# Patient Record
Sex: FEMALE | Race: BLACK OR AFRICAN AMERICAN | NOT HISPANIC OR LATINO | URBAN - METROPOLITAN AREA
[De-identification: names, ages, dates, MRNs, and addresses within clinical notes are randomized per-mention and may not be internally consistent; named-entity substitution may affect disease eponyms.]

---

## 2017-01-27 ENCOUNTER — EMERGENCY (EMERGENCY)
Facility: HOSPITAL | Age: 62
LOS: 1 days | Discharge: HOME | End: 2017-01-27
Attending: EMERGENCY MEDICINE | Admitting: EMERGENCY MEDICINE
Payer: COMMERCIAL

## 2017-01-27 VITALS
DIASTOLIC BLOOD PRESSURE: 106 MMHG | SYSTOLIC BLOOD PRESSURE: 152 MMHG | HEART RATE: 103 BPM | RESPIRATION RATE: 20 BRPM | TEMPERATURE: 99 F | OXYGEN SATURATION: 98 %

## 2017-01-27 DIAGNOSIS — S01.01XA LACERATION WITHOUT FOREIGN BODY OF SCALP, INITIAL ENCOUNTER: ICD-10-CM

## 2017-01-27 PROCEDURE — 70450 CT HEAD/BRAIN W/O DYE: CPT | Mod: 26

## 2017-01-27 PROCEDURE — 72125 CT NECK SPINE W/O DYE: CPT | Mod: 26

## 2017-01-27 PROCEDURE — 12001 RPR S/N/AX/GEN/TRNK 2.5CM/<: CPT

## 2017-01-27 PROCEDURE — 71010: CPT | Mod: 26

## 2017-01-27 PROCEDURE — 99285 EMERGENCY DEPT VISIT HI MDM: CPT | Mod: 25

## 2017-01-27 RX ORDER — ACETAMINOPHEN 500 MG
975 TABLET ORAL ONCE
Qty: 0 | Refills: 0 | Status: COMPLETED | OUTPATIENT
Start: 2017-01-27 | End: 2017-01-27

## 2017-01-27 RX ADMIN — Medication 975 MILLIGRAM(S): at 22:11

## 2017-01-27 NOTE — ED PROCEDURE NOTE - CPROC ED POST PROC CARE GUIDE1
Verbal/written post procedure instructions were given to patient/caregiver. Verbal/written post procedure instructions were given to patient/caregiver./Instructed patient/caregiver regarding signs and symptoms of infection./Instructed patient/caregiver to follow-up with primary care physician.

## 2017-01-27 NOTE — ED PROVIDER NOTE - PHYSICAL EXAMINATION
Gen: NAD  Eyes: PERRL, EOMI. sclerae white, no icterus  ENT: Moist mucous membranes. No exudates  Neck: supple, no LAD, mass or goiter, trachea midline  CV: RRR. Audible S1 and S2. No murmurs, rubs, gallops, S3, nor S4  Pulm: Clear to auscultation bilaterally. No wheezes, rales, or rhonchi  Abd: Obese, BS+, nondistended, No tenderness to palpation  Musculoskeletal:  No edema  Skin: 2cm lac on posterior scalp  Psych: mood good, affect full range and congruent with mood.  Neurologic: AAOx3, Cranial nerves grossly intact. 5/5 strength in all extrem

## 2017-01-27 NOTE — ED PROVIDER NOTE - OBJECTIVE STATEMENT
62 y/o F presents after fall. Walking up stairs with laundry basket w/ slippers, fell down 3 steps, hit head. Not sure if LOC. Sister was at home, ran immediately to see pt, states no LOC. C/O head soreness and soreness in mid back. Able to ambulate w/out difficulty.   PMHx: A-fib on Eliquis, HTN, DM, asthma  PSHx: None 62 y/o F presents after fall. Walking up stairs with laundry basket w/ slippers, fell down 3 steps, hit head. Not sure if LOC. Sister was at home, ran immediately to see pt, states no LOC. C/O head soreness and soreness in mid back. Able to ambulate w/out difficulty. Tetanus UTD  PMHx: A-fib on Eliquis, HTN, DM, asthma  PSHx: None

## 2017-01-27 NOTE — ED ADULT NURSE NOTE - OBJECTIVE STATEMENT
Pt presents to ED awake and alert, brought in by EMS s/p fall at home. Pt states she was walking up her stairs carrying laundry and lost her footing and fell backwards. Pt is unsure about LOC but did strike her head. Pt also reports mid back pain. Laceration to the occiput and bleeding is controlled. Pt denies neck pain but EMS did place a collar. Pt denies feeling dizzy, chest pain or SOB prior to falling. Pt takes Eloquis for Afib and has h/o HTN and DM. Pt was ambulatory at the scene after falling.

## 2017-01-27 NOTE — ED PROVIDER NOTE - CARE PLAN
Principal Discharge DX:	Fall  Secondary Diagnosis:	Laceration Principal Discharge DX:	Fall, initial encounter  Secondary Diagnosis:	Laceration

## 2017-01-27 NOTE — ED PROVIDER NOTE - ATTENDING CONTRIBUTION TO CARE
****ATTENDING**** 62yo f hx Afib on eliquis, HTN, DM presents s/p mechanical fall 2 steps onto her head. + trauma to the head, unknown loc, sister came immediately and patient was awake. Pt co headache at site of impact. no neck or back pain. no chest/abd, numbness, tingling or weakness. Denies any symptoms of prior illness or dizziness. On exam, Patient is awake, alert x 3. GCS15. eval laceration s/p ct scan and removal of collar. PERRL. C Collar in place, No Posterior midline cervical spine tenderness.  Chest is clear to auscultation. +S1S2. Abdomen is soft nondistended/nontender +BS. No rebound or guarding. Pelvis is stable. Full ROM B/L hips. Back non tender midline T/L spine.   Pt well appearing, neurologically intact. Check CT to ro injury. Tet utd. Pain control. Laceration repair.

## 2017-01-27 NOTE — ED PROVIDER NOTE - MEDICAL DECISION MAKING DETAILS
62 y/o F w/ A-fib on eliquis presents after fall, hit head, no LOC. Neuro exam WNL. Scalp lac. Plan: CT head and C-spine, lac repair.

## 2017-01-27 NOTE — ED PROVIDER NOTE - PROGRESS NOTE DETAILS
Patient noted to be tachycardic to 100, states she took her meds tonight and normally HR is in 90s. States she is nervous to be here. Also noted pulse ox to be 92 now. States she has history of atelectasis and states she is not able to take deep breaths with the collar. Will send labs and CT non con chest and continue to monitor. ROLY

## 2017-01-28 VITALS
OXYGEN SATURATION: 95 % | DIASTOLIC BLOOD PRESSURE: 78 MMHG | SYSTOLIC BLOOD PRESSURE: 124 MMHG | HEART RATE: 97 BPM | TEMPERATURE: 98 F | RESPIRATION RATE: 20 BRPM

## 2017-01-28 LAB
ALBUMIN SERPL ELPH-MCNC: 3.7 G/DL — SIGNIFICANT CHANGE UP (ref 3.3–5)
ALP SERPL-CCNC: 77 U/L — SIGNIFICANT CHANGE UP (ref 40–120)
ALT FLD-CCNC: 20 U/L RC — SIGNIFICANT CHANGE UP (ref 10–45)
ANION GAP SERPL CALC-SCNC: 11 MMOL/L — SIGNIFICANT CHANGE UP (ref 5–17)
ANISOCYTOSIS BLD QL: SLIGHT — SIGNIFICANT CHANGE UP
APTT BLD: 34.8 SEC — SIGNIFICANT CHANGE UP (ref 27.5–37.4)
AST SERPL-CCNC: 27 U/L — SIGNIFICANT CHANGE UP (ref 10–40)
BASE EXCESS BLDV CALC-SCNC: 1.6 MMOL/L — SIGNIFICANT CHANGE UP (ref -2–2)
BASO STIPL BLD QL SMEAR: PRESENT — SIGNIFICANT CHANGE UP
BILIRUB SERPL-MCNC: 0.8 MG/DL — SIGNIFICANT CHANGE UP (ref 0.2–1.2)
BUN SERPL-MCNC: 22 MG/DL — SIGNIFICANT CHANGE UP (ref 7–23)
BURR CELLS BLD QL SMEAR: PRESENT — SIGNIFICANT CHANGE UP
CA-I SERPL-SCNC: 1.25 MMOL/L — SIGNIFICANT CHANGE UP (ref 1.12–1.3)
CALCIUM SERPL-MCNC: 9.3 MG/DL — SIGNIFICANT CHANGE UP (ref 8.4–10.5)
CHLORIDE BLDV-SCNC: 108 MMOL/L — SIGNIFICANT CHANGE UP (ref 96–108)
CHLORIDE SERPL-SCNC: 106 MMOL/L — SIGNIFICANT CHANGE UP (ref 96–108)
CO2 BLDV-SCNC: 28 MMOL/L — SIGNIFICANT CHANGE UP (ref 22–30)
CO2 SERPL-SCNC: 27 MMOL/L — SIGNIFICANT CHANGE UP (ref 22–31)
CREAT SERPL-MCNC: 1.06 MG/DL — SIGNIFICANT CHANGE UP (ref 0.5–1.3)
DACRYOCYTES BLD QL SMEAR: SIGNIFICANT CHANGE UP
ELLIPTOCYTES BLD QL SMEAR: SLIGHT — SIGNIFICANT CHANGE UP
EOSINOPHIL NFR BLD AUTO: 1 % — SIGNIFICANT CHANGE UP (ref 0–6)
GAS PNL BLDV: 142 MMOL/L — SIGNIFICANT CHANGE UP (ref 136–145)
GAS PNL BLDV: SIGNIFICANT CHANGE UP
GAS PNL BLDV: SIGNIFICANT CHANGE UP
GLUCOSE BLDV-MCNC: 122 MG/DL — HIGH (ref 70–99)
GLUCOSE SERPL-MCNC: 125 MG/DL — HIGH (ref 70–99)
HCO3 BLDV-SCNC: 27 MMOL/L — SIGNIFICANT CHANGE UP (ref 21–29)
HCT VFR BLD CALC: 34.1 % — LOW (ref 34.5–45)
HCT VFR BLDA CALC: 34 % — LOW (ref 39–50)
HGB BLD CALC-MCNC: 10.9 G/DL — LOW (ref 11.5–15.5)
HGB BLD-MCNC: 11.1 G/DL — LOW (ref 11.5–15.5)
HYPOCHROMIA BLD QL: SIGNIFICANT CHANGE UP
INR BLD: 1.45 RATIO — HIGH (ref 0.88–1.16)
LACTATE BLDV-MCNC: 1.7 MMOL/L — SIGNIFICANT CHANGE UP (ref 0.7–2)
LYMPHOCYTES # BLD AUTO: 1.4 K/UL — SIGNIFICANT CHANGE UP (ref 1–3.3)
LYMPHOCYTES # BLD AUTO: 12 % — LOW (ref 13–44)
MCHC RBC-ENTMCNC: 21.6 PG — LOW (ref 27–34)
MCHC RBC-ENTMCNC: 32.5 GM/DL — SIGNIFICANT CHANGE UP (ref 32–36)
MCV RBC AUTO: 66.4 FL — LOW (ref 80–100)
MICROCYTES BLD QL: SIGNIFICANT CHANGE UP
MONOCYTES # BLD AUTO: 1.1 K/UL — HIGH (ref 0–0.9)
MONOCYTES NFR BLD AUTO: 10 % — SIGNIFICANT CHANGE UP (ref 2–14)
NEUTROPHILS NFR BLD AUTO: 74 % — SIGNIFICANT CHANGE UP (ref 43–77)
NEUTS BAND # BLD: 2 % — SIGNIFICANT CHANGE UP (ref 0–8)
NRBC # BLD: 1 /100 — HIGH (ref 0–0)
PCO2 BLDV: 46 MMHG — SIGNIFICANT CHANGE UP (ref 35–50)
PH BLDV: 7.38 — SIGNIFICANT CHANGE UP (ref 7.35–7.45)
PLAT MORPH BLD: NORMAL — SIGNIFICANT CHANGE UP
PLATELET # BLD AUTO: 212 K/UL — SIGNIFICANT CHANGE UP (ref 150–400)
PO2 BLDV: 39 MMHG — SIGNIFICANT CHANGE UP (ref 25–45)
POIKILOCYTOSIS BLD QL AUTO: SIGNIFICANT CHANGE UP
POLYCHROMASIA BLD QL SMEAR: SLIGHT — SIGNIFICANT CHANGE UP
POTASSIUM BLDV-SCNC: 4.1 MMOL/L — SIGNIFICANT CHANGE UP (ref 3.5–5)
POTASSIUM SERPL-MCNC: 4.3 MMOL/L — SIGNIFICANT CHANGE UP (ref 3.5–5.3)
POTASSIUM SERPL-SCNC: 4.3 MMOL/L — SIGNIFICANT CHANGE UP (ref 3.5–5.3)
PROT SERPL-MCNC: 6.7 G/DL — SIGNIFICANT CHANGE UP (ref 6–8.3)
PROTHROM AB SERPL-ACNC: 15.7 SEC — HIGH (ref 10–13.1)
RBC # BLD: 5.13 M/UL — SIGNIFICANT CHANGE UP (ref 3.8–5.2)
RBC # FLD: 17 % — HIGH (ref 10.3–14.5)
RBC BLD AUTO: ABNORMAL
SAO2 % BLDV: 65 % — LOW (ref 67–88)
SCHISTOCYTES BLD QL AUTO: SLIGHT — SIGNIFICANT CHANGE UP
SODIUM SERPL-SCNC: 144 MMOL/L — SIGNIFICANT CHANGE UP (ref 135–145)
TARGETS BLD QL SMEAR: SLIGHT — SIGNIFICANT CHANGE UP
VARIANT LYMPHS # BLD: 1 % — SIGNIFICANT CHANGE UP (ref 0–6)
WBC # BLD: 10.7 K/UL — HIGH (ref 3.8–10.5)
WBC # FLD AUTO: 10.7 K/UL — HIGH (ref 3.8–10.5)

## 2017-01-28 PROCEDURE — 72125 CT NECK SPINE W/O DYE: CPT

## 2017-01-28 PROCEDURE — 82330 ASSAY OF CALCIUM: CPT

## 2017-01-28 PROCEDURE — 71045 X-RAY EXAM CHEST 1 VIEW: CPT

## 2017-01-28 PROCEDURE — 71250 CT THORAX DX C-: CPT

## 2017-01-28 PROCEDURE — 12001 RPR S/N/AX/GEN/TRNK 2.5CM/<: CPT

## 2017-01-28 PROCEDURE — 71250 CT THORAX DX C-: CPT | Mod: 26

## 2017-01-28 PROCEDURE — 70450 CT HEAD/BRAIN W/O DYE: CPT

## 2017-01-28 PROCEDURE — 80053 COMPREHEN METABOLIC PANEL: CPT

## 2017-01-28 PROCEDURE — 84132 ASSAY OF SERUM POTASSIUM: CPT

## 2017-01-28 PROCEDURE — 85610 PROTHROMBIN TIME: CPT

## 2017-01-28 PROCEDURE — 82435 ASSAY OF BLOOD CHLORIDE: CPT

## 2017-01-28 PROCEDURE — 85014 HEMATOCRIT: CPT

## 2017-01-28 PROCEDURE — 82803 BLOOD GASES ANY COMBINATION: CPT

## 2017-01-28 PROCEDURE — 99284 EMERGENCY DEPT VISIT MOD MDM: CPT | Mod: 25

## 2017-01-28 PROCEDURE — 85730 THROMBOPLASTIN TIME PARTIAL: CPT

## 2017-01-28 PROCEDURE — 82947 ASSAY GLUCOSE BLOOD QUANT: CPT

## 2017-01-28 PROCEDURE — 84295 ASSAY OF SERUM SODIUM: CPT

## 2017-01-28 PROCEDURE — 83605 ASSAY OF LACTIC ACID: CPT

## 2017-01-28 PROCEDURE — 85027 COMPLETE CBC AUTOMATED: CPT

## 2017-01-28 RX ORDER — SODIUM CHLORIDE 9 MG/ML
500 INJECTION INTRAMUSCULAR; INTRAVENOUS; SUBCUTANEOUS ONCE
Qty: 0 | Refills: 0 | Status: COMPLETED | OUTPATIENT
Start: 2017-01-28 | End: 2017-01-28

## 2017-01-28 RX ADMIN — SODIUM CHLORIDE 500 MILLILITER(S): 9 INJECTION INTRAMUSCULAR; INTRAVENOUS; SUBCUTANEOUS at 00:37

## 2017-01-28 NOTE — ED ADULT NURSE REASSESSMENT NOTE - NS ED NURSE REASSESS COMMENT FT1
Pt O2 sat 92% on room air. Dr. Baron notified and went to bedside to see pt. Pt sat up and coughed and took deep breaths which raised O2 sat to 94% on room air. Pt placed on 2L nasal cannula as per MD and CT chest and labs ordered d/t persistent hypoxia on room air.

## 2017-01-28 NOTE — ED ADULT NURSE REASSESSMENT NOTE - NS ED NURSE REASSESS COMMENT FT1
Discussed Ct results and dispo with Dr. Jaime and Dr. Lira. Pt O2 sat is 95% on room air while she is taking deep breaths. Pt denies respiratory distress or SOB. Family states pt seems to be breathing as she normally does. Pt is ambulatory to and from the restroom. Pt has a scheduled appt with her pulmonologist in one week. Pt will return to either the ED or her PCP for staple removal. Pt urged to return to the ED for any concerns of worsening SOB or any respiratory distress. Dr. Lira and Dr. Jaime OK with pt being d/becca.

## 2017-02-04 ENCOUNTER — EMERGENCY (EMERGENCY)
Facility: HOSPITAL | Age: 62
LOS: 1 days | Discharge: HOME | End: 2017-02-04
Attending: EMERGENCY MEDICINE | Admitting: EMERGENCY MEDICINE
Payer: COMMERCIAL

## 2017-02-04 VITALS
SYSTOLIC BLOOD PRESSURE: 136 MMHG | DIASTOLIC BLOOD PRESSURE: 83 MMHG | TEMPERATURE: 99 F | OXYGEN SATURATION: 96 % | HEART RATE: 90 BPM | RESPIRATION RATE: 16 BRPM

## 2017-02-04 DIAGNOSIS — S01.01XD LACERATION WITHOUT FOREIGN BODY OF SCALP, SUBSEQUENT ENCOUNTER: ICD-10-CM

## 2017-02-04 DIAGNOSIS — I10 ESSENTIAL (PRIMARY) HYPERTENSION: ICD-10-CM

## 2017-02-04 DIAGNOSIS — E11.9 TYPE 2 DIABETES MELLITUS WITHOUT COMPLICATIONS: ICD-10-CM
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
## 2017-02-04 DIAGNOSIS — Z79.84 LONG TERM (CURRENT) USE OF ORAL HYPOGLYCEMIC DRUGS: ICD-10-CM

## 2017-02-04 PROCEDURE — G0463: CPT

## 2017-02-04 NOTE — ED PROVIDER NOTE - CARE PLAN
Principal Discharge DX:	Removal of staples  Instructions for follow-up, activity and diet:	1. Have your blood pressure check by your doctor next week  2. You may wash your hair  3. Return If you have any problems

## 2017-02-04 NOTE — ED PROVIDER NOTE - OBJECTIVE STATEMENT
Fall descending stairs 8d ago. No loc no chest pain shortness of breath, weakness,. Seen in ed had scalp lac stabled, Now pw req for remvoal of same.Has been well since except mild gen soreness. Private Physician HTN, Dm

## 2017-02-04 NOTE — ED ADULT NURSE NOTE - OBJECTIVE STATEMENT
Pt presents to the ER A+Ox3 for staple removal status post fall x8 days ago. Pt states was walking downstairs tripped and fell hit head; 4 staples were placed at that time. (-) redness, swelling, fever, chills, nausea, vomiting.

## 2017-02-04 NOTE — ED PROVIDER NOTE - PLAN OF CARE
1. Have your blood pressure check by your doctor next week  2. You may wash your hair  3. Return If you have any problems

## 2017-02-22 ENCOUNTER — APPOINTMENT (OUTPATIENT)
Dept: CARDIOLOGY | Facility: CLINIC | Age: 62
End: 2017-02-22

## 2017-02-22 VITALS
RESPIRATION RATE: 14 BRPM | DIASTOLIC BLOOD PRESSURE: 88 MMHG | BODY MASS INDEX: 42.17 KG/M2 | HEIGHT: 64 IN | WEIGHT: 247 LBS | OXYGEN SATURATION: 93 % | HEART RATE: 82 BPM | SYSTOLIC BLOOD PRESSURE: 145 MMHG

## 2017-03-24 ENCOUNTER — OUTPATIENT (OUTPATIENT)
Dept: OUTPATIENT SERVICES | Facility: HOSPITAL | Age: 62
LOS: 1 days | End: 2017-03-24

## 2017-03-24 ENCOUNTER — APPOINTMENT (OUTPATIENT)
Dept: CV DIAGNOSITCS | Facility: HOSPITAL | Age: 62
End: 2017-03-24

## 2017-03-24 DIAGNOSIS — I11.9 HYPERTENSIVE HEART DISEASE WITHOUT HEART FAILURE: ICD-10-CM

## 2017-03-24 DIAGNOSIS — R06.02 SHORTNESS OF BREATH: ICD-10-CM

## 2017-03-24 DIAGNOSIS — I10 ESSENTIAL (PRIMARY) HYPERTENSION: ICD-10-CM

## 2017-05-22 ENCOUNTER — APPOINTMENT (OUTPATIENT)
Dept: CARDIOLOGY | Facility: CLINIC | Age: 62
End: 2017-05-22

## 2017-05-22 ENCOUNTER — NON-APPOINTMENT (OUTPATIENT)
Age: 62
End: 2017-05-22

## 2017-05-22 VITALS — SYSTOLIC BLOOD PRESSURE: 134 MMHG | DIASTOLIC BLOOD PRESSURE: 87 MMHG

## 2017-05-22 VITALS — HEIGHT: 64 IN | WEIGHT: 254 LBS | HEART RATE: 82 BPM | BODY MASS INDEX: 43.36 KG/M2

## 2017-07-13 ENCOUNTER — APPOINTMENT (OUTPATIENT)
Dept: OPHTHALMOLOGY | Facility: CLINIC | Age: 62
End: 2017-07-13

## 2017-07-31 ENCOUNTER — APPOINTMENT (OUTPATIENT)
Dept: CARDIOLOGY | Facility: CLINIC | Age: 62
End: 2017-07-31
Payer: COMMERCIAL

## 2017-07-31 VITALS
OXYGEN SATURATION: 90 % | SYSTOLIC BLOOD PRESSURE: 126 MMHG | HEART RATE: 92 BPM | WEIGHT: 242 LBS | HEIGHT: 64 IN | DIASTOLIC BLOOD PRESSURE: 82 MMHG | BODY MASS INDEX: 41.32 KG/M2

## 2017-07-31 PROCEDURE — 93000 ELECTROCARDIOGRAM COMPLETE: CPT

## 2017-07-31 PROCEDURE — 99214 OFFICE O/P EST MOD 30 MIN: CPT

## 2017-08-01 ENCOUNTER — NON-APPOINTMENT (OUTPATIENT)
Age: 62
End: 2017-08-01

## 2017-10-30 ENCOUNTER — INPATIENT (INPATIENT)
Facility: HOSPITAL | Age: 62
LOS: 10 days | Discharge: HOME HEALTH | End: 2017-11-10
Attending: INTERNAL MEDICINE | Admitting: INTERNAL MEDICINE
Payer: COMMERCIAL

## 2017-10-30 VITALS
HEART RATE: 89 BPM | DIASTOLIC BLOOD PRESSURE: 94 MMHG | SYSTOLIC BLOOD PRESSURE: 122 MMHG | RESPIRATION RATE: 18 BRPM | OXYGEN SATURATION: 100 % | TEMPERATURE: 98 F

## 2017-10-30 DIAGNOSIS — E11.9 TYPE 2 DIABETES MELLITUS WITHOUT COMPLICATIONS: ICD-10-CM

## 2017-10-30 DIAGNOSIS — R06.00 DYSPNEA, UNSPECIFIED: ICD-10-CM

## 2017-10-30 DIAGNOSIS — I50.9 HEART FAILURE, UNSPECIFIED: ICD-10-CM

## 2017-10-30 DIAGNOSIS — Z29.9 ENCOUNTER FOR PROPHYLACTIC MEASURES, UNSPECIFIED: ICD-10-CM

## 2017-10-30 DIAGNOSIS — C91.10 CHRONIC LYMPHOCYTIC LEUKEMIA OF B-CELL TYPE NOT HAVING ACHIEVED REMISSION: ICD-10-CM

## 2017-10-30 DIAGNOSIS — I10 ESSENTIAL (PRIMARY) HYPERTENSION: ICD-10-CM

## 2017-10-30 DIAGNOSIS — I48.2 CHRONIC ATRIAL FIBRILLATION: ICD-10-CM

## 2017-10-30 DIAGNOSIS — J44.9 CHRONIC OBSTRUCTIVE PULMONARY DISEASE, UNSPECIFIED: ICD-10-CM

## 2017-10-30 LAB
ALBUMIN SERPL ELPH-MCNC: 3.4 G/DL — SIGNIFICANT CHANGE UP (ref 3.3–5)
ALP SERPL-CCNC: 72 U/L — SIGNIFICANT CHANGE UP (ref 40–120)
ALT FLD-CCNC: 20 U/L — SIGNIFICANT CHANGE UP (ref 4–33)
ANISOCYTOSIS BLD QL: SIGNIFICANT CHANGE UP
APPEARANCE UR: CLEAR — SIGNIFICANT CHANGE UP
AST SERPL-CCNC: 21 U/L — SIGNIFICANT CHANGE UP (ref 4–32)
BASE EXCESS BLDV CALC-SCNC: -1.9 MMOL/L — SIGNIFICANT CHANGE UP
BASOPHILS # BLD AUTO: 0.04 K/UL — SIGNIFICANT CHANGE UP (ref 0–0.2)
BASOPHILS NFR BLD AUTO: 0.6 % — SIGNIFICANT CHANGE UP (ref 0–2)
BASOPHILS NFR SPEC: 1.7 % — SIGNIFICANT CHANGE UP (ref 0–2)
BILIRUB SERPL-MCNC: 0.8 MG/DL — SIGNIFICANT CHANGE UP (ref 0.2–1.2)
BILIRUB UR-MCNC: NEGATIVE — SIGNIFICANT CHANGE UP
BLASTS # FLD: 0 % — SIGNIFICANT CHANGE UP (ref 0–0)
BLOOD GAS VENOUS - CREATININE: 1.68 MG/DL — HIGH (ref 0.5–1.3)
BLOOD UR QL VISUAL: NEGATIVE — SIGNIFICANT CHANGE UP
BUN SERPL-MCNC: 45 MG/DL — HIGH (ref 7–23)
CALCIUM SERPL-MCNC: 8.6 MG/DL — SIGNIFICANT CHANGE UP (ref 8.4–10.5)
CHLORIDE BLDV-SCNC: 110 MMOL/L — HIGH (ref 96–108)
CHLORIDE SERPL-SCNC: 107 MMOL/L — SIGNIFICANT CHANGE UP (ref 98–107)
CK MB BLD-MCNC: 2.75 NG/ML — SIGNIFICANT CHANGE UP (ref 1–4.7)
CK MB BLD-MCNC: SIGNIFICANT CHANGE UP (ref 0–2.5)
CK SERPL-CCNC: 50 U/L — SIGNIFICANT CHANGE UP (ref 25–170)
CO2 SERPL-SCNC: 23 MMOL/L — SIGNIFICANT CHANGE UP (ref 22–31)
COLOR SPEC: YELLOW — SIGNIFICANT CHANGE UP
CREAT SERPL-MCNC: 1.66 MG/DL — HIGH (ref 0.5–1.3)
DACRYOCYTES BLD QL SMEAR: SLIGHT — SIGNIFICANT CHANGE UP
ELLIPTOCYTES BLD QL SMEAR: SLIGHT — SIGNIFICANT CHANGE UP
EOSINOPHIL # BLD AUTO: 0.15 K/UL — SIGNIFICANT CHANGE UP (ref 0–0.5)
EOSINOPHIL NFR BLD AUTO: 2.1 % — SIGNIFICANT CHANGE UP (ref 0–6)
EOSINOPHIL NFR FLD: 1.7 % — SIGNIFICANT CHANGE UP (ref 0–6)
GAS PNL BLDV: 139 MMOL/L — SIGNIFICANT CHANGE UP (ref 136–146)
GIANT PLATELETS BLD QL SMEAR: PRESENT — SIGNIFICANT CHANGE UP
GLUCOSE BLDV-MCNC: 139 — HIGH (ref 70–99)
GLUCOSE SERPL-MCNC: 130 MG/DL — HIGH (ref 70–99)
GLUCOSE UR-MCNC: NEGATIVE — SIGNIFICANT CHANGE UP
HBA1C BLD-MCNC: 6.5 % — HIGH (ref 4–5.6)
HCO3 BLDV-SCNC: 20 MMOL/L — SIGNIFICANT CHANGE UP (ref 20–27)
HCT VFR BLD CALC: 38.7 % — SIGNIFICANT CHANGE UP (ref 34.5–45)
HCT VFR BLDV CALC: 36.2 % — SIGNIFICANT CHANGE UP (ref 34.5–45)
HGB BLD-MCNC: 11.3 G/DL — LOW (ref 11.5–15.5)
HGB BLDV-MCNC: 11.8 G/DL — SIGNIFICANT CHANGE UP (ref 11.5–15.5)
HYALINE CASTS # UR AUTO: SIGNIFICANT CHANGE UP (ref 0–?)
HYPOCHROMIA BLD QL: SIGNIFICANT CHANGE UP
IMM GRANULOCYTES # BLD AUTO: 0.03 # — SIGNIFICANT CHANGE UP
IMM GRANULOCYTES NFR BLD AUTO: 0.4 % — SIGNIFICANT CHANGE UP (ref 0–1.5)
KETONES UR-MCNC: NEGATIVE — SIGNIFICANT CHANGE UP
LACTATE BLDV-MCNC: 2.8 MMOL/L — HIGH (ref 0.5–2)
LEUKOCYTE ESTERASE UR-ACNC: NEGATIVE — SIGNIFICANT CHANGE UP
LYMPHOCYTES # BLD AUTO: 1.97 K/UL — SIGNIFICANT CHANGE UP (ref 1–3.3)
LYMPHOCYTES # BLD AUTO: 28 % — SIGNIFICANT CHANGE UP (ref 13–44)
LYMPHOCYTES NFR SPEC AUTO: 17.2 % — SIGNIFICANT CHANGE UP (ref 13–44)
MACROCYTES BLD QL: SLIGHT — SIGNIFICANT CHANGE UP
MAGNESIUM SERPL-MCNC: 2 MG/DL — SIGNIFICANT CHANGE UP (ref 1.6–2.6)
MCHC RBC-ENTMCNC: 20.3 PG — LOW (ref 27–34)
MCHC RBC-ENTMCNC: 29.2 % — LOW (ref 32–36)
MCV RBC AUTO: 69.5 FL — LOW (ref 80–100)
METAMYELOCYTES # FLD: 0 % — SIGNIFICANT CHANGE UP (ref 0–1)
MICROCYTES BLD QL: SIGNIFICANT CHANGE UP
MONOCYTES # BLD AUTO: 0.89 K/UL — SIGNIFICANT CHANGE UP (ref 0–0.9)
MONOCYTES NFR BLD AUTO: 12.7 % — SIGNIFICANT CHANGE UP (ref 2–14)
MONOCYTES NFR BLD: 6.9 % — SIGNIFICANT CHANGE UP (ref 2–9)
MUCOUS THREADS # UR AUTO: SIGNIFICANT CHANGE UP
MYELOCYTES NFR BLD: 0 % — SIGNIFICANT CHANGE UP (ref 0–0)
NEUTROPHIL AB SER-ACNC: 62.1 % — SIGNIFICANT CHANGE UP (ref 43–77)
NEUTROPHILS # BLD AUTO: 3.95 K/UL — SIGNIFICANT CHANGE UP (ref 1.8–7.4)
NEUTROPHILS NFR BLD AUTO: 56.2 % — SIGNIFICANT CHANGE UP (ref 43–77)
NEUTS BAND # BLD: 0 % — SIGNIFICANT CHANGE UP (ref 0–6)
NITRITE UR-MCNC: NEGATIVE — SIGNIFICANT CHANGE UP
NON-SQ EPI CELLS # UR AUTO: <1 — SIGNIFICANT CHANGE UP
NRBC # FLD: 0.22 — SIGNIFICANT CHANGE UP
NRBC FLD-RTO: 3.1 — SIGNIFICANT CHANGE UP
NT-PROBNP SERPL-SCNC: 6997 PG/ML — SIGNIFICANT CHANGE UP
OTHER - HEMATOLOGY %: 0 — SIGNIFICANT CHANGE UP
OVALOCYTES BLD QL SMEAR: SLIGHT — SIGNIFICANT CHANGE UP
PCO2 BLDV: 62 MMHG — HIGH (ref 41–51)
PH BLDV: 7.23 PH — LOW (ref 7.32–7.43)
PH UR: 6 — SIGNIFICANT CHANGE UP (ref 4.6–8)
PHOSPHATE SERPL-MCNC: 3.2 MG/DL — SIGNIFICANT CHANGE UP (ref 2.5–4.5)
PLATELET # BLD AUTO: 216 K/UL — SIGNIFICANT CHANGE UP (ref 150–400)
PLATELET COUNT - ESTIMATE: NORMAL — SIGNIFICANT CHANGE UP
PMV BLD: SIGNIFICANT CHANGE UP FL (ref 7–13)
PO2 BLDV: < 24 MMHG — LOW (ref 35–40)
POIKILOCYTOSIS BLD QL AUTO: SIGNIFICANT CHANGE UP
POLYCHROMASIA BLD QL SMEAR: SLIGHT — SIGNIFICANT CHANGE UP
POTASSIUM BLDV-SCNC: 4.3 MMOL/L — SIGNIFICANT CHANGE UP (ref 3.4–4.5)
POTASSIUM SERPL-MCNC: 4.5 MMOL/L — SIGNIFICANT CHANGE UP (ref 3.5–5.3)
POTASSIUM SERPL-SCNC: 4.5 MMOL/L — SIGNIFICANT CHANGE UP (ref 3.5–5.3)
PROMYELOCYTES # FLD: 0 % — SIGNIFICANT CHANGE UP (ref 0–0)
PROT SERPL-MCNC: 6.3 G/DL — SIGNIFICANT CHANGE UP (ref 6–8.3)
PROT UR-MCNC: 300 — HIGH
RBC # BLD: 5.57 M/UL — HIGH (ref 3.8–5.2)
RBC # FLD: 20.6 % — HIGH (ref 10.3–14.5)
RBC CASTS # UR COMP ASSIST: SIGNIFICANT CHANGE UP (ref 0–?)
REVIEW TO FOLLOW: YES — SIGNIFICANT CHANGE UP
SAO2 % BLDV: 23.1 % — LOW (ref 60–85)
SCHISTOCYTES BLD QL AUTO: SLIGHT — SIGNIFICANT CHANGE UP
SODIUM SERPL-SCNC: 142 MMOL/L — SIGNIFICANT CHANGE UP (ref 135–145)
SP GR SPEC: 1.02 — SIGNIFICANT CHANGE UP (ref 1–1.03)
SPHEROCYTES BLD QL SMEAR: SLIGHT — SIGNIFICANT CHANGE UP
SQUAMOUS # UR AUTO: SIGNIFICANT CHANGE UP
TARGETS BLD QL SMEAR: SIGNIFICANT CHANGE UP
TROPONIN T SERPL-MCNC: < 0.06 NG/ML — SIGNIFICANT CHANGE UP (ref 0–0.06)
TSH SERPL-MCNC: 6.54 UIU/ML — HIGH (ref 0.27–4.2)
UROBILINOGEN FLD QL: NORMAL E.U. — SIGNIFICANT CHANGE UP (ref 0.1–0.2)
VARIANT LYMPHS # BLD: 10.4 % — SIGNIFICANT CHANGE UP
WBC # BLD: 7.03 K/UL — SIGNIFICANT CHANGE UP (ref 3.8–10.5)
WBC # FLD AUTO: 7.03 K/UL — SIGNIFICANT CHANGE UP (ref 3.8–10.5)
WBC UR QL: SIGNIFICANT CHANGE UP (ref 0–?)

## 2017-10-30 PROCEDURE — 71010: CPT | Mod: 26

## 2017-10-30 RX ORDER — ALLOPURINOL 300 MG
100 TABLET ORAL
Qty: 0 | Refills: 0 | Status: DISCONTINUED | OUTPATIENT
Start: 2017-10-30 | End: 2017-11-10

## 2017-10-30 RX ORDER — INSULIN LISPRO 100/ML
VIAL (ML) SUBCUTANEOUS
Qty: 0 | Refills: 0 | Status: DISCONTINUED | OUTPATIENT
Start: 2017-10-30 | End: 2017-11-10

## 2017-10-30 RX ORDER — BUDESONIDE AND FORMOTEROL FUMARATE DIHYDRATE 160; 4.5 UG/1; UG/1
2 AEROSOL RESPIRATORY (INHALATION)
Qty: 0 | Refills: 0 | Status: DISCONTINUED | OUTPATIENT
Start: 2017-10-30 | End: 2017-11-05

## 2017-10-30 RX ORDER — DEXTROSE 50 % IN WATER 50 %
25 SYRINGE (ML) INTRAVENOUS ONCE
Qty: 0 | Refills: 0 | Status: DISCONTINUED | OUTPATIENT
Start: 2017-10-30 | End: 2017-11-10

## 2017-10-30 RX ORDER — FUROSEMIDE 40 MG
40 TABLET ORAL ONCE
Qty: 0 | Refills: 0 | Status: COMPLETED | OUTPATIENT
Start: 2017-10-30 | End: 2017-10-31

## 2017-10-30 RX ORDER — LISINOPRIL 2.5 MG/1
0 TABLET ORAL
Qty: 0 | Refills: 0 | COMMUNITY

## 2017-10-30 RX ORDER — AMLODIPINE BESYLATE 2.5 MG/1
0 TABLET ORAL
Qty: 0 | Refills: 0 | COMMUNITY

## 2017-10-30 RX ORDER — FUROSEMIDE 40 MG
0 TABLET ORAL
Qty: 0 | Refills: 0 | COMMUNITY

## 2017-10-30 RX ORDER — METOPROLOL TARTRATE 50 MG
0 TABLET ORAL
Qty: 0 | Refills: 0 | COMMUNITY

## 2017-10-30 RX ORDER — METOPROLOL TARTRATE 50 MG
12.5 TABLET ORAL
Qty: 0 | Refills: 0 | Status: DISCONTINUED | OUTPATIENT
Start: 2017-10-30 | End: 2017-10-31

## 2017-10-30 RX ORDER — FUROSEMIDE 40 MG
7.5 TABLET ORAL
Qty: 500 | Refills: 0 | Status: DISCONTINUED | OUTPATIENT
Start: 2017-10-30 | End: 2017-11-06

## 2017-10-30 RX ORDER — INFLUENZA VIRUS VACCINE 15; 15; 15; 15 UG/.5ML; UG/.5ML; UG/.5ML; UG/.5ML
0.5 SUSPENSION INTRAMUSCULAR ONCE
Qty: 0 | Refills: 0 | Status: DISCONTINUED | OUTPATIENT
Start: 2017-10-30 | End: 2017-11-10

## 2017-10-30 RX ORDER — SODIUM CHLORIDE 9 MG/ML
1000 INJECTION, SOLUTION INTRAVENOUS
Qty: 0 | Refills: 0 | Status: DISCONTINUED | OUTPATIENT
Start: 2017-10-30 | End: 2017-11-10

## 2017-10-30 RX ORDER — SODIUM CHLORIDE 9 MG/ML
500 INJECTION INTRAMUSCULAR; INTRAVENOUS; SUBCUTANEOUS ONCE
Qty: 0 | Refills: 0 | Status: COMPLETED | OUTPATIENT
Start: 2017-10-30 | End: 2017-10-30

## 2017-10-30 RX ORDER — GLUCAGON INJECTION, SOLUTION 0.5 MG/.1ML
1 INJECTION, SOLUTION SUBCUTANEOUS ONCE
Qty: 0 | Refills: 0 | Status: DISCONTINUED | OUTPATIENT
Start: 2017-10-30 | End: 2017-11-10

## 2017-10-30 RX ORDER — CHLORHEXIDINE GLUCONATE 213 G/1000ML
1 SOLUTION TOPICAL DAILY
Qty: 0 | Refills: 0 | Status: DISCONTINUED | OUTPATIENT
Start: 2017-10-30 | End: 2017-11-10

## 2017-10-30 RX ORDER — DEXTROSE 50 % IN WATER 50 %
1 SYRINGE (ML) INTRAVENOUS ONCE
Qty: 0 | Refills: 0 | Status: DISCONTINUED | OUTPATIENT
Start: 2017-10-30 | End: 2017-11-10

## 2017-10-30 RX ORDER — APIXABAN 2.5 MG/1
5 TABLET, FILM COATED ORAL EVERY 12 HOURS
Qty: 0 | Refills: 0 | Status: DISCONTINUED | OUTPATIENT
Start: 2017-10-30 | End: 2017-11-06

## 2017-10-30 RX ORDER — METFORMIN HYDROCHLORIDE 850 MG/1
0 TABLET ORAL
Qty: 0 | Refills: 0 | COMMUNITY

## 2017-10-30 RX ORDER — INSULIN LISPRO 100/ML
VIAL (ML) SUBCUTANEOUS AT BEDTIME
Qty: 0 | Refills: 0 | Status: DISCONTINUED | OUTPATIENT
Start: 2017-10-30 | End: 2017-11-10

## 2017-10-30 RX ORDER — DEXTROSE 50 % IN WATER 50 %
12.5 SYRINGE (ML) INTRAVENOUS ONCE
Qty: 0 | Refills: 0 | Status: DISCONTINUED | OUTPATIENT
Start: 2017-10-30 | End: 2017-11-10

## 2017-10-30 RX ADMIN — SODIUM CHLORIDE 500 MILLILITER(S): 9 INJECTION INTRAMUSCULAR; INTRAVENOUS; SUBCUTANEOUS at 19:55

## 2017-10-30 RX ADMIN — Medication 2.5 MG/HR: at 23:00

## 2017-10-30 RX ADMIN — Medication 2.5 MG/HR: at 23:03

## 2017-10-30 NOTE — H&P ADULT - PROBLEM SELECTOR PLAN 4
Has h/o diabetes and is on oral hypoglycemic agents  Check HbA1c  Hold oral hypoglycemic agents  Monitor blood sugars ac and hs  Lispro insulin sliding scale  Low carbohydrate diet  Diabetes education

## 2017-10-30 NOTE — H&P ADULT - FAMILY HISTORY
Aunt  Still living? Unknown  Family history of cancer, Age at diagnosis: Age Unknown     Mother  Still living? Unknown  Family history of type 2 diabetes mellitus, Age at diagnosis: Age Unknown  Family history of hypertension, Age at diagnosis: Age Unknown  Family history of hyperlipidemia, Age at diagnosis: Age Unknown     Sibling  Still living? Unknown  Family history of hypertension, Age at diagnosis: Age Unknown

## 2017-10-30 NOTE — ED PROVIDER NOTE - ATTENDING CONTRIBUTION TO CARE
Hasmukh: 63 yo female with h/o HTN an dafib c/o SOB x 4 days since she stopped her lasix and metoprolol because her BP has been low over the last several days. No recent illness or associated fevers. No associated chest pain. + b/l LE edema, no abdominal pain. Exam: chronically ill appearing, mild tachypnea with exertion, diffuse 4+ LE pitting edema, + abdominal edema, non tender, no guarding. + decreased breath sounds at b/l bases +irregular tachycardia Plan: cbc, cmp, cardiac enzymes, CTA chest, bipap reassess

## 2017-10-30 NOTE — PATIENT PROFILE ADULT. - PMH
Atrial fibrillation    Chronic diastolic congestive heart failure    CLL (chronic lymphocytic leukemia)    COPD (chronic obstructive pulmonary disease)    Diabetes    HTN (hypertension)

## 2017-10-30 NOTE — H&P ADULT - PROBLEM SELECTOR PLAN 2
Patient with Afib 110-110s  Has h/o Afib and is on Toprol  mg at home. Patient with low BP. Will initiate metoprolol 12.5 mg PO BID with hold parameters. Will up titrate as tolerated.   Continuous tele monitoring  Monitor lytes and replete as needed.

## 2017-10-30 NOTE — H&P ADULT - PROBLEM SELECTOR PLAN 5
Patient with h/o COPD, currently on home oxygen 2L  If SOB or wheezing, will start Duoneb prn  maintain goal SpO2 > 92%. C/W Advair discuss  O2 Supplement via nasal cannula. Wean BiPAP as tolerated  Continuous O2 sat monitoring

## 2017-10-30 NOTE — H&P ADULT - RS GEN PE MLT RESP DETAILS PC
clear to auscultation bilaterally/breath sounds equal/diminished breath sounds, R/airway patent/no chest wall tenderness/diminished breath sounds, L

## 2017-10-30 NOTE — ED ADULT NURSE NOTE - OBJECTIVE STATEMENT
Pt to spot # 18 alert and oriented with complaints of SOB, BLE edema, pt BIBA from PMD office.  pt c/o weakness, hasn't taken her lasix and toprol x 5 days due to BP being low in the 90's.  BLE noted, MD huffman at bedside, labs drawn and sent, vitals as noted, will monitor and follow up

## 2017-10-30 NOTE — ED PROVIDER NOTE - MEDICAL DECISION MAKING DETAILS
Suzan Cloud, DO: 62F with uncontrolled HTN here with low BP - consider cardiogenic vs. obstructive vs. neurogenic shock as hx inconsistent with hemorrhagic shock. Pt not endorsing infectious etiology & no clinical signs on exam - will check UA & CXR/CT chest. PE considered & will get CTA as pt has hx of COPD & likely baseline hypoxic but atypically hypotensive in setting of toprol & lasix held. Afib here bouncing between 100s - 150s, increased when stimulated but mostly in low 100s, unlikely 2/2 diastolic filling time - will rate control if continues to persist >130.

## 2017-10-30 NOTE — H&P ADULT - HISTORY OF PRESENT ILLNESS
62F with hx of Afib on Eliquis, DM on Metformin, uncontrolled HTN on Toprol COPD, lymphoma/CLL (1997), HFpEF LVEF 63%. BIBEMS from PMD for concern for fluid overload. Patient went to see PMD on Thursday for LE edema and was found to have BP 90/40. Antihypertensive medications held. Patient also held Lasix along with that. Patient had a fall at work (+ LOC) on Friday and was evaluated at [facility] with negative head CT. Patient was sent home the same day. Patient noted to have worsening LE edema and abdominal edema with worsening CHAVEZ.   Patient currently Afib 100-110s.   PE considered on the differentials but patient being therapeutically anticoagulated with eliquis and reports compliance with that. Patient’s creatinine 1.66. CTA held as per Dr. Devlin. Will treat CHF which is highly on the differentials and continue to monitor patient in CCU. Patient chronically SOB and is on 2L O2 at home. 62F with hx of Afib on Eliquis, DM on Metformin, uncontrolled HTN on Toprol COPD, 2L home O2, lymphoma/CLL (1997), HFpEF LVEF 63%. BIBEMS from PMD for concern for fluid overload. Patient went to see PMD on Thursday for LE edema and was found to have BP 90/40. Antihypertensive medications held. Patient also held Lasix along with that. Patient had a fall at work (+ LOC) on Friday and was evaluated at [facility] with negative head CT. Patient was sent home the same day. Patient noted to have worsening LE edema and abdominal edema with worsening CHAVEZ. Patient has chronic orthopnea, sleeps in chair most of the time. If she sleeps in bed, wakes up at night and sleeps in chair. + syncope on Friday. + weakness. Patient currently Afib 100-110s.   PE considered on the differentials but patient being therapeutically anticoagulated with Eliquis and reports compliance with that. Patient’s creatinine 1.66. CTA held as per Dr. Devlin. Will treat CHF which is highly on the differentials and continue to monitor patient in CCU. Patient chronically SOB and is on 2L O2 at home.   Denies fever, chills, chest pain, palpitation, nausea, vomiting, diarrhea, constipation, abdominal pain, bladder and bowel problems, sick contact, recent travel.

## 2017-10-30 NOTE — ED PROVIDER NOTE - NS ED ROS FT
Suzan Cloud, DO: ROS: denies HA, weakness, dizziness, diaphoresis, abdominal pain, diarrhea, joint pain, neuro deficits, dysuria/hematuria, rash, otherwise as HPI.

## 2017-10-30 NOTE — H&P ADULT - NSHPSOCIALHISTORY_GEN_ALL_CORE
SOCIAL HISTORY    Occupation: School nurse  Lives with: Mother and sister    SUBSTANCE USE  Tobacco Usage:  (x ) never smoked   ( ) former smoker  ( ) current smoker; Packs per day:   Alcohol Usage: (x ) none  ( ) occasional ( ) 2-3 times a week ( ) daily; Last drink:   Recreational drugs (x ) None

## 2017-10-30 NOTE — H&P ADULT - ASSESSMENT
62F with hx of Afib on Eliquis, DM on Metformin, uncontrolled HTN on Toprol COPD, on home O2 2L, lymphoma/CLL (1997), HFpEF LVEF 63%. BIBEMS from PMD for concern for fluid overload, found to be in CHF exacerbation in the setting of holding diuretics and afterload reducing agents. Admitted to CCU for monitoring.

## 2017-10-30 NOTE — ED PROVIDER NOTE - CARE PLAN
Principal Discharge DX:	Respiratory distress  Secondary Diagnosis:	Acute on chronic congestive heart failure, unspecified congestive heart failure type

## 2017-10-30 NOTE — ED PROVIDER NOTE - PROGRESS NOTE DETAILS
Hasmukh: discussed with radiollogy, Pt high risk for PE- relatively immobile with minimal abulation, SOB, LE edema, hypotensive and hypoxic, risk of PE outweighs risk of IV contrast. will get CTA. Hasmukh: pt clinically improved on bipap, decreased work of breathing, will continue to reassess Ramos: spoke with patient's interventional cardiologist, Dr. Devlin, who voiced question regarding CTA scan ordered given patient's creatinine. He acknowledges PE is on patient's differential for her SOB, tachycardia, hypotension, but felt her symptoms are due to CHF exacerbation. While the ED team agrees CHF is high on the differential, PE must be excluded given the patient's presentation and variation from her baseline presentation. Spoke with ED attending on [facility], Dr. Noe, who wants to proceed with CTA. Hasmukh: discussed with pt's nephrologist Dr Heart- given clinical picture he agrees with IV contrast to r/o PE despite creatinine elevation. he will see patient tomorrow. Hasmukh: pt seen and examined by CCU NP, pt to be admitted to CCU for further management. will not get CTA since pt has been taking her eliquis and cardiology thinks that her clinical presentation is similar to her previous CHF exacerbations and they will closely monitor in CCU.

## 2017-10-30 NOTE — H&P ADULT - PROBLEM SELECTOR PLAN 1
Patient p/w SOB, CHAVEZ, orthopnea, BLE edema and abdominal edema. Has h/o CHF and is on Lasix 40 mg PO BID at home. Doses held since Thursday along with other antihypertensives.   CXR clear  CT chest to r/p PE postponed 2/2 elevated creatinine and CHF exacerbation highly on differentials  Admit to CCU.   Continuous cardiac monitoring to r/o arrhythmias.   Continue Bipap, wean as tolerated  Check ABG,   Serum Pro-Brain Natriuretic Peptide: 6997  Diuresis: Lasix 40 mg IVP x 1 now  Start Lasix gtt @ 5 mg/hr. Increase as tolerated  Trend BMP 2/2 diuresis and replete as needed  Daily weight monitoring  Strict I/O  Low sodium DASH diet  Check ECHO to evaluate LV/RV function and valvular abnormalities  Hold ACE given elevated creatinine and low BP

## 2017-10-30 NOTE — ED ADULT TRIAGE NOTE - CHIEF COMPLAINT QUOTE
pt BIBA from PMD office.  pt c/o weakness, hasn't taken her lasix and toprol x 5 days due to BP being low in the 90's.  pt has BLE edema and SOB

## 2017-10-30 NOTE — ED PROVIDER NOTE - PHYSICAL EXAMINATION
Suzan Cloud, DO:   Gen: tachypneic on 2L home O2, NAD  Head: NCAT  HEENT: PERRL, MMM, normal conjunctiva, anicteric, neck supple  Lung: rales at b/l bases, tachypneic but speaking in full sentences  CV: irregular rhythm & tachycardic, no murmurs, rubs or gallops, +JVD   Abd: soft, ND with pitting edema, NT, no rebound or guarding, no CVAT  MSK: 4+ b/l pitting edema - weeping, no visible deformities  Neuro: No focal neurologic deficits. Moving all extremities equally.   Skin: Warm and dry, no evidence of rash  Psych: normal mood and affect

## 2017-10-30 NOTE — CONSULT NOTE ADULT - PROBLEM SELECTOR RECOMMENDATION 9
- with hypercarbia; responded well to Bipap  - likely 2/2 ADHF. would defer to CCU for recs   - CXR shows interstitial edema and cardiomegaly. Last EF 63%, diastolic dysfunction  - No indication for antibiotics at this time.   - Patient tachycardic on admission; although PE may certainly be possible, patient is not bedbound, no recent surgery was performed. Patient may certainly have a malignancy that could drive a PE. However, given her improvement with Bipap and clinical presentation consistent with ADHF, the latter is more likely. Agree with deferring CTA to r/o PE  - transfer to CCU for further management.

## 2017-10-30 NOTE — ED PROVIDER NOTE - OBJECTIVE STATEMENT
Suzan Cloud, DO: 62F with hx of Afib on Eliquis, DM on Metformin, uncontrolled HTN on Toprol BIBEMS from PMD for concern for fluid overload. Pt non-compliant with Lasix & Toprol x 5 days 2/2 low BP & syncopized ~1 week ago with eval at [facility] with negative head CT. No noted CT chest at that time. Pt with b/l LE edema & abdominal edema worsening since this summer. Echo 3/2017 with EF 67%. No fevers/chills, nausea/vomiting, cough, rhinorrhea, diarrhea, CP/SOB. +CHAVEZ.

## 2017-10-30 NOTE — CONSULT NOTE ADULT - SUBJECTIVE AND OBJECTIVE BOX
CHIEF COMPLAINT: worsening leg swelling; referred from PMD     HPI:  62F with hx of Afib on Eliquis, DM on Metformin, uncontrolled HTN on Toprol BIBEMS from PMD for concern for fluid overload. Patient reports that since this summer, she has been diagnosed with heart failure, and has had intermittent dyspnea on exertion and b/l lower extremity edema. She was started on lasix, metoprolol, lisinopril since. Pt reports being non-compliant with Lasix & Toprol x 5 days 2/ low BP & syncopized on Friday with eval at [facility] with negative head CT. No noted CT chest at that time. Patient went to see her PMD, Dr. Palafox on Thursday who noted progressive leg swelling and hypotension (BP 90/60 in clinic). Patient was told not to take her lasix until her BP is higher than 100 systolic. She reports not being on lasix since given hypotension. No fevers/chills, nausea/vomiting, cough, rhinorrhea, diarrhea, CP/SOB. +CHAVEZ. Of note, patient reports being told she has mild COPD and restrictive lung disease picked up on PFTs from 3 months ago. She is on home O2 at 2L. Pulmonologist: Dr. Francesco Francisco; PCP: Dr. Palafox     PAST MEDICAL & SURGICAL HISTORY:  CLL (chronic lymphocytic leukemia)  Chronic diastolic congestive heart failure  COPD (chronic obstructive pulmonary disease)  Diabetes  HTN (hypertension)  Atrial fibrillation  No significant past surgical history      FAMILY HISTORY:  Family history of hyperlipidemia (Mother)  Family history of hypertension (Mother, Sibling)  Family history of type 2 diabetes mellitus (Mother)  Family history of cancer (Aunt): Breast      SOCIAL HISTORY:  Smoking: denies  EtOH Use: denies   Marital Status:  Occupation: school nurse   Recent Travel:  Country of Birth:  Advance Directives:    Allergies    No Known Allergies    Intolerances        Home Medications:  Advair Diskus 100 mcg-50 mcg inhalation powder: 1 puff(s) inhaled 2 times a day (30 Oct 2017 21:38)  Aldactone 25 mg oral tablet: 1 tab(s) orally once a day (30 Oct 2017 21:38)  allopurinol 100 mg oral tablet: 1 tab(s) orally 3 times a day (30 Oct 2017 21:38)  Eliquis 5 mg oral tablet: 1 tab(s) orally 2 times a day (30 Oct 2017 21:38)  hydrALAZINE 100 mg oral tablet: 1 tab(s) orally 3 times a day (30 Oct 2017 21:38)  Lasix 40 mg oral tablet: 1 tab(s) orally 2 times a day (30 Oct 2017 21:38)  lisinopril 40 mg oral tablet: 1 tab(s) orally once a day (30 Oct 2017 21:38)  metFORMIN 500 mg oral tablet: 1 tab(s) orally 3 times a day (30 Oct 2017 21:38)  Metoprolol Succinate  mg oral tablet, extended release: 1 tab(s) orally once a day (30 Oct 2017 21:38)  Ventolin HFA 90 mcg/inh inhalation aerosol: 2 puff(s) inhaled 4 times a day, As Needed (30 Oct 2017 21:38)    REVIEW OF SYSTEMS:    CONSTITUTIONAL: No weakness, fevers or chills  EYES/ENT: No visual changes;  No vertigo or throat pain   NECK: No pain or stiffness  RESPIRATORY: No cough, wheezing, hemoptysis; DYSPNEA ON EXERTION  CARDIOVASCULAR: No chest pain or palpitations. PROGRESSIVE LEG EDEMA.   GASTROINTESTINAL: No abdominal or epigastric pain. No nausea, vomiting, or hematemesis; No diarrhea or constipation. No melena or hematochezia.  GENITOURINARY: No dysuria, frequency or hematuria  NEUROLOGICAL: No numbness or weakness  SKIN: No itching, burning, rashes, or lesions.   All other review of systems is negative unless indicated above.    OBJECTIVE:  ICU Vital Signs Last 24 Hrs  T(C): 37.6 (30 Oct 2017 17:49), Max: 37.6 (30 Oct 2017 17:49)  T(F): 99.6 (30 Oct 2017 17:49), Max: 99.6 (30 Oct 2017 17:49)  HR: 104 (30 Oct 2017 21:06) (89 - 113)  BP: 111/76 (30 Oct 2017 21:06) (101/57 - 122/94)  BP(mean): --  ABP: --  ABP(mean): --  RR: 25 (30 Oct 2017 21:06) (18 - 25)  SpO2: 99% (30 Oct 2017 21:06) (95% - 100%)        CAPILLARY BLOOD GLUCOSE      PHYSICAL EXAM:  GENERAL: NAD, well-developed, on Bipap  HEAD:  Atraumatic, Normocephalic  EYES: EOMI, PERRLA, conjunctiva and sclera clear  NECK: Supple, No JVD  CHEST/LUNG:  Decreased breath sounds; fine inspiratory crackles in RLL; No wheeze or ronchi  HEART: Irregular rate and rhythm; No murmurs, rubs, or gallops  ABDOMEN: Soft, Nontender, Nondistended; Bowel sounds present  EXTREMITIES:  2+ Peripheral Pulses, No clubbing, cyanosis, or edema  PSYCH: AO x 3   NEUROLOGY: non-focal  SKIN: No rashes or lesions    HOSPITAL MEDICATIONS:  MEDICATIONS  (STANDING):  allopurinol 100 milliGRAM(s) Oral three times a day after meals  apixaban 5 milliGRAM(s) Oral every 12 hours  buDESOnide  80 MICROgram(s)/formoterol 4.5 MICROgram(s) Inhaler 2 Puff(s) Inhalation two times a day  chlorhexidine 4% Liquid 1 Application(s) Topical daily  dextrose 5%. 1000 milliLiter(s) (50 mL/Hr) IV Continuous <Continuous>  dextrose 50% Injectable 12.5 Gram(s) IV Push once  dextrose 50% Injectable 25 Gram(s) IV Push once  dextrose 50% Injectable 25 Gram(s) IV Push once  furosemide Infusion 5 mG/Hr (2.5 mL/Hr) IV Continuous <Continuous>  insulin lispro (HumaLOG) corrective regimen sliding scale   SubCutaneous three times a day before meals  insulin lispro (HumaLOG) corrective regimen sliding scale   SubCutaneous at bedtime  metoprolol     tartrate 12.5 milliGRAM(s) Oral two times a day    MEDICATIONS  (PRN):  dextrose Gel 1 Dose(s) Oral once PRN Blood Glucose LESS THAN 70 milliGRAM(s)/deciliter  glucagon  Injectable 1 milliGRAM(s) IntraMuscular once PRN Glucose LESS THAN 70 milligrams/deciliter      LABS:                        11.3   7.03  )-----------( 216      ( 30 Oct 2017 16:00 )             38.7     10-30    142  |  107  |  45<H>  ----------------------------<  130<H>  4.5   |  23  |  1.66<H>    Ca    8.6      30 Oct 2017 16:00    TPro  6.3  /  Alb  3.4  /  TBili  0.8  /  DBili  x   /  AST  21  /  ALT  20  /  AlkPhos  72  10-30      Urinalysis Basic - ( 30 Oct 2017 17:45 )    Color: YELLOW / Appearance: CLEAR / S.021 / pH: 6.0  Gluc: NEGATIVE / Ketone: NEGATIVE  / Bili: NEGATIVE / Urobili: NORMAL E.U.   Blood: NEGATIVE / Protein: 300 / Nitrite: NEGATIVE   Leuk Esterase: NEGATIVE / RBC: 0-2 / WBC 2-5   Sq Epi: OCC / Non Sq Epi: x / Bacteria: x        Venous Blood Gas:  10-30 @ 16:00  7.23/62/< 24/20/23.1  VBG Lactate: 2.8      MICROBIOLOGY:     RADIOLOGY:  [ ] Reviewed and interpreted by me    EKG: TWI in V3-V4, II

## 2017-10-31 DIAGNOSIS — J45.909 UNSPECIFIED ASTHMA, UNCOMPLICATED: ICD-10-CM

## 2017-10-31 DIAGNOSIS — E66.01 MORBID (SEVERE) OBESITY DUE TO EXCESS CALORIES: ICD-10-CM

## 2017-10-31 DIAGNOSIS — I50.33 ACUTE ON CHRONIC DIASTOLIC (CONGESTIVE) HEART FAILURE: ICD-10-CM

## 2017-10-31 LAB
B PERT DNA SPEC QL NAA+PROBE: SIGNIFICANT CHANGE UP
BASE EXCESS BLDA CALC-SCNC: 1.5 MMOL/L — SIGNIFICANT CHANGE UP
BASOPHILS # BLD AUTO: 0.03 K/UL — SIGNIFICANT CHANGE UP (ref 0–0.2)
BASOPHILS NFR BLD AUTO: 0.6 % — SIGNIFICANT CHANGE UP (ref 0–2)
BUN SERPL-MCNC: 42 MG/DL — HIGH (ref 7–23)
BUN SERPL-MCNC: 44 MG/DL — HIGH (ref 7–23)
BUN SERPL-MCNC: 45 MG/DL — HIGH (ref 7–23)
C PNEUM DNA SPEC QL NAA+PROBE: NOT DETECTED — SIGNIFICANT CHANGE UP
CALCIUM SERPL-MCNC: 8.4 MG/DL — SIGNIFICANT CHANGE UP (ref 8.4–10.5)
CALCIUM SERPL-MCNC: 8.6 MG/DL — SIGNIFICANT CHANGE UP (ref 8.4–10.5)
CALCIUM SERPL-MCNC: 8.6 MG/DL — SIGNIFICANT CHANGE UP (ref 8.4–10.5)
CHLORIDE SERPL-SCNC: 106 MMOL/L — SIGNIFICANT CHANGE UP (ref 98–107)
CHLORIDE SERPL-SCNC: 107 MMOL/L — SIGNIFICANT CHANGE UP (ref 98–107)
CHLORIDE SERPL-SCNC: 108 MMOL/L — HIGH (ref 98–107)
CO2 SERPL-SCNC: 24 MMOL/L — SIGNIFICANT CHANGE UP (ref 22–31)
CO2 SERPL-SCNC: 27 MMOL/L — SIGNIFICANT CHANGE UP (ref 22–31)
CO2 SERPL-SCNC: 27 MMOL/L — SIGNIFICANT CHANGE UP (ref 22–31)
CREAT ?TM UR-MCNC: 21.98 MG/DL — SIGNIFICANT CHANGE UP
CREAT SERPL-MCNC: 1.38 MG/DL — HIGH (ref 0.5–1.3)
CREAT SERPL-MCNC: 1.46 MG/DL — HIGH (ref 0.5–1.3)
CREAT SERPL-MCNC: 1.48 MG/DL — HIGH (ref 0.5–1.3)
D DIMER BLD IA.RAPID-MCNC: 269 NG/ML — SIGNIFICANT CHANGE UP
EOSINOPHIL # BLD AUTO: 0.14 K/UL — SIGNIFICANT CHANGE UP (ref 0–0.5)
EOSINOPHIL NFR BLD AUTO: 2.7 % — SIGNIFICANT CHANGE UP (ref 0–6)
FLUAV H1 2009 PAND RNA SPEC QL NAA+PROBE: NOT DETECTED — SIGNIFICANT CHANGE UP
FLUAV H1 RNA SPEC QL NAA+PROBE: NOT DETECTED — SIGNIFICANT CHANGE UP
FLUAV H3 RNA SPEC QL NAA+PROBE: NOT DETECTED — SIGNIFICANT CHANGE UP
FLUAV SUBTYP SPEC NAA+PROBE: SIGNIFICANT CHANGE UP
FLUBV RNA SPEC QL NAA+PROBE: NOT DETECTED — SIGNIFICANT CHANGE UP
GLUCOSE BLDC GLUCOMTR-MCNC: 136 MG/DL — HIGH (ref 70–99)
GLUCOSE BLDC GLUCOMTR-MCNC: 149 MG/DL — HIGH (ref 70–99)
GLUCOSE BLDC GLUCOMTR-MCNC: 172 MG/DL — HIGH (ref 70–99)
GLUCOSE BLDC GLUCOMTR-MCNC: 99 MG/DL — SIGNIFICANT CHANGE UP (ref 70–99)
GLUCOSE SERPL-MCNC: 116 MG/DL — HIGH (ref 70–99)
GLUCOSE SERPL-MCNC: 151 MG/DL — HIGH (ref 70–99)
GLUCOSE SERPL-MCNC: 162 MG/DL — HIGH (ref 70–99)
HADV DNA SPEC QL NAA+PROBE: NOT DETECTED — SIGNIFICANT CHANGE UP
HCO3 BLDA-SCNC: 25 MMOL/L — SIGNIFICANT CHANGE UP (ref 22–26)
HCOV 229E RNA SPEC QL NAA+PROBE: NOT DETECTED — SIGNIFICANT CHANGE UP
HCOV HKU1 RNA SPEC QL NAA+PROBE: NOT DETECTED — SIGNIFICANT CHANGE UP
HCOV NL63 RNA SPEC QL NAA+PROBE: NOT DETECTED — SIGNIFICANT CHANGE UP
HCOV OC43 RNA SPEC QL NAA+PROBE: NOT DETECTED — SIGNIFICANT CHANGE UP
HCT VFR BLD CALC: 34.7 % — SIGNIFICANT CHANGE UP (ref 34.5–45)
HGB BLD-MCNC: 10.5 G/DL — LOW (ref 11.5–15.5)
HMPV RNA SPEC QL NAA+PROBE: NOT DETECTED — SIGNIFICANT CHANGE UP
HPIV1 RNA SPEC QL NAA+PROBE: NOT DETECTED — SIGNIFICANT CHANGE UP
HPIV2 RNA SPEC QL NAA+PROBE: NOT DETECTED — SIGNIFICANT CHANGE UP
HPIV3 RNA SPEC QL NAA+PROBE: NOT DETECTED — SIGNIFICANT CHANGE UP
HPIV4 RNA SPEC QL NAA+PROBE: NOT DETECTED — SIGNIFICANT CHANGE UP
IMM GRANULOCYTES # BLD AUTO: 0.01 # — SIGNIFICANT CHANGE UP
IMM GRANULOCYTES NFR BLD AUTO: 0.2 % — SIGNIFICANT CHANGE UP (ref 0–1.5)
INR BLD: 1.38 — HIGH (ref 0.88–1.17)
LYMPHOCYTES # BLD AUTO: 1.65 K/UL — SIGNIFICANT CHANGE UP (ref 1–3.3)
LYMPHOCYTES # BLD AUTO: 32 % — SIGNIFICANT CHANGE UP (ref 13–44)
M PNEUMO DNA SPEC QL NAA+PROBE: NOT DETECTED — SIGNIFICANT CHANGE UP
MAGNESIUM SERPL-MCNC: 1.8 MG/DL — SIGNIFICANT CHANGE UP (ref 1.6–2.6)
MAGNESIUM SERPL-MCNC: 1.8 MG/DL — SIGNIFICANT CHANGE UP (ref 1.6–2.6)
MANUAL SMEAR VERIFICATION: SIGNIFICANT CHANGE UP
MCHC RBC-ENTMCNC: 21 PG — LOW (ref 27–34)
MCHC RBC-ENTMCNC: 30.3 % — LOW (ref 32–36)
MCV RBC AUTO: 69.5 FL — LOW (ref 80–100)
MONOCYTES # BLD AUTO: 0.77 K/UL — SIGNIFICANT CHANGE UP (ref 0–0.9)
MONOCYTES NFR BLD AUTO: 15 % — HIGH (ref 2–14)
NEUTROPHILS # BLD AUTO: 2.55 K/UL — SIGNIFICANT CHANGE UP (ref 1.8–7.4)
NEUTROPHILS NFR BLD AUTO: 49.5 % — SIGNIFICANT CHANGE UP (ref 43–77)
NRBC # FLD: 0.12 — SIGNIFICANT CHANGE UP
NRBC FLD-RTO: 2.3 — SIGNIFICANT CHANGE UP
PCO2 BLDA: 50 MMHG — HIGH (ref 32–48)
PH BLDA: 7.35 PH — SIGNIFICANT CHANGE UP (ref 7.35–7.45)
PHOSPHATE SERPL-MCNC: 3.1 MG/DL — SIGNIFICANT CHANGE UP (ref 2.5–4.5)
PHOSPHATE SERPL-MCNC: 3.6 MG/DL — SIGNIFICANT CHANGE UP (ref 2.5–4.5)
PLATELET # BLD AUTO: 193 K/UL — SIGNIFICANT CHANGE UP (ref 150–400)
PMV BLD: SIGNIFICANT CHANGE UP FL (ref 7–13)
PO2 BLDA: 79 MMHG — LOW (ref 83–108)
POTASSIUM SERPL-MCNC: 4 MMOL/L — SIGNIFICANT CHANGE UP (ref 3.5–5.3)
POTASSIUM SERPL-MCNC: 4.3 MMOL/L — SIGNIFICANT CHANGE UP (ref 3.5–5.3)
POTASSIUM SERPL-MCNC: 4.4 MMOL/L — SIGNIFICANT CHANGE UP (ref 3.5–5.3)
POTASSIUM SERPL-SCNC: 4 MMOL/L — SIGNIFICANT CHANGE UP (ref 3.5–5.3)
POTASSIUM SERPL-SCNC: 4.3 MMOL/L — SIGNIFICANT CHANGE UP (ref 3.5–5.3)
POTASSIUM SERPL-SCNC: 4.4 MMOL/L — SIGNIFICANT CHANGE UP (ref 3.5–5.3)
PROT UR-MCNC: 12.2 MG/DL — SIGNIFICANT CHANGE UP
PROTHROM AB SERPL-ACNC: 15.6 SEC — HIGH (ref 9.8–13.1)
RBC # BLD: 4.99 M/UL — SIGNIFICANT CHANGE UP (ref 3.8–5.2)
RBC # FLD: 20 % — HIGH (ref 10.3–14.5)
RSV RNA SPEC QL NAA+PROBE: NOT DETECTED — SIGNIFICANT CHANGE UP
RV+EV RNA SPEC QL NAA+PROBE: POSITIVE — HIGH
SAO2 % BLDA: 95.5 % — SIGNIFICANT CHANGE UP (ref 95–99)
SODIUM SERPL-SCNC: 142 MMOL/L — SIGNIFICANT CHANGE UP (ref 135–145)
SODIUM SERPL-SCNC: 143 MMOL/L — SIGNIFICANT CHANGE UP (ref 135–145)
SODIUM SERPL-SCNC: 143 MMOL/L — SIGNIFICANT CHANGE UP (ref 135–145)
T4 FREE SERPL-MCNC: 1.16 NG/DL — SIGNIFICANT CHANGE UP (ref 0.9–1.8)
WBC # BLD: 5.15 K/UL — SIGNIFICANT CHANGE UP (ref 3.8–10.5)
WBC # FLD AUTO: 5.15 K/UL — SIGNIFICANT CHANGE UP (ref 3.8–10.5)

## 2017-10-31 PROCEDURE — 99223 1ST HOSP IP/OBS HIGH 75: CPT

## 2017-10-31 PROCEDURE — 93306 TTE W/DOPPLER COMPLETE: CPT | Mod: 26

## 2017-10-31 RX ORDER — METOPROLOL TARTRATE 50 MG
12.5 TABLET ORAL THREE TIMES A DAY
Qty: 0 | Refills: 0 | Status: DISCONTINUED | OUTPATIENT
Start: 2017-10-31 | End: 2017-11-01

## 2017-10-31 RX ORDER — MAGNESIUM SULFATE 500 MG/ML
2 VIAL (ML) INJECTION ONCE
Qty: 0 | Refills: 0 | Status: COMPLETED | OUTPATIENT
Start: 2017-10-31 | End: 2017-11-01

## 2017-10-31 RX ORDER — PANTOPRAZOLE SODIUM 20 MG/1
40 TABLET, DELAYED RELEASE ORAL
Qty: 0 | Refills: 0 | Status: DISCONTINUED | OUTPATIENT
Start: 2017-10-31 | End: 2017-11-10

## 2017-10-31 RX ORDER — MAGNESIUM SULFATE 500 MG/ML
2 VIAL (ML) INJECTION ONCE
Qty: 0 | Refills: 0 | Status: COMPLETED | OUTPATIENT
Start: 2017-10-31 | End: 2017-10-31

## 2017-10-31 RX ORDER — METOPROLOL TARTRATE 50 MG
12.5 TABLET ORAL THREE TIMES A DAY
Qty: 0 | Refills: 0 | Status: DISCONTINUED | OUTPATIENT
Start: 2017-10-31 | End: 2017-10-31

## 2017-10-31 RX ADMIN — Medication 100 MILLIGRAM(S): at 14:12

## 2017-10-31 RX ADMIN — Medication 100 MILLIGRAM(S): at 09:55

## 2017-10-31 RX ADMIN — CHLORHEXIDINE GLUCONATE 1 APPLICATION(S): 213 SOLUTION TOPICAL at 13:25

## 2017-10-31 RX ADMIN — Medication 12.5 MILLIGRAM(S): at 05:53

## 2017-10-31 RX ADMIN — APIXABAN 5 MILLIGRAM(S): 2.5 TABLET, FILM COATED ORAL at 05:53

## 2017-10-31 RX ADMIN — BUDESONIDE AND FORMOTEROL FUMARATE DIHYDRATE 2 PUFF(S): 160; 4.5 AEROSOL RESPIRATORY (INHALATION) at 22:52

## 2017-10-31 RX ADMIN — BUDESONIDE AND FORMOTEROL FUMARATE DIHYDRATE 2 PUFF(S): 160; 4.5 AEROSOL RESPIRATORY (INHALATION) at 09:30

## 2017-10-31 RX ADMIN — Medication 40 MILLIGRAM(S): at 01:02

## 2017-10-31 RX ADMIN — Medication 50 GRAM(S): at 09:55

## 2017-10-31 RX ADMIN — Medication 12.5 MILLIGRAM(S): at 16:22

## 2017-10-31 RX ADMIN — APIXABAN 5 MILLIGRAM(S): 2.5 TABLET, FILM COATED ORAL at 18:10

## 2017-10-31 RX ADMIN — Medication 100 MILLIGRAM(S): at 18:09

## 2017-10-31 RX ADMIN — Medication 12.5 MILLIGRAM(S): at 21:56

## 2017-10-31 NOTE — CHART NOTE - NSCHARTNOTEFT_GEN_A_CORE
62F with hx of Afib on Eliquis, DM on Metformin, uncontrolled HTN on Toprol COPD, on home O2 2L, lymphoma/CLL (1997), HFpEF LVEF 63%. BIBEMS from PMD for concern for fluid overload, found to be in CHF exacerbation in the setting of holding diuretics and afterload reducing agents. Pt was also noted to have a syncopal episode at work. This was corroborated by her PCP, Dr. Palafox. Admitted to CCU for monitoring.    #Syncope - unclear etiology at this point and will need to be worked up. On exam patient has notable JVD w/ hepatojugular reflux  - ? PE. Patient on Eliquis and with low pre-test probability. Will check D-dimer before imaging  - baseline COPD and reported to be feeling progressively more lethargic as an outpatient. Will get a baseline ABG. Continue with chronic O2 therapy and nocturnal bipap  - h/o afib - ? conversion pauses. Will continue monitoring on tele  - f/u echo w/ attention to right sided pressures    #HFpEF - currently volume overloaded and on lasix gtt w/ good urine output. He lungs are clear on chest x-ray and clinically on exam (except for some basilar crackles). Suspect there is right sided failure.  - c/w diuresis  - echo  - check BMP this afternoon to monitor lytes  - plan as outline in first problem 62F with hx of Afib on Eliquis, DM on Metformin, uncontrolled HTN on Toprol COPD, on home O2 2L, lymphoma/CLL (1997), HFpEF LVEF 63%. BIBEMS from PMD for concern for fluid overload, found to be in CHF exacerbation in the setting of holding diuretics and afterload reducing agents. Pt was also noted to have a syncopal episode at work. This was corroborated by her PCP, Dr. Palafox. Admitted to CCU for monitoring.    #Syncope - unclear etiology at this point and will need to be worked up. On exam patient has notable JVD w/ hepatojugular reflux  - ? PE. Patient on Eliquis and with low pre-test probability. Will check D-dimer before imaging  - baseline COPD and reported to be feeling progressively more lethargic as an outpatient. Will get a baseline ABG. Continue with chronic O2 therapy and nocturnal bipap  - h/o afib - ? conversion pauses. Will continue monitoring on tele  - f/u echo w/ attention to right sided pressures    #HFpEF - currently volume overloaded and on lasix gtt w/ good urine output. He lungs are clear on chest x-ray and clinically on exam (except for some basilar crackles). Suspect there is right sided failure. Pt with h/o of COPD w/ worsening dyspnea over the past months - ? pulm htn  - c/w diuresis  - echo  - check BMP this afternoon to monitor lytes  - plan as outline in first problem  - will consider getting a high resolution CT scan or CT-angio depending on preliminary findings    #Proteinuria - found on UA. Albumin levels acceptable. Unlikely to be the source of edema but will work up to quantify amount  - will check urine protein/cr ratio    #Elevated TSH - likely normal for her age or subclinical hypothyroidism  - will check FT4 62F with hx of Afib on Eliquis, DM on Metformin, uncontrolled HTN on Toprol COPD, on home O2 2L, lymphoma/CLL (1997), HFpEF LVEF 63%. BIBEMS from PMD for concern for fluid overload, found to be in CHF exacerbation in the setting of holding diuretics and afterload reducing agents. Pt was also noted to have a syncopal episode at work. This was corroborated by her PCP, Dr. Palafox. Admitted to CCU for monitoring.    #Syncope - unclear etiology at this point and will need to be worked up. On exam patient has notable JVD w/ hepatojugular reflux  - ? PE. Patient on Eliquis and with low pre-test probability. Will check D-dimer before imaging  - baseline COPD and reported to be feeling progressively more lethargic as an outpatient. Will get a baseline ABG. Continue with chronic O2 therapy and nocturnal bipap  - h/o afib - ? conversion pauses. Will continue monitoring on tele  - f/u echo w/ attention to right sided pressures    #HFpEF - currently volume overloaded and on lasix gtt w/ good urine output. He lungs are clear on chest x-ray and clinically on exam (except for some basilar crackles). Suspect there is right sided failure. Pt with h/o of COPD w/ worsening dyspnea over the past months - ? pulm htn  - c/w diuresis  - echo  - check BMP this afternoon to monitor lytes  - plan as outline in first problem  - will consider getting a high resolution CT scan or CT-angio depending on preliminary findings    #Proteinuria - found on UA. Albumin levels acceptable. Unlikely to be the source of edema but will work up to quantify amount  - will check urine protein/cr ratio    #Elevated TSH - likely normal for her age or subclinical hypothyroidism  - will check FT4    #+RVP - incidentally noted + Rhinovirus. Patient w/o overt URI symptoms. May be resolving infection   - will continue monitoring clinically 62F with hx of Afib on Eliquis, DM on Metformin, uncontrolled HTN on Toprol COPD, on home O2 2L, lymphoma/CLL (1997), HFpEF LVEF 63%. BIBEMS from PMD for concern for fluid overload, found to be in CHF exacerbation in the setting of holding diuretics and afterload reducing agents. Pt was also noted to have a syncopal episode at work. This was corroborated by her PCP, Dr. Palafox. Admitted to CCU for monitoring.    #Syncope - unclear etiology at this point and will need to be worked up. On exam patient has notable JVD w/ hepatojugular reflux  - ? PE. Patient on Eliquis and with low pre-test probability. Will check D-dimer before imaging  - baseline COPD and reported to be feeling progressively more lethargic as an outpatient. Will get a baseline ABG. Continue with chronic O2 therapy and nocturnal bipap  - h/o afib - ? conversion pauses. Will continue monitoring on tele  - f/u echo w/ attention to right sided pressures    #HFpEF - currently volume overloaded and on lasix gtt w/ good urine output. He lungs are clear on chest x-ray and clinically on exam (except for some basilar crackles). Suspect there is right sided failure. Pt with h/o of COPD w/ worsening dyspnea over the past months - ? pulm htn  - c/w diuresis  - echo  - check BMP this afternoon to monitor lytes  - plan as outline in first problem  - will consider getting a high resolution CT scan or CT-angio depending on preliminary findings  - will likely need right heart cath    #Proteinuria - found on UA. Albumin levels acceptable. Unlikely to be the source of edema but will work up to quantify amount  - will check urine protein/cr ratio    #Elevated TSH - likely normal for her age or subclinical hypothyroidism  - will check FT4    #+RVP - incidentally noted + Rhinovirus. Patient w/o overt URI symptoms. May be resolving infection   - will continue monitoring clinically 62F with hx of Afib on Eliquis, DM on Metformin, uncontrolled HTN on Toprol COPD, on home O2 2L, lymphoma/CLL (1997), HFpEF LVEF 63%. BIBEMS from PMD for concern for fluid overload, found to be in CHF exacerbation in the setting of holding diuretics and afterload reducing agents. Pt was also noted to have a syncopal episode at work. This was corroborated by her PCP, Dr. Palafox. Admitted to CCU for monitoring.    #Syncope - unclear etiology at this point and will need to be worked up. On exam patient has notable JVD w/ hepatojugular reflux  - ? PE. Patient on Eliquis and with low pre-test probability. Will check D-dimer before imaging  - baseline airway disease likely s/p radiation and chemo for CLL and reported to be feeling progressively more lethargic as an outpatient. Will get a baseline ABG. Continue with chronic O2 therapy and nocturnal bipap  - h/o afib - ? conversion pauses. Will continue monitoring on tele  - f/u echo     #HFpEF - currently volume overloaded and on lasix gtt w/ good urine output. He lungs are clear on chest x-ray and clinically on exam (except for some basilar crackles). Suspect there is right sided failure c/b poor medication adherence and optimization  - c/w diuresis  - echo  - check BMP this afternoon to monitor lytes  - plan as outline in first problem  - will consider getting a high resolution CT scan or CT-angio depending on preliminary findings  - will likely need right heart cath    #Proteinuria - found on UA. Albumin levels acceptable. Unlikely to be the source of edema but will work up to quantify amount  - will check urine protein/cr ratio    #Elevated TSH - likely normal for her age or subclinical hypothyroidism  - will check FT4    #+RVP - incidentally noted + Rhinovirus. Patient w/o overt URI symptoms. May be resolving infection   - will continue monitoring clinically 62F with hx of Afib on Eliquis, DM on Metformin, uncontrolled HTN on Toprol COPD, on home O2 2L, lymphoma/CLL (1997), HFpEF LVEF 63%. BIBEMS from PMD for concern for fluid overload, found to be in CHF exacerbation in the setting of holding diuretics and afterload reducing agents. Pt was also noted to have a syncopal episode at work. This was corroborated by her PCP, Dr. Palafox. Admitted to CCU for monitoring.    #Syncope - unclear etiology at this point and will need to be worked up. On exam patient has notable JVD w/ hepatojugular reflux  - ? PE. Patient on Eliquis and with low pre-test probability. Will check D-dimer before imaging  - baseline airway disease likely s/p radiation and chemo for CLL and reported to be feeling progressively more lethargic as an outpatient. Will get a baseline ABG. Continue with chronic O2 therapy and nocturnal bipap  - h/o afib - ? conversion pauses. Will continue monitoring on tele  - f/u echo     #HFpEF - currently volume overloaded and on lasix gtt w/ good urine output. He lungs are clear on chest x-ray and clinically on exam (except for some basilar crackles). Suspect there is right sided failure c/b poor medication adherence, optimization and likely pulmonary hypertension from airway disease  - c/w diuresis  - echo  - check BMP this afternoon to monitor lytes  - plan as outline in first problem  - will consider getting a high resolution CT scan or CT-angio depending on preliminary findings  - will likely need right heart cath    #Proteinuria - found on UA. Albumin levels acceptable. Unlikely to be the source of edema but will work up to quantify amount  - will check urine protein/cr ratio    #Elevated TSH - likely normal for her age or subclinical hypothyroidism  - will check FT4    #+RVP - incidentally noted + Rhinovirus. Patient w/o overt URI symptoms. May be resolving infection   - will continue monitoring clinically

## 2017-10-31 NOTE — PROGRESS NOTE ADULT - ASSESSMENT
61 yo F with PMH of Afib on Eliquis, DM on Metformin (HbA1c 6.5), uncontrolled HTN on Toprol, COPD, on home O2 2L, lymphoma/CLL (1997), HFpEF LVEF 63% was sent PMD office for concern for fluid overload, found to be in CHF exacerbation in the setting of holding diuretics and afterload reducing agents, now improving on diuretics.

## 2017-10-31 NOTE — CONSULT NOTE ADULT - ATTENDING COMMENTS
62F with hx of Afib on Eliquis, DM, uncontrolled HTN admitted for respiratory distress and worsening leg edema, likely 2/2 acute on chronic heart failure. Pt currently being admitted to CCU for treatment of acute decompensated heart failure.
63 y/o female w/ h/o Afib on Eliquis, DM II, HTN, ?COPD on home O2 2L, lymphoma/CLL s/p radiation to neck and chemo (1997), HFpEF LVEF 66% presented yesterday with SOB with decline over past several weeks-months with volume retention that was becoming resistant to home regimen of diuretics. She had an episode of possible syncope (? low BP) which she cannot recall. On admission, normotensive, volume overloaded, BNP elevated. CXR was read as clear (appears congested to me) and was started on lasix gtt with good urine output. She denies any recent travel/car rides and was takign her Eliquis and other meds fairly consistently. Reviewed her TTE today which shows significant RV dysfunction, mod-severe TR (new), RVSP of 60s (read as 80s but overread) with dilated IVC with severe diastolic dysfunction but normal EF which was changed from her prior TTE in 7/17. EKG afib w/o ST changes. On exam, significant volume overload with JVD to 15 cm, RV heave, irreg irreg rhythm, crackles at bases with wheezing, distended abdomen, 3+ pitting edema b/l, warm to palpation. Currently, acute on chronic diastolic HF with volume overload, class IIIb symptoms. While PE is a possibility, she is being treated as is and she is normotensive so would defer on CTA.   - continue lasix gtt at 2.5/hr; increase to 5 mg/hr if not net negative 1-2L  - lytes twice daily; replete K>4, Mg>2  - standing weights daily  - check LE dopplers bilaterally; D dimer elevated which is not unusual in this setting  - check TSH  - afib with RVR; continue metoprolol tartrate 12.5 mg q8h

## 2017-10-31 NOTE — CONSULT NOTE ADULT - SUBJECTIVE AND OBJECTIVE BOX
Date of Admission: 10/30/17    CHIEF COMPLAINT: SOB    HISTORY OF PRESENT ILLNESS:    63 y/o female w/ PMHX  of Afib on Eliquis, DM II,  HTN, COPD on home O2 2L, lymphoma/CLL (1997), HFpEF LVEF 63% comes in with complaints of worseing SOB and LE edema.     She was found to be in CHF exacerbation in the setting of holding diuretics and afterload reducing agents for hypotension.     Allergies    No Known Allergies    Intolerances    	    MEDICATIONS:  apixaban 5 milliGRAM(s) Oral every 12 hours  furosemide Infusion 5 mG/Hr IV Continuous <Continuous>  metoprolol     tartrate 12.5 milliGRAM(s) Oral two times a day    buDESOnide  80 MICROgram(s)/formoterol 4.5 MICROgram(s) Inhaler 2 Puff(s) Inhalation two times a day    allopurinol 100 milliGRAM(s) Oral three times a day after meals  dextrose 50% Injectable 12.5 Gram(s) IV Push once  dextrose 50% Injectable 25 Gram(s) IV Push once  dextrose 50% Injectable 25 Gram(s) IV Push once  dextrose Gel 1 Dose(s) Oral once PRN  glucagon  Injectable 1 milliGRAM(s) IntraMuscular once PRN  insulin lispro (HumaLOG) corrective regimen sliding scale   SubCutaneous three times a day before meals  insulin lispro (HumaLOG) corrective regimen sliding scale   SubCutaneous at bedtime    chlorhexidine 4% Liquid 1 Application(s) Topical daily  dextrose 5%. 1000 milliLiter(s) IV Continuous <Continuous>  influenza   Vaccine 0.5 milliLiter(s) IntraMuscular once      PAST MEDICAL & SURGICAL HISTORY:  CLL (chronic lymphocytic leukemia)  Chronic diastolic congestive heart failure  COPD (chronic obstructive pulmonary disease)  Diabetes  HTN (hypertension)  Atrial fibrillation  No significant past surgical history      FAMILY HISTORY:  Family history of hyperlipidemia (Mother)  Family history of hypertension (Mother, Sibling)  Family history of type 2 diabetes mellitus (Mother)  Family history of cancer (Aunt): Breast      SOCIAL HISTORY:          REVIEW OF SYSTEMS:  CONSTITUTIONAL: No fever, weight loss, or fatigue  EYES: No eye pain, visual disturbances, or discharge  ENMT:  No difficulty hearing, tinnitus, vertigo; No sinus or throat pain  NECK: No pain or stiffness  RESPIRATORY: No cough, wheezing, chills or hemoptysis; No Shortness of Breath  CARDIOVASCULAR: No chest pain, palpitations, passing out, dizziness, or leg swelling  GASTROINTESTINAL: No abdominal or epigastric pain. No nausea, vomiting, or hematemesis; No diarrhea or constipation. No melena or hematochezia.  GENITOURINARY: No dysuria, frequency, hematuria, or incontinence  NEUROLOGICAL: No headaches, memory loss, loss of strength, numbness, or tremors  SKIN: No itching, burning, rashes, or lesions   LYMPH Nodes: No enlarged glands  ENDOCRINE: No heat or cold intolerance; No hair loss  MUSCULOSKELETAL: No joint pain or swelling; No muscle, back, or extremity pain  PSYCHIATRIC: No depression, anxiety, mood swings, or difficulty sleeping  HEME/LYMPH: No easy bruising, or bleeding gums  ALLERY AND IMMUNOLOGIC: No hives or eczema	    [ ] All others negative	  [ ] Unable to obtain    PHYSICAL EXAM:  T(C): 36.7 (10-31-17 @ 07:37), Max: 37.6 (10-30-17 @ 17:49)  HR: 897 (10-31-17 @ 11:36) (88 - 897)  BP: 140/113 (10-31-17 @ 11:00) (88/68 - 140/113)  RR: 18 (10-31-17 @ 11:00) (18 - 27)  SpO2: 99% (10-31-17 @ 11:36) (83% - 100%)  Wt(kg): --  I&O's Summary    30 Oct 2017 07:01  -  31 Oct 2017 07:00  --------------------------------------------------------  IN: 20 mL / OUT: 2335 mL / NET: -2315 mL    31 Oct 2017 07:01  -  31 Oct 2017 11:39  --------------------------------------------------------  IN: 5 mL / OUT: 300 mL / NET: -295 mL        Appearance: Normal	  HEENT:   Normal oral mucosa, PERRL, EOMI	  Lymphatic: No lymphadenopathy  Cardiovascular: Normal S1 S2, No JVD, No murmurs, No edema  Respiratory: Lungs clear to auscultation	  Psychiatry: A & O x 3, Mood & affect appropriate  Gastrointestinal:  Soft, Non-tender, + BS	  Skin: No rashes, No ecchymoses, No cyanosis	  Neurologic: Non-focal  Extremities: Normal range of motion, No clubbing, cyanosis or edema  Vascular: Peripheral pulses palpable 2+ bilaterally        LABS:	 	    CBC Full  -  ( 31 Oct 2017 06:00 )  WBC Count : 5.15 K/uL  Hemoglobin : 10.5 g/dL  Hematocrit : 34.7 %  Platelet Count - Automated : 193 K/uL  Mean Cell Volume : 69.5 fL  Mean Cell Hemoglobin : 21.0 pg  Mean Cell Hemoglobin Concentration : 30.3 %  Auto Neutrophil # : 2.55 K/uL  Auto Lymphocyte # : 1.65 K/uL  Auto Monocyte # : 0.77 K/uL  Auto Eosinophil # : 0.14 K/uL  Auto Basophil # : 0.03 K/uL  Auto Neutrophil % : 49.5 %  Auto Lymphocyte % : 32.0 %  Auto Monocyte % : 15.0 %  Auto Eosinophil % : 2.7 %  Auto Basophil % : 0.6 %    10-31    142  |  107  |  44<H>  ----------------------------<  116<H>  4.3   |  24  |  1.48<H>  10-30    142  |  107  |  45<H>  ----------------------------<  130<H>  4.5   |  23  |  1.66<H>    Ca    8.6      31 Oct 2017 06:00  Ca    8.6      30 Oct 2017 16:00  Phos  3.6     10-31  Phos  3.2     10-30  Mg     1.8     10-31  Mg     2.0     10-30    TPro  6.3  /  Alb  3.4  /  TBili  0.8  /  DBili  x   /  AST  21  /  ALT  20  /  AlkPhos  72  10-30      proBNP: Serum Pro-Brain Natriuretic Peptide: 6997 pg/mL (10-30 @ 16:00)    Lipid Profile:   HgA1c: Hemoglobin A1C, Whole Blood: 6.5 % (10-30 @ 16:02)    TSH: Thyroid Stimulating Hormone, Serum: 6.54 uIU/mL (10-30 @ 16:00)        CARDIAC MARKERS:      CKMB: 2.75 ng/mL (10-30 @ 16:00)    CKMB Relative Index: Test not performed (10-30 @ 16:00)      < from: Transthoracic Echocardiogram (03.24.17 @ 10:58) >  DIMENSIONS:  Dimensions:     Normal Values:  LA:     4.1 cm    2.0 - 4.0 cm  Ao:     2.9 cm    2.0 - 3.8 cm  SEPTUM: 0.8 cm    0.6 - 1.2 cm  PWT:    0.8 cm    0.6 - 1.1 cm  LVIDd:  5.2 cm    3.0 - 5.6 cm  LVIDs:  3.3 cm    1.8 - 4.0 cm  Derived Variables:  LVMI:68 g/m2  RWT: 0.30  Fractional short: 37 %  Ejection Fraction (Teicholtz): 66 %  ------------------------------------------------------------------------  OBSERVATIONS:  Mitral Valve: Mitral annular calcification, otherwise  normal mitral valve. Mildmitral regurgitation.  Aortic Root: Normal aortic root.  Aortic Valve: Calcified trileaflet aortic valve with normal  opening.  Left Atrium: Mildly dilated left atrium.  LA volume index =  37 cc/m2.  Left Ventricle: Endocardium not well visualized; grossly  normal left ventricular systolic function. Normal left  ventricular internal dimensions and wall thicknesses.  Right Heart: Mild right atrial enlargement. Normal right  ventricular size and function. Normal tricuspid valve. Mild  tricuspid regurgitation. Normal pulmonic valve.  Mild  pulmonic regurgitation.  Pericardium/PleuraNormal pericardium with no pericardial  effusion.    < end of copied text >    < from: Xray Chest 1 View AP/PA (10.30.17 @ 17:34) >  Clear lungs. Cardiomegaly.      < end of copied text > Date of Admission: 10/30/17    CHIEF COMPLAINT: SOB    HISTORY OF PRESENT ILLNESS:    63 y/o female w/ PMHX  of Afib on Eliquis, DM II,  HTN, COPD on home O2 2L, lymphoma/CLL s/p radiation and chemo (1997), HFpEF LVEF 66% comes in with complaints of worsening SOB and LE edema. She states she was recently fell, and was found to be hypotensive and some of her HF medications had been held. She then took her self to Dr. Goldberg (PMD) for a follow up, and she was found to be in ADHF and was sent to the hospital.     Today she is feeling a little better, still has CHAVEZ w/ minimal activities, and a great amount of LE edema.     Allergies    No Known Allergies    Intolerances    	    MEDICATIONS:  apixaban 5 milliGRAM(s) Oral every 12 hours  furosemide Infusion 5 mG/Hr IV Continuous <Continuous>  metoprolol     tartrate 12.5 milliGRAM(s) Oral two times a day    buDESOnide  80 MICROgram(s)/formoterol 4.5 MICROgram(s) Inhaler 2 Puff(s) Inhalation two times a day    allopurinol 100 milliGRAM(s) Oral three times a day after meals  dextrose 50% Injectable 12.5 Gram(s) IV Push once  dextrose 50% Injectable 25 Gram(s) IV Push once  dextrose 50% Injectable 25 Gram(s) IV Push once  dextrose Gel 1 Dose(s) Oral once PRN  glucagon  Injectable 1 milliGRAM(s) IntraMuscular once PRN  insulin lispro (HumaLOG) corrective regimen sliding scale   SubCutaneous three times a day before meals  insulin lispro (HumaLOG) corrective regimen sliding scale   SubCutaneous at bedtime    chlorhexidine 4% Liquid 1 Application(s) Topical daily  dextrose 5%. 1000 milliLiter(s) IV Continuous <Continuous>  influenza   Vaccine 0.5 milliLiter(s) IntraMuscular once      PAST MEDICAL & SURGICAL HISTORY:  CLL (chronic lymphocytic leukemia)  Chronic diastolic congestive heart failure  COPD (chronic obstructive pulmonary disease)  Diabetes  HTN (hypertension)  Atrial fibrillation  No significant past surgical history      FAMILY HISTORY:  Family history of hyperlipidemia (Mother)  Family history of hypertension (Mother, Sibling)  Family history of type 2 diabetes mellitus (Mother)  Family history of cancer (Aunt): Breast      SOCIAL HISTORY:          REVIEW OF SYSTEMS:  CONSTITUTIONAL: No fever, weight loss, or fatigue  EYES: No eye pain, visual disturbances, or discharge  ENMT:  No difficulty hearing, tinnitus, vertigo; No sinus or throat pain  NECK: No pain or stiffness  RESPIRATORY: No cough, wheezing, chills or hemoptysis; No Shortness of Breath  CARDIOVASCULAR: No chest pain, palpitations, passing out, dizziness, or leg swelling  GASTROINTESTINAL: No abdominal or epigastric pain. No nausea, vomiting, or hematemesis; No diarrhea or constipation. No melena or hematochezia.  GENITOURINARY: No dysuria, frequency, hematuria, or incontinence  NEUROLOGICAL: No headaches, memory loss, loss of strength, numbness, or tremors  SKIN: No itching, burning, rashes, or lesions   LYMPH Nodes: No enlarged glands  ENDOCRINE: No heat or cold intolerance; No hair loss  MUSCULOSKELETAL: No joint pain or swelling; No muscle, back, or extremity pain  PSYCHIATRIC: No depression, anxiety, mood swings, or difficulty sleeping  HEME/LYMPH: No easy bruising, or bleeding gums  ALLERY AND IMMUNOLOGIC: No hives or eczema	    [ ] All others negative	  [ ] Unable to obtain    PHYSICAL EXAM:  T(C): 36.7 (10-31-17 @ 07:37), Max: 37.6 (10-30-17 @ 17:49)  HR: 897 (10-31-17 @ 11:36) (88 - 897)  BP: 140/113 (10-31-17 @ 11:00) (88/68 - 140/113)  RR: 18 (10-31-17 @ 11:00) (18 - 27)  SpO2: 99% (10-31-17 @ 11:36) (83% - 100%)  Wt(kg): --  I&O's Summary    30 Oct 2017 07:01  -  31 Oct 2017 07:00  --------------------------------------------------------  IN: 20 mL / OUT: 2335 mL / NET: -2315 mL    31 Oct 2017 07:01  -  31 Oct 2017 11:39  --------------------------------------------------------  IN: 5 mL / OUT: 300 mL / NET: -295 mL        Appearance: Normal	  HEENT:   Normal oral mucosa, PERRL, EOMI	  Lymphatic: No lymphadenopathy  Cardiovascular: Normal S1 S2, No JVD, No murmurs, No edema  Respiratory: Lungs clear to auscultation	  Psychiatry: A & O x 3, Mood & affect appropriate  Gastrointestinal:  Soft, Non-tender, + BS	  Skin: No rashes, No ecchymoses, No cyanosis	  Neurologic: Non-focal  Extremities: Normal range of motion, No clubbing, cyanosis or edema  Vascular: Peripheral pulses palpable 2+ bilaterally        LABS:	 	    CBC Full  -  ( 31 Oct 2017 06:00 )  WBC Count : 5.15 K/uL  Hemoglobin : 10.5 g/dL  Hematocrit : 34.7 %  Platelet Count - Automated : 193 K/uL  Mean Cell Volume : 69.5 fL  Mean Cell Hemoglobin : 21.0 pg  Mean Cell Hemoglobin Concentration : 30.3 %  Auto Neutrophil # : 2.55 K/uL  Auto Lymphocyte # : 1.65 K/uL  Auto Monocyte # : 0.77 K/uL  Auto Eosinophil # : 0.14 K/uL  Auto Basophil # : 0.03 K/uL  Auto Neutrophil % : 49.5 %  Auto Lymphocyte % : 32.0 %  Auto Monocyte % : 15.0 %  Auto Eosinophil % : 2.7 %  Auto Basophil % : 0.6 %    10-31    142  |  107  |  44<H>  ----------------------------<  116<H>  4.3   |  24  |  1.48<H>  10-30    142  |  107  |  45<H>  ----------------------------<  130<H>  4.5   |  23  |  1.66<H>    Ca    8.6      31 Oct 2017 06:00  Ca    8.6      30 Oct 2017 16:00  Phos  3.6     10-31  Phos  3.2     10-30  Mg     1.8     10-31  Mg     2.0     10-30    TPro  6.3  /  Alb  3.4  /  TBili  0.8  /  DBili  x   /  AST  21  /  ALT  20  /  AlkPhos  72  10-30      proBNP: Serum Pro-Brain Natriuretic Peptide: 6997 pg/mL (10-30 @ 16:00)    Lipid Profile:   HgA1c: Hemoglobin A1C, Whole Blood: 6.5 % (10-30 @ 16:02)    TSH: Thyroid Stimulating Hormone, Serum: 6.54 uIU/mL (10-30 @ 16:00)        CARDIAC MARKERS:      CKMB: 2.75 ng/mL (10-30 @ 16:00)    CKMB Relative Index: Test not performed (10-30 @ 16:00)      < from: Transthoracic Echocardiogram (03.24.17 @ 10:58) >  DIMENSIONS:  Dimensions:     Normal Values:  LA:     4.1 cm    2.0 - 4.0 cm  Ao:     2.9 cm    2.0 - 3.8 cm  SEPTUM: 0.8 cm    0.6 - 1.2 cm  PWT:    0.8 cm    0.6 - 1.1 cm  LVIDd:  5.2 cm    3.0 - 5.6 cm  LVIDs:  3.3 cm    1.8 - 4.0 cm  Derived Variables:  LVMI:68 g/m2  RWT: 0.30  Fractional short: 37 %  Ejection Fraction (Teicholtz): 66 %  ------------------------------------------------------------------------  OBSERVATIONS:  Mitral Valve: Mitral annular calcification, otherwise  normal mitral valve. Mildmitral regurgitation.  Aortic Root: Normal aortic root.  Aortic Valve: Calcified trileaflet aortic valve with normal  opening.  Left Atrium: Mildly dilated left atrium.  LA volume index =  37 cc/m2.  Left Ventricle: Endocardium not well visualized; grossly  normal left ventricular systolic function. Normal left  ventricular internal dimensions and wall thicknesses.  Right Heart: Mild right atrial enlargement. Normal right  ventricular size and function. Normal tricuspid valve. Mild  tricuspid regurgitation. Normal pulmonic valve.  Mild  pulmonic regurgitation.  Pericardium/PleuraNormal pericardium with no pericardial  effusion.    < end of copied text >    < from: Xray Chest 1 View AP/PA (10.30.17 @ 17:34) >  Clear lungs. Cardiomegaly.      < end of copied text > Date of Admission: 10/30/17    CHIEF COMPLAINT: SOB    HISTORY OF PRESENT ILLNESS:    61 y/o female w/ PMHX  of Afib on Eliquis, DM II,  HTN, COPD on home O2 2L, lymphoma/CLL s/p radiation and chemo (1997), HFpEF LVEF 66% comes in with complaints of worsening SOB and LE edema. She states she recently had a fall at work, hit her head and was sent to the ER where she was found to be hypotensive. Since then some of her HF medications had been held. CT head was unremarkable per pt. She then took her self to Dr. Goldberg (PMD) for a follow up yesterday, and she was found to be in ADHF and was sent to [facility] ED.     Today she is feeling a little better s/p IV diuresis, still has CHAVEZ w/ minimal activities, and a great amount of LE edema. Prior to coming to hospital pt was sleeping in chair. She states her fluctuating weight has been a problem since the spring.     She is a non-smoker, no ETOH or drug abuse.     Allergies    No Known Allergies      MEDICATIONS:  apixaban 5 milliGRAM(s) Oral every 12 hours  furosemide Infusion 5 mG/Hr IV Continuous <Continuous>  metoprolol     tartrate 12.5 milliGRAM(s) Oral two times a day  buDESOnide  80 MICROgram(s)/formoterol 4.5 MICROgram(s) Inhaler 2 Puff(s) Inhalation two times a day  allopurinol 100 milliGRAM(s) Oral three times a day after meals  dextrose 50% Injectable 12.5 Gram(s) IV Push once  dextrose 50% Injectable 25 Gram(s) IV Push once  dextrose 50% Injectable 25 Gram(s) IV Push once  dextrose Gel 1 Dose(s) Oral once PRN  glucagon  Injectable 1 milliGRAM(s) IntraMuscular once PRN  insulin lispro (HumaLOG) corrective regimen sliding scale   SubCutaneous three times a day before meals  insulin lispro (HumaLOG) corrective regimen sliding scale   SubCutaneous at bedtime    chlorhexidine 4% Liquid 1 Application(s) Topical daily  dextrose 5%. 1000 milliLiter(s) IV Continuous <Continuous>  influenza   Vaccine 0.5 milliLiter(s) IntraMuscular once      PAST MEDICAL & SURGICAL HISTORY:  CLL (chronic lymphocytic leukemia)  Chronic diastolic congestive heart failure  COPD (chronic obstructive pulmonary disease)  Diabetes  HTN (hypertension)  Atrial fibrillation  No significant past surgical history      FAMILY HISTORY:  Family history of hyperlipidemia (Mother)  Family history of hypertension (Mother, Sibling)  Family history of type 2 diabetes mellitus (Mother)  Family history of cancer (Aunt): Breast      SOCIAL HISTORY:    As in HPI    REVIEW OF SYSTEMS:  CONSTITUTIONAL: No fever, weight loss. Admits to weight gain and fatigue.   EYES: No eye pain, visual disturbances, or discharge  ENMT:  No difficulty hearing, tinnitus, vertigo; No sinus or throat pain  NECK: No pain or stiffness  RESPIRATORY: No cough, wheezing, chills or hemoptysis. Admits to SOB.   CARDIOVASCULAR: No chest pain, palpitations, passing out, dizziness. Has b/l LE edema.   GASTROINTESTINAL: No abdominal or epigastric pain. No nausea, vomiting, or hematemesis; No diarrhea or constipation. No melena or hematochezia.  GENITOURINARY: No dysuria, frequency, hematuria, or incontinence  NEUROLOGICAL: No headaches, memory loss, loss of strength, numbness, or tremors  SKIN: No itching, burning, rashes, or lesions   LYMPH Nodes: No enlarged glands  ENDOCRINE: No heat or cold intolerance; No hair loss  MUSCULOSKELETAL: No joint pain or swelling; No muscle, back, or extremity pain  PSYCHIATRIC: No depression, anxiety, mood swings, or difficulty sleeping  HEME/LYMPH: No easy bruising, or bleeding gums  ALLERY AND IMMUNOLOGIC: No hives or eczema	    [ ] All others negative	  [ ] Unable to obtain    PHYSICAL EXAM:  T(C): 36.7 (10-31-17 @ 07:37), Max: 37.6 (10-30-17 @ 17:49)  HR: 897 (10-31-17 @ 11:36) (88 - 897)  BP: 140/113 (10-31-17 @ 11:00) (88/68 - 140/113)  RR: 18 (10-31-17 @ 11:00) (18 - 27)  SpO2: 99% (10-31-17 @ 11:36) (83% - 100%)  Wt(kg): --  I&O's Summary    30 Oct 2017 07:01  -  31 Oct 2017 07:00  --------------------------------------------------------  IN: 20 mL / OUT: 2335 mL / NET: -2315 mL    31 Oct 2017 07:01  -  31 Oct 2017 11:39  --------------------------------------------------------  IN: 5 mL / OUT: 300 mL / NET: -295 mL      Appearance: Normal	  HEENT:   Normal oral mucosa, PERRL, EOMI	  Lymphatic: No lymphadenopathy  Cardiovascular: Normal S1 S2, moderatly elevated JVP, No murmurs, No edema  Respiratory: Lungs clear to auscultation	  Psychiatry: A & O x 3, Mood & affect appropriate  Gastrointestinal:  Soft, Non-tender, + BS	  Skin: No rashes, No ecchymoses, No cyanosis	  Neurologic: Non-focal  Extremities: Normal range of motion, No clubbing, cyanosis or edema  Vascular: Peripheral pulses palpable 2+ bilaterally        LABS:	 	    CBC Full  -  ( 31 Oct 2017 06:00 )  WBC Count : 5.15 K/uL  Hemoglobin : 10.5 g/dL  Hematocrit : 34.7 %  Platelet Count - Automated : 193 K/uL  Mean Cell Volume : 69.5 fL  Mean Cell Hemoglobin : 21.0 pg  Mean Cell Hemoglobin Concentration : 30.3 %  Auto Neutrophil # : 2.55 K/uL  Auto Lymphocyte # : 1.65 K/uL  Auto Monocyte # : 0.77 K/uL  Auto Eosinophil # : 0.14 K/uL  Auto Basophil # : 0.03 K/uL  Auto Neutrophil % : 49.5 %  Auto Lymphocyte % : 32.0 %  Auto Monocyte % : 15.0 %  Auto Eosinophil % : 2.7 %  Auto Basophil % : 0.6 %    10-31    142  |  107  |  44<H>  ----------------------------<  116<H>  4.3   |  24  |  1.48<H>  10-30    142  |  107  |  45<H>  ----------------------------<  130<H>  4.5   |  23  |  1.66<H>    Ca    8.6      31 Oct 2017 06:00  Ca    8.6      30 Oct 2017 16:00  Phos  3.6     10-31  Phos  3.2     10-30  Mg     1.8     10-31  Mg     2.0     10-30    TPro  6.3  /  Alb  3.4  /  TBili  0.8  /  DBili  x   /  AST  21  /  ALT  20  /  AlkPhos  72  10-30      proBNP: Serum Pro-Brain Natriuretic Peptide: 6997 pg/mL (10-30 @ 16:00)    Lipid Profile:   HgA1c: Hemoglobin A1C, Whole Blood: 6.5 % (10-30 @ 16:02)    TSH: Thyroid Stimulating Hormone, Serum: 6.54 uIU/mL (10-30 @ 16:00)        CARDIAC MARKERS:      CKMB: 2.75 ng/mL (10-30 @ 16:00)    CKMB Relative Index: Test not performed (10-30 @ 16:00)      < from: Transthoracic Echocardiogram (03.24.17 @ 10:58) >  DIMENSIONS:  Dimensions:     Normal Values:  LA:     4.1 cm    2.0 - 4.0 cm  Ao:     2.9 cm    2.0 - 3.8 cm  SEPTUM: 0.8 cm    0.6 - 1.2 cm  PWT:    0.8 cm    0.6 - 1.1 cm  LVIDd:  5.2 cm    3.0 - 5.6 cm  LVIDs:  3.3 cm    1.8 - 4.0 cm  Derived Variables:  LVMI:68 g/m2  RWT: 0.30  Fractional short: 37 %  Ejection Fraction (Teicholtz): 66 %  ------------------------------------------------------------------------  OBSERVATIONS:  Mitral Valve: Mitral annular calcification, otherwise  normal mitral valve. Mildmitral regurgitation.  Aortic Root: Normal aortic root.  Aortic Valve: Calcified trileaflet aortic valve with normal  opening.  Left Atrium: Mildly dilated left atrium.  LA volume index =  37 cc/m2.  Left Ventricle: Endocardium not well visualized; grossly  normal left ventricular systolic function. Normal left  ventricular internal dimensions and wall thicknesses.  Right Heart: Mild right atrial enlargement. Normal right  ventricular size and function. Normal tricuspid valve. Mild  tricuspid regurgitation. Normal pulmonic valve.  Mild  pulmonic regurgitation.  Pericardium/PleuraNormal pericardium with no pericardial  effusion.    < end of copied text >    < from: Xray Chest 1 View AP/PA (10.30.17 @ 17:34) >  Clear lungs. Cardiomegaly.      < end of copied text >

## 2017-10-31 NOTE — PROGRESS NOTE ADULT - PROBLEM SELECTOR PLAN 5
Patient with h/o COPD, currently on home oxygen 2L  - on 2L NC, maintain goal SpO2 > 92%.   - c/w Advair PRN   - ABG  - continuous O2 sat monitoring

## 2017-10-31 NOTE — DIETITIAN INITIAL EVALUATION ADULT. - DIET TYPE
regular/DASH/TLC (sodium and cholesterol restricted diet)/low sodium/consistent carbohydrate (evening snack)

## 2017-10-31 NOTE — PROGRESS NOTE ADULT - PROBLEM SELECTOR PLAN 2
Patient with Afib 90-110s  - c/w metoprolol 12.5 mg PO BID  - Continuous tele monitoring  - Monitor BMP and replete as needed.

## 2017-10-31 NOTE — CONSULT NOTE ADULT - SUBJECTIVE AND OBJECTIVE BOX
Patient is a 62y old  Female who presents with a chief complaint of LE edema and Abd edema, SOB (30 Oct 2017 22:46)      HPI:  62F with hx of Afib on Eliquis, DM on Metformin, uncontrolled HTN on Toprol COPD, 2L home O2, lymphoma/CLL (), HFpEF LVEF 63%. BIBEMS from PMD for concern for fluid overload. Patient went to see PMD on Thursday for LE edema and was found to have BP 90/40. Antihypertensive medications held. Patient also held Lasix along with that. Patient had a fall at work (+ LOC) on Friday and was evaluated at [facility] with negative head CT. Patient was sent home the same day. Patient noted to have worsening LE edema and abdominal edema with worsening CHAVEZ. Patient has chronic orthopnea, sleeps in chair most of the time. If she sleeps in bed, wakes up at night and sleeps in chair. + syncope on Friday. + weakness. Patient currently Afib 100-110s.   PE considered on the differentials but patient being therapeutically anticoagulated with Eliquis and reports compliance with that. Patient’s creatinine 1.66. CTA held as per Dr. Devlin. Will treat CHF which is highly on the differentials and continue to monitor patient in CCU. Patient chronically SOB and is on 2L O2 at home.   Denies fever, chills, chest pain, palpitation, nausea, vomiting, diarrhea, constipation, abdominal pain, bladder and bowel problems, sick contact, recent travel. (30 Oct 2017 21:20)  She has been diuresed aggressively as per CCU team and she feels a lot better.    PAST MEDICAL & SURGICAL HISTORY:  CLL (chronic lymphocytic leukemia)  Chronic diastolic congestive heart failure  COPD (chronic obstructive pulmonary disease)  Diabetes  HTN (hypertension)  Atrial fibrillation  No significant past surgical history      Review of Systems:   CONSTITUTIONAL: No fever, weight loss, or fatigue  EYES: No eye pain, visual disturbances, or discharge  ENMT:  No difficulty hearing, tinnitus, vertigo; No sinus or throat pain  NECK: No pain or stiffness  BREASTS: No pain, masses, or nipple discharge  RESPIRATORY: No cough, wheezing, chills or hemoptysis; SOB +  CARDIOVASCULAR: No chest pain, palpitations, dizziness, Leg swelling +++  GASTROINTESTINAL: No abdominal or epigastric pain. No nausea, vomiting, or hematemesis; No diarrhea or constipation. No melena or hematochezia. Abdomen distended  GENITOURINARY: No dysuria, frequency, hematuria, or incontinence  NEUROLOGICAL: No headaches, memory loss, loss of strength, numbness, or tremors  SKIN: No itching, burning, rashes, or lesions   LYMPH NODES: No enlarged glands  ENDOCRINE: No heat or cold intolerance; No hair loss  MUSCULOSKELETAL: No joint pain or swelling; No muscle, back, or extremity pain  PSYCHIATRIC: No depression, anxiety, mood swings, or difficulty sleeping  HEME/LYMPH: No easy bruising, or bleeding gums  ALLERY AND IMMUNOLOGIC: No hives or eczema    Allergies    No Known Allergies    Intolerances        Social History:     FAMILY HISTORY:  Family history of hyperlipidemia (Mother)  Family history of hypertension (Mother, Sibling)  Family history of type 2 diabetes mellitus (Mother)  Family history of cancer (Aunt): Breast      MEDICATIONS  (STANDING):  allopurinol 100 milliGRAM(s) Oral three times a day after meals  apixaban 5 milliGRAM(s) Oral every 12 hours  buDESOnide  80 MICROgram(s)/formoterol 4.5 MICROgram(s) Inhaler 2 Puff(s) Inhalation two times a day  chlorhexidine 4% Liquid 1 Application(s) Topical daily  dextrose 5%. 1000 milliLiter(s) (50 mL/Hr) IV Continuous <Continuous>  dextrose 50% Injectable 12.5 Gram(s) IV Push once  dextrose 50% Injectable 25 Gram(s) IV Push once  dextrose 50% Injectable 25 Gram(s) IV Push once  furosemide Infusion 5 mG/Hr (2.5 mL/Hr) IV Continuous <Continuous>  influenza   Vaccine 0.5 milliLiter(s) IntraMuscular once  insulin lispro (HumaLOG) corrective regimen sliding scale   SubCutaneous three times a day before meals  insulin lispro (HumaLOG) corrective regimen sliding scale   SubCutaneous at bedtime  metoprolol     tartrate 12.5 milliGRAM(s) Oral three times a day  pantoprazole    Tablet 40 milliGRAM(s) Oral before breakfast    MEDICATIONS  (PRN):  dextrose Gel 1 Dose(s) Oral once PRN Blood Glucose LESS THAN 70 milliGRAM(s)/deciliter  glucagon  Injectable 1 milliGRAM(s) IntraMuscular once PRN Glucose LESS THAN 70 milligrams/deciliter        CAPILLARY BLOOD GLUCOSE  149 (31 Oct 2017 13:15)  99 (31 Oct 2017 09:30)  133 (30 Oct 2017 23:00)      POCT Blood Glucose.: 136 mg/dL (31 Oct 2017 17:17)  POCT Blood Glucose.: 149 mg/dL (31 Oct 2017 13:15)  POCT Blood Glucose.: 99 mg/dL (31 Oct 2017 09:30)  POCT Blood Glucose.: 133 mg/dL (30 Oct 2017 23:15)    I&O's Summary    30 Oct 2017 07:01  -  31 Oct 2017 07:00  --------------------------------------------------------  IN: 20 mL / OUT: 2335 mL / NET: -2315 mL    31 Oct 2017 07:01  -  31 Oct 2017 17:28  --------------------------------------------------------  IN: 505 mL / OUT: 1400 mL / NET: -895 mL        PHYSICAL EXAM:  Vital Signs Last 24 Hrs  T(C): 36.8 (31 Oct 2017 16:05), Max: 37.6 (30 Oct 2017 17:49)  T(F): 98.3 (31 Oct 2017 16:05), Max: 99.6 (30 Oct 2017 17:49)  HR: 108 (31 Oct 2017 16:05) (88 - 897)  BP: 118/86 (31 Oct 2017 16:05) (88/68 - 140/113)  BP(mean): 91 (31 Oct 2017 16:05) (67 - 120)  RR: 28 (31 Oct 2017 16:05) (13 - 28)  SpO2: 97% (31 Oct 2017 16:05) (83% - 100%)    GENERAL: NAD, well-developed  HEAD:  Atraumatic, Normocephalic  EYES: EOMI, PERRLA, conjunctiva and sclera clear  NECK: Supple, JVD+  CHEST/LUNG: Poor AE bilaterally  HEART: Irregular rate and rhythm; No murmurs, rubs, or gallops  ABDOMEN: Soft, Nontender, distended Bowel sounds presentAbdominal skin with edema  EXTREMITIES:  2+ Peripheral Pulses, No clubbing, cyanosis, edema improved  PSYCH: AAOx3  NEUROLOGY: non-focal  SKIN: No rashes or lesions    LABS:                        10.5   5.15  )-----------( 193      ( 31 Oct 2017 06:00 )             34.7     10-31    143  |  106  |  42<H>  ----------------------------<  162<H>  4.0   |  27  |  1.38<H>    Ca    8.6      31 Oct 2017 13:00  Phos  3.6     10-31  Mg     1.8     10-31    TPro  6.3  /  Alb  3.4  /  TBili  0.8  /  DBili  x   /  AST  21  /  ALT  20  /  AlkPhos  72  10-30    PT/INR - ( 31 Oct 2017 06:00 )   PT: 15.6 SEC;   INR: 1.38            CARDIAC MARKERS ( 30 Oct 2017 16:00 )  x     / < 0.06 ng/mL / 50 u/L / 2.75 ng/mL / x          Urinalysis Basic - ( 30 Oct 2017 17:45 )    Color: YELLOW / Appearance: CLEAR / S.021 / pH: 6.0  Gluc: NEGATIVE / Ketone: NEGATIVE  / Bili: NEGATIVE / Urobili: NORMAL E.U.   Blood: NEGATIVE / Protein: 300 / Nitrite: NEGATIVE   Leuk Esterase: NEGATIVE / RBC: 0-2 / WBC 2-5   Sq Epi: OCC / Non Sq Epi: x / Bacteria: x        RADIOLOGY & ADDITIONAL TESTS:    Imaging Personally Reviewed:    Consultant(s) Notes Reviewed:      Care Discussed with Consultants/Other Providers:

## 2017-10-31 NOTE — CONSULT NOTE ADULT - PROBLEM SELECTOR RECOMMENDATION 9
She has a history of chronic diastolic HF. Her symptoms are for right sided HF. Will need echo  Continue diureses as per CCU team.

## 2017-10-31 NOTE — CHART NOTE - NSCHARTNOTEFT_GEN_A_CORE
Pt was seen and examined.  Briefly this is a female c hx of HTN DM afib pw SOB and found to have stigmata cw RV failure?  Subnephrotic range proteinuria  Hypotensive    Suggest  -awaiting echo--> pay attention to PASP and RV function  -Lasix gtt  -If CTA is clinically indicated (based on echo findings) then no renal objection to CTA  -Quantify proteinuria        Sayed Una  [facility]  [phone]

## 2017-10-31 NOTE — CONSULT NOTE ADULT - PROBLEM SELECTOR RECOMMENDATION 5
Once stabilized encourage weight reduction;  Would have dietician meet with patient.
No longer requiring BiPAP. May need a ABG to evaluate CO2 accumulation. Pulm is following

## 2017-10-31 NOTE — CONSULT NOTE ADULT - SUBJECTIVE AND OBJECTIVE BOX
PULM/MED CONSULT NOTE:	    The patient was admitted for dyspnea and increased fluid overload.   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  She was evaluated at [facility]; head Ct reported ly negative (Pt is on Eliquis)    HPI:  62F with hx of Afib on Eliquis, DM on Metformin, uncontrolled HTN on Toprol COPD, 2L home O2, lymphoma/CLL (), HFpEF LVEF 63%. BIBEMS from PMD for concern for fluid overload. Patient went to see PMD on 10/26 for LE edema and was found to have BP 90/40. Antihypertensive medications held. Patient also held Lasix along with that. Patient had a fall at work (+ LOC) on 10/27 and was evaluated at [facility] with negative head CT. Patient was sent home the same day. Patient noted to have worsening LE edema and abdominal edema with worsening CHAVEZ. Patient has chronic orthopnea, sleeps in chair most of the time. If she sleeps in bed, wakes up at night and sleeps in chair. + syncope on Friday. + weakness. Patient currently Afib 100-110s.     Patient chronically SOB and is on 2L O2 at home.     The patient has a hx of asthma with mosaic attnuation in chest CT 2017, along with pulmonary nodules.   No recent coughing or wheezing. Her breathing has improved since her treatment with Lasix and BIPAP.        MEDICATIONS  (STANDING):  allopurinol 100 milliGRAM(s) Oral three times a day after meals  apixaban 5 milliGRAM(s) Oral every 12 hours  buDESOnide  80 MICROgram(s)/formoterol 4.5 MICROgram(s) Inhaler 2 Puff(s) Inhalation two times a day  chlorhexidine 4% Liquid 1 Application(s) Topical daily  dextrose 5%. 1000 milliLiter(s) (50 mL/Hr) IV Continuous <Continuous>  dextrose 50% Injectable 12.5 Gram(s) IV Push once  dextrose 50% Injectable 25 Gram(s) IV Push once  dextrose 50% Injectable 25 Gram(s) IV Push once  furosemide Infusion 5 mG/Hr (2.5 mL/Hr) IV Continuous <Continuous>  influenza   Vaccine 0.5 milliLiter(s) IntraMuscular once  insulin lispro (HumaLOG) corrective regimen sliding scale   SubCutaneous three times a day before meals  insulin lispro (HumaLOG) corrective regimen sliding scale   SubCutaneous at bedtime  metoprolol     tartrate 12.5 milliGRAM(s) Oral three times a day  pantoprazole    Tablet 40 milliGRAM(s) Oral before breakfast    MEDICATIONS  (PRN):  dextrose Gel 1 Dose(s) Oral once PRN Blood Glucose LESS THAN 70 milliGRAM(s)/deciliter  glucagon  Injectable 1 milliGRAM(s) IntraMuscular once PRN Glucose LESS THAN 70 milligrams/deciliter          PAST MEDICAL & SURGICAL HISTORY:  CLL (chronic lymphocytic leukemia)  Chronic diastolic congestive heart failure  COPD (chronic obstructive pulmonary disease)  Diabetes  HTN (hypertension)  Atrial fibrillation  No significant past surgical history      FAMILY HISTORY:  Family history of hyperlipidemia (Mother)  Family history of hypertension (Mother, Sibling)  Family history of type 2 diabetes mellitus (Mother)  Family history of cancer (Aunt): Breast      PHYSICAL EXAM:  Vital Signs Last 24 Hrs  T(C): 36.8 (31 Oct 2017 16:05), Max: 37 (30 Oct 2017 22:15)  T(F): 98.3 (31 Oct 2017 16:05), Max: 98.6 (30 Oct 2017 22:15)  HR: 92 (31 Oct 2017 18:00) (88 - 897)  BP: 109/79 (31 Oct 2017 18:00) (88/68 - 140/113)  BP(mean): 86 (31 Oct 2017 18:00) (67 - 120)  RR: 16 (31 Oct 2017 18:00) (13 - 28)      SpO2: 96% (31 Oct 2017 18:00) (83% - 100%)  GENERAL: NAD,     HEAD:  Atraumatic, no evidence of bruising or lacerations ( on left from recent fall)    NECK: Supple,     CHEST/LUNG: Clear breath sounds bilaterally  HEART: S1S2 irreg irreg  ABDOMEN: Soft, Nontender, ; Bowel sounds present; some abdominal wall edema.   EXTREMITIES:  2 - 3+ edema      LABS:                        10.5   5.15  )-----------( 193      ( 31 Oct 2017 06:00 )             34.7     10-31    143  |  106  |  42<H>  ----------------------------<  162<H>  4.0   |  27  |  1.38<H>    Ca    8.6      31 Oct 2017 13:00  Phos  3.6     10-31  Mg     1.8     10-31    TPro  6.3  /  Alb  3.4  /  TBili  0.8  /  DBili  x   /  AST  21  /  ALT  20  /  AlkPhos  72  10-30    PT/INR - ( 31 Oct 2017 06:00 )   PT: 15.6 SEC;   INR: 1.38            Urinalysis Basic - ( 30 Oct 2017 17:45 )    Color: YELLOW / Appearance: CLEAR / S.021 / pH: 6.0  Gluc: NEGATIVE / Ketone: NEGATIVE  / Bili: NEGATIVE / Urobili: NORMAL E.U.   Blood: NEGATIVE / Protein: 300 / Nitrite: NEGATIVE   Leuk Esterase: NEGATIVE / RBC: 0-2 / WBC 2-5   Sq Epi: OCC / Non Sq Epi: x / Bacteria: x      ABG - ( 31 Oct 2017 13:00 )  pH: 7.35  /  pCO2: 50    /  pO2: 79    / HCO3: 25    / Base Excess: 1.5   /  SaO2: 95.5              CARDIAC MARKERS ( 30 Oct 2017 16:00 )  x     / < 0.06 ng/mL / 50 u/L / 2.75 ng/mL / x          D-Dimer Assay, Quantitative: 269 ng/mL (10-31-17 @ 13:00)    Serum Pro-Brain Natriuretic Peptide: 6997 pg/mL (10-30-17 @ 16:00)            Microbiology      RADIOLOGY & ADDITIONAL STUDIES:    Xray:   EXAM:  RAD CHEST AP PA 1 VIEW        PROCEDURE DATE:  Oct 30 2017         INTERPRETATION:  CLINICAL INFORMATION: Weakness    TECHNIQUE: AP view of the chest.    COMPARISON: Chest x-ray 2017. Chest CT 2017.    FINDINGS:     The lungs are clear.  The heart is enlarged despite projection.  There are degenerative changes of the thoracic spine.    IMPRESSION:   Clear lungs. Cardiomegaly.      AMY GAGE M.D., RADIOLOGY RESIDENT  This document has been electronically signed.  ACOSTA ZAMORA M.D.; ATTENDING RADIOLOGIST  This document has been electronically signed. Oct 31 2017  8:17AM          CTScan:     EXAM:  CT CHEST                            PROCEDURE DATE:  2017        INTERPRETATION:  CLINICAL INFORMATION: 61-year-old female status post   fall with chest pain and hypoxia.    COMPARISON: CT chest 6/10/2010    PROCEDURE:   CT of the Chest was performed without intravenous contrast.  Sagittal and coronal reformats were performed as well as 3D   Reconstructions.    FINDINGS:    LUNGS AND LARGE AIRWAYS: Patent central airways. Mosaic attenuation of   the lungs compatible with air trapping. 5 mm right upper lobe (image 286,   series 4) and 2 mm right lower lobe subpleural nodule (image 446, series   4), likely benign. 6 mm right lower lobe subpleural nodular opacity with   associated scarring (image 103, series 3). Mild bibasilar linear   atelectasis and/or scarring.    PLEURA: No pleural effusion or pneumothorax.    VESSELS: Calcific atherosclerosis. Normal caliber aorta. Dilatation of   the main pulmonary artery to a caliber of 3.5 cm which may be seen with   pulmonary arterialhypertension.    HEART: Cardiomegaly. No pericardial effusion. Mild coronary artery   calcifications.    MEDIASTINUM AND CLAUDIA: No lymphadenopathy.    CHEST WALL AND LOWER NECK: Within normal limits.    VISUALIZED UPPER ABDOMEN: Nonspecific mild nodular left adrenal gland   thickening.     BONES: Degenerative changes of the visualized spine.    IMPRESSION:     No evidence of acute injury in the chest.  Mosaic attenuation of the lung parenchyma consistent with air trapping.      BERNARDO SALCEDO, RADIOLOGY RESIDENT  This document has been electronically signed.  BENJIE TYSON M.D., ATTENDING RADIOLOGIST  This document has been electronically signed. 2017  2:05AM        Echo:    Assessment:    Plan:

## 2017-10-31 NOTE — CONSULT NOTE ADULT - PROBLEM SELECTOR PROBLEM 4
Chronic atrial fibrillation
Type 2 diabetes mellitus without complication, without long-term current use of insulin

## 2017-10-31 NOTE — DIETITIAN INITIAL EVALUATION ADULT. - PROBLEM SELECTOR PLAN 3
Patient with h/o hypertension and is on multiple antihypertensives. Currently with borderline to low BP  Initiate home medications as tolerated  DASH diet  Monitor blood pressure

## 2017-10-31 NOTE — CONSULT NOTE ADULT - PROBLEM SELECTOR PROBLEM 1
Respiratory distress
Acute on chronic heart failure, unspecified heart failure type
Respiratory distress
Acute on chronic diastolic heart failure

## 2017-10-31 NOTE — CONSULT NOTE ADULT - PROBLEM SELECTOR RECOMMENDATION 9
-Appears grossly hypervolemic. Elevated JVP and 3+ LE edema to knees.   -Agree w/ Lasix gtt 5 mg/hr. Diuresing well on current dose. Will re-evaluate tomorrow morning if she needs more.  - -Appears grossly hypervolemic. Elevated JVP and 3+ LE edema to knees.   -Agree w/ Lasix gtt 5 mg/hr. Diuresing well on current dose. Will re-evaluate tomorrow morning if she needs more.  -She has a.fib w/ RVR, but likely due to decompensated heart failure and should improve once fluid comes off.  -Continue lopressor as ordered.

## 2017-10-31 NOTE — PROGRESS NOTE ADULT - PROBLEM SELECTOR PLAN 1
sent from PMD due to fluid overload, pt stopped lasix and other HTN meds due to hypotension last week. CXR clear  - c/w lasix drip 5mg/hr  - c/w telemetry   - Daily weight monitoring  - Strict I/O  - ECHO to evaluate LV/RV function and valvular abnormalities  - nephrology ok with CTA if necessary

## 2017-10-31 NOTE — PROGRESS NOTE ADULT - SUBJECTIVE AND OBJECTIVE BOX
Patient is a 62y old  Female who presents with a chief complaint of LE edema and Abd edema, SOB (30 Oct 2017 22:46)      Overnight Events:    Vital Signs Last 24 Hrs  T(C): 36.7 (31 Oct 2017 07:37), Max: 37.6 (30 Oct 2017 17:49)  T(F): 98 (31 Oct 2017 07:37), Max: 99.6 (30 Oct 2017 17:49)  HR: 98 (31 Oct 2017 11:00) (88 - 113)  BP: 140/113 (31 Oct 2017 11:00) (88/68 - 140/113)  BP(mean): 120 (31 Oct 2017 11:00) (67 - 120)  RR: 18 (31 Oct 2017 11:00) (18 - 27)  SpO2: 83% (31 Oct 2017 11:00) (83% - 100%)    CAPILLARY BLOOD GLUCOSE  133 (30 Oct 2017 23:00)      POCT Blood Glucose.: 99 mg/dL (31 Oct 2017 09:30)  POCT Blood Glucose.: 133 mg/dL (30 Oct 2017 23:15)      I&O's Summary    30 Oct 2017 07:01  -  31 Oct 2017 07:00  --------------------------------------------------------  IN: 20 mL / OUT: 2335 mL / NET: -2315 mL    31 Oct 2017 07:01  -  31 Oct 2017 11:23  --------------------------------------------------------  IN: 5 mL / OUT: 300 mL / NET: -295 mL        Gen: NAD  HEENT: NCAT/ PERRLA  Cardio: S1 S2 WNL, No M/R/G, RRR  Resp: CTA B/L  Abd: soft, NT/ND BS+  Skin: no rashes, moist, no LE edema  Msk: normal ROM  Neuro: no focal deficits, CN grossly intact  Psych: AAO X3    MEDICATIONS  (STANDING):  allopurinol 100 milliGRAM(s) Oral three times a day after meals  apixaban 5 milliGRAM(s) Oral every 12 hours  buDESOnide  80 MICROgram(s)/formoterol 4.5 MICROgram(s) Inhaler 2 Puff(s) Inhalation two times a day  chlorhexidine 4% Liquid 1 Application(s) Topical daily  dextrose 5%. 1000 milliLiter(s) (50 mL/Hr) IV Continuous <Continuous>  dextrose 50% Injectable 12.5 Gram(s) IV Push once  dextrose 50% Injectable 25 Gram(s) IV Push once  dextrose 50% Injectable 25 Gram(s) IV Push once  furosemide Infusion 5 mG/Hr (2.5 mL/Hr) IV Continuous <Continuous>  influenza   Vaccine 0.5 milliLiter(s) IntraMuscular once  insulin lispro (HumaLOG) corrective regimen sliding scale   SubCutaneous three times a day before meals  insulin lispro (HumaLOG) corrective regimen sliding scale   SubCutaneous at bedtime  metoprolol     tartrate 12.5 milliGRAM(s) Oral two times a day    MEDICATIONS  (PRN):  dextrose Gel 1 Dose(s) Oral once PRN Blood Glucose LESS THAN 70 milliGRAM(s)/deciliter  glucagon  Injectable 1 milliGRAM(s) IntraMuscular once PRN Glucose LESS THAN 70 milligrams/deciliter      CBC Full  -  ( 31 Oct 2017 06:00 )  WBC Count : 5.15 K/uL  Hemoglobin : 10.5 g/dL  Hematocrit : 34.7 %  Platelet Count - Automated : 193 K/uL  Mean Cell Volume : 69.5 fL  Mean Cell Hemoglobin : 21.0 pg  Mean Cell Hemoglobin Concentration : 30.3 %  Auto Neutrophil # : 2.55 K/uL  Auto Lymphocyte # : 1.65 K/uL  Auto Monocyte # : 0.77 K/uL  Auto Eosinophil # : 0.14 K/uL  Auto Basophil # : 0.03 K/uL  Auto Neutrophil % : 49.5 %  Auto Lymphocyte % : 32.0 %  Auto Monocyte % : 15.0 %  Auto Eosinophil % : 2.7 %  Auto Basophil % : 0.6 %                            10.5   5.15  )-----------( 193      ( 31 Oct 2017 06:00 )             34.7       LIVER FUNCTIONS - ( 30 Oct 2017 16:00 )  Alb: 3.4 g/dL / Pro: 6.3 g/dL / ALK PHOS: 72 u/L / ALT: 20 u/L / AST: 21 u/L / GGT: x             PT/INR - ( 31 Oct 2017 06:00 )   PT: 15.6 SEC;   INR: 1.38              CARDIAC MARKERS ( 30 Oct 2017 16:00 )  x     / < 0.06 ng/mL / 50 u/L / 2.75 ng/mL / x                Urinalysis Basic - ( 30 Oct 2017 17:45 )    Color: YELLOW / Appearance: CLEAR / S.021 / pH: 6.0  Gluc: NEGATIVE / Ketone: NEGATIVE  / Bili: NEGATIVE / Urobili: NORMAL E.U.   Blood: NEGATIVE / Protein: 300 / Nitrite: NEGATIVE   Leuk Esterase: NEGATIVE / RBC: 0-2 / WBC 2-5   Sq Epi: OCC / Non Sq Epi: x / Bacteria: x        Imaging:    Assessment:    Plan: Patient is a 62y old  Female who presents with a chief complaint of LE edema and Abd edema, SOB (30 Oct 2017 22:46)      Overnight Events: Pt admitted overnight. AFib 100s-110s last night. This am, pt feels better. States that abd is less distended and softer and legs are less swollen. Denies fevers, chills, n/v, CP, SOB, abd pain, diarrhea.     Vital Signs Last 24 Hrs  T(C): 36.7 (31 Oct 2017 07:37), Max: 37.6 (30 Oct 2017 17:49)  T(F): 98 (31 Oct 2017 07:37), Max: 99.6 (30 Oct 2017 17:49)  HR: 98 (31 Oct 2017 11:00) (88 - 113)  BP: 140/113 (31 Oct 2017 11:00) (88/68 - 140/113)  BP(mean): 120 (31 Oct 2017 11:00) (67 - 120)  RR: 18 (31 Oct 2017 11:00) (18 - 27)  SpO2: 83% (31 Oct 2017 11:00) (83% - 100%)    CAPILLARY BLOOD GLUCOSE  133 (30 Oct 2017 23:00)      POCT Blood Glucose.: 99 mg/dL (31 Oct 2017 09:30)  POCT Blood Glucose.: 133 mg/dL (30 Oct 2017 23:15)      I&O's Summary    30 Oct 2017 07:  -  31 Oct 2017 07:00  --------------------------------------------------------  IN: 20 mL / OUT: 2335 mL / NET: -2315 mL    31 Oct 2017 07:01  -  31 Oct 2017 11:23  --------------------------------------------------------  IN: 5 mL / OUT: 300 mL / NET: -295 mL        Gen: NAD, obese woman lying comfortably in bed  HEENT: NCAT/ PERRL  Cardio: irregularly irregular, No M/R/G  Resp: bibasilar crackles  Abd: soft, NT, very distended, BS+  Skin: no rashes, moist  Extremities: 2+ radial pulses, no cyanosis, clubbing. 3+ edema b/l LE  Neuro: no focal deficits, CN grossly intact  Psych: AAO X3    MEDICATIONS  (STANDING):  allopurinol 100 milliGRAM(s) Oral three times a day after meals  apixaban 5 milliGRAM(s) Oral every 12 hours  buDESOnide  80 MICROgram(s)/formoterol 4.5 MICROgram(s) Inhaler 2 Puff(s) Inhalation two times a day  chlorhexidine 4% Liquid 1 Application(s) Topical daily  dextrose 5%. 1000 milliLiter(s) (50 mL/Hr) IV Continuous <Continuous>  dextrose 50% Injectable 12.5 Gram(s) IV Push once  dextrose 50% Injectable 25 Gram(s) IV Push once  dextrose 50% Injectable 25 Gram(s) IV Push once  furosemide Infusion 5 mG/Hr (2.5 mL/Hr) IV Continuous <Continuous>  influenza   Vaccine 0.5 milliLiter(s) IntraMuscular once  insulin lispro (HumaLOG) corrective regimen sliding scale   SubCutaneous three times a day before meals  insulin lispro (HumaLOG) corrective regimen sliding scale   SubCutaneous at bedtime  metoprolol     tartrate 12.5 milliGRAM(s) Oral two times a day    MEDICATIONS  (PRN):  dextrose Gel 1 Dose(s) Oral once PRN Blood Glucose LESS THAN 70 milliGRAM(s)/deciliter  glucagon  Injectable 1 milliGRAM(s) IntraMuscular once PRN Glucose LESS THAN 70 milligrams/deciliter      CBC Full  -  ( 31 Oct 2017 06:00 )  WBC Count : 5.15 K/uL  Hemoglobin : 10.5 g/dL  Hematocrit : 34.7 %  Platelet Count - Automated : 193 K/uL  Mean Cell Volume : 69.5 fL  Mean Cell Hemoglobin : 21.0 pg  Mean Cell Hemoglobin Concentration : 30.3 %  Auto Neutrophil # : 2.55 K/uL  Auto Lymphocyte # : 1.65 K/uL  Auto Monocyte # : 0.77 K/uL  Auto Eosinophil # : 0.14 K/uL  Auto Basophil # : 0.03 K/uL  Auto Neutrophil % : 49.5 %  Auto Lymphocyte % : 32.0 %  Auto Monocyte % : 15.0 %  Auto Eosinophil % : 2.7 %  Auto Basophil % : 0.6 %                            10.5   5.15  )-----------( 193      ( 31 Oct 2017 06:00 )             34.7       LIVER FUNCTIONS - ( 30 Oct 2017 16:00 )  Alb: 3.4 g/dL / Pro: 6.3 g/dL / ALK PHOS: 72 u/L / ALT: 20 u/L / AST: 21 u/L / GGT: x             PT/INR - ( 31 Oct 2017 06:00 )   PT: 15.6 SEC;   INR: 1.38              CARDIAC MARKERS ( 30 Oct 2017 16:00 )  x     / < 0.06 ng/mL / 50 u/L / 2.75 ng/mL / x                Urinalysis Basic - ( 30 Oct 2017 17:45 )    Color: YELLOW / Appearance: CLEAR / S.021 / pH: 6.0  Gluc: NEGATIVE / Ketone: NEGATIVE  / Bili: NEGATIVE / Urobili: NORMAL E.U.   Blood: NEGATIVE / Protein: 300 / Nitrite: NEGATIVE   Leuk Esterase: NEGATIVE / RBC: 0-2 / WBC 2-5   Sq Epi: OCC / Non Sq Epi: x / Bacteria: x        Imaging:    Assessment:    Plan: Patient is a 62y old  Female who presents with a chief complaint of LE edema and Abd edema, SOB (30 Oct 2017 22:46)      Overnight Events: Pt admitted overnight. AFib 100s-110s last night. This am, pt feels better. States that abd is less distended and softer and legs are less swollen. Denies fevers, chills, n/v, CP, SOB, abd pain, diarrhea.     Vital Signs Last 24 Hrs  T(C): 36.7 (31 Oct 2017 07:37), Max: 37.6 (30 Oct 2017 17:49)  T(F): 98 (31 Oct 2017 07:37), Max: 99.6 (30 Oct 2017 17:49)  HR: 98 (31 Oct 2017 11:00) (88 - 113)  BP: 140/113 (31 Oct 2017 11:00) (88/68 - 140/113)  BP(mean): 120 (31 Oct 2017 11:00) (67 - 120)  RR: 18 (31 Oct 2017 11:00) (18 - 27)  SpO2: 83% (31 Oct 2017 11:00) (83% - 100%)    CAPILLARY BLOOD GLUCOSE  133 (30 Oct 2017 23:00)      POCT Blood Glucose.: 99 mg/dL (31 Oct 2017 09:30)  POCT Blood Glucose.: 133 mg/dL (30 Oct 2017 23:15)      I&O's Summary    30 Oct 2017 07:  -  31 Oct 2017 07:00  --------------------------------------------------------  IN: 20 mL / OUT: 2335 mL / NET: -2315 mL    31 Oct 2017 07:  -  31 Oct 2017 11:23  --------------------------------------------------------  IN: 5 mL / OUT: 300 mL / NET: -295 mL        Gen: NAD, obese woman lying comfortably in bed  HEENT: NCAT/ PERRL  Neck: +JVD, +hepatojugular reflex  Cardio: irregularly irregular, No M/R/G  Resp: bibasilar crackles  Abd: soft, NT, very distended, BS+  Skin: no rashes, moist  Extremities: 2+ radial pulses, no cyanosis, clubbing. 3+ edema b/l LE  Neuro: no focal deficits, CN grossly intact  Psych: AAO X3    MEDICATIONS  (STANDING):  allopurinol 100 milliGRAM(s) Oral three times a day after meals  apixaban 5 milliGRAM(s) Oral every 12 hours  buDESOnide  80 MICROgram(s)/formoterol 4.5 MICROgram(s) Inhaler 2 Puff(s) Inhalation two times a day  chlorhexidine 4% Liquid 1 Application(s) Topical daily  dextrose 5%. 1000 milliLiter(s) (50 mL/Hr) IV Continuous <Continuous>  dextrose 50% Injectable 12.5 Gram(s) IV Push once  dextrose 50% Injectable 25 Gram(s) IV Push once  dextrose 50% Injectable 25 Gram(s) IV Push once  furosemide Infusion 5 mG/Hr (2.5 mL/Hr) IV Continuous <Continuous>  influenza   Vaccine 0.5 milliLiter(s) IntraMuscular once  insulin lispro (HumaLOG) corrective regimen sliding scale   SubCutaneous three times a day before meals  insulin lispro (HumaLOG) corrective regimen sliding scale   SubCutaneous at bedtime  metoprolol     tartrate 12.5 milliGRAM(s) Oral two times a day    MEDICATIONS  (PRN):  dextrose Gel 1 Dose(s) Oral once PRN Blood Glucose LESS THAN 70 milliGRAM(s)/deciliter  glucagon  Injectable 1 milliGRAM(s) IntraMuscular once PRN Glucose LESS THAN 70 milligrams/deciliter      CBC Full  -  ( 31 Oct 2017 06:00 )  WBC Count : 5.15 K/uL  Hemoglobin : 10.5 g/dL  Hematocrit : 34.7 %  Platelet Count - Automated : 193 K/uL  Mean Cell Volume : 69.5 fL  Mean Cell Hemoglobin : 21.0 pg  Mean Cell Hemoglobin Concentration : 30.3 %  Auto Neutrophil # : 2.55 K/uL  Auto Lymphocyte # : 1.65 K/uL  Auto Monocyte # : 0.77 K/uL  Auto Eosinophil # : 0.14 K/uL  Auto Basophil # : 0.03 K/uL  Auto Neutrophil % : 49.5 %  Auto Lymphocyte % : 32.0 %  Auto Monocyte % : 15.0 %  Auto Eosinophil % : 2.7 %  Auto Basophil % : 0.6 %                            10.5   5.15  )-----------( 193      ( 31 Oct 2017 06:00 )             34.7       LIVER FUNCTIONS - ( 30 Oct 2017 16:00 )  Alb: 3.4 g/dL / Pro: 6.3 g/dL / ALK PHOS: 72 u/L / ALT: 20 u/L / AST: 21 u/L / GGT: x             PT/INR - ( 31 Oct 2017 06:00 )   PT: 15.6 SEC;   INR: 1.38              CARDIAC MARKERS ( 30 Oct 2017 16:00 )  x     / < 0.06 ng/mL / 50 u/L / 2.75 ng/mL / x                Urinalysis Basic - ( 30 Oct 2017 17:45 )    Color: YELLOW / Appearance: CLEAR / S.021 / pH: 6.0  Gluc: NEGATIVE / Ketone: NEGATIVE  / Bili: NEGATIVE / Urobili: NORMAL E.U.   Blood: NEGATIVE / Protein: 300 / Nitrite: NEGATIVE   Leuk Esterase: NEGATIVE / RBC: 0-2 / WBC 2-5   Sq Epi: OCC / Non Sq Epi: x / Bacteria: x        Imaging:    Assessment:    Plan:

## 2017-10-31 NOTE — DIETITIAN INITIAL EVALUATION ADULT. - OTHER INFO
Nutrition consult received on 10/30/17 for BMI > 40 . Pt. alert, oriented , tolerating PO diet , reports following consistent carbohydrate, low salt diet , serum glucose under control, Hemoglobin A1C 6.2%, verbalized therapeutic diet well, reports her medical team and health insurance team follows up w/ her for keeping things under control.  Pt. states no issue w/ PO intake - no difficulty chewing , swallowing , no GI issues - diarrhea , constipation .

## 2017-10-31 NOTE — CONSULT NOTE ADULT - PROBLEM SELECTOR PROBLEM 2
Asthma, unspecified asthma severity, unspecified whether complicated, unspecified whether persistent
Chronic atrial fibrillation

## 2017-10-31 NOTE — CONSULT NOTE ADULT - ASSESSMENT
62F with hx of Afib on Eliquis, DM on Metformin, uncontrolled HTN on Toprol COPD, on home O2 2L, lymphoma/CLL (1997), HFpEF LVEF 63%. BIBEMS from PMD for concern for fluid overload, found to be in CHF exacerbation in the setting of holding diuretics and afterload reducing agents. Admitted to CCU for monitoring.
62F with hx of Afib on Eliquis, DM on Metformin, uncontrolled HTN on Toprol admitted for respiratory distress and worsening leg edema, likely 2/2 acute on chronic heart failure responded well to Bipap at the moment. Patient is being transferred to the CCU for further management of heart failure.
63 y/o female w/ PMHX  of Afib on Eliquis, DM II,  HTN, COPD on home O2 2L, lymphoma/CLL s/p radiation and chemo (1997), HFpEF LVEF 66% comes in with complaints of worsening SOB and LE edema. She states she recently had a fall at work, hit her head and was sent to the ER where she was found to be hypotensive. Since then some of her HF medications had been held. CT head was unremarkable per pt. She then took her self to Dr. Goldberg (PMD) for a follow up yesterday, and she was found to be in ADHF and was sent to [facility] ED.     Today she is feeling a little better s/p IV diuresis, still has CHAVEZ w/ minimal activities, and a great amount of LE edema. Prior to coming to hospital pt was sleeping in chair. She states her fluctuating weight has been a problem since the spring.     She is a non-smoker, no ETOH or drug abuse.     Appears hypervolemic.

## 2017-10-31 NOTE — PROGRESS NOTE ADULT - PROBLEM SELECTOR PLAN 4
on Metformin at home. HbA1c 6.5  - Monitor blood sugars ac and hs  - Humalog ISS. holding metformin in hospital  - CC/DASH diet

## 2017-11-01 DIAGNOSIS — I27.20 PULMONARY HYPERTENSION, UNSPECIFIED: ICD-10-CM

## 2017-11-01 LAB
ALBUMIN SERPL ELPH-MCNC: 3.3 G/DL — SIGNIFICANT CHANGE UP (ref 3.3–5)
ALP SERPL-CCNC: 69 U/L — SIGNIFICANT CHANGE UP (ref 40–120)
ALT FLD-CCNC: 18 U/L — SIGNIFICANT CHANGE UP (ref 4–33)
AST SERPL-CCNC: 21 U/L — SIGNIFICANT CHANGE UP (ref 4–32)
BACTERIA UR CULT: SIGNIFICANT CHANGE UP
BILIRUB SERPL-MCNC: 0.8 MG/DL — SIGNIFICANT CHANGE UP (ref 0.2–1.2)
BUN SERPL-MCNC: 37 MG/DL — HIGH (ref 7–23)
BUN SERPL-MCNC: 43 MG/DL — HIGH (ref 7–23)
CALCIUM SERPL-MCNC: 8.5 MG/DL — SIGNIFICANT CHANGE UP (ref 8.4–10.5)
CALCIUM SERPL-MCNC: 8.5 MG/DL — SIGNIFICANT CHANGE UP (ref 8.4–10.5)
CHLORIDE SERPL-SCNC: 105 MMOL/L — SIGNIFICANT CHANGE UP (ref 98–107)
CHLORIDE SERPL-SCNC: 106 MMOL/L — SIGNIFICANT CHANGE UP (ref 98–107)
CO2 SERPL-SCNC: 25 MMOL/L — SIGNIFICANT CHANGE UP (ref 22–31)
CO2 SERPL-SCNC: 27 MMOL/L — SIGNIFICANT CHANGE UP (ref 22–31)
CREAT SERPL-MCNC: 1.19 MG/DL — SIGNIFICANT CHANGE UP (ref 0.5–1.3)
CREAT SERPL-MCNC: 1.36 MG/DL — HIGH (ref 0.5–1.3)
GLUCOSE BLDC GLUCOMTR-MCNC: 104 MG/DL — HIGH (ref 70–99)
GLUCOSE BLDC GLUCOMTR-MCNC: 116 MG/DL — HIGH (ref 70–99)
GLUCOSE BLDC GLUCOMTR-MCNC: 132 MG/DL — HIGH (ref 70–99)
GLUCOSE BLDC GLUCOMTR-MCNC: 174 MG/DL — HIGH (ref 70–99)
GLUCOSE SERPL-MCNC: 114 MG/DL — HIGH (ref 70–99)
GLUCOSE SERPL-MCNC: 125 MG/DL — HIGH (ref 70–99)
HCT VFR BLD CALC: 37.1 % — SIGNIFICANT CHANGE UP (ref 34.5–45)
HGB BLD-MCNC: 11 G/DL — LOW (ref 11.5–15.5)
MAGNESIUM SERPL-MCNC: 1.9 MG/DL — SIGNIFICANT CHANGE UP (ref 1.6–2.6)
MAGNESIUM SERPL-MCNC: 2.1 MG/DL — SIGNIFICANT CHANGE UP (ref 1.6–2.6)
MANUAL SMEAR VERIFICATION: SIGNIFICANT CHANGE UP
MCHC RBC-ENTMCNC: 20.3 PG — LOW (ref 27–34)
MCHC RBC-ENTMCNC: 29.6 % — LOW (ref 32–36)
MCV RBC AUTO: 68.5 FL — LOW (ref 80–100)
NRBC # FLD: 0.06 — SIGNIFICANT CHANGE UP
NRBC FLD-RTO: 1 — SIGNIFICANT CHANGE UP
PHOSPHATE SERPL-MCNC: 3.2 MG/DL — SIGNIFICANT CHANGE UP (ref 2.5–4.5)
PHOSPHATE SERPL-MCNC: 3.8 MG/DL — SIGNIFICANT CHANGE UP (ref 2.5–4.5)
PLATELET # BLD AUTO: 189 K/UL — SIGNIFICANT CHANGE UP (ref 150–400)
PMV BLD: SIGNIFICANT CHANGE UP FL (ref 7–13)
POTASSIUM SERPL-MCNC: 4 MMOL/L — SIGNIFICANT CHANGE UP (ref 3.5–5.3)
POTASSIUM SERPL-MCNC: 4.1 MMOL/L — SIGNIFICANT CHANGE UP (ref 3.5–5.3)
POTASSIUM SERPL-SCNC: 4 MMOL/L — SIGNIFICANT CHANGE UP (ref 3.5–5.3)
POTASSIUM SERPL-SCNC: 4.1 MMOL/L — SIGNIFICANT CHANGE UP (ref 3.5–5.3)
PROT SERPL-MCNC: 6.3 G/DL — SIGNIFICANT CHANGE UP (ref 6–8.3)
RBC # BLD: 5.42 M/UL — HIGH (ref 3.8–5.2)
RBC # FLD: 20.1 % — HIGH (ref 10.3–14.5)
SODIUM SERPL-SCNC: 142 MMOL/L — SIGNIFICANT CHANGE UP (ref 135–145)
SODIUM SERPL-SCNC: 144 MMOL/L — SIGNIFICANT CHANGE UP (ref 135–145)
SPECIMEN SOURCE: SIGNIFICANT CHANGE UP
WBC # BLD: 5.82 K/UL — SIGNIFICANT CHANGE UP (ref 3.8–10.5)
WBC # FLD AUTO: 5.82 K/UL — SIGNIFICANT CHANGE UP (ref 3.8–10.5)

## 2017-11-01 PROCEDURE — 99233 SBSQ HOSP IP/OBS HIGH 50: CPT

## 2017-11-01 RX ORDER — METOPROLOL TARTRATE 50 MG
12.5 TABLET ORAL EVERY 8 HOURS
Qty: 0 | Refills: 0 | Status: DISCONTINUED | OUTPATIENT
Start: 2017-11-01 | End: 2017-11-02

## 2017-11-01 RX ADMIN — Medication 2.5 MG/HR: at 09:12

## 2017-11-01 RX ADMIN — APIXABAN 5 MILLIGRAM(S): 2.5 TABLET, FILM COATED ORAL at 17:27

## 2017-11-01 RX ADMIN — APIXABAN 5 MILLIGRAM(S): 2.5 TABLET, FILM COATED ORAL at 06:13

## 2017-11-01 RX ADMIN — Medication 100 MILLIGRAM(S): at 13:44

## 2017-11-01 RX ADMIN — Medication 100 MILLIGRAM(S): at 17:26

## 2017-11-01 RX ADMIN — Medication 12.5 MILLIGRAM(S): at 13:44

## 2017-11-01 RX ADMIN — PANTOPRAZOLE SODIUM 40 MILLIGRAM(S): 20 TABLET, DELAYED RELEASE ORAL at 06:13

## 2017-11-01 RX ADMIN — CHLORHEXIDINE GLUCONATE 1 APPLICATION(S): 213 SOLUTION TOPICAL at 13:47

## 2017-11-01 RX ADMIN — Medication 12.5 MILLIGRAM(S): at 22:09

## 2017-11-01 RX ADMIN — Medication 50 GRAM(S): at 00:13

## 2017-11-01 RX ADMIN — Medication 100 MILLIGRAM(S): at 13:46

## 2017-11-01 RX ADMIN — Medication 12.5 MILLIGRAM(S): at 06:12

## 2017-11-01 RX ADMIN — BUDESONIDE AND FORMOTEROL FUMARATE DIHYDRATE 2 PUFF(S): 160; 4.5 AEROSOL RESPIRATORY (INHALATION) at 09:13

## 2017-11-01 NOTE — PROGRESS NOTE ADULT - PROBLEM SELECTOR PLAN 5
on Metformin at home. HbA1c 6.5  - Monitor blood sugars ac and hs  - Humalog ISS. holding metformin  - CC/DASH diet Patient with h/o COPD, currently on home oxygen 2L  - on 2L NC, maintain goal SpO2 > 92%.   - c/w Advair PRN   - continuous O2 sat monitoring

## 2017-11-01 NOTE — PROGRESS NOTE ADULT - PROBLEM SELECTOR PLAN 1
-Diuresing well -2220 ml in 24 hours, but still grossly hypervolemic.   -Continue with Lasix gtt at 5 mg/hr.  -BUN/Cr continues to trend down, suggesting renal venous congestion as cause of OSMAR.   -Still tachycardic-continue Lopressor 12.5 mg po TID. Should improve as she becomes euvolemic.   -TTE shows severe pulm HTN, RV pressure overload and mod-severe TR, all likely from severe diastolic dysfunction.

## 2017-11-01 NOTE — PROGRESS NOTE ADULT - PROBLEM SELECTOR PLAN 2
sent from PMD due to fluid overload, pt stopped lasix and other HTN meds due to hypotension last week. CXR clear  - c/w lasix drip 5mg/hr  - c/w telemetry   - Daily weight monitoring  - Strict I/Os  - Echo showed severe pulmonary hypertension, EF 66%. Patient with Afib 90-110s  - c/w metoprolol 12.5 mg PO TID  - Continuous tele monitoring  - Monitor BMP and replete Mg to 2.0, K to 4.0 Patient with Afib 90-110s  - c/w metoprolol 12.5 mg PO TID  - Continuous tele monitoring  - Monitor BMP and replete Mg to 2.0, K to 4.0 (q12 labs while on gtt) Patient with Afib 90-110s  - c/w metoprolol 12.5 mg PO TID  - Continuous tele monitoring  - Monitor BMP Q12 and replete Mg to 2.0, K to 4.0 (q12 labs while on gtt)

## 2017-11-01 NOTE — PROGRESS NOTE ADULT - SUBJECTIVE AND OBJECTIVE BOX
Patient is a 62y old  Female who presents with a chief complaint of LE edema and Abd edema, SOB (30 Oct 2017 22:46)      SUBJECTIVE / OVERNIGHT EVENTS:  SOB ++  O >!  Anasarca persists  Review of Systems:   CONSTITUTIONAL: No fever, weight loss, or fatigue  EYES: No eye pain, visual disturbances, or discharge  ENMT:  No difficulty hearing, tinnitus, vertigo; No sinus or throat pain  NECK: No pain or stiffness  BREASTS: No pain, masses, or nipple discharge  RESPIRATORY: No cough, wheezing, chills or hemoptysis; SOB +  CARDIOVASCULAR: No chest pain, palpitations, dizziness, Leg swelling improving  GASTROINTESTINAL: No abdominal or epigastric pain. No nausea, vomiting, or hematemesis; No diarrhea or constipation. No melena or hematochezia.  GENITOURINARY: No dysuria, frequency, hematuria, or incontinence  NEUROLOGICAL: No headaches, memory loss, loss of strength, numbness, or tremors  SKIN: No itching, burning, rashes, or lesions   LYMPH NODES: No enlarged glands  ENDOCRINE: No heat or cold intolerance; No hair loss  MUSCULOSKELETAL: No joint pain or swelling; No muscle, back, or extremity pain  PSYCHIATRIC: No depression, anxiety, mood swings, or difficulty sleeping  HEME/LYMPH: No easy bruising, or bleeding gums  ALLERY AND IMMUNOLOGIC: No hives or eczema    MEDICATIONS  (STANDING):  allopurinol 100 milliGRAM(s) Oral three times a day after meals  apixaban 5 milliGRAM(s) Oral every 12 hours  buDESOnide  80 MICROgram(s)/formoterol 4.5 MICROgram(s) Inhaler 2 Puff(s) Inhalation two times a day  chlorhexidine 4% Liquid 1 Application(s) Topical daily  dextrose 5%. 1000 milliLiter(s) (50 mL/Hr) IV Continuous <Continuous>  dextrose 50% Injectable 12.5 Gram(s) IV Push once  dextrose 50% Injectable 25 Gram(s) IV Push once  dextrose 50% Injectable 25 Gram(s) IV Push once  furosemide Infusion 5 mG/Hr (2.5 mL/Hr) IV Continuous <Continuous>  influenza   Vaccine 0.5 milliLiter(s) IntraMuscular once  insulin lispro (HumaLOG) corrective regimen sliding scale   SubCutaneous three times a day before meals  insulin lispro (HumaLOG) corrective regimen sliding scale   SubCutaneous at bedtime  metoprolol     tartrate 12.5 milliGRAM(s) Oral every 8 hours  pantoprazole    Tablet 40 milliGRAM(s) Oral before breakfast    MEDICATIONS  (PRN):  dextrose Gel 1 Dose(s) Oral once PRN Blood Glucose LESS THAN 70 milliGRAM(s)/deciliter  glucagon  Injectable 1 milliGRAM(s) IntraMuscular once PRN Glucose LESS THAN 70 milligrams/deciliter      PHYSICAL EXAM:  Vital Signs Last 24 Hrs  T(C): 36.7 (01 Nov 2017 12:00), Max: 36.7 (31 Oct 2017 20:00)  T(F): 98.1 (01 Nov 2017 12:00), Max: 98.1 (01 Nov 2017 12:00)  HR: 90 (01 Nov 2017 18:00) (87 - 123)  BP: 105/89 (01 Nov 2017 18:00) (92/76 - 135/54)  BP(mean): 93 (01 Nov 2017 18:00) (71 - 99)  RR: 20 (01 Nov 2017 18:00) (18 - 36)  SpO2: 89% (01 Nov 2017 18:00) (80% - 100%)  I&O's Summary    31 Oct 2017 07:01  -  01 Nov 2017 07:00  --------------------------------------------------------  IN: 1030 mL / OUT: 3250 mL / NET: -2220 mL    01 Nov 2017 07:01  -  01 Nov 2017 18:27  --------------------------------------------------------  IN: 747.5 mL / OUT: 1475 mL / NET: -727.5 mL      GENERAL: NAD, well-developed  HEAD:  Atraumatic, Normocephalic  EYES: EOMI, PERRLA, conjunctiva and sclera clear  NECK: Supple, No JVD  CHEST/LUNG:Elie rales ++  HEART: IRRegular rate and rhythm; No murmurs, rubs, or gallops  ABDOMEN: Soft, Nontender, Skin edema ++, morbidly obese  EXTREMITIES:  2+ Peripheral Pulses, No clubbing, cyanosis,2 + Edema bilaterally  PSYCH: AAOx3  NEUROLOGY: non-focal  SKIN: No rashes or lesions    LABS:  CAPILLARY BLOOD GLUCOSE  176 (31 Oct 2017 21:52)      POCT Blood Glucose.: 116 mg/dL (01 Nov 2017 17:21)  POCT Blood Glucose.: 132 mg/dL (01 Nov 2017 12:45)  POCT Blood Glucose.: 104 mg/dL (01 Nov 2017 09:10)  POCT Blood Glucose.: 172 mg/dL (31 Oct 2017 21:52)                          11.0   5.82  )-----------( 189      ( 01 Nov 2017 05:30 )             37.1     11-01    144  |  106  |  43<H>  ----------------------------<  125<H>  4.0   |  25  |  1.36<H>    Ca    8.5      01 Nov 2017 05:30  Phos  3.8     11-01  Mg     2.1     11-01    TPro  6.3  /  Alb  3.3  /  TBili  0.8  /  DBili  x   /  AST  21  /  ALT  18  /  AlkPhos  69  11-01    PT/INR - ( 31 Oct 2017 06:00 )   PT: 15.6 SEC;   INR: 1.38                    RADIOLOGY & ADDITIONAL TESTS:    Imaging Personally Reviewed:    Consultant(s) Notes Reviewed:      Care Discussed with Consultants/Other Providers:

## 2017-11-01 NOTE — PROGRESS NOTE ADULT - PROBLEM SELECTOR PROBLEM 4
Hypertension, unspecified type Type 2 diabetes mellitus without complication, without long-term current use of insulin

## 2017-11-01 NOTE — PROGRESS NOTE ADULT - PROBLEM SELECTOR PROBLEM 2
Pulmonary hypertension Acute on chronic heart failure, unspecified heart failure type Chronic atrial fibrillation

## 2017-11-01 NOTE — PROGRESS NOTE ADULT - PROBLEM SELECTOR PROBLEM 5
Type 2 diabetes mellitus without complication, without long-term current use of insulin Chronic obstructive pulmonary disease, unspecified COPD type

## 2017-11-01 NOTE — PROGRESS NOTE ADULT - PROBLEM SELECTOR PLAN 3
Patient with Afib 90-110s  - c/w metoprolol 12.5 mg PO TID  - Continuous tele monitoring  - Monitor BMP and replete Mg to 2.0, K to 4.0 controlled. Lowest /60   - holding antihypertensives for low BP

## 2017-11-01 NOTE — PROGRESS NOTE ADULT - PROBLEM SELECTOR PLAN 4
controlled. Lowest /60   - holding antihypertensives for low BP on Metformin at home. HbA1c 6.5  - Monitor blood sugars ac and hs  - Humalog ISS. holding metformin  - CC/DASH diet  - nutrition consult on Metformin at home. HbA1c 6.5  - Monitor blood sugars ac and hs  - Humalog ISS. holding metformin  - CC/DASH diet per nutrition recs

## 2017-11-01 NOTE — PROGRESS NOTE ADULT - ASSESSMENT
63 yo F with PMH of Afib on Eliquis, DM on Metformin (HbA1c 6.5), uncontrolled HTN on Toprol, COPD, on home O2 2L, lymphoma/CLL (1997), HFpEF LVEF 63% was sent PMD office for concern for fluid overload, found to be in CHF exacerbation in the setting of holding diuretics and afterload reducing agents, now improving on diuretics.

## 2017-11-01 NOTE — PROGRESS NOTE ADULT - SUBJECTIVE AND OBJECTIVE BOX
NEPHROLOGY-[facility] [phone]        Patient seen and examined in bed.  Breathing improved        MEDICATIONS  (STANDING):  allopurinol 100 milliGRAM(s) Oral three times a day after meals  apixaban 5 milliGRAM(s) Oral every 12 hours  buDESOnide  80 MICROgram(s)/formoterol 4.5 MICROgram(s) Inhaler 2 Puff(s) Inhalation two times a day  chlorhexidine 4% Liquid 1 Application(s) Topical daily  dextrose 5%. 1000 milliLiter(s) (50 mL/Hr) IV Continuous <Continuous>  dextrose 50% Injectable 12.5 Gram(s) IV Push once  dextrose 50% Injectable 25 Gram(s) IV Push once  dextrose 50% Injectable 25 Gram(s) IV Push once  furosemide Infusion 5 mG/Hr (2.5 mL/Hr) IV Continuous <Continuous>  influenza   Vaccine 0.5 milliLiter(s) IntraMuscular once  insulin lispro (HumaLOG) corrective regimen sliding scale   SubCutaneous three times a day before meals  insulin lispro (HumaLOG) corrective regimen sliding scale   SubCutaneous at bedtime  metoprolol     tartrate 12.5 milliGRAM(s) Oral every 8 hours  pantoprazole    Tablet 40 milliGRAM(s) Oral before breakfast      VITAL:  T(C): , Max: 36.8 (10-31-17 @ 16:05)  T(F): , Max: 98.3 (10-31-17 @ 16:05)  HR: 94 (17 @ 12:00)  BP: 111/91 (17 @ 12:00)  BP(mean): 96 (17 @ 12:00)  RR: 21 (17 @ 12:00)  SpO2: 80% (17 @ 12:00)  Wt(kg): --    I and O's:    10-31 @ :  -   @ 07:00  --------------------------------------------------------  IN: 1030 mL / OUT: 3250 mL / NET: -2220 mL     @ 07:  -   @ 12:21  --------------------------------------------------------  IN: 367.5 mL / OUT: 725 mL / NET: -357.5 mL          PHYSICAL EXAM:    Constitutional: NAD  HEENT: PERRLA    Neck:  +  JVD  Respiratory: CTAB/L  Cardiovascular: S1 and S2  Gastrointestinal: BS+, soft, NT/ND ; ? fluid shift  Extremities: + 2  peripheral edema  Neurological: A/O x 3, no focal deficits  Psychiatric: Normal mood, normal affect  : +  Corey  Skin: No rashes  Access: Not applicable    LABS:                        11.0   5.82  )-----------( 189      ( 2017 05:30 )             37.1         144  |  106  |  43<H>  ----------------------------<  125<H>  4.0   |  25  |  1.36<H>    Ca    8.5      2017 05:30  Phos  3.8       Mg     2.1         TPro  6.3  /  Alb  3.3  /  TBili  0.8  /  DBili  x   /  AST  21  /  ALT  18  /  AlkPhos  69            Urine Studies:  Urinalysis Basic - ( 30 Oct 2017 17:45 )    Color: YELLOW / Appearance: CLEAR / S.021 / pH: 6.0  Gluc: NEGATIVE / Ketone: NEGATIVE  / Bili: NEGATIVE / Urobili: NORMAL E.U.   Blood: NEGATIVE / Protein: 300 / Nitrite: NEGATIVE   Leuk Esterase: NEGATIVE / RBC: 0-2 / WBC 2-5   Sq Epi: OCC / Non Sq Epi: x / Bacteria: x      Creatinine, Random Urine: 21.98 mg/dL (10-31 @ 15:59)  Protein, Random Urine: 12.2 mg/dL (10-31 @ 15:59)        RADIOLOGY & ADDITIONAL STUDIES:

## 2017-11-01 NOTE — PROGRESS NOTE ADULT - SUBJECTIVE AND OBJECTIVE BOX
Patient is a 62y old  Female who presents with a chief complaint of LE edema and Abd edema, SOB (30 Oct 2017 22:46)      SUBJECTIVE / OVERNIGHT EVENTS: Telemetry: Afib 90s-110s up to 13. Multiple runs of possible NSVT 3-12 beats in duration. 1.3s missed beat x1. Pt states that she feels fine. Appetite is improved. No difficulties with breathing. Abd feels softer per pt. Denies fever, chills, dizziness, n/v, CP, palpitations, SOB, abd pain, diarrhea, dysuria.     MEDICATIONS  (STANDING):  allopurinol 100 milliGRAM(s) Oral three times a day after meals  apixaban 5 milliGRAM(s) Oral every 12 hours  buDESOnide  80 MICROgram(s)/formoterol 4.5 MICROgram(s) Inhaler 2 Puff(s) Inhalation two times a day  chlorhexidine 4% Liquid 1 Application(s) Topical daily  dextrose 5%. 1000 milliLiter(s) (50 mL/Hr) IV Continuous <Continuous>  dextrose 50% Injectable 12.5 Gram(s) IV Push once  dextrose 50% Injectable 25 Gram(s) IV Push once  dextrose 50% Injectable 25 Gram(s) IV Push once  furosemide Infusion 5 mG/Hr (2.5 mL/Hr) IV Continuous <Continuous>  influenza   Vaccine 0.5 milliLiter(s) IntraMuscular once  insulin lispro (HumaLOG) corrective regimen sliding scale   SubCutaneous three times a day before meals  insulin lispro (HumaLOG) corrective regimen sliding scale   SubCutaneous at bedtime  metoprolol     tartrate 12.5 milliGRAM(s) Oral every 8 hours  pantoprazole    Tablet 40 milliGRAM(s) Oral before breakfast    MEDICATIONS  (PRN):  dextrose Gel 1 Dose(s) Oral once PRN Blood Glucose LESS THAN 70 milliGRAM(s)/deciliter  glucagon  Injectable 1 milliGRAM(s) IntraMuscular once PRN Glucose LESS THAN 70 milligrams/deciliter      Vital Signs Last 24 Hrs  T(C): 36.6 (2017 08:00), Max: 36.8 (31 Oct 2017 16:05)  T(F): 97.9 (2017 08:00), Max: 98.3 (31 Oct 2017 16:05)  HR: 92 (2017 08:00) (88 - 897)  BP: 112/91 (2017 08:00) (100/69 - 140/113)  BP(mean): 95 (2017 08:00) (70 - 120)  RR: 21 (2017 08:00) (13 - 28)  SpO2: 99% (2017 08:00) (83% - 100%)    CAPILLARY BLOOD GLUCOSE  176 (31 Oct 2017 21:52)  136 (31 Oct 2017 17:27)  149 (31 Oct 2017 13:15)  99 (31 Oct 2017 09:30)      POCT Blood Glucose.: 172 mg/dL (31 Oct 2017 21:52)  POCT Blood Glucose.: 136 mg/dL (31 Oct 2017 17:17)  POCT Blood Glucose.: 149 mg/dL (31 Oct 2017 13:15)  POCT Blood Glucose.: 99 mg/dL (31 Oct 2017 09:30)      I&O's Summary    31 Oct 2017 07:01  -  2017 07:00  --------------------------------------------------------  IN: 1027.5 mL / OUT: 3125 mL / NET: -2097.5 mL          PHYSICAL EXAM:  General: NAD, obese woman lying comfortably in bed  HEENT: NCAT/ PERRL  Neck: +JVD, +hepatojugular reflex  Cardio: irregularly irregular, No M/R/G  Resp: bibasilar crackles  Abd: soft, NT, very distended, decreased BS  Skin: no rashes, moist  Extremities: 1+ radial pulses, no cyanosis, clubbing. 3+ edema b/l LE  Neuro: no focal deficits, CN grossly intact  Psych: AAO X3      LABS:                        11.0   5.82  )-----------( 189      ( 2017 05:30 )             37.1     11    144  |  106  |  43<H>  ----------------------------<  125<H>  4.0   |  25  |  1.36<H>    Ca    8.5      2017 05:30  Phos  3.8       Mg     2.1         TPro  6.3  /  Alb  3.3  /  TBili  0.8  /  DBili  x   /  AST  21  /  ALT  18  /  AlkPhos  69      LIVER FUNCTIONS - ( 2017 05:30 )  Alb: 3.3 g/dL / Pro: 6.3 g/dL / ALK PHOS: 69 u/L / ALT: 18 u/L / AST: 21 u/L / GGT: x           PT/INR - ( 31 Oct 2017 06:00 )   PT: 15.6 SEC;   INR: 1.38            CARDIAC MARKERS ( 30 Oct 2017 16:00 )  x     / < 0.06 ng/mL / 50 u/L / 2.75 ng/mL / x          Urinalysis Basic - ( 30 Oct 2017 17:45 )    Color: YELLOW / Appearance: CLEAR / S.021 / pH: 6.0  Gluc: NEGATIVE / Ketone: NEGATIVE  / Bili: NEGATIVE / Urobili: NORMAL E.U.   Blood: NEGATIVE / Protein: 300 / Nitrite: NEGATIVE   Leuk Esterase: NEGATIVE / RBC: 0-2 / WBC 2-5   Sq Epi: OCC / Non Sq Epi: x / Bacteria: x      ABG - ( 31 Oct 2017 13:00 )  pH: 7.35  /  pCO2: 50    /  pO2: 79    / HCO3: 25    / Base Excess: 1.5   /  SaO2: 95.5                RADIOLOGY & ADDITIONAL TESTS:    Imaging Personally Reviewed: YES    Consultant(s) Notes Reviewed: YES    Care Discussed with Consultants/Other Providers: YES    Prasanna Garsia MD-PGY1 Department of Anesthesiology  Contact - [phone]/ Pager - [phone] Patient is a 62y old  Female who presents with a chief complaint of LE edema and Abd edema, SOB (30 Oct 2017 22:46)      SUBJECTIVE / OVERNIGHT EVENTS: Telemetry: Afib 90s-110s up to 137. Multiple runs of possible NSVT 3-12 beats in duration. 1.3s missed beat x1. Pt states that she feels fine. Appetite is improved. No difficulties with breathing or palpitations. Abd feels softer per pt. Denies fever, chills, dizziness, n/v, CP, palpitations, SOB, abd pain, diarrhea, dysuria.     MEDICATIONS  (STANDING):  allopurinol 100 milliGRAM(s) Oral three times a day after meals  apixaban 5 milliGRAM(s) Oral every 12 hours  buDESOnide  80 MICROgram(s)/formoterol 4.5 MICROgram(s) Inhaler 2 Puff(s) Inhalation two times a day  chlorhexidine 4% Liquid 1 Application(s) Topical daily  dextrose 5%. 1000 milliLiter(s) (50 mL/Hr) IV Continuous <Continuous>  dextrose 50% Injectable 12.5 Gram(s) IV Push once  dextrose 50% Injectable 25 Gram(s) IV Push once  dextrose 50% Injectable 25 Gram(s) IV Push once  furosemide Infusion 5 mG/Hr (2.5 mL/Hr) IV Continuous <Continuous>  influenza   Vaccine 0.5 milliLiter(s) IntraMuscular once  insulin lispro (HumaLOG) corrective regimen sliding scale   SubCutaneous three times a day before meals  insulin lispro (HumaLOG) corrective regimen sliding scale   SubCutaneous at bedtime  metoprolol     tartrate 12.5 milliGRAM(s) Oral every 8 hours  pantoprazole    Tablet 40 milliGRAM(s) Oral before breakfast    MEDICATIONS  (PRN):  dextrose Gel 1 Dose(s) Oral once PRN Blood Glucose LESS THAN 70 milliGRAM(s)/deciliter  glucagon  Injectable 1 milliGRAM(s) IntraMuscular once PRN Glucose LESS THAN 70 milligrams/deciliter      Vital Signs Last 24 Hrs  T(C): 36.6 (2017 08:00), Max: 36.8 (31 Oct 2017 16:05)  T(F): 97.9 (2017 08:00), Max: 98.3 (31 Oct 2017 16:05)  HR: 92 (2017 08:00) (88 - 897)  BP: 112/91 (2017 08:00) (100/69 - 140/113)  BP(mean): 95 (2017 08:00) (70 - 120)  RR: 21 (2017 08:00) (13 - 28)  SpO2: 99% (2017 08:00) (83% - 100%)    CAPILLARY BLOOD GLUCOSE  176 (31 Oct 2017 21:52)  136 (31 Oct 2017 17:27)  149 (31 Oct 2017 13:15)  99 (31 Oct 2017 09:30)      POCT Blood Glucose.: 172 mg/dL (31 Oct 2017 21:52)  POCT Blood Glucose.: 136 mg/dL (31 Oct 2017 17:17)  POCT Blood Glucose.: 149 mg/dL (31 Oct 2017 13:15)  POCT Blood Glucose.: 99 mg/dL (31 Oct 2017 09:30)      I&O's Summary    31 Oct 2017 07:01  -  2017 07:00  --------------------------------------------------------  IN: 1027.5 mL / OUT: 3125 mL / NET: -2097.5 mL          PHYSICAL EXAM:  General: NAD, obese woman lying comfortably in bed  HEENT: NCAT/ PERRL  Neck: +JVD, +hepatojugular reflex  Cardio: irregularly irregular, No M/R/G  Resp: bibasilar crackles  Abd: soft, NT, very distended, decreased BS  Skin: no rashes, moist  Extremities: 1+ radial pulses, no cyanosis, clubbing. 3+ edema b/l LE  Neuro: no focal deficits, CN grossly intact  Psych: AAO X3      LABS:                        11.0   5.82  )-----------( 189      ( 2017 05:30 )             37.1         144  |  106  |  43<H>  ----------------------------<  125<H>  4.0   |  25  |  1.36<H>    Ca    8.5      2017 05:30  Phos  3.8       Mg     2.1         TPro  6.3  /  Alb  3.3  /  TBili  0.8  /  DBili  x   /  AST  21  /  ALT  18  /  AlkPhos  69      LIVER FUNCTIONS - ( 2017 05:30 )  Alb: 3.3 g/dL / Pro: 6.3 g/dL / ALK PHOS: 69 u/L / ALT: 18 u/L / AST: 21 u/L / GGT: x           PT/INR - ( 31 Oct 2017 06:00 )   PT: 15.6 SEC;   INR: 1.38            CARDIAC MARKERS ( 30 Oct 2017 16:00 )  x     / < 0.06 ng/mL / 50 u/L / 2.75 ng/mL / x          Urinalysis Basic - ( 30 Oct 2017 17:45 )    Color: YELLOW / Appearance: CLEAR / S.021 / pH: 6.0  Gluc: NEGATIVE / Ketone: NEGATIVE  / Bili: NEGATIVE / Urobili: NORMAL E.U.   Blood: NEGATIVE / Protein: 300 / Nitrite: NEGATIVE   Leuk Esterase: NEGATIVE / RBC: 0-2 / WBC 2-5   Sq Epi: OCC / Non Sq Epi: x / Bacteria: x      ABG - ( 31 Oct 2017 13:00 )  pH: 7.35  /  pCO2: 50    /  pO2: 79    / HCO3: 25    / Base Excess: 1.5   /  SaO2: 95.5                RADIOLOGY & ADDITIONAL TESTS:    Imaging Personally Reviewed: YES    Consultant(s) Notes Reviewed: YES    Care Discussed with Consultants/Other Providers: YES    Prasanna Garsia MD-PGY1 Department of Anesthesiology  Contact - [phone]/ Pager - [phone] Patient is a 62y old  Female who presents with a chief complaint of LE edema and Abd edema, SOB (30 Oct 2017 22:46)      SUBJECTIVE / OVERNIGHT EVENTS: Telemetry: Afib 90s-110s up to 137. Multiple runs of possible NSVT 3-12 beats in duration. 1.3s missed beat x1. Pt states that she feels fine. Appetite is improved. No difficulties with breathing or palpitations. Abd feels softer per pt. Denies fever, chills, dizziness, n/v, CP, palpitations, SOB, abd pain, diarrhea, dysuria.     MEDICATIONS  (STANDING):  allopurinol 100 milliGRAM(s) Oral three times a day after meals  apixaban 5 milliGRAM(s) Oral every 12 hours  buDESOnide  80 MICROgram(s)/formoterol 4.5 MICROgram(s) Inhaler 2 Puff(s) Inhalation two times a day  chlorhexidine 4% Liquid 1 Application(s) Topical daily  dextrose 5%. 1000 milliLiter(s) (50 mL/Hr) IV Continuous <Continuous>  dextrose 50% Injectable 12.5 Gram(s) IV Push once  dextrose 50% Injectable 25 Gram(s) IV Push once  dextrose 50% Injectable 25 Gram(s) IV Push once  furosemide Infusion 5 mG/Hr (2.5 mL/Hr) IV Continuous <Continuous>  influenza   Vaccine 0.5 milliLiter(s) IntraMuscular once  insulin lispro (HumaLOG) corrective regimen sliding scale   SubCutaneous three times a day before meals  insulin lispro (HumaLOG) corrective regimen sliding scale   SubCutaneous at bedtime  metoprolol     tartrate 12.5 milliGRAM(s) Oral every 8 hours  pantoprazole    Tablet 40 milliGRAM(s) Oral before breakfast    MEDICATIONS  (PRN):  dextrose Gel 1 Dose(s) Oral once PRN Blood Glucose LESS THAN 70 milliGRAM(s)/deciliter  glucagon  Injectable 1 milliGRAM(s) IntraMuscular once PRN Glucose LESS THAN 70 milligrams/deciliter      Vital Signs Last 24 Hrs  T(C): 36.6 (2017 08:00), Max: 36.8 (31 Oct 2017 16:05)  T(F): 97.9 (2017 08:00), Max: 98.3 (31 Oct 2017 16:05)  HR: 92 (2017 08:00) (88 - 897)  BP: 112/91 (2017 08:00) (100/69 - 140/113)  BP(mean): 95 (2017 08:00) (70 - 120)  RR: 21 (2017 08:00) (13 - 28)  SpO2: 99% (2017 08:00) (83% - 100%)    CAPILLARY BLOOD GLUCOSE  176 (31 Oct 2017 21:52)  136 (31 Oct 2017 17:27)  149 (31 Oct 2017 13:15)  99 (31 Oct 2017 09:30)      POCT Blood Glucose.: 172 mg/dL (31 Oct 2017 21:52)  POCT Blood Glucose.: 136 mg/dL (31 Oct 2017 17:17)  POCT Blood Glucose.: 149 mg/dL (31 Oct 2017 13:15)  POCT Blood Glucose.: 99 mg/dL (31 Oct 2017 09:30)      I&O's Summary    31 Oct 2017 07:01  -  2017 07:00  --------------------------------------------------------  IN: 1027.5 mL / OUT: 3125 mL / NET: -2097.5 mL          PHYSICAL EXAM:  General: NAD, obese woman lying comfortably in bed  HEENT: NCAT/ PERRL  Neck: +JVD, +hepatojugular reflex  Cardio: irregularly irregular, No M/R/G  Resp: bibasilar crackles  Abd: soft, NT, very distended, decreased BS  Skin: no rashes, moist  Extremities: 1+ radial pulses, no cyanosis, clubbing. 3+ edema b/l LE  Neuro: no focal deficits, CN grossly intact  Psych: AAO X3      LABS:                        11.0   5.82  )-----------( 189      ( 2017 05:30 )             37.1         144  |  106  |  43<H>  ----------------------------<  125<H>  4.0   |  25  |  1.36<H>    Ca    8.5      2017 05:30  Phos  3.8       Mg     2.1         TPro  6.3  /  Alb  3.3  /  TBili  0.8  /  DBili  x   /  AST  21  /  ALT  18  /  AlkPhos  69      LIVER FUNCTIONS - ( 2017 05:30 )  Alb: 3.3 g/dL / Pro: 6.3 g/dL / ALK PHOS: 69 u/L / ALT: 18 u/L / AST: 21 u/L / GGT: x           PT/INR - ( 31 Oct 2017 06:00 )   PT: 15.6 SEC;   INR: 1.38            CARDIAC MARKERS ( 30 Oct 2017 16:00 )  x     / < 0.06 ng/mL / 50 u/L / 2.75 ng/mL / x          Urinalysis Basic - ( 30 Oct 2017 17:45 )    Color: YELLOW / Appearance: CLEAR / S.021 / pH: 6.0  Gluc: NEGATIVE / Ketone: NEGATIVE  / Bili: NEGATIVE / Urobili: NORMAL E.U.   Blood: NEGATIVE / Protein: 300 / Nitrite: NEGATIVE   Leuk Esterase: NEGATIVE / RBC: 0-2 / WBC 2-5   Sq Epi: OCC / Non Sq Epi: x / Bacteria: x      ABG - ( 31 Oct 2017 13:00 )  pH: 7.35  /  pCO2: 50    /  pO2: 79    / HCO3: 25    / Base Excess: 1.5   /  SaO2: 95.5                RADIOLOGY & ADDITIONAL TESTS:    Imaging Personally Reviewed: YES    Consultant(s) Notes Reviewed: YES    Care Discussed with Consultants/Other Providers: YES    Desmond Romeo MD  PGY-1 | Internal Medicine  [phone] / [phone]

## 2017-11-01 NOTE — PROGRESS NOTE ADULT - ATTENDING COMMENTS
63 y/o female w/ h/o Afib on Eliquis, DM II, HTN, ?COPD on home O2 2L, lymphoma/CLL s/p radiation to neck and chemo (1997), HFpEF LVEF 66% presented yesterday with SOB with decline over past several weeks-months with volume retention that was becoming resistant to home regimen of diuretics. She had an episode of possible syncope (? low BP) which she cannot recall. On admission, normotensive, volume overloaded, BNP elevated. CXR was read as clear (appears congested to me) and was started on lasix gtt with good urine output. She denies any recent travel/car rides and was takign her Eliquis and other meds fairly consistently. Reviewed her TTE which shows significant RV dysfunction, mod-severe TR (new), RVSP of 60s (read as 80s but overread) with dilated IVC with severe diastolic dysfunction but normal EF which was changed from her prior TTE in 7/17. EKG afib w/o ST changes. On exam, significant volume overload with JVD to 15 cm, RV heave, irreg irreg rhythm, crackles at bases with wheezing, distended abdomen, 3+ pitting edema b/l, warm to palpation. Currently, acute on chronic diastolic HF with volume overload, class IIIb symptoms. While PE is a possibility, she is being treated as is and she is normotensive so would defer on CTA.   - continue lasix gtt at 2.5/hr; increase to 5 mg/hr if not net negative 1-2L  - lytes twice daily; replete K>4, Mg>2  - standing weights daily  - check LE dopplers bilaterally; D dimer elevated which is not unusual in this setting  - check TSH  - afib with RVR; continue metoprolol tartrate 12.5 mg q8h

## 2017-11-01 NOTE — PROGRESS NOTE ADULT - SUBJECTIVE AND OBJECTIVE BOX
Patient seen and examined. She is feeling slighly better- LE edema and SOB improving. Diuresing well.   No CP, palpitations, dizziness. Brother by bedside.     Medications:  allopurinol 100 milliGRAM(s) Oral three times a day after meals  apixaban 5 milliGRAM(s) Oral every 12 hours  buDESOnide  80 MICROgram(s)/formoterol 4.5 MICROgram(s) Inhaler 2 Puff(s) Inhalation two times a day  chlorhexidine 4% Liquid 1 Application(s) Topical daily  dextrose 5%. 1000 milliLiter(s) IV Continuous <Continuous>  dextrose 50% Injectable 12.5 Gram(s) IV Push once  dextrose 50% Injectable 25 Gram(s) IV Push once  dextrose 50% Injectable 25 Gram(s) IV Push once  dextrose Gel 1 Dose(s) Oral once PRN  furosemide Infusion 5 mG/Hr IV Continuous <Continuous>  glucagon  Injectable 1 milliGRAM(s) IntraMuscular once PRN  influenza   Vaccine 0.5 milliLiter(s) IntraMuscular once  insulin lispro (HumaLOG) corrective regimen sliding scale   SubCutaneous three times a day before meals  insulin lispro (HumaLOG) corrective regimen sliding scale   SubCutaneous at bedtime  metoprolol     tartrate 12.5 milliGRAM(s) Oral every 8 hours  pantoprazole    Tablet 40 milliGRAM(s) Oral before breakfast      Vitals:    Vital Signs Last 24 Hrs  T(C): 36.6 (2017 08:00), Max: 36.8 (31 Oct 2017 16:05)  T(F): 97.9 (2017 08:00), Max: 98.3 (31 Oct 2017 16:05)  HR: 94 (2017 12:00) (87 - 123)  BP: 111/91 (2017 12:00) (98/79 - 135/54)  BP(mean): 96 (2017 12:00) (70 - 99)  RR: 21 (2017 12:00) (16 - 28)  SpO2: 80% (2017 12:00) (80% - 100%)    Daily     Daily Weight in k (2017 06:00)    I&O's Summary    31 Oct 2017 07:01  -  2017 07:00  --------------------------------------------------------  IN: 1030 mL / OUT: 3250 mL / NET: -2220 mL    2017 07:  -  2017 13:21  --------------------------------------------------------  IN: 367.5 mL / OUT: 725 mL / NET: -357.5 mL      I&O's Detail    31 Oct 2017 07:  -  2017 07:00  --------------------------------------------------------  IN:    furosemide Infusion: 60 mL    IV PiggyBack: 50 mL    Oral Fluid: 920 mL  Total IN: 1030 mL    OUT:    Indwelling Catheter - Urethral: 3250 mL  Total OUT: 3250 mL    Total NET: -2220 mL      2017 07:  -  2017 13:21  --------------------------------------------------------  IN:    furosemide Infusion: 7.5 mL    Oral Fluid: 360 mL  Total IN: 367.5 mL    OUT:    Indwelling Catheter - Urethral: 725 mL  Total OUT: 725 mL    Total NET: -357.5 mL          Physical Exam:     General: No distress. Comfortable.  HEENT: EOM intact.  Neck: Neck supple. JVP elevated to jawline.  No masses  Chest: diffuse crackles throughout   CV: Irregularly irregular. + RADHA, rub, or gallops. Radial pulses normal.  Abdomen: Soft, non-distended, non-tender  Skin: No rashes or skin breakdown  Neurology: Alert and oriented times three. Sensation intact  Psych: Affect normal    Labs:                        11.0   5.82  )-----------( 189      ( 2017 05:30 )             37.1     11-    144  |  106  |  43<H>  ----------------------------<  125<H>  4.0   |  25  |  1.36<H>    Ca    8.5      2017 05:30  Phos  3.8       Mg     2.1         TPro  6.3  /  Alb  3.3  /  TBili  0.8  /  DBili  x   /  AST  21  /  ALT  18  /  AlkPhos  69      PT/INR - ( 31 Oct 2017 06:00 )   PT: 15.6 SEC;   INR: 1.38            CARDIAC MARKERS ( 30 Oct 2017 16:00 )  x     / < 0.06 ng/mL / 50 u/L / 2.75 ng/mL / x          Serum Pro-Brain Natriuretic Peptide: 6997 pg/mL (10-30 @ 16:00) Patient seen and examined. She is feeling slighly better- LE edema and SOB improving. Diuresing well.   No CP, palpitations, dizziness. Brother by bedside.     Medications:  allopurinol 100 milliGRAM(s) Oral three times a day after meals  apixaban 5 milliGRAM(s) Oral every 12 hours  buDESOnide  80 MICROgram(s)/formoterol 4.5 MICROgram(s) Inhaler 2 Puff(s) Inhalation two times a day  chlorhexidine 4% Liquid 1 Application(s) Topical daily  dextrose 5%. 1000 milliLiter(s) IV Continuous <Continuous>  dextrose 50% Injectable 12.5 Gram(s) IV Push once  dextrose 50% Injectable 25 Gram(s) IV Push once  dextrose 50% Injectable 25 Gram(s) IV Push once  dextrose Gel 1 Dose(s) Oral once PRN  furosemide Infusion 5 mG/Hr IV Continuous <Continuous>  glucagon  Injectable 1 milliGRAM(s) IntraMuscular once PRN  influenza   Vaccine 0.5 milliLiter(s) IntraMuscular once  insulin lispro (HumaLOG) corrective regimen sliding scale   SubCutaneous three times a day before meals  insulin lispro (HumaLOG) corrective regimen sliding scale   SubCutaneous at bedtime  metoprolol     tartrate 12.5 milliGRAM(s) Oral every 8 hours  pantoprazole    Tablet 40 milliGRAM(s) Oral before breakfast      Vitals:    Vital Signs Last 24 Hrs  T(C): 36.6 (2017 08:00), Max: 36.8 (31 Oct 2017 16:05)  T(F): 97.9 (2017 08:00), Max: 98.3 (31 Oct 2017 16:05)  HR: 94 (2017 12:00) (87 - 123)  BP: 111/91 (2017 12:00) (98/79 - 135/54)  BP(mean): 96 (2017 12:00) (70 - 99)  RR: 21 (2017 12:00) (16 - 28)  SpO2: 80% (2017 12:00) (80% - 100%)    Daily     Daily Weight in k (2017 06:00)    I&O's Summary    31 Oct 2017 07:01  -  2017 07:00  --------------------------------------------------------  IN: 1030 mL / OUT: 3250 mL / NET: -2220 mL    2017 07:  -  2017 13:21  --------------------------------------------------------  IN: 367.5 mL / OUT: 725 mL / NET: -357.5 mL      I&O's Detail    31 Oct 2017 07:  -  2017 07:00  --------------------------------------------------------  IN:    furosemide Infusion: 60 mL    IV PiggyBack: 50 mL    Oral Fluid: 920 mL  Total IN: 1030 mL    OUT:    Indwelling Catheter - Urethral: 3250 mL  Total OUT: 3250 mL    Total NET: -2220 mL      2017 07:  -  2017 13:21  --------------------------------------------------------  IN:    furosemide Infusion: 7.5 mL    Oral Fluid: 360 mL  Total IN: 367.5 mL    OUT:    Indwelling Catheter - Urethral: 725 mL  Total OUT: 725 mL    Total NET: -357.5 mL          Physical Exam:     General: No distress. Comfortable.  HEENT: EOM intact.  Neck: Neck supple. JVP elevated to jawline.  No masses  Chest: diffuse crackles throughout   CV: Irregularly irregular. + RADHA, rub, or gallops. Radial pulses normal.  Abdomen: Soft, non-distended, non-tender  Skin: No rashes or skin breakdown  Neurology: Alert and oriented times three. Sensation intact  Psych: Affect normal    Labs:                        11.0   5.82  )-----------( 189      ( 2017 05:30 )             37.1     11-    144  |  106  |  43<H>  ----------------------------<  125<H>  4.0   |  25  |  1.36<H>    Ca    8.5      2017 05:30  Phos  3.8       Mg     2.1         TPro  6.3  /  Alb  3.3  /  TBili  0.8  /  DBili  x   /  AST  21  /  ALT  18  /  AlkPhos  69      PT/INR - ( 31 Oct 2017 06:00 )   PT: 15.6 SEC;   INR: 1.38            CARDIAC MARKERS ( 30 Oct 2017 16:00 )  x     / < 0.06 ng/mL / 50 u/L / 2.75 ng/mL / x          Serum Pro-Brain Natriuretic Peptide: 6997 pg/mL (10-30 @ 16:00)    < from: Transthoracic Echocardiogram (10.31.17 @ 12:58) >  Dimensions:     Normal Values:  LA:     4.2 cm    2.0 - 4.0 cm  Ao:     2.8 cm    2.0 - 3.8 cm  SEPTUM: 0.9 cm    0.6 - 1.2 cm  PWT:    1.0 cm    0.6 - 1.1 cm  LVIDd:  4.2 cm    3.0 - 5.6 cm  LVIDs:  2.7 cm    1.8 - 4.0 cm  Derived Variables:  LVMI: 58 g/m2  RWT: 0.47  Fractional short: 36 %  Ejection Fraction (Teicholtz): 66 %  ------------------------------------------------------------------------  OBSERVATIONS:  Mitral Valve: Mitral annular calcification, otherwise  normal mitral valve. Mild mitral regurgitation.  Aortic Root: Normal aortic root.  Aortic Valve: Normal trileaflet aortic valve.  Left Atrium: Normal left atrium.  Left Ventricle: Normal left ventricular systolic function.  No segmental wall motion abnormalities. Septal motion  consistent with right ventricular pressure and volume  overload. Increased relative wall thickness with normal  left ventricular mass index, consistent with concentric  left ventricular remodeling.  Right Heart: Severe right atrial enlargement. Right  ventricular enlargement with decreased right ventricular  systolic function. Normal tricuspid valve.  Moderate-severe  tricuspid regurgitation. Normal pulmonic valve.  Pericardium/PleuraNormal pericardium with no pericardial  effusion.  Hemodynamic: Estimated right ventricular systolic pressure  equals 80 mm Hg, assuming right atrial pressure equals 10  mm Hg, consistent with severe pulmonary hypertension.    < end of copied text >

## 2017-11-01 NOTE — PROGRESS NOTE ADULT - ASSESSMENT
A 62-year-old female with past medical history of chronic lymphocytic leukemia, atrial fibrillation, diabetes, and hypertension, who presents to the hospital for evaluation of shortness of breath and dyspnea on exertion.  The patient has jugular venous distention along with a positive hepatojugular reflux.  She does have evidence of volume overloaded state with flank edema and lower extremity edema.    Acute kidney injury, likely hemodynamic in nature.  New right ventricular dysfunction   Volume overloaded state    1.	Renal.  For now, hold off on renal sonogram.  Continue Lasix drips, out's greater than in's.  2.	Cardiovascular. Possible right heart cath c nitric oxide  3.	Pulmonary.  No renal objection to CTA.

## 2017-11-01 NOTE — PROGRESS NOTE ADULT - PROBLEM SELECTOR PLAN 1
sent from PMD due to fluid overload, pt stopped lasix and other HTN meds due to hypotension last week. CXR clear  - c/w lasix drip 5mg/hr  - c/w telemetry   - Daily weight monitoring  - Strict I/Os  - Echo showed severe pulmonary hypertension, EF 66%. TTE shows enlarged R atrium, RV, decreased RV systolic function. Likely severe pulmonary hypertension. Low likelihood for PE, negative D-dimer if age-adjusted.  - considering RHC  - f/u LE dopplers sent from PMD due to fluid overload, pt stopped lasix and other HTN meds due to hypotension last week. CXR clear. Low likelihood for PE, negative D-dimer if age-adjusted.  - c/w lasix drip 5mg/hr  - c/w telemetry   - Daily weight monitoring  - Strict I/Os  - Echo showed severe pulmonary hypertension, EF 66%. sent from PMD due to fluid overload, pt stopped lasix and other HTN meds due to hypotension last week. CXR clear. Low likelihood for PE, negative D-dimer if age-adjusted.  - c/w lasix drip 5mg/hr  - c/w telemetry   - Daily weight monitoring  - Strict I/Os  - Echo showed severe pulmonary hypertension, EF 66%.  - may need right heart cath eventually. For now will continue diuresing to dry weight and monitor sent from PMD due to fluid overload, pt stopped lasix and other HTN meds due to hypotension last week. CXR clear. Low likelihood for PE, negative D-dimer if age-adjusted.  - c/w lasix drip 5mg/hr  - c/w telemetry   - Daily weight monitoring  - Strict I/Os  - Echo showed severe pulmonary hypertension, EF 66%.  - may need right heart cath eventually. For now will continue diuresing to dry weight and monitor  - f/u heart failure recs

## 2017-11-01 NOTE — PROGRESS NOTE ADULT - PROBLEM SELECTOR PLAN 6
Patient with h/o COPD, currently on home oxygen 2L  - on 2L NC, maintain goal SpO2 > 92%.   - c/w Advair PRN   - continuous O2 sat monitoring On Eliquis for Afib

## 2017-11-01 NOTE — PROGRESS NOTE ADULT - PROBLEM SELECTOR PLAN 1
Echo indicates right sided HF, Acute cor pulmonale with TR.  On aggressive diuresis  Replete K [phone]

## 2017-11-01 NOTE — PROGRESS NOTE ADULT - ASSESSMENT
63 y/o female w/ PMHX  of Afib on Eliquis, DM II,  HTN, COPD on home O2 2L, lymphoma/CLL s/p radiation and chemo (1997), HFpEF LVEF 66% comes in with complaints of worsening SOB and LE edema. She states she recently had a fall at work, hit her head and was sent to the ER where she was found to be hypotensive. Since then some of her HF medications had been held. CT head was unremarkable per pt. She then took her self to Dr. Goldberg (PMD) for a follow up yesterday, and she was found to be in ADHF and was sent to [facility] ED.     Diuresing well, but still grossly hypervolemic.

## 2017-11-02 ENCOUNTER — TRANSCRIPTION ENCOUNTER (OUTPATIENT)
Age: 62
End: 2017-11-02

## 2017-11-02 DIAGNOSIS — I48.91 UNSPECIFIED ATRIAL FIBRILLATION: ICD-10-CM

## 2017-11-02 LAB
ALBUMIN SERPL ELPH-MCNC: 3.4 G/DL — SIGNIFICANT CHANGE UP (ref 3.3–5)
ALP SERPL-CCNC: 66 U/L — SIGNIFICANT CHANGE UP (ref 40–120)
ALT FLD-CCNC: 18 U/L — SIGNIFICANT CHANGE UP (ref 4–33)
AST SERPL-CCNC: 16 U/L — SIGNIFICANT CHANGE UP (ref 4–32)
BILIRUB SERPL-MCNC: 0.8 MG/DL — SIGNIFICANT CHANGE UP (ref 0.2–1.2)
BUN SERPL-MCNC: 32 MG/DL — HIGH (ref 7–23)
BUN SERPL-MCNC: 37 MG/DL — HIGH (ref 7–23)
CALCIUM SERPL-MCNC: 8.4 MG/DL — SIGNIFICANT CHANGE UP (ref 8.4–10.5)
CALCIUM SERPL-MCNC: 8.6 MG/DL — SIGNIFICANT CHANGE UP (ref 8.4–10.5)
CHLORIDE SERPL-SCNC: 104 MMOL/L — SIGNIFICANT CHANGE UP (ref 98–107)
CHLORIDE SERPL-SCNC: 107 MMOL/L — SIGNIFICANT CHANGE UP (ref 98–107)
CO2 SERPL-SCNC: 32 MMOL/L — HIGH (ref 22–31)
CO2 SERPL-SCNC: 32 MMOL/L — HIGH (ref 22–31)
CREAT SERPL-MCNC: 1.3 MG/DL — SIGNIFICANT CHANGE UP (ref 0.5–1.3)
CREAT SERPL-MCNC: 1.4 MG/DL — HIGH (ref 0.5–1.3)
GLUCOSE BLDC GLUCOMTR-MCNC: 148 MG/DL — HIGH (ref 70–99)
GLUCOSE BLDC GLUCOMTR-MCNC: 170 MG/DL — HIGH (ref 70–99)
GLUCOSE BLDC GLUCOMTR-MCNC: 206 MG/DL — HIGH (ref 70–99)
GLUCOSE BLDC GLUCOMTR-MCNC: 219 MG/DL — HIGH (ref 70–99)
GLUCOSE SERPL-MCNC: 129 MG/DL — HIGH (ref 70–99)
GLUCOSE SERPL-MCNC: 154 MG/DL — HIGH (ref 70–99)
HCT VFR BLD CALC: 35 % — SIGNIFICANT CHANGE UP (ref 34.5–45)
HGB BLD-MCNC: 10.4 G/DL — LOW (ref 11.5–15.5)
MAGNESIUM SERPL-MCNC: 1.7 MG/DL — SIGNIFICANT CHANGE UP (ref 1.6–2.6)
MAGNESIUM SERPL-MCNC: 1.9 MG/DL — SIGNIFICANT CHANGE UP (ref 1.6–2.6)
MCHC RBC-ENTMCNC: 20.6 PG — LOW (ref 27–34)
MCHC RBC-ENTMCNC: 29.7 % — LOW (ref 32–36)
MCV RBC AUTO: 69.2 FL — LOW (ref 80–100)
NRBC # FLD: 0.06 — SIGNIFICANT CHANGE UP
NRBC FLD-RTO: 1 — SIGNIFICANT CHANGE UP
PHOSPHATE SERPL-MCNC: 3.1 MG/DL — SIGNIFICANT CHANGE UP (ref 2.5–4.5)
PHOSPHATE SERPL-MCNC: 3.2 MG/DL — SIGNIFICANT CHANGE UP (ref 2.5–4.5)
PLATELET # BLD AUTO: 170 K/UL — SIGNIFICANT CHANGE UP (ref 150–400)
PMV BLD: SIGNIFICANT CHANGE UP FL (ref 7–13)
POTASSIUM SERPL-MCNC: 4 MMOL/L — SIGNIFICANT CHANGE UP (ref 3.5–5.3)
POTASSIUM SERPL-MCNC: 4.4 MMOL/L — SIGNIFICANT CHANGE UP (ref 3.5–5.3)
POTASSIUM SERPL-SCNC: 4 MMOL/L — SIGNIFICANT CHANGE UP (ref 3.5–5.3)
POTASSIUM SERPL-SCNC: 4.4 MMOL/L — SIGNIFICANT CHANGE UP (ref 3.5–5.3)
PROT SERPL-MCNC: 6.1 G/DL — SIGNIFICANT CHANGE UP (ref 6–8.3)
RBC # BLD: 5.06 M/UL — SIGNIFICANT CHANGE UP (ref 3.8–5.2)
RBC # FLD: 19.9 % — HIGH (ref 10.3–14.5)
SODIUM SERPL-SCNC: 144 MMOL/L — SIGNIFICANT CHANGE UP (ref 135–145)
SODIUM SERPL-SCNC: 146 MMOL/L — HIGH (ref 135–145)
WBC # BLD: 5.9 K/UL — SIGNIFICANT CHANGE UP (ref 3.8–10.5)
WBC # FLD AUTO: 5.9 K/UL — SIGNIFICANT CHANGE UP (ref 3.8–10.5)

## 2017-11-02 PROCEDURE — 99233 SBSQ HOSP IP/OBS HIGH 50: CPT

## 2017-11-02 RX ORDER — METOPROLOL TARTRATE 50 MG
25 TABLET ORAL
Qty: 0 | Refills: 0 | Status: DISCONTINUED | OUTPATIENT
Start: 2017-11-02 | End: 2017-11-03

## 2017-11-02 RX ORDER — MAGNESIUM SULFATE 500 MG/ML
2 VIAL (ML) INJECTION ONCE
Qty: 0 | Refills: 0 | Status: COMPLETED | OUTPATIENT
Start: 2017-11-02 | End: 2017-11-02

## 2017-11-02 RX ORDER — FUROSEMIDE 40 MG
40 TABLET ORAL ONCE
Qty: 0 | Refills: 0 | Status: COMPLETED | OUTPATIENT
Start: 2017-11-02 | End: 2017-11-02

## 2017-11-02 RX ORDER — METOPROLOL TARTRATE 50 MG
12.5 TABLET ORAL ONCE
Qty: 0 | Refills: 0 | Status: COMPLETED | OUTPATIENT
Start: 2017-11-02 | End: 2017-11-02

## 2017-11-02 RX ADMIN — Medication 1: at 12:27

## 2017-11-02 RX ADMIN — APIXABAN 5 MILLIGRAM(S): 2.5 TABLET, FILM COATED ORAL at 17:30

## 2017-11-02 RX ADMIN — PANTOPRAZOLE SODIUM 40 MILLIGRAM(S): 20 TABLET, DELAYED RELEASE ORAL at 06:01

## 2017-11-02 RX ADMIN — Medication 100 MILLIGRAM(S): at 08:58

## 2017-11-02 RX ADMIN — Medication 100 MILLIGRAM(S): at 15:08

## 2017-11-02 RX ADMIN — Medication 12.5 MILLIGRAM(S): at 21:51

## 2017-11-02 RX ADMIN — Medication 12.5 MILLIGRAM(S): at 06:01

## 2017-11-02 RX ADMIN — Medication 100 MILLIGRAM(S): at 17:30

## 2017-11-02 RX ADMIN — Medication 2.5 MG/HR: at 08:58

## 2017-11-02 RX ADMIN — Medication 12.5 MILLIGRAM(S): at 15:08

## 2017-11-02 RX ADMIN — BUDESONIDE AND FORMOTEROL FUMARATE DIHYDRATE 2 PUFF(S): 160; 4.5 AEROSOL RESPIRATORY (INHALATION) at 20:09

## 2017-11-02 RX ADMIN — Medication 3.75 MG/HR: at 22:31

## 2017-11-02 RX ADMIN — BUDESONIDE AND FORMOTEROL FUMARATE DIHYDRATE 2 PUFF(S): 160; 4.5 AEROSOL RESPIRATORY (INHALATION) at 08:00

## 2017-11-02 RX ADMIN — CHLORHEXIDINE GLUCONATE 1 APPLICATION(S): 213 SOLUTION TOPICAL at 12:27

## 2017-11-02 RX ADMIN — APIXABAN 5 MILLIGRAM(S): 2.5 TABLET, FILM COATED ORAL at 06:01

## 2017-11-02 RX ADMIN — Medication 50 GRAM(S): at 06:14

## 2017-11-02 RX ADMIN — Medication 12.5 MILLIGRAM(S): at 22:30

## 2017-11-02 RX ADMIN — Medication 40 MILLIGRAM(S): at 11:13

## 2017-11-02 NOTE — DISCHARGE NOTE ADULT - HOSPITAL COURSE
63 yo F with PMH of Afib on Eliquis, DM on Metformin, uncontrolled HTN on Toprol, COPD on 2L home O2, lymphoma/CLL (1997), HFpEF LVEF  63%. BIBEMS from PMD for concern for fluid overload due to diastolic HFpEF and was diuresed. Patient went to see PMD on Thursday for LE edema and was found to have BP 90/40. Antihypertensive medications held. Patient also held Lasix along with that. Patient had a fall at work (+ LOC) on Friday and was evaluated at [facility] with negative head CT. Patient was sent home the same day. Patient noted to have worsening LE edema and abdominal edema with worsening CHAVEZ. PMD called EMS and pt was admitted to CCU. Pt was put on BIPAP. Pt given Lasix 40 mg IV and started on lasix drip. Pt had proteinuria and OSMAR. Nephrology and heart failure consulted. There was some suspicion for PE due to low BP. Pretest probability low. D-dimer slightly elevated but wnl if adjusted for age. CTA was not ordered. Pt improved on diuresis. Lasix drip was increased. LE dopplers showed 61 yo F with PMH of Afib on Eliquis, DM on Metformin, uncontrolled HTN on Toprol, COPD on 2L home O2, lymphoma/CLL (1997), HFpEF LVEF  63%. BIBEMS from PMD for concern for fluid overload due to diastolic HFpEF and was diuresed. Patient went to see PMD on Thursday for LE edema and was found to have BP 90/40. Antihypertensive medications held. Patient also held Lasix along with that. Patient had a fall at work (+ LOC) on Friday and was evaluated at [facility] with negative head CT. Patient was sent home the same day. Patient noted to have worsening LE edema and abdominal edema with worsening CHAVEZ. PMD called EMS and pt was admitted to CCU. Pt was put on BIPAP. Pt given Lasix 40 mg IV and started on lasix drip. Pt had proteinuria and OSMAR. Nephrology and heart failure consulted. There was some suspicion for PE due to low BP. Pretest probability low. D-dimer slightly elevated but wnl if adjusted for age. CTA was not ordered. Pt improved on diuresis. Lasix drip was increased. LE dopplers were negative for DVT.  She was treated for Acute on chronic diastolic heart failure.   AF with RVR was treated.  Echo shows severe pulm HTN, RV pressure overload and mod-severe TR, all likely from severe diastolic dysfunction. CT angop shows no PE.   . OSMAR. Hypernatremia due to obligate diuresis. Metabolic alkalosis due to contraction.   Torsemide 60 mg po BID rercommended   Pulmonary hypertension. may represent bivent failure.     She was treated for a UTI with Iv Rocephin.    Stable for discharge 11/10/17

## 2017-11-02 NOTE — PROGRESS NOTE ADULT - PROBLEM SELECTOR PLAN 4
on Metformin at home. HbA1c 6.5  - Monitor blood sugars ac and hs  - Humalog ISS. holding metformin  - CC/DASH diet per nutrition recs

## 2017-11-02 NOTE — DISCHARGE NOTE ADULT - ADDITIONAL INSTRUCTIONS
Please follow up with your primary medical doctor within 1-2 weeks of discharge.   Please follow up with the heart failure clinic within 1 week of discharge. Please follow up with your primary medical doctor within 1-2 weeks of discharge.   Please follow up with the heart failure clinic within 1 week of discharge, Dr. Degroot on 11/20/17 at 2:00pm Please follow up with your primary medical doctor within 1-2 weeks of discharge.   Follow up with Nephrology or PMD  Please follow up with the heart failure clinic within 1 week of discharge, Dr. Degroot on 11/20/17 at 2:00pm

## 2017-11-02 NOTE — DISCHARGE NOTE ADULT - PROVIDER TOKENS
FREE:[LAST:[follow up],PHONE:[(   )    -],FAX:[(   )    -],ADDRESS:[Dr. Degroot on 11/20/17 at 2:00pm]],TOKEN:'[id number]'

## 2017-11-02 NOTE — PROGRESS NOTE ADULT - SUBJECTIVE AND OBJECTIVE BOX
Patient is a 62y old  Female who presents with a chief complaint of LE edema and Abd edema, SOB (30 Oct 2017 22:46)      SUBJECTIVE / OVERNIGHT EVENTS:    MEDICATIONS  (STANDING):  allopurinol 100 milliGRAM(s) Oral three times a day after meals  apixaban 5 milliGRAM(s) Oral every 12 hours  buDESOnide  80 MICROgram(s)/formoterol 4.5 MICROgram(s) Inhaler 2 Puff(s) Inhalation two times a day  chlorhexidine 4% Liquid 1 Application(s) Topical daily  dextrose 5%. 1000 milliLiter(s) (50 mL/Hr) IV Continuous <Continuous>  dextrose 50% Injectable 12.5 Gram(s) IV Push once  dextrose 50% Injectable 25 Gram(s) IV Push once  dextrose 50% Injectable 25 Gram(s) IV Push once  furosemide Infusion 5 mG/Hr (2.5 mL/Hr) IV Continuous <Continuous>  influenza   Vaccine 0.5 milliLiter(s) IntraMuscular once  insulin lispro (HumaLOG) corrective regimen sliding scale   SubCutaneous three times a day before meals  insulin lispro (HumaLOG) corrective regimen sliding scale   SubCutaneous at bedtime  metoprolol     tartrate 12.5 milliGRAM(s) Oral every 8 hours  pantoprazole    Tablet 40 milliGRAM(s) Oral before breakfast    MEDICATIONS  (PRN):  dextrose Gel 1 Dose(s) Oral once PRN Blood Glucose LESS THAN 70 milliGRAM(s)/deciliter  glucagon  Injectable 1 milliGRAM(s) IntraMuscular once PRN Glucose LESS THAN 70 milligrams/deciliter      Vital Signs Last 24 Hrs  T(C): 36.7 (02 Nov 2017 04:00), Max: 36.9 (01 Nov 2017 20:00)  T(F): 98 (02 Nov 2017 04:00), Max: 98.4 (01 Nov 2017 20:00)  HR: 84 (02 Nov 2017 07:01) (84 - 121)  BP: 100/64 (02 Nov 2017 07:00) (92/76 - 135/54)  BP(mean): 71 (02 Nov 2017 07:00) (64 - 100)  RR: 20 (02 Nov 2017 07:00) (15 - 36)  SpO2: 100% (02 Nov 2017 07:01) (80% - 100%)    CAPILLARY BLOOD GLUCOSE  174 (01 Nov 2017 22:16)      POCT Blood Glucose.: 174 mg/dL (01 Nov 2017 22:16)  POCT Blood Glucose.: 116 mg/dL (01 Nov 2017 17:21)  POCT Blood Glucose.: 132 mg/dL (01 Nov 2017 12:45)  POCT Blood Glucose.: 104 mg/dL (01 Nov 2017 09:10)      I&O's Summary    01 Nov 2017 07:01  -  02 Nov 2017 07:00  --------------------------------------------------------  IN: 1220 mL / OUT: 3015 mL / NET: -1795 mL          PHYSICAL EXAM:  General: NAD, obese woman lying comfortably in bed  HEENT: NCAT/ PERRL  Neck: +JVD, +hepatojugular reflex  Cardio: irregularly irregular, No M/R/G  Resp: bibasilar crackles  Abd: soft, NT, very distended, decreased BS  Skin: Warm, dry, intact; No rashes or lesions  Extremities: 1+ radial pulses, no cyanosis, clubbing. 3+ edema b/l LE  Neuro: no focal deficits, CN grossly intact  Psych: AAO X3      LABS:                        10.4   5.90  )-----------( 170      ( 02 Nov 2017 04:26 )             35.0     11-02    146<H>  |  107  |  37<H>  ----------------------------<  129<H>  4.4   |  32<H>  |  1.40<H>    Ca    8.6      02 Nov 2017 04:26  Phos  3.2     11-02  Mg     1.7     11-02    TPro  6.1  /  Alb  3.4  /  TBili  0.8  /  DBili  x   /  AST  16  /  ALT  18  /  AlkPhos  66  11-02    LIVER FUNCTIONS - ( 02 Nov 2017 04:26 )  Alb: 3.4 g/dL / Pro: 6.1 g/dL / ALK PHOS: 66 u/L / ALT: 18 u/L / AST: 16 u/L / GGT: x                   ABG - ( 31 Oct 2017 13:00 )  pH: 7.35  /  pCO2: 50    /  pO2: 79    / HCO3: 25    / Base Excess: 1.5   /  SaO2: 95.5                RADIOLOGY & ADDITIONAL TESTS:    Imaging Personally Reviewed:     Consultant(s) Notes Reviewed:     Care Discussed with Consultants/Other Providers:     Desmond Romeo MD  PGY-1 | Internal Medicine  [phone] / [phone] Patient is a 62y old  Female who presents with a chief complaint of LE edema and Abd edema, SOB (30 Oct 2017 22:46)      SUBJECTIVE / OVERNIGHT EVENTS:    MEDICATIONS  (STANDING):  allopurinol 100 milliGRAM(s) Oral three times a day after meals  apixaban 5 milliGRAM(s) Oral every 12 hours  buDESOnide  80 MICROgram(s)/formoterol 4.5 MICROgram(s) Inhaler 2 Puff(s) Inhalation two times a day  chlorhexidine 4% Liquid 1 Application(s) Topical daily  dextrose 5%. 1000 milliLiter(s) (50 mL/Hr) IV Continuous <Continuous>  dextrose 50% Injectable 12.5 Gram(s) IV Push once  dextrose 50% Injectable 25 Gram(s) IV Push once  dextrose 50% Injectable 25 Gram(s) IV Push once  furosemide Infusion 5 mG/Hr (2.5 mL/Hr) IV Continuous <Continuous>  influenza   Vaccine 0.5 milliLiter(s) IntraMuscular once  insulin lispro (HumaLOG) corrective regimen sliding scale   SubCutaneous three times a day before meals  insulin lispro (HumaLOG) corrective regimen sliding scale   SubCutaneous at bedtime  metoprolol     tartrate 12.5 milliGRAM(s) Oral every 8 hours  pantoprazole    Tablet 40 milliGRAM(s) Oral before breakfast    MEDICATIONS  (PRN):  dextrose Gel 1 Dose(s) Oral once PRN Blood Glucose LESS THAN 70 milliGRAM(s)/deciliter  glucagon  Injectable 1 milliGRAM(s) IntraMuscular once PRN Glucose LESS THAN 70 milligrams/deciliter      Vital Signs Last 24 Hrs  T(C): 36.7 (02 Nov 2017 04:00), Max: 36.9 (01 Nov 2017 20:00)  T(F): 98 (02 Nov 2017 04:00), Max: 98.4 (01 Nov 2017 20:00)  HR: 84 (02 Nov 2017 07:01) (84 - 121)  BP: 100/64 (02 Nov 2017 07:00) (92/76 - 135/54)  BP(mean): 71 (02 Nov 2017 07:00) (64 - 100)  RR: 20 (02 Nov 2017 07:00) (15 - 36)  SpO2: 100% (02 Nov 2017 07:01) (80% - 100%)    CAPILLARY BLOOD GLUCOSE  174 (01 Nov 2017 22:16)      POCT Blood Glucose.: 174 mg/dL (01 Nov 2017 22:16)  POCT Blood Glucose.: 116 mg/dL (01 Nov 2017 17:21)  POCT Blood Glucose.: 132 mg/dL (01 Nov 2017 12:45)  POCT Blood Glucose.: 104 mg/dL (01 Nov 2017 09:10)      I&O's Summary    01 Nov 2017 07:01  -  02 Nov 2017 07:00  --------------------------------------------------------  IN: 1220 mL / OUT: 3015 mL / NET: -1795 mL          PHYSICAL EXAM:  General: NAD, obese woman lying comfortably in bed  HEENT: NCAT/ PERRL  Neck: +JVD, +hepatojugular reflex  Cardio: irregularly irregular, No M/R/G  Resp: CTAB, decreased BS in RLL  Abd: soft, NT, very distended, decreased BS  Skin: Warm, dry, intact; No rashes or lesions  Extremities: 1+ radial pulses, no cyanosis, clubbing. 3+ edema b/l LE  Neuro: no focal deficits, CN grossly intact  Psych: AAO X3      LABS:                        10.4   5.90  )-----------( 170      ( 02 Nov 2017 04:26 )             35.0     11-02    146<H>  |  107  |  37<H>  ----------------------------<  129<H>  4.4   |  32<H>  |  1.40<H>    Ca    8.6      02 Nov 2017 04:26  Phos  3.2     11-02  Mg     1.7     11-02    TPro  6.1  /  Alb  3.4  /  TBili  0.8  /  DBili  x   /  AST  16  /  ALT  18  /  AlkPhos  66  11-02    LIVER FUNCTIONS - ( 02 Nov 2017 04:26 )  Alb: 3.4 g/dL / Pro: 6.1 g/dL / ALK PHOS: 66 u/L / ALT: 18 u/L / AST: 16 u/L / GGT: x                   ABG - ( 31 Oct 2017 13:00 )  pH: 7.35  /  pCO2: 50    /  pO2: 79    / HCO3: 25    / Base Excess: 1.5   /  SaO2: 95.5                RADIOLOGY & ADDITIONAL TESTS:    Imaging Personally Reviewed:     Consultant(s) Notes Reviewed:     Care Discussed with Consultants/Other Providers:     Desmond Romeo MD  PGY-1 | Internal Medicine  [phone] / [phone] Patient is a 62y old  Female who presents with a chief complaint of LE edema and Abd edema, SOB (30 Oct 2017 22:46)      SUBJECTIVE / OVERNIGHT EVENTS: No acute overnight events. Pt feels well this am. Denies SOB, fever, chills, n/v, CP, palpitations, abd pain, diarrhea.     MEDICATIONS  (STANDING):  allopurinol 100 milliGRAM(s) Oral three times a day after meals  apixaban 5 milliGRAM(s) Oral every 12 hours  buDESOnide  80 MICROgram(s)/formoterol 4.5 MICROgram(s) Inhaler 2 Puff(s) Inhalation two times a day  chlorhexidine 4% Liquid 1 Application(s) Topical daily  dextrose 5%. 1000 milliLiter(s) (50 mL/Hr) IV Continuous <Continuous>  dextrose 50% Injectable 12.5 Gram(s) IV Push once  dextrose 50% Injectable 25 Gram(s) IV Push once  dextrose 50% Injectable 25 Gram(s) IV Push once  furosemide Infusion 5 mG/Hr (2.5 mL/Hr) IV Continuous <Continuous>  influenza   Vaccine 0.5 milliLiter(s) IntraMuscular once  insulin lispro (HumaLOG) corrective regimen sliding scale   SubCutaneous three times a day before meals  insulin lispro (HumaLOG) corrective regimen sliding scale   SubCutaneous at bedtime  metoprolol     tartrate 12.5 milliGRAM(s) Oral every 8 hours  pantoprazole    Tablet 40 milliGRAM(s) Oral before breakfast    MEDICATIONS  (PRN):  dextrose Gel 1 Dose(s) Oral once PRN Blood Glucose LESS THAN 70 milliGRAM(s)/deciliter  glucagon  Injectable 1 milliGRAM(s) IntraMuscular once PRN Glucose LESS THAN 70 milligrams/deciliter      Vital Signs Last 24 Hrs  T(C): 36.7 (02 Nov 2017 04:00), Max: 36.9 (01 Nov 2017 20:00)  T(F): 98 (02 Nov 2017 04:00), Max: 98.4 (01 Nov 2017 20:00)  HR: 84 (02 Nov 2017 07:01) (84 - 121)  BP: 100/64 (02 Nov 2017 07:00) (92/76 - 135/54)  BP(mean): 71 (02 Nov 2017 07:00) (64 - 100)  RR: 20 (02 Nov 2017 07:00) (15 - 36)  SpO2: 100% (02 Nov 2017 07:01) (80% - 100%)    CAPILLARY BLOOD GLUCOSE  174 (01 Nov 2017 22:16)      POCT Blood Glucose.: 174 mg/dL (01 Nov 2017 22:16)  POCT Blood Glucose.: 116 mg/dL (01 Nov 2017 17:21)  POCT Blood Glucose.: 132 mg/dL (01 Nov 2017 12:45)  POCT Blood Glucose.: 104 mg/dL (01 Nov 2017 09:10)      I&O's Summary    01 Nov 2017 07:01  -  02 Nov 2017 07:00  --------------------------------------------------------  IN: 1220 mL / OUT: 3015 mL / NET: -1795 mL          PHYSICAL EXAM:  General: NAD, obese woman lying comfortably in bed  HEENT: NCAT/ PERRL  Neck: +JVD, +hepatojugular reflex  Cardio: irregularly irregular, No M/R/G  Resp: CTAB, decreased BS in RLL  Abd: soft, NT, very distended, decreased BS  Skin: Warm, dry, intact; No rashes or lesions  Extremities: 1+ radial pulses, no cyanosis, clubbing. 3+ edema b/l LE  Neuro: no focal deficits, CN grossly intact  Psych: AAO X3      LABS:                        10.4   5.90  )-----------( 170      ( 02 Nov 2017 04:26 )             35.0     11-02    146<H>  |  107  |  37<H>  ----------------------------<  129<H>  4.4   |  32<H>  |  1.40<H>    Ca    8.6      02 Nov 2017 04:26  Phos  3.2     11-02  Mg     1.7     11-02    TPro  6.1  /  Alb  3.4  /  TBili  0.8  /  DBili  x   /  AST  16  /  ALT  18  /  AlkPhos  66  11-02    LIVER FUNCTIONS - ( 02 Nov 2017 04:26 )  Alb: 3.4 g/dL / Pro: 6.1 g/dL / ALK PHOS: 66 u/L / ALT: 18 u/L / AST: 16 u/L / GGT: x                   ABG - ( 31 Oct 2017 13:00 )  pH: 7.35  /  pCO2: 50    /  pO2: 79    / HCO3: 25    / Base Excess: 1.5   /  SaO2: 95.5                RADIOLOGY & ADDITIONAL TESTS:    Imaging Personally Reviewed:     Consultant(s) Notes Reviewed:     Care Discussed with Consultants/Other Providers:     Desmond Romeo MD  PGY-1 | Internal Medicine  [phone] / [phone] Patient is a 62y old  Female who presents with a chief complaint of LE edema and Abd edema, SOB (30 Oct 2017 22:46)      SUBJECTIVE / OVERNIGHT EVENTS: No acute overnight events. Pt feels well this am. Denies SOB, fever, chills, n/v, CP, palpitations, abd pain, diarrhea.     MEDICATIONS  (STANDING):  allopurinol 100 milliGRAM(s) Oral three times a day after meals  apixaban 5 milliGRAM(s) Oral every 12 hours  buDESOnide  80 MICROgram(s)/formoterol 4.5 MICROgram(s) Inhaler 2 Puff(s) Inhalation two times a day  chlorhexidine 4% Liquid 1 Application(s) Topical daily  dextrose 5%. 1000 milliLiter(s) (50 mL/Hr) IV Continuous <Continuous>  dextrose 50% Injectable 12.5 Gram(s) IV Push once  dextrose 50% Injectable 25 Gram(s) IV Push once  dextrose 50% Injectable 25 Gram(s) IV Push once  furosemide Infusion 5 mG/Hr (2.5 mL/Hr) IV Continuous <Continuous>  influenza   Vaccine 0.5 milliLiter(s) IntraMuscular once  insulin lispro (HumaLOG) corrective regimen sliding scale   SubCutaneous three times a day before meals  insulin lispro (HumaLOG) corrective regimen sliding scale   SubCutaneous at bedtime  metoprolol     tartrate 12.5 milliGRAM(s) Oral every 8 hours  pantoprazole    Tablet 40 milliGRAM(s) Oral before breakfast    MEDICATIONS  (PRN):  dextrose Gel 1 Dose(s) Oral once PRN Blood Glucose LESS THAN 70 milliGRAM(s)/deciliter  glucagon  Injectable 1 milliGRAM(s) IntraMuscular once PRN Glucose LESS THAN 70 milligrams/deciliter      Vital Signs Last 24 Hrs  T(C): 36.7 (02 Nov 2017 04:00), Max: 36.9 (01 Nov 2017 20:00)  T(F): 98 (02 Nov 2017 04:00), Max: 98.4 (01 Nov 2017 20:00)  HR: 84 (02 Nov 2017 07:01) (84 - 121)  BP: 100/64 (02 Nov 2017 07:00) (92/76 - 135/54)  BP(mean): 71 (02 Nov 2017 07:00) (64 - 100)  RR: 20 (02 Nov 2017 07:00) (15 - 36)  SpO2: 100% (02 Nov 2017 07:01) (80% - 100%)    CAPILLARY BLOOD GLUCOSE  174 (01 Nov 2017 22:16)      POCT Blood Glucose.: 174 mg/dL (01 Nov 2017 22:16)  POCT Blood Glucose.: 116 mg/dL (01 Nov 2017 17:21)  POCT Blood Glucose.: 132 mg/dL (01 Nov 2017 12:45)  POCT Blood Glucose.: 104 mg/dL (01 Nov 2017 09:10)      I&O's Summary    01 Nov 2017 07:01  -  02 Nov 2017 07:00  --------------------------------------------------------  IN: 1220 mL / OUT: 3015 mL / NET: -1795 mL          PHYSICAL EXAM:  General: NAD, obese woman lying comfortably in bed  HEENT: NCAT/ PERRL  Neck: +JVD, +hepatojugular reflex  Cardio: irregularly irregular, No M/R/G  Resp: CTAB, decreased BS in RLL  Abd: soft, NT, very distended, decreased BS  : Corey in place  Skin: Warm, dry, intact; No rashes or lesions  Extremities: 1+ radial pulses, no cyanosis, clubbing. 3+ edema b/l LE  Neuro: no focal deficits, CN grossly intact  Psych: AAO X3      LABS:                        10.4   5.90  )-----------( 170      ( 02 Nov 2017 04:26 )             35.0     11-02    146<H>  |  107  |  37<H>  ----------------------------<  129<H>  4.4   |  32<H>  |  1.40<H>    Ca    8.6      02 Nov 2017 04:26  Phos  3.2     11-02  Mg     1.7     11-02    TPro  6.1  /  Alb  3.4  /  TBili  0.8  /  DBili  x   /  AST  16  /  ALT  18  /  AlkPhos  66  11-02    LIVER FUNCTIONS - ( 02 Nov 2017 04:26 )  Alb: 3.4 g/dL / Pro: 6.1 g/dL / ALK PHOS: 66 u/L / ALT: 18 u/L / AST: 16 u/L / GGT: x                   ABG - ( 31 Oct 2017 13:00 )  pH: 7.35  /  pCO2: 50    /  pO2: 79    / HCO3: 25    / Base Excess: 1.5   /  SaO2: 95.5                RADIOLOGY & ADDITIONAL TESTS:    Imaging Personally Reviewed:     Consultant(s) Notes Reviewed:     Care Discussed with Consultants/Other Providers:     Desmond Romeo MD  PGY-1 | Internal Medicine  [phone] / [phone]

## 2017-11-02 NOTE — DISCHARGE NOTE ADULT - MEDICATION SUMMARY - MEDICATIONS TO CHANGE
I will SWITCH the dose or number of times a day I take the medications listed below when I get home from the hospital:    Metoprolol Succinate  mg oral tablet, extended release  -- 1 tab(s) by mouth once a day    hydrALAZINE 100 mg oral tablet  -- 1 tab(s) by mouth 3 times a day I will SWITCH the dose or number of times a day I take the medications listed below when I get home from the hospital:    Metoprolol Succinate  mg oral tablet, extended release  -- 1 tab(s) by mouth once a day

## 2017-11-02 NOTE — DISCHARGE NOTE ADULT - MEDICATION SUMMARY - MEDICATIONS TO STOP TAKING
I will STOP taking the medications listed below when I get home from the hospital:    Lasix 40 mg oral tablet  -- 1 tab(s) by mouth 2 times a day    lisinopril 40 mg oral tablet  -- 1 tab(s) by mouth once a day I will STOP taking the medications listed below when I get home from the hospital:    Lasix 40 mg oral tablet  -- 1 tab(s) by mouth 2 times a day    lisinopril 40 mg oral tablet  -- 1 tab(s) by mouth once a day    hydrALAZINE 100 mg oral tablet  -- 1 tab(s) by mouth 3 times a day

## 2017-11-02 NOTE — PROGRESS NOTE ADULT - SUBJECTIVE AND OBJECTIVE BOX
PULM/MED - IN CCU - PROGRESS NOTE:    awake, alert comfortable.  Sitting in recliner chair  Denies SOB, cough, wheezing, chest pains      MEDICATIONS  (STANDING):  allopurinol 100 milliGRAM(s) Oral three times a day after meals  apixaban 5 milliGRAM(s) Oral every 12 hours  buDESOnide  80 MICROgram(s)/formoterol 4.5 MICROgram(s) Inhaler 2 Puff(s) Inhalation two times a day  chlorhexidine 4% Liquid 1 Application(s) Topical daily  dextrose 5%. 1000 milliLiter(s) (50 mL/Hr) IV Continuous <Continuous>  dextrose 50% Injectable 12.5 Gram(s) IV Push once  dextrose 50% Injectable 25 Gram(s) IV Push once  dextrose 50% Injectable 25 Gram(s) IV Push once  furosemide Infusion 7.5 mG/Hr (3.75 mL/Hr) IV Continuous <Continuous>  influenza   Vaccine 0.5 milliLiter(s) IntraMuscular once  insulin lispro (HumaLOG) corrective regimen sliding scale   SubCutaneous three times a day before meals  insulin lispro (HumaLOG) corrective regimen sliding scale   SubCutaneous at bedtime  metoprolol     tartrate 12.5 milliGRAM(s) Oral every 8 hours  pantoprazole    Tablet 40 milliGRAM(s) Oral before breakfast      MEDICATIONS  (PRN):  dextrose Gel 1 Dose(s) Oral once PRN Blood Glucose LESS THAN 70 milliGRAM(s)/deciliter  glucagon  Injectable 1 milliGRAM(s) IntraMuscular once PRN Glucose LESS THAN 70 milligrams/deciliter          Vital Signs Last 24 Hrs  T(C): 36.6 (02 Nov 2017 16:00), Max: 37.1 (02 Nov 2017 09:00)  T(F): 97.9 (02 Nov 2017 16:00), Max: 98.7 (02 Nov 2017 09:00)  HR: 138 (02 Nov 2017 17:00) (84 - 138)  BP: 129/78 (02 Nov 2017 17:00) (93/76 - 130/71)  BP(mean): 89 (02 Nov 2017 17:00) (64 - 100)  RR: 29 (02 Nov 2017 17:00) (15 - 31)  SpO2: 98% (02 Nov 2017 17:00) (89% - 100%)    PHYSICAL EXAMINATION:    GENERAL: The patient is awake and alert in no apparent distress.     NECK: Supple.    LUNGS: Bilateral breath sounds; respirations unlabored    HEART: Irreg irregular; CM atrial fib with controlled rate     ABDOMEN: Soft, nontender; morbidly obese; abdominal wall edema present.     EXTREMITIES: 3+lower extremity pitting edema.        LABS:                        10.4   5.90  )-----------( 170      ( 02 Nov 2017 04:26 )             35.0     11-02    144  |  104  |  32<H>  ----------------------------<  154<H>  4.0   |  32<H>  |  1.30    Ca    8.4      02 Nov 2017 16:00  Phos  3.1     11-02  Mg     1.9     11-02    TPro  6.1  /  Alb  3.4  /  TBili  0.8  /  DBili  x   /  AST  16  /  ALT  18  /  AlkPhos  66  11-02      MICROBIOLOGY:      RADIOLOGY & ADDITIONAL STUDIES:    EXAM:  RAD CHEST AP PA 1 VIEW        PROCEDURE DATE:  Oct 30 2017         INTERPRETATION:  CLINICAL INFORMATION: Weakness    TECHNIQUE: AP view of the chest.    COMPARISON: Chest x-ray 1/27/2017. Chest CT 1/28/2017.    FINDINGS:     The lungs are clear.  The heart is enlarged despite projection.  There are degenerative changes of the thoracic spine.    IMPRESSION:   Clear lungs. Cardiomegaly.        AMY GAGE M.D., RADIOLOGY RESIDENT  This document has been electronically signed.  ACOSTA ZAMORA M.D.; ATTENDING RADIOLOGIST  This document has been electronically signed. Oct 31 2017  8:17AM    -----------------------------------------------------------------------------------------    Patient name: KARINA SCHNEIDER  YOB: 1955   Age: 62 (F)   MR#: [id number]  Study Date: 10/31/2017  [facility]  Study quality: Technically good  Referring Physician: Angela Belle MD  Blood Pressure: 140/33 mmHg  Height: 163 cm  Weight: 122 kg  BSA: 2.2 m2  ------------------------------------------------------------------------  PROCEDURE: Transthoracic echocardiogram with 2-D, M-Mode  and complete spectral and color flow Doppler.  INDICATION: Shortness of breath (R06.02)  ------------------------------------------------------------------------  DIMENSIONS:  Dimensions:     Normal Values:  LA:     4.2 cm    2.0 - 4.0 cm  Ao:     2.8 cm    2.0 - 3.8 cm  SEPTUM: 0.9 cm    0.6 - 1.2 cm  PWT:    1.0 cm    0.6 - 1.1 cm  LVIDd:  4.2 cm    3.0 - 5.6 cm  LVIDs:  2.7 cm    1.8 - 4.0 cm  Derived Variables:  LVMI: 58 g/m2  RWT: 0.47  Fractional short: 36 %  Ejection Fraction (Teicholtz): 66 %  ------------------------------------------------------------------------  OBSERVATIONS:  Mitral Valve: Mitral annular calcification, otherwise  normal mitral valve. Mild mitral regurgitation.  Aortic Root: Normal aortic root.  Aortic Valve: Normal trileaflet aortic valve.  Left Atrium: Normal left atrium.  Left Ventricle: Normal left ventricular systolic function.  No segmental wall motion abnormalities. Septal motion  consistent with right ventricular pressure and volume  overload. Increased relative wall thickness with normal  left ventricular mass index, consistent with concentric  left ventricular remodeling.  Right Heart: Severe right atrial enlargement. Right  ventricular enlargement with decreased right ventricular  systolic function. Normal tricuspid valve.  Moderate-severe  tricuspid regurgitation. Normal pulmonic valve.  Pericardium/PleuraNormal pericardium with no pericardial  effusion.  Hemodynamic: Estimated right ventricular systolic pressure  equals 80 mm Hg, assuming right atrial pressure equals 10  mm Hg, consistent with severe pulmonary hypertension.  ------------------------------------------------------------------------  CONCLUSIONS:  1. Increased relative wall thickness with normal left  ventricular mass index, consistent with concentric left  ventricular remodeling.  2. Normal left ventricular systolic function. No segmental  wall motion abnormalities. Septal motion consistent with  right ventricular pressure and volume overload.  3. Severe right atrial enlargement.  4. Right ventricular enlargement with decreased right  ventricular systolic function.  5. Normal tricuspid valve.  Moderate-severe tricuspid  regurgitation.  ------------------------------------------------------------------------  Confirmed on  10/31/2017 - 16:02:32 by DENISSE Avila  ------------------------------------------------------------------------

## 2017-11-02 NOTE — DISCHARGE NOTE ADULT - CARE PLAN
Principal Discharge DX:	Acute on chronic congestive heart failure, unspecified congestive heart failure type  Goal:	Prevent recurrence of fluid overload and swelling.  Instructions for follow-up, activity and diet:	Follow up with your primary medical doctor within 1-2 weeks of discharge. Follow up with the heart failure clinic within 1 week of discharge. Take your medications as prescribed.  Secondary Diagnosis:	Chronic atrial fibrillation  Goal:	Manage atrial fibrillation and  Instructions for follow-up, activity and diet:	Follow up with your primary medical doctor within 1-2 weeks of discharge. Take your medications as prescribed. Principal Discharge DX:	Acute on chronic congestive heart failure, unspecified congestive heart failure type  Goal:	Prevent recurrence of fluid overload and swelling.  Instructions for follow-up, activity and diet:	Follow up with your primary medical doctor within 1-2 weeks of discharge. Follow up with the heart failure clinic within 1 week of discharge. Dr. Degroot on 11/20/17 at 2:00pm  Take your medications as prescribed.  Secondary Diagnosis:	Chronic atrial fibrillation  Goal:	Manage atrial fibrillation and  Instructions for follow-up, activity and diet:	Follow up with your primary medical doctor within 1-2 weeks of discharge. Take your medications as prescribed. Principal Discharge DX:	Acute on chronic congestive heart failure, unspecified congestive heart failure type  Goal:	Prevent recurrence of fluid overload and swelling.  Instructions for follow-up, activity and diet:	Follow up with your primary medical doctor within 1-2 weeks of discharge. Follow up with the heart failure clinic within 1 week of discharge. Dr. Degroot on 11/20/17 at 2:00pm  Take your medications as prescribed.  Secondary Diagnosis:	Chronic atrial fibrillation  Goal:	Manage atrial fibrillation and  Instructions for follow-up, activity and diet:	Follow up with your primary medical doctor within 1-2 weeks of discharge. Take your medications as prescribed.  Secondary Diagnosis:	OSMAR (acute kidney injury)  Instructions for follow-up, activity and diet:	You had some kidney damage that has been improving  Follow up with Nephrologist in 1-2 weeks  seek medical attention if you become short of breath or swelling  Secondary Diagnosis:	Urinary tract infection  Instructions for follow-up, activity and diet:	You were treated for a Urinary Tract Infection  Seek medical attention for fevers, painful urination, blood in your urine or lower back pain that may be another sign of infection

## 2017-11-02 NOTE — PROGRESS NOTE ADULT - SUBJECTIVE AND OBJECTIVE BOX
Nephrology Progress Note    Chart reviewed  No acute events overnight  noted tachycardic  Noted significant UOP  BP acceptable    Noted worsening of kidney function, mild hypernatremia and increasing bicarbonates- likely some intravascular volume depletion in the setting of ?overdiuresis    Patient denies subjective sob but seems sob when talk in, able to talk in full sentences  Denies chest pain, nausea or vomiting    MEDICATIONS  (STANDING):  allopurinol 100 milliGRAM(s) Oral three times a day after meals  apixaban 5 milliGRAM(s) Oral every 12 hours  buDESOnide  80 MICROgram(s)/formoterol 4.5 MICROgram(s) Inhaler 2 Puff(s) Inhalation two times a day  chlorhexidine 4% Liquid 1 Application(s) Topical daily  dextrose 5%. 1000 milliLiter(s) (50 mL/Hr) IV Continuous <Continuous>  dextrose 50% Injectable 12.5 Gram(s) IV Push once  dextrose 50% Injectable 25 Gram(s) IV Push once  dextrose 50% Injectable 25 Gram(s) IV Push once  furosemide Infusion 5 mG/Hr (2.5 mL/Hr) IV Continuous <Continuous>  influenza   Vaccine 0.5 milliLiter(s) IntraMuscular once  insulin lispro (HumaLOG) corrective regimen sliding scale   SubCutaneous three times a day before meals  insulin lispro (HumaLOG) corrective regimen sliding scale   SubCutaneous at bedtime  metoprolol     tartrate 12.5 milliGRAM(s) Oral every 8 hours  pantoprazole    Tablet 40 milliGRAM(s) Oral before breakfast      VITAL:  T(C): , Max: 36.8 (10-31-17 @ 16:05)  T(F): , Max: 98.3 (10-31-17 @ 16:05)  HR: 94 (17 @ 12:00)  BP: 111/91 (17 @ 12:00)  BP(mean): 96 (17 @ 12:00)  RR: 21 (17 @ 12:00)  SpO2: 80% (17 @ 12:00)  Wt(kg): --    I and O's:    10-31 @ 07:01  -   @ 07:00  --------------------------------------------------------  IN: 1030 mL / OUT: 3250 mL / NET: -2220 mL     @ 07:01  -   @ 12:21  --------------------------------------------------------  IN: 367.5 mL / OUT: 725 mL / NET: -357.5 mL          PHYSICAL EXAM:    Constitutional: NAD, awake and alert, coherent, comprehensible  HEENT: PERRLA, EOMI, clear conj, anicteric sclera    Neck:  +  JVD, supple  Respiratory: CTAB/L  Cardiovascular: S1 and S2 normal, no murmur appreciated  Gastrointestinal: BS+, soft, NT/ND no peritoneal signs  Extremities: + 2  peripheral edema, well perfused otherwise  Neurological: A/O x 3, no focal deficits  Psychiatric: Normal mood, normal affect  : +  Corey  Skin: No rashes  Access: Not applicable    LABS:                        11.0   5.82  )-----------( 189      ( 2017 05:30 )             37.1         144  |  106  |  43<H>  ----------------------------<  125<H>  4.0   |  25  |  1.36<H>    Ca    8.5      2017 05:30  Phos  3.8       Mg     2.1         TPro  6.3  /  Alb  3.3  /  TBili  0.8  /  DBili  x   /  AST  21  /  ALT  18  /  AlkPhos  69            Urine Studies:  Urinalysis Basic - ( 30 Oct 2017 17:45 )    Color: YELLOW / Appearance: CLEAR / S.021 / pH: 6.0  Gluc: NEGATIVE / Ketone: NEGATIVE  / Bili: NEGATIVE / Urobili: NORMAL E.U.   Blood: NEGATIVE / Protein: 300 / Nitrite: NEGATIVE   Leuk Esterase: NEGATIVE / RBC: 0-2 / WBC 2-5   Sq Epi: OCC / Non Sq Epi: x / Bacteria: x      Creatinine, Random Urine: 21.98 mg/dL (10-31 @ 15:59)  Protein, Random Urine: 12.2 mg/dL (10-31 @ 15:59)      ASSESSMENT: A 62-year-old female with past medical history of chronic lymphocytic leukemia, atrial fibrillation, diabetes, and hypertension, who presents to the hospital for evaluation of shortness of breath and dyspnea on exertion.      1. Diastolic CHF exacerbation with significant fluid overload  2. Acute kidney injury- CRS  3. Mild Hypernatremia- likely over diuresing high rate of UOP  4. Metabolic alkalosis with bicarbs of 32   5. Anemia likely of chronic disease  6. Tachycardia  7. A-fib with RVR- cardiology on board    PLAN  - continue lasix drip same rate for now- patient with more than 3 liter UOP and might get intravascularly depleted in short terms  - keep 2 liters negative, would prefer to limit fluid intake and slow down on diuresis if Na/Bicarboantes increase and or creatinine increases  - Continue to monitor kidney function  - Monitor and support hemodynamics keep MAP>70  - avoid nephrotoxins and dose meds per GFR  - continue other management per primary team      We will follow with you    Garry Funes M.D

## 2017-11-02 NOTE — PROGRESS NOTE ADULT - ASSESSMENT
61 y/o female w/ PMHX  of Afib on Eliquis, DM II,  HTN, COPD on home O2 2L, lymphoma/CLL s/p radiation and chemo (1997), HFpEF LVEF 66% comes in with complaints of worsening SOB and LE edema. She states she recently had a fall at work, hit her head and was sent to the ER where she was found to be hypotensive. Since then some of her HF medications had been held. CT head was unremarkable per pt. She then took her self to Dr. Goldberg (PMD) for a follow up yesterday, and she was found to be in ADHF and was sent to [facility] ED.     Diuresing well, but still grossly hypervolemic.

## 2017-11-02 NOTE — PROGRESS NOTE ADULT - SUBJECTIVE AND OBJECTIVE BOX
Patient seen and examined. She is resting comfortably in reclining chair. States she feels ok, and LE edema is improving. No acute SOB, orthopnea, PND, CP, palpitations, dizziness.     Medications:  allopurinol 100 milliGRAM(s) Oral three times a day after meals  apixaban 5 milliGRAM(s) Oral every 12 hours  buDESOnide  80 MICROgram(s)/formoterol 4.5 MICROgram(s) Inhaler 2 Puff(s) Inhalation two times a day  chlorhexidine 4% Liquid 1 Application(s) Topical daily  dextrose 5%. 1000 milliLiter(s) IV Continuous <Continuous>  dextrose 50% Injectable 12.5 Gram(s) IV Push once  dextrose 50% Injectable 25 Gram(s) IV Push once  dextrose 50% Injectable 25 Gram(s) IV Push once  dextrose Gel 1 Dose(s) Oral once PRN  furosemide Infusion 7.5 mG/Hr IV Continuous <Continuous>  glucagon  Injectable 1 milliGRAM(s) IntraMuscular once PRN  influenza   Vaccine 0.5 milliLiter(s) IntraMuscular once  insulin lispro (HumaLOG) corrective regimen sliding scale   SubCutaneous three times a day before meals  insulin lispro (HumaLOG) corrective regimen sliding scale   SubCutaneous at bedtime  metoprolol     tartrate 12.5 milliGRAM(s) Oral every 8 hours  pantoprazole    Tablet 40 milliGRAM(s) Oral before breakfast      Vital Signs Last 24 Hrs  T(C): 37.1 (2017 09:00), Max: 37.1 (2017 09:00)  T(F): 98.7 (2017 09:00), Max: 98.7 (2017 09:00)  HR: 106 (2017 11:00) (84 - 121)  BP: 124/80 (2017 11:00) (92/76 - 130/71)  BP(mean): 90 (2017 11:00) (64 - 100)  RR: 23 (2017 11:00) (15 - 36)  SpO2: 95% (2017 11:00) (85% - 100%)    Daily     Daily Weight in k.6 (2017 11:25)    I&O's Summary    2017 07:01  -  2017 07:00  --------------------------------------------------------  IN: 1220 mL / OUT: 3015 mL / NET: -1795 mL    2017 07:  -  2017 12:15  --------------------------------------------------------  IN: 133.1 mL / OUT: 525 mL / NET: -391.9 mL      I&O's Detail    2017 07:  -  2017 07:00  --------------------------------------------------------  IN:    furosemide Infusion: 60 mL    Oral Fluid: 1160 mL  Total IN: 1220 mL    OUT:    Indwelling Catheter - Urethral: 3015 mL  Total OUT: 3015 mL    Total NET: -1795 mL      2017 07:  -  2017 12:15  --------------------------------------------------------  IN:    furosemide Infusion: 7.5 mL    furosemide Infusion: 7.6 mL    Oral Fluid: 118 mL  Total IN: 133.1 mL    OUT:    Indwelling Catheter - Urethral: 525 mL  Total OUT: 525 mL    Total NET: -391.9 mL        Physical Exam:     General: No distress. Comfortable.  HEENT: EOM intact.  Neck: Neck supple. JVP elevated to Jawline.  No masses  Chest: diffuse crackles and wheezing.   CV: irregularly irregular, No murmurs, rub, or gallops. Radial pulses normal. 2+ LE edema b/l. Warm.   Abdomen: Soft, non-distended, non-tender  Skin: No rashes or skin breakdown  Neurology: Alert and oriented times three. Sensation intact  Psych: Affect normal    Labs:                        10.4   5.90  )-----------( 170      ( 2017 04:26 )             35.0     11-    146<H>  |  107  |  37<H>  ----------------------------<  129<H>  4.4   |  32<H>  |  1.40<H>    Ca    8.6      2017 04:26  Phos  3.2     11  Mg     1.7         TPro  6.1  /  Alb  3.4  /  TBili  0.8  /  DBili  x   /  AST  16  /  ALT  18  /  AlkPhos  66            Serum Pro-Brain Natriuretic Peptide: 6997 pg/mL (10-30 @ 16:00)      < from: Transthoracic Echocardiogram (10.31.17 @ 12:58) >  DIMENSIONS:  Dimensions:     Normal Values:  LA:     4.2 cm    2.0 - 4.0 cm  Ao:     2.8 cm    2.0 - 3.8 cm  SEPTUM: 0.9 cm    0.6 - 1.2 cm  PWT:    1.0 cm    0.6 - 1.1 cm  LVIDd:  4.2 cm    3.0 - 5.6 cm  LVIDs:  2.7 cm    1.8 - 4.0 cm  Derived Variables:  LVMI: 58 g/m2  RWT: 0.47  Fractional short: 36 %  Ejection Fraction (Teicholtz): 66 %  ------------------------------------------------------------------------  OBSERVATIONS:  Mitral Valve: Mitral annular calcification, otherwise  normal mitral valve. Mild mitral regurgitation.  Aortic Root: Normal aortic root.  Aortic Valve: Normal trileaflet aortic valve.  Left Atrium: Normal left atrium.  Left Ventricle: Normal left ventricular systolic function.  No segmental wall motion abnormalities. Septal motion  consistent with right ventricular pressure and volume  overload. Increased relative wall thickness with normal  left ventricular mass index, consistent with concentric  left ventricular remodeling.  Right Heart: Severe right atrial enlargement. Right  ventricular enlargement with decreased right ventricular  systolic function. Normal tricuspid valve.  Moderate-severe  tricuspid regurgitation. Normal pulmonic valve.  Pericardium/PleuraNormal pericardium with no pericardial  effusion.  Hemodynamic: Estimated right ventricular systolic pressure  equals 80 mm Hg, assuming right atrial pressure equals 10  mm Hg, consistent with severe pulmonary hypertension.    < end of copied text >

## 2017-11-02 NOTE — PROGRESS NOTE ADULT - ATTENDING COMMENTS
Agree with above assessment and plan  Patient with HFpEF  Continue with diuresis  -currently on lasix ggt  Continue with current med regimen

## 2017-11-02 NOTE — PROGRESS NOTE ADULT - PROBLEM SELECTOR PLAN 1
-Diuresing well -1795 ml in 24 hours, but still grossly hypervolemic.   -Increase Lasix gtt to 7.5 mg/hr. Give Lasix 40 mg IV x 1 prior to increased dose of gtt.   -Renal function stable.   -Still tachycardic-continue Lopressor 12.5 mg po TID. Should improve as she becomes euvolemic.   -TTE shows severe pulm HTN, RV pressure overload and mod-severe TR, all likely from severe diastolic dysfunction.  -If she continues to be hypertensive, can consider ACEI/ARB (nephroprotective w/ DM II), but would wait till renal function normalizes.

## 2017-11-02 NOTE — DISCHARGE NOTE ADULT - PLAN OF CARE
Prevent recurrence of fluid overload and swelling. Follow up with your primary medical doctor within 1-2 weeks of discharge. Follow up with the heart failure clinic within 1 week of discharge. Take your medications as prescribed. Manage atrial fibrillation and Follow up with your primary medical doctor within 1-2 weeks of discharge. Take your medications as prescribed. Follow up with your primary medical doctor within 1-2 weeks of discharge. Follow up with the heart failure clinic within 1 week of discharge. Dr. Degroot on 11/20/17 at 2:00pm  Take your medications as prescribed. You had some kidney damage that has been improving  Follow up with Nephrologist in 1-2 weeks  seek medical attention if you become short of breath or swelling You were treated for a Urinary Tract Infection  Seek medical attention for fevers, painful urination, blood in your urine or lower back pain that may be another sign of infection

## 2017-11-02 NOTE — PROGRESS NOTE ADULT - ATTENDING COMMENTS
63 y/o female w/ h/o Afib on Eliquis, DM II, HTN, ?COPD on home O2 2L, lymphoma/CLL s/p radiation to neck and chemo (1997), HFpEF LVEF 66% presented yesterday with SOB with decline over past several weeks-months with volume retention that was becoming resistant to home regimen of diuretics. On admission, normotensive, volume overloaded, BNP elevated. Started on lasix gtt with good urine output. TTE shows significant RV dysfunction, mod-severe TR (new), RVSP of 60s (read as 80s but overread) with dilated IVC with severe diastolic dysfunction but normal EF which was changed from her prior TTE in 7/17. EKG afib w/o ST changes. On exam, significant volume overload with JVD to 15 cm, RV heave, irreg irreg rhythm, crackles at bases with wheezing, distended abdomen, 3+ pitting edema b/l, warm to palpation. Currently, acute on chronic diastolic HF with volume overload, class IIIb symptoms.   - increase lasix gtt to 7.5 mg/hr; give 40 mg IV lasix x1 prior to change  - lytes twice daily; replete K>4, Mg>2  - standing weights daily  - check LE dopplers bilaterally; D dimer elevated which is not unusual in this setting  - check TSH  - afib with RVR; continue metoprolol tartrate 12.5 mg q8h

## 2017-11-02 NOTE — PROGRESS NOTE ADULT - PROBLEM SELECTOR PLAN 2
Patient with Afib 80-120s. Rates improved to 80s-90s this am.   - c/w metoprolol 12.5 mg PO TID  - Continuous tele monitoring  - Monitor BMP Q12 and replete Mg to 2.0, K to 4.0 (q12 labs while on gtt) Patient with Afib 80-120s. Rates improved to 80s-90s this am.   AFib, rates 80s-120 on tele. Improved to 80s-90s this am  - c/w metoprolol 12.5 mg PO TID  - Continuous tele monitoring  - Monitor BMP Q12 and replete Mg to 2.0, K to 4.0 (q12 labs while on gtt) AFib, rates 80s-120 on tele. Improved to 80s-90s this am  - c/w metoprolol 12.5 mg PO TID  - Continuous tele monitoring  - Monitor BMP Q12 and replete Mg to 2.0, K to 4.0 (q12 labs while on gtt)

## 2017-11-02 NOTE — PROGRESS NOTE ADULT - PROBLEM SELECTOR PLAN 1
sent from PMD due to fluid overload, pt stopped lasix and other HTN meds due to hypotension last week. CXR clear. Low likelihood for PE, negative D-dimer if age-adjusted.  - c/w lasix drip 5mg/hr  - c/w telemetry   - Daily weight monitoring  - Strict I/Os  - Echo showed severe pulmonary hypertension, EF 66%.  - may need right heart cath eventually. For now will continue diuresing to dry weight and monitor  - f/u heart failure recs fluid overload 2/2 stopping lasix w/ severe diastolic HF. Low likelihood for PE, negative D-dimer if age-adjusted. Improved edema  - c/w lasix drip 5mg/hr  - c/w telemetry   - Daily weight monitoring  - Strict I/Os; net negative 6.3 L during stay  - Echo showed severe pulmonary hypertension, EF 66%.  - may need right heart cath eventually. For now will continue diuresing to dry weight and monitor  - f/u heart failure recs fluid overload 2/2 stopping lasix w/ severe diastolic HF. Low likelihood for PE, negative D-dimer if age-adjusted. Improved edema  - c/w lasix drip 5mg/hr  - c/w telemetry   - Daily weight monitoring  - Strict I/Os; net negative 6.3 L during stay  - Echo showed severe pulmonary hypertension, EF 66%.  - may need right heart cath eventually. For now will continue diuresing to dry weight and monitor  - f/u heart failure recs; ACEI vs afterload reduction w/ hydralazine+imdur fluid overload 2/2 stopping lasix w/ severe diastolic HF. Low likelihood for PE, negative D-dimer if age-adjusted. Improved edema  - increasing lasix drip to 7.5 mg/hr, w/ lasix 40 mg IV push per heart failure recs  - c/w telemetry  - Daily weight monitoring  - Strict I/Os; net negative 6.3 L during stay  - Echo showed severe pulmonary hypertension, EF 66%.  - may need right heart cath eventually. For now will continue diuresing to dry weight and monitor. May eventually start aldactone for HFpEF  - f/u heart failure recs

## 2017-11-02 NOTE — DISCHARGE NOTE ADULT - CARE PROVIDER_API CALL
follow up,   Dr. Degroot on 11/20/17 at 2:00pm  Phone: (   )    -  Fax: (   )    -    [facility] Internal Medicine; Nephrology  [address] [place] [id number]  Phone: [phone]  Fax: [phone]

## 2017-11-02 NOTE — DISCHARGE NOTE ADULT - CONDITIONS AT DISCHARGE
Pt. remained stable. pt. CHAVEZ and is going home on oxygen. Pt. is discharged to home as per orders. D/c tele and IV as per orders.

## 2017-11-03 DIAGNOSIS — I50.9 HEART FAILURE, UNSPECIFIED: ICD-10-CM

## 2017-11-03 LAB
BUN SERPL-MCNC: 32 MG/DL — HIGH (ref 7–23)
BUN SERPL-MCNC: 35 MG/DL — HIGH (ref 7–23)
CALCIUM SERPL-MCNC: 8.6 MG/DL — SIGNIFICANT CHANGE UP (ref 8.4–10.5)
CALCIUM SERPL-MCNC: 8.7 MG/DL — SIGNIFICANT CHANGE UP (ref 8.4–10.5)
CHLORIDE SERPL-SCNC: 102 MMOL/L — SIGNIFICANT CHANGE UP (ref 98–107)
CHLORIDE SERPL-SCNC: 105 MMOL/L — SIGNIFICANT CHANGE UP (ref 98–107)
CO2 SERPL-SCNC: 30 MMOL/L — SIGNIFICANT CHANGE UP (ref 22–31)
CO2 SERPL-SCNC: 32 MMOL/L — HIGH (ref 22–31)
CREAT SERPL-MCNC: 1.24 MG/DL — SIGNIFICANT CHANGE UP (ref 0.5–1.3)
CREAT SERPL-MCNC: 1.27 MG/DL — SIGNIFICANT CHANGE UP (ref 0.5–1.3)
FERRITIN SERPL-MCNC: 87.97 NG/ML — SIGNIFICANT CHANGE UP (ref 15–150)
FOLATE SERPL-MCNC: 13.3 NG/ML — SIGNIFICANT CHANGE UP (ref 4.7–20)
GLUCOSE BLDC GLUCOMTR-MCNC: 133 MG/DL — HIGH (ref 70–99)
GLUCOSE BLDC GLUCOMTR-MCNC: 138 MG/DL — HIGH (ref 70–99)
GLUCOSE BLDC GLUCOMTR-MCNC: 171 MG/DL — HIGH (ref 70–99)
GLUCOSE BLDC GLUCOMTR-MCNC: 182 MG/DL — HIGH (ref 70–99)
GLUCOSE SERPL-MCNC: 119 MG/DL — HIGH (ref 70–99)
GLUCOSE SERPL-MCNC: 201 MG/DL — HIGH (ref 70–99)
HCT VFR BLD CALC: 34.8 % — SIGNIFICANT CHANGE UP (ref 34.5–45)
HGB BLD-MCNC: 10.6 G/DL — LOW (ref 11.5–15.5)
IRON SATN MFR SERPL: 273 UG/DL — SIGNIFICANT CHANGE UP (ref 140–530)
IRON SATN MFR SERPL: 48 UG/DL — SIGNIFICANT CHANGE UP (ref 30–160)
MAGNESIUM SERPL-MCNC: 1.7 MG/DL — SIGNIFICANT CHANGE UP (ref 1.6–2.6)
MAGNESIUM SERPL-MCNC: 2 MG/DL — SIGNIFICANT CHANGE UP (ref 1.6–2.6)
MCHC RBC-ENTMCNC: 20.9 PG — LOW (ref 27–34)
MCHC RBC-ENTMCNC: 30.5 % — LOW (ref 32–36)
MCV RBC AUTO: 68.8 FL — LOW (ref 80–100)
NRBC # FLD: 0.03 — SIGNIFICANT CHANGE UP
PHOSPHATE SERPL-MCNC: 2 MG/DL — LOW (ref 2.5–4.5)
PHOSPHATE SERPL-MCNC: 3.1 MG/DL — SIGNIFICANT CHANGE UP (ref 2.5–4.5)
PLATELET # BLD AUTO: 173 K/UL — SIGNIFICANT CHANGE UP (ref 150–400)
PMV BLD: SIGNIFICANT CHANGE UP FL (ref 7–13)
POTASSIUM SERPL-MCNC: 3.8 MMOL/L — SIGNIFICANT CHANGE UP (ref 3.5–5.3)
POTASSIUM SERPL-MCNC: 3.9 MMOL/L — SIGNIFICANT CHANGE UP (ref 3.5–5.3)
POTASSIUM SERPL-SCNC: 3.8 MMOL/L — SIGNIFICANT CHANGE UP (ref 3.5–5.3)
POTASSIUM SERPL-SCNC: 3.9 MMOL/L — SIGNIFICANT CHANGE UP (ref 3.5–5.3)
RBC # BLD: 5.06 M/UL — SIGNIFICANT CHANGE UP (ref 3.8–5.2)
RBC # FLD: 19.8 % — HIGH (ref 10.3–14.5)
SODIUM SERPL-SCNC: 144 MMOL/L — SIGNIFICANT CHANGE UP (ref 135–145)
SODIUM SERPL-SCNC: 146 MMOL/L — HIGH (ref 135–145)
TRANSFERRIN SERPL-MCNC: 223 MG/DL — SIGNIFICANT CHANGE UP (ref 200–360)
UIBC SERPL-MCNC: 225 UG/DL — SIGNIFICANT CHANGE UP (ref 110–370)
VIT B12 SERPL-MCNC: 784 PG/ML — SIGNIFICANT CHANGE UP (ref 200–900)
WBC # BLD: 5.38 K/UL — SIGNIFICANT CHANGE UP (ref 3.8–10.5)
WBC # FLD AUTO: 5.38 K/UL — SIGNIFICANT CHANGE UP (ref 3.8–10.5)

## 2017-11-03 PROCEDURE — 99233 SBSQ HOSP IP/OBS HIGH 50: CPT

## 2017-11-03 PROCEDURE — 71275 CT ANGIOGRAPHY CHEST: CPT | Mod: 26

## 2017-11-03 PROCEDURE — 93970 EXTREMITY STUDY: CPT | Mod: 26

## 2017-11-03 RX ORDER — CEFTRIAXONE 500 MG/1
INJECTION, POWDER, FOR SOLUTION INTRAMUSCULAR; INTRAVENOUS
Qty: 0 | Refills: 0 | Status: DISCONTINUED | OUTPATIENT
Start: 2017-11-03 | End: 2017-11-10

## 2017-11-03 RX ORDER — SODIUM,POTASSIUM PHOSPHATES 278-250MG
1 POWDER IN PACKET (EA) ORAL EVERY 4 HOURS
Qty: 0 | Refills: 0 | Status: COMPLETED | OUTPATIENT
Start: 2017-11-03 | End: 2017-11-03

## 2017-11-03 RX ORDER — PHENAZOPYRIDINE HCL 100 MG
200 TABLET ORAL
Qty: 0 | Refills: 0 | Status: DISCONTINUED | OUTPATIENT
Start: 2017-11-03 | End: 2017-11-09

## 2017-11-03 RX ORDER — POTASSIUM CHLORIDE 20 MEQ
40 PACKET (EA) ORAL ONCE
Qty: 0 | Refills: 0 | Status: DISCONTINUED | OUTPATIENT
Start: 2017-11-03 | End: 2017-11-03

## 2017-11-03 RX ORDER — CEFTRIAXONE 500 MG/1
1 INJECTION, POWDER, FOR SOLUTION INTRAMUSCULAR; INTRAVENOUS ONCE
Qty: 0 | Refills: 0 | Status: COMPLETED | OUTPATIENT
Start: 2017-11-03 | End: 2017-11-03

## 2017-11-03 RX ORDER — POTASSIUM CHLORIDE 20 MEQ
40 PACKET (EA) ORAL EVERY 4 HOURS
Qty: 0 | Refills: 0 | Status: COMPLETED | OUTPATIENT
Start: 2017-11-03 | End: 2017-11-03

## 2017-11-03 RX ORDER — MAGNESIUM SULFATE 500 MG/ML
2 VIAL (ML) INJECTION ONCE
Qty: 0 | Refills: 0 | Status: COMPLETED | OUTPATIENT
Start: 2017-11-03 | End: 2017-11-03

## 2017-11-03 RX ORDER — FUROSEMIDE 40 MG
40 TABLET ORAL ONCE
Qty: 0 | Refills: 0 | Status: COMPLETED | OUTPATIENT
Start: 2017-11-03 | End: 2017-11-03

## 2017-11-03 RX ORDER — CEFTRIAXONE 500 MG/1
1 INJECTION, POWDER, FOR SOLUTION INTRAMUSCULAR; INTRAVENOUS EVERY 24 HOURS
Qty: 0 | Refills: 0 | Status: DISCONTINUED | OUTPATIENT
Start: 2017-11-04 | End: 2017-11-10

## 2017-11-03 RX ORDER — METOPROLOL TARTRATE 50 MG
25 TABLET ORAL THREE TIMES A DAY
Qty: 0 | Refills: 0 | Status: DISCONTINUED | OUTPATIENT
Start: 2017-11-03 | End: 2017-11-06

## 2017-11-03 RX ORDER — POTASSIUM CHLORIDE 20 MEQ
40 PACKET (EA) ORAL ONCE
Qty: 0 | Refills: 0 | Status: COMPLETED | OUTPATIENT
Start: 2017-11-03 | End: 2017-11-03

## 2017-11-03 RX ADMIN — Medication 1 TABLET(S): at 23:01

## 2017-11-03 RX ADMIN — Medication 40 MILLIEQUIVALENT(S): at 18:11

## 2017-11-03 RX ADMIN — Medication 200 MILLIGRAM(S): at 13:09

## 2017-11-03 RX ADMIN — Medication 1 TABLET(S): at 18:11

## 2017-11-03 RX ADMIN — BUDESONIDE AND FORMOTEROL FUMARATE DIHYDRATE 2 PUFF(S): 160; 4.5 AEROSOL RESPIRATORY (INHALATION) at 09:58

## 2017-11-03 RX ADMIN — Medication 40 MILLIEQUIVALENT(S): at 09:57

## 2017-11-03 RX ADMIN — Medication 200 MILLIGRAM(S): at 18:12

## 2017-11-03 RX ADMIN — Medication 25 MILLIGRAM(S): at 23:02

## 2017-11-03 RX ADMIN — Medication 25 MILLIGRAM(S): at 13:10

## 2017-11-03 RX ADMIN — Medication 25 MILLIGRAM(S): at 06:03

## 2017-11-03 RX ADMIN — Medication 100 MILLIGRAM(S): at 09:55

## 2017-11-03 RX ADMIN — APIXABAN 5 MILLIGRAM(S): 2.5 TABLET, FILM COATED ORAL at 18:11

## 2017-11-03 RX ADMIN — PANTOPRAZOLE SODIUM 40 MILLIGRAM(S): 20 TABLET, DELAYED RELEASE ORAL at 06:03

## 2017-11-03 RX ADMIN — CHLORHEXIDINE GLUCONATE 1 APPLICATION(S): 213 SOLUTION TOPICAL at 13:04

## 2017-11-03 RX ADMIN — Medication 100 MILLIGRAM(S): at 13:10

## 2017-11-03 RX ADMIN — Medication 40 MILLIEQUIVALENT(S): at 23:01

## 2017-11-03 RX ADMIN — Medication 1: at 16:23

## 2017-11-03 RX ADMIN — APIXABAN 5 MILLIGRAM(S): 2.5 TABLET, FILM COATED ORAL at 06:03

## 2017-11-03 RX ADMIN — BUDESONIDE AND FORMOTEROL FUMARATE DIHYDRATE 2 PUFF(S): 160; 4.5 AEROSOL RESPIRATORY (INHALATION) at 21:34

## 2017-11-03 RX ADMIN — Medication 40 MILLIGRAM(S): at 16:25

## 2017-11-03 RX ADMIN — Medication 100 MILLIGRAM(S): at 18:11

## 2017-11-03 RX ADMIN — Medication 50 GRAM(S): at 09:56

## 2017-11-03 RX ADMIN — CEFTRIAXONE 100 GRAM(S): 500 INJECTION, POWDER, FOR SOLUTION INTRAMUSCULAR; INTRAVENOUS at 16:22

## 2017-11-03 NOTE — PROGRESS NOTE ADULT - SUBJECTIVE AND OBJECTIVE BOX
PULM/MED PROGRESS NOTE - IN CCU:  awake, alert comfortable.  On nasal O2      MEDICATIONS  (STANDING):  allopurinol 100 milliGRAM(s) Oral three times a day after meals  apixaban 5 milliGRAM(s) Oral every 12 hours  buDESOnide  80 MICROgram(s)/formoterol 4.5 MICROgram(s) Inhaler 2 Puff(s) Inhalation two times a day  chlorhexidine 4% Liquid 1 Application(s) Topical daily  dextrose 5%. 1000 milliLiter(s) (50 mL/Hr) IV Continuous <Continuous>  dextrose 50% Injectable 12.5 Gram(s) IV Push once  dextrose 50% Injectable 25 Gram(s) IV Push once  dextrose 50% Injectable 25 Gram(s) IV Push once  furosemide Infusion 7.5 mG/Hr (3.75 mL/Hr) IV Continuous <Continuous>  influenza   Vaccine 0.5 milliLiter(s) IntraMuscular once  insulin lispro (HumaLOG) corrective regimen sliding scale   SubCutaneous three times a day before meals  insulin lispro (HumaLOG) corrective regimen sliding scale   SubCutaneous at bedtime  magnesium sulfate  IVPB 2 Gram(s) IV Intermittent once  metoprolol     tartrate 25 milliGRAM(s) Oral two times a day  pantoprazole    Tablet 40 milliGRAM(s) Oral before breakfast  potassium chloride    Tablet ER 40 milliEquivalent(s) Oral once      MEDICATIONS  (PRN):  dextrose Gel 1 Dose(s) Oral once PRN Blood Glucose LESS THAN 70 milliGRAM(s)/deciliter  glucagon  Injectable 1 milliGRAM(s) IntraMuscular once PRN Glucose LESS THAN 70 milligrams/deciliter          Vital Signs Last 24 Hrs  T(C): 36.6 (03 Nov 2017 07:37), Max: 36.9 (03 Nov 2017 00:00)  T(F): 97.9 (03 Nov 2017 07:37), Max: 98.4 (03 Nov 2017 00:00)  HR: 82 (03 Nov 2017 07:37) (82 - 138)  BP: 148/85 (03 Nov 2017 07:00) (103/86 - 162/71)  BP(mean): 98 (03 Nov 2017 07:00) (65 - 101)  RR: 26 (03 Nov 2017 07:00) (19 - 31)  SpO2: 99% (03 Nov 2017 07:37) (72% - 100%)    PHYSICAL EXAMINATION:    GENERAL: The patient is awake and alert in bed on nasal O2    NECK: Supple.    LUNGS: Bilateral breath sounds;  respirations unlabored    HEART: Irreg irregular    ABDOMEN: Soft, abdominal wall edema present.     EXTREMITIES: 2 -3+lower extremity edema.      LABS:                        10.6   5.38  )-----------( 173      ( 03 Nov 2017 05:30 )             34.8     11-03    146<H>  |  105  |  35<H>  ----------------------------<  119<H>  3.8   |  30  |  1.24    Ca    8.7      03 Nov 2017 05:30  Phos  3.1     11-03  Mg     1.7     11-03    TPro  6.1  /  Alb  3.4  /  TBili  0.8  /  DBili  x   /  AST  16  /  ALT  18  /  AlkPhos  66  11-02

## 2017-11-03 NOTE — PROGRESS NOTE ADULT - SUBJECTIVE AND OBJECTIVE BOX
Patient is a 62y old  Female who presents with a chief complaint of LE edema and Abd edema, SOB (02 Nov 2017 11:25)      SUBJECTIVE / OVERNIGHT EVENTS:  SOB +, Edema improving  Review of Systems:   CONSTITUTIONAL: No fever, weight loss, or fatigue  EYES: No eye pain, visual disturbances, or discharge  ENMT:  No difficulty hearing, tinnitus, vertigo; No sinus or throat pain  NECK: No pain or stiffness  BREASTS: No pain, masses, or nipple discharge  RESPIRATORY: No cough, wheezing, chills or hemoptysis;   CARDIOVASCULAR: No chest pain, palpitations, dizziness, or leg swelling  GASTROINTESTINAL: No abdominal or epigastric pain. No nausea, vomiting, or hematemesis; No diarrhea or constipation. No melena or hematochezia.  GENITOURINARY: No dysuria, frequency, hematuria, or incontinence  NEUROLOGICAL: No headaches, memory loss, loss of strength, numbness, or tremors  SKIN: No itching, burning, rashes, or lesions   LYMPH NODES: No enlarged glands  ENDOCRINE: No heat or cold intolerance; No hair loss  MUSCULOSKELETAL: No joint pain or swelling; No muscle, back, or extremity pain  PSYCHIATRIC: No depression, anxiety, mood swings, or difficulty sleeping  HEME/LYMPH: No easy bruising, or bleeding gums  ALLERY AND IMMUNOLOGIC: No hives or eczema    MEDICATIONS  (STANDING):  allopurinol 100 milliGRAM(s) Oral three times a day after meals  apixaban 5 milliGRAM(s) Oral every 12 hours  buDESOnide  80 MICROgram(s)/formoterol 4.5 MICROgram(s) Inhaler 2 Puff(s) Inhalation two times a day  cefTRIAXone   IVPB      chlorhexidine 4% Liquid 1 Application(s) Topical daily  dextrose 5%. 1000 milliLiter(s) (50 mL/Hr) IV Continuous <Continuous>  dextrose 50% Injectable 12.5 Gram(s) IV Push once  dextrose 50% Injectable 25 Gram(s) IV Push once  dextrose 50% Injectable 25 Gram(s) IV Push once  furosemide Infusion 7.5 mG/Hr (3.75 mL/Hr) IV Continuous <Continuous>  influenza   Vaccine 0.5 milliLiter(s) IntraMuscular once  insulin lispro (HumaLOG) corrective regimen sliding scale   SubCutaneous three times a day before meals  insulin lispro (HumaLOG) corrective regimen sliding scale   SubCutaneous at bedtime  metoprolol     tartrate 25 milliGRAM(s) Oral three times a day  pantoprazole    Tablet 40 milliGRAM(s) Oral before breakfast  phenazopyridine 200 milliGRAM(s) Oral three times a day after meals  potassium chloride    Tablet ER 40 milliEquivalent(s) Oral every 4 hours    MEDICATIONS  (PRN):  dextrose Gel 1 Dose(s) Oral once PRN Blood Glucose LESS THAN 70 milliGRAM(s)/deciliter  glucagon  Injectable 1 milliGRAM(s) IntraMuscular once PRN Glucose LESS THAN 70 milligrams/deciliter      PHYSICAL EXAM:  Vital Signs Last 24 Hrs  T(C): 36.7 (03 Nov 2017 16:11), Max: 36.9 (03 Nov 2017 00:00)  T(F): 98 (03 Nov 2017 16:11), Max: 98.4 (03 Nov 2017 00:00)  HR: 121 (03 Nov 2017 16:11) (82 - 132)  BP: 124/74 (03 Nov 2017 16:00) (107/73 - 162/71)  BP(mean): 81 (03 Nov 2017 15:00) (68 - 104)  RR: 27 (03 Nov 2017 16:00) (18 - 28)  SpO2: 97% (03 Nov 2017 15:00) (72% - 100%)  I&O's Summary    02 Nov 2017 07:01  -  03 Nov 2017 07:00  --------------------------------------------------------  IN: 361.5 mL / OUT: 3070 mL / NET: -2708.5 mL    03 Nov 2017 07:01  -  03 Nov 2017 17:50  --------------------------------------------------------  IN: 341.4 mL / OUT: 1745 mL / NET: -1403.6 mL      GENERAL: NAD, well-developed  HEAD:  Atraumatic, Normocephalic  EYES: EOMI, PERRLA, conjunctiva and sclera clear  NECK: Supple, No JVD  CHEST/LUNG: Clear to auscultation bilaterally; No wheeze  HEART: Regular rate and rhythm; No murmurs, rubs, or gallops  ABDOMEN: Soft, Nontender, Nondistended; Bowel sounds present  EXTREMITIES:  2+ Peripheral Pulses, No clubbing, cyanosis, or edema  PSYCH: AAOx3  NEUROLOGY: non-focal  SKIN: No rashes or lesions    LABS:  CAPILLARY BLOOD GLUCOSE  133 (03 Nov 2017 08:00)  170 (02 Nov 2017 22:00)      POCT Blood Glucose.: 182 mg/dL (03 Nov 2017 16:15)  POCT Blood Glucose.: 138 mg/dL (03 Nov 2017 12:25)  POCT Blood Glucose.: 133 mg/dL (03 Nov 2017 08:36)  POCT Blood Glucose.: 170 mg/dL (02 Nov 2017 22:00)                          10.6   5.38  )-----------( 173      ( 03 Nov 2017 05:30 )             34.8     11-03    144  |  102  |  32<H>  ----------------------------<  201<H>  3.9   |  32<H>  |  1.27    Ca    8.6      03 Nov 2017 16:20  Phos  2.0     11-03  Mg     2.0     11-03    TPro  6.1  /  Alb  3.4  /  TBili  0.8  /  DBili  x   /  AST  16  /  ALT  18  /  AlkPhos  66  11-02              RADIOLOGY & ADDITIONAL TESTS:    Imaging Personally Reviewed:    Consultant(s) Notes Reviewed:      Care Discussed with Consultants/Other Providers:

## 2017-11-03 NOTE — PROGRESS NOTE ADULT - SUBJECTIVE AND OBJECTIVE BOX
Nephrology Progress Note    Chart reviewed  No acute events overnight  Patient looks comfortable and not in distress  Improved clinically,   improved SOB and can easier move around  Still significant peripheral edema    UOP excellent, 2.7 litters negative  BP improved and HR better    MEDICATIONS  (STANDING):  allopurinol 100 milliGRAM(s) Oral three times a day after meals  apixaban 5 milliGRAM(s) Oral every 12 hours  buDESOnide  80 MICROgram(s)/formoterol 4.5 MICROgram(s) Inhaler 2 Puff(s) Inhalation two times a day  chlorhexidine 4% Liquid 1 Application(s) Topical daily  dextrose 5%. 1000 milliLiter(s) (50 mL/Hr) IV Continuous <Continuous>  dextrose 50% Injectable 12.5 Gram(s) IV Push once  dextrose 50% Injectable 25 Gram(s) IV Push once  dextrose 50% Injectable 25 Gram(s) IV Push once  furosemide Infusion 5 mG/Hr (2.5 mL/Hr) IV Continuous <Continuous>  influenza   Vaccine 0.5 milliLiter(s) IntraMuscular once  insulin lispro (HumaLOG) corrective regimen sliding scale   SubCutaneous three times a day before meals  insulin lispro (HumaLOG) corrective regimen sliding scale   SubCutaneous at bedtime  metoprolol     tartrate 12.5 milliGRAM(s) Oral every 8 hours  pantoprazole    Tablet 40 milliGRAM(s) Oral before breakfast      VITAL:  T(C): , Max: 36.8 (10-31-17 @ 16:05)  T(F): , Max: 98.3 (10-31-17 @ 16:05)  HR: 94 (17 @ 12:00)  BP: 111/91 (17 @ 12:00)  BP(mean): 96 (17 @ 12:00)  RR: 21 (17 @ 12:00)  SpO2: 80% (17 @ 12:00)  Wt(kg): --    I and O's:    10-31 @ 07:01  -   @ 07:00  --------------------------------------------------------  IN: 1030 mL / OUT: 3250 mL / NET: -2220 mL     @ 07:01   @ 12:21  --------------------------------------------------------  IN: 367.5 mL / OUT: 725 mL / NET: -357.5 mL          PHYSICAL EXAM:    Constitutional: NAD, awake and alert, coherent, comprehensible  HEENT: PERRLA, EOMI, clear conj, anicteric sclera    Neck:  +  JVD, supple  Respiratory: CTAB/L  Cardiovascular: S1 and S2 normal, no murmur appreciated  Gastrointestinal: BS+, soft, NT/ND no peritoneal signs  Extremities: + 2  peripheral edema, well perfused otherwise  Neurological: A/O x 3, no focal deficits  Psychiatric: Normal mood, normal affect  : +  Corey  Skin: No rashes  Access: Not applicable    LABS:                        11.0   5.82  )-----------( 189      ( 2017 05:30 )             37.1         144  |  106  |  43<H>  ----------------------------<  125<H>  4.0   |  25  |  1.36<H>    Ca    8.5      2017 05:30  Phos  3.8       Mg     2.1         TPro  6.3  /  Alb  3.3  /  TBili  0.8  /  DBili  x   /  AST  21  /  ALT  18  /  AlkPhos  69            Urine Studies:  Urinalysis Basic - ( 30 Oct 2017 17:45 )    Color: YELLOW / Appearance: CLEAR / S.021 / pH: 6.0  Gluc: NEGATIVE / Ketone: NEGATIVE  / Bili: NEGATIVE / Urobili: NORMAL E.U.   Blood: NEGATIVE / Protein: 300 / Nitrite: NEGATIVE   Leuk Esterase: NEGATIVE / RBC: 0-2 / WBC 2-5   Sq Epi: OCC / Non Sq Epi: x / Bacteria: x      Creatinine, Random Urine: 21.98 mg/dL (10-31 @ 15:59)  Protein, Random Urine: 12.2 mg/dL (10-31 @ 15:59)      ASSESSMENT: A 62-year-old female with past medical history of chronic lymphocytic leukemia, atrial fibrillation, diabetes, and hypertension, who presents to the hospital for evaluation of shortness of breath and dyspnea on exertion.      1. Diastolic CHF exacerbation with significant fluid overload  2. Acute kidney injury- CRS- Cr fluctuating- stable last few days  3. Mild Hypernatremia- likely over diuresing high rate of UOP  4. Metabolic alkalosis with bicarbs of 32   5. Anemia likely of chronic disease: iron stores, B12 an folate levels acceptable  6. A-fib with RVR- cardiology on board    PLAN  - continue lasix drip at 7.5 mg/hour as per cardiology/HF service  - keep 2 liters negative to prevent any intravascular volume depletion  - Continue to monitor kidney function and electrolytes  - Monitor and support hemodynamics keep MAP>70  - avoid nephrotoxins and dose meds per GFR  - continue other management per primary team      We will follow with you    Garry Funes M.D

## 2017-11-03 NOTE — PROGRESS NOTE ADULT - PROBLEM SELECTOR PLAN 1
-Diuresing well -2708 ml in 24 hours, but still grossly hypervolemic.   -Continue Lasix gtt to 7.5 mg/hr.   -Renal function improving, Cr 1.24.  -Lopressor increased to 25 mg po TID, continue. Hr should improve as she becomes euvolemic.   -TTE shows severe pulm HTN, RV pressure overload and mod-severe TR, all likely from severe diastolic dysfunction.  -CTA to r/o PE as d/w CCU team.

## 2017-11-03 NOTE — PROGRESS NOTE ADULT - PROBLEM SELECTOR PLAN 3
controlled. Lowest BP 92/54.   - holding antihypertensives for low BP controlled. Lowest /56.   - holding antihypertensives for low BP

## 2017-11-03 NOTE — PROGRESS NOTE ADULT - PROBLEM SELECTOR PLAN 5
Patient with h/o COPD, currently on home oxygen 2L  - on 2L NC, maintain goal SpO2 > 92%.   - c/w Advair PRN   - continuous O2 sat monitoring

## 2017-11-03 NOTE — PROGRESS NOTE ADULT - ASSESSMENT
63 y/o female w/ PMHX  of Afib on Eliquis, DM II,  HTN, COPD on home O2 2L, lymphoma/CLL s/p radiation and chemo (1997), HFpEF LVEF 66% comes in with complaints of worsening SOB and LE edema. She states she recently had a fall at work, hit her head and was sent to the ER where she was found to be hypotensive. Since then some of her HF medications had been held. CT head was unremarkable per pt. She then took her self to Dr. Goldberg (PMD) for a follow up yesterday, and she was found to be in ADHF and was sent to [facility] ED.     Diuresing well, but still grossly hypervolemic.

## 2017-11-03 NOTE — PROGRESS NOTE ADULT - ATTENDING COMMENTS
Patient seen and examined. HFpEF  Continue with aggressive diuresis  Ceftriaxone x 5 days  Increase metoprolol 25 mg TID

## 2017-11-03 NOTE — PROGRESS NOTE ADULT - SUBJECTIVE AND OBJECTIVE BOX
Patient is a 62y old  Female who presents with a chief complaint of LE edema and Abd edema, SOB (02 Nov 2017 11:25)      SUBJECTIVE / OVERNIGHT EVENTS:    MEDICATIONS  (STANDING):  allopurinol 100 milliGRAM(s) Oral three times a day after meals  apixaban 5 milliGRAM(s) Oral every 12 hours  buDESOnide  80 MICROgram(s)/formoterol 4.5 MICROgram(s) Inhaler 2 Puff(s) Inhalation two times a day  chlorhexidine 4% Liquid 1 Application(s) Topical daily  dextrose 5%. 1000 milliLiter(s) (50 mL/Hr) IV Continuous <Continuous>  dextrose 50% Injectable 12.5 Gram(s) IV Push once  dextrose 50% Injectable 25 Gram(s) IV Push once  dextrose 50% Injectable 25 Gram(s) IV Push once  furosemide Infusion 7.5 mG/Hr (3.75 mL/Hr) IV Continuous <Continuous>  influenza   Vaccine 0.5 milliLiter(s) IntraMuscular once  insulin lispro (HumaLOG) corrective regimen sliding scale   SubCutaneous three times a day before meals  insulin lispro (HumaLOG) corrective regimen sliding scale   SubCutaneous at bedtime  magnesium sulfate  IVPB 2 Gram(s) IV Intermittent once  metoprolol     tartrate 25 milliGRAM(s) Oral two times a day  pantoprazole    Tablet 40 milliGRAM(s) Oral before breakfast  potassium chloride    Tablet ER 40 milliEquivalent(s) Oral once    MEDICATIONS  (PRN):  dextrose Gel 1 Dose(s) Oral once PRN Blood Glucose LESS THAN 70 milliGRAM(s)/deciliter  glucagon  Injectable 1 milliGRAM(s) IntraMuscular once PRN Glucose LESS THAN 70 milligrams/deciliter      Vital Signs Last 24 Hrs  T(C): 36.6 (03 Nov 2017 07:37), Max: 36.9 (03 Nov 2017 00:00)  T(F): 97.9 (03 Nov 2017 07:37), Max: 98.4 (03 Nov 2017 00:00)  HR: 82 (03 Nov 2017 07:37) (82 - 138)  BP: 148/85 (03 Nov 2017 07:00) (103/86 - 162/71)  BP(mean): 98 (03 Nov 2017 07:00) (65 - 101)  RR: 26 (03 Nov 2017 07:00) (19 - 31)  SpO2: 99% (03 Nov 2017 07:37) (72% - 100%)    CAPILLARY BLOOD GLUCOSE  170 (02 Nov 2017 22:00)  148 (02 Nov 2017 17:00)      POCT Blood Glucose.: 133 mg/dL (03 Nov 2017 08:36)  POCT Blood Glucose.: 170 mg/dL (02 Nov 2017 22:00)  POCT Blood Glucose.: 148 mg/dL (02 Nov 2017 17:29)  POCT Blood Glucose.: 206 mg/dL (02 Nov 2017 12:20)  POCT Blood Glucose.: 219 mg/dL (02 Nov 2017 12:05)      I&O's Summary    02 Nov 2017 07:01  -  03 Nov 2017 07:00  --------------------------------------------------------  IN: 361.5 mL / OUT: 3070 mL / NET: -2708.5 mL    03 Nov 2017 07:01  -  03 Nov 2017 09:04  --------------------------------------------------------  IN: 3.8 mL / OUT: 0 mL / NET: 3.8 mL          PHYSICAL EXAM:  GENERAL: NAD, well-developed  HEAD:  NC, AT  EYES: EOMI, PERRL, conjunctiva and sclera clear  ENMT: Airway patent. MMM.  NECK: Supple, No JVD  HEART: RRR; Normal S1, S2. No murmurs, rubs, or gallops  CHEST/LUNG: CTABL; No wheezing, rhonchi, or rales  ABDOMEN: +BS; Soft, nondistended, nontender  EXTREMITIES:  2+ Peripheral Pulses, No clubbing, cyanosis, or edema  PSYCH: AAOx3  NEUROLOGY: non-focal  SKIN: Warm, dry, intact; No rashes or lesions      LABS:                        10.6   5.38  )-----------( 173      ( 03 Nov 2017 05:30 )             34.8     11-03    146<H>  |  105  |  35<H>  ----------------------------<  119<H>  3.8   |  30  |  1.24    Ca    8.7      03 Nov 2017 05:30  Phos  3.1     11-03  Mg     1.7     11-03    TPro  6.1  /  Alb  3.4  /  TBili  0.8  /  DBili  x   /  AST  16  /  ALT  18  /  AlkPhos  66  11-02    LIVER FUNCTIONS - ( 02 Nov 2017 04:26 )  Alb: 3.4 g/dL / Pro: 6.1 g/dL / ALK PHOS: 66 u/L / ALT: 18 u/L / AST: 16 u/L / GGT: x                       RADIOLOGY & ADDITIONAL TESTS:    Imaging Personally Reviewed:     Consultant(s) Notes Reviewed:     Care Discussed with Consultants/Other Providers:     Desmond Romeo MD  PGY-1 | Internal Medicine  [phone] / [phone] Patient is a 62y old  Female who presents with a chief complaint of LE edema and Abd edema, SOB (02 Nov 2017 11:25)      SUBJECTIVE / OVERNIGHT EVENTS: Pt was tachycardic last night to 140s. Metoprolol increased to 25 mg with improvement in HR. Pt complains of general malaise. She felt her dawson pulling yesterday which resolved on repositioning. Pt SOB on exertion. Otherwise denies CP, SOB, orthopnea, fever, chills, n/v, abd pain, diarrhea.     Telemetry: Afib rate in 80s-110s, 8 beat NSVT, PVCs    MEDICATIONS  (STANDING):  allopurinol 100 milliGRAM(s) Oral three times a day after meals  apixaban 5 milliGRAM(s) Oral every 12 hours  buDESOnide  80 MICROgram(s)/formoterol 4.5 MICROgram(s) Inhaler 2 Puff(s) Inhalation two times a day  chlorhexidine 4% Liquid 1 Application(s) Topical daily  dextrose 5%. 1000 milliLiter(s) (50 mL/Hr) IV Continuous <Continuous>  dextrose 50% Injectable 12.5 Gram(s) IV Push once  dextrose 50% Injectable 25 Gram(s) IV Push once  dextrose 50% Injectable 25 Gram(s) IV Push once  furosemide Infusion 7.5 mG/Hr (3.75 mL/Hr) IV Continuous <Continuous>  influenza   Vaccine 0.5 milliLiter(s) IntraMuscular once  insulin lispro (HumaLOG) corrective regimen sliding scale   SubCutaneous three times a day before meals  insulin lispro (HumaLOG) corrective regimen sliding scale   SubCutaneous at bedtime  magnesium sulfate  IVPB 2 Gram(s) IV Intermittent once  metoprolol     tartrate 25 milliGRAM(s) Oral two times a day  pantoprazole    Tablet 40 milliGRAM(s) Oral before breakfast  potassium chloride    Tablet ER 40 milliEquivalent(s) Oral once    MEDICATIONS  (PRN):  dextrose Gel 1 Dose(s) Oral once PRN Blood Glucose LESS THAN 70 milliGRAM(s)/deciliter  glucagon  Injectable 1 milliGRAM(s) IntraMuscular once PRN Glucose LESS THAN 70 milligrams/deciliter      Vital Signs Last 24 Hrs  T(C): 36.6 (03 Nov 2017 07:37), Max: 36.9 (03 Nov 2017 00:00)  T(F): 97.9 (03 Nov 2017 07:37), Max: 98.4 (03 Nov 2017 00:00)  HR: 82 (03 Nov 2017 07:37) (82 - 138)  BP: 148/85 (03 Nov 2017 07:00) (103/86 - 162/71)  BP(mean): 98 (03 Nov 2017 07:00) (65 - 101)  RR: 26 (03 Nov 2017 07:00) (19 - 31)  SpO2: 99% (03 Nov 2017 07:37) (72% - 100%)    CAPILLARY BLOOD GLUCOSE  170 (02 Nov 2017 22:00)  148 (02 Nov 2017 17:00)      POCT Blood Glucose.: 133 mg/dL (03 Nov 2017 08:36)  POCT Blood Glucose.: 170 mg/dL (02 Nov 2017 22:00)  POCT Blood Glucose.: 148 mg/dL (02 Nov 2017 17:29)  POCT Blood Glucose.: 206 mg/dL (02 Nov 2017 12:20)  POCT Blood Glucose.: 219 mg/dL (02 Nov 2017 12:05)      I&O's Summary    02 Nov 2017 07:01  -  03 Nov 2017 07:00  --------------------------------------------------------  IN: 361.5 mL / OUT: 3070 mL / NET: -2708.5 mL    03 Nov 2017 07:01  -  03 Nov 2017 09:04  --------------------------------------------------------  IN: 3.8 mL / OUT: 0 mL / NET: 3.8 mL          PHYSICAL EXAM:  General: NAD, obese woman lying comfortably in bed  HEENT: NCAT/ PERRL  Neck: +JVD, +hepatojugular reflex  Cardio: irregularly irregular, No M/R/G  Resp: CTAB, decreased BS in RLL  Abd: soft, NT, very distended, decreased BS  : Dawson in place  Skin: Warm, dry, intact; No rashes or lesions  Extremities: 1+ radial pulses, no cyanosis, clubbing. 3+ edema b/l LE  Neuro: no focal deficits, CN grossly intact  Psych: AAO X3        LABS:                        10.6   5.38  )-----------( 173      ( 03 Nov 2017 05:30 )             34.8     11-03    146<H>  |  105  |  35<H>  ----------------------------<  119<H>  3.8   |  30  |  1.24    Ca    8.7      03 Nov 2017 05:30  Phos  3.1     11-03  Mg     1.7     11-03    TPro  6.1  /  Alb  3.4  /  TBili  0.8  /  DBili  x   /  AST  16  /  ALT  18  /  AlkPhos  66  11-02    LIVER FUNCTIONS - ( 02 Nov 2017 04:26 )  Alb: 3.4 g/dL / Pro: 6.1 g/dL / ALK PHOS: 66 u/L / ALT: 18 u/L / AST: 16 u/L / GGT: x                       RADIOLOGY & ADDITIONAL TESTS:    Imaging Personally Reviewed:     Consultant(s) Notes Reviewed:     Care Discussed with Consultants/Other Providers:     Desmond Romeo MD  PGY-1 | Internal Medicine  [phone] / [phone]

## 2017-11-03 NOTE — PROGRESS NOTE ADULT - PROBLEM SELECTOR PLAN 1
fluid overload 2/2 stopping lasix w/ severe diastolic HF. Low likelihood for PE, negative D-dimer if age-adjusted. Improved edema  - increasing lasix drip to 7.5 mg/hr, w/ lasix 40 mg IV push per heart failure recs  - c/w telemetry  - Daily weight monitoring  - Strict I/Os; net negative 6.3 L during stay  - Echo showed severe pulmonary hypertension, EF 66%.  - may need right heart cath eventually. For now will continue diuresing to dry weight and monitor. May eventually start aldactone for HFpEF  - f/u heart failure recs fluid overload 2/2 stopping lasix w/ severe diastolic HF. Low likelihood for PE, negative D-dimer if age-adjusted. Improved edema  - c/w lasix drip 7.5 mg/hr, will consider metolazone  - c/w telemetry  - Daily weight monitoring  - Strict I/Os; net negative 6.3 L during stay  - Echo showed severe pulmonary hypertension, EF 66%.  - may need right heart cath eventually. For now will continue diuresing to dry weight and monitor. May eventually start aldactone for HFpEF  - f/u heart failure recs fluid overload 2/2 stopping lasix w/ severe diastolic HF. Low likelihood for PE, negative D-dimer if age-adjusted. Improved edema  - c/w lasix drip 7.5 mg/hr, will consider metolazone  - c/w telemetry  - Daily weight monitoring  - Strict I/Os; net negative 9 L during stay, -2.7L in last 24h  - may need right heart cath eventually. For now will continue diuresing to dry weight and monitor. May eventually start aldactone for HFpEF  - f/u heart failure recs  - CTA for possible PE causing RV failure, pulm htn

## 2017-11-03 NOTE — PROGRESS NOTE ADULT - SUBJECTIVE AND OBJECTIVE BOX
Patient seen and examined. She is feeling well- no acute SOB, orthopnea, PND, CP, palpitations, dizziness or syncope. She states that she did not sleep much last night, continues to diurese well -2708 ml in 24 hours.     Medications:  allopurinol 100 milliGRAM(s) Oral three times a day after meals  apixaban 5 milliGRAM(s) Oral every 12 hours  buDESOnide  80 MICROgram(s)/formoterol 4.5 MICROgram(s) Inhaler 2 Puff(s) Inhalation two times a day  cefTRIAXone   IVPB      cefTRIAXone   IVPB 1 Gram(s) IV Intermittent once  chlorhexidine 4% Liquid 1 Application(s) Topical daily  dextrose 5%. 1000 milliLiter(s) IV Continuous <Continuous>  dextrose 50% Injectable 12.5 Gram(s) IV Push once  dextrose 50% Injectable 25 Gram(s) IV Push once  dextrose 50% Injectable 25 Gram(s) IV Push once  dextrose Gel 1 Dose(s) Oral once PRN  furosemide Infusion 7.5 mG/Hr IV Continuous <Continuous>  glucagon  Injectable 1 milliGRAM(s) IntraMuscular once PRN  influenza   Vaccine 0.5 milliLiter(s) IntraMuscular once  insulin lispro (HumaLOG) corrective regimen sliding scale   SubCutaneous three times a day before meals  insulin lispro (HumaLOG) corrective regimen sliding scale   SubCutaneous at bedtime  metoprolol     tartrate 25 milliGRAM(s) Oral three times a day  pantoprazole    Tablet 40 milliGRAM(s) Oral before breakfast  phenazopyridine 200 milliGRAM(s) Oral three times a day after meals    Vital Signs Last 24 Hrs  T(C): 36.6 (2017 07:37), Max: 36.9 (2017 00:00)  T(F): 97.9 (2017 07:37), Max: 98.4 (2017 00:00)  HR: 96 (2017 11:00) (82 - 138)  BP: 127/98 (2017 11:00) (103/86 - 162/71)  BP(mean): 104 (2017 11:00) (65 - 104)  RR: 23 (2017 11:00) (18 - 31)  SpO2: 92% (2017 11:00) (72% - 100%)    Daily     Daily Weight in k (2017 07:00)    I&O's Summary    2017 07:  -  2017 07:00  --------------------------------------------------------  IN: 361.5 mL / OUT: 3070 mL / NET: -2708.5 mL    2017 07:  -  2017 11:57  --------------------------------------------------------  IN: 11.4 mL / OUT: 600 mL / NET: -588.6 mL      I&O's Detail    2017 07:  -  2017 07:00  --------------------------------------------------------  IN:    furosemide Infusion: 7.5 mL    furosemide Infusion: 76 mL    Oral Fluid: 278 mL  Total IN: 361.5 mL    OUT:    Indwelling Catheter - Urethral: 3070 mL  Total OUT: 3070 mL    Total NET: -2708.5 mL      2017 07:  -  2017 11:57  --------------------------------------------------------  IN:    furosemide Infusion: 11.4 mL  Total IN: 11.4 mL    OUT:    Voided: 600 mL  Total OUT: 600 mL    Total NET: -588.6 mL      Physical Exam:     General: No distress. Comfortable.  HEENT: EOM intact.  Neck: Neck supple. JVP elevated to earlobe. No masses  Chest: diffuse crackles.  CV: irregularly irregular, No murmurs, rub, or gallops. Radial pulses normal. 3+ LE b/l.   Abdomen: Soft, non-distended, non-tender  Skin: No rashes or skin breakdown  Neurology: Alert and oriented times three. Sensation intact  Psych: Affect normal    Labs:                        10.6   5.38  )-----------( 173      ( 2017 05:30 )             34.8     11-    146<H>  |  105  |  35<H>  ----------------------------<  119<H>  3.8   |  30  |  1.24    Ca    8.7      2017 05:30  Phos  3.1     11-  Mg     1.7         TPro  6.1  /  Alb  3.4  /  TBili  0.8  /  DBili  x   /  AST  16  /  ALT  18  /  AlkPhos  66            Serum Pro-Brain Natriuretic Peptide: 6997 pg/mL (10-30 @ 16:00)      < from: Transthoracic Echocardiogram (10.31.17 @ 12:58) >  DIMENSIONS:  Dimensions:     Normal Values:  LA:     4.2 cm    2.0 - 4.0 cm  Ao:     2.8 cm    2.0 - 3.8 cm  SEPTUM: 0.9 cm    0.6 - 1.2 cm  PWT:    1.0 cm    0.6 - 1.1 cm  LVIDd:  4.2 cm    3.0 - 5.6 cm  LVIDs:  2.7 cm    1.8 - 4.0 cm  Derived Variables:  LVMI: 58 g/m2  RWT: 0.47  Fractional short: 36 %  Ejection Fraction (Teicholtz): 66 %  ------------------------------------------------------------------------  OBSERVATIONS:  Mitral Valve: Mitral annular calcification, otherwise  normal mitral valve. Mild mitral regurgitation.  Aortic Root: Normal aortic root.  Aortic Valve: Normal trileaflet aortic valve.  Left Atrium: Normal left atrium.  Left Ventricle: Normal left ventricular systolic function.  No segmental wall motion abnormalities. Septal motion  consistent with right ventricular pressure and volume  overload. Increased relative wall thickness with normal  left ventricular mass index, consistent with concentric  left ventricular remodeling.  Right Heart: Severe right atrial enlargement. Right  ventricular enlargement with decreased right ventricular  systolic function. Normal tricuspid valve.  Moderate-severe  tricuspid regurgitation. Normal pulmonic valve.  Pericardium/PleuraNormal pericardium with no pericardial  effusion.  Hemodynamic: Estimated right ventricular systolic pressure  equals 80 mm Hg, assuming right atrial pressure equals 10  mm Hg, consistent with severe pulmonary hypertension.    < end of copied text >

## 2017-11-04 DIAGNOSIS — I26.99 OTHER PULMONARY EMBOLISM WITHOUT ACUTE COR PULMONALE: ICD-10-CM

## 2017-11-04 DIAGNOSIS — T83.9XXA UNSPECIFIED COMPLICATION OF GENITOURINARY PROSTHETIC DEVICE, IMPLANT AND GRAFT, INITIAL ENCOUNTER: ICD-10-CM

## 2017-11-04 LAB
ALBUMIN SERPL ELPH-MCNC: 3.3 G/DL — SIGNIFICANT CHANGE UP (ref 3.3–5)
ALP SERPL-CCNC: 66 U/L — SIGNIFICANT CHANGE UP (ref 40–120)
ALT FLD-CCNC: 14 U/L — SIGNIFICANT CHANGE UP (ref 4–33)
AST SERPL-CCNC: 17 U/L — SIGNIFICANT CHANGE UP (ref 4–32)
BILIRUB SERPL-MCNC: 1.1 MG/DL — SIGNIFICANT CHANGE UP (ref 0.2–1.2)
BUN SERPL-MCNC: 31 MG/DL — HIGH (ref 7–23)
CALCIUM SERPL-MCNC: 9 MG/DL — SIGNIFICANT CHANGE UP (ref 8.4–10.5)
CHLORIDE SERPL-SCNC: 106 MMOL/L — SIGNIFICANT CHANGE UP (ref 98–107)
CO2 SERPL-SCNC: 33 MMOL/L — HIGH (ref 22–31)
CREAT SERPL-MCNC: 1.23 MG/DL — SIGNIFICANT CHANGE UP (ref 0.5–1.3)
GLUCOSE BLDC GLUCOMTR-MCNC: 123 MG/DL — HIGH (ref 70–99)
GLUCOSE BLDC GLUCOMTR-MCNC: 134 MG/DL — HIGH (ref 70–99)
GLUCOSE BLDC GLUCOMTR-MCNC: 161 MG/DL — HIGH (ref 70–99)
GLUCOSE BLDC GLUCOMTR-MCNC: 176 MG/DL — HIGH (ref 70–99)
GLUCOSE SERPL-MCNC: 136 MG/DL — HIGH (ref 70–99)
HCT VFR BLD CALC: 36.5 % — SIGNIFICANT CHANGE UP (ref 34.5–45)
HGB BLD-MCNC: 10.6 G/DL — LOW (ref 11.5–15.5)
MAGNESIUM SERPL-MCNC: 1.9 MG/DL — SIGNIFICANT CHANGE UP (ref 1.6–2.6)
MCHC RBC-ENTMCNC: 20.2 PG — LOW (ref 27–34)
MCHC RBC-ENTMCNC: 29 % — LOW (ref 32–36)
MCV RBC AUTO: 69.5 FL — LOW (ref 80–100)
NRBC # FLD: 0.06 — SIGNIFICANT CHANGE UP
PHOSPHATE SERPL-MCNC: 3.5 MG/DL — SIGNIFICANT CHANGE UP (ref 2.5–4.5)
PLATELET # BLD AUTO: 183 K/UL — SIGNIFICANT CHANGE UP (ref 150–400)
PMV BLD: SIGNIFICANT CHANGE UP FL (ref 7–13)
POTASSIUM SERPL-MCNC: 4.7 MMOL/L — SIGNIFICANT CHANGE UP (ref 3.5–5.3)
POTASSIUM SERPL-SCNC: 4.7 MMOL/L — SIGNIFICANT CHANGE UP (ref 3.5–5.3)
PROT SERPL-MCNC: 6.2 G/DL — SIGNIFICANT CHANGE UP (ref 6–8.3)
RBC # BLD: 5.25 M/UL — HIGH (ref 3.8–5.2)
RBC # FLD: 19.3 % — HIGH (ref 10.3–14.5)
SODIUM SERPL-SCNC: 148 MMOL/L — HIGH (ref 135–145)
WBC # BLD: 6.59 K/UL — SIGNIFICANT CHANGE UP (ref 3.8–10.5)
WBC # FLD AUTO: 6.59 K/UL — SIGNIFICANT CHANGE UP (ref 3.8–10.5)

## 2017-11-04 PROCEDURE — 99233 SBSQ HOSP IP/OBS HIGH 50: CPT

## 2017-11-04 RX ORDER — HYDRALAZINE HCL 50 MG
25 TABLET ORAL THREE TIMES A DAY
Qty: 0 | Refills: 0 | Status: DISCONTINUED | OUTPATIENT
Start: 2017-11-04 | End: 2017-11-10

## 2017-11-04 RX ORDER — MAGNESIUM SULFATE 500 MG/ML
1 VIAL (ML) INJECTION ONCE
Qty: 0 | Refills: 0 | Status: COMPLETED | OUTPATIENT
Start: 2017-11-04 | End: 2017-11-04

## 2017-11-04 RX ADMIN — CEFTRIAXONE 100 GRAM(S): 500 INJECTION, POWDER, FOR SOLUTION INTRAMUSCULAR; INTRAVENOUS at 10:16

## 2017-11-04 RX ADMIN — Medication 25 MILLIGRAM(S): at 14:06

## 2017-11-04 RX ADMIN — Medication 200 MILLIGRAM(S): at 08:33

## 2017-11-04 RX ADMIN — Medication 100 GRAM(S): at 06:27

## 2017-11-04 RX ADMIN — BUDESONIDE AND FORMOTEROL FUMARATE DIHYDRATE 2 PUFF(S): 160; 4.5 AEROSOL RESPIRATORY (INHALATION) at 09:48

## 2017-11-04 RX ADMIN — APIXABAN 5 MILLIGRAM(S): 2.5 TABLET, FILM COATED ORAL at 18:19

## 2017-11-04 RX ADMIN — Medication 100 MILLIGRAM(S): at 08:33

## 2017-11-04 RX ADMIN — Medication 200 MILLIGRAM(S): at 13:16

## 2017-11-04 RX ADMIN — Medication 1: at 12:42

## 2017-11-04 RX ADMIN — PANTOPRAZOLE SODIUM 40 MILLIGRAM(S): 20 TABLET, DELAYED RELEASE ORAL at 06:27

## 2017-11-04 RX ADMIN — Medication 25 MILLIGRAM(S): at 22:11

## 2017-11-04 RX ADMIN — Medication 100 MILLIGRAM(S): at 13:16

## 2017-11-04 RX ADMIN — Medication 100 MILLIGRAM(S): at 18:19

## 2017-11-04 RX ADMIN — Medication 25 MILLIGRAM(S): at 06:27

## 2017-11-04 RX ADMIN — Medication 200 MILLIGRAM(S): at 18:19

## 2017-11-04 RX ADMIN — APIXABAN 5 MILLIGRAM(S): 2.5 TABLET, FILM COATED ORAL at 06:27

## 2017-11-04 RX ADMIN — BUDESONIDE AND FORMOTEROL FUMARATE DIHYDRATE 2 PUFF(S): 160; 4.5 AEROSOL RESPIRATORY (INHALATION) at 23:02

## 2017-11-04 RX ADMIN — Medication 3.75 MG/HR: at 08:34

## 2017-11-04 RX ADMIN — CHLORHEXIDINE GLUCONATE 1 APPLICATION(S): 213 SOLUTION TOPICAL at 12:40

## 2017-11-04 NOTE — PROGRESS NOTE ADULT - ASSESSMENT
62-year-old female with chronic lymphocytic leukemia, atrial fibrillation, diabetes, and hypertension p/w CHF exacerbation.    1. Diastolic CHF exacerbation: slowly improving with good diuresis and less edema  2. Acute kidney injury- CRS- Renal function stable  3. Hypernatremia- due to obligate diuresis. mild   4. Metabolic alkalosis: due to contraction.    5. A-fib with RVR: elevated HR at times    PLAN  - continue lasix infusion as ordered  - strict I+O and daily weights  - maintain K+ greater than 4, consider diamox

## 2017-11-04 NOTE — PROGRESS NOTE ADULT - PROBLEM SELECTOR PLAN 4
on Metformin at home. HbA1c 6.5  - Monitor blood sugars ac and hs  - Humalog ISS. holding metformin  - CC/DASH diet per nutrition recs - On Metformin at home. HbA1c 6.5  - Monitor FS ac and hs  - Humalog ISS. holding metformin  - CC/DASH diet per nutrition recs  - Diabetes Education - BP currently well controlled with BP of 120s/60s  - Started on hydralazine 25mg TID to reduce afterload and help with diuresis. If patient's BP declines - will D/C

## 2017-11-04 NOTE — PROGRESS NOTE ADULT - PROBLEM SELECTOR PLAN 1
fluid overload 2/2 stopping lasix w/ severe diastolic HF. Low likelihood for PE, negative D-dimer if age-adjusted. Improved edema  - c/w lasix drip 7.5 mg/hr, will consider metolazone  - c/w telemetry  - Daily weight monitoring  - Strict I/Os; net negative 9 L during stay, -2.7L in last 24h  - may need right heart cath eventually. For now will continue diuresing to dry weight and monitor. May eventually start aldactone for HFpEF  - f/u heart failure recs  - CTA for possible PE causing RV failure, pulm htn - Fluid overload 2/2 stopping lasix w/ severe diastolic HF  - C/w aggressive diuresis; lasix drip 7.5 mg/hr  - Started on hydralazine 25 TID today to reduce afterload  - Monitor daily weights  - Strict I/Os; net negative 12 L during stay, -2.8L in last 24h  - Monitor BMP Q12 and replete Mg to 2.0, K to 4.0 (q12 labs while on gtt)  - HF following; appreciating recs

## 2017-11-04 NOTE — PROGRESS NOTE ADULT - PROBLEM SELECTOR PLAN 6
On Eliquis for Afib - Patient reports dawson irritation w/ spasms causing her discomfort. Discussed D/Cing dawson, however patient is unable to ambulate from bed to chair without desaturing, receiving lasix gtt causing increased urinary output.  - CTX 1gm started 11/3 for prophylactic tx of UTI  - On pyridum 200 TID - On Metformin at home. HbA1c 6.5  - Monitor FS ac and hs  - Humalog ISS. holding metformin  - CC/DASH diet per nutrition recs  - Diabetes Education

## 2017-11-04 NOTE — PROGRESS NOTE ADULT - PROBLEM SELECTOR PROBLEM 6
Need for prophylactic measure Corey catheter problem Type 2 diabetes mellitus without complication, without long-term current use of insulin

## 2017-11-04 NOTE — PROGRESS NOTE ADULT - PROBLEM SELECTOR PLAN 2
AFib, rates 80s-110 on tele. Improved to 80s-90s this am  - c/w metoprolol 25 mg PO TID  - Continuous tele monitoring  - Monitor BMP Q12 and replete Mg to 2.0, K to 4.0 (q12 labs while on gtt) - AFib, HR 80s-110 on tele. Currently 90s-100s this AM  - C/w metoprolol 25 mg PO TID  - C/w A/C with eliquis 5mg q12  - C/w tele monitoring

## 2017-11-04 NOTE — PROGRESS NOTE ADULT - SUBJECTIVE AND OBJECTIVE BOX
Patient is a 62y old  Female who presents with a chief complaint of LE edema and Abd edema, SOB (02 Nov 2017 11:25)      SUBJECTIVE / OVERNIGHT EVENTS:    MEDICATIONS  (STANDING):  allopurinol 100 milliGRAM(s) Oral three times a day after meals  apixaban 5 milliGRAM(s) Oral every 12 hours  buDESOnide  80 MICROgram(s)/formoterol 4.5 MICROgram(s) Inhaler 2 Puff(s) Inhalation two times a day  cefTRIAXone   IVPB      chlorhexidine 4% Liquid 1 Application(s) Topical daily  dextrose 5%. 1000 milliLiter(s) (50 mL/Hr) IV Continuous <Continuous>  dextrose 50% Injectable 12.5 Gram(s) IV Push once  dextrose 50% Injectable 25 Gram(s) IV Push once  dextrose 50% Injectable 25 Gram(s) IV Push once  furosemide Infusion 7.5 mG/Hr (3.75 mL/Hr) IV Continuous <Continuous>  influenza   Vaccine 0.5 milliLiter(s) IntraMuscular once  insulin lispro (HumaLOG) corrective regimen sliding scale   SubCutaneous three times a day before meals  insulin lispro (HumaLOG) corrective regimen sliding scale   SubCutaneous at bedtime  metoprolol     tartrate 25 milliGRAM(s) Oral three times a day  pantoprazole    Tablet 40 milliGRAM(s) Oral before breakfast  phenazopyridine 200 milliGRAM(s) Oral three times a day after meals    MEDICATIONS  (PRN):  dextrose Gel 1 Dose(s) Oral once PRN Blood Glucose LESS THAN 70 milliGRAM(s)/deciliter  glucagon  Injectable 1 milliGRAM(s) IntraMuscular once PRN Glucose LESS THAN 70 milligrams/deciliter      Vital Signs Last 24 Hrs  T(C): 36.6 (04 Nov 2017 08:00), Max: 37.1 (03 Nov 2017 20:00)  T(F): 97.9 (04 Nov 2017 08:00), Max: 98.8 (03 Nov 2017 20:00)  HR: 97 (04 Nov 2017 08:00) (90 - 133)  BP: 109/67 (04 Nov 2017 08:00) (109/67 - 150/87)  BP(mean): 78 (04 Nov 2017 08:00) (78 - 106)  RR: 23 (04 Nov 2017 08:00) (17 - 34)  SpO2: 100% (04 Nov 2017 08:00) (58% - 100%)    CAPILLARY BLOOD GLUCOSE  181 (03 Nov 2017 16:00)  138 (03 Nov 2017 12:00)      POCT Blood Glucose.: 123 mg/dL (04 Nov 2017 08:50)  POCT Blood Glucose.: 171 mg/dL (03 Nov 2017 22:22)  POCT Blood Glucose.: 182 mg/dL (03 Nov 2017 16:15)  POCT Blood Glucose.: 138 mg/dL (03 Nov 2017 12:25)      I&O's Summary    03 Nov 2017 07:01  -  04 Nov 2017 07:00  --------------------------------------------------------  IN: 667 mL / OUT: 3505 mL / NET: -2838 mL    04 Nov 2017 08:01  -  04 Nov 2017 09:09  --------------------------------------------------------  IN: 107.6 mL / OUT: 450 mL / NET: -342.4 mL          PHYSICAL EXAM:  GENERAL: NAD, well-developed  HEAD:  AT, NC  EYES: EOMI, PERRLA, conjunctiva and sclera clear  ENMT: Airway patent. MMM. Good dentition, no lesions.  NECK: Supple, No JVD  CHEST/LUNG: CTABL; No wheezing, rhonci, or rales  HEART: RRR; Normal S1, S2. No murmurs, rubs, or gallops  ABDOMEN: Soft, NT, ND; Bowel sounds present. No organomegaly  EXTREMITIES:  2+ Peripheral Pulses, No clubbing, cyanosis, or edema  PSYCH: AAOx3  NEUROLOGY: non-focal  SKIN: Warm, dry, intact; No rashes or lesions      LABS:                        10.6   6.59  )-----------( 183      ( 04 Nov 2017 03:50 )             36.5     11-04    148<H>  |  106  |  31<H>  ----------------------------<  136<H>  4.7   |  33<H>  |  1.23    Ca    9.0      04 Nov 2017 03:50  Phos  3.5     11-04  Mg     1.9     11-04    TPro  6.2  /  Alb  3.3  /  TBili  1.1  /  DBili  x   /  AST  17  /  ALT  14  /  AlkPhos  66  11-04    LIVER FUNCTIONS - ( 04 Nov 2017 03:50 )  Alb: 3.3 g/dL / Pro: 6.2 g/dL / ALK PHOS: 66 u/L / ALT: 14 u/L / AST: 17 u/L / GGT: x                       RADIOLOGY & ADDITIONAL TESTS:    Imaging Personally Reviewed: YES    Consultant(s) Notes Reviewed: YES    Care Discussed with Consultants/Other Providers: YES    Prasanna Garsia MD-PGY1 Department of Anesthesiology  Contact - [phone]/ Pager - [phone] Patient is a 62y old  Female who presents with a chief complaint of LE edema and Abd edema, SOB (02 Nov 2017 11:25)      SUBJECTIVE / OVERNIGHT EVENTS:  Patient seen and examined at bedside. Patient reports dyspnea on exertion. Patient desaturated down to 58%, 63% overnight as she was moving from bed to chair. Patient's saturation improved once she was sitting in her chair. On 3L of 02. No events on Tele. Patient denies CP, palpitations, SOB, NVD, blurry vision, headaches, dizziness.    MEDICATIONS  (STANDING):  allopurinol 100 milliGRAM(s) Oral three times a day after meals  apixaban 5 milliGRAM(s) Oral every 12 hours  buDESOnide  80 MICROgram(s)/formoterol 4.5 MICROgram(s) Inhaler 2 Puff(s) Inhalation two times a day  cefTRIAXone   IVPB      chlorhexidine 4% Liquid 1 Application(s) Topical daily  dextrose 5%. 1000 milliLiter(s) (50 mL/Hr) IV Continuous <Continuous>  dextrose 50% Injectable 12.5 Gram(s) IV Push once  dextrose 50% Injectable 25 Gram(s) IV Push once  dextrose 50% Injectable 25 Gram(s) IV Push once  furosemide Infusion 7.5 mG/Hr (3.75 mL/Hr) IV Continuous <Continuous>  influenza   Vaccine 0.5 milliLiter(s) IntraMuscular once  insulin lispro (HumaLOG) corrective regimen sliding scale   SubCutaneous three times a day before meals  insulin lispro (HumaLOG) corrective regimen sliding scale   SubCutaneous at bedtime  metoprolol     tartrate 25 milliGRAM(s) Oral three times a day  pantoprazole    Tablet 40 milliGRAM(s) Oral before breakfast  phenazopyridine 200 milliGRAM(s) Oral three times a day after meals    MEDICATIONS  (PRN):  dextrose Gel 1 Dose(s) Oral once PRN Blood Glucose LESS THAN 70 milliGRAM(s)/deciliter  glucagon  Injectable 1 milliGRAM(s) IntraMuscular once PRN Glucose LESS THAN 70 milligrams/deciliter      Vital Signs Last 24 Hrs  T(C): 36.6 (04 Nov 2017 08:00), Max: 37.1 (03 Nov 2017 20:00)  T(F): 97.9 (04 Nov 2017 08:00), Max: 98.8 (03 Nov 2017 20:00)  HR: 97 (04 Nov 2017 08:00) (90 - 133)  BP: 109/67 (04 Nov 2017 08:00) (109/67 - 150/87)  BP(mean): 78 (04 Nov 2017 08:00) (78 - 106)  RR: 23 (04 Nov 2017 08:00) (17 - 34)  SpO2: 100% (04 Nov 2017 08:00) (58% - 100%)    CAPILLARY BLOOD GLUCOSE  181 (03 Nov 2017 16:00)  138 (03 Nov 2017 12:00)      POCT Blood Glucose.: 123 mg/dL (04 Nov 2017 08:50)  POCT Blood Glucose.: 171 mg/dL (03 Nov 2017 22:22)  POCT Blood Glucose.: 182 mg/dL (03 Nov 2017 16:15)  POCT Blood Glucose.: 138 mg/dL (03 Nov 2017 12:25)      I&O's Summary    03 Nov 2017 07:01  -  04 Nov 2017 07:00  --------------------------------------------------------  IN: 667 mL / OUT: 3505 mL / NET: -2838 mL    04 Nov 2017 08:01  -  04 Nov 2017 09:09  --------------------------------------------------------  IN: 107.6 mL / OUT: 450 mL / NET: -342.4 mL          PHYSICAL EXAM:  General: NAD, obese woman sitting comfortably in chair  HEENT: NC/ AT. PERRLA  Neck: +JVD, +hepatojugular reflex  Cardio: irregularly irregular, No M/R/G  Resp: CTAB, decreased BS in RLL  Abd: soft, NT, very distended, decreased BS  : Corey in place  Extremities: 1+ radial pulses, no cyanosis, clubbing. 3+ edema b/l LE up to abdomen  Skin: Warm, dry, intact; No rashes or lesions  Psych: AAO X3      LABS:                        10.6   6.59  )-----------( 183      ( 04 Nov 2017 03:50 )             36.5     11-04    148<H>  |  106  |  31<H>  ----------------------------<  136<H>  4.7   |  33<H>  |  1.23    Ca    9.0      04 Nov 2017 03:50  Phos  3.5     11-04  Mg     1.9     11-04    TPro  6.2  /  Alb  3.3  /  TBili  1.1  /  DBili  x   /  AST  17  /  ALT  14  /  AlkPhos  66  11-04    LIVER FUNCTIONS - ( 04 Nov 2017 03:50 )  Alb: 3.3 g/dL / Pro: 6.2 g/dL / ALK PHOS: 66 u/L / ALT: 14 u/L / AST: 17 u/L / GGT: x                       RADIOLOGY & ADDITIONAL TESTS:    Imaging Personally Reviewed: YES    Consultant(s) Notes Reviewed: YES    Care Discussed with Consultants/Other Providers: YES    Prasanna Garsia MD-PGY1 Department of Anesthesiology  Contact - [phone]/ Pager - [phone]

## 2017-11-04 NOTE — PROGRESS NOTE ADULT - SUBJECTIVE AND OBJECTIVE BOX
Patient is a 62y old  Female who presents with a chief complaint of LE edema and Abd edema, SOB (02 Nov 2017 11:25)      SUBJECTIVE / OVERNIGHT EVENTS:  Orthopnea improving  Edema in legs persists  Review of Systems:   CONSTITUTIONAL: No fever, weight loss, or fatigue  EYES: No eye pain, visual disturbances, or discharge  ENMT:  No difficulty hearing, tinnitus, vertigo; No sinus or throat pain  NECK: No pain or stiffness  BREASTS: No pain, masses, or nipple discharge  RESPIRATORY: No cough, wheezing, chills or hemoptysis; No shortness of breath  CARDIOVASCULAR: No chest pain, palpitations, dizziness, or leg swelling  GASTROINTESTINAL: No abdominal or epigastric pain. No nausea, vomiting, or hematemesis; No diarrhea or constipation. No melena or hematochezia.  GENITOURINARY: No dysuria, frequency, hematuria, or incontinence  NEUROLOGICAL: No headaches, memory loss, loss of strength, numbness, or tremors  SKIN: No itching, burning, rashes, or lesions   LYMPH NODES: No enlarged glands  ENDOCRINE: No heat or cold intolerance; No hair loss  MUSCULOSKELETAL: No joint pain or swelling; No muscle, back, or extremity pain  PSYCHIATRIC: No depression, anxiety, mood swings, or difficulty sleeping  HEME/LYMPH: No easy bruising, or bleeding gums  ALLERY AND IMMUNOLOGIC: No hives or eczema    MEDICATIONS  (STANDING):  allopurinol 100 milliGRAM(s) Oral three times a day after meals  apixaban 5 milliGRAM(s) Oral every 12 hours  buDESOnide  80 MICROgram(s)/formoterol 4.5 MICROgram(s) Inhaler 2 Puff(s) Inhalation two times a day  cefTRIAXone   IVPB      chlorhexidine 4% Liquid 1 Application(s) Topical daily  dextrose 5%. 1000 milliLiter(s) (50 mL/Hr) IV Continuous <Continuous>  dextrose 50% Injectable 12.5 Gram(s) IV Push once  dextrose 50% Injectable 25 Gram(s) IV Push once  dextrose 50% Injectable 25 Gram(s) IV Push once  furosemide Infusion 7.5 mG/Hr (3.75 mL/Hr) IV Continuous <Continuous>  hydrALAZINE 25 milliGRAM(s) Oral three times a day  influenza   Vaccine 0.5 milliLiter(s) IntraMuscular once  insulin lispro (HumaLOG) corrective regimen sliding scale   SubCutaneous three times a day before meals  insulin lispro (HumaLOG) corrective regimen sliding scale   SubCutaneous at bedtime  metoprolol     tartrate 25 milliGRAM(s) Oral three times a day  pantoprazole    Tablet 40 milliGRAM(s) Oral before breakfast  phenazopyridine 200 milliGRAM(s) Oral three times a day after meals    MEDICATIONS  (PRN):  dextrose Gel 1 Dose(s) Oral once PRN Blood Glucose LESS THAN 70 milliGRAM(s)/deciliter  glucagon  Injectable 1 milliGRAM(s) IntraMuscular once PRN Glucose LESS THAN 70 milligrams/deciliter      PHYSICAL EXAM:  Vital Signs Last 24 Hrs  T(C): 36.4 (04 Nov 2017 12:00), Max: 37.1 (03 Nov 2017 20:00)  T(F): 97.6 (04 Nov 2017 12:00), Max: 98.8 (03 Nov 2017 20:00)  HR: 101 (04 Nov 2017 15:00) (97 - 133)  BP: 126/66 (04 Nov 2017 15:00) (109/65 - 150/87)  BP(mean): 80 (04 Nov 2017 15:00) (73 - 106)  RR: 21 (04 Nov 2017 15:00) (17 - 34)  SpO2: 99% (04 Nov 2017 15:00) (58% - 100%)  I&O's Summary    03 Nov 2017 07:01  -  04 Nov 2017 07:00  --------------------------------------------------------  IN: 667 mL / OUT: 3505 mL / NET: -2838 mL    04 Nov 2017 08:01  -  04 Nov 2017 15:55  --------------------------------------------------------  IN: 460.4 mL / OUT: 1140 mL / NET: -679.6 mL      GENERAL: NAD, well-developed  HEAD:  Atraumatic, Normocephalic  EYES: EOMI, PERRLA, conjunctiva and sclera clear  NECK: Supple, No JVD  CHEST/LUNG: Clear to auscultation bilaterally; No wheeze  HEART: Regular rate and rhythm; No murmurs, rubs, or gallops  ABDOMEN: Soft, Nontender, Nondistended; Bowel sounds present  EXTREMITIES:  2+ Peripheral Pulses, No clubbing, cyanosis, Edema +++  PSYCH: AAOx3  NEUROLOGY: non-focal  SKIN: No rashes or lesions    LABS:  CAPILLARY BLOOD GLUCOSE  181 (03 Nov 2017 16:00)      POCT Blood Glucose.: 161 mg/dL (04 Nov 2017 12:39)  POCT Blood Glucose.: 123 mg/dL (04 Nov 2017 08:50)  POCT Blood Glucose.: 171 mg/dL (03 Nov 2017 22:22)  POCT Blood Glucose.: 182 mg/dL (03 Nov 2017 16:15)                          10.6   6.59  )-----------( 183      ( 04 Nov 2017 03:50 )             36.5     11-04    148<H>  |  106  |  31<H>  ----------------------------<  136<H>  4.7   |  33<H>  |  1.23    Ca    9.0      04 Nov 2017 03:50  Phos  3.5     11-04  Mg     1.9     11-04    TPro  6.2  /  Alb  3.3  /  TBili  1.1  /  DBili  x   /  AST  17  /  ALT  14  /  AlkPhos  66  11-04              RADIOLOGY & ADDITIONAL TESTS:    Imaging Personally Reviewed:    Consultant(s) Notes Reviewed:      Care Discussed with Consultants/Other Providers:

## 2017-11-04 NOTE — PROGRESS NOTE ADULT - PROBLEM SELECTOR PROBLEM 4
Type 2 diabetes mellitus without complication, without long-term current use of insulin Hypertension, unspecified type

## 2017-11-04 NOTE — PROGRESS NOTE ADULT - PROBLEM SELECTOR PLAN 7
- On Eliquis for Afib - Patient with h/o COPD, currently on home oxygen 2L  - Currently on 3L NC, maintain goal SpO2 > 92%.   - C/w Advair PRN   - Continuous O2 sat monitoring

## 2017-11-04 NOTE — PROGRESS NOTE ADULT - PROBLEM SELECTOR PLAN 5
Patient with h/o COPD, currently on home oxygen 2L  - on 2L NC, maintain goal SpO2 > 92%.   - c/w Advair PRN   - continuous O2 sat monitoring - Patient with h/o COPD, currently on home oxygen 2L  - Currently on 3L NC, maintain goal SpO2 > 92%.   - C/w Advair PRN   - Continuous O2 sat monitoring - Patient reports dawson irritation w/ spasms causing her discomfort. Discussed D/Cing dawson, however patient is unable to ambulate from bed to chair without desaturing, receiving lasix gtt causing increased urinary output.  - CTX 1gm started 11/3 for prophylactic tx of UTI  - On pyridum 200 TID

## 2017-11-04 NOTE — PROGRESS NOTE ADULT - ASSESSMENT
63 yo F with PMH of Afib on Eliquis, DM on Metformin (HbA1c 6.5), uncontrolled HTN on Toprol, COPD, on home O2 2L, lymphoma/CLL (1997), HFpEF LVEF 63% was sent PMD office for concern for fluid overload, found to be in CHF exacerbation in the setting of holding diuretics and afterload reducing agents, now improving on diuretics. 63 yo F with PMHx of Afib on Eliquis, DM on Metformin (HbA1c 6.5), uncontrolled HTN on Toprol, COPD w/ home O2 of 2L, lymphoma/CLL (1997), HFpEF LVEF 63% was sent in her from PMD's office for concern of fluid overload likely secondary to exacerbation of diastolic CHF, currently being diuresed w/ lasix gtt.

## 2017-11-04 NOTE — PROGRESS NOTE ADULT - PROBLEM SELECTOR PLAN 3
controlled. Lowest /56.   - holding antihypertensives for low BP - BP currently well controlled with BP of 120s/60s  - Started on hydralazine 25mg TID to reduce afterload and help with diuresis. If patient's BP declines - will D/C - D-dimer slightley elevated at 269  - Doppler LE negative for DVT  - CTA negative for PE

## 2017-11-05 LAB
ALBUMIN SERPL ELPH-MCNC: 3.3 G/DL — SIGNIFICANT CHANGE UP (ref 3.3–5)
ALP SERPL-CCNC: 65 U/L — SIGNIFICANT CHANGE UP (ref 40–120)
ALT FLD-CCNC: 16 U/L — SIGNIFICANT CHANGE UP (ref 4–33)
AST SERPL-CCNC: 24 U/L — SIGNIFICANT CHANGE UP (ref 4–32)
BASE EXCESS BLDV CALC-SCNC: 13.7 MMOL/L — SIGNIFICANT CHANGE UP
BILIRUB SERPL-MCNC: 1 MG/DL — SIGNIFICANT CHANGE UP (ref 0.2–1.2)
BUN SERPL-MCNC: 33 MG/DL — HIGH (ref 7–23)
CALCIUM SERPL-MCNC: 8.8 MG/DL — SIGNIFICANT CHANGE UP (ref 8.4–10.5)
CHLORIDE SERPL-SCNC: 101 MMOL/L — SIGNIFICANT CHANGE UP (ref 98–107)
CO2 SERPL-SCNC: 35 MMOL/L — HIGH (ref 22–31)
CREAT SERPL-MCNC: 1.18 MG/DL — SIGNIFICANT CHANGE UP (ref 0.5–1.3)
GAS PNL BLDV: 141 MMOL/L — SIGNIFICANT CHANGE UP (ref 136–146)
GLUCOSE BLDC GLUCOMTR-MCNC: 120 MG/DL — HIGH (ref 70–99)
GLUCOSE BLDC GLUCOMTR-MCNC: 123 MG/DL — HIGH (ref 70–99)
GLUCOSE BLDC GLUCOMTR-MCNC: 150 MG/DL — HIGH (ref 70–99)
GLUCOSE BLDC GLUCOMTR-MCNC: 234 MG/DL — HIGH (ref 70–99)
GLUCOSE BLDV-MCNC: 134 — HIGH (ref 70–99)
GLUCOSE SERPL-MCNC: 136 MG/DL — HIGH (ref 70–99)
HCO3 BLDV-SCNC: 34 MMOL/L — HIGH (ref 20–27)
HCT VFR BLD CALC: 34 % — LOW (ref 34.5–45)
HCT VFR BLDV CALC: 32.8 % — LOW (ref 34.5–45)
HGB BLD-MCNC: 10.2 G/DL — LOW (ref 11.5–15.5)
HGB BLDV-MCNC: 10.6 G/DL — LOW (ref 11.5–15.5)
MAGNESIUM SERPL-MCNC: 2 MG/DL — SIGNIFICANT CHANGE UP (ref 1.6–2.6)
MCHC RBC-ENTMCNC: 20.4 PG — LOW (ref 27–34)
MCHC RBC-ENTMCNC: 30 % — LOW (ref 32–36)
MCV RBC AUTO: 67.9 FL — LOW (ref 80–100)
NRBC # FLD: 0.03 — SIGNIFICANT CHANGE UP
PCO2 BLDV: 75 MMHG — HIGH (ref 41–51)
PH BLDV: 7.35 PH — SIGNIFICANT CHANGE UP (ref 7.32–7.43)
PHOSPHATE SERPL-MCNC: 3.9 MG/DL — SIGNIFICANT CHANGE UP (ref 2.5–4.5)
PLATELET # BLD AUTO: 126 K/UL — LOW (ref 150–400)
PMV BLD: SIGNIFICANT CHANGE UP FL (ref 7–13)
PO2 BLDV: < 24 MMHG — LOW (ref 35–40)
POTASSIUM BLDV-SCNC: 3.6 MMOL/L — SIGNIFICANT CHANGE UP (ref 3.4–4.5)
POTASSIUM SERPL-MCNC: 3.8 MMOL/L — SIGNIFICANT CHANGE UP (ref 3.5–5.3)
POTASSIUM SERPL-SCNC: 3.8 MMOL/L — SIGNIFICANT CHANGE UP (ref 3.5–5.3)
PROT SERPL-MCNC: 6.6 G/DL — SIGNIFICANT CHANGE UP (ref 6–8.3)
RBC # BLD: 5.01 M/UL — SIGNIFICANT CHANGE UP (ref 3.8–5.2)
RBC # FLD: 19.2 % — HIGH (ref 10.3–14.5)
SAO2 % BLDV: 28.8 % — LOW (ref 60–85)
SODIUM SERPL-SCNC: 146 MMOL/L — HIGH (ref 135–145)
WBC # BLD: 6.25 K/UL — SIGNIFICANT CHANGE UP (ref 3.8–10.5)
WBC # FLD AUTO: 6.25 K/UL — SIGNIFICANT CHANGE UP (ref 3.8–10.5)

## 2017-11-05 PROCEDURE — 99233 SBSQ HOSP IP/OBS HIGH 50: CPT

## 2017-11-05 RX ORDER — LISINOPRIL 2.5 MG/1
5 TABLET ORAL DAILY
Qty: 0 | Refills: 0 | Status: DISCONTINUED | OUTPATIENT
Start: 2017-11-05 | End: 2017-11-08

## 2017-11-05 RX ORDER — POTASSIUM CHLORIDE 20 MEQ
20 PACKET (EA) ORAL ONCE
Qty: 0 | Refills: 0 | Status: COMPLETED | OUTPATIENT
Start: 2017-11-05 | End: 2017-11-05

## 2017-11-05 RX ORDER — BUDESONIDE AND FORMOTEROL FUMARATE DIHYDRATE 160; 4.5 UG/1; UG/1
2 AEROSOL RESPIRATORY (INHALATION)
Qty: 0 | Refills: 0 | Status: DISCONTINUED | OUTPATIENT
Start: 2017-11-05 | End: 2017-11-10

## 2017-11-05 RX ADMIN — LISINOPRIL 5 MILLIGRAM(S): 2.5 TABLET ORAL at 11:15

## 2017-11-05 RX ADMIN — Medication 25 MILLIGRAM(S): at 13:04

## 2017-11-05 RX ADMIN — CHLORHEXIDINE GLUCONATE 1 APPLICATION(S): 213 SOLUTION TOPICAL at 13:03

## 2017-11-05 RX ADMIN — Medication 25 MILLIGRAM(S): at 22:30

## 2017-11-05 RX ADMIN — Medication 100 MILLIGRAM(S): at 08:35

## 2017-11-05 RX ADMIN — Medication 200 MILLIGRAM(S): at 13:05

## 2017-11-05 RX ADMIN — Medication 25 MILLIGRAM(S): at 06:48

## 2017-11-05 RX ADMIN — Medication 25 MILLIGRAM(S): at 13:03

## 2017-11-05 RX ADMIN — Medication 3: at 13:06

## 2017-11-05 RX ADMIN — Medication 20 MILLIEQUIVALENT(S): at 08:35

## 2017-11-05 RX ADMIN — Medication 200 MILLIGRAM(S): at 08:36

## 2017-11-05 RX ADMIN — PANTOPRAZOLE SODIUM 40 MILLIGRAM(S): 20 TABLET, DELAYED RELEASE ORAL at 06:46

## 2017-11-05 RX ADMIN — APIXABAN 5 MILLIGRAM(S): 2.5 TABLET, FILM COATED ORAL at 18:20

## 2017-11-05 RX ADMIN — BUDESONIDE AND FORMOTEROL FUMARATE DIHYDRATE 2 PUFF(S): 160; 4.5 AEROSOL RESPIRATORY (INHALATION) at 20:55

## 2017-11-05 RX ADMIN — Medication 3.75 MG/HR: at 22:31

## 2017-11-05 RX ADMIN — Medication 100 MILLIGRAM(S): at 18:20

## 2017-11-05 RX ADMIN — Medication 100 MILLIGRAM(S): at 13:07

## 2017-11-05 RX ADMIN — BUDESONIDE AND FORMOTEROL FUMARATE DIHYDRATE 2 PUFF(S): 160; 4.5 AEROSOL RESPIRATORY (INHALATION) at 10:20

## 2017-11-05 RX ADMIN — APIXABAN 5 MILLIGRAM(S): 2.5 TABLET, FILM COATED ORAL at 06:46

## 2017-11-05 RX ADMIN — CEFTRIAXONE 100 GRAM(S): 500 INJECTION, POWDER, FOR SOLUTION INTRAMUSCULAR; INTRAVENOUS at 11:15

## 2017-11-05 RX ADMIN — Medication 25 MILLIGRAM(S): at 06:46

## 2017-11-05 RX ADMIN — Medication 200 MILLIGRAM(S): at 18:21

## 2017-11-05 NOTE — PROGRESS NOTE ADULT - PROBLEM SELECTOR PLAN 1
Fluid overload 2/2 stopping lasix w/ severe diastolic HF  - C/w aggressive diuresis; lasix drip 7.5 mg/hr  - c/w hydralazine 25 TID  - Monitor daily weights  - Strict I/Os; net negative 14.5 L during stay, -2.6L in last 24h  - Monitor BMP Q12 and replete Mg to 2.0, K to 4.0 (q12 labs while on gtt)  - HF following; appreciating recs Fluid overload 2/2 stopping lasix w/ severe diastolic HF  - C/w aggressive diuresis; lasix drip 7.5 mg/hr  - c/w hydralazine 25 TID  - starting lisinopril 5 mg daily  - Monitor daily weights  - Strict I/Os; net negative 14.5 L during stay, -2.6L in last 24h  - Monitor BMP Q12 and replete Mg to 2.0, K to 4.0 (q12 labs while on gtt)  - HF following; appreciating recs

## 2017-11-05 NOTE — PROGRESS NOTE ADULT - PROBLEM SELECTOR PLAN 3
ruled out.   - D-dimer slightly elevated at 269  - Doppler LE negative for DVT  - CTA negative for PE

## 2017-11-05 NOTE — PROGRESS NOTE ADULT - ASSESSMENT
62-year-old female with chronic lymphocytic leukemia, atrial fibrillation, diabetes, and hypertension p/w CHF exacerbation.    1. Diastolic CHF exacerbation: good diuresis but CHAVEZ at times  2. Acute kidney injury- CRS- Renal function improving   3. Hypernatremia- due to obligate diuresis. mild and improving  4. Metabolic alkalosis: due to contraction. Trending up    5. A-fib with RVR: elevated HR at times    PLAN  - continue lasix infusion as ordered, per CHF  - strict I+O and daily weights  - maintain K+ greater than 4, a/w potassium 20 meq po once  - consider diamox

## 2017-11-05 NOTE — PROGRESS NOTE ADULT - PROBLEM SELECTOR PLAN 6
On Metformin at home. HbA1c 6.5. FSG controlled  - Monitor FS ac and hs  - Humalog ISS. holding metformin  - CC/DASH diet per nutrition recs  - Diabetes Education

## 2017-11-05 NOTE — PROGRESS NOTE ADULT - PROBLEM SELECTOR PLAN 5
- Patient reports dawson irritation w/ spasms causing her discomfort. Discussed D/Cing dawson, however patient is unable to ambulate from bed to chair without desaturing, receiving lasix gtt causing increased urinary output.  - c/w CTX 1gm day 3 for prophylactic tx of UTI  - On pyridum 200 TID

## 2017-11-05 NOTE — PROGRESS NOTE ADULT - SUBJECTIVE AND OBJECTIVE BOX
Patient is a 62y old  Female who presents with a chief complaint of LE edema and Abd edema, SOB (02 Nov 2017 11:25)      SUBJECTIVE / OVERNIGHT EVENTS: Patient seen and examined at bedside. Patient reports dyspnea on exertion. On 2L of O2. BM yesterday. Complains of pressure sensation when urinating through dawson, denies dysuria. Patient denies CP, palpitations, SOB, NVD, blurry vision, abd pain, fevers, chills.    Tele: Afib 90s-100s. PVCs    MEDICATIONS  (STANDING):  allopurinol 100 milliGRAM(s) Oral three times a day after meals  apixaban 5 milliGRAM(s) Oral every 12 hours  buDESOnide  80 MICROgram(s)/formoterol 4.5 MICROgram(s) Inhaler 2 Puff(s) Inhalation two times a day  cefTRIAXone   IVPB      chlorhexidine 4% Liquid 1 Application(s) Topical daily  dextrose 5%. 1000 milliLiter(s) (50 mL/Hr) IV Continuous <Continuous>  dextrose 50% Injectable 12.5 Gram(s) IV Push once  dextrose 50% Injectable 25 Gram(s) IV Push once  dextrose 50% Injectable 25 Gram(s) IV Push once  furosemide Infusion 7.5 mG/Hr (3.75 mL/Hr) IV Continuous <Continuous>  hydrALAZINE 25 milliGRAM(s) Oral three times a day  influenza   Vaccine 0.5 milliLiter(s) IntraMuscular once  insulin lispro (HumaLOG) corrective regimen sliding scale   SubCutaneous three times a day before meals  insulin lispro (HumaLOG) corrective regimen sliding scale   SubCutaneous at bedtime  metoprolol     tartrate 25 milliGRAM(s) Oral three times a day  pantoprazole    Tablet 40 milliGRAM(s) Oral before breakfast  phenazopyridine 200 milliGRAM(s) Oral three times a day after meals    MEDICATIONS  (PRN):  dextrose Gel 1 Dose(s) Oral once PRN Blood Glucose LESS THAN 70 milliGRAM(s)/deciliter  glucagon  Injectable 1 milliGRAM(s) IntraMuscular once PRN Glucose LESS THAN 70 milligrams/deciliter      Vital Signs Last 24 Hrs  T(C): 36.6 (05 Nov 2017 05:00), Max: 36.6 (05 Nov 2017 00:00)  T(F): 97.8 (05 Nov 2017 05:00), Max: 97.9 (05 Nov 2017 00:00)  HR: 95 (05 Nov 2017 08:00) (89 - 116)  BP: 136/90 (05 Nov 2017 08:00) (109/65 - 160/91)  BP(mean): 97 (05 Nov 2017 08:00) (73 - 125)  RR: 26 (05 Nov 2017 08:00) (16 - 29)  SpO2: 100% (05 Nov 2017 08:00) (90% - 100%)    CAPILLARY BLOOD GLUCOSE  176 (04 Nov 2017 22:00)      POCT Blood Glucose.: 176 mg/dL (04 Nov 2017 22:18)  POCT Blood Glucose.: 134 mg/dL (04 Nov 2017 18:17)  POCT Blood Glucose.: 161 mg/dL (04 Nov 2017 12:39)  POCT Blood Glucose.: 123 mg/dL (04 Nov 2017 08:50)      I&O's Summary    04 Nov 2017 08:01  -  05 Nov 2017 07:00  --------------------------------------------------------  IN: 675 mL / OUT: 3345 mL / NET: -2670 mL    05 Nov 2017 07:01  -  05 Nov 2017 08:45  --------------------------------------------------------  IN: 7.6 mL / OUT: 150 mL / NET: -142.4 mL          PHYSICAL EXAM:  General: NAD, obese woman lying comfortably in bed  HEENT: NC/ AT. PERRL  Neck: +JVD, +hepatojugular reflex  Cardio: irregularly irregular, No M/R/G  Resp: CTAB, decreased BS in RLL  Abd: soft, NT, very distended, decreased BS  : Dawsno in place  Extremities: 1+ radial pulses, no cyanosis, clubbing. 3+ edema b/l LE up to abdomen  Skin: Warm, dry, intact; No rashes or lesions  Psych: AAO X3      LABS:                        10.2   6.25  )-----------( 126      ( 05 Nov 2017 06:00 )             34.0     11-05    146<H>  |  101  |  33<H>  ----------------------------<  136<H>  3.8   |  35<H>  |  1.18    Ca    8.8      05 Nov 2017 06:00  Phos  3.9     11-05  Mg     2.0     11-05    TPro  6.6  /  Alb  3.3  /  TBili  1.0  /  DBili  x   /  AST  24  /  ALT  16  /  AlkPhos  65  11-05    LIVER FUNCTIONS - ( 05 Nov 2017 06:00 )  Alb: 3.3 g/dL / Pro: 6.6 g/dL / ALK PHOS: 65 u/L / ALT: 16 u/L / AST: 24 u/L / GGT: x                       RADIOLOGY & ADDITIONAL TESTS:    Imaging Personally Reviewed:     Consultant(s) Notes Reviewed:     Care Discussed with Consultants/Other Providers:     Desmond Romeo MD  PGY-1 | Internal Medicine  [phone] / [phone]

## 2017-11-05 NOTE — PROGRESS NOTE ADULT - ASSESSMENT
63 yo F with PMHx of Afib on Eliquis, DM on Metformin (HbA1c 6.5), uncontrolled HTN on Toprol, COPD w/ home O2 of 2L, lymphoma/CLL (1997), HFpEF LVEF 63% was sent in her from PMD's office for concern of fluid overload likely secondary to exacerbation of diastolic CHF, currently being diuresed w/ lasix gtt.

## 2017-11-05 NOTE — PROGRESS NOTE ADULT - SUBJECTIVE AND OBJECTIVE BOX
Jose CAR                             10.2   6.25  )-----------( 126      ( 2017 06:00 )             34.0                           10.2   6.25  )-----------( 126      ( 2017 06:00 )             34.0     11-05    146<H>  |  101  |  33<H>  ----------------------------<  136<H>  3.8   |  35<H>  |  1.18    Ca    8.8      2017 06:00  Phos  3.9     11  Mg     2.0         TPro  6.6  /  Alb  3.3  /  TBili  1.0  /  DBili  x   /  AST  24  /  ALT  16  /  AlkPhos  65        I&O's Detail    2017 08:  -  2017 07:00  --------------------------------------------------------  IN:    furosemide Infusion: 95 mL    IV PiggyBack: 50 mL    Oral Fluid: 530 mL  Total IN: 675 mL    OUT:    Indwelling Catheter - Urethral: 3345 mL  Total OUT: 3345 mL    Total NET: -2670 mL      2017 07:  -  2017 15:13  --------------------------------------------------------  IN:    furosemide Infusion: 22.8 mL    IV PiggyBack: 50 mL    Oral Fluid: 400 mL  Total IN: 472.8 mL    OUT:    Indwelling Catheter - Urethral: 610 mL  Total OUT: 610 mL    Total NET: -137.2 mL          I&O's Summary    2017 08:  -  2017 07:00  --------------------------------------------------------  IN: 675 mL / OUT: 3345 mL / NET: -2670 mL    2017 07:01  -  2017 15:13  --------------------------------------------------------  IN: 472.8 mL / OUT: 610 mL / NET: -137.2 mL        Daily     Daily Weight in k (2017 06:00)    Vital Signs Last 24 Hrs  T(C): 36.4 (2017 08:00), Max: 36.6 (2017 00:00)  T(F): 97.5 (2017 08:00), Max: 97.9 (2017 00:00)  HR: 112 (2017 12:00) (89 - 119)  BP: 106/54 (2017 11:00) (93/69 - 160/91)  BP(mean): 65 (2017 11:00) (65 - 125)  RR: 23 (2017 12:00) (16 - 31)  SpO2: 92% (2017 12:00) (91% - 100%)      MEDICATIONS  (STANDING):  allopurinol 100 milliGRAM(s) Oral three times a day after meals  apixaban 5 milliGRAM(s) Oral every 12 hours  buDESOnide  80 MICROgram(s)/formoterol 4.5 MICROgram(s) Inhaler 2 Puff(s) Inhalation two times a day  cefTRIAXone   IVPB      chlorhexidine 4% Liquid 1 Application(s) Topical daily  dextrose 5%. 1000 milliLiter(s) (50 mL/Hr) IV Continuous <Continuous>  dextrose 50% Injectable 12.5 Gram(s) IV Push once  dextrose 50% Injectable 25 Gram(s) IV Push once  dextrose 50% Injectable 25 Gram(s) IV Push once  furosemide Infusion 7.5 mG/Hr (3.75 mL/Hr) IV Continuous <Continuous>  hydrALAZINE 25 milliGRAM(s) Oral three times a day  influenza   Vaccine 0.5 milliLiter(s) IntraMuscular once  insulin lispro (HumaLOG) corrective regimen sliding scale   SubCutaneous three times a day before meals  insulin lispro (HumaLOG) corrective regimen sliding scale   SubCutaneous at bedtime  lisinopril 5 milliGRAM(s) Oral daily  metoprolol     tartrate 25 milliGRAM(s) Oral three times a day  pantoprazole    Tablet 40 milliGRAM(s) Oral before breakfast  phenazopyridine 200 milliGRAM(s) Oral three times a day after meals    MEDICATIONS  (PRN):  dextrose Gel 1 Dose(s) Oral once PRN Blood Glucose LESS THAN 70 milliGRAM(s)/deciliter  glucagon  Injectable 1 milliGRAM(s) IntraMuscular once PRN Glucose LESS THAN 70 milligrams/deciliter

## 2017-11-05 NOTE — PROGRESS NOTE ADULT - PROBLEM SELECTOR PLAN 2
- AFib, HR 90s-100s on tele  - C/w metoprolol 25 mg PO TID  - C/w A/C with eliquis 5mg q12  - C/w tele monitoring

## 2017-11-06 ENCOUNTER — APPOINTMENT (OUTPATIENT)
Dept: CARDIOLOGY | Facility: CLINIC | Age: 62
End: 2017-11-06

## 2017-11-06 LAB
ALBUMIN SERPL ELPH-MCNC: 3.4 G/DL — SIGNIFICANT CHANGE UP (ref 3.3–5)
ALP SERPL-CCNC: 66 U/L — SIGNIFICANT CHANGE UP (ref 40–120)
ALT FLD-CCNC: 13 U/L — SIGNIFICANT CHANGE UP (ref 4–33)
AST SERPL-CCNC: 18 U/L — SIGNIFICANT CHANGE UP (ref 4–32)
BILIRUB SERPL-MCNC: 0.9 MG/DL — SIGNIFICANT CHANGE UP (ref 0.2–1.2)
BUN SERPL-MCNC: 32 MG/DL — HIGH (ref 7–23)
CALCIUM SERPL-MCNC: 9.2 MG/DL — SIGNIFICANT CHANGE UP (ref 8.4–10.5)
CHLORIDE SERPL-SCNC: 100 MMOL/L — SIGNIFICANT CHANGE UP (ref 98–107)
CO2 SERPL-SCNC: 38 MMOL/L — HIGH (ref 22–31)
CREAT SERPL-MCNC: 1.21 MG/DL — SIGNIFICANT CHANGE UP (ref 0.5–1.3)
GLUCOSE BLDC GLUCOMTR-MCNC: 116 MG/DL — HIGH (ref 70–99)
GLUCOSE BLDC GLUCOMTR-MCNC: 198 MG/DL — HIGH (ref 70–99)
GLUCOSE SERPL-MCNC: 120 MG/DL — HIGH (ref 70–99)
HCT VFR BLD CALC: 34.3 % — LOW (ref 34.5–45)
HGB BLD-MCNC: 10.3 G/DL — LOW (ref 11.5–15.5)
MAGNESIUM SERPL-MCNC: 1.9 MG/DL — SIGNIFICANT CHANGE UP (ref 1.6–2.6)
MCHC RBC-ENTMCNC: 20.5 PG — LOW (ref 27–34)
MCHC RBC-ENTMCNC: 30 % — LOW (ref 32–36)
MCV RBC AUTO: 68.2 FL — LOW (ref 80–100)
NRBC # FLD: 0.05 — SIGNIFICANT CHANGE UP
PHOSPHATE SERPL-MCNC: 3.9 MG/DL — SIGNIFICANT CHANGE UP (ref 2.5–4.5)
PLATELET # BLD AUTO: 143 K/UL — LOW (ref 150–400)
PMV BLD: SIGNIFICANT CHANGE UP FL (ref 7–13)
POTASSIUM SERPL-MCNC: 3.9 MMOL/L — SIGNIFICANT CHANGE UP (ref 3.5–5.3)
POTASSIUM SERPL-SCNC: 3.9 MMOL/L — SIGNIFICANT CHANGE UP (ref 3.5–5.3)
PROT SERPL-MCNC: 6.5 G/DL — SIGNIFICANT CHANGE UP (ref 6–8.3)
RBC # BLD: 5.03 M/UL — SIGNIFICANT CHANGE UP (ref 3.8–5.2)
RBC # FLD: 18.9 % — HIGH (ref 10.3–14.5)
SODIUM SERPL-SCNC: 147 MMOL/L — HIGH (ref 135–145)
WBC # BLD: 5.32 K/UL — SIGNIFICANT CHANGE UP (ref 3.8–10.5)
WBC # FLD AUTO: 5.32 K/UL — SIGNIFICANT CHANGE UP (ref 3.8–10.5)

## 2017-11-06 PROCEDURE — 99233 SBSQ HOSP IP/OBS HIGH 50: CPT

## 2017-11-06 RX ORDER — METOPROLOL TARTRATE 50 MG
50 TABLET ORAL THREE TIMES A DAY
Qty: 0 | Refills: 0 | Status: DISCONTINUED | OUTPATIENT
Start: 2017-11-06 | End: 2017-11-09

## 2017-11-06 RX ORDER — APIXABAN 2.5 MG/1
5 TABLET, FILM COATED ORAL EVERY 12 HOURS
Qty: 0 | Refills: 0 | Status: DISCONTINUED | OUTPATIENT
Start: 2017-11-06 | End: 2017-11-10

## 2017-11-06 RX ORDER — POTASSIUM CHLORIDE 20 MEQ
40 PACKET (EA) ORAL EVERY 4 HOURS
Qty: 0 | Refills: 0 | Status: COMPLETED | OUTPATIENT
Start: 2017-11-06 | End: 2017-11-06

## 2017-11-06 RX ORDER — MAGNESIUM SULFATE 500 MG/ML
2 VIAL (ML) INJECTION ONCE
Qty: 0 | Refills: 0 | Status: COMPLETED | OUTPATIENT
Start: 2017-11-06 | End: 2017-11-06

## 2017-11-06 RX ORDER — FUROSEMIDE 40 MG
7.5 TABLET ORAL
Qty: 500 | Refills: 0 | Status: DISCONTINUED | OUTPATIENT
Start: 2017-11-06 | End: 2017-11-09

## 2017-11-06 RX ADMIN — BUDESONIDE AND FORMOTEROL FUMARATE DIHYDRATE 2 PUFF(S): 160; 4.5 AEROSOL RESPIRATORY (INHALATION) at 23:02

## 2017-11-06 RX ADMIN — Medication 50 GRAM(S): at 08:14

## 2017-11-06 RX ADMIN — Medication 50 MILLIGRAM(S): at 23:08

## 2017-11-06 RX ADMIN — CEFTRIAXONE 100 GRAM(S): 500 INJECTION, POWDER, FOR SOLUTION INTRAMUSCULAR; INTRAVENOUS at 10:12

## 2017-11-06 RX ADMIN — Medication 100 MILLIGRAM(S): at 13:57

## 2017-11-06 RX ADMIN — Medication 1: at 12:23

## 2017-11-06 RX ADMIN — APIXABAN 5 MILLIGRAM(S): 2.5 TABLET, FILM COATED ORAL at 23:03

## 2017-11-06 RX ADMIN — LISINOPRIL 5 MILLIGRAM(S): 2.5 TABLET ORAL at 05:29

## 2017-11-06 RX ADMIN — Medication 25 MILLIGRAM(S): at 13:57

## 2017-11-06 RX ADMIN — Medication 200 MILLIGRAM(S): at 08:14

## 2017-11-06 RX ADMIN — PANTOPRAZOLE SODIUM 40 MILLIGRAM(S): 20 TABLET, DELAYED RELEASE ORAL at 05:30

## 2017-11-06 RX ADMIN — Medication 25 MILLIGRAM(S): at 05:30

## 2017-11-06 RX ADMIN — Medication 200 MILLIGRAM(S): at 13:57

## 2017-11-06 RX ADMIN — BUDESONIDE AND FORMOTEROL FUMARATE DIHYDRATE 2 PUFF(S): 160; 4.5 AEROSOL RESPIRATORY (INHALATION) at 08:44

## 2017-11-06 RX ADMIN — Medication 100 MILLIGRAM(S): at 10:11

## 2017-11-06 RX ADMIN — Medication 25 MILLIGRAM(S): at 23:02

## 2017-11-06 RX ADMIN — Medication 100 MILLIGRAM(S): at 17:47

## 2017-11-06 RX ADMIN — Medication 40 MILLIEQUIVALENT(S): at 08:20

## 2017-11-06 RX ADMIN — Medication 40 MILLIEQUIVALENT(S): at 12:23

## 2017-11-06 RX ADMIN — Medication 25 MILLIGRAM(S): at 05:29

## 2017-11-06 RX ADMIN — Medication 200 MILLIGRAM(S): at 17:47

## 2017-11-06 NOTE — PROGRESS NOTE ADULT - PROBLEM SELECTOR PLAN 1
Echo indicates right sided HF, Acute cor pulmonale with TR.  Continue aggressive diuresis. Zaroxolyn added

## 2017-11-06 NOTE — PROGRESS NOTE ADULT - SUBJECTIVE AND OBJECTIVE BOX
Patient seen and examined. She is feeling much better, has been able to walk a short distance w/ help, still very volume overloaded. No acute SOB, orthopnea, PND, CP, palpitations, dizziness or syncope. Has moderate LE edema.     Medications:  allopurinol 100 milliGRAM(s) Oral three times a day after meals  buDESOnide  80 MICROgram(s)/formoterol 4.5 MICROgram(s) Inhaler 2 Puff(s) Inhalation two times a day  cefTRIAXone   IVPB      chlorhexidine 4% Liquid 1 Application(s) Topical daily  dextrose 5%. 1000 milliLiter(s) IV Continuous <Continuous>  dextrose 50% Injectable 12.5 Gram(s) IV Push once  dextrose 50% Injectable 25 Gram(s) IV Push once  dextrose 50% Injectable 25 Gram(s) IV Push once  dextrose Gel 1 Dose(s) Oral once PRN  furosemide Infusion 7.5 mG/Hr IV Continuous <Continuous>  glucagon  Injectable 1 milliGRAM(s) IntraMuscular once PRN  hydrALAZINE 25 milliGRAM(s) Oral three times a day  influenza   Vaccine 0.5 milliLiter(s) IntraMuscular once  insulin lispro (HumaLOG) corrective regimen sliding scale   SubCutaneous three times a day before meals  insulin lispro (HumaLOG) corrective regimen sliding scale   SubCutaneous at bedtime  lisinopril 5 milliGRAM(s) Oral daily  metoprolol     tartrate 25 milliGRAM(s) Oral three times a day  pantoprazole    Tablet 40 milliGRAM(s) Oral before breakfast  phenazopyridine 200 milliGRAM(s) Oral three times a day after meals    Vital Signs Last 24 Hrs  T(C): 36.6 (2017 11:50), Max: 36.8 (2017 00:00)  T(F): 97.8 (2017 11:50), Max: 98.2 (2017 00:00)  HR: 95 (2017 11:50) (86 - 110)  BP: 106/60 (2017 12:34) (106/60 - 145/86)  BP(mean): 95 (2017 08:00) (74 - 114)  RR: 17 (2017 12:34) (16 - 27)  SpO2: 95% (2017 12:34) (92% - 100%)    Daily     Daily Weight in k (2017 06:00)    I&O's Summary    2017 07:  -  2017 07:00  --------------------------------------------------------  IN: 980.7 mL / OUT: 2315 mL / NET: -1334.3 mL    2017 07:  -  2017 14:40  --------------------------------------------------------  IN: 53.8 mL / OUT: 475 mL / NET: -421.2 mL      I&O's Detail    2017 07:  -  2017 07:00  --------------------------------------------------------  IN:    furosemide Infusion: 26.3 mL    furosemide Infusion: 64.4 mL    IV PiggyBack: 50 mL    Oral Fluid: 840 mL  Total IN: 980.7 mL    OUT:    Indwelling Catheter - Urethral: 2315 mL  Total OUT: 2315 mL    Total NET: -1334.3 mL      2017 07:  -  2017 14:40  --------------------------------------------------------  IN:    furosemide Infusion: 3.8 mL    IV PiggyBack: 50 mL  Total IN: 53.8 mL    OUT:    Indwelling Catheter - Urethral: 475 mL  Total OUT: 475 mL    Total NET: -421.2 mL          Physical Exam:     General: No distress. Comfortable.  HEENT: EOM intact.  Neck: Neck supple. JVP elevated to mid neck at a 90 degree angle,  No masses.   Chest: b/l crackles on bases.   CV: irregularly irregular. No murmurs, rub, or gallops. Radial pulses normal. 3+ LE edema b/l.   Abdomen: Soft, non-distended, non-tender  Skin: No rashes or skin breakdown  Neurology: Alert and oriented times three. Sensation intact  Psych: Affect normal    Labs:                        10.3   5.32  )-----------( 143      ( 2017 05:55 )             34.3     11-06    147<H>  |  100  |  32<H>  ----------------------------<  120<H>  3.9   |  38<H>  |  1.21    Ca    9.2      2017 05:55  Phos  3.9     11-  Mg     1.9     -    TPro  6.5  /  Alb  3.4  /  TBili  0.9  /  DBili  x   /  AST  18  /  ALT  13  /  AlkPhos  66  11-06          Serum Pro-Brain Natriuretic Peptide: 6997 pg/mL (10-30 @ 16:00)      < from: Transthoracic Echocardiogram (10.31.17 @ 12:58) >  DIMENSIONS:  Dimensions:     Normal Values:  LA:     4.2 cm    2.0 - 4.0 cm  Ao:     2.8 cm    2.0 - 3.8 cm  SEPTUM: 0.9 cm    0.6 - 1.2 cm  PWT:    1.0 cm    0.6 - 1.1 cm  LVIDd:  4.2 cm    3.0 - 5.6 cm  LVIDs:  2.7 cm    1.8 - 4.0 cm  Derived Variables:  LVMI: 58 g/m2  RWT: 0.47  Fractional short: 36 %  Ejection Fraction (Teicholtz): 66 %  ------------------------------------------------------------------------  OBSERVATIONS:  Mitral Valve: Mitral annular calcification, otherwise  normal mitral valve. Mild mitral regurgitation.  Aortic Root: Normal aortic root.  Aortic Valve: Normal trileaflet aortic valve.  Left Atrium: Normal left atrium.  Left Ventricle: Normal left ventricular systolic function.  No segmental wall motion abnormalities. Septal motion  consistent with right ventricular pressure and volume  overload. Increased relative wall thickness with normal  left ventricular mass index, consistent with concentric  left ventricular remodeling.  Right Heart: Severe right atrial enlargement. Right  ventricular enlargement with decreased right ventricular  systolic function. Normal tricuspid valve.  Moderate-severe  tricuspid regurgitation. Normal pulmonic valve.  Pericardium/PleuraNormal pericardium with no pericardial  effusion.  Hemodynamic: Estimated right ventricular systolic pressure  equals 80 mm Hg, assuming right atrial pressure equals 10  mm Hg, consistent with severe pulmonary hypertension.  ------------------------------------------------------------------------    < end of copied text >

## 2017-11-06 NOTE — CHART NOTE - NSCHARTNOTEFT_GEN_A_CORE
Transfer note from CCU    62F with hx of Afib on Eliquis, DM on Metformin, uncontrolled HTN on Toprol COPD, on home O2 2L, lymphoma/CLL (1997), HFpEF LVEF 63%. BIBEMS from PMD for concern for fluid overload, ( sob,b/l LE edema and abd distention, +JVD)found to be in CHF exacerbation in the setting of holding diuretics and afterload reducing agents for hypotension. Admitted to CCU for monitoring. Pt initially on BIPAP was improved and was satting well on 2L NC. Nephrology is following. Heart failure team is following.     Pt came in with AFib w/ RVR, rate controlled with metoprolol 12.5 mg TID, later increased to 25 mg TID, rates now in 90s-100s. Initially concern for PE due to hypotension. LE dopplers and CTA chest negative. Pt became hypertensive and started on hydralazine 25 mg TID to reduce afterload and lisinopril 5 mg started. Pt has been endorsing bladder discomfort/pressure with the dawson and was started on ceftriaxone prophylactically.     Pt was diuresed with lasix drip at 5 mg/hr, later increased to 7.5 mg/hr. Pt is net negative 15.8L so far since admission. Abd and LE swelling has improved. Pt with CHAVEZ, but no SOB at rest. Pt has been in AFib with rates in 90s-low 100s. Pt is medically stable to go to a telemetry unit.

## 2017-11-06 NOTE — PROGRESS NOTE ADULT - ASSESSMENT
61 y/o female w/ PMHX  of Afib on Eliquis, DM II,  HTN, COPD on home O2 2L, lymphoma/CLL s/p radiation and chemo (1997), HFpEF LVEF 66% comes in with complaints of worsening SOB and LE edema. She states she recently had a fall at work, hit her head and was sent to the ER where she was found to be hypotensive. Since then some of her HF medications had been held. CT head was unremarkable per pt. She then took her self to Dr. Goldberg (PMD) for a follow up yesterday, and she was found to be in ADHF and was sent to [facility] ED.     Diuresing well, but still grossly hypervolemic. CTPA no PE.

## 2017-11-06 NOTE — PROGRESS NOTE ADULT - ASSESSMENT
· Assessment		  62-year-old female with chronic lymphocytic leukemia, atrial fibrillation, diabetes, and hypertension p/w CHF exacerbation.    1. Diastolic CHF exacerbation: good diuresis but CHAVEZ at times  2. Acute kidney injury- CRS- Renal function improving   3. Hypernatremia- due to obligate diuresis. mild and improving  4. Metabolic alkalosis: due to contraction. Trending up    5. A-fib with RVR: elevated HR at times    PLAN  - continue lasix infusion as ordered, per CHF;  Add zaroxoyln for synergy today  -Check VBG for acid base status--possible diamox  - strict I+O and daily weights  - maintain K+ greater than 4,

## 2017-11-06 NOTE — PROGRESS NOTE ADULT - SUBJECTIVE AND OBJECTIVE BOX
[facility] [phone]        Patient seen and examined         MEDICATIONS  (STANDING):  allopurinol 100 milliGRAM(s) Oral three times a day after meals  buDESOnide  80 MICROgram(s)/formoterol 4.5 MICROgram(s) Inhaler 2 Puff(s) Inhalation two times a day  cefTRIAXone   IVPB      chlorhexidine 4% Liquid 1 Application(s) Topical daily  dextrose 5%. 1000 milliLiter(s) (50 mL/Hr) IV Continuous <Continuous>  dextrose 50% Injectable 12.5 Gram(s) IV Push once  dextrose 50% Injectable 25 Gram(s) IV Push once  dextrose 50% Injectable 25 Gram(s) IV Push once  furosemide Infusion 7.5 mG/Hr (3.75 mL/Hr) IV Continuous <Continuous>  hydrALAZINE 25 milliGRAM(s) Oral three times a day  influenza   Vaccine 0.5 milliLiter(s) IntraMuscular once  insulin lispro (HumaLOG) corrective regimen sliding scale   SubCutaneous three times a day before meals  insulin lispro (HumaLOG) corrective regimen sliding scale   SubCutaneous at bedtime  lisinopril 5 milliGRAM(s) Oral daily  metoprolol     tartrate 25 milliGRAM(s) Oral three times a day  pantoprazole    Tablet 40 milliGRAM(s) Oral before breakfast  phenazopyridine 200 milliGRAM(s) Oral three times a day after meals      VITAL:  T(C): , Max: 36.8 (11-06-17 @ 00:00)  T(F): , Max: 98.2 (11-06-17 @ 00:00)  HR: 95 (11-06-17 @ 11:50)  BP: 106/60 (11-06-17 @ 12:34)  BP(mean): 95 (11-06-17 @ 08:00)  RR: 17 (11-06-17 @ 12:34)  SpO2: 95% (11-06-17 @ 12:34)  Wt(kg): --    I and O's:    11-05 @ 07:01  -  11-06 @ 07:00  --------------------------------------------------------  IN: 980.7 mL / OUT: 2315 mL / NET: -1334.3 mL    11-06 @ 07:01  -  11-06 @ 16:08  --------------------------------------------------------  IN: 53.8 mL / OUT: 650 mL / NET: -596.2 mL          PHYSICAL EXAM:    Constitutional: NAD  HEENT: PERRLA    Neck:  + JVD  Respiratory: CTAB/L  Cardiovascular: S1 and S2  Gastrointestinal: BS+, soft, NT/ND  Extremities: + 3 peripheral edema  Neurological: A/O x 3, no focal deficits  Psychiatric: Normal mood, normal affect  : + Corey  Skin: No rashes  Access: Not applicable    LABS:                        10.3   5.32  )-----------( 143      ( 06 Nov 2017 05:55 )             34.3     11-06    147<H>  |  100  |  32<H>  ----------------------------<  120<H>  3.9   |  38<H>  |  1.21    Ca    9.2      06 Nov 2017 05:55  Phos  3.9     11-06  Mg     1.9     11-06    TPro  6.5  /  Alb  3.4  /  TBili  0.9  /  DBili  x   /  AST  18  /  ALT  13  /  AlkPhos  66  11-06          Urine Studies:          RADIOLOGY & ADDITIONAL STUDIES: [facility] [phone]        Patient seen and examined in bed.  SHe was breathing easier         MEDICATIONS  (STANDING):  allopurinol 100 milliGRAM(s) Oral three times a day after meals  buDESOnide  80 MICROgram(s)/formoterol 4.5 MICROgram(s) Inhaler 2 Puff(s) Inhalation two times a day  cefTRIAXone   IVPB      chlorhexidine 4% Liquid 1 Application(s) Topical daily  dextrose 5%. 1000 milliLiter(s) (50 mL/Hr) IV Continuous <Continuous>  dextrose 50% Injectable 12.5 Gram(s) IV Push once  dextrose 50% Injectable 25 Gram(s) IV Push once  dextrose 50% Injectable 25 Gram(s) IV Push once  furosemide Infusion 7.5 mG/Hr (3.75 mL/Hr) IV Continuous <Continuous>  hydrALAZINE 25 milliGRAM(s) Oral three times a day  influenza   Vaccine 0.5 milliLiter(s) IntraMuscular once  insulin lispro (HumaLOG) corrective regimen sliding scale   SubCutaneous three times a day before meals  insulin lispro (HumaLOG) corrective regimen sliding scale   SubCutaneous at bedtime  lisinopril 5 milliGRAM(s) Oral daily  metoprolol     tartrate 25 milliGRAM(s) Oral three times a day  pantoprazole    Tablet 40 milliGRAM(s) Oral before breakfast  phenazopyridine 200 milliGRAM(s) Oral three times a day after meals      VITAL:  T(C): , Max: 36.8 (11-06-17 @ 00:00)  T(F): , Max: 98.2 (11-06-17 @ 00:00)  HR: 95 (11-06-17 @ 11:50)  BP: 106/60 (11-06-17 @ 12:34)  BP(mean): 95 (11-06-17 @ 08:00)  RR: 17 (11-06-17 @ 12:34)  SpO2: 95% (11-06-17 @ 12:34)  Wt(kg): --    I and O's:    11-05 @ 07:01  -  11-06 @ 07:00  --------------------------------------------------------  IN: 980.7 mL / OUT: 2315 mL / NET: -1334.3 mL    11-06 @ 07:01  -  11-06 @ 16:08  --------------------------------------------------------  IN: 53.8 mL / OUT: 650 mL / NET: -596.2 mL          PHYSICAL EXAM:    Constitutional: NAD  HEENT: PERRLA    Neck:  + JVD  Respiratory: CTAB/L  Cardiovascular: S1 and S2  Gastrointestinal: BS+, soft, NT/ND  Extremities: + 3 peripheral edema  Neurological: A/O x 3, no focal deficits  Psychiatric: Normal mood, normal affect  : + Corey  Skin: No rashes  Access: Not applicable    LABS:                        10.3   5.32  )-----------( 143      ( 06 Nov 2017 05:55 )             34.3     11-06    147<H>  |  100  |  32<H>  ----------------------------<  120<H>  3.9   |  38<H>  |  1.21    Ca    9.2      06 Nov 2017 05:55  Phos  3.9     11-06  Mg     1.9     11-06    TPro  6.5  /  Alb  3.4  /  TBili  0.9  /  DBili  x   /  AST  18  /  ALT  13  /  AlkPhos  66  11-06          Urine Studies:          RADIOLOGY & ADDITIONAL STUDIES:

## 2017-11-06 NOTE — PROGRESS NOTE ADULT - PROBLEM SELECTOR PLAN 1
-Diuresing well -1334 ml in 24 hours, but still grossly hypervolemic.   -Continue Lasix gtt to 7.5 mg/hr.   -Renal function improving, Cr 1.21.  -Continue Lopressor to 25 mg po TID, continue. Hr should improve as she becomes euvolemic.  Still has some a.fib w/ RVR up to 120s.   -TTE shows severe pulm HTN, RV pressure overload and mod-severe TR, all likely from severe diastolic dysfunction.  -CTA shows no PE.

## 2017-11-06 NOTE — PROGRESS NOTE ADULT - SUBJECTIVE AND OBJECTIVE BOX
Patient is a 62y old  Female who presents with a chief complaint of LE edema and Abd edema, SOB (02 Nov 2017 11:25)      SUBJECTIVE / OVERNIGHT EVENTS:  Off CCU. Able to walk to bathroom with assistance,. no orthopnea  Review of Systems:   CONSTITUTIONAL: No fever, weight loss, or fatigue  EYES: No eye pain, visual disturbances, or discharge  ENMT:  No difficulty hearing, tinnitus, vertigo; No sinus or throat pain  NECK: No pain or stiffness  BREASTS: No pain, masses, or nipple discharge  RESPIRATORY: As above    CARDIOVASCULAR: No chest pain, palpitations, dizziness, Lef edema +++  GASTROINTESTINAL: No abdominal or epigastric pain. No nausea, vomiting, or hematemesis; No diarrhea or constipation. No melena or hematochezia.  GENITOURINARY: No dysuria, frequency, hematuria, or incontinence  NEUROLOGICAL: No headaches, memory loss, loss of strength, numbness, or tremors  SKIN: No itching, burning, rashes, or lesions   LYMPH NODES: No enlarged glands  ENDOCRINE: No heat or cold intolerance; No hair loss  MUSCULOSKELETAL: No joint pain or swelling; No muscle, back, or extremity pain  PSYCHIATRIC: No depression, anxiety, mood swings, or difficulty sleeping  HEME/LYMPH: No easy bruising, or bleeding gums  ALLERY AND IMMUNOLOGIC: No hives or eczema    MEDICATIONS  (STANDING):  allopurinol 100 milliGRAM(s) Oral three times a day after meals  buDESOnide  80 MICROgram(s)/formoterol 4.5 MICROgram(s) Inhaler 2 Puff(s) Inhalation two times a day  cefTRIAXone   IVPB      chlorhexidine 4% Liquid 1 Application(s) Topical daily  dextrose 5%. 1000 milliLiter(s) (50 mL/Hr) IV Continuous <Continuous>  dextrose 50% Injectable 12.5 Gram(s) IV Push once  dextrose 50% Injectable 25 Gram(s) IV Push once  dextrose 50% Injectable 25 Gram(s) IV Push once  furosemide Infusion 7.5 mG/Hr (3.75 mL/Hr) IV Continuous <Continuous>  hydrALAZINE 25 milliGRAM(s) Oral three times a day  influenza   Vaccine 0.5 milliLiter(s) IntraMuscular once  insulin lispro (HumaLOG) corrective regimen sliding scale   SubCutaneous three times a day before meals  insulin lispro (HumaLOG) corrective regimen sliding scale   SubCutaneous at bedtime  lisinopril 5 milliGRAM(s) Oral daily  metolazone 5 milliGRAM(s) Oral once  metoprolol     tartrate 50 milliGRAM(s) Oral three times a day  pantoprazole    Tablet 40 milliGRAM(s) Oral before breakfast  phenazopyridine 200 milliGRAM(s) Oral three times a day after meals    MEDICATIONS  (PRN):  dextrose Gel 1 Dose(s) Oral once PRN Blood Glucose LESS THAN 70 milliGRAM(s)/deciliter  glucagon  Injectable 1 milliGRAM(s) IntraMuscular once PRN Glucose LESS THAN 70 milligrams/deciliter      PHYSICAL EXAM:  Vital Signs Last 24 Hrs  T(C): 36.4 (06 Nov 2017 16:24), Max: 36.8 (06 Nov 2017 00:00)  T(F): 97.6 (06 Nov 2017 16:24), Max: 98.2 (06 Nov 2017 00:00)  HR: 119 (06 Nov 2017 16:24) (86 - 119)  BP: 108/72 (06 Nov 2017 16:24) (106/60 - 145/86)  BP(mean): 95 (06 Nov 2017 08:00) (79 - 114)  RR: 18 (06 Nov 2017 16:24) (16 - 26)  SpO2: 100% (06 Nov 2017 16:24) (94% - 100%)  I&O's Summary    05 Nov 2017 07:01  -  06 Nov 2017 07:00  --------------------------------------------------------  IN: 980.7 mL / OUT: 2315 mL / NET: -1334.3 mL    06 Nov 2017 07:01  -  06 Nov 2017 18:32  --------------------------------------------------------  IN: 53.8 mL / OUT: 850 mL / NET: -796.2 mL      GENERAL: NAD, well-developed  HEAD:  Atraumatic, Normocephalic  EYES: EOMI, PERRLA, conjunctiva and sclera clear  NECK: Supple, JVD +  CHEST/LUNG: Clear to auscultation bilaterally; No wheeze  HEART: Regular rate and rhythm; No murmurs, rubs, or gallops  ABDOMEN: Soft, Nontender, Nondistended; Bowel sounds present  EXTREMITIES:  2+ Peripheral Pulses, No clubbing, cyanosis,+++ Edema  PSYCH: AAOx3  NEUROLOGY: non-focal  SKIN: No rashes or lesions    LABS:  CAPILLARY BLOOD GLUCOSE  116 (06 Nov 2017 08:00)  150 (05 Nov 2017 22:29)      POCT Blood Glucose.: 139 mg/dL (06 Nov 2017 17:11)  POCT Blood Glucose.: 198 mg/dL (06 Nov 2017 11:45)  POCT Blood Glucose.: 116 mg/dL (06 Nov 2017 08:21)  POCT Blood Glucose.: 150 mg/dL (05 Nov 2017 22:27)                          10.3   5.32  )-----------( 143      ( 06 Nov 2017 05:55 )             34.3     11-06    147<H>  |  100  |  32<H>  ----------------------------<  120<H>  3.9   |  38<H>  |  1.21    Ca    9.2      06 Nov 2017 05:55  Phos  3.9     11-06  Mg     1.9     11-06    TPro  6.5  /  Alb  3.4  /  TBili  0.9  /  DBili  x   /  AST  18  /  ALT  13  /  AlkPhos  66  11-06              RADIOLOGY & ADDITIONAL TESTS:    Imaging Personally Reviewed:    Consultant(s) Notes Reviewed:      Care Discussed with Consultants/Other Providers:

## 2017-11-07 LAB
BASE EXCESS BLDV CALC-SCNC: 15 MMOL/L — SIGNIFICANT CHANGE UP
BASOPHILS # BLD AUTO: 0.05 K/UL — SIGNIFICANT CHANGE UP (ref 0–0.2)
BASOPHILS NFR BLD AUTO: 0.9 % — SIGNIFICANT CHANGE UP (ref 0–2)
BUN SERPL-MCNC: 40 MG/DL — HIGH (ref 7–23)
CALCIUM SERPL-MCNC: 9.5 MG/DL — SIGNIFICANT CHANGE UP (ref 8.4–10.5)
CHLORIDE SERPL-SCNC: 98 MMOL/L — SIGNIFICANT CHANGE UP (ref 98–107)
CO2 SERPL-SCNC: 37 MMOL/L — HIGH (ref 22–31)
CREAT SERPL-MCNC: 1.37 MG/DL — HIGH (ref 0.5–1.3)
EOSINOPHIL # BLD AUTO: 0.23 K/UL — SIGNIFICANT CHANGE UP (ref 0–0.5)
EOSINOPHIL NFR BLD AUTO: 4.1 % — SIGNIFICANT CHANGE UP (ref 0–6)
GAS PNL BLDV: 141 MMOL/L — SIGNIFICANT CHANGE UP (ref 136–146)
GLUCOSE BLDV-MCNC: 124 — HIGH (ref 70–99)
GLUCOSE SERPL-MCNC: 120 MG/DL — HIGH (ref 70–99)
HCO3 BLDV-SCNC: 36 MMOL/L — HIGH (ref 20–27)
HCT VFR BLD CALC: 34.1 % — LOW (ref 34.5–45)
HCT VFR BLDV CALC: 34.2 % — LOW (ref 34.5–45)
HGB BLD-MCNC: 10.3 G/DL — LOW (ref 11.5–15.5)
HGB BLDV-MCNC: 11.1 G/DL — LOW (ref 11.5–15.5)
IMM GRANULOCYTES # BLD AUTO: 0.02 # — SIGNIFICANT CHANGE UP
IMM GRANULOCYTES NFR BLD AUTO: 0.4 % — SIGNIFICANT CHANGE UP (ref 0–1.5)
LYMPHOCYTES # BLD AUTO: 1.29 K/UL — SIGNIFICANT CHANGE UP (ref 1–3.3)
LYMPHOCYTES # BLD AUTO: 23 % — SIGNIFICANT CHANGE UP (ref 13–44)
MAGNESIUM SERPL-MCNC: 2 MG/DL — SIGNIFICANT CHANGE UP (ref 1.6–2.6)
MCHC RBC-ENTMCNC: 20.6 PG — LOW (ref 27–34)
MCHC RBC-ENTMCNC: 30.2 % — LOW (ref 32–36)
MCV RBC AUTO: 68.3 FL — LOW (ref 80–100)
MONOCYTES # BLD AUTO: 0.66 K/UL — SIGNIFICANT CHANGE UP (ref 0–0.9)
MONOCYTES NFR BLD AUTO: 11.7 % — SIGNIFICANT CHANGE UP (ref 2–14)
NEUTROPHILS # BLD AUTO: 3.37 K/UL — SIGNIFICANT CHANGE UP (ref 1.8–7.4)
NEUTROPHILS NFR BLD AUTO: 59.9 % — SIGNIFICANT CHANGE UP (ref 43–77)
NRBC # FLD: 0.04 — SIGNIFICANT CHANGE UP
PCO2 BLDV: 66 MMHG — HIGH (ref 41–51)
PH BLDV: 7.41 PH — SIGNIFICANT CHANGE UP (ref 7.32–7.43)
PHOSPHATE SERPL-MCNC: 3.8 MG/DL — SIGNIFICANT CHANGE UP (ref 2.5–4.5)
PLATELET # BLD AUTO: 175 K/UL — SIGNIFICANT CHANGE UP (ref 150–400)
PMV BLD: SIGNIFICANT CHANGE UP FL (ref 7–13)
PO2 BLDV: < 24 MMHG — LOW (ref 35–40)
POTASSIUM BLDV-SCNC: 4.3 MMOL/L — SIGNIFICANT CHANGE UP (ref 3.4–4.5)
POTASSIUM SERPL-MCNC: 4.2 MMOL/L — SIGNIFICANT CHANGE UP (ref 3.5–5.3)
POTASSIUM SERPL-SCNC: 4.2 MMOL/L — SIGNIFICANT CHANGE UP (ref 3.5–5.3)
RBC # BLD: 4.99 M/UL — SIGNIFICANT CHANGE UP (ref 3.8–5.2)
RBC # FLD: 18.7 % — HIGH (ref 10.3–14.5)
SAO2 % BLDV: 24.8 % — LOW (ref 60–85)
SODIUM SERPL-SCNC: 146 MMOL/L — HIGH (ref 135–145)
WBC # BLD: 5.62 K/UL — SIGNIFICANT CHANGE UP (ref 3.8–10.5)
WBC # FLD AUTO: 5.62 K/UL — SIGNIFICANT CHANGE UP (ref 3.8–10.5)

## 2017-11-07 PROCEDURE — 99232 SBSQ HOSP IP/OBS MODERATE 35: CPT

## 2017-11-07 RX ADMIN — Medication 50 MILLIGRAM(S): at 21:53

## 2017-11-07 RX ADMIN — CHLORHEXIDINE GLUCONATE 1 APPLICATION(S): 213 SOLUTION TOPICAL at 11:30

## 2017-11-07 RX ADMIN — Medication 50 MILLIGRAM(S): at 05:31

## 2017-11-07 RX ADMIN — CEFTRIAXONE 100 GRAM(S): 500 INJECTION, POWDER, FOR SOLUTION INTRAMUSCULAR; INTRAVENOUS at 09:54

## 2017-11-07 RX ADMIN — PANTOPRAZOLE SODIUM 40 MILLIGRAM(S): 20 TABLET, DELAYED RELEASE ORAL at 06:03

## 2017-11-07 RX ADMIN — Medication 100 MILLIGRAM(S): at 17:29

## 2017-11-07 RX ADMIN — BUDESONIDE AND FORMOTEROL FUMARATE DIHYDRATE 2 PUFF(S): 160; 4.5 AEROSOL RESPIRATORY (INHALATION) at 21:53

## 2017-11-07 RX ADMIN — Medication 1: at 17:31

## 2017-11-07 RX ADMIN — Medication 25 MILLIGRAM(S): at 05:31

## 2017-11-07 RX ADMIN — APIXABAN 5 MILLIGRAM(S): 2.5 TABLET, FILM COATED ORAL at 09:54

## 2017-11-07 RX ADMIN — Medication 200 MILLIGRAM(S): at 12:52

## 2017-11-07 RX ADMIN — Medication 100 MILLIGRAM(S): at 12:52

## 2017-11-07 RX ADMIN — Medication 200 MILLIGRAM(S): at 08:44

## 2017-11-07 RX ADMIN — Medication 50 MILLIGRAM(S): at 14:25

## 2017-11-07 RX ADMIN — Medication 25 MILLIGRAM(S): at 21:53

## 2017-11-07 RX ADMIN — BUDESONIDE AND FORMOTEROL FUMARATE DIHYDRATE 2 PUFF(S): 160; 4.5 AEROSOL RESPIRATORY (INHALATION) at 08:45

## 2017-11-07 RX ADMIN — Medication 25 MILLIGRAM(S): at 14:25

## 2017-11-07 RX ADMIN — Medication 200 MILLIGRAM(S): at 17:28

## 2017-11-07 RX ADMIN — LISINOPRIL 5 MILLIGRAM(S): 2.5 TABLET ORAL at 05:31

## 2017-11-07 RX ADMIN — Medication 100 MILLIGRAM(S): at 08:45

## 2017-11-07 RX ADMIN — APIXABAN 5 MILLIGRAM(S): 2.5 TABLET, FILM COATED ORAL at 21:53

## 2017-11-07 NOTE — PROGRESS NOTE ADULT - ASSESSMENT
61 y/o female w/ PMHX  of Afib on Eliquis, DM II,  HTN, COPD on home O2 2L, lymphoma/CLL s/p radiation and chemo (1997), HFpEF LVEF 66% comes in with complaints of worsening SOB and LE edema. She states she recently had a fall at work, hit her head and was sent to the ER where she was found to be hypotensive. Since then some of her HF medications had been held. CT head was unremarkable per pt. She then took her self to Dr. Goldberg (PMD) for a follow up yesterday, and she was found to be in ADHF and was sent to [facility] ED.     Diuresing well, but still grossly hypervolemic. CTPA no PE. 61 y/o female w/ PMHX  of Afib on Eliquis, DM II,  HTN, COPD on home O2 2L, lymphoma/CLL s/p radiation and chemo (1997), HFpEF LVEF 66% comes in with complaints of worsening SOB and LE edema. She states she recently had a fall at work, hit her head and was sent to the ER where she was found to be hypotensive. Since then some of her HF medications had been held. CT head was unremarkable per pt. She then took her self to Dr. Goldberg (PMD) for a follow up yesterday, and she was found to be in ADHF and was sent to [facility] ED.     Diuresing well, but still grossly hypervolemic.

## 2017-11-07 NOTE — PROGRESS NOTE ADULT - PROBLEM SELECTOR PLAN 1
-3391 ml urine output in 24 hours,  s/p Metolazone 5 mg x 1  Cr up today 1.37. -3391 ml urine output in 24 hours,  s/p Metolazone 5 mg x 1  Cr up today 1.37.  -Diuresed well w/ Lasix and metolazone- would continue with Lasix gtt at 7.5 mg/hr and metolazone 5 mg po x 1 today as well. Would allow for some permissive azotemia.

## 2017-11-07 NOTE — PROGRESS NOTE ADULT - PROBLEM SELECTOR PLAN 1
Echo indicates right sided HF, Acute cor pulmonale with TR.  Continue aggressive diuresis. Zaroxolyn added. Still overloaded with fluid clnically

## 2017-11-07 NOTE — PROGRESS NOTE ADULT - SUBJECTIVE AND OBJECTIVE BOX
NEPHROLOGY-[facility] [phone]      Patient seen and examined in bed.  SHe felt better and breathing easier        MEDICATIONS  (STANDING):  allopurinol 100 milliGRAM(s) Oral three times a day after meals  apixaban 5 milliGRAM(s) Oral every 12 hours  buDESOnide  80 MICROgram(s)/formoterol 4.5 MICROgram(s) Inhaler 2 Puff(s) Inhalation two times a day  cefTRIAXone   IVPB      chlorhexidine 4% Liquid 1 Application(s) Topical daily  dextrose 5%. 1000 milliLiter(s) (50 mL/Hr) IV Continuous <Continuous>  dextrose 50% Injectable 12.5 Gram(s) IV Push once  dextrose 50% Injectable 25 Gram(s) IV Push once  dextrose 50% Injectable 25 Gram(s) IV Push once  furosemide Infusion 7.5 mG/Hr (3.75 mL/Hr) IV Continuous <Continuous>  hydrALAZINE 25 milliGRAM(s) Oral three times a day  influenza   Vaccine 0.5 milliLiter(s) IntraMuscular once  insulin lispro (HumaLOG) corrective regimen sliding scale   SubCutaneous three times a day before meals  insulin lispro (HumaLOG) corrective regimen sliding scale   SubCutaneous at bedtime  lisinopril 5 milliGRAM(s) Oral daily  metoprolol     tartrate 50 milliGRAM(s) Oral three times a day  pantoprazole    Tablet 40 milliGRAM(s) Oral before breakfast  phenazopyridine 200 milliGRAM(s) Oral three times a day after meals      VITAL:  T(C): , Max: 36.9 (11-06-17 @ 20:06)  T(F): , Max: 98.5 (11-06-17 @ 22:57)  HR: 97 (11-07-17 @ 11:50)  BP: 106/80 (11-07-17 @ 11:50)  BP(mean): --  RR: 18 (11-07-17 @ 11:50)  SpO2: 95% (11-07-17 @ 11:50)  Wt(kg): --    I and O's:    11-06 @ 07:01  -  11-07 @ 07:00  --------------------------------------------------------  IN: 83.8 mL / OUT: 3475 mL / NET: -3391.2 mL    11-07 @ 07:01  -  11-07 @ 15:00  --------------------------------------------------------  IN: 0 mL / OUT: 2000 mL / NET: -2000 mL          PHYSICAL EXAM:    Constitutional: NAD  HEENT: PERRLA    Neck:  + JVD  Respiratory: CTAB/L  Cardiovascular: S1 and S2  Gastrointestinal: BS+, soft, NT/ND  Extremities: + 3 peripheral edema  Neurological: A/O x 3, no focal deficits  Psychiatric: Normal mood, normal affect  : No Corey  Skin: No rashes  Access: Not applicable    LABS:                        10.3   5.62  )-----------( 175      ( 07 Nov 2017 05:24 )             34.1     11-07    146<H>  |  98  |  40<H>  ----------------------------<  120<H>  4.2   |  37<H>  |  1.37<H>    Ca    9.5      07 Nov 2017 05:24  Phos  3.8     11-07  Mg     2.0     11-07    TPro  6.5  /  Alb  3.4  /  TBili  0.9  /  DBili  x   /  AST  18  /  ALT  13  /  AlkPhos  66  11-06          Urine Studies:          RADIOLOGY & ADDITIONAL STUDIES:

## 2017-11-07 NOTE — PROGRESS NOTE ADULT - SUBJECTIVE AND OBJECTIVE BOX
Medications:  allopurinol 100 milliGRAM(s) Oral three times a day after meals  apixaban 5 milliGRAM(s) Oral every 12 hours  buDESOnide  80 MICROgram(s)/formoterol 4.5 MICROgram(s) Inhaler 2 Puff(s) Inhalation two times a day  cefTRIAXone   IVPB      chlorhexidine 4% Liquid 1 Application(s) Topical daily  dextrose 5%. 1000 milliLiter(s) IV Continuous <Continuous>  dextrose 50% Injectable 12.5 Gram(s) IV Push once  dextrose 50% Injectable 25 Gram(s) IV Push once  dextrose 50% Injectable 25 Gram(s) IV Push once  dextrose Gel 1 Dose(s) Oral once PRN  furosemide Infusion 7.5 mG/Hr IV Continuous <Continuous>  glucagon  Injectable 1 milliGRAM(s) IntraMuscular once PRN  hydrALAZINE 25 milliGRAM(s) Oral three times a day  influenza   Vaccine 0.5 milliLiter(s) IntraMuscular once  insulin lispro (HumaLOG) corrective regimen sliding scale   SubCutaneous three times a day before meals  insulin lispro (HumaLOG) corrective regimen sliding scale   SubCutaneous at bedtime  lisinopril 5 milliGRAM(s) Oral daily  metoprolol     tartrate 50 milliGRAM(s) Oral three times a day  pantoprazole    Tablet 40 milliGRAM(s) Oral before breakfast  phenazopyridine 200 milliGRAM(s) Oral three times a day after meals      Vitals:  T(C): 36.7 (11-07-17 @ 11:50), Max: 36.9 (11-06-17 @ 20:06)  HR: 97 (11-07-17 @ 11:50) (80 - 119)  BP: 106/80 (11-07-17 @ 11:50) (100/69 - 132/90)  BP(mean): --  ABP: --  ABP(mean): --  RR: 18 (11-07-17 @ 11:50) (17 - 18)  SpO2: 95% (11-07-17 @ 11:50) (91% - 100%)  Wt(kg): --  CVP(cm H2O): --  CO: --  CI: --  PA: --  PA(mean): --  PCWP: --  SVR: --  PVR: --  Vital Signs Last 24 Hrs  T(C): 36.7 (07 Nov 2017 11:50), Max: 36.9 (06 Nov 2017 20:06)  T(F): 98.1 (07 Nov 2017 11:50), Max: 98.5 (06 Nov 2017 22:57)  HR: 97 (07 Nov 2017 11:50) (80 - 119)  BP: 106/80 (07 Nov 2017 11:50) (100/69 - 132/90)  BP(mean): --  RR: 18 (07 Nov 2017 11:50) (17 - 18)  SpO2: 95% (07 Nov 2017 11:50) (91% - 100%)    Daily     Daily     I&O's Summary    06 Nov 2017 07:01  -  07 Nov 2017 07:00  --------------------------------------------------------  IN: 83.8 mL / OUT: 3475 mL / NET: -3391.2 mL    07 Nov 2017 07:01  -  07 Nov 2017 12:14  --------------------------------------------------------  IN: 0 mL / OUT: 1100 mL / NET: -1100 mL      I&O's Detail    06 Nov 2017 07:01  -  07 Nov 2017 07:00  --------------------------------------------------------  IN:    furosemide Infusion: 33.8 mL    IV PiggyBack: 50 mL  Total IN: 83.8 mL    OUT:    Indwelling Catheter - Urethral: 3475 mL  Total OUT: 3475 mL    Total NET: -3391.2 mL      07 Nov 2017 07:01  -  07 Nov 2017 12:14  --------------------------------------------------------  IN:  Total IN: 0 mL    OUT:    Indwelling Catheter - Urethral: 1100 mL  Total OUT: 1100 mL    Total NET: -1100 mL          Physical Exam:     General: No distress. Comfortable.  HEENT: EOM intact.  Neck: Neck supple. JVP not elevated. No masses  Chest: Clear to auscultation bilaterally  CV: Normal S1 and S2. No murmurs, rub, or gallops. Radial pulses normal.  Abdomen: Soft, non-distended, non-tender  Skin: No rashes or skin breakdown  Neurology: Alert and oriented times three. Sensation intact  Psych: Affect normal    Labs:                        10.3   5.62  )-----------( 175      ( 07 Nov 2017 05:24 )             34.1     11-07    146<H>  |  98  |  40<H>  ----------------------------<  120<H>  4.2   |  37<H>  |  1.37<H>    Ca    9.5      07 Nov 2017 05:24  Phos  3.8     11-07  Mg     2.0     11-07    TPro  6.5  /  Alb  3.4  /  TBili  0.9  /  DBili  x   /  AST  18  /  ALT  13  /  AlkPhos  66  11-06 Patient seen and examined. She is feeling better- No SOB at rest, PND, CP, palpitations. Has not ambulated much. LE edema improving.     Medications:  allopurinol 100 milliGRAM(s) Oral three times a day after meals  apixaban 5 milliGRAM(s) Oral every 12 hours  buDESOnide  80 MICROgram(s)/formoterol 4.5 MICROgram(s) Inhaler 2 Puff(s) Inhalation two times a day  cefTRIAXone   IVPB      chlorhexidine 4% Liquid 1 Application(s) Topical daily  dextrose 5%. 1000 milliLiter(s) IV Continuous <Continuous>  dextrose 50% Injectable 12.5 Gram(s) IV Push once  dextrose 50% Injectable 25 Gram(s) IV Push once  dextrose 50% Injectable 25 Gram(s) IV Push once  dextrose Gel 1 Dose(s) Oral once PRN  furosemide Infusion 7.5 mG/Hr IV Continuous <Continuous>  glucagon  Injectable 1 milliGRAM(s) IntraMuscular once PRN  hydrALAZINE 25 milliGRAM(s) Oral three times a day  influenza   Vaccine 0.5 milliLiter(s) IntraMuscular once  insulin lispro (HumaLOG) corrective regimen sliding scale   SubCutaneous three times a day before meals  insulin lispro (HumaLOG) corrective regimen sliding scale   SubCutaneous at bedtime  lisinopril 5 milliGRAM(s) Oral daily  metoprolol     tartrate 50 milliGRAM(s) Oral three times a day  pantoprazole    Tablet 40 milliGRAM(s) Oral before breakfast  phenazopyridine 200 milliGRAM(s) Oral three times a day after meals    Vital Signs Last 24 Hrs  T(C): 36.7 (07 Nov 2017 11:50), Max: 36.9 (06 Nov 2017 20:06)  T(F): 98.1 (07 Nov 2017 11:50), Max: 98.5 (06 Nov 2017 22:57)  HR: 97 (07 Nov 2017 11:50) (80 - 119)  BP: 106/80 (07 Nov 2017 11:50) (100/69 - 132/90)  BP(mean): --  RR: 18 (07 Nov 2017 11:50) (17 - 18)  SpO2: 95% (07 Nov 2017 11:50) (91% - 100%)    Daily     Daily     I&O's Summary    06 Nov 2017 07:01  -  07 Nov 2017 07:00  --------------------------------------------------------  IN: 83.8 mL / OUT: 3475 mL / NET: -3391.2 mL    07 Nov 2017 07:01  -  07 Nov 2017 12:14  --------------------------------------------------------  IN: 0 mL / OUT: 1100 mL / NET: -1100 mL      I&O's Detail    06 Nov 2017 07:01  -  07 Nov 2017 07:00  --------------------------------------------------------  IN:    furosemide Infusion: 33.8 mL    IV PiggyBack: 50 mL  Total IN: 83.8 mL    OUT:    Indwelling Catheter - Urethral: 3475 mL  Total OUT: 3475 mL    Total NET: -3391.2 mL      07 Nov 2017 07:01  -  07 Nov 2017 12:14  --------------------------------------------------------  IN:  Total IN: 0 mL    OUT:    Indwelling Catheter - Urethral: 1100 mL  Total OUT: 1100 mL    Total NET: -1100 mL          Physical Exam:     General: No distress. Comfortable.  HEENT: EOM intact.  Neck: Neck supple. JVP elevated to mid neck, 90 degree angle. No masses  Chest: diffuse crackles bl.   CV: irregularly irregular. No murmurs, rub, or gallops. Radial pulses normal. 2+ LE edema b/l.   Abdomen: Soft, non-distended, non-tender  Skin: No rashes or skin breakdown  Neurology: Alert and oriented times three. Sensation intact  Psych: Affect normal    Labs:                        10.3   5.62  )-----------( 175      ( 07 Nov 2017 05:24 )             34.1     11-07    146<H>  |  98  |  40<H>  ----------------------------<  120<H>  4.2   |  37<H>  |  1.37<H>    Ca    9.5      07 Nov 2017 05:24  Phos  3.8     11-07  Mg     2.0     11-07    TPro  6.5  /  Alb  3.4  /  TBili  0.9  /  DBili  x   /  AST  18  /  ALT  13  /  AlkPhos  66  11-06

## 2017-11-07 NOTE — PROGRESS NOTE ADULT - SUBJECTIVE AND OBJECTIVE BOX
Patient is a 62y old  Female who presents with a chief complaint of LE edema and Abd edema, SOB (02 Nov 2017 11:25)      SUBJECTIVE / OVERNIGHT EVENTS:  SOB stable  Edema still persists  Review of Systems:   CONSTITUTIONAL: No fever, weight loss, or fatigue  EYES: No eye pain, visual disturbances, or discharge  ENMT:  No difficulty hearing, tinnitus, vertigo; No sinus or throat pain  NECK: No pain or stiffness  BREASTS: No pain, masses, or nipple discharge  RESPIRATORY: No cough, wheezing, chills or hemoptysis; No shortness of breath  CARDIOVASCULAR: No chest pain, palpitations, dizziness,   GASTROINTESTINAL: No abdominal or epigastric pain. No nausea, vomiting, or hematemesis; No diarrhea or constipation. No melena or hematochezia.  GENITOURINARY: No dysuria, frequency, hematuria, or incontinence  NEUROLOGICAL: No headaches, memory loss, loss of strength, numbness, or tremors  SKIN: No itching, burning, rashes, or lesions   LYMPH NODES: No enlarged glands  ENDOCRINE: No heat or cold intolerance; No hair loss  MUSCULOSKELETAL: No joint pain or swelling; No muscle, back, or extremity pain  PSYCHIATRIC: No depression, anxiety, mood swings, or difficulty sleeping  HEME/LYMPH: No easy bruising, or bleeding gums  ALLERY AND IMMUNOLOGIC: No hives or eczema    MEDICATIONS  (STANDING):  allopurinol 100 milliGRAM(s) Oral three times a day after meals  apixaban 5 milliGRAM(s) Oral every 12 hours  buDESOnide  80 MICROgram(s)/formoterol 4.5 MICROgram(s) Inhaler 2 Puff(s) Inhalation two times a day  cefTRIAXone   IVPB      chlorhexidine 4% Liquid 1 Application(s) Topical daily  dextrose 5%. 1000 milliLiter(s) (50 mL/Hr) IV Continuous <Continuous>  dextrose 50% Injectable 12.5 Gram(s) IV Push once  dextrose 50% Injectable 25 Gram(s) IV Push once  dextrose 50% Injectable 25 Gram(s) IV Push once  furosemide Infusion 7.5 mG/Hr (3.75 mL/Hr) IV Continuous <Continuous>  hydrALAZINE 25 milliGRAM(s) Oral three times a day  influenza   Vaccine 0.5 milliLiter(s) IntraMuscular once  insulin lispro (HumaLOG) corrective regimen sliding scale   SubCutaneous three times a day before meals  insulin lispro (HumaLOG) corrective regimen sliding scale   SubCutaneous at bedtime  lisinopril 5 milliGRAM(s) Oral daily  metoprolol     tartrate 50 milliGRAM(s) Oral three times a day  pantoprazole    Tablet 40 milliGRAM(s) Oral before breakfast  phenazopyridine 200 milliGRAM(s) Oral three times a day after meals    MEDICATIONS  (PRN):  dextrose Gel 1 Dose(s) Oral once PRN Blood Glucose LESS THAN 70 milliGRAM(s)/deciliter  glucagon  Injectable 1 milliGRAM(s) IntraMuscular once PRN Glucose LESS THAN 70 milligrams/deciliter      PHYSICAL EXAM:  Vital Signs Last 24 Hrs  T(C): 36.8 (07 Nov 2017 16:18), Max: 36.9 (06 Nov 2017 20:06)  T(F): 98.2 (07 Nov 2017 16:18), Max: 98.5 (06 Nov 2017 22:57)  HR: 83 (07 Nov 2017 16:18) (80 - 97)  BP: 118/67 (07 Nov 2017 16:18) (100/69 - 132/90)  BP(mean): --  RR: 18 (07 Nov 2017 16:18) (17 - 18)  SpO2: 94% (07 Nov 2017 16:18) (91% - 99%)  I&O's Summary    06 Nov 2017 07:01  -  07 Nov 2017 07:00  --------------------------------------------------------  IN: 83.8 mL / OUT: 3475 mL / NET: -3391.2 mL    07 Nov 2017 07:01  -  07 Nov 2017 17:50  --------------------------------------------------------  IN: 64 mL / OUT: 2000 mL / NET: -1936 mL      GENERAL: NAD, well-developed  HEAD:  Atraumatic, Normocephalic  EYES: EOMI, PERRLA, conjunctiva and sclera clear  NECK: Supple, No JVD  CHEST/LUNG: Clear to auscultation bilaterally; No wheeze  HEART: Regular rate and rhythm; No murmurs, rubs, or gallops  ABDOMEN: Soft, Nontender, Nondistended; Bowel sounds present  EXTREMITIES:  2+ Peripheral Pulses, No clubbing, cyanosis, Edema +++  PSYCH: AAOx3  NEUROLOGY: non-focal  SKIN: No rashes or lesions    LABS:  CAPILLARY BLOOD GLUCOSE  128 (07 Nov 2017 11:50)      POCT Blood Glucose.: 160 mg/dL (07 Nov 2017 16:35)  POCT Blood Glucose.: 128 mg/dL (07 Nov 2017 11:46)  POCT Blood Glucose.: 128 mg/dL (07 Nov 2017 08:00)  POCT Blood Glucose.: 138 mg/dL (06 Nov 2017 22:13)                          10.3   5.62  )-----------( 175      ( 07 Nov 2017 05:24 )             34.1     11-07    146<H>  |  98  |  40<H>  ----------------------------<  120<H>  4.2   |  37<H>  |  1.37<H>    Ca    9.5      07 Nov 2017 05:24  Phos  3.8     11-07  Mg     2.0     11-07    TPro  6.5  /  Alb  3.4  /  TBili  0.9  /  DBili  x   /  AST  18  /  ALT  13  /  AlkPhos  66  11-06              RADIOLOGY & ADDITIONAL TESTS:    Imaging Personally Reviewed:    Consultant(s) Notes Reviewed:      Care Discussed with Consultants/Other Providers:

## 2017-11-07 NOTE — PROGRESS NOTE ADULT - ASSESSMENT
· Assessment		  62-year-old female with chronic lymphocytic leukemia, atrial fibrillation, diabetes, and hypertension p/w CHF exacerbation.    1. Diastolic CHF exacerbation: good diuresis but CHAVEZ at times  2. Acute kidney injury- CRS- Renal function improving   3. Hypernatremia- due to obligate diuresis. mild and improving  4. Metabolic alkalosis: due to contraction. Trending up    5. A-fib with RVR: elevated HR at times    PLAN  - continue lasix infusion as ordered, per CHF;  Add zaroxoyln for synergy today again.  Good output.  Would like to hopefully dc Lasix gtt in the next 24-48 hours  -Chronic Co2 retention.  pH was 7.41(slight degree of metabolic alkalosis)  - maintain K+ greater than 4,

## 2017-11-08 LAB
BASOPHILS # BLD AUTO: 0.03 K/UL — SIGNIFICANT CHANGE UP (ref 0–0.2)
BASOPHILS NFR BLD AUTO: 0.6 % — SIGNIFICANT CHANGE UP (ref 0–2)
BUN SERPL-MCNC: 42 MG/DL — HIGH (ref 7–23)
CALCIUM SERPL-MCNC: 9.5 MG/DL — SIGNIFICANT CHANGE UP (ref 8.4–10.5)
CHLORIDE SERPL-SCNC: 94 MMOL/L — LOW (ref 98–107)
CO2 SERPL-SCNC: 42 MMOL/L — HIGH (ref 22–31)
CREAT SERPL-MCNC: 1.4 MG/DL — HIGH (ref 0.5–1.3)
EOSINOPHIL # BLD AUTO: 0.24 K/UL — SIGNIFICANT CHANGE UP (ref 0–0.5)
EOSINOPHIL NFR BLD AUTO: 4.8 % — SIGNIFICANT CHANGE UP (ref 0–6)
GLUCOSE SERPL-MCNC: 124 MG/DL — HIGH (ref 70–99)
HCT VFR BLD CALC: 33.8 % — LOW (ref 34.5–45)
HGB BLD-MCNC: 10.2 G/DL — LOW (ref 11.5–15.5)
IMM GRANULOCYTES # BLD AUTO: 0.02 # — SIGNIFICANT CHANGE UP
IMM GRANULOCYTES NFR BLD AUTO: 0.4 % — SIGNIFICANT CHANGE UP (ref 0–1.5)
LYMPHOCYTES # BLD AUTO: 1.2 K/UL — SIGNIFICANT CHANGE UP (ref 1–3.3)
LYMPHOCYTES # BLD AUTO: 24.1 % — SIGNIFICANT CHANGE UP (ref 13–44)
MAGNESIUM SERPL-MCNC: 1.8 MG/DL — SIGNIFICANT CHANGE UP (ref 1.6–2.6)
MCHC RBC-ENTMCNC: 20.6 PG — LOW (ref 27–34)
MCHC RBC-ENTMCNC: 30.2 % — LOW (ref 32–36)
MCV RBC AUTO: 68.1 FL — LOW (ref 80–100)
MONOCYTES # BLD AUTO: 0.66 K/UL — SIGNIFICANT CHANGE UP (ref 0–0.9)
MONOCYTES NFR BLD AUTO: 13.3 % — SIGNIFICANT CHANGE UP (ref 2–14)
NEUTROPHILS # BLD AUTO: 2.83 K/UL — SIGNIFICANT CHANGE UP (ref 1.8–7.4)
NEUTROPHILS NFR BLD AUTO: 56.8 % — SIGNIFICANT CHANGE UP (ref 43–77)
NRBC # FLD: 0.05 — SIGNIFICANT CHANGE UP
NRBC FLD-RTO: 1 — SIGNIFICANT CHANGE UP
PHOSPHATE SERPL-MCNC: 4.6 MG/DL — HIGH (ref 2.5–4.5)
PLATELET # BLD AUTO: 148 K/UL — LOW (ref 150–400)
PMV BLD: SIGNIFICANT CHANGE UP FL (ref 7–13)
POTASSIUM SERPL-MCNC: 3.6 MMOL/L — SIGNIFICANT CHANGE UP (ref 3.5–5.3)
POTASSIUM SERPL-SCNC: 3.6 MMOL/L — SIGNIFICANT CHANGE UP (ref 3.5–5.3)
RBC # BLD: 4.96 M/UL — SIGNIFICANT CHANGE UP (ref 3.8–5.2)
RBC # FLD: 17.9 % — HIGH (ref 10.3–14.5)
SODIUM SERPL-SCNC: 145 MMOL/L — SIGNIFICANT CHANGE UP (ref 135–145)
WBC # BLD: 4.98 K/UL — SIGNIFICANT CHANGE UP (ref 3.8–10.5)
WBC # FLD AUTO: 4.98 K/UL — SIGNIFICANT CHANGE UP (ref 3.8–10.5)

## 2017-11-08 PROCEDURE — 99232 SBSQ HOSP IP/OBS MODERATE 35: CPT

## 2017-11-08 RX ORDER — POTASSIUM CHLORIDE 20 MEQ
40 PACKET (EA) ORAL ONCE
Qty: 0 | Refills: 0 | Status: COMPLETED | OUTPATIENT
Start: 2017-11-08 | End: 2017-11-08

## 2017-11-08 RX ADMIN — Medication 1: at 12:45

## 2017-11-08 RX ADMIN — APIXABAN 5 MILLIGRAM(S): 2.5 TABLET, FILM COATED ORAL at 21:30

## 2017-11-08 RX ADMIN — APIXABAN 5 MILLIGRAM(S): 2.5 TABLET, FILM COATED ORAL at 09:44

## 2017-11-08 RX ADMIN — Medication 200 MILLIGRAM(S): at 12:44

## 2017-11-08 RX ADMIN — Medication 100 MILLIGRAM(S): at 09:43

## 2017-11-08 RX ADMIN — Medication 50 MILLIGRAM(S): at 05:13

## 2017-11-08 RX ADMIN — Medication 1: at 17:08

## 2017-11-08 RX ADMIN — CEFTRIAXONE 100 GRAM(S): 500 INJECTION, POWDER, FOR SOLUTION INTRAMUSCULAR; INTRAVENOUS at 09:44

## 2017-11-08 RX ADMIN — LISINOPRIL 5 MILLIGRAM(S): 2.5 TABLET ORAL at 05:13

## 2017-11-08 RX ADMIN — CHLORHEXIDINE GLUCONATE 1 APPLICATION(S): 213 SOLUTION TOPICAL at 16:39

## 2017-11-08 RX ADMIN — BUDESONIDE AND FORMOTEROL FUMARATE DIHYDRATE 2 PUFF(S): 160; 4.5 AEROSOL RESPIRATORY (INHALATION) at 09:45

## 2017-11-08 RX ADMIN — Medication 25 MILLIGRAM(S): at 16:39

## 2017-11-08 RX ADMIN — Medication 100 MILLIGRAM(S): at 12:45

## 2017-11-08 RX ADMIN — PANTOPRAZOLE SODIUM 40 MILLIGRAM(S): 20 TABLET, DELAYED RELEASE ORAL at 06:41

## 2017-11-08 RX ADMIN — Medication 200 MILLIGRAM(S): at 17:10

## 2017-11-08 RX ADMIN — Medication 3.75 MG/HR: at 20:17

## 2017-11-08 RX ADMIN — BUDESONIDE AND FORMOTEROL FUMARATE DIHYDRATE 2 PUFF(S): 160; 4.5 AEROSOL RESPIRATORY (INHALATION) at 21:28

## 2017-11-08 RX ADMIN — Medication 3.75 MG/HR: at 05:12

## 2017-11-08 RX ADMIN — Medication 200 MILLIGRAM(S): at 09:44

## 2017-11-08 RX ADMIN — Medication 40 MILLIEQUIVALENT(S): at 17:08

## 2017-11-08 RX ADMIN — Medication 100 MILLIGRAM(S): at 17:10

## 2017-11-08 RX ADMIN — Medication 25 MILLIGRAM(S): at 05:13

## 2017-11-08 NOTE — PROGRESS NOTE ADULT - ASSESSMENT
63 y/o female w/ PMHX  of Afib on Eliquis, DM II,  HTN, COPD on home O2 2L, lymphoma/CLL s/p radiation and chemo (1997), HFpEF LVEF 66% comes in with complaints of worsening SOB and LE edema. She states she recently had a fall at work, hit her head and was sent to the ER where she was found to be hypotensive. Since then some of her HF medications had been held. CT head was unremarkable per pt. She then took her self to Dr. Goldberg (PMD) for a follow up yesterday, and she was found to be in ADHF and was sent to [facility] ED.     Diuresing well, but still grossly hypervolemic.

## 2017-11-08 NOTE — PROGRESS NOTE ADULT - SUBJECTIVE AND OBJECTIVE BOX
Patient is a 62y old  Female who presents with a chief complaint of LE edema and Abd edema, SOB (02 Nov 2017 11:25)      SUBJECTIVE / OVERNIGHT EVENTS:  SOB improving  Review of Systems:   CONSTITUTIONAL: No fever, weight loss, or fatigue  EYES: No eye pain, visual disturbances, or discharge  ENMT:  No difficulty hearing, tinnitus, vertigo; No sinus or throat pain  NECK: No pain or stiffness  BREASTS: No pain, masses, or nipple discharge  RESPIRATORY: No cough, wheezing, chills or hemoptysis; No shortness of breath  CARDIOVASCULAR: No chest pain, palpitations, dizziness, edema +  GASTROINTESTINAL: No abdominal or epigastric pain. No nausea, vomiting, or hematemesis; No diarrhea or constipation. No melena or hematochezia.  GENITOURINARY: No dysuria, frequency, hematuria, or incontinence  NEUROLOGICAL: No headaches, memory loss, loss of strength, numbness, or tremors  SKIN: No itching, burning, rashes, or lesions   LYMPH NODES: No enlarged glands  ENDOCRINE: No heat or cold intolerance; No hair loss  MUSCULOSKELETAL: No joint pain or swelling; No muscle, back, or extremity pain  PSYCHIATRIC: No depression, anxiety, mood swings, or difficulty sleeping  HEME/LYMPH: No easy bruising, or bleeding gums  ALLERY AND IMMUNOLOGIC: No hives or eczema    MEDICATIONS  (STANDING):  allopurinol 100 milliGRAM(s) Oral three times a day after meals  apixaban 5 milliGRAM(s) Oral every 12 hours  buDESOnide  80 MICROgram(s)/formoterol 4.5 MICROgram(s) Inhaler 2 Puff(s) Inhalation two times a day  cefTRIAXone   IVPB      chlorhexidine 4% Liquid 1 Application(s) Topical daily  dextrose 5%. 1000 milliLiter(s) (50 mL/Hr) IV Continuous <Continuous>  dextrose 50% Injectable 12.5 Gram(s) IV Push once  dextrose 50% Injectable 25 Gram(s) IV Push once  dextrose 50% Injectable 25 Gram(s) IV Push once  furosemide Infusion 7.5 mG/Hr (3.75 mL/Hr) IV Continuous <Continuous>  hydrALAZINE 25 milliGRAM(s) Oral three times a day  influenza   Vaccine 0.5 milliLiter(s) IntraMuscular once  insulin lispro (HumaLOG) corrective regimen sliding scale   SubCutaneous three times a day before meals  insulin lispro (HumaLOG) corrective regimen sliding scale   SubCutaneous at bedtime  metoprolol     tartrate 50 milliGRAM(s) Oral three times a day  pantoprazole    Tablet 40 milliGRAM(s) Oral before breakfast  phenazopyridine 200 milliGRAM(s) Oral three times a day after meals    MEDICATIONS  (PRN):  dextrose Gel 1 Dose(s) Oral once PRN Blood Glucose LESS THAN 70 milliGRAM(s)/deciliter  glucagon  Injectable 1 milliGRAM(s) IntraMuscular once PRN Glucose LESS THAN 70 milligrams/deciliter      PHYSICAL EXAM:  Vital Signs Last 24 Hrs  T(C): 36.6 (08 Nov 2017 17:00), Max: 36.7 (07 Nov 2017 20:09)  T(F): 97.8 (08 Nov 2017 17:00), Max: 98 (07 Nov 2017 20:09)  HR: 87 (08 Nov 2017 17:00) (83 - 88)  BP: 97/56 (08 Nov 2017 17:00) (97/56 - 132/73)  BP(mean): --  RR: 98 (08 Nov 2017 17:00) (18 - 98)  SpO2: 96% (08 Nov 2017 09:00) (95% - 96%)  I&O's Summary    07 Nov 2017 07:01  -  08 Nov 2017 07:00  --------------------------------------------------------  IN: 64 mL / OUT: 5400 mL / NET: -5336 mL    08 Nov 2017 07:01  -  08 Nov 2017 18:36  --------------------------------------------------------  IN: 0 mL / OUT: 500 mL / NET: -500 mL      GENERAL: NAD, well-developed  HEAD:  Atraumatic, Normocephalic  EYES: EOMI, PERRLA, conjunctiva and sclera clear  NECK: Supple, No JVD  CHEST/LUNG: Clear to auscultation bilaterally; No wheeze  HEART: Regular rate and rhythm; No murmurs, rubs, or gallops  ABDOMEN: Soft, Nontender, Nondistended; Bowel sounds present  EXTREMITIES:  2+ Peripheral Pulses, No clubbing, cyanosis, Edema +++  PSYCH: AAOx3  NEUROLOGY: non-focal  SKIN: No rashes or lesions    LABS:  CAPILLARY BLOOD GLUCOSE      POCT Blood Glucose.: 164 mg/dL (08 Nov 2017 16:23)  POCT Blood Glucose.: 166 mg/dL (08 Nov 2017 11:57)  POCT Blood Glucose.: 135 mg/dL (08 Nov 2017 08:03)  POCT Blood Glucose.: 142 mg/dL (07 Nov 2017 22:23)                          10.2   4.98  )-----------( 148      ( 08 Nov 2017 06:30 )             33.8     11-08    145  |  94<L>  |  42<H>  ----------------------------<  124<H>  3.6   |  42<H>  |  1.40<H>    Ca    9.5      08 Nov 2017 06:30  Phos  4.6     11-08  Mg     1.8     11-08                RADIOLOGY & ADDITIONAL TESTS:    Imaging Personally Reviewed:    Consultant(s) Notes Reviewed:      Care Discussed with Consultants/Other Providers:

## 2017-11-08 NOTE — PROGRESS NOTE ADULT - PROBLEM SELECTOR PLAN 1
Echo indicates right sided HF, Acute cor pulmonale with TR.  Continue aggressive diuresis, on Lasix gtt 7.5 mg/hr. Zaroxolyn added. Still overloaded with fluid clnically

## 2017-11-08 NOTE — PROGRESS NOTE ADULT - SUBJECTIVE AND OBJECTIVE BOX
PULM/MED PROGRESS NOTE:    (Note based on visit earlier today at 9 AM)  awake, alert comfortable.  On nasal O2      MEDICATIONS  (STANDING):  allopurinol 100 milliGRAM(s) Oral three times a day after meals  apixaban 5 milliGRAM(s) Oral every 12 hours  buDESOnide  80 MICROgram(s)/formoterol 4.5 MICROgram(s) Inhaler 2 Puff(s) Inhalation two times a day  cefTRIAXone   IVPB      chlorhexidine 4% Liquid 1 Application(s) Topical daily  dextrose 5%. 1000 milliLiter(s) (50 mL/Hr) IV Continuous <Continuous>  dextrose 50% Injectable 12.5 Gram(s) IV Push once  dextrose 50% Injectable 25 Gram(s) IV Push once  dextrose 50% Injectable 25 Gram(s) IV Push once  furosemide Infusion 7.5 mG/Hr (3.75 mL/Hr) IV Continuous <Continuous>  hydrALAZINE 25 milliGRAM(s) Oral three times a day  influenza   Vaccine 0.5 milliLiter(s) IntraMuscular once  insulin lispro (HumaLOG) corrective regimen sliding scale   SubCutaneous three times a day before meals  insulin lispro (HumaLOG) corrective regimen sliding scale   SubCutaneous at bedtime  metoprolol     tartrate 50 milliGRAM(s) Oral three times a day  pantoprazole    Tablet 40 milliGRAM(s) Oral before breakfast  phenazopyridine 200 milliGRAM(s) Oral three times a day after meals      MEDICATIONS  (PRN):  dextrose Gel 1 Dose(s) Oral once PRN Blood Glucose LESS THAN 70 milliGRAM(s)/deciliter  glucagon  Injectable 1 milliGRAM(s) IntraMuscular once PRN Glucose LESS THAN 70 milligrams/deciliter          Vital Signs Last 24 Hrs  T(C): 36.8 (08 Nov 2017 20:26), Max: 36.8 (08 Nov 2017 20:26)  T(F): 98.2 (08 Nov 2017 20:26), Max: 98.2 (08 Nov 2017 20:26)  HR: 79 (08 Nov 2017 20:26) (79 - 88)  BP: 81/62 (08 Nov 2017 20:26) (81/62 - 132/73)  BP(mean): --  RR: 18 (08 Nov 2017 20:26) (18 - 19)  SpO2: 96% (08 Nov 2017 20:26) (95% - 98%)    PHYSICAL EXAMINATION:    GENERAL: The patient is awake and alert in no apparent distress.     NECK: Supple.    LUNGS: Bilateral breath sounds; low lung volumes/diminished at bases; respirations unlabored    HEART: Irreg irreg    ABDOMEN: Soft, nontender, and nondistended.      EXTREMITIES: 2+ edema.        LABS:                        10.2   4.98  )-----------( 148      ( 08 Nov 2017 06:30 )             33.8     11-08    145  |  94<L>  |  42<H>  ----------------------------<  124<H>  3.6   |  42<H>  |  1.40<H>    Ca    9.5      08 Nov 2017 06:30  Phos  4.6     11-08  Mg     1.8     11-08                          MICROBIOLOGY:      RADIOLOGY & ADDITIONAL STUDIES:

## 2017-11-08 NOTE — PROGRESS NOTE ADULT - SUBJECTIVE AND OBJECTIVE BOX
Patient seen and examined. She has diuresed very well -5336 ml in 24 hours. No acute SOB, orthopnea, PND, CP, palpitations, dizziness.       Medications:  allopurinol 100 milliGRAM(s) Oral three times a day after meals  apixaban 5 milliGRAM(s) Oral every 12 hours  buDESOnide  80 MICROgram(s)/formoterol 4.5 MICROgram(s) Inhaler 2 Puff(s) Inhalation two times a day  cefTRIAXone   IVPB      chlorhexidine 4% Liquid 1 Application(s) Topical daily  dextrose 5%. 1000 milliLiter(s) IV Continuous <Continuous>  dextrose 50% Injectable 12.5 Gram(s) IV Push once  dextrose 50% Injectable 25 Gram(s) IV Push once  dextrose 50% Injectable 25 Gram(s) IV Push once  dextrose Gel 1 Dose(s) Oral once PRN  furosemide Infusion 7.5 mG/Hr IV Continuous <Continuous>  glucagon  Injectable 1 milliGRAM(s) IntraMuscular once PRN  hydrALAZINE 25 milliGRAM(s) Oral three times a day  influenza   Vaccine 0.5 milliLiter(s) IntraMuscular once  insulin lispro (HumaLOG) corrective regimen sliding scale   SubCutaneous three times a day before meals  insulin lispro (HumaLOG) corrective regimen sliding scale   SubCutaneous at bedtime  metoprolol     tartrate 50 milliGRAM(s) Oral three times a day  pantoprazole    Tablet 40 milliGRAM(s) Oral before breakfast  phenazopyridine 200 milliGRAM(s) Oral three times a day after meals  potassium chloride    Tablet ER 40 milliEquivalent(s) Oral once    Vital Signs Last 24 Hrs  T(C): 36.4 (08 Nov 2017 09:00), Max: 36.8 (07 Nov 2017 16:18)  T(F): 97.5 (08 Nov 2017 09:00), Max: 98.2 (07 Nov 2017 16:18)  HR: 86 (08 Nov 2017 09:00) (83 - 88)  BP: 128/72 (08 Nov 2017 09:00) (100/60 - 132/73)  RR: 18 (08 Nov 2017 09:00) (18 - 19)  SpO2: 96% (08 Nov 2017 09:00) (94% - 96%)    Daily     Daily     I&O's Summary    07 Nov 2017 07:01  -  08 Nov 2017 07:00  --------------------------------------------------------  IN: 64 mL / OUT: 5400 mL / NET: -5336 mL    08 Nov 2017 07:01  -  08 Nov 2017 14:00  --------------------------------------------------------  IN: 0 mL / OUT: 400 mL / NET: -400 mL      I&O's Detail    07 Nov 2017 07:01  -  08 Nov 2017 07:00  --------------------------------------------------------  IN:    furosemide Infusion: 14 mL    IV PiggyBack: 50 mL  Total IN: 64 mL    OUT:    Indwelling Catheter - Urethral: 5400 mL  Total OUT: 5400 mL    Total NET: -5336 mL      08 Nov 2017 07:01  -  08 Nov 2017 14:00  --------------------------------------------------------  IN:  Total IN: 0 mL    OUT:    Indwelling Catheter - Urethral: 400 mL  Total OUT: 400 mL    Total NET: -400 mL          Physical Exam:     General: No distress. Comfortable.  HEENT: EOM intact.  Neck: Neck supple. JVP mildly elevated. No masses  Chest: Clear to auscultation bilaterally  CV: Normal S1 and S2. No murmurs, rub, or gallops. Radial pulses normal. 2+ LE edema b/l.   Abdomen: Soft, non-distended, non-tender  Skin: No rashes or skin breakdown  Neurology: Alert and oriented times three. Sensation intact  Psych: Affect normal    Labs:                        10.2   4.98  )-----------( 148      ( 08 Nov 2017 06:30 )             33.8     11-08    145  |  94<L>  |  42<H>  ----------------------------<  124<H>  3.6   |  42<H>  |  1.40<H>    Ca    9.5      08 Nov 2017 06:30  Phos  4.6     11-08  Mg     1.8     11-08

## 2017-11-08 NOTE — PROGRESS NOTE ADULT - PROBLEM SELECTOR PLAN 1
-5336 ml urine output in 24 hours,  s/p Metolazone 5 mg x 1.  Cr stable 1.40.   -Diuresed well w/ Lasix and metolazone- would continue with Lasix gtt at 7.5 mg/hr and metolazone 5 mg po x 1 today as well. Would allow for some permissive azotemia.

## 2017-11-08 NOTE — PROGRESS NOTE ADULT - SUBJECTIVE AND OBJECTIVE BOX
Nephrology Progress Note      MEDICATIONS  (STANDING):  allopurinol 100 milliGRAM(s) Oral three times a day after meals  apixaban 5 milliGRAM(s) Oral every 12 hours  buDESOnide  80 MICROgram(s)/formoterol 4.5 MICROgram(s) Inhaler 2 Puff(s) Inhalation two times a day  cefTRIAXone   IVPB      chlorhexidine 4% Liquid 1 Application(s) Topical daily  dextrose 5%. 1000 milliLiter(s) (50 mL/Hr) IV Continuous <Continuous>  dextrose 50% Injectable 12.5 Gram(s) IV Push once  dextrose 50% Injectable 25 Gram(s) IV Push once  dextrose 50% Injectable 25 Gram(s) IV Push once  furosemide Infusion 7.5 mG/Hr (3.75 mL/Hr) IV Continuous <Continuous>  hydrALAZINE 25 milliGRAM(s) Oral three times a day  influenza   Vaccine 0.5 milliLiter(s) IntraMuscular once  insulin lispro (HumaLOG) corrective regimen sliding scale   SubCutaneous three times a day before meals  insulin lispro (HumaLOG) corrective regimen sliding scale   SubCutaneous at bedtime  lisinopril 5 milliGRAM(s) Oral daily  metoprolol     tartrate 50 milliGRAM(s) Oral three times a day  pantoprazole    Tablet 40 milliGRAM(s) Oral before breakfast  phenazopyridine 200 milliGRAM(s) Oral three times a day after meals      VITAL:  T(C): , Max: 36.9 (11-06-17 @ 20:06)  T(F): , Max: 98.5 (11-06-17 @ 22:57)  HR: 97 (11-07-17 @ 11:50)  BP: 106/80 (11-07-17 @ 11:50)  BP(mean): --  RR: 18 (11-07-17 @ 11:50)  SpO2: 95% (11-07-17 @ 11:50)  Wt(kg): --    I and O's:    11-06 @ 07:01  -  11-07 @ 07:00  --------------------------------------------------------  IN: 83.8 mL / OUT: 3475 mL / NET: -3391.2 mL    11-07 @ 07:01  -  11-07 @ 15:00  --------------------------------------------------------  IN: 0 mL / OUT: 2000 mL / NET: -2000 mL          PHYSICAL EXAM:    Constitutional: NAD  HEENT: PERRLA    Neck:  + JVD  Respiratory: CTAB/L  Cardiovascular: S1 and S2  Gastrointestinal: BS+, soft, NT/ND  Extremities: + 3 peripheral edema  Neurological: A/O x 3, no focal deficits  Psychiatric: Normal mood, normal affect  : No Corey  Skin: No rashes  Access: Not applicable    LABS:                        10.3   5.62  )-----------( 175      ( 07 Nov 2017 05:24 )             34.1     11-07    146<H>  |  98  |  40<H>  ----------------------------<  120<H>  4.2   |  37<H>  |  1.37<H>    Ca    9.5      07 Nov 2017 05:24  Phos  3.8     11-07  Mg     2.0     11-07    TPro  6.5  /  Alb  3.4  /  TBili  0.9  /  DBili  x   /  AST  18  /  ALT  13  /  AlkPhos  66  11-06    Urine Studies:    RADIOLOGY & ADDITIONAL STUDIES:    ASSESSMENT: 62-year-old female with chronic lymphocytic leukemia, atrial fibrillation, diabetes, and hypertension p/w CHF exacerbation.    1. Diastolic CHF exacerbation: good diuresis and much improved symptoms  2. Acute kidney injury- CRS- Renal function improving   3. Hypernatremia- improved  4. Metabolic alkalosis: due to contraction. Trending up    5. A-fib with RVR: cardiology following    RECOMMENDATIONS  - continue lasix drip for now; consider switching to PO in 1-2 days;   - given one more dose of metolazone- would hold it forward as good diuresis and improved volume status  - noted significant diuresis; negative 5 liters last 24 hrs and high bicarbs  - would keep no more than 3 liters negative to prevent any kidney injury or other complications  - continue to monitor kidney function and electrolytes while inpatient    Garry Funes MD  [facility]   [phone]

## 2017-11-09 LAB
BUN SERPL-MCNC: 52 MG/DL — HIGH (ref 7–23)
CALCIUM SERPL-MCNC: 9.2 MG/DL — SIGNIFICANT CHANGE UP (ref 8.4–10.5)
CHLORIDE SERPL-SCNC: 93 MMOL/L — LOW (ref 98–107)
CO2 SERPL-SCNC: 42 MMOL/L — HIGH (ref 22–31)
CREAT SERPL-MCNC: 1.64 MG/DL — HIGH (ref 0.5–1.3)
GLUCOSE SERPL-MCNC: 111 MG/DL — HIGH (ref 70–99)
HCT VFR BLD CALC: 35.7 % — SIGNIFICANT CHANGE UP (ref 34.5–45)
HGB BLD-MCNC: 10.5 G/DL — LOW (ref 11.5–15.5)
MAGNESIUM SERPL-MCNC: 1.8 MG/DL — SIGNIFICANT CHANGE UP (ref 1.6–2.6)
MCHC RBC-ENTMCNC: 20.1 PG — LOW (ref 27–34)
MCHC RBC-ENTMCNC: 29.4 % — LOW (ref 32–36)
MCV RBC AUTO: 68.3 FL — LOW (ref 80–100)
NRBC # FLD: 0.02 — SIGNIFICANT CHANGE UP
PLATELET # BLD AUTO: 179 K/UL — SIGNIFICANT CHANGE UP (ref 150–400)
PMV BLD: SIGNIFICANT CHANGE UP FL (ref 7–13)
POTASSIUM SERPL-MCNC: 3.8 MMOL/L — SIGNIFICANT CHANGE UP (ref 3.5–5.3)
POTASSIUM SERPL-SCNC: 3.8 MMOL/L — SIGNIFICANT CHANGE UP (ref 3.5–5.3)
RBC # BLD: 5.23 M/UL — HIGH (ref 3.8–5.2)
RBC # FLD: 17.9 % — HIGH (ref 10.3–14.5)
SODIUM SERPL-SCNC: 143 MMOL/L — SIGNIFICANT CHANGE UP (ref 135–145)
WBC # BLD: 4.7 K/UL — SIGNIFICANT CHANGE UP (ref 3.8–10.5)
WBC # FLD AUTO: 4.7 K/UL — SIGNIFICANT CHANGE UP (ref 3.8–10.5)

## 2017-11-09 PROCEDURE — 99232 SBSQ HOSP IP/OBS MODERATE 35: CPT

## 2017-11-09 RX ORDER — SPIRONOLACTONE 25 MG/1
25 TABLET, FILM COATED ORAL DAILY
Qty: 0 | Refills: 0 | Status: DISCONTINUED | OUTPATIENT
Start: 2017-11-09 | End: 2017-11-10

## 2017-11-09 RX ORDER — METOPROLOL TARTRATE 50 MG
100 TABLET ORAL DAILY
Qty: 0 | Refills: 0 | Status: DISCONTINUED | OUTPATIENT
Start: 2017-11-09 | End: 2017-11-10

## 2017-11-09 RX ADMIN — PANTOPRAZOLE SODIUM 40 MILLIGRAM(S): 20 TABLET, DELAYED RELEASE ORAL at 09:02

## 2017-11-09 RX ADMIN — Medication 100 MILLIGRAM(S): at 13:08

## 2017-11-09 RX ADMIN — BUDESONIDE AND FORMOTEROL FUMARATE DIHYDRATE 2 PUFF(S): 160; 4.5 AEROSOL RESPIRATORY (INHALATION) at 09:02

## 2017-11-09 RX ADMIN — Medication 40 MILLIGRAM(S): at 17:08

## 2017-11-09 RX ADMIN — CEFTRIAXONE 100 GRAM(S): 500 INJECTION, POWDER, FOR SOLUTION INTRAMUSCULAR; INTRAVENOUS at 11:38

## 2017-11-09 RX ADMIN — APIXABAN 5 MILLIGRAM(S): 2.5 TABLET, FILM COATED ORAL at 21:24

## 2017-11-09 RX ADMIN — APIXABAN 5 MILLIGRAM(S): 2.5 TABLET, FILM COATED ORAL at 09:01

## 2017-11-09 RX ADMIN — Medication 25 MILLIGRAM(S): at 21:24

## 2017-11-09 RX ADMIN — Medication 50 MILLIGRAM(S): at 16:09

## 2017-11-09 RX ADMIN — Medication 50 MILLIGRAM(S): at 05:53

## 2017-11-09 RX ADMIN — Medication 25 MILLIGRAM(S): at 13:08

## 2017-11-09 RX ADMIN — Medication 100 MILLIGRAM(S): at 17:55

## 2017-11-09 RX ADMIN — Medication 200 MILLIGRAM(S): at 09:01

## 2017-11-09 RX ADMIN — BUDESONIDE AND FORMOTEROL FUMARATE DIHYDRATE 2 PUFF(S): 160; 4.5 AEROSOL RESPIRATORY (INHALATION) at 20:28

## 2017-11-09 RX ADMIN — Medication 100 MILLIGRAM(S): at 09:01

## 2017-11-09 NOTE — PROGRESS NOTE ADULT - PROBLEM SELECTOR PLAN 1
-Per record, has not diuresed much yeterday -1500 ml,   -Cr trending up 1.64 today.   -Would continue with Lasix gtt 7.5 mg/hr. No more metolazone.   -Continue with lopressor 25 mg po TID, Hydral 25 TID, lisinopril 5 mg qd.  -Planning on d/c over the weekend with Torsemide 60 mg po BID. -Per record, has not diuresed much yesterday -1500 ml,   -Cr trending up 1.64 today.   -Would continue with Lasix gtt 7.5 mg/hr. No more metolazone.   -Continue with lopressor 25 mg po TID, Hydral 25 TID, lisinopril 5 mg qd.  -Planning on d/c over the weekend with Torsemide 60 mg po BID.

## 2017-11-09 NOTE — PROGRESS NOTE ADULT - ASSESSMENT
· Assessment		  62-year-old female with chronic lymphocytic leukemia, atrial fibrillation, diabetes, and hypertension p/w CHF exacerbation.    1. Diastolic CHF exacerbation: good diuresis but CHAVEZ at times  2. Acute kidney injury- CRS- Renal function improving --Worse numbers today  3. Chronic CO2 retention with  slight metabolic component  4. A-fib with RVR: elevated HR at times    PLAN  - DC Lasix gtt--place on Demedex bid for am  -Chronic Co2 retention.  pH was 7.41(slight degree of metabolic alkalosis)  - maintain K+ greater than 4

## 2017-11-09 NOTE — PROGRESS NOTE ADULT - SUBJECTIVE AND OBJECTIVE BOX
[facility] [phone]        Patient seen and examined in bed.  She was in good spirits but felt lightheaded.  Soft BP        MEDICATIONS  (STANDING):  allopurinol 100 milliGRAM(s) Oral three times a day after meals  apixaban 5 milliGRAM(s) Oral every 12 hours  buDESOnide  80 MICROgram(s)/formoterol 4.5 MICROgram(s) Inhaler 2 Puff(s) Inhalation two times a day  cefTRIAXone   IVPB      cefTRIAXone   IVPB 1 Gram(s) IV Intermittent every 24 hours  chlorhexidine 4% Liquid 1 Application(s) Topical daily  dextrose 5%. 1000 milliLiter(s) (50 mL/Hr) IV Continuous <Continuous>  dextrose 50% Injectable 12.5 Gram(s) IV Push once  dextrose 50% Injectable 25 Gram(s) IV Push once  dextrose 50% Injectable 25 Gram(s) IV Push once  furosemide Infusion 7.5 mG/Hr (3.75 mL/Hr) IV Continuous <Continuous>  hydrALAZINE 25 milliGRAM(s) Oral three times a day  influenza   Vaccine 0.5 milliLiter(s) IntraMuscular once  insulin lispro (HumaLOG) corrective regimen sliding scale   SubCutaneous three times a day before meals  insulin lispro (HumaLOG) corrective regimen sliding scale   SubCutaneous at bedtime  metoprolol     tartrate 50 milliGRAM(s) Oral three times a day  pantoprazole    Tablet 40 milliGRAM(s) Oral before breakfast      VITAL:  T(C): , Max: 36.8 (11-08-17 @ 20:26)  T(F): , Max: 98.2 (11-08-17 @ 20:26)  HR: 85 (11-09-17 @ 11:43)  BP: 96/68 (11-09-17 @ 12:06)  BP(mean): --  RR: 17 (11-09-17 @ 11:43)  SpO2: 98% (11-09-17 @ 11:43)  Wt(kg): --    I and O's:    11-08 @ 07:01  -  11-09 @ 07:00  --------------------------------------------------------  IN: 0 mL / OUT: 1500 mL / NET: -1500 mL    11-09 @ 07:01  -  11-09 @ 12:51  --------------------------------------------------------  IN: 0 mL / OUT: 1100 mL / NET: -1100 mL          PHYSICAL EXAM:    Constitutional: NAD  HEENT: PERRLA    Neck:  No JVD  Respiratory: CTAB/L  Cardiovascular: S1 and S2  Gastrointestinal: BS+, soft, NT/ND  Extremities: + peripheral edema 1/3 lower tibia  Neurological: A/O x 3, no focal deficits  Psychiatric: Normal mood, normal affect  : No Corey  Skin: No rashes  Access: Not applicable    LABS:                        10.5   4.70  )-----------( 179      ( 09 Nov 2017 06:28 )             35.7     11-09    143  |  93<L>  |  52<H>  ----------------------------<  111<H>  3.8   |  42<H>  |  1.64<H>    Ca    9.2      09 Nov 2017 06:28  Phos  4.6     11-08  Mg     1.8     11-09            Urine Studies:          RADIOLOGY & ADDITIONAL STUDIES:

## 2017-11-09 NOTE — PROGRESS NOTE ADULT - SUBJECTIVE AND OBJECTIVE BOX
PULM/MED PROGRESS NOTE:  awake, alert comfortable.  some discomfort with dawson      MEDICATIONS  (STANDING):  allopurinol 100 milliGRAM(s) Oral three times a day after meals  apixaban 5 milliGRAM(s) Oral every 12 hours  buDESOnide  80 MICROgram(s)/formoterol 4.5 MICROgram(s) Inhaler 2 Puff(s) Inhalation two times a day  cefTRIAXone   IVPB      cefTRIAXone   IVPB 1 Gram(s) IV Intermittent every 24 hours  chlorhexidine 4% Liquid 1 Application(s) Topical daily  dextrose 5%. 1000 milliLiter(s) (50 mL/Hr) IV Continuous <Continuous>  dextrose 50% Injectable 12.5 Gram(s) IV Push once  dextrose 50% Injectable 25 Gram(s) IV Push once  dextrose 50% Injectable 25 Gram(s) IV Push once  hydrALAZINE 25 milliGRAM(s) Oral three times a day  influenza   Vaccine 0.5 milliLiter(s) IntraMuscular once  insulin lispro (HumaLOG) corrective regimen sliding scale   SubCutaneous three times a day before meals  insulin lispro (HumaLOG) corrective regimen sliding scale   SubCutaneous at bedtime  metoprolol     tartrate 50 milliGRAM(s) Oral three times a day  pantoprazole    Tablet 40 milliGRAM(s) Oral before breakfast  torsemide 40 milliGRAM(s) Oral two times a day      MEDICATIONS  (PRN):  dextrose Gel 1 Dose(s) Oral once PRN Blood Glucose LESS THAN 70 milliGRAM(s)/deciliter  glucagon  Injectable 1 milliGRAM(s) IntraMuscular once PRN Glucose LESS THAN 70 milligrams/deciliter          Vital Signs Last 24 Hrs  T(C): 36.6 (09 Nov 2017 11:43), Max: 36.8 (08 Nov 2017 20:26)  T(F): 97.9 (09 Nov 2017 11:43), Max: 98.2 (08 Nov 2017 20:26)  HR: 85 (09 Nov 2017 11:43) (79 - 109)  BP: 96/68 (09 Nov 2017 12:06) (81/62 - 111/76)  BP(mean): --  RR: 17 (09 Nov 2017 11:43) (17 - 18)  SpO2: 98% (09 Nov 2017 11:43) (95% - 98%)    PHYSICAL EXAMINATION:    GENERAL: The patient is awake and alert in no apparent distress.     LUNGS: Bilateral breath sounds    HEART: irreg irreg    ABDOMEN: Soft, nontender    EXTREMITIES: 2+ pitting edema      LABS:                        10.5   4.70  )-----------( 179      ( 09 Nov 2017 06:28 )             35.7     11-09    143  |  93<L>  |  52<H>  ----------------------------<  111<H>  3.8   |  42<H>  |  1.64<H>    Ca    9.2      09 Nov 2017 06:28  Phos  4.6     11-08  Mg     1.8     11-09    MICROBIOLOGY:      RADIOLOGY & ADDITIONAL STUDIES:

## 2017-11-09 NOTE — PROGRESS NOTE ADULT - ATTENDING COMMENTS
Increase torsemide to 60 po bid.  Change metoprolol to Toprol 100 qd.  D/c planning. Agree w torsemide 40 po bid.  Start spironolactone 25 qd.  Change metoprolol to Toprol 100 qd.  D/c planning.

## 2017-11-09 NOTE — PROGRESS NOTE ADULT - SUBJECTIVE AND OBJECTIVE BOX
Patient seen and examined. She is feeling a lot better- SOB and LE edema greatly improving. She waked to the bathroom 2 x times by her self, states CHAVEZ improving, but felt a little SOB once she sat back down.    Medications:  allopurinol 100 milliGRAM(s) Oral three times a day after meals  apixaban 5 milliGRAM(s) Oral every 12 hours  buDESOnide  80 MICROgram(s)/formoterol 4.5 MICROgram(s) Inhaler 2 Puff(s) Inhalation two times a day  cefTRIAXone   IVPB      cefTRIAXone   IVPB 1 Gram(s) IV Intermittent every 24 hours  chlorhexidine 4% Liquid 1 Application(s) Topical daily  dextrose 5%. 1000 milliLiter(s) IV Continuous <Continuous>  dextrose 50% Injectable 12.5 Gram(s) IV Push once  dextrose 50% Injectable 25 Gram(s) IV Push once  dextrose 50% Injectable 25 Gram(s) IV Push once  dextrose Gel 1 Dose(s) Oral once PRN  furosemide Infusion 7.5 mG/Hr IV Continuous <Continuous>  glucagon  Injectable 1 milliGRAM(s) IntraMuscular once PRN  hydrALAZINE 25 milliGRAM(s) Oral three times a day  influenza   Vaccine 0.5 milliLiter(s) IntraMuscular once  insulin lispro (HumaLOG) corrective regimen sliding scale   SubCutaneous three times a day before meals  insulin lispro (HumaLOG) corrective regimen sliding scale   SubCutaneous at bedtime  metoprolol     tartrate 50 milliGRAM(s) Oral three times a day  pantoprazole    Tablet 40 milliGRAM(s) Oral before breakfast  phenazopyridine 200 milliGRAM(s) Oral three times a day after meals      Vitals:  T(C): 36.8 (11-09-17 @ 08:57), Max: 36.8 (11-08-17 @ 20:26)  HR: 109 (11-09-17 @ 08:57) (79 - 109)  BP: 106/69 (11-09-17 @ 08:57) (81/62 - 111/76)  BP(mean): --  ABP: --  ABP(mean): --  RR: 17 (11-09-17 @ 08:57) (17 - 18)  SpO2: 96% (11-09-17 @ 08:57) (95% - 98%)  Wt(kg): --  CVP(cm H2O): --  CO: --  CI: --  PA: --  PA(mean): --  PCWP: --  SVR: --  PVR: --  Vital Signs Last 24 Hrs  T(C): 36.8 (09 Nov 2017 08:57), Max: 36.8 (08 Nov 2017 20:26)  T(F): 98.2 (09 Nov 2017 08:57), Max: 98.2 (08 Nov 2017 20:26)  HR: 109 (09 Nov 2017 08:57) (79 - 109)  BP: 106/69 (09 Nov 2017 08:57) (81/62 - 111/76)  BP(mean): --  RR: 17 (09 Nov 2017 08:57) (17 - 18)  SpO2: 96% (09 Nov 2017 08:57) (95% - 98%)    Daily     Daily     I&O's Summary    08 Nov 2017 07:01  -  09 Nov 2017 07:00  --------------------------------------------------------  IN: 0 mL / OUT: 1500 mL / NET: -1500 mL    09 Nov 2017 07:01  -  09 Nov 2017 11:26  --------------------------------------------------------  IN: 0 mL / OUT: 700 mL / NET: -700 mL      I&O's Detail    08 Nov 2017 07:01  -  09 Nov 2017 07:00  --------------------------------------------------------  IN:  Total IN: 0 mL    OUT:    Indwelling Catheter - Urethral: 1400 mL    Voided: 100 mL  Total OUT: 1500 mL    Total NET: -1500 mL      09 Nov 2017 07:01  -  09 Nov 2017 11:26  --------------------------------------------------------  IN:  Total IN: 0 mL    OUT:    Indwelling Catheter - Urethral: 700 mL  Total OUT: 700 mL    Total NET: -700 mL          Physical Exam:     General: No distress. Comfortable.  HEENT: EOM intact.  Neck: Neck supple. JVP not elevated. No masses  Chest: Clear to auscultation bilaterally  CV: Normal S1 and S2. No murmurs, rub, or gallops. Radial pulses normal.  Abdomen: Soft, non-distended, non-tender  Skin: No rashes or skin breakdown  Neurology: Alert and oriented times three. Sensation intact  Psych: Affect normal    Labs:                        10.5   4.70  )-----------( 179      ( 09 Nov 2017 06:28 )             35.7     11-09    143  |  93<L>  |  52<H>  ----------------------------<  111<H>  3.8   |  42<H>  |  1.64<H>    Ca    9.2      09 Nov 2017 06:28  Phos  4.6     11-08  Mg     1.8     11-09 Patient seen and examined. She is feeling a lot better- SOB and LE edema greatly improving. She waked to the bathroom 2 x times by her self, states CHAVEZ improving, but felt a little SOB once she sat back down.    Medications:  allopurinol 100 milliGRAM(s) Oral three times a day after meals  apixaban 5 milliGRAM(s) Oral every 12 hours  buDESOnide  80 MICROgram(s)/formoterol 4.5 MICROgram(s) Inhaler 2 Puff(s) Inhalation two times a day  cefTRIAXone   IVPB      cefTRIAXone   IVPB 1 Gram(s) IV Intermittent every 24 hours  chlorhexidine 4% Liquid 1 Application(s) Topical daily  dextrose 5%. 1000 milliLiter(s) IV Continuous <Continuous>  dextrose 50% Injectable 12.5 Gram(s) IV Push once  dextrose 50% Injectable 25 Gram(s) IV Push once  dextrose 50% Injectable 25 Gram(s) IV Push once  dextrose Gel 1 Dose(s) Oral once PRN  furosemide Infusion 7.5 mG/Hr IV Continuous <Continuous>  glucagon  Injectable 1 milliGRAM(s) IntraMuscular once PRN  hydrALAZINE 25 milliGRAM(s) Oral three times a day  influenza   Vaccine 0.5 milliLiter(s) IntraMuscular once  insulin lispro (HumaLOG) corrective regimen sliding scale   SubCutaneous three times a day before meals  insulin lispro (HumaLOG) corrective regimen sliding scale   SubCutaneous at bedtime  metoprolol     tartrate 50 milliGRAM(s) Oral three times a day  pantoprazole    Tablet 40 milliGRAM(s) Oral before breakfast  phenazopyridine 200 milliGRAM(s) Oral three times a day after meals    Vital Signs Last 24 Hrs  T(C): 36.8 (09 Nov 2017 08:57), Max: 36.8 (08 Nov 2017 20:26)  T(F): 98.2 (09 Nov 2017 08:57), Max: 98.2 (08 Nov 2017 20:26)  HR: 109 (09 Nov 2017 08:57) (79 - 109)  BP: 106/69 (09 Nov 2017 08:57) (81/62 - 111/76)  BP(mean): --  RR: 17 (09 Nov 2017 08:57) (17 - 18)  SpO2: 96% (09 Nov 2017 08:57) (95% - 98%)    I&O's Summary    08 Nov 2017 07:01  -  09 Nov 2017 07:00  --------------------------------------------------------  IN: 0 mL / OUT: 1500 mL / NET: -1500 mL    09 Nov 2017 07:01  -  09 Nov 2017 11:26  --------------------------------------------------------  IN: 0 mL / OUT: 700 mL / NET: -700 mL      I&O's Detail    08 Nov 2017 07:01  -  09 Nov 2017 07:00  --------------------------------------------------------  IN:  Total IN: 0 mL    OUT:    Indwelling Catheter - Urethral: 1400 mL    Voided: 100 mL  Total OUT: 1500 mL    Total NET: -1500 mL      09 Nov 2017 07:01  -  09 Nov 2017 11:26  --------------------------------------------------------  IN:  Total IN: 0 mL    OUT:    Indwelling Catheter - Urethral: 700 mL  Total OUT: 700 mL    Total NET: -700 mL      Physical Exam:     General: No distress. Comfortable.  HEENT: EOM intact.  Neck: Neck supple. JVP elevated to less than mid neck at 90 degree angle. No masses  Chest: Clear to auscultation bilaterally  CV: Normal S1 and S2. No murmurs, rub, or gallops. Radial pulses normal. 1-2+ LE edema b/ L.   Abdomen: Soft, non-distended, non-tender  Skin: No rashes or skin breakdown  Neurology: Alert and oriented times three. Sensation intact  Psych: Affect normal    Labs:                        10.5   4.70  )-----------( 179      ( 09 Nov 2017 06:28 )             35.7     11-09    143  |  93<L>  |  52<H>  ----------------------------<  111<H>  3.8   |  42<H>  |  1.64<H>    Ca    9.2      09 Nov 2017 06:28  Phos  4.6     11-08  Mg     1.8     11-09

## 2017-11-09 NOTE — PROGRESS NOTE ADULT - ASSESSMENT
61 y/o female w/ PMHX  of Afib on Eliquis, DM II,  HTN, COPD on home O2 2L, lymphoma/CLL s/p radiation and chemo (1997), HFpEF LVEF 66% comes in with complaints of worsening SOB and LE edema. She states she recently had a fall at work, hit her head and was sent to the ER where she was found to be hypotensive. Since then some of her HF medications had been held. CT head was unremarkable per pt. She then took her self to Dr. Goldberg (PMD) for a follow up yesterday, and she was found to be in ADHF and was sent to [facility] ED.     Clinically improving.

## 2017-11-09 NOTE — PROGRESS NOTE ADULT - SUBJECTIVE AND OBJECTIVE BOX
Patient is a 62y old  Female who presents with a chief complaint of LE edema and Abd edema, SOB (02 Nov 2017 11:25)      SUBJECTIVE / OVERNIGHT EVENTS:  Felt dizzy last night  SOB improved further.    Review of Systems:   CONSTITUTIONAL: No fever, or fatigue  EYES: No eye pain, visual disturbances, or discharge  ENMT:  No difficulty hearing, tinnitus, vertigo; No sinus or throat pain  NECK: No pain or stiffness  BREASTS: No pain, masses, or nipple discharge  RESPIRATORY: No cough, wheezing, chills or hemoptysis; No shortness of breath  CARDIOVASCULAR: No chest pain, palpitations, dizziness, +++ leg swelling  GASTROINTESTINAL: No abdominal or epigastric pain. No nausea, vomiting, or hematemesis; No diarrhea or constipation. No melena or hematochezia.  GENITOURINARY: No dysuria, frequency, hematuria, or incontinence  NEUROLOGICAL: No headaches, memory loss, loss of strength, numbness, or tremors  SKIN: No itching, burning, rashes, or lesions   LYMPH NODES: No enlarged glands  ENDOCRINE: No heat or cold intolerance; No hair loss  MUSCULOSKELETAL: No joint pain or swelling; No muscle, back, or extremity pain  PSYCHIATRIC: No depression, anxiety, mood swings, or difficulty sleeping  HEME/LYMPH: No easy bruising, or bleeding gums  ALLERY AND IMMUNOLOGIC: No hives or eczema    MEDICATIONS  (STANDING):  allopurinol 100 milliGRAM(s) Oral three times a day after meals  apixaban 5 milliGRAM(s) Oral every 12 hours  buDESOnide  80 MICROgram(s)/formoterol 4.5 MICROgram(s) Inhaler 2 Puff(s) Inhalation two times a day  cefTRIAXone   IVPB      cefTRIAXone   IVPB 1 Gram(s) IV Intermittent every 24 hours  chlorhexidine 4% Liquid 1 Application(s) Topical daily  dextrose 5%. 1000 milliLiter(s) (50 mL/Hr) IV Continuous <Continuous>  dextrose 50% Injectable 12.5 Gram(s) IV Push once  dextrose 50% Injectable 25 Gram(s) IV Push once  dextrose 50% Injectable 25 Gram(s) IV Push once  hydrALAZINE 25 milliGRAM(s) Oral three times a day  influenza   Vaccine 0.5 milliLiter(s) IntraMuscular once  insulin lispro (HumaLOG) corrective regimen sliding scale   SubCutaneous three times a day before meals  insulin lispro (HumaLOG) corrective regimen sliding scale   SubCutaneous at bedtime  metoprolol     tartrate 50 milliGRAM(s) Oral three times a day  pantoprazole    Tablet 40 milliGRAM(s) Oral before breakfast  torsemide 40 milliGRAM(s) Oral two times a day    MEDICATIONS  (PRN):  dextrose Gel 1 Dose(s) Oral once PRN Blood Glucose LESS THAN 70 milliGRAM(s)/deciliter  glucagon  Injectable 1 milliGRAM(s) IntraMuscular once PRN Glucose LESS THAN 70 milligrams/deciliter      PHYSICAL EXAM:  Vital Signs Last 24 Hrs  T(C): 36.6 (09 Nov 2017 11:43), Max: 36.8 (08 Nov 2017 20:26)  T(F): 97.9 (09 Nov 2017 11:43), Max: 98.2 (08 Nov 2017 20:26)  HR: 85 (09 Nov 2017 11:43) (79 - 109)  BP: 96/68 (09 Nov 2017 12:06) (81/62 - 111/76)  BP(mean): --  RR: 17 (09 Nov 2017 11:43) (17 - 18)  SpO2: 98% (09 Nov 2017 11:43) (95% - 98%)  I&O's Summary    08 Nov 2017 07:01  -  09 Nov 2017 07:00  --------------------------------------------------------  IN: 0 mL / OUT: 1500 mL / NET: -1500 mL    09 Nov 2017 07:01  -  09 Nov 2017 15:36  --------------------------------------------------------  IN: 0 mL / OUT: 1100 mL / NET: -1100 mL      GENERAL: NAD, well-developed  HEAD:  Atraumatic, Normocephalic  EYES: EOMI, PERRLA, conjunctiva and sclera clear  NECK: Supple, No JVD  CHEST/LUNG: Clear to auscultation bilaterally; No wheeze  HEART: Regular rate and rhythm; No murmurs, rubs, or gallops  ABDOMEN: Soft, Nontender, Nondistended; Bowel sounds present  EXTREMITIES:  2+ Peripheral Pulses, No clubbing, cyanosis, +++ Edema  PSYCH: AAOx3  NEUROLOGY: non-focal  SKIN: No rashes or lesions    LABS:  CAPILLARY BLOOD GLUCOSE      POCT Blood Glucose.: 126 mg/dL (09 Nov 2017 12:08)  POCT Blood Glucose.: 136 mg/dL (09 Nov 2017 08:08)  POCT Blood Glucose.: 113 mg/dL (09 Nov 2017 06:27)  POCT Blood Glucose.: 182 mg/dL (08 Nov 2017 21:13)  POCT Blood Glucose.: 164 mg/dL (08 Nov 2017 16:23)                          10.5   4.70  )-----------( 179      ( 09 Nov 2017 06:28 )             35.7     11-09    143  |  93<L>  |  52<H>  ----------------------------<  111<H>  3.8   |  42<H>  |  1.64<H>    Ca    9.2      09 Nov 2017 06:28  Phos  4.6     11-08  Mg     1.8     11-09                RADIOLOGY & ADDITIONAL TESTS:    Imaging Personally Reviewed:    Consultant(s) Notes Reviewed:      Care Discussed with Consultants/Other Providers:

## 2017-11-10 VITALS
SYSTOLIC BLOOD PRESSURE: 99 MMHG | DIASTOLIC BLOOD PRESSURE: 66 MMHG | HEART RATE: 90 BPM | TEMPERATURE: 98 F | RESPIRATION RATE: 18 BRPM | OXYGEN SATURATION: 99 %

## 2017-11-10 LAB
BUN SERPL-MCNC: 54 MG/DL — HIGH (ref 7–23)
CALCIUM SERPL-MCNC: 9.4 MG/DL — SIGNIFICANT CHANGE UP (ref 8.4–10.5)
CHLORIDE SERPL-SCNC: 88 MMOL/L — LOW (ref 98–107)
CO2 SERPL-SCNC: 42 MMOL/L — HIGH (ref 22–31)
CREAT SERPL-MCNC: 1.69 MG/DL — HIGH (ref 0.5–1.3)
GLUCOSE SERPL-MCNC: 120 MG/DL — HIGH (ref 70–99)
HCT VFR BLD CALC: 33.7 % — LOW (ref 34.5–45)
HGB BLD-MCNC: 10.1 G/DL — LOW (ref 11.5–15.5)
MAGNESIUM SERPL-MCNC: 1.7 MG/DL — SIGNIFICANT CHANGE UP (ref 1.6–2.6)
MCHC RBC-ENTMCNC: 20.4 PG — LOW (ref 27–34)
MCHC RBC-ENTMCNC: 30 % — LOW (ref 32–36)
MCV RBC AUTO: 67.9 FL — LOW (ref 80–100)
NRBC # FLD: 0.03 — SIGNIFICANT CHANGE UP
PLATELET # BLD AUTO: 230 K/UL — SIGNIFICANT CHANGE UP (ref 150–400)
PMV BLD: SIGNIFICANT CHANGE UP FL (ref 7–13)
POTASSIUM SERPL-MCNC: 3.2 MMOL/L — LOW (ref 3.5–5.3)
POTASSIUM SERPL-SCNC: 3.2 MMOL/L — LOW (ref 3.5–5.3)
RBC # BLD: 4.96 M/UL — SIGNIFICANT CHANGE UP (ref 3.8–5.2)
RBC # FLD: 17.7 % — HIGH (ref 10.3–14.5)
SODIUM SERPL-SCNC: 140 MMOL/L — SIGNIFICANT CHANGE UP (ref 135–145)
WBC # BLD: 5.66 K/UL — SIGNIFICANT CHANGE UP (ref 3.8–10.5)
WBC # FLD AUTO: 5.66 K/UL — SIGNIFICANT CHANGE UP (ref 3.8–10.5)

## 2017-11-10 PROCEDURE — 99232 SBSQ HOSP IP/OBS MODERATE 35: CPT

## 2017-11-10 RX ORDER — HYDRALAZINE HCL 50 MG
1 TABLET ORAL
Qty: 0 | Refills: 0 | COMMUNITY
Start: 2017-11-10

## 2017-11-10 RX ORDER — ALLOPURINOL 300 MG
1 TABLET ORAL
Qty: 0 | Refills: 0 | COMMUNITY

## 2017-11-10 RX ORDER — FUROSEMIDE 40 MG
1 TABLET ORAL
Qty: 0 | Refills: 0 | COMMUNITY

## 2017-11-10 RX ORDER — POTASSIUM CHLORIDE 20 MEQ
20 PACKET (EA) ORAL
Qty: 0 | Refills: 0 | Status: COMPLETED | OUTPATIENT
Start: 2017-11-10 | End: 2017-11-10

## 2017-11-10 RX ORDER — SPIRONOLACTONE 25 MG/1
1 TABLET, FILM COATED ORAL
Qty: 0 | Refills: 0 | COMMUNITY

## 2017-11-10 RX ORDER — HYDRALAZINE HCL 50 MG
1 TABLET ORAL
Qty: 0 | Refills: 0 | COMMUNITY

## 2017-11-10 RX ORDER — METOPROLOL TARTRATE 50 MG
1 TABLET ORAL
Qty: 30 | Refills: 0 | OUTPATIENT
Start: 2017-11-10 | End: 2017-12-10

## 2017-11-10 RX ORDER — METOPROLOL TARTRATE 50 MG
1 TABLET ORAL
Qty: 0 | Refills: 0 | COMMUNITY

## 2017-11-10 RX ORDER — LISINOPRIL 2.5 MG/1
1 TABLET ORAL
Qty: 0 | Refills: 0 | COMMUNITY

## 2017-11-10 RX ORDER — SPIRONOLACTONE 25 MG/1
1 TABLET, FILM COATED ORAL
Qty: 0 | Refills: 0 | COMMUNITY
Start: 2017-11-10

## 2017-11-10 RX ORDER — PANTOPRAZOLE SODIUM 20 MG/1
1 TABLET, DELAYED RELEASE ORAL
Qty: 0 | Refills: 0 | COMMUNITY
Start: 2017-11-10

## 2017-11-10 RX ORDER — METOPROLOL TARTRATE 50 MG
1 TABLET ORAL
Qty: 0 | Refills: 0 | COMMUNITY
Start: 2017-11-10

## 2017-11-10 RX ORDER — HYDRALAZINE HCL 50 MG
1 TABLET ORAL
Qty: 90 | Refills: 0 | OUTPATIENT
Start: 2017-11-10 | End: 2017-12-10

## 2017-11-10 RX ORDER — APIXABAN 2.5 MG/1
1 TABLET, FILM COATED ORAL
Qty: 0 | Refills: 0 | COMMUNITY

## 2017-11-10 RX ORDER — ALLOPURINOL 300 MG
1 TABLET ORAL
Qty: 0 | Refills: 0 | COMMUNITY
Start: 2017-11-10

## 2017-11-10 RX ORDER — PANTOPRAZOLE SODIUM 20 MG/1
1 TABLET, DELAYED RELEASE ORAL
Qty: 30 | Refills: 0 | OUTPATIENT
Start: 2017-11-10 | End: 2017-12-10

## 2017-11-10 RX ORDER — APIXABAN 2.5 MG/1
1 TABLET, FILM COATED ORAL
Qty: 0 | Refills: 0 | COMMUNITY
Start: 2017-11-10

## 2017-11-10 RX ADMIN — PANTOPRAZOLE SODIUM 40 MILLIGRAM(S): 20 TABLET, DELAYED RELEASE ORAL at 09:44

## 2017-11-10 RX ADMIN — Medication 25 MILLIGRAM(S): at 06:19

## 2017-11-10 RX ADMIN — BUDESONIDE AND FORMOTEROL FUMARATE DIHYDRATE 2 PUFF(S): 160; 4.5 AEROSOL RESPIRATORY (INHALATION) at 09:44

## 2017-11-10 RX ADMIN — Medication 100 MILLIGRAM(S): at 06:19

## 2017-11-10 RX ADMIN — Medication 100 MILLIGRAM(S): at 17:42

## 2017-11-10 RX ADMIN — Medication 100 MILLIGRAM(S): at 14:14

## 2017-11-10 RX ADMIN — Medication 20 MILLIEQUIVALENT(S): at 09:44

## 2017-11-10 RX ADMIN — Medication 100 MILLIGRAM(S): at 09:44

## 2017-11-10 RX ADMIN — SPIRONOLACTONE 25 MILLIGRAM(S): 25 TABLET, FILM COATED ORAL at 06:19

## 2017-11-10 RX ADMIN — CEFTRIAXONE 100 GRAM(S): 500 INJECTION, POWDER, FOR SOLUTION INTRAMUSCULAR; INTRAVENOUS at 11:48

## 2017-11-10 RX ADMIN — Medication 20 MILLIEQUIVALENT(S): at 11:47

## 2017-11-10 RX ADMIN — APIXABAN 5 MILLIGRAM(S): 2.5 TABLET, FILM COATED ORAL at 09:44

## 2017-11-10 NOTE — PROGRESS NOTE ADULT - SUBJECTIVE AND OBJECTIVE BOX
Patient seen and examined. She is feeling a lot better- no acute SOB, orthopnea, PND, CP, palpitations, dizziness or syncope. LE are much better.     Medications:  allopurinol 100 milliGRAM(s) Oral three times a day after meals  apixaban 5 milliGRAM(s) Oral every 12 hours  buDESOnide  80 MICROgram(s)/formoterol 4.5 MICROgram(s) Inhaler 2 Puff(s) Inhalation two times a day  cefTRIAXone   IVPB      cefTRIAXone   IVPB 1 Gram(s) IV Intermittent every 24 hours  chlorhexidine 4% Liquid 1 Application(s) Topical daily  dextrose 5%. 1000 milliLiter(s) IV Continuous <Continuous>  dextrose 50% Injectable 12.5 Gram(s) IV Push once  dextrose 50% Injectable 25 Gram(s) IV Push once  dextrose 50% Injectable 25 Gram(s) IV Push once  dextrose Gel 1 Dose(s) Oral once PRN  glucagon  Injectable 1 milliGRAM(s) IntraMuscular once PRN  influenza   Vaccine 0.5 milliLiter(s) IntraMuscular once  insulin lispro (HumaLOG) corrective regimen sliding scale   SubCutaneous three times a day before meals  insulin lispro (HumaLOG) corrective regimen sliding scale   SubCutaneous at bedtime  metoprolol succinate  milliGRAM(s) Oral daily  pantoprazole    Tablet 40 milliGRAM(s) Oral before breakfast  spironolactone 25 milliGRAM(s) Oral daily  torsemide 60 milliGRAM(s) Oral two times a day      Vital Signs Last 24 Hrs  T(C): 36.9 (10 Nov 2017 12:00), Max: 36.9 (10 Nov 2017 12:00)  T(F): 98.5 (10 Nov 2017 12:00), Max: 98.5 (10 Nov 2017 12:00)  HR: 113 (10 Nov 2017 12:00) (72 - 113)  BP: 99/77 (10 Nov 2017 12:00) (99/77 - 119/70)  BP(mean): --  RR: 17 (10 Nov 2017 12:00) (17 - 18)  SpO2: 94% (10 Nov 2017 12:00) (94% - 99%)    Daily     Daily     I&O's Summary    09 Nov 2017 07:01  -  10 Nov 2017 07:00  --------------------------------------------------------  IN: 0 mL / OUT: 4050 mL / NET: -4050 mL      I&O's Detail    09 Nov 2017 07:01  -  10 Nov 2017 07:00  --------------------------------------------------------  IN:  Total IN: 0 mL    OUT:    Indwelling Catheter - Urethral: 4050 mL  Total OUT: 4050 mL    Total NET: -4050 mL        Physical Exam:     General: No distress. Comfortable.  HEENT: EOM intact.  Neck: Neck supple. JVP not elevated. No masses  Chest: Clear to auscultation bilaterally  CV: Normal S1 and S2. No murmurs, rub, or gallops. Radial pulses normal. trace b/l LE edema.   Abdomen: Soft, non-distended, non-tender  Skin: No rashes or skin breakdown  Neurology: Alert and oriented times three. Sensation intact  Psych: Affect normal    Labs:                        10.1   5.66  )-----------( 230      ( 10 Nov 2017 05:46 )             33.7     11-10    140  |  88<L>  |  54<H>  ----------------------------<  120<H>  3.2<L>   |  42<H>  |  1.69<H>    Ca    9.4      10 Nov 2017 05:46  Mg     1.7     11-10

## 2017-11-10 NOTE — PROGRESS NOTE ADULT - PROVIDER SPECIALTY LIST ADULT
CCU
Heart Failure
Internal Medicine
Nephrology
Pulmonology
Pulmonology
Nephrology
Heart Failure
Internal Medicine
Pulmonology
Pulmonology
CCU
CCU
Internal Medicine

## 2017-11-10 NOTE — PROGRESS NOTE ADULT - SUBJECTIVE AND OBJECTIVE BOX
Nephrology Progress Note    No events overnight  Patient looks comfortable and not in distress  Denies sob or chest pain    Noted significant diuresis ; 4 liters last 24 hrs  Noted slight increase of creatinine and hypokalemia of 3.2    VITAL:  T(C): , Max: 36.9 (11-10-17 @ 12:00)  T(F): , Max: 98.5 (11-10-17 @ 12:00)  HR: 113 (11-10-17 @ 12:00)  BP: 99/77 (11-10-17 @ 12:00)  BP(mean): --  RR: 17 (11-10-17 @ 12:00)  SpO2: 94% (11-10-17 @ 12:00)  Wt(kg): --      PHYSICAL EXAM:    Constitutional: AAO x 3, NAD  HEENT: PERRLA    Neck:  supple; No JVD  Respiratory: CTAB/L, no wrc, normal effort  Cardiovascular: S1 and S2, improving peripheral edema  Gastrointestinal: BS+, soft, NT/ND  Extremities: + peripheral edema more on the ankles, much improved  Neurological: A/O x 3, no focal deficits  Skin: well perfused; No rashes on visible skin    LABS:                        10.1   5.66  )-----------( 230      ( 10 Nov 2017 05:46 )             33.7     Na(140)/K(3.2)/Cl(88)/HCO3(42)/BUN(54)/Cr(1.69)Glu(120)/Ca(9.4)/Mg(1.7)/PO4(--)    11-10 @ 05:46  Na(143)/K(3.8)/Cl(93)/HCO3(42)/BUN(52)/Cr(1.64)Glu(111)/Ca(9.2)/Mg(1.8)/PO4(--)    11-09 @ 06:28  Na(145)/K(3.6)/Cl(94)/HCO3(42)/BUN(42)/Cr(1.40)Glu(124)/Ca(9.5)/Mg(1.8)/PO4(4.6)    11-08 @ 06:30      ASSESSMENT: 62-year-old female with chronic lymphocytic leukemia, atrial fibrillation, diabetes, and hypertension p/w CHF exacerbation.    1. Diastolic CHF exacerbation: - improved; on torsemide 40 mg daily  2. Acute kidney injury- CRS- Renal function improving --Worse numbers today likely due to overdiuresis  3. Chronic respiratory acidosis with significant CO2 retention and metabolic alkalosis likely due to contraction alkalosis  4. A-fib with RVR: elevated HR at times    RECOMMENDATIONS;    - Noted patient started on Torsemide 60 mg BID: noted diuresis of 4 liters last 24 hours; Noted creatinine rising and bicarbonates high:   - in view of the above I would slow down on diuresis as her volume status is excellent now. Would keep her no more than 2 liters negative for now: if higher would decrease the dose of torsemide.   - BP low; HR on the higher side likely due to relative low blood pressure considering she lives on 130s systolic  - I would hold lisinopril and hydralazine for now  - would rather decrease torsemide and keep spironolactone considering CHF and low K   - continue to avoid nephrotoxins  - continue k supplement  Patient will need to follow with nephrology as outpatient    Garry Funes MD  [facility]  [phone]

## 2017-11-10 NOTE — PROGRESS NOTE ADULT - PROBLEM SELECTOR PLAN 1
-Diuresed really well -4050 ml.   -Cr same as yest 1.69.   -Continue toprol 100 mg qd, Hydral 25 TID, lisinopril 5 held due to renal dysfunction, will start as outpatient. Continue torsemide 40 po BID.  -d/c planning as d/w Dr. Palafox.   -F/u w/ HF team 11/20 at 2 pm.

## 2017-11-10 NOTE — CHART NOTE - NSCHARTNOTEFT_GEN_A_CORE
Pt complains of "bladder pain".  She had no complaints while taking Pyridium but it  was discontinued on the advice of Pharmacy.  I was going to restart it, however, Pyridium is contraindicated with GFR <50 and Ms Sanam is 32 GFR. Pt complains of "bladder pain".  She had no complaints while taking Pyridium but it  was discontinued on the advice of Pharmacy.  I was going to restart it, however, Pyridium is contraindicated with GFR <50 and Ms Sanam is 37 GFR.

## 2017-11-10 NOTE — PROGRESS NOTE ADULT - ASSESSMENT
63 y/o female w/ PMHX  of Afib on Eliquis, DM II,  HTN, COPD on home O2 2L, lymphoma/CLL s/p radiation and chemo (1997), HFpEF LVEF 66% comes in with complaints of worsening SOB and LE edema. She states she recently had a fall at work, hit her head and was sent to the ER where she was found to be hypotensive. Since then some of her HF medications had been held. CT head was unremarkable per pt. She then took her self to Dr. Goldberg (PMD) for a follow up yesterday, and she was found to be in ADHF and was sent to [facility] ED.     Clinically improving.

## 2017-11-10 NOTE — PROGRESS NOTE ADULT - SUBJECTIVE AND OBJECTIVE BOX
Patient is a 62y old  Female who presents with a chief complaint of LE edema and Abd edema, SOB (02 Nov 2017 11:25)      SUBJECTIVE / OVERNIGHT EVENTS:  Status post is improved.  Edema has improved.  She denies any palpitations or dizziness.  Review of Systems:   CONSTITUTIONAL: No fever, weight loss, or fatigue  EYES: No eye pain, visual disturbances, or discharge  ENMT:  No difficulty hearing, tinnitus, vertigo; No sinus or throat pain  NECK: No pain or stiffness  BREASTS: No pain, masses, or nipple discharge  RESPIRATORY: No cough, wheezing, chills or hemoptysis; No shortness of breath  CARDIOVASCULAR: No chest pain, palpitations, dizziness, or leg swelling  GASTROINTESTINAL: No abdominal or epigastric pain. No nausea, vomiting, or hematemesis; No diarrhea or constipation. No melena or hematochezia.  GENITOURINARY: No dysuria, frequency, hematuria, or incontinence  NEUROLOGICAL: No headaches, memory loss, loss of strength, numbness, or tremors  SKIN: No itching, burning, rashes, or lesions   LYMPH NODES: No enlarged glands  ENDOCRINE: No heat or cold intolerance; No hair loss  MUSCULOSKELETAL: No joint pain or swelling; No muscle, back, or extremity pain  PSYCHIATRIC: No depression, anxiety, mood swings, or difficulty sleeping  HEME/LYMPH: No easy bruising, or bleeding gums  ALLERY AND IMMUNOLOGIC: No hives or eczema    MEDICATIONS  (STANDING):  allopurinol 100 milliGRAM(s) Oral three times a day after meals  apixaban 5 milliGRAM(s) Oral every 12 hours  buDESOnide  80 MICROgram(s)/formoterol 4.5 MICROgram(s) Inhaler 2 Puff(s) Inhalation two times a day  cefTRIAXone   IVPB      cefTRIAXone   IVPB 1 Gram(s) IV Intermittent every 24 hours  chlorhexidine 4% Liquid 1 Application(s) Topical daily  dextrose 5%. 1000 milliLiter(s) (50 mL/Hr) IV Continuous <Continuous>  dextrose 50% Injectable 12.5 Gram(s) IV Push once  dextrose 50% Injectable 25 Gram(s) IV Push once  dextrose 50% Injectable 25 Gram(s) IV Push once  influenza   Vaccine 0.5 milliLiter(s) IntraMuscular once  insulin lispro (HumaLOG) corrective regimen sliding scale   SubCutaneous three times a day before meals  insulin lispro (HumaLOG) corrective regimen sliding scale   SubCutaneous at bedtime  metoprolol succinate  milliGRAM(s) Oral daily  pantoprazole    Tablet 40 milliGRAM(s) Oral before breakfast  spironolactone 25 milliGRAM(s) Oral daily  torsemide 60 milliGRAM(s) Oral two times a day    MEDICATIONS  (PRN):  dextrose Gel 1 Dose(s) Oral once PRN Blood Glucose LESS THAN 70 milliGRAM(s)/deciliter  glucagon  Injectable 1 milliGRAM(s) IntraMuscular once PRN Glucose LESS THAN 70 milligrams/deciliter      PHYSICAL EXAM:  Vital Signs Last 24 Hrs  T(C): 36.6 (10 Nov 2017 16:52), Max: 36.9 (10 Nov 2017 12:00)  T(F): 97.9 (10 Nov 2017 16:52), Max: 98.5 (10 Nov 2017 12:00)  HR: 90 (10 Nov 2017 16:52) (72 - 113)  BP: 99/66 (10 Nov 2017 16:52) (99/66 - 110/64)  BP(mean): --  RR: 18 (10 Nov 2017 16:52) (17 - 18)  SpO2: 99% (10 Nov 2017 16:52) (94% - 99%)  I&O's Summary    09 Nov 2017 07:01  -  10 Nov 2017 07:00  --------------------------------------------------------  IN: 0 mL / OUT: 4050 mL / NET: -4050 mL      GENERAL: NAD, well-developed  HEAD:  Atraumatic, Normocephalic  EYES: EOMI, PERRLA, conjunctiva and sclera clear  NECK: Supple, No JVD  CHEST/LUNG: Clear to auscultation bilaterally; No wheeze  HEART: Regular rate and rhythm; No murmurs, rubs, or gallops  ABDOMEN: Soft, Nontender, Nondistended; Bowel sounds present  EXTREMITIES:  2+ Peripheral Pulses, No clubbing, cyanosis, edema has much improved.  PSYCH: AAOx3  NEUROLOGY: non-focal  SKIN: No rashes or lesions    LABS:  CAPILLARY BLOOD GLUCOSE      POCT Blood Glucose.: 139 mg/dL (10 Nov 2017 16:26)  POCT Blood Glucose.: 140 mg/dL (10 Nov 2017 12:42)  POCT Blood Glucose.: 111 mg/dL (10 Nov 2017 08:30)  POCT Blood Glucose.: 175 mg/dL (09 Nov 2017 21:19)                          10.1   5.66  )-----------( 230      ( 10 Nov 2017 05:46 )             33.7     11-10    140  |  88<L>  |  54<H>  ----------------------------<  120<H>  3.2<L>   |  42<H>  |  1.69<H>    Ca    9.4      10 Nov 2017 05:46  Mg     1.7     11-10                RADIOLOGY & ADDITIONAL TESTS:    Imaging Personally Reviewed:    Consultant(s) Notes Reviewed:      Care Discussed with Consultants/Other Providers:

## 2017-11-20 ENCOUNTER — NON-APPOINTMENT (OUTPATIENT)
Age: 62
End: 2017-11-20

## 2017-11-20 ENCOUNTER — APPOINTMENT (OUTPATIENT)
Dept: CARDIOLOGY | Facility: CLINIC | Age: 62
End: 2017-11-20
Payer: COMMERCIAL

## 2017-11-20 VITALS
DIASTOLIC BLOOD PRESSURE: 80 MMHG | OXYGEN SATURATION: 93 % | HEIGHT: 64 IN | BODY MASS INDEX: 38.93 KG/M2 | HEART RATE: 130 BPM | SYSTOLIC BLOOD PRESSURE: 113 MMHG | WEIGHT: 228 LBS

## 2017-11-20 PROCEDURE — 36415 COLL VENOUS BLD VENIPUNCTURE: CPT

## 2017-11-20 PROCEDURE — 99214 OFFICE O/P EST MOD 30 MIN: CPT

## 2017-11-20 PROCEDURE — 93000 ELECTROCARDIOGRAM COMPLETE: CPT

## 2017-11-20 RX ORDER — HYDRALAZINE HYDROCHLORIDE 50 MG/1
50 TABLET ORAL 3 TIMES DAILY
Qty: 90 | Refills: 3 | Status: DISCONTINUED | COMMUNITY
Start: 2017-07-31 | End: 2017-11-20

## 2017-11-21 LAB
ANION GAP SERPL CALC-SCNC: 22 MMOL/L
BUN SERPL-MCNC: 68 MG/DL
CALCIUM SERPL-MCNC: 10 MG/DL
CHLORIDE SERPL-SCNC: 93 MMOL/L
CO2 SERPL-SCNC: 23 MMOL/L
CREAT SERPL-MCNC: 2.43 MG/DL
GLUCOSE SERPL-MCNC: 21 MG/DL
MAGNESIUM SERPL-MCNC: 1.2 MG/DL
NT-PROBNP SERPL-MCNC: 5258 PG/ML
POTASSIUM SERPL-SCNC: 5.3 MMOL/L
SODIUM SERPL-SCNC: 138 MMOL/L

## 2017-11-22 ENCOUNTER — MEDICATION RENEWAL (OUTPATIENT)
Age: 62
End: 2017-11-22

## 2018-01-11 ENCOUNTER — NON-APPOINTMENT (OUTPATIENT)
Age: 63
End: 2018-01-11

## 2018-01-11 ENCOUNTER — APPOINTMENT (OUTPATIENT)
Dept: CARDIOLOGY | Facility: CLINIC | Age: 63
End: 2018-01-11
Payer: COMMERCIAL

## 2018-01-11 VITALS
HEIGHT: 64 IN | BODY MASS INDEX: 38.58 KG/M2 | HEART RATE: 114 BPM | DIASTOLIC BLOOD PRESSURE: 77 MMHG | RESPIRATION RATE: 18 BRPM | WEIGHT: 226 LBS | SYSTOLIC BLOOD PRESSURE: 115 MMHG | OXYGEN SATURATION: 93 %

## 2018-01-11 PROCEDURE — 36415 COLL VENOUS BLD VENIPUNCTURE: CPT

## 2018-01-11 PROCEDURE — 99214 OFFICE O/P EST MOD 30 MIN: CPT

## 2018-01-11 PROCEDURE — 93000 ELECTROCARDIOGRAM COMPLETE: CPT

## 2018-01-11 RX ORDER — MAGNESIUM OXIDE 241.3 MG/1000MG
400 TABLET ORAL
Qty: 60 | Refills: 0 | Status: DISCONTINUED | COMMUNITY
Start: 2017-11-21 | End: 2018-01-11

## 2018-01-12 LAB
ANION GAP SERPL CALC-SCNC: 20 MMOL/L
BUN SERPL-MCNC: 44 MG/DL
CALCIUM SERPL-MCNC: 9.7 MG/DL
CHLORIDE SERPL-SCNC: 96 MMOL/L
CO2 SERPL-SCNC: 24 MMOL/L
CREAT SERPL-MCNC: 2.23 MG/DL
GLUCOSE SERPL-MCNC: 61 MG/DL
MAGNESIUM SERPL-MCNC: 2.1 MG/DL
NT-PROBNP SERPL-MCNC: 4722 PG/ML
POTASSIUM SERPL-SCNC: 4.1 MMOL/L
SODIUM SERPL-SCNC: 140 MMOL/L

## 2018-04-02 ENCOUNTER — NON-APPOINTMENT (OUTPATIENT)
Age: 63
End: 2018-04-02

## 2018-04-02 ENCOUNTER — APPOINTMENT (OUTPATIENT)
Dept: CARDIOLOGY | Facility: CLINIC | Age: 63
End: 2018-04-02
Payer: COMMERCIAL

## 2018-04-02 VITALS
RESPIRATION RATE: 18 BRPM | OXYGEN SATURATION: 94 % | DIASTOLIC BLOOD PRESSURE: 98 MMHG | BODY MASS INDEX: 38.58 KG/M2 | WEIGHT: 226 LBS | HEIGHT: 64 IN | HEART RATE: 114 BPM | SYSTOLIC BLOOD PRESSURE: 133 MMHG

## 2018-04-02 VITALS — SYSTOLIC BLOOD PRESSURE: 132 MMHG | DIASTOLIC BLOOD PRESSURE: 90 MMHG | HEART RATE: 100 BPM

## 2018-04-02 PROCEDURE — 99214 OFFICE O/P EST MOD 30 MIN: CPT

## 2018-04-02 PROCEDURE — 93000 ELECTROCARDIOGRAM COMPLETE: CPT

## 2018-04-02 RX ORDER — EXENATIDE 2 MG/.65ML
2 INJECTION, SUSPENSION, EXTENDED RELEASE SUBCUTANEOUS
Refills: 0 | Status: DISCONTINUED | COMMUNITY
Start: 2018-01-11 | End: 2018-04-02

## 2018-04-02 RX ORDER — SITAGLIPTIN 50 MG/1
50 TABLET, FILM COATED ORAL
Qty: 60 | Refills: 0 | Status: DISCONTINUED | COMMUNITY
Start: 2017-12-22 | End: 2018-04-02

## 2018-04-02 RX ORDER — MUPIROCIN 2 G/100G
2 CREAM TOPICAL
Qty: 15 | Refills: 0 | Status: DISCONTINUED | COMMUNITY
Start: 2017-10-26 | End: 2018-04-02

## 2018-04-02 RX ORDER — EXENATIDE 2 MG/.65ML
2 INJECTION, SUSPENSION, EXTENDED RELEASE SUBCUTANEOUS
Qty: 8 | Refills: 0 | Status: DISCONTINUED | COMMUNITY
Start: 2017-12-22 | End: 2018-04-02

## 2018-04-02 RX ORDER — HYDRALAZINE HYDROCHLORIDE 25 MG/1
25 TABLET ORAL
Qty: 90 | Refills: 3 | Status: DISCONTINUED | COMMUNITY
Start: 2018-04-02 | End: 2018-04-02

## 2018-04-17 ENCOUNTER — MEDICATION RENEWAL (OUTPATIENT)
Age: 63
End: 2018-04-17

## 2018-05-16 ENCOUNTER — MEDICATION RENEWAL (OUTPATIENT)
Age: 63
End: 2018-05-16

## 2018-05-30 NOTE — PROGRESS NOTE ADULT - CONSTITUTIONAL
----- Message from Adelso Whyte sent at 5/30/2018  3:55 PM CDT -----  Contact: Nidia with [facility]  Nidia with [facility], [phone]. Requesting a refill for clopidogrel (PLAVIX) 75 mg tablet. Says she only has three days left. Please advise. Thanks.    [facility]   [address] [place] [address].  JUDI HARRIS [id number]  Phone: [phone] Fax: [phone]    
Well-developed, well nourished
Well-developed, well nourished

## 2018-06-14 ENCOUNTER — APPOINTMENT (OUTPATIENT)
Dept: CARDIOLOGY | Facility: CLINIC | Age: 63
End: 2018-06-14
Payer: COMMERCIAL

## 2018-06-14 VITALS
HEART RATE: 87 BPM | SYSTOLIC BLOOD PRESSURE: 136 MMHG | OXYGEN SATURATION: 91 % | RESPIRATION RATE: 18 BRPM | DIASTOLIC BLOOD PRESSURE: 91 MMHG

## 2018-06-14 PROCEDURE — 99214 OFFICE O/P EST MOD 30 MIN: CPT

## 2018-06-14 PROCEDURE — 36415 COLL VENOUS BLD VENIPUNCTURE: CPT

## 2018-06-14 RX ORDER — ALLOPURINOL 300 MG/1
300 TABLET ORAL DAILY
Qty: 30 | Refills: 6 | Status: ACTIVE | COMMUNITY
Start: 2017-11-20

## 2018-06-15 LAB
ANION GAP SERPL CALC-SCNC: 20 MMOL/L
BUN SERPL-MCNC: 49 MG/DL
CALCIUM SERPL-MCNC: 9.8 MG/DL
CHLORIDE SERPL-SCNC: 98 MMOL/L
CO2 SERPL-SCNC: 23 MMOL/L
CREAT SERPL-MCNC: 2.02 MG/DL
DIGOXIN SERPL-MCNC: 0.5 NG/ML
GLUCOSE SERPL-MCNC: 21 MG/DL
NT-PROBNP SERPL-MCNC: 4901 PG/ML
POTASSIUM SERPL-SCNC: 4.9 MMOL/L
SODIUM SERPL-SCNC: 141 MMOL/L

## 2018-06-19 LAB — MAGNESIUM SERPL-MCNC: 2.3 MG/DL

## 2018-07-12 ENCOUNTER — APPOINTMENT (OUTPATIENT)
Dept: OPHTHALMOLOGY | Facility: CLINIC | Age: 63
End: 2018-07-12
Payer: COMMERCIAL

## 2018-07-12 DIAGNOSIS — H25.813 COMBINED FORMS OF AGE-RELATED CATARACT, BILATERAL: ICD-10-CM

## 2018-07-12 DIAGNOSIS — H02.9 UNSPECIFIED DISORDER OF EYELID: ICD-10-CM

## 2018-07-12 DIAGNOSIS — H35.09 OTHER INTRARETINAL MICROVASCULAR ABNORMALITIES: ICD-10-CM

## 2018-07-12 DIAGNOSIS — H43.813 VITREOUS DEGENERATION, BILATERAL: ICD-10-CM

## 2018-07-12 PROCEDURE — 92014 COMPRE OPH EXAM EST PT 1/>: CPT

## 2018-07-16 ENCOUNTER — MOBILE ON CALL (OUTPATIENT)
Age: 63
End: 2018-07-16

## 2018-09-17 ENCOUNTER — APPOINTMENT (OUTPATIENT)
Dept: CARDIOLOGY | Facility: CLINIC | Age: 63
End: 2018-09-17
Payer: COMMERCIAL

## 2018-09-17 ENCOUNTER — NON-APPOINTMENT (OUTPATIENT)
Age: 63
End: 2018-09-17

## 2018-09-17 VITALS
WEIGHT: 203 LBS | SYSTOLIC BLOOD PRESSURE: 119 MMHG | RESPIRATION RATE: 16 BRPM | HEART RATE: 74 BPM | BODY MASS INDEX: 34.66 KG/M2 | DIASTOLIC BLOOD PRESSURE: 81 MMHG | OXYGEN SATURATION: 93 % | HEIGHT: 64 IN

## 2018-09-17 PROBLEM — I10 ESSENTIAL (PRIMARY) HYPERTENSION: Chronic | Status: ACTIVE | Noted: 2017-02-04

## 2018-09-17 PROBLEM — J44.9 CHRONIC OBSTRUCTIVE PULMONARY DISEASE, UNSPECIFIED: Chronic | Status: ACTIVE | Noted: 2017-10-30

## 2018-09-17 PROCEDURE — 99214 OFFICE O/P EST MOD 30 MIN: CPT

## 2018-09-17 PROCEDURE — 93000 ELECTROCARDIOGRAM COMPLETE: CPT

## 2018-09-20 ENCOUNTER — FORM ENCOUNTER (OUTPATIENT)
Age: 63
End: 2018-09-20

## 2018-10-26 NOTE — H&P ADULT - PROBLEM SELECTOR PLAN 3
"Subjective:      Patient ID: Tatum Kelley is a 50 y.o. female.    Chief Complaint: Cough and Pain (rib on left side x 1 week)    HPI: 50 y.o. White female, had an URI 9/21/18 which lasted for at least 2 weeks. She came to see me 10/1/18 after she  Had gone to the [facility] [place], [place].  I gave her Tessalon perles and 10 days of Prednisone.  She got better for a few days, then had a recrudescence, and has come in noow with 1 week of a deep cough with LLL  Pain.  No hemoptysis. No fever,chills. No URI symptoms. No sweats,SOB.  Just hurts, and she is tired of coughing.  .        Review of Systems   Constitutional: Negative for activity change, appetite change, chills, diaphoresis, fatigue and fever.   HENT: Positive for hearing loss.    Eyes: Negative.    Respiratory: Positive for cough. Negative for apnea, choking, chest tightness, shortness of breath and wheezing.    Cardiovascular: Negative for chest pain, palpitations and leg swelling.   Endocrine: Negative.    Genitourinary: Negative.    Musculoskeletal: Negative.    Skin: Negative.    Neurological: Negative.  Negative for dizziness.   Hematological: Negative.    Psychiatric/Behavioral: Negative.        Objective:   /70 (BP Location: Left arm, Patient Position: Sitting, BP Method: Medium (Manual))   Pulse 86   Temp 98 °F (36.7 °C) (Oral)   Resp 16   Ht 5' 5" (1.651 m)   Wt 70 kg (154 lb 6.4 oz)   LMP  (LMP Unknown) Comment: 2013?  SpO2 97%   BMI 25.69 kg/m²     Physical Exam   Constitutional: She is oriented to person, place, and time. She appears well-developed and well-nourished.   HENT:   Head: Normocephalic and atraumatic.   Right Ear: Tympanic membrane, external ear and ear canal normal.   Left Ear: Tympanic membrane, external ear and ear canal normal.   Nose: Nose normal.   Mouth/Throat: Uvula is midline, oropharynx is clear and moist and mucous membranes are normal.   Eyes: Conjunctivae and EOM are normal. Pupils are equal, round, and reactive to light. "   Neck: Normal range of motion. Neck supple.   Cardiovascular: Normal rate, regular rhythm and normal heart sounds.   Pulmonary/Chest: Effort normal. She has rales in the left lower field.           Abdominal: Soft. Bowel sounds are normal.   Musculoskeletal: Normal range of motion.   Neurological: She is alert and oriented to person, place, and time.   Skin: Skin is warm and dry.   Psychiatric: She has a normal mood and affect. Her behavior is normal.   Nursing note and vitals reviewed.      Assessment:     1. Bronchitis    Doubt pneumonia, PE. May have a componentt of atelectasis.  Will hold on ordering a CXR for now, in view of no other symptoms than cough.  Plan:     Bronchitis  -     azithromycin (Z-KEON) 250 MG tablet; Take 2 tablets by mouth on day 1; Take 1 tablet by mouth on days 2-5  Dispense: 6 tablet; Refill: 0  -     guaifenesin-codeine 100-10 mg/5 ml (TUSSI-ORGANIDIN NR)  mg/5 mL syrup; Take 5 mLs by mouth 3 (three) times daily as needed for Cough or Congestion.  Dispense: 180 mL; Refill: 0       Patient with h/o hypertension and is on multiple antihypertensives. Currently with borderline to low BP  Initiate home medications as tolerated  DASH diet  Monitor blood pressure

## 2018-10-30 ENCOUNTER — OTHER (OUTPATIENT)
Age: 63
End: 2018-10-30

## 2018-12-12 NOTE — PHYSICAL THERAPY INITIAL EVALUATION ADULT - GENERAL OBSERVATIONS, REHAB EVAL
Pt received sitting OOB in a chair this PM
Denies smoking, occasional alcohol, no illicit drug use     Lives alone, ambulates with walker

## 2019-01-03 ENCOUNTER — NON-APPOINTMENT (OUTPATIENT)
Age: 64
End: 2019-01-03

## 2019-01-03 ENCOUNTER — APPOINTMENT (OUTPATIENT)
Dept: CARDIOLOGY | Facility: CLINIC | Age: 64
End: 2019-01-03
Payer: COMMERCIAL

## 2019-01-03 VITALS
OXYGEN SATURATION: 92 % | HEART RATE: 79 BPM | WEIGHT: 196 LBS | DIASTOLIC BLOOD PRESSURE: 73 MMHG | HEIGHT: 64 IN | BODY MASS INDEX: 33.46 KG/M2 | SYSTOLIC BLOOD PRESSURE: 118 MMHG

## 2019-01-03 PROCEDURE — 93000 ELECTROCARDIOGRAM COMPLETE: CPT

## 2019-01-03 PROCEDURE — 36415 COLL VENOUS BLD VENIPUNCTURE: CPT

## 2019-01-03 PROCEDURE — 99214 OFFICE O/P EST MOD 30 MIN: CPT

## 2019-01-03 NOTE — REVIEW OF SYSTEMS
[Negative] : Heme/Lymph [see HPI] : see HPI [Recent Weight Loss (___ Lbs)] : recent [unfilled] ~Ulb weight loss [Joint Pain] : no joint pain

## 2019-01-03 NOTE — CARDIOLOGY SUMMARY
[No Ischemia] : no Ischemia [___] : [unfilled] [LVEF ___%] : LVEF [unfilled]% [None] : normal LV function [Severe] : severe pulmonary hypertension  [Enlarged] : enlarged LA size [Mild] : mild mitral regurgitation

## 2019-01-03 NOTE — DISCUSSION/SUMMARY
[Patient] : the patient [___ Month(s)] : [unfilled] month(s) [FreeTextEntry1] : 1. HFpEF, LVEF 63%.- Compensated\par - SCr up to 2.50 and torsemide was decreased 40 mg po qd, will recheck labs today and willl call with results. Will fax labs to primary Dr. Palafox and nephrology Dr. Heart..\par \par 2. HTN- B/P stable 118/73\par - will continue daily weight and B/P log\par \par 3. AF- VR controlled\par - continue metoprolol 100 mg in am and 125 mg in the pm\par - continue digoxin 0.125 mg daily and will check digoxin level and magnesium level today\par - continue Eliquis for oral anticoagulation and stroke prevention.

## 2019-01-03 NOTE — PHYSICAL EXAM
[General Appearance - Well Developed] : well developed [Normal Appearance] : normal appearance [Well Groomed] : well groomed [General Appearance - Well Nourished] : well nourished [No Deformities] : no deformities [General Appearance - In No Acute Distress] : no acute distress [Normal Conjunctiva] : the conjunctiva exhibited no abnormalities [Eyelids - No Xanthelasma] : the eyelids demonstrated no xanthelasmas [Normal Oral Mucosa] : normal oral mucosa [Respiration, Rhythm And Depth] : normal respiratory rhythm and effort [Exaggerated Use Of Accessory Muscles For Inspiration] : no accessory muscle use [Auscultation Breath Sounds / Voice Sounds] : lungs were clear to auscultation bilaterally [Heart Sounds] : normal S1 and S2 [Edema] : no peripheral edema present [Bowel Sounds] : normal bowel sounds [Abdomen Soft] : soft [Abdomen Tenderness] : non-tender [Nail Clubbing] : no clubbing of the fingernails [Cyanosis, Localized] : no localized cyanosis [Skin Color & Pigmentation] : normal skin color and pigmentation [] : no rash [No Venous Stasis] : no venous stasis [Skin Lesions] : no skin lesions [No Skin Ulcers] : no skin ulcer [No Xanthoma] : no  xanthoma was observed [Oriented To Time, Place, And Person] : oriented to person, place, and time [Affect] : the affect was normal [FreeTextEntry1] : no edema

## 2019-01-03 NOTE — ASSESSMENT
[FreeTextEntry1] : 61 y/o moderately obese female with controlled diabetes, HTN, COPD, lymphoma/CLL (1997), A.fib on Eliquis, HFpEF, LVEF 63%. \par ACC/AHA Stage C, NYHA Class II\par Appears compensated and normotensive

## 2019-01-03 NOTE — HISTORY OF PRESENT ILLNESS
[FreeTextEntry1] : 62 y/o moderately obese female with diabetes, HTN, COPD, lymphoma/CLL (1997), A.fib on Eliquis, HFpEF, h/o severe pHTN, LVEF 63%. Pt had a prior hospitalization at [facility] 10/30/17-11/10/17 for ADHF. Pt had a recent sleep study test which was mild for LINH and will be fitted for BIPAP mask.\par \par Pt comes for a follow up today. She feels well. Last visit 9/17/18, her torsemide was decreased to 40 mg daily. Since last visit, she lost 7 lbs which she attributes to Trulicity. 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  She is able to walk 1 block with her cane, she can climb 12 steps slowly and then has to take a rest. She notices on her fit bit that her heart rate goes up to 120 when she climbs stairs with no associated symptoms. She sleeps with 2 pillows with no orthopnea. She is following a low salt diet and is using oxygen at night when sleeping. She states she had a colonoscopy 8 years ago and had polys so will be following up with GI and she has an appt scheduled

## 2019-01-04 ENCOUNTER — RESULT REVIEW (OUTPATIENT)
Age: 64
End: 2019-01-04

## 2019-01-04 ENCOUNTER — MEDICATION RENEWAL (OUTPATIENT)
Age: 64
End: 2019-01-04

## 2019-01-04 LAB
ALBUMIN SERPL ELPH-MCNC: 4.1 G/DL
ALP BLD-CCNC: 95 U/L
ALT SERPL-CCNC: 10 U/L
ANION GAP SERPL CALC-SCNC: 22 MMOL/L
AST SERPL-CCNC: 21 U/L
BILIRUB SERPL-MCNC: 0.7 MG/DL
BUN SERPL-MCNC: 82 MG/DL
CALCIUM SERPL-MCNC: 10.3 MG/DL
CHLORIDE SERPL-SCNC: 100 MMOL/L
CO2 SERPL-SCNC: 18 MMOL/L
CREAT SERPL-MCNC: 2.56 MG/DL
DIGOXIN SERPL-MCNC: 1.8 NG/ML
GLUCOSE SERPL-MCNC: 50 MG/DL
MAGNESIUM SERPL-MCNC: 2.3 MG/DL
NT-PROBNP SERPL-MCNC: 4456 PG/ML
POTASSIUM SERPL-SCNC: 5.2 MMOL/L
PROT SERPL-MCNC: 7.7 G/DL
SODIUM SERPL-SCNC: 140 MMOL/L

## 2019-02-04 RX ORDER — SPIRONOLACTONE 25 MG/1
25 TABLET ORAL
Qty: 30 | Refills: 3 | Status: DISCONTINUED | COMMUNITY
Start: 2017-07-31 | End: 2019-02-04

## 2019-03-31 ENCOUNTER — INPATIENT (INPATIENT)
Facility: HOSPITAL | Age: 64
LOS: 32 days | Discharge: HOME HEALTH | End: 2019-05-03
Attending: STUDENT IN AN ORGANIZED HEALTH CARE EDUCATION/TRAINING PROGRAM | Admitting: STUDENT IN AN ORGANIZED HEALTH CARE EDUCATION/TRAINING PROGRAM
Payer: COMMERCIAL

## 2019-03-31 VITALS
RESPIRATION RATE: 16 BRPM | SYSTOLIC BLOOD PRESSURE: 116 MMHG | DIASTOLIC BLOOD PRESSURE: 70 MMHG | HEART RATE: 86 BPM | TEMPERATURE: 98 F

## 2019-03-31 DIAGNOSIS — N17.9 ACUTE KIDNEY FAILURE, UNSPECIFIED: ICD-10-CM

## 2019-03-31 LAB
ALBUMIN SERPL ELPH-MCNC: 4.3 G/DL — SIGNIFICANT CHANGE UP (ref 3.3–5)
ALP SERPL-CCNC: 68 U/L — SIGNIFICANT CHANGE UP (ref 40–120)
ALT FLD-CCNC: 13 U/L — SIGNIFICANT CHANGE UP (ref 4–33)
ANION GAP SERPL CALC-SCNC: 16 MMO/L — HIGH (ref 7–14)
APPEARANCE UR: CLEAR — SIGNIFICANT CHANGE UP
APTT BLD: 34 SEC — SIGNIFICANT CHANGE UP (ref 27.5–36.3)
AST SERPL-CCNC: 24 U/L — SIGNIFICANT CHANGE UP (ref 4–32)
BASOPHILS # BLD AUTO: 0.02 K/UL — SIGNIFICANT CHANGE UP (ref 0–0.2)
BASOPHILS NFR BLD AUTO: 0.3 % — SIGNIFICANT CHANGE UP (ref 0–2)
BILIRUB SERPL-MCNC: 1 MG/DL — SIGNIFICANT CHANGE UP (ref 0.2–1.2)
BILIRUB UR-MCNC: NEGATIVE — SIGNIFICANT CHANGE UP
BLOOD UR QL VISUAL: NEGATIVE — SIGNIFICANT CHANGE UP
BUN SERPL-MCNC: 135 MG/DL — HIGH (ref 7–23)
CALCIUM SERPL-MCNC: 10.5 MG/DL — SIGNIFICANT CHANGE UP (ref 8.4–10.5)
CHLORIDE SERPL-SCNC: 83 MMOL/L — LOW (ref 98–107)
CO2 SERPL-SCNC: 32 MMOL/L — HIGH (ref 22–31)
COLOR SPEC: YELLOW — SIGNIFICANT CHANGE UP
CREAT SERPL-MCNC: 2.87 MG/DL — HIGH (ref 0.5–1.3)
DIGOXIN SERPL-MCNC: 2.1 NG/ML — HIGH (ref 0.8–2)
EOSINOPHIL # BLD AUTO: 0.09 K/UL — SIGNIFICANT CHANGE UP (ref 0–0.5)
EOSINOPHIL NFR BLD AUTO: 1.2 % — SIGNIFICANT CHANGE UP (ref 0–6)
GLUCOSE SERPL-MCNC: 132 MG/DL — HIGH (ref 70–99)
GLUCOSE UR-MCNC: NEGATIVE — SIGNIFICANT CHANGE UP
HCT VFR BLD CALC: 32.5 % — LOW (ref 34.5–45)
HGB BLD-MCNC: 9.6 G/DL — LOW (ref 11.5–15.5)
IMM GRANULOCYTES NFR BLD AUTO: 0.9 % — SIGNIFICANT CHANGE UP (ref 0–1.5)
INR BLD: 1.85 — HIGH (ref 0.88–1.17)
KETONES UR-MCNC: NEGATIVE — SIGNIFICANT CHANGE UP
LDH SERPL L TO P-CCNC: 243 U/L — HIGH (ref 135–225)
LEUKOCYTE ESTERASE UR-ACNC: NEGATIVE — SIGNIFICANT CHANGE UP
LYMPHOCYTES # BLD AUTO: 1.19 K/UL — SIGNIFICANT CHANGE UP (ref 1–3.3)
LYMPHOCYTES # BLD AUTO: 15.3 % — SIGNIFICANT CHANGE UP (ref 13–44)
MAGNESIUM SERPL-MCNC: 1.6 MG/DL — SIGNIFICANT CHANGE UP (ref 1.6–2.6)
MCHC RBC-ENTMCNC: 19.7 PG — LOW (ref 27–34)
MCHC RBC-ENTMCNC: 29.5 % — LOW (ref 32–36)
MCV RBC AUTO: 66.6 FL — LOW (ref 80–100)
MONOCYTES # BLD AUTO: 1.3 K/UL — HIGH (ref 0–0.9)
MONOCYTES NFR BLD AUTO: 16.8 % — HIGH (ref 2–14)
NEUTROPHILS # BLD AUTO: 5.09 K/UL — SIGNIFICANT CHANGE UP (ref 1.8–7.4)
NEUTROPHILS NFR BLD AUTO: 65.5 % — SIGNIFICANT CHANGE UP (ref 43–77)
NITRITE UR-MCNC: NEGATIVE — SIGNIFICANT CHANGE UP
NRBC # FLD: 0.05 K/UL — SIGNIFICANT CHANGE UP (ref 0–0)
NT-PROBNP SERPL-SCNC: 6490 PG/ML — SIGNIFICANT CHANGE UP
OB PNL STL: POSITIVE — SIGNIFICANT CHANGE UP
PH UR: 6.5 — SIGNIFICANT CHANGE UP (ref 5–8)
PHOSPHATE SERPL-MCNC: 3 MG/DL — SIGNIFICANT CHANGE UP (ref 2.5–4.5)
PLATELET # BLD AUTO: 191 K/UL — SIGNIFICANT CHANGE UP (ref 150–400)
PMV BLD: SIGNIFICANT CHANGE UP FL (ref 7–13)
POTASSIUM SERPL-MCNC: 3.1 MMOL/L — LOW (ref 3.5–5.3)
POTASSIUM SERPL-SCNC: 3.1 MMOL/L — LOW (ref 3.5–5.3)
PROT SERPL-MCNC: 8 G/DL — SIGNIFICANT CHANGE UP (ref 6–8.3)
PROT UR-MCNC: NEGATIVE — SIGNIFICANT CHANGE UP
PROTHROM AB SERPL-ACNC: 21.5 SEC — HIGH (ref 9.8–13.1)
RBC # BLD: 4.88 M/UL — SIGNIFICANT CHANGE UP (ref 3.8–5.2)
RBC # FLD: 15.7 % — HIGH (ref 10.3–14.5)
SODIUM SERPL-SCNC: 131 MMOL/L — LOW (ref 135–145)
SP GR SPEC: 1.01 — SIGNIFICANT CHANGE UP (ref 1–1.04)
URATE SERPL-MCNC: 5.5 MG/DL — SIGNIFICANT CHANGE UP (ref 2.5–7)
UROBILINOGEN FLD QL: NORMAL — SIGNIFICANT CHANGE UP
WBC # BLD: 7.76 K/UL — SIGNIFICANT CHANGE UP (ref 3.8–10.5)
WBC # FLD AUTO: 7.76 K/UL — SIGNIFICANT CHANGE UP (ref 3.8–10.5)

## 2019-03-31 PROCEDURE — 99223 1ST HOSP IP/OBS HIGH 75: CPT

## 2019-03-31 PROCEDURE — 71046 X-RAY EXAM CHEST 2 VIEWS: CPT | Mod: 26

## 2019-03-31 RX ORDER — POTASSIUM CHLORIDE 20 MEQ
40 PACKET (EA) ORAL ONCE
Qty: 0 | Refills: 0 | Status: COMPLETED | OUTPATIENT
Start: 2019-03-31 | End: 2019-03-31

## 2019-03-31 RX ORDER — SODIUM CHLORIDE 9 MG/ML
500 INJECTION INTRAMUSCULAR; INTRAVENOUS; SUBCUTANEOUS ONCE
Qty: 0 | Refills: 0 | Status: COMPLETED | OUTPATIENT
Start: 2019-03-31 | End: 2019-03-31

## 2019-03-31 RX ORDER — MAGNESIUM OXIDE 400 MG ORAL TABLET 241.3 MG
400 TABLET ORAL ONCE
Qty: 0 | Refills: 0 | Status: COMPLETED | OUTPATIENT
Start: 2019-03-31 | End: 2019-03-31

## 2019-03-31 RX ORDER — FAMOTIDINE 10 MG/ML
20 INJECTION INTRAVENOUS ONCE
Qty: 0 | Refills: 0 | Status: COMPLETED | OUTPATIENT
Start: 2019-03-31 | End: 2019-03-31

## 2019-03-31 RX ADMIN — MAGNESIUM OXIDE 400 MG ORAL TABLET 400 MILLIGRAM(S): 241.3 TABLET ORAL at 23:58

## 2019-03-31 RX ADMIN — Medication 40 MILLIEQUIVALENT(S): at 23:59

## 2019-03-31 RX ADMIN — Medication 30 MILLILITER(S): at 22:32

## 2019-03-31 RX ADMIN — FAMOTIDINE 20 MILLIGRAM(S): 10 INJECTION INTRAVENOUS at 22:32

## 2019-03-31 RX ADMIN — SODIUM CHLORIDE 500 MILLILITER(S): 9 INJECTION INTRAMUSCULAR; INTRAVENOUS; SUBCUTANEOUS at 21:57

## 2019-03-31 NOTE — H&P ADULT - PROBLEM SELECTOR PLAN 2
Positive FOBT in ED. Given positive Cologuard test as outpatient on 3/19/19, concern for malignancy of colon. Can also be due to upper GI bleed, especially in setting of recent multiple episodes of nausea and vomiting. Pt currently HD stable.  - Start IV protonix 40 mg bid given possibility of upper GI bleed   - Rectal exam unable to be performed given pt's location in hallway of ED and no private rooms available  - Monitor H/H, at least daily for now, and transfuse pRBCs to keep Hgb > 7-8  - GI consult in AM for colonoscopy +/- EGD  - Given that pt ate a couple meals on Sunday, will start clear liquid diet for possible colonoscopy prep on Monday  - Hold Eliquis for now given possibility of biopsies with colonoscopy Positive FOBT in ED. Given positive Cologuard test as outpatient on 3/19/19, concern for malignancy of colon. Can also be due to upper GI bleed, especially in setting of recent multiple episodes of nausea and vomiting. Pt currently HD stable.  - Start IV protonix 40 mg bid given possibility of upper GI bleed   - Rectal exam unable to be performed given pt's location in hallway of ED and no private rooms available  - Monitor H/H, at least daily for now, and transfuse pRBCs to keep Hgb > 7-8  - GI consult in AM for colonoscopy +/- EGD  - Given that pt ate a couple meals on Sunday, will start clear liquid diet for possible colonoscopy prep on Monday; pt, however, has mod-severe TR and should get TTE if one has not been performed within last 6 mos-1 yr. Pt also high risk (RCRI score 2, 10.1% 30-day risk of death, MI, or cardiac arrest) for endoscopic procedure.  - Hold Eliquis for now given positive FOBT and possibility of biopsies with colonoscopy

## 2019-03-31 NOTE — H&P ADULT - PROBLEM SELECTOR PLAN 6
[id number] 5. On Eliquis at home.  - Hold Eliquis for now given positive FOBT and possibility of biopsies with colonoscopy  - C/w Toprol 100 mg in AM and 125 mg in PM

## 2019-03-31 NOTE — H&P ADULT - NSICDXFAMILYHX_GEN_ALL_CORE_FT
FAMILY HISTORY:  Family history of hyperlipidemia  Family history of type 2 diabetes mellitus    Sibling  Still living? Unknown  Family history of hypertension, Age at diagnosis: Age Unknown    Aunt  Still living? Unknown  Family history of cancer, Age at diagnosis: Age Unknown

## 2019-03-31 NOTE — H&P ADULT - HISTORY OF PRESENT ILLNESS
62 yo woman with multiple comorbidities, including R-sided heart failure with preserved EF (EF 66% on TTE in 10/2017), severe pulm HTN, mod-severe TR, COPD (on 2L NC at night), LINH (being arranged for bilevel support), afib (on Eliquis), HTN, HLD, DM2, and gout presents with generalized weakness for the past 1 week. Pt states that suddenly felt weak starting 1 week ago, leading to a fall on Tuesday while walking outdoors. Pt says that she was drained after walking several feet and tried to hold on to a fence for support, but her legs gave out, with pt dropping to her knees. Denies any head strike or LOC. Pt thinks that her weakness is mainly due to her poor appetite and PO intake. Pt states that after being started on Trulicity ~1 year ago, she began having weekly episodes of nausea and vomiting with increasing loss of appetite, resulting in a ~50-lb weight loss. Pt's PCP discontinued Trulicity in late February, though pt continued to have nausea and vomiting over the next few weeks, with the vomiting finally resolving 1 week ago. Pt's appetite, however, remains poor, worsened by a "metallic taste" in her mouth that pt first noticed 2 weeks ago. Due to the weakness and recent fall, pt saw her PCP this past Friday, with pt instructed to stop her metolazone and decrease her torsemide dose from 60 to 40 mg daily. Pt notes that outpatient lab work from 3/23/19 revealed a BUN of 167 and Cr of 3.7 62 yo woman with multiple comorbidities, including R-sided heart failure with preserved EF (EF 66% on TTE in 10/2017), severe pulm HTN, mod-severe TR, COPD (on 2L NC at night), LINH (being arranged for bilevel support), afib (on Eliquis), HTN, HLD, DM2, and gout presents with generalized weakness for the past 1 week. Pt states that she suddenly felt weak starting 1 week ago, leading to a fall on Tuesday while walking outdoors. Pt says that she was drained after walking several feet and tried to hold on to a fence for support, but her legs gave out, with pt dropping to her knees. Denies any head strike, LOC, or residual pain. Has had no recent fevers, headaches, vision changes, numbness/tingling, lightheadedness/dizziness, diarrhea, or urinary symptoms, but reports 1-2 weeks of a nonproductive cough and frequent chills. Pt thinks that her weakness is mainly due to her poor appetite and PO intake. Pt states that after being started on Trulicity ~1 year ago, she began having weekly episodes of nausea and vomiting with increasing loss of appetite, resulting in a ~50-lb weight loss. Pt's PCP discontinued Trulicity in late February, though pt continued to have nausea and vomiting over the next few weeks, with the vomiting finally resolving 1 week ago. Pt's appetite, however, remains poor, worsened by a "metallic taste" in her mouth that pt first noticed 2 weeks ago. Due to the weakness and recent fall, pt saw her PCP this past Friday, with pt instructed to stop her metolazone and decrease her torsemide dose from 60 to 40 mg daily. Pt notes that outpatient lab work from 3/23/19 revealed a BUN of 167 and Cr of 3.7. Pt was started on metolazone and torsemide 60 mg daily on 2/9/19 for fluid retention after pt was discontinued on spironolactone in early January. Pt denies any CP, SOB/CHAVEZ, worsening orthopnea (baseline, 2 pillows), PND, or abdominal distention. Has improved b/l LE edema.    Pt also notes that she had a positive Cologuard test on 3/19/19 and was given a referral to GI for a diagnostic colonoscopy. Pt had 2 previous colonoscopies, last one ~9 years ago, and was found to have polyps both times. Pt denies any abdominal pain, dark/bloody stools, or BRBPR.     In the ED, 64 yo woman with multiple comorbidities, including R-sided heart failure with preserved EF (EF 66% on TTE in 10/2017), severe pulm HTN, mod-severe TR, COPD (on 2L NC at night), LINH (being arranged for bilevel support), afib (on Eliquis), HTN, HLD, DM2, and gout presents with generalized weakness for the past 1 week. Pt states that she suddenly felt weak starting 1 week ago, leading to a fall on Tuesday while walking outdoors. Pt says that she was drained after walking several feet and tried to hold on to a fence for support, but her legs gave out, with pt dropping to her knees. Denies any head strike, LOC, or residual pain. Has had no recent fevers, headaches, vision changes, numbness/tingling, lightheadedness/dizziness, diarrhea, or urinary symptoms, but reports 1-2 weeks of a nonproductive cough and frequent chills. Pt thinks that her weakness is mainly due to her poor appetite and PO intake. Pt states that after being started on Trulicity ~1 year ago, she began having weekly episodes of nausea and vomiting with increasing loss of appetite, resulting in a ~50-lb weight loss. Pt's PCP discontinued Trulicity in late February, though pt continued to have nausea and vomiting over the next few weeks, with the vomiting finally resolving 1 week ago. Pt's appetite, however, remains poor, worsened by a "metallic taste" in her mouth that pt first noticed 2 weeks ago. Due to the weakness and recent fall, pt saw her PCP this past Friday, with pt instructed to stop her metolazone and decrease her torsemide dose from 60 to 40 mg daily. Pt notes that outpatient lab work from 3/23/19 revealed a BUN of 167 and Cr of 3.7. Pt was started on metolazone and torsemide 60 mg daily on 2/9/19 for fluid retention after pt was discontinued on spironolactone in early January. Pt denies any CP, SOB/CHAVEZ, worsening orthopnea (baseline, 2 pillows), PND, or abdominal distention. Has improved b/l LE edema.    Pt also notes that she had a positive Cologuard test on 3/19/19 and was given a referral to GI for a diagnostic colonoscopy. Pt had 2 previous colonoscopies, last one ~9 years ago, and was found to have polyps both times. Pt denies any abdominal pain, dark/bloody stools, or BRBPR.     In the ED,  T 98.5, HR 67-86, -121/70-86, RR 16, O2 sats 100% RA. FOBT positive. RVP positive for influenza A and entero/rhinovirus.

## 2019-03-31 NOTE — H&P ADULT - PROBLEM SELECTOR PLAN 4
Also with severe pulm HTN and mod-severe TR. Pt euvolemic on exam presently.  - C/w decreased dose of torsemide 40 mg daily  - Strict I&Os  - Daily weights  - Keep K>4, Mg>2. Serum K on admission 3.1, repleted by ED. Will f/u AM BMP.   - Cardiology consult in AM

## 2019-03-31 NOTE — ED PROVIDER NOTE - ATTENDING CONTRIBUTION TO CARE
I, Misbah Mondragon MD, personally saw the patient with the resident, and completed the key components of the history and physical exam. I then discussed the management plan with the resident.    pt w/ generalized weakness and FTT, unclear etiology.  eval infectious etiology - gentle hydration, check labs and reeval.  Likely admit.

## 2019-03-31 NOTE — H&P ADULT - NSHPLABSRESULTS_GEN_ALL_CORE
EKG personally reviewed.  Afib at 62 bpm, QTc 428 ms. No acute ischemic changes. EKG personally reviewed.  Afib at 62 bpm, QTc 428 ms. No acute ischemic changes.    CXR personally reviewed.  No focal consolidations or infiltrates. No pleural effusions.

## 2019-03-31 NOTE — ED ADULT NURSE NOTE - OBJECTIVE STATEMENT
rec'd pt in room 27 with adele at [facility]. pt has multiple complaints-- nausea, general wekaness, malaise, metallic taste in mouth, occ vomiting,poor appetite for 1 month.  denies cp, abd pain, sob, dysuira.  states had 1 epoisode of diarrhea today.  states her knees felt weak on tuesday and had fall but no loc.  landed on kness.  abrasions noted.  20 gauge inserted left ac. blood sent. urine sent. given ivf. for re-eval

## 2019-03-31 NOTE — H&P ADULT - PROBLEM SELECTOR PLAN 3
Pt with outpatient labs on 3/23/19 revealing OSMAR on CKD with  and Cr 3.7. Baseline Cr appears to be 2-2.5. Currently,  and Cr 2.87. Pt with outpatient labs on 3/23/19 revealing OSMAR on CKD with  and Cr 3.7. Baseline Cr appears to be 2-2.5. Currently,  and Cr 2.87.  - Urine studies obtained. Fe Urea 17.9% < 35%, consistent with pre-renal etiology. Can be due to overdiuresis while on metolazone and torsemide and given recent episodes of vomiting with decreased PO intake.  - C/w decreased dose of torsemide 40 mg daily  - Renal US ordered and pending  - Renal consult in AM (Dr. Heart)  - Monitor Cr, UOP, and electrolytes  - Avoid nephrotoxins  - Renally dose meds  - Metabolic alkalosis due to chronic CO2 retention from COPD, diuretic use, and hypokalemia Pt with outpatient labs on 3/23/19 revealing OSMAR on CKD with  and Cr 3.7. Baseline Cr appears to be 2-2.5. Currently,  and Cr 2.87.  - Urine studies obtained. Fe Urea 17.9% < 35%, consistent with pre-renal etiology. Can be due to overdiuresis while on metolazone and torsemide and given recent episodes of vomiting with decreased PO intake.  - C/w decreased dose of torsemide 40 mg daily  - Renal US ordered and pending  - Renal consult in AM (Dr. Heart)  - Monitor Cr, UOP, and electrolytes  - Avoid nephrotoxins  - Renally dose meds  - Metabolic alkalosis due to chronic CO2 retention from COPD, diuretic use, and hypokalemia  - Metallic taste likely from uremia

## 2019-03-31 NOTE — H&P ADULT - NSHPPHYSICALEXAM_GEN_ALL_CORE
Vital Signs Last 24 Hrs  T(C): 37.1 (01 Apr 2019 03:45), Max: 37.1 (01 Apr 2019 03:45)  T(F): 98.7 (01 Apr 2019 03:45), Max: 98.7 (01 Apr 2019 03:45)  HR: 71 (01 Apr 2019 03:45) (67 - 86)  BP: 103/64 (01 Apr 2019 03:45) (103/64 - 121/86)  BP(mean): --  RR: 16 (01 Apr 2019 03:45) (16 - 16)  SpO2: 100% (01 Apr 2019 03:45) (100% - 100%)    PHYSICAL EXAM:  General: Awake and alert.  No acute distress.  Head: Normocephalic, atraumatic.    Eyes: PERRL.  EOMI.  No scleral icterus.  No conjunctival pallor.  Mouth: Moist MM.  No oropharyngeal exudates.    Neck: Supple.  Full range of motion.  No JVD.  Trachea midline.  No lymphadenopathy.   Heart: Regular rate, irregular rhythm.  Normal S1 and S2.  No murmurs, rubs, or gallops.  Trace pitting LE edema b/l.   Lungs: Good inspiratory effort.  Nonlabored breathing.  CTAB.  No wheezes, crackles, or rhonchi.    Abdomen: BS+, soft, NT/ND.    Skin: Warm and dry.  No rashes.  Extremities: No clubbing or cyanosis.  2+ peripheral pulses b/l.  Musculoskeletal: No joint deformities.  No spinal or paraspinal tenderness.  Neuro: A&Ox3.  CN II-XII intact.  5/5 motor strength in UE and LE b/l.  Tactile sensation intact in UE and LE b/l.  Cerebellar function intact as assessed by finger-to-nose test.  No focal deficits.

## 2019-03-31 NOTE — H&P ADULT - PROBLEM SELECTOR PLAN 1
Generalized weakness likely 2/2 influenza A. Pt also with entero/rhinovirus.   - Start Tamiflu 30 mg daily for 5 days, renally dosed  - Supportive care with tessalon perle for cough, duonebs PRN for SOB/wheezing, tylenol PRN for fevers

## 2019-03-31 NOTE — H&P ADULT - NSHPSOCIALHISTORY_GEN_ALL_CORE
Tobacco: denies  EtOH: denies  Illicit drugs: denies  Lives with sister and mother. Works as a school nurse. Ambulates with a cane at baseline.

## 2019-03-31 NOTE — H&P ADULT - PROBLEM SELECTOR PLAN 5
No S/S of exacerbation currently. However, pt with influenza A.  - Duonebs q6hrs PRN for SOB/wheezing  - C/w Symbicort  - C/w supplemental O2

## 2019-03-31 NOTE — H&P ADULT - NSHPREVIEWOFSYSTEMS_GEN_ALL_CORE
Constitutional: No weakness, fevers, chills, or weight loss  Eyes: No visual changes, double vision, or eye pain  Ears, Nose, Mouth, Throat: No runny nose, sinus pain, ear pain, tinnitus, sore throat, dysphagia, or odynophagia  Cardiovascular: No chest pain, palpitations, or LE edema  Respiratory: No cough, wheezing, hemoptysis, or shortness of breath  Gastrointestinal: No abdominal pain, dysphagia, anorexia, nausea/vomiting, diarrhea/constipation, hematemesis, BRBPR, or melena  Genitourinary: No dysuria, frequency, urgency, or hematuria  Musculoskeletal: No joint pain, joint swelling, or decreased ROM  Skin: No pruritus, rashes, lesions, or wounds  Neurologic:  No seizures, headache, paresthesias, numbness, or limb weakness  Psychiatric: No depression, anxiety, difficulty concentrating, anhedonia, or lack of energy  Endocrine: No heat/cold intolerance, mood swings, sweats, polydipsia, or polyuria  Hematologic/lymphatic: No purpura, petechia, or prolonged or excessive bleeding after dental extraction / injury  Allergic/Immunologic: No anaphylaxis or allergic response to materials, foods, animals    Positives and pertinent negatives noted and all other systems negative. Constitutional: +Generalized weakness. +Chills. +Weight loss. No fevers.   Eyes: No visual changes, double vision, or eye pain  Ears, Nose, Mouth, Throat: No runny nose, sinus pain, ear pain, tinnitus, sore throat, dysphagia, or odynophagia  Cardiovascular: +Chronic b/l LE edema (improved). No chest pain or palpitations.  Respiratory: +Nonproductive cough. No wheezing, hemoptysis, or shortness of breath.  Gastrointestinal: +Recent nausea/vomiting (currently resolved). No abdominal pain, diarrhea/constipation, hematemesis, BRBPR, or melena.  Genitourinary: No dysuria, frequency, urgency, or hematuria  Musculoskeletal: No joint pain, joint swelling, or decreased ROM  Skin: No pruritus or rashes  Neurologic:  No seizures, headache, paresthesias, or numbness  Psychiatric: No depression, anxiety, or agitation  Endocrine: No heat/cold intolerance, mood swings, sweats, polydipsia, or polyuria  Hematologic/lymphatic: No purpura, petechia, or prolonged or excessive bleeding after dental extraction / injury  Allergic/Immunologic: No anaphylaxis or allergic response to materials, foods, animals    Positives and pertinent negatives noted and all other systems negative.

## 2019-03-31 NOTE — ED PROVIDER NOTE - PHYSICAL EXAMINATION
NEURO: pupils 3 mm, PERRL, EOMI (CN III, IV, VI), facial sensation intact to light touch in all 3 divisions bilat (CN V), face is symmetric with normal eye closure, eye opening, and smile (CN VII), hearing is normal to rubbing fingers (CN VII), palate elevates symmetrically, phonation is normal (CN IX, X),  shoulder shrug intact bilat (CN XI), tongue is midline with nl movements and no atrophy (CN XII), finger to nose test nl bilat, negative pronator drift bilat, speech is clear; 5/5 motor strength BUE and BLE: deltoids, biceps, triceps, wrist flexors/extensors, hand , hip flexors, knee flexors/extensors, plantar/dorsiflexors, hallux flexors/extensors; sensation intact to light touch BUE and BLE: C5-T1 and L3-S1; gait wnl with cane  delayed cap refill NEURO: pupils 3 mm, PERRL, EOMI (CN III, IV, VI), facial sensation intact to light touch in all 3 divisions bilat (CN V), face is symmetric with normal eye closure, eye opening, and smile (CN VII), hearing is normal to rubbing fingers (CN VII), palate elevates symmetrically, phonation is normal (CN IX, X),  shoulder shrug intact bilat (CN XI), tongue is midline with nl movements and no atrophy (CN XII), finger to nose test nl bilat, negative pronator drift bilat, speech is clear; 5/5 motor strength BUE and BLE: deltoids, biceps, triceps, wrist flexors/extensors, hand , hip flexors, knee flexors/extensors, plantar/dorsiflexors, hallux flexors/extensors; sensation intact to light touch BUE and BLE: C5-T1 and L3-S1; gait wnl with cane  delayed cap refill      Giancarlo:  ***GEN - NAD; well appearing; A+O  ***PULMONARY - CTA b/l, symmetric breath sounds. ***CARDIAC -s1s2, RRR, no M,G,R  ***ABDOMEN - ND, NT, soft, no guarding, no rebound, no owen's   ***SKIN - no rash or bruising   ***NEUROLOGIC - alert, follows commands, sensation nl, motor nl, ***PSYCH - insight and judgment nl, memory nl, affect nl, thought nl

## 2019-03-31 NOTE — ED PROVIDER NOTE - OBJECTIVE STATEMENT
62 yo F c PMH of HTN, HLD, DM, afib (on eliquis, digoxin), gout, CHF, and COPD (on 2L O2 @ night), p/w 1 month hx of worsening generalized weakness associated with 70 lb weight loss in 1.5 years, dry cough (1-2 weeks), intermittent NBNB n/v, fell on Tuesday onto knees due to generalized weakness without syncope or head trauma, states she sent her stool instead of colonoscopy 3 m/a and it was "positive" so has GI f/u in 1-2 weeks. No hx of sx before 1 m/a. saw PMD 1 w/a who told her she was dehydrated.     ROS positive: weakness, weight loss, anorexia, fall, coryza, cough, n/v  ROS negative: f/c, congestion, pharyngitis, hemoptysis, CP, SOB, abdominal pain, hematemesis, hematachezia, melena, dysuria, hematuria, leg edema, syncope, head trauma 62 yo F c PMH of HTN, HLD, DM, afib (on eliquis, digoxin), gout, CHF, and COPD (on 2L O2 @ night), p/w 1 month hx of worsening generalized weakness associated with 70 lb weight loss in 1.5 years, dry cough (1-2 weeks), intermittent NBNB n/v, fell on Tuesday onto knees due to generalized weakness without syncope or head trauma, states she sent her stool instead of colonoscopy 3 m/a and it was "positive" so has GI f/u in 1-2 weeks. No hx of sx before 1 m/a. saw PMD 1 w/a who told her she was dehydrated.     62 y/o F w/ pHx as noted pw generalized weakness x 1 month associated w/ weight loss x 1 year.  Notes feeling very weak and falling onto knees.  No head trauma or LOC.  Denies fever, CP, SOB, AP, vomiting.      ROS positive: weakness, weight loss, anorexia, fall, coryza, cough, n/v  ROS negative: f/c, congestion, pharyngitis, hemoptysis, CP, SOB, abdominal pain, hematemesis, hematachezia, melena, dysuria, hematuria, leg edema, syncope, head trauma

## 2019-03-31 NOTE — ED ADULT TRIAGE NOTE - CHIEF COMPLAINT QUOTE
Pt c/o not eating for past month/decreased PO intake.  Pt reports "everything tastes bitter or metallic."  Pt also reports falling on Tuesday, was told by her PMD that she is dehydrated.

## 2019-03-31 NOTE — H&P ADULT - ASSESSMENT
62 yo woman with multiple comorbidities, including R-sided heart failure with preserved EF (EF 66% on TTE in 10/2017), severe pulm HTN, mod-severe TR, COPD (on 2L NC at night), LINH (being arranged for bilevel support), afib (on Eliquis), HTN, HLD, DM2, and gout presents with generalized weakness for the past 1 week, found to be positive for influenza A and entero/rhinovirus, also with positive FOBT.

## 2019-03-31 NOTE — H&P ADULT - NSICDXPASTMEDICALHX_GEN_ALL_CORE_FT
PAST MEDICAL HISTORY:  Atrial fibrillation     Chronic systolic right heart failure     CLL (chronic lymphocytic leukemia)     COPD (chronic obstructive pulmonary disease)     Diabetes     Gout     HTN (hypertension)     LINH (obstructive sleep apnea)

## 2019-03-31 NOTE — ED ADULT NURSE NOTE - INTERVENTIONS DEFINITIONS
Instruct patient to call for assistance/Non-slip footwear when patient is off stretcher/Physically safe environment: no spills, clutter or unnecessary equipment/Provide visual clues: red socks/Call bell, personal items and telephone within reach/Stretcher in lowest position, wheels locked, appropriate side rails in place/Provide visual cue, wrist band, yellow gown, etc./Monitor gait and stability

## 2019-03-31 NOTE — H&P ADULT - PROBLEM SELECTOR PLAN 10
- DVT ppx: Pt on therapeutic AC with Eliquis  - Diet: Clear liquid pending GI consult - DVT ppx: Pt on therapeutic AC with Eliquis  - Diet: Clear liquid pending GI consult  - PT eval pending

## 2019-03-31 NOTE — ED PROVIDER NOTE - CLINICAL SUMMARY MEDICAL DECISION MAKING FREE TEXT BOX
64 yo F c PMH of HTN, HLD, DM, afib (on eliquis, digoxin), gout, CHF, and COPD (on 2L O2 @ night), p/w 1 month hx of worsening generalized weakness associated with 70 lb weight loss in 1.5 years, dry cough (1-2 weeks), intermittent NBNB n/v, fall and using cane due to generalized weakness. on exam, VS wnl, non-focal neuro exam, delayed cap refill. given onc and chf hx and s/s of cough will obtain labs/cxr/rvp and dig level/ekg. gentle fluid resusc. will reassess.

## 2019-04-01 DIAGNOSIS — I50.22 CHRONIC SYSTOLIC (CONGESTIVE) HEART FAILURE: ICD-10-CM

## 2019-04-01 DIAGNOSIS — N17.9 ACUTE KIDNEY FAILURE, UNSPECIFIED: ICD-10-CM

## 2019-04-01 DIAGNOSIS — I48.91 UNSPECIFIED ATRIAL FIBRILLATION: ICD-10-CM

## 2019-04-01 DIAGNOSIS — J10.1 INFLUENZA DUE TO OTHER IDENTIFIED INFLUENZA VIRUS WITH OTHER RESPIRATORY MANIFESTATIONS: ICD-10-CM

## 2019-04-01 DIAGNOSIS — G47.33 OBSTRUCTIVE SLEEP APNEA (ADULT) (PEDIATRIC): ICD-10-CM

## 2019-04-01 DIAGNOSIS — M10.9 GOUT, UNSPECIFIED: ICD-10-CM

## 2019-04-01 DIAGNOSIS — B34.8 OTHER VIRAL INFECTIONS OF UNSPECIFIED SITE: ICD-10-CM

## 2019-04-01 DIAGNOSIS — J44.9 CHRONIC OBSTRUCTIVE PULMONARY DISEASE, UNSPECIFIED: ICD-10-CM

## 2019-04-01 DIAGNOSIS — R19.5 OTHER FECAL ABNORMALITIES: ICD-10-CM

## 2019-04-01 DIAGNOSIS — E11.9 TYPE 2 DIABETES MELLITUS WITHOUT COMPLICATIONS: ICD-10-CM

## 2019-04-01 DIAGNOSIS — Z29.9 ENCOUNTER FOR PROPHYLACTIC MEASURES, UNSPECIFIED: ICD-10-CM

## 2019-04-01 DIAGNOSIS — G47.30 SLEEP APNEA, UNSPECIFIED: ICD-10-CM

## 2019-04-01 PROBLEM — I50.32 CHRONIC DIASTOLIC (CONGESTIVE) HEART FAILURE: Chronic | Status: INACTIVE | Noted: 2017-10-30 | Resolved: 2019-04-01

## 2019-04-01 LAB
ALBUMIN SERPL ELPH-MCNC: 4.2 G/DL — SIGNIFICANT CHANGE UP (ref 3.3–5)
ALP SERPL-CCNC: 65 U/L — SIGNIFICANT CHANGE UP (ref 40–120)
ALT FLD-CCNC: 14 U/L — SIGNIFICANT CHANGE UP (ref 4–33)
ANION GAP SERPL CALC-SCNC: 17 MMO/L — HIGH (ref 7–14)
ANISOCYTOSIS BLD QL: SLIGHT — SIGNIFICANT CHANGE UP
ANISOCYTOSIS BLD QL: SLIGHT — SIGNIFICANT CHANGE UP
AST SERPL-CCNC: 23 U/L — SIGNIFICANT CHANGE UP (ref 4–32)
B PERT DNA SPEC QL NAA+PROBE: NOT DETECTED — SIGNIFICANT CHANGE UP
BASOPHILS # BLD AUTO: 0.02 K/UL — SIGNIFICANT CHANGE UP (ref 0–0.2)
BASOPHILS NFR BLD AUTO: 0.3 % — SIGNIFICANT CHANGE UP (ref 0–2)
BASOPHILS NFR SPEC: 0 % — SIGNIFICANT CHANGE UP (ref 0–2)
BASOPHILS NFR SPEC: 1 % — SIGNIFICANT CHANGE UP (ref 0–2)
BILIRUB SERPL-MCNC: 1 MG/DL — SIGNIFICANT CHANGE UP (ref 0.2–1.2)
BUN SERPL-MCNC: 132 MG/DL — HIGH (ref 7–23)
C PNEUM DNA SPEC QL NAA+PROBE: NOT DETECTED — SIGNIFICANT CHANGE UP
CALCIUM SERPL-MCNC: 10.1 MG/DL — SIGNIFICANT CHANGE UP (ref 8.4–10.5)
CHLORIDE SERPL-SCNC: 87 MMOL/L — LOW (ref 98–107)
CHLORIDE UR-SCNC: 65 MMOL/L — SIGNIFICANT CHANGE UP
CO2 SERPL-SCNC: 31 MMOL/L — SIGNIFICANT CHANGE UP (ref 22–31)
CREAT ?TM UR-MCNC: 51.8 MG/DL — SIGNIFICANT CHANGE UP
CREAT SERPL-MCNC: 2.83 MG/DL — HIGH (ref 0.5–1.3)
DACRYOCYTES BLD QL SMEAR: SLIGHT — SIGNIFICANT CHANGE UP
DACRYOCYTES BLD QL SMEAR: SLIGHT — SIGNIFICANT CHANGE UP
DIGOXIN SERPL-MCNC: 2.1 NG/ML — HIGH (ref 0.8–2)
EOSINOPHIL # BLD AUTO: 0.09 K/UL — SIGNIFICANT CHANGE UP (ref 0–0.5)
EOSINOPHIL NFR BLD AUTO: 1.3 % — SIGNIFICANT CHANGE UP (ref 0–6)
EOSINOPHIL NFR FLD: 0 % — SIGNIFICANT CHANGE UP (ref 0–6)
EOSINOPHIL NFR FLD: 3 % — SIGNIFICANT CHANGE UP (ref 0–6)
FLUAV H1 2009 PAND RNA SPEC QL NAA+PROBE: DETECTED — HIGH
FLUAV H1 RNA SPEC QL NAA+PROBE: NOT DETECTED — SIGNIFICANT CHANGE UP
FLUAV H3 RNA SPEC QL NAA+PROBE: NOT DETECTED — SIGNIFICANT CHANGE UP
FLUBV RNA SPEC QL NAA+PROBE: NOT DETECTED — SIGNIFICANT CHANGE UP
GLUCOSE SERPL-MCNC: 125 MG/DL — HIGH (ref 70–99)
HADV DNA SPEC QL NAA+PROBE: NOT DETECTED — SIGNIFICANT CHANGE UP
HCOV PNL SPEC NAA+PROBE: SIGNIFICANT CHANGE UP
HCT VFR BLD CALC: 29.7 % — LOW (ref 34.5–45)
HGB BLD-MCNC: 9 G/DL — LOW (ref 11.5–15.5)
HMPV RNA SPEC QL NAA+PROBE: NOT DETECTED — SIGNIFICANT CHANGE UP
HPIV1 RNA SPEC QL NAA+PROBE: NOT DETECTED — SIGNIFICANT CHANGE UP
HPIV2 RNA SPEC QL NAA+PROBE: NOT DETECTED — SIGNIFICANT CHANGE UP
HPIV3 RNA SPEC QL NAA+PROBE: NOT DETECTED — SIGNIFICANT CHANGE UP
HPIV4 RNA SPEC QL NAA+PROBE: NOT DETECTED — SIGNIFICANT CHANGE UP
HYPOCHROMIA BLD QL: SIGNIFICANT CHANGE UP
IMM GRANULOCYTES NFR BLD AUTO: 0.3 % — SIGNIFICANT CHANGE UP (ref 0–1.5)
LYMPHOCYTES # BLD AUTO: 1.03 K/UL — SIGNIFICANT CHANGE UP (ref 1–3.3)
LYMPHOCYTES # BLD AUTO: 14.5 % — SIGNIFICANT CHANGE UP (ref 13–44)
LYMPHOCYTES NFR SPEC AUTO: 11 % — LOW (ref 13–44)
LYMPHOCYTES NFR SPEC AUTO: 12 % — LOW (ref 13–44)
MAGNESIUM SERPL-MCNC: 1.7 MG/DL — SIGNIFICANT CHANGE UP (ref 1.6–2.6)
MANUAL SMEAR VERIFICATION: SIGNIFICANT CHANGE UP
MANUAL SMEAR VERIFICATION: SIGNIFICANT CHANGE UP
MCHC RBC-ENTMCNC: 19.6 PG — LOW (ref 27–34)
MCHC RBC-ENTMCNC: 30.3 % — LOW (ref 32–36)
MCV RBC AUTO: 64.7 FL — LOW (ref 80–100)
MICROCYTES BLD QL: SLIGHT — SIGNIFICANT CHANGE UP
MICROCYTES BLD QL: SLIGHT — SIGNIFICANT CHANGE UP
MONOCYTES # BLD AUTO: 1.44 K/UL — HIGH (ref 0–0.9)
MONOCYTES NFR BLD AUTO: 20.2 % — HIGH (ref 2–14)
MONOCYTES NFR BLD: 20 % — HIGH (ref 2–9)
MONOCYTES NFR BLD: 4 % — SIGNIFICANT CHANGE UP (ref 2–9)
NEUTROPHIL AB SER-ACNC: 67 % — SIGNIFICANT CHANGE UP (ref 43–77)
NEUTROPHIL AB SER-ACNC: 80 % — HIGH (ref 43–77)
NEUTROPHILS # BLD AUTO: 4.52 K/UL — SIGNIFICANT CHANGE UP (ref 1.8–7.4)
NEUTROPHILS NFR BLD AUTO: 63.4 % — SIGNIFICANT CHANGE UP (ref 43–77)
NRBC # BLD: 0 /100WBC — SIGNIFICANT CHANGE UP
NRBC # BLD: 0 /100WBC — SIGNIFICANT CHANGE UP
NRBC # FLD: 0.04 K/UL — SIGNIFICANT CHANGE UP (ref 0–0)
OVALOCYTES BLD QL SMEAR: SIGNIFICANT CHANGE UP
OVALOCYTES BLD QL SMEAR: SLIGHT — SIGNIFICANT CHANGE UP
PHOSPHATE SERPL-MCNC: 2.5 MG/DL — SIGNIFICANT CHANGE UP (ref 2.5–4.5)
PLATELET # BLD AUTO: 174 K/UL — SIGNIFICANT CHANGE UP (ref 150–400)
PLATELET COUNT - ESTIMATE: NORMAL — SIGNIFICANT CHANGE UP
PLATELET COUNT - ESTIMATE: NORMAL — SIGNIFICANT CHANGE UP
PMV BLD: SIGNIFICANT CHANGE UP FL (ref 7–13)
POIKILOCYTOSIS BLD QL AUTO: SLIGHT — SIGNIFICANT CHANGE UP
POIKILOCYTOSIS BLD QL AUTO: SLIGHT — SIGNIFICANT CHANGE UP
POLYCHROMASIA BLD QL SMEAR: SLIGHT — SIGNIFICANT CHANGE UP
POTASSIUM SERPL-MCNC: 3.4 MMOL/L — LOW (ref 3.5–5.3)
POTASSIUM SERPL-SCNC: 3.4 MMOL/L — LOW (ref 3.5–5.3)
POTASSIUM UR-SCNC: 28.7 MMOL/L — SIGNIFICANT CHANGE UP
PROT SERPL-MCNC: 7 G/DL — SIGNIFICANT CHANGE UP (ref 6–8.3)
RBC # BLD: 4.59 M/UL — SIGNIFICANT CHANGE UP (ref 3.8–5.2)
RBC # FLD: 16.1 % — HIGH (ref 10.3–14.5)
RSV RNA SPEC QL NAA+PROBE: NOT DETECTED — SIGNIFICANT CHANGE UP
RV+EV RNA SPEC QL NAA+PROBE: DETECTED — HIGH
SODIUM SERPL-SCNC: 135 MMOL/L — SIGNIFICANT CHANGE UP (ref 135–145)
SODIUM UR-SCNC: 56 MMOL/L — SIGNIFICANT CHANGE UP
TARGETS BLD QL SMEAR: SLIGHT — SIGNIFICANT CHANGE UP
TARGETS BLD QL SMEAR: SLIGHT — SIGNIFICANT CHANGE UP
UUN UR-MCNC: 435.5 MG/DL — SIGNIFICANT CHANGE UP
VARIANT LYMPHS # BLD: 2 % — SIGNIFICANT CHANGE UP
WBC # BLD: 7.12 K/UL — SIGNIFICANT CHANGE UP (ref 3.8–10.5)
WBC # FLD AUTO: 7.12 K/UL — SIGNIFICANT CHANGE UP (ref 3.8–10.5)

## 2019-04-01 PROCEDURE — 76770 US EXAM ABDO BACK WALL COMP: CPT | Mod: 26

## 2019-04-01 PROCEDURE — 99232 SBSQ HOSP IP/OBS MODERATE 35: CPT | Mod: GC

## 2019-04-01 PROCEDURE — 99222 1ST HOSP IP/OBS MODERATE 55: CPT | Mod: GC

## 2019-04-01 PROCEDURE — 93306 TTE W/DOPPLER COMPLETE: CPT | Mod: 26

## 2019-04-01 RX ORDER — METOPROLOL TARTRATE 50 MG
100 TABLET ORAL
Qty: 0 | Refills: 0 | Status: DISCONTINUED | OUTPATIENT
Start: 2019-04-01 | End: 2019-04-09

## 2019-04-01 RX ORDER — IPRATROPIUM/ALBUTEROL SULFATE 18-103MCG
3 AEROSOL WITH ADAPTER (GRAM) INHALATION EVERY 6 HOURS
Qty: 0 | Refills: 0 | Status: DISCONTINUED | OUTPATIENT
Start: 2019-04-01 | End: 2019-05-03

## 2019-04-01 RX ORDER — METOPROLOL TARTRATE 50 MG
1 TABLET ORAL
Qty: 0 | Refills: 0 | COMMUNITY

## 2019-04-01 RX ORDER — PANTOPRAZOLE SODIUM 20 MG/1
40 TABLET, DELAYED RELEASE ORAL EVERY 12 HOURS
Qty: 0 | Refills: 0 | Status: DISCONTINUED | OUTPATIENT
Start: 2019-04-01 | End: 2019-05-01

## 2019-04-01 RX ORDER — SODIUM CHLORIDE 9 MG/ML
1000 INJECTION INTRAMUSCULAR; INTRAVENOUS; SUBCUTANEOUS
Qty: 0 | Refills: 0 | Status: DISCONTINUED | OUTPATIENT
Start: 2019-04-01 | End: 2019-04-02

## 2019-04-01 RX ORDER — INSULIN LISPRO 100/ML
VIAL (ML) SUBCUTANEOUS
Qty: 0 | Refills: 0 | Status: DISCONTINUED | OUTPATIENT
Start: 2019-04-01 | End: 2019-04-26

## 2019-04-01 RX ORDER — METFORMIN HYDROCHLORIDE 850 MG/1
1 TABLET ORAL
Qty: 0 | Refills: 0 | COMMUNITY

## 2019-04-01 RX ORDER — BUDESONIDE AND FORMOTEROL FUMARATE DIHYDRATE 160; 4.5 UG/1; UG/1
2 AEROSOL RESPIRATORY (INHALATION)
Qty: 0 | Refills: 0 | Status: DISCONTINUED | OUTPATIENT
Start: 2019-04-01 | End: 2019-04-01

## 2019-04-01 RX ORDER — METOPROLOL TARTRATE 50 MG
125 TABLET ORAL
Qty: 0 | Refills: 0 | Status: DISCONTINUED | OUTPATIENT
Start: 2019-04-01 | End: 2019-04-09

## 2019-04-01 RX ORDER — GLUCAGON INJECTION, SOLUTION 0.5 MG/.1ML
1 INJECTION, SOLUTION SUBCUTANEOUS ONCE
Qty: 0 | Refills: 0 | Status: DISCONTINUED | OUTPATIENT
Start: 2019-04-01 | End: 2019-05-03

## 2019-04-01 RX ORDER — INSULIN LISPRO 100/ML
VIAL (ML) SUBCUTANEOUS AT BEDTIME
Qty: 0 | Refills: 0 | Status: DISCONTINUED | OUTPATIENT
Start: 2019-04-01 | End: 2019-04-26

## 2019-04-01 RX ORDER — DEXTROSE 50 % IN WATER 50 %
25 SYRINGE (ML) INTRAVENOUS ONCE
Qty: 0 | Refills: 0 | Status: DISCONTINUED | OUTPATIENT
Start: 2019-04-01 | End: 2019-05-03

## 2019-04-01 RX ORDER — DEXTROSE 50 % IN WATER 50 %
12.5 SYRINGE (ML) INTRAVENOUS ONCE
Qty: 0 | Refills: 0 | Status: DISCONTINUED | OUTPATIENT
Start: 2019-04-01 | End: 2019-04-25

## 2019-04-01 RX ORDER — MIDODRINE HYDROCHLORIDE 2.5 MG/1
10 TABLET ORAL ONCE
Qty: 0 | Refills: 0 | Status: COMPLETED | OUTPATIENT
Start: 2019-04-01 | End: 2019-04-01

## 2019-04-01 RX ORDER — SODIUM CHLORIDE 9 MG/ML
1000 INJECTION, SOLUTION INTRAVENOUS
Qty: 0 | Refills: 0 | Status: DISCONTINUED | OUTPATIENT
Start: 2019-04-01 | End: 2019-04-25

## 2019-04-01 RX ORDER — ALBUTEROL 90 UG/1
2 AEROSOL, METERED ORAL EVERY 6 HOURS
Qty: 0 | Refills: 0 | Status: DISCONTINUED | OUTPATIENT
Start: 2019-04-01 | End: 2019-04-01

## 2019-04-01 RX ORDER — ALLOPURINOL 300 MG
300 TABLET ORAL DAILY
Qty: 0 | Refills: 0 | Status: DISCONTINUED | OUTPATIENT
Start: 2019-04-01 | End: 2019-05-03

## 2019-04-01 RX ORDER — BUDESONIDE AND FORMOTEROL FUMARATE DIHYDRATE 160; 4.5 UG/1; UG/1
2 AEROSOL RESPIRATORY (INHALATION)
Qty: 0 | Refills: 0 | Status: DISCONTINUED | OUTPATIENT
Start: 2019-04-01 | End: 2019-05-03

## 2019-04-01 RX ORDER — DEXTROSE 50 % IN WATER 50 %
15 SYRINGE (ML) INTRAVENOUS ONCE
Qty: 0 | Refills: 0 | Status: DISCONTINUED | OUTPATIENT
Start: 2019-04-01 | End: 2019-05-03

## 2019-04-01 RX ORDER — POTASSIUM CHLORIDE 20 MEQ
40 PACKET (EA) ORAL ONCE
Qty: 0 | Refills: 0 | Status: COMPLETED | OUTPATIENT
Start: 2019-04-01 | End: 2019-04-01

## 2019-04-01 RX ADMIN — BUDESONIDE AND FORMOTEROL FUMARATE DIHYDRATE 2 PUFF(S): 160; 4.5 AEROSOL RESPIRATORY (INHALATION) at 08:36

## 2019-04-01 RX ADMIN — MIDODRINE HYDROCHLORIDE 10 MILLIGRAM(S): 2.5 TABLET ORAL at 22:55

## 2019-04-01 RX ADMIN — Medication 40 MILLIEQUIVALENT(S): at 21:18

## 2019-04-01 RX ADMIN — SODIUM CHLORIDE 70 MILLILITER(S): 9 INJECTION INTRAMUSCULAR; INTRAVENOUS; SUBCUTANEOUS at 17:42

## 2019-04-01 RX ADMIN — Medication 1 TABLET(S): at 13:51

## 2019-04-01 RX ADMIN — Medication 300 MILLIGRAM(S): at 13:51

## 2019-04-01 RX ADMIN — PANTOPRAZOLE SODIUM 40 MILLIGRAM(S): 20 TABLET, DELAYED RELEASE ORAL at 17:41

## 2019-04-01 RX ADMIN — Medication 100 MILLIGRAM(S): at 05:40

## 2019-04-01 RX ADMIN — Medication 30 MILLIGRAM(S): at 04:04

## 2019-04-01 RX ADMIN — Medication 30 MILLIGRAM(S): at 13:51

## 2019-04-01 RX ADMIN — Medication 100 MILLIGRAM(S): at 13:51

## 2019-04-01 RX ADMIN — BUDESONIDE AND FORMOTEROL FUMARATE DIHYDRATE 2 PUFF(S): 160; 4.5 AEROSOL RESPIRATORY (INHALATION) at 21:18

## 2019-04-01 RX ADMIN — SODIUM CHLORIDE 70 MILLILITER(S): 9 INJECTION INTRAMUSCULAR; INTRAVENOUS; SUBCUTANEOUS at 21:19

## 2019-04-01 RX ADMIN — PANTOPRAZOLE SODIUM 40 MILLIGRAM(S): 20 TABLET, DELAYED RELEASE ORAL at 05:40

## 2019-04-01 RX ADMIN — Medication 100 MILLIGRAM(S): at 21:18

## 2019-04-01 NOTE — CONSULT NOTE ADULT - ATTENDING COMMENTS
Renal attd    - Elevated BUN to creatinine ratio along with elevated HCO3 and high normal points towards pre renal azotemia.  Need to hold diuretics and  give gentle IVF.  Again she has a preserved LV function  -Essentially CKD stage 3 and a solitary kidney   -Rx for the flu    >60 minutes spent on total encounter and more then 50% of the visit was spent counseling and/or coordinating care by the attending physician

## 2019-04-01 NOTE — CONSULT NOTE ADULT - ASSESSMENT
62 yo woman with multiple comorbidities, including R-sided heart failure with preserved EF (EF 66% on TTE in 10/2017), severe pulm HTN, mod-severe TR, COPD (on 2L NC at night), LINH (being arranged for bilevel support), afib (on Eliquis), HTN, HLD, DM2, and gout presents with generalized weakness for the past 1 week, cough and chills now found to be influenza and enterovirus/rhinovirus +. GI now consulted for outpatient +FOBT/cologuard test done on 3/19/2019.    1) +cologuard  3/19/2019 + cologuard. Last colonoscopy done 9 years ago with  hx of polyps. No overt signs of GI bleed at this time. Hb of 9 during admission (baseline of 10 from 11/2017). MCV of 64. Will need colonoscopy for surveillance as well as to rule out malignancy.   2) Influenza, enterovirus/rhinovirus +  3) Microcytic anemia  FOBT+ stool and cologuard positive    - please check iron studies and ferritin. if low will need iron supplementation  - would hold off on colonoscopy for evaluation of +cologuard at this time given acute respiratory infection  - would recommend colonoscopy as outpatient once respiratory status improved.  - will need cardiology clearance prior to procedure  - rest of plan as per primary team 64 yo woman with multiple comorbidities, including R-sided heart failure with preserved EF (EF 66% on TTE in 10/2017), severe pulm HTN, mod-severe TR, COPD (on 2L NC at night), LINH (being arranged for bilevel support), afib (on Eliquis), HTN, HLD, DM2, and gout presents with generalized weakness for the past 1 week, cough and chills now found to be influenza and enterovirus/rhinovirus +. GI now consulted for outpatient +FOBT/cologuard test done on 3/19/2019.    1) Weight loss  Patient reporting 75lb weight loss over the last 1.5 years in the setting of trulicity. Although trulicity could help with weight loss, this is a signficant amount of weight loss. Also, she does have a recent +cologuard with increased constipation as well as early satiety and bloating concerning for possible malignancy.  2) Influenza, enterovirus/rhinovirus +  3) Microcytic anemia  FOBT+ stool and cologuard positive      - please check iron studies and ferritin. if low will need iron supplementation  - would hold off on EGD/colonoscopy for evaluation of +cologuard at this time given acute respiratory infection  - would recommend endoscopic procedures as outpatient once respiratory status improved.  - will need cardiology clearance prior to procedure  - rest of plan as per primary team 62 yo woman with multiple comorbidities, including R-sided heart failure with preserved EF (EF 66% on TTE in 10/2017), severe pulm HTN, mod-severe TR, COPD (on 2L NC at night), LINH (being arranged for bilevel support), afib (on Eliquis), HTN, HLD, DM2, and gout presents with generalized weakness for the past 1 week, cough and chills now found to be influenza and enterovirus/rhinovirus +. GI now consulted for outpatient +FOBT/cologuard test done on 3/19/2019.    1) Weight loss  Patient reporting 75lb weight loss over the last 1.5 years in the setting of trulicity. Although trulicity could help with weight loss, this is a signficant amount of weight loss. Also, she does have a recent +cologuard with increased constipation as well as early satiety and bloating concerning for possible malignancy.  2) Influenza, enterovirus/rhinovirus +  3) Microcytic anemia  FOBT+ stool and cologuard positive    - f/u CTAP  - please check iron studies and ferritin. if low will need iron supplementation  - would hold off on EGD/colonoscopy for evaluation of +cologuard at this time given acute respiratory infection  - would recommend endoscopic procedures as outpatient once respiratory status improved.  - will need cardiology clearance prior to procedure  - rest of plan as per primary team

## 2019-04-01 NOTE — CONSULT NOTE ADULT - SUBJECTIVE AND OBJECTIVE BOX
NEPHROLOGY - NSN    Patient seen and examined.    HPI: 63F with COPD (on O2), LINH, AF on A/C, HLD, gout, HFpEF, PHTN , DM and CKD3/4 p/w generalized weakness and poor appetite. Endorses metallic taste in mouth and food tasting abnormal, mild constipation, general malaise a/w positive cologuard and is planned for GI evaluation with Dr. Novak in the upcoming week. She has lost approx 50 lbs in the past year.  She fell due to LE weakness on Tuesday while outside, landed on her knees, denies LOC or head injury, has b/l knee abrasions and tenderness. She was placed on metolazone in addition to torsemide in February which was subsequently d/c at the beginning of March due to large volume urine output. Trulicity was also d/c within the past two months to see if it would affect her decreased appetite; which it did not. She was found to have influenza A and entero/rhinovirus. Her stool is guaiac positive. Her blood pressure is controlled. She is euvolemic on exam. She has CKD stage 3 with a creatinine as high as 3.7 recently; her baseline Cr is ~ 2.6, likely due to HTN/DM. She denies sob, cp, palpitations, abdominal pain, n/v/d, dysuria, hematuria, fever or chills. Nephrology was consulted for her elevated creatinine.     PAST MEDICAL & SURGICAL HISTORY:  Gout  Chronic systolic right heart failure  LINH (obstructive sleep apnea)  CLL (chronic lymphocytic leukemia)  COPD (chronic obstructive pulmonary disease)  Diabetes  HTN (hypertension)  Atrial fibrillation  No significant past surgical history    MEDICATIONS  (STANDING):  allopurinol 300 milliGRAM(s) Oral daily  benzonatate 100 milliGRAM(s) Oral every 8 hours  buDESOnide  80 MICROgram(s)/formoterol 4.5 MICROgram(s) Inhaler 2 Puff(s) Inhalation two times a day  insulin lispro (HumaLOG) corrective regimen sliding scale   metoprolol succinate  milliGRAM(s) Oral <User Schedule>  metoprolol succinate  milliGRAM(s) Oral <User Schedule>  multivitamin 1 Tablet(s) Oral daily  oseltamivir 30 milliGRAM(s) Oral daily  pantoprazole  Injectable 40 milliGRAM(s) IV Push every 12 hours  torsemide 40 milliGRAM(s) Oral daily    ALLERGIES:  No Known Allergies    SOCIAL HISTORY:  Denies alcohol abuse, drug abuse or tobacco usage.     FAMILY HISTORY:  Family history of hyperlipidemia  Family history of hypertension (Sibling)  Family history of type 2 diabetes mellitus  Family history of cancer (Aunt): Breast    VITALS:  T(C): 36.7 (19 @ 13:12), Max: 37.1 (19 @ 03:45)  HR: 76 (19 @ 13:12) (67 - 89)  BP: 102/55 (19 @ 13:12) (99/51 - 121/86)  RR: 18 (19 @ 13:12) (16 - 18)  SpO2: 95% (19 @ 13:12) (95% - 100%)    REVIEW OF SYSTEMS:  Denies any nausea, vomiting, diarrhea, fever or chills. Denies chest pain, SOB, focal weakness, hematuria or dysuria. Poor oral intake. All other pertinent systems are reviewed and are negative.    PHYSICAL EXAM:  Constitutional: NAD  HEENT: EOMI  Neck:  No JVD, supple   Respiratory: CTA B/L  Cardiovascular: S1 and S2, RRR  Gastrointestinal: + BS, soft, NT, ND  Extremities: No peripheral edema  Neurological: AAOX3  Psychiatric: Normal mood, normal affect  : No Corey  Skin: No rashes, C/D/I  Access: Not applicable    Height (cm): 162.56 ( @ 22:03)  Weight (kg): 82.1 ( @ 22:03)  BMI (kg/m2): 31.1 ( @ 22:03)  BSA (m2): 1.87 ( @ 22:03)    LABS:                        9.0    7.12  )-----------( 174      ( 2019 05:30 )             29.7     -    135  |  87<L>  |  132<H>  ----------------------------<  125<H>  3.4<L>   |  31  |  2.83<H>    Ca    10.1      2019 05:30  Phos  2.5     04-01  Mg     1.7         TPro  7.0  /  Alb  4.2  /  TBili  1.0  /  DBili  x   /  AST  23  /  ALT  14  /  AlkPhos  65        URINE:  Urinalysis Basic - ( 31 Mar 2019 21:50 )  Color: YELLOW / Appearance: CLEAR / S.012 / pH: 6.5  Gluc: NEGATIVE / Ketone: NEGATIVE  / Bili: NEGATIVE / Urobili: NORMAL   Blood: NEGATIVE / Protein: NEGATIVE / Nitrite: NEGATIVE   Leuk Esterase: NEGATIVE / RBC: x / WBC x   Sq Epi: x / Non Sq Epi: x / Bacteria: x    Sodium, Random Urine: 56 mmol/L ( @ 00:30)  Potassium, Random Urine: 28.7 mmol/L ( @ 00:30)  Chloride, Random Urine: 65 mmol/L ( @ 00:30)  Creatinine, Random Urine: 51.80 mg/dL ( @ 00:30)    RADIOLOGY & ADDITIONAL STUDIES:  < from: US Kidney and Bladder (19 @ 12:46) >  FINDINGS:    Right kidney:  9.7 cm. No hydronephrosis. An interpolar cyst measures 1.4   x 1.3 x 1.1 cm.    Left kidney:  8.4 cm. No hydronephrosis.    Urinary bladder: Within normal limits.    IMPRESSION:     No hydronephrosis.    < end of copied text >    ASSESSMENT  63F with COPD (on O2), LINH, AF on A/C, HLD, gout, HFpEF, PHTN , DM and CKD3/4 p/w generalized weakness and poor appetite.   - CKD: stage 3 likely due to DM/HTN with baseline Cr ~ 2.6; mild OSMAR likely pre-renal   - HTN: BP acceptable  - HFrEF: euvolemic on exam; pending TTE  - Pulm: RVP + for influenza, rhino and enterovirus  - anemia: in chronic disease + GIB, hold LUIS until colonoscopy   - GI: + FOBT, eventual colonoscopy    RECOMMEND  - c/w torsemide 40 mg po daily  - c/w tamiflu  - encourage PO intake as able  - avoid NSAIDs and nephrotoxins  - dose meds for a CrCl ~ 20 mL/min    Christina Lyn NP  [facility],   [phone] NEPHROLOGY - NSN    Patient seen and examined.    HPI: 63F with COPD (on O2), LINH, AF on A/C, HLD, gout, HFpEF, PHTN , DM and CKD3/4 p/w generalized weakness and poor appetite. Endorses metallic taste in mouth and food tasting abnormal, mild constipation, general malaise a/w positive cologuard and is planned for GI evaluation with Dr. Novak in the upcoming week. She has lost approx 50 lbs in the past year.  She fell due to LE weakness on Tuesday while outside, landed on her knees, denies LOC or head injury, has b/l knee abrasions and tenderness. She was placed on metolazone in addition to torsemide in February which was subsequently d/c at the beginning of March due to large volume urine output. Trulicity was also d/c within the past two months to see if it would affect her decreased appetite; which it did not. She was found to have influenza A and entero/rhinovirus. Her stool is guaiac positive. Her blood pressure is controlled. She is euvolemic on exam. She has CKD stage 3 with a creatinine as high as 3.7 recently; her baseline Cr is ~ 2.6, likely due to HTN/DM. She denies sob, cp, palpitations, abdominal pain, n/v/d, dysuria, hematuria, fever or chills. Nephrology was consulted for her elevated creatinine.   She has a history of diabetes for about 15 years ago.  She does see the eye doctor regularly and she has no evidence of retinopathy.  She has had hypertension for probably about the same time.  Her blood pressure is usually high, this is the first time her blood pressure has been low.  The patient has been off blood pressure medications because of the low blood pressure.  She has no prior history or knowledge of renal failure.  She does not take any NSAIDs or any herbal medications.  No contrast procedures.  The patient urinates once or twice in night.  She does not have any nephrolithiasis.  There are no alleviating or aggravating factors with respect to the kidneys.    The patient also has a history of lymphoma.  The patient had it diagnosed in .  She received chemo and radiation and as far as she knows, she is cancer free.  She has had an echocardiogram in March, which showed a normal LV and a normal RV.  She does have atrial fibrillation that was diagnosed approximately a year ago and she is on Eliquis for it.  She has never required a cardiac stent.      PAST MEDICAL & SURGICAL HISTORY:  Gout  Chronic systolic right heart failure  LINH (obstructive sleep apnea)  CLL (chronic lymphocytic leukemia)  COPD (chronic obstructive pulmonary disease)  Diabetes  HTN (hypertension)  Atrial fibrillation  No significant past surgical history    MEDICATIONS  (STANDING):  allopurinol 300 milliGRAM(s) Oral daily  benzonatate 100 milliGRAM(s) Oral every 8 hours  buDESOnide  80 MICROgram(s)/formoterol 4.5 MICROgram(s) Inhaler 2 Puff(s) Inhalation two times a day  insulin lispro (HumaLOG) corrective regimen sliding scale   metoprolol succinate  milliGRAM(s) Oral <User Schedule>  metoprolol succinate  milliGRAM(s) Oral <User Schedule>  multivitamin 1 Tablet(s) Oral daily  oseltamivir 30 milliGRAM(s) Oral daily  pantoprazole  Injectable 40 milliGRAM(s) IV Push every 12 hours  torsemide 40 milliGRAM(s) Oral daily    ALLERGIES:  No Known Allergies    SOCIAL HISTORY:  Denies alcohol abuse, drug abuse or tobacco usage.     FAMILY HISTORY:  Family history of hyperlipidemia  Family history of hypertension (Sibling)  Family history of type 2 diabetes mellitus  Family history of cancer (Aunt): Breast    VITALS:  T(C): 36.7 (19 @ 13:12), Max: 37.1 (19 @ 03:45)  HR: 76 (19 @ 13:12) (67 - 89)  BP: 102/55 (19 @ 13:12) (99/51 - 121/86)  RR: 18 (19 @ 13:12) (16 - 18)  SpO2: 95% (19 @ 13:12) (95% - 100%)    REVIEW OF SYSTEMS:  Denies any nausea, vomiting, diarrhea, fever or chills. Denies chest pain, SOB, focal weakness, hematuria or dysuria. Poor oral intake. All other pertinent systems are reviewed and are negative.    PHYSICAL EXAM:  Constitutional: NAD  HEENT: EOMI  Neck:  No JVD, supple   Respiratory: CTA B/L  Cardiovascular: S1 and S2, RRR  Gastrointestinal: + BS, soft, NT, ND  Extremities: No peripheral edema  Neurological: AAOX3  Psychiatric: Normal mood, normal affect  : No Corey  Skin: No rashes, C/D/I  Access: Not applicable    Height (cm): 162.56 ( @ 22:03)  Weight (kg): 82.1 ( @ 22:03)  BMI (kg/m2): 31.1 ( @ 22:03)  BSA (m2): 1.87 ( @ 22:03)    LABS:                        9.0    7.12  )-----------( 174      ( 2019 05:30 )             29.7         135  |  87<L>  |  132<H>  ----------------------------<  125<H>  3.4<L>   |  31  |  2.83<H>    Ca    10.1      2019 05:30  Phos  2.5       Mg     1.7         TPro  7.0  /  Alb  4.2  /  TBili  1.0  /  DBili  x   /  AST  23  /  ALT  14  /  AlkPhos  65        URINE:  Urinalysis Basic - ( 31 Mar 2019 21:50 )  Color: YELLOW / Appearance: CLEAR / S.012 / pH: 6.5  Gluc: NEGATIVE / Ketone: NEGATIVE  / Bili: NEGATIVE / Urobili: NORMAL   Blood: NEGATIVE / Protein: NEGATIVE / Nitrite: NEGATIVE   Leuk Esterase: NEGATIVE / RBC: x / WBC x   Sq Epi: x / Non Sq Epi: x / Bacteria: x    Sodium, Random Urine: 56 mmol/L ( @ 00:30)  Potassium, Random Urine: 28.7 mmol/L ( @ 00:30)  Chloride, Random Urine: 65 mmol/L ( @ 00:30)  Creatinine, Random Urine: 51.80 mg/dL ( @ 00:30)    RADIOLOGY & ADDITIONAL STUDIES:  < from: US Kidney and Bladder (19 @ 12:46) >  FINDINGS:    Right kidney:  9.7 cm. No hydronephrosis. An interpolar cyst measures 1.4   x 1.3 x 1.1 cm.    Left kidney:  8.4 cm. No hydronephrosis.    Urinary bladder: Within normal limits.    IMPRESSION:     No hydronephrosis.    < end of copied text >    ASSESSMENT  63F with COPD (on O2), LINH, AF on A/C, HLD, gout, HFpEF, PHTN , DM and CKD3/4 p/w generalized weakness and poor appetite.   - CKD: stage 3 likely due to DM/HTN with baseline Cr ~ 1.6;  OSMAR likely pre-renal   - HTN: BP acceptable  - HFrEF: euvolemic on exam; pending TTE  - Pulm: RVP + for influenza, rhino and enterovirus  - anemia: in chronic disease + GIB, hold LUIS until colonoscopy   - GI: + FOBT, eventual colonoscopy    RECOMMEND  -  c/w tamiflu  - encourage PO intake as able  - avoid NSAIDs and nephrotoxins  - dose meds for a CrCl ~ 20 mL/min    Christina Lyn NP  [facility]  [phone]

## 2019-04-01 NOTE — CONSULT NOTE ADULT - PROBLEM SELECTOR RECOMMENDATION 3
Patient has been on home O2, predominantly at night;  Had  CPAP titration study: Will followup to arrange for home bilevel PAP (19/13 cm H2O) through [facility].  Can request resp therapy while in hospital

## 2019-04-01 NOTE — CONSULT NOTE ADULT - ASSESSMENT
64 yo woman with multiple comorbidities, including R-sided heart failure with preserved EF (EF 66% on TTE in 10/2017), severe pulm HTN, mod-severe TR, COPD (on 2L NC at night), LINH (being arranged for bilevel support), afib (on Eliquis), HTN, HLD, DM2, and gout presents with generalized weakness for the past 1 week in the setting of recent occult postive stool, OSMAR and newly diagnosed influenza/entero virus infection    #Severe pulm HTN w/ RV systolic dysfunction - 62 yo woman with multiple comorbidities, including history of lymphoma, R-sided heart failure with preserved EF (EF 66% on TTE in 10/2017), severe pulm HTN, mod-severe TR, reported COPD but denies smoking? (on 2L NC at night), LINH (being arranged for bilevel support), A-Fib (on Eliquis), HTN, HLD, DM2, and gout presents with generalized weakness for the past 1 week in the setting of recent occult positive stool, OSMAR and newly diagnosed influenza/entero virus infection    #Severe pulm HTN w/ RV systolic dysfunction - on exam patient appears euvolemic and probably on the drier side.  - have asked her to liberalize her fluid intake. Can start her on maintenance fluids till tomorrow morning  - Echo to evaluate right sided pressure.  - check daily standing weights  - hold lisinopril     #Afib - currently rate controlled  - hold digoxin for now (possibly the reason she has a metallic taste)  - cont metoprolol and AC    #Viral infection - management per primary team    #Anemia - GI work up on hold in setting of acute viral illness, however, patient appears well compensated and if needed to be done on this admission we can likely proceed    GREGORIO Go MD  Cardiology Fellow  [id number]

## 2019-04-01 NOTE — CONSULT NOTE ADULT - ATTENDING COMMENTS
Appears euvolemic to hypovolemic on exam. Baseline creatinine ? 2, appetite changes, metallic taste ? secondary to Dig with increase in Scr, will need GI workup for occult blood. Would like to assess PASP on Echo now that patient is not hypervolemic. Holding diuretic, Digoxin and ACEi for now.

## 2019-04-01 NOTE — CONSULT NOTE ADULT - SUBJECTIVE AND OBJECTIVE BOX
PULM/MED PROGRESS NOTE:          HPI:  62 yo woman with multiple comorbidities, including R-sided heart failure with preserved EF (EF 66% on TTE in 10/2017), severe pulm HTN, mod-severe TR, COPD (on 2L NC at night), LINH (being arranged for bilevel support), afib (on Eliquis), HTN, HLD, DM2, and gout presents with generalized weakness for the past 1 week. Pt states that she suddenly felt weak starting 1 week ago, leading to a fall on Tuesday while walking outdoors. Pt says that she was drained after walking several feet and tried to hold on to a fence for support, but her legs gave out, with pt dropping to her knees. Denies any head strike, LOC, or residual pain. Has had no recent fevers, headaches, vision changes, numbness/tingling, lightheadedness/dizziness, diarrhea, or urinary symptoms, but reports 1-2 weeks of a nonproductive cough and frequent chills. Pt thinks that her weakness is mainly due to her poor appetite and PO intake. Pt states that after being started on Trulicity ~1 year ago, she began having weekly episodes of nausea and vomiting with increasing loss of appetite, resulting in a ~50-lb weight loss. Pt's PCP discontinued Trulicity in late February, though pt continued to have nausea and vomiting over the next few weeks, with the vomiting finally resolving 1 week ago. Pt's appetite, however, remains poor, worsened by a "metallic taste" in her mouth that pt first noticed 2 weeks ago. Due to the weakness and recent fall, pt saw her PCP this past Friday, with pt instructed to stop her metolazone and decrease her torsemide dose from 60 to 40 mg daily. Pt notes that outpatient lab work from 3/23/19 revealed a BUN of 167 and Cr of 3.7. Pt was started on metolazone and torsemide 60 mg daily on 19 for fluid retention after pt was discontinued on spironolactone in early January. Pt denies any CP, SOB/CHAVEZ, worsening orthopnea (baseline, 2 pillows), PND, or abdominal distention. Has improved b/l LE edema.    The patient has home oxygen; uses it primarily to sleep at night.  She apparently didn't receive bilevel PAP 19/13 cm H2O;  she previously had a CPAP titration study    Pt also notes that she had a positive Cologuard test on 3/19/19 and was given a referral to GI for a diagnostic colonoscopy. Pt had 2 previous colonoscopies, last one ~9 years ago, and was found to have polyps both times. Pt denies any abdominal pain, dark/bloody stools, or BRBPR.     In the ED,  T 98.5, HR 67-86, -121/70-86, RR 16, O2 sats 100% RA. FOBT positive. RVP positive for influenza A and entero/rhinovirus. (31 Mar 2019 23:28)      MEDICATIONS  (STANDING):  allopurinol 300 milliGRAM(s) Oral daily  benzonatate 100 milliGRAM(s) Oral every 8 hours  buDESOnide  80 MICROgram(s)/formoterol 4.5 MICROgram(s) Inhaler 2 Puff(s) Inhalation two times a day  dextrose 5%. 1000 milliLiter(s) (50 mL/Hr) IV Continuous <Continuous>  dextrose 50% Injectable 12.5 Gram(s) IV Push once  dextrose 50% Injectable 25 Gram(s) IV Push once  dextrose 50% Injectable 25 Gram(s) IV Push once  insulin lispro (HumaLOG) corrective regimen sliding scale   SubCutaneous three times a day before meals  insulin lispro (HumaLOG) corrective regimen sliding scale   SubCutaneous at bedtime  metoprolol succinate  milliGRAM(s) Oral <User Schedule>  metoprolol succinate  milliGRAM(s) Oral <User Schedule>  multivitamin 1 Tablet(s) Oral daily  oseltamivir 30 milliGRAM(s) Oral daily  pantoprazole  Injectable 40 milliGRAM(s) IV Push every 12 hours  torsemide 40 milliGRAM(s) Oral daily    MEDICATIONS  (PRN):  ALBUTerol/ipratropium for Nebulization 3 milliLiter(s) Nebulizer every 6 hours PRN Shortness of Breath and/or Wheezing  dextrose 40% Gel 15 Gram(s) Oral once PRN Blood Glucose LESS THAN 70 milliGRAM(s)/deciliter  glucagon  Injectable 1 milliGRAM(s) IntraMuscular once PRN Glucose LESS THAN 70 milligrams/deciliter              PAST MEDICAL & SURGICAL HISTORY:  Gout  Chronic systolic right heart failure  LINH (obstructive sleep apnea)  CLL (chronic lymphocytic leukemia)  COPD (chronic obstructive pulmonary disease)  Diabetes  HTN (hypertension)  Atrial fibrillation  No significant past surgical history      FAMILY HISTORY:  Family history of hyperlipidemia  Family history of hypertension (Sibling)  Family history of type 2 diabetes mellitus  Family history of cancer (Aunt): Breast        PHYSICAL EXAM:  Vital Signs Last 24 Hrs  T(C): 37.1 (2019 08:43), Max: 37.1 (2019 03:45)  T(F): 98.7 (2019 08:43), Max: 98.7 (2019 03:45)  HR: 89 (2019 08:43) (67 - 89)  BP: 99/51 (2019 08:43) (99/51 - 121/86)  BP(mean): --  RR: 18 (2019 08:43) (16 - 18)  SpO2: 98% (2019 08:43) (98% - 100%)   GENERAL: NAD    NECK: Supple    CHEST/LUNG: Clear to percussion bilaterally; No rales, rhonchi, wheezing, or rubs  HEART: irreg;   CM: a fib; No murmurs, rubs, or gallops  ABDOMEN: Soft, Nontender  EXTREMITIES:  No significant edema        LABS:                        9.0    7.12  )-----------( 174      ( 2019 05:30 )             29.7     04    135  |  87<L>  |  132<H>  ----------------------------<  125<H>  3.4<L>   |  31  |  2.83<H>    Ca    10.1      2019 05:30  Phos  2.5     04-  Mg     1.7     -    TPro  7.0  /  Alb  4.2  /  TBili  1.0  /  DBili  x   /  AST  23  /  ALT  14  /  AlkPhos  65  04-    PT/INR - ( 31 Mar 2019 21:50 )   PT: 21.5 SEC;   INR: 1.85          PTT - ( 31 Mar 2019 21:50 )  PTT:34.0 SEC  Urinalysis Basic - ( 31 Mar 2019 21:50 )    Color: YELLOW / Appearance: CLEAR / S.012 / pH: 6.5  Gluc: NEGATIVE / Ketone: NEGATIVE  / Bili: NEGATIVE / Urobili: NORMAL   Blood: NEGATIVE / Protein: NEGATIVE / Nitrite: NEGATIVE   Leuk Esterase: NEGATIVE / RBC: x / WBC x   Sq Epi: x / Non Sq Epi: x / Bacteria: x              Serum Pro-Brain Natriuretic Peptide: 6490 pg/mL (19 @ 21:50)            Microbiology  Rapid Respiratory Viral Panel (19 @ 21:50)    Adenovirus (RapRVP): Not Detected    Influenza AH1 2009 (RapRVP): Not Detected    Influenza AH1 (RapRVP): Not Detected    Influenza AH3 (RapRVP): Detected    Influenza B (RapRVP): Not Detected    Parainfluenza 1 (RapRVP): Not Detected    Parainfluenza 2 (RapRVP): Not Detected    Parainfluenza 3 (RapRVP): Not Detected    Parainfluenza 4 (RapRVP): Not Detected    Resp Syncytial Virus (RapRVP): Not Detected    Chlamydia pneumoniae (RapRVP): Not Detected    Mycoplasma pneumoniae (RapRVP): Not Detected    Entero/Rhinovirus (RapRVP): Detected    hMPV (RapRVP): Not Detected    Coronavirus (229E,HKU1,NL63,OC43): Not Detected This Respiratory Panel uses polymerase chain reaction (PCR)  to detect for adenovirus; coronavirus (HKU1, NL63, 229E,  OC43); human metapneumovirus (hMPV); human  enterovirus/rhinovirus (Entero/RV); influenza A; influenza  A/H1: influenza A/H3; influenza A/H1-2009; influenza B;  parainfluenza viruses 1,2,3,4; respiratory syncytial virus;  Mycoplasma pneumoniae; and Chlamydophila pneumoniae.          RADIOLOGY & ADDITIONAL STUDIES:    Xray:   EXAM:  XR CHEST PA LAT 2V        PROCEDURE DATE:  Mar 31 2019         INTERPRETATION:  CLINICAL INDICATION: Cough    TECHNIQUE: PA and lateral views of the chest dated 3/31/2019    COMPARISON: Comparison study dated 10/30/2017    FINDINGS:  The lungs are clear.   There is no pneumothorax or pleural effusion.  The cardiomediastinal silhouette is normal in size.    IMPRESSION:  Clear lungs.      HOLLIS STEINBERG M.D., Radiology Resident  This document has been electronically signed.  ACOSTA ZAMORA M.D., ATTENDING RADIOLOGIST  This document has been electronically signed. 2019  6:21AM                Echo: Patient name: KARINA SCHNEIDER  YOB: 1955   Age: 62 (F)   MR#: [id number]  Study Date: 10/31/2017  [facility]  Study quality: Technically good  Referring Physician: Angela Belle MD  Blood Pressure: 140/33 mmHg  Height: 163 cm  Weight: 122 kg  BSA: 2.2 m2  ------------------------------------------------------------------------  PROCEDURE: Transthoracic echocardiogram with 2-D, M-Mode  and complete spectral and color flow Doppler.  INDICATION: Shortness of breath (R06.02)  ------------------------------------------------------------------------  DIMENSIONS:  Dimensions:     Normal Values:  LA:     4.2 cm    2.0 - 4.0 cm  Ao:     2.8 cm    2.0 - 3.8 cm  SEPTUM: 0.9 cm    0.6 - 1.2 cm  PWT:    1.0 cm    0.6 - 1.1 cm  LVIDd:  4.2 cm    3.0 - 5.6 cm  LVIDs:  2.7 cm    1.8 - 4.0 cm  Derived Variables:  LVMI: 58 g/m2  RWT: 0.47  Fractional short: 36 %  Ejection Fraction (Teicholtz): 66 %  ------------------------------------------------------------------------  OBSERVATIONS:  Mitral Valve: Mitral annular calcification, otherwise  normal mitral valve. Mild mitral regurgitation.  Aortic Root: Normal aortic root.  Aortic Valve: Normal trileaflet aortic valve.  Left Atrium: Normal left atrium.  Left Ventricle: Normal left ventricular systolic function.  No segmental wall motion abnormalities. Septal motion  consistent with right ventricular pressure and volume  overload. Increased relative wall thickness with normal  left ventricular mass index, consistent with concentric  left ventricular remodeling.  Right Heart: Severe right atrial enlargement. Right  ventricular enlargement with decreased right ventricular  systolic function. Normal tricuspid valve.  Moderate-severe  tricuspid regurgitation. Normal pulmonic valve.  Pericardium/PleuraNormal pericardium with no pericardial  effusion.  Hemodynamic: Estimated right ventricular systolic pressure  equals 80 mm Hg, assuming right atrial pressure equals 10  mm Hg, consistent with severe pulmonary hypertension.  ------------------------------------------------------------------------  CONCLUSIONS:  1. Increased relative wall thickness with normal left  ventricular mass index, consistent with concentric left  ventricular remodeling.  2. Normal left ventricular systolic function. No segmental  wall motion abnormalities. Septal motion consistent with  right ventricular pressure and volume overload.  3. Severe right atrial enlargement.  4. Right ventricular enlargement with decreased right  ventricular systolic function.  5. Normal tricuspid valve.  Moderate-severe tricuspid  regurgitation.  ------------------------------------------------------------------------  Confirmed on  10/31/2017 - 16:02:32 by DENISSE Avila  ------------------------------------------------------------------------          Plan:

## 2019-04-01 NOTE — CONSULT NOTE ADULT - SUBJECTIVE AND OBJECTIVE BOX
Patient seen and evaluated @   Chief Complaint:     HPI:  64 yo woman with multiple comorbidities, including R-sided heart failure with preserved EF (EF 66% on TTE in 10/2017), severe pulm HTN, mod-severe TR, COPD (on 2L NC at night), LINH (being arranged for bilevel support), afib (on Eliquis), HTN, HLD, DM2, and gout presents with generalized weakness for the past 1 week. Pt states that she suddenly felt weak starting 1 week ago, leading to a fall on Tuesday while walking outdoors. Pt says that she was drained after walking several feet and tried to hold on to a fence for support, but her legs gave out, with pt dropping to her knees. Denies any head strike, LOC, or residual pain. Has had no recent fevers, headaches, vision changes, numbness/tingling, lightheadedness/dizziness, diarrhea, or urinary symptoms, but reports 1-2 weeks of a nonproductive cough and frequent chills. Pt thinks that her weakness is mainly due to her poor appetite and PO intake. Pt states that after being started on Trulicity ~1 year ago, she began having weekly episodes of nausea and vomiting with increasing loss of appetite, resulting in a ~50-lb weight loss. Pt's PCP discontinued Trulicity in late February, though pt continued to have nausea and vomiting over the next few weeks, with the vomiting finally resolving 1 week ago. Pt's appetite, however, remains poor, worsened by a "metallic taste" in her mouth that pt first noticed 2 weeks ago. Due to the weakness and recent fall, pt saw her PCP this past Friday, with pt instructed to stop her metolazone and decrease her torsemide dose from 60 to 40 mg daily. Pt notes that outpatient lab work from 3/23/19 revealed a BUN of 167 and Cr of 3.7. Pt was started on metolazone and torsemide 60 mg daily on 2/9/19 for fluid retention after pt was discontinued on spironolactone in early January. Pt denies any CP, SOB/CHAVEZ, worsening orthopnea (baseline, 2 pillows), PND, or abdominal distention. Has improved b/l LE edema.    Pt also notes that she had a positive Cologuard test on 3/19/19 and was given a referral to GI for a diagnostic colonoscopy. Pt had 2 previous colonoscopies, last one ~9 years ago, and was found to have polyps both times. Pt denies any abdominal pain, dark/bloody stools, or BRBPR.     In the ED,  T 98.5, HR 67-86, -121/70-86, RR 16, O2 sats 100% RA. FOBT positive. RVP positive for influenza A and entero/rhinovirus.     Patient was evaluated by GI and work up deferred in the setting of viral infection     PMH:   Gout  Chronic systolic right heart failure  LINH (obstructive sleep apnea)  CLL (chronic lymphocytic leukemia)  Chronic diastolic congestive heart failure  COPD (chronic obstructive pulmonary disease)  Diabetes  HTN (hypertension)  Atrial fibrillation    PSH:   No significant past surgical history    Medications:   ALBUTerol/ipratropium for Nebulization 3 milliLiter(s) Nebulizer every 6 hours PRN  allopurinol 300 milliGRAM(s) Oral daily  benzonatate 100 milliGRAM(s) Oral every 8 hours  buDESOnide  80 MICROgram(s)/formoterol 4.5 MICROgram(s) Inhaler 2 Puff(s) Inhalation two times a day  dextrose 40% Gel 15 Gram(s) Oral once PRN  dextrose 5%. 1000 milliLiter(s) IV Continuous <Continuous>  dextrose 50% Injectable 12.5 Gram(s) IV Push once  dextrose 50% Injectable 25 Gram(s) IV Push once  dextrose 50% Injectable 25 Gram(s) IV Push once  glucagon  Injectable 1 milliGRAM(s) IntraMuscular once PRN  insulin lispro (HumaLOG) corrective regimen sliding scale   SubCutaneous three times a day before meals  insulin lispro (HumaLOG) corrective regimen sliding scale   SubCutaneous at bedtime  metoprolol succinate  milliGRAM(s) Oral <User Schedule>  metoprolol succinate  milliGRAM(s) Oral <User Schedule>  multivitamin 1 Tablet(s) Oral daily  oseltamivir 30 milliGRAM(s) Oral daily  pantoprazole  Injectable 40 milliGRAM(s) IV Push every 12 hours  torsemide 40 milliGRAM(s) Oral daily    Allergies:  No Known Allergies    FAMILY HISTORY:  Family history of hyperlipidemia  Family history of hypertension (Sibling)  Family history of type 2 diabetes mellitus  Family history of cancer (Aunt): Breast    Social History:  Smoking: distant  Alcohol: no   Drugs: no    Review of Systems:  REVIEW OF SYSTEMS:    CONSTITUTIONAL: No weakness, fevers or chills  EYES/ENT: No visual changes;  No dysphagia  NECK: No pain or stiffness  RESPIRATORY: No cough, wheezing, hemoptysis; No shortness of breath  CARDIOVASCULAR: No chest pain or palpitations; No lower extremity edema  GASTROINTESTINAL: No abdominal or epigastric pain. No nausea, vomiting, or hematemesis; No diarrhea or constipation. No melena or hematochezia.  BACK: No back pain  GENITOURINARY: No dysuria, frequency or hematuria  NEUROLOGICAL: No numbness or weakness  SKIN: No itching, burning, rashes, or lesions   All other review of systems is negative unless indicated above.  [ x] 10 point review of systems is otherwise negative except as mentioned above            [ ]Unable to obtain    Physical Exam:  T(C): 36.7 (04-01-19 @ 13:12), Max: 37.1 (04-01-19 @ 03:45)  HR: 76 (04-01-19 @ 13:12) (67 - 89)  BP: 102/55 (04-01-19 @ 13:12) (99/51 - 121/86)  RR: 18 (04-01-19 @ 13:12) (16 - 18)  SpO2: 95% (04-01-19 @ 13:12) (95% - 100%)  Wt(kg): --  GENERAL: No acute distress, well-developed  HEAD:  Atraumatic, Normocephalic  ENT: EOMI, PERRLA, conjunctiva and sclera clear, Neck supple, No JVD, moist mucosa  CHEST/LUNG: Clear to auscultation bilaterally; No wheeze, equal breath sounds bilaterally   BACK: No spinal tenderness  HEART: Regular rate and rhythm; No murmurs, rubs, or gallops  ABDOMEN: Soft, Nontender, Nondistended; Bowel sounds present  EXTREMITIES:  No clubbing, cyanosis, or edema  PSYCH: Nl behavior, nl affect  NEUROLOGY: AAOx3, non-focal, cranial nerves intact  SKIN: Normal color, No rashes or lesions      Daily Height in cm: 162.56 (31 Mar 2019 22:03)    Daily     Cardiovascular Diagnostic Testing:  ECG: afib 80's    Echo:    Stress Testing:    Cath:    Interpretation of Telemetry: afib 80's    Imaging:    Labs:                        9.0    7.12  )-----------( 174      ( 01 Apr 2019 05:30 )             29.7     04-01    135  |  87<L>  |  132<H>  ----------------------------<  125<H>  3.4<L>   |  31  |  2.83<H>    Ca    10.1      01 Apr 2019 05:30  Phos  2.5     04-01  Mg     1.7     04-01    TPro  7.0  /  Alb  4.2  /  TBili  1.0  /  DBili  x   /  AST  23  /  ALT  14  /  AlkPhos  65  04-01    PT/INR - ( 31 Mar 2019 21:50 )   PT: 21.5 SEC;   INR: 1.85          PTT - ( 31 Mar 2019 21:50 )  PTT:34.0 SEC      Serum Pro-Brain Natriuretic Peptide: 6490 pg/mL (03-31 @ 21:50)      < from: Transthoracic Echocardiogram (10.31.17 @ 12:58) >    Patient name: KARINA SCHNEIDER  YOB: 1955   Age: 62 (F)   MR#: [id number]  Study Date: 10/31/2017  [facility]  Study quality: Technically good  Referring Physician: Angela Belle MD  Blood Pressure: 140/33 mmHg  Height: 163 cm  Weight: 122 kg  BSA: 2.2 m2  ------------------------------------------------------------------------  PROCEDURE: Transthoracic echocardiogram with 2-D, M-Mode  and complete spectral and color flow Doppler.  INDICATION: Shortness of breath (R06.02)  ------------------------------------------------------------------------  DIMENSIONS:  Dimensions:     Normal Values:  LA:     4.2 cm    2.0 - 4.0 cm  Ao:     2.8 cm    2.0 - 3.8 cm  SEPTUM: 0.9 cm    0.6 - 1.2 cm  PWT:    1.0 cm    0.6 - 1.1 cm  LVIDd:  4.2 cm    3.0 - 5.6 cm  LVIDs:  2.7 cm    1.8 - 4.0 cm  Derived Variables:  LVMI: 58 g/m2  RWT: 0.47  Fractional short: 36 %  Ejection Fraction (Teicholtz): 66 %  ------------------------------------------------------------------------  OBSERVATIONS:  Mitral Valve: Mitral annular calcification, otherwise  normal mitral valve. Mild mitral regurgitation.  Aortic Root: Normal aortic root.  Aortic Valve: Normal trileaflet aortic valve.  Left Atrium: Normal left atrium.  Left Ventricle: Normal left ventricular systolic function.  No segmental wall motion abnormalities. Septal motion  consistent with right ventricular pressure and volume  overload. Increased relative wall thickness with normal  left ventricular mass index, consistent with concentric  left ventricular remodeling.  Right Heart: Severe right atrial enlargement. Right  ventricular enlargement with decreased right ventricular  systolic function. Normal tricuspid valve.  Moderate-severe  tricuspid regurgitation. Normal pulmonic valve.  Pericardium/PleuraNormal pericardium with no pericardial  effusion.  Hemodynamic: Estimated right ventricular systolic pressure  equals 80 mm Hg, assuming right atrial pressure equals 10  mm Hg, consistent with severe pulmonary hypertension.  ------------------------------------------------------------------------  CONCLUSIONS:  1. Increased relative wall thickness with normal left  ventricular mass index, consistent with concentric left  ventricular remodeling.  2. Normal left ventricular systolic function. No segmental  wall motion abnormalities. Septal motion consistent with  right ventricular pressure and volume overload.  3. Severe right atrial enlargement.  4. Right ventricular enlargement with decreased right  ventricular systolic function.  5. Normal tricuspid valve.  Moderate-severe tricuspid  regurgitation.  ------------------------------------------------------------------------  Confirmed on  10/31/2017 - 16:02:32 by Nikkie Ramey M.D. RPVI  ------------------------------------------------------------------------    < end of copied text > Patient seen and evaluated @ [facility]  Chief Complaint: Malaise    HPI:  64 yo woman with multiple comorbidities, including R-sided heart failure with preserved EF (EF 66% on TTE in 10/2017), severe pulm HTN, mod-severe TR, COPD (on 2L NC at night), LINH (being arranged for bilevel support), afib (on Eliquis), HTN, HLD, DM2, and gout presents with generalized weakness for the past 1 week. Pt states that she suddenly felt weak starting 1 week ago, leading to a fall on Tuesday while walking outdoors. Pt says that she was drained after walking several feet and tried to hold on to a fence for support, but her legs gave out, with pt dropping to her knees. Denies any head strike, LOC, or residual pain. Has had no recent fevers, headaches, vision changes, numbness/tingling, lightheadedness/dizziness, diarrhea, or urinary symptoms, but reports 1-2 weeks of a nonproductive cough and frequent chills. Pt thinks that her weakness is mainly due to her poor appetite and PO intake. Pt states that after being started on Trulicity ~1 year ago, she began having weekly episodes of nausea and vomiting with increasing loss of appetite, resulting in a ~50-lb weight loss. Pt's PCP discontinued Trulicity in late February, though pt continued to have nausea and vomiting over the next few weeks, with the vomiting finally resolving 1 week ago. Pt's appetite, however, remains poor, worsened by a "metallic taste" in her mouth that pt first noticed 2 weeks ago. Due to the weakness and recent fall, pt saw her PCP this past Friday, with pt instructed to stop her metolazone and decrease her torsemide dose from 60 to 40 mg daily. Pt notes that outpatient lab work from 3/23/19 revealed a BUN of 167 and Cr of 3.7. Pt was started on metolazone and torsemide 60 mg daily on 2/9/19 for fluid retention after pt was discontinued on spironolactone in early January. Pt denies any CP, SOB/CHAVEZ, worsening orthopnea (baseline, 2 pillows), PND, or abdominal distention. Has improved b/l LE edema.    Pt also notes that she had a positive Cologuard test on 3/19/19 and was given a referral to GI for a diagnostic colonoscopy. Pt had 2 previous colonoscopies, last one ~9 years ago, and was found to have polyps both times. Pt denies any abdominal pain, dark/bloody stools, or BRBPR.     In the ED,  T 98.5, HR 67-86, -121/70-86, RR 16, O2 sats 100% RA. FOBT positive. RVP positive for influenza A and entero/rhinovirus.     Patient was evaluated by GI and work up deferred in the setting of viral infection     PMH:   Gout  Chronic systolic right heart failure  LINH (obstructive sleep apnea)  CLL (chronic lymphocytic leukemia)  Chronic diastolic congestive heart failure  COPD (chronic obstructive pulmonary disease)  Diabetes  HTN (hypertension)  Atrial fibrillation    PSH:   No significant past surgical history    Medications:   ALBUTerol/ipratropium for Nebulization 3 milliLiter(s) Nebulizer every 6 hours PRN  allopurinol 300 milliGRAM(s) Oral daily  benzonatate 100 milliGRAM(s) Oral every 8 hours  buDESOnide  80 MICROgram(s)/formoterol 4.5 MICROgram(s) Inhaler 2 Puff(s) Inhalation two times a day  dextrose 40% Gel 15 Gram(s) Oral once PRN  dextrose 5%. 1000 milliLiter(s) IV Continuous <Continuous>  dextrose 50% Injectable 12.5 Gram(s) IV Push once  dextrose 50% Injectable 25 Gram(s) IV Push once  dextrose 50% Injectable 25 Gram(s) IV Push once  glucagon  Injectable 1 milliGRAM(s) IntraMuscular once PRN  insulin lispro (HumaLOG) corrective regimen sliding scale   SubCutaneous three times a day before meals  insulin lispro (HumaLOG) corrective regimen sliding scale   SubCutaneous at bedtime  metoprolol succinate  milliGRAM(s) Oral <User Schedule>  metoprolol succinate  milliGRAM(s) Oral <User Schedule>  multivitamin 1 Tablet(s) Oral daily  oseltamivir 30 milliGRAM(s) Oral daily  pantoprazole  Injectable 40 milliGRAM(s) IV Push every 12 hours  torsemide 40 milliGRAM(s) Oral daily    Allergies:  No Known Allergies    FAMILY HISTORY:  Family history of hyperlipidemia  Family history of hypertension (Sibling)  Family history of type 2 diabetes mellitus  Family history of cancer (Aunt): Breast    Social History:  Smoking: distant  Alcohol: no   Drugs: no    Review of Systems:  REVIEW OF SYSTEMS:    CONSTITUTIONAL: No weakness, fevers or chills  EYES/ENT: No visual changes;  No dysphagia  NECK: No pain or stiffness  RESPIRATORY: No cough, wheezing, hemoptysis; No shortness of breath  CARDIOVASCULAR: No chest pain or palpitations; No lower extremity edema  GASTROINTESTINAL: No abdominal or epigastric pain. No nausea, vomiting, or hematemesis; No diarrhea or constipation. No melena or hematochezia.  BACK: No back pain  GENITOURINARY: No dysuria, frequency or hematuria  NEUROLOGICAL: No numbness or weakness  SKIN: No itching, burning, rashes, or lesions   All other review of systems is negative unless indicated above.  [ x] 10 point review of systems is otherwise negative except as mentioned above            [ ]Unable to obtain    Physical Exam:  T(C): 36.7 (04-01-19 @ 13:12), Max: 37.1 (04-01-19 @ 03:45)  HR: 76 (04-01-19 @ 13:12) (67 - 89)  BP: 102/55 (04-01-19 @ 13:12) (99/51 - 121/86)  RR: 18 (04-01-19 @ 13:12) (16 - 18)  SpO2: 95% (04-01-19 @ 13:12) (95% - 100%)  Wt(kg): --  GENERAL: No acute distress, well-developed  HEAD:  Atraumatic, Normocephalic  ENT: EOMI, PERRLA, conjunctiva and sclera clear, Neck supple, No JVD, moist mucosa  CHEST/LUNG: Clear to auscultation bilaterally; No wheeze, equal breath sounds bilaterally   BACK: No spinal tenderness  HEART: Regular rate and rhythm; No murmurs, rubs, or gallops  ABDOMEN: Soft, Nontender, Nondistended; Bowel sounds present  EXTREMITIES:  No clubbing, cyanosis, or edema  PSYCH: Nl behavior, nl affect  NEUROLOGY: AAOx3, non-focal, cranial nerves intact  SKIN: Normal color, No rashes or lesions      Daily Height in cm: 162.56 (31 Mar 2019 22:03)    Daily     Cardiovascular Diagnostic Testing:  ECG: afib 80's    Echo:    Stress Testing:    Cath:    Interpretation of Telemetry: afib 80's    Imaging:    Labs:                        9.0    7.12  )-----------( 174      ( 01 Apr 2019 05:30 )             29.7     04-01    135  |  87<L>  |  132<H>  ----------------------------<  125<H>  3.4<L>   |  31  |  2.83<H>    Ca    10.1      01 Apr 2019 05:30  Phos  2.5     04-01  Mg     1.7     04-01    TPro  7.0  /  Alb  4.2  /  TBili  1.0  /  DBili  x   /  AST  23  /  ALT  14  /  AlkPhos  65  04-01    PT/INR - ( 31 Mar 2019 21:50 )   PT: 21.5 SEC;   INR: 1.85          PTT - ( 31 Mar 2019 21:50 )  PTT:34.0 SEC      Serum Pro-Brain Natriuretic Peptide: 6490 pg/mL (03-31 @ 21:50)      < from: Transthoracic Echocardiogram (10.31.17 @ 12:58) >    Patient name: KARINA SCHNEIDER  YOB: 1955   Age: 62 (F)   MR#: [id number]  Study Date: 10/31/2017  [facility]  Study quality: Technically good  Referring Physician: Angela Belel MD  Blood Pressure: 140/33 mmHg  Height: 163 cm  Weight: 122 kg  BSA: 2.2 m2  ------------------------------------------------------------------------  PROCEDURE: Transthoracic echocardiogram with 2-D, M-Mode  and complete spectral and color flow Doppler.  INDICATION: Shortness of breath (R06.02)  ------------------------------------------------------------------------  DIMENSIONS:  Dimensions:     Normal Values:  LA:     4.2 cm    2.0 - 4.0 cm  Ao:     2.8 cm    2.0 - 3.8 cm  SEPTUM: 0.9 cm    0.6 - 1.2 cm  PWT:    1.0 cm    0.6 - 1.1 cm  LVIDd:  4.2 cm    3.0 - 5.6 cm  LVIDs:  2.7 cm    1.8 - 4.0 cm  Derived Variables:  LVMI: 58 g/m2  RWT: 0.47  Fractional short: 36 %  Ejection Fraction (Teicholtz): 66 %  ------------------------------------------------------------------------  OBSERVATIONS:  Mitral Valve: Mitral annular calcification, otherwise  normal mitral valve. Mild mitral regurgitation.  Aortic Root: Normal aortic root.  Aortic Valve: Normal trileaflet aortic valve.  Left Atrium: Normal left atrium.  Left Ventricle: Normal left ventricular systolic function.  No segmental wall motion abnormalities. Septal motion  consistent with right ventricular pressure and volume  overload. Increased relative wall thickness with normal  left ventricular mass index, consistent with concentric  left ventricular remodeling.  Right Heart: Severe right atrial enlargement. Right  ventricular enlargement with decreased right ventricular  systolic function. Normal tricuspid valve.  Moderate-severe  tricuspid regurgitation. Normal pulmonic valve.  Pericardium/PleuraNormal pericardium with no pericardial  effusion.  Hemodynamic: Estimated right ventricular systolic pressure  equals 80 mm Hg, assuming right atrial pressure equals 10  mm Hg, consistent with severe pulmonary hypertension.  ------------------------------------------------------------------------  CONCLUSIONS:  1. Increased relative wall thickness with normal left  ventricular mass index, consistent with concentric left  ventricular remodeling.  2. Normal left ventricular systolic function. No segmental  wall motion abnormalities. Septal motion consistent with  right ventricular pressure and volume overload.  3. Severe right atrial enlargement.  4. Right ventricular enlargement with decreased right  ventricular systolic function.  5. Normal tricuspid valve.  Moderate-severe tricuspid  regurgitation.  ------------------------------------------------------------------------  Confirmed on  10/31/2017 - 16:02:32 by Nikkie Ramey M.D. RPVI  ------------------------------------------------------------------------    < end of copied text >

## 2019-04-01 NOTE — CONSULT NOTE ADULT - SUBJECTIVE AND OBJECTIVE BOX
Chief Complaint:  Patient is a 63y old  Female who presents with a chief complaint of Generalized weakness (31 Mar 2019 23:28)      HPI:  62 yo woman with multiple comorbidities, including R-sided heart failure with preserved EF (EF 66% on TTE in 10/2017), severe pulm HTN, mod-severe TR, COPD (on 2L NC at night), LINH (being arranged for bilevel support), afib (on Eliquis), HTN, HLD, DM2, and gout presents with generalized weakness for the past 1 week. GI now consulted for outpatient +FOBT/cologuard test done on 3/19/2019.    Pt states that she suddenly felt weak starting 1 week ago, leading to a fall on Tuesday while walking outdoors. She has also had cough and chills for 1-2 weeks. Pt says that she was drained after walking several feet and tried to hold on to a fence for support. Pt thinks that her weakness is mainly due to her poor appetite and PO intake. Pt states that after being started on Trulicity ~1 year ago, she began having weekly episodes of nausea and vomiting with increasing loss of appetite, resulting in a ~50-lb weight loss. Pt's PCP discontinued Trulicity in late February, though pt continued to have nausea and vomiting over the next few weeks, with the vomiting finally resolving 1 week ago. Pt's appetite, however, remains poor, worsened by a "metallic taste" in her mouth that pt first noticed 2 weeks ago.     Pt also notes that she had a positive Cologuard test on 3/19/19 and was given a referral to GI for a diagnostic colonoscopy. Pt had 2 previous colonoscopies, last one ~9 years ago, and was found to have polyps both times. Pt denies any abdominal pain, dark/bloody stools, or BRBPR.     In the ED,  T 98.5, HR 67-86, -121/70-86, RR 16, O2 sats 100% RA. FOBT positive. RVP positive for influenza A and entero/rhinovirus.    Allergies:  No Known Allergies      Home Medications:    Hospital Medications:  ALBUTerol/ipratropium for Nebulization 3 milliLiter(s) Nebulizer every 6 hours PRN  allopurinol 300 milliGRAM(s) Oral daily  benzonatate 100 milliGRAM(s) Oral every 8 hours  buDESOnide  80 MICROgram(s)/formoterol 4.5 MICROgram(s) Inhaler 2 Puff(s) Inhalation two times a day  dextrose 40% Gel 15 Gram(s) Oral once PRN  dextrose 5%. 1000 milliLiter(s) IV Continuous <Continuous>  dextrose 50% Injectable 12.5 Gram(s) IV Push once  dextrose 50% Injectable 25 Gram(s) IV Push once  dextrose 50% Injectable 25 Gram(s) IV Push once  glucagon  Injectable 1 milliGRAM(s) IntraMuscular once PRN  insulin lispro (HumaLOG) corrective regimen sliding scale   SubCutaneous three times a day before meals  insulin lispro (HumaLOG) corrective regimen sliding scale   SubCutaneous at bedtime  metoprolol succinate  milliGRAM(s) Oral <User Schedule>  metoprolol succinate  milliGRAM(s) Oral <User Schedule>  multivitamin 1 Tablet(s) Oral daily  oseltamivir 30 milliGRAM(s) Oral daily  pantoprazole  Injectable 40 milliGRAM(s) IV Push every 12 hours  torsemide 40 milliGRAM(s) Oral daily      PMHX/PSHX:  Gout  Chronic systolic right heart failure  LINH (obstructive sleep apnea)  CLL (chronic lymphocytic leukemia)  Chronic diastolic congestive heart failure  COPD (chronic obstructive pulmonary disease)  Diabetes  HTN (hypertension)  Atrial fibrillation  No significant past surgical history      Family history:  Family history of hyperlipidemia  Family history of hypertension (Sibling)  Family history of type 2 diabetes mellitus  Family history of cancer (Aunt)      Social History:     ROS:   as above      PHYSICAL EXAM:     GENERAL:  NAD  HEENT:  sclera anicteric  CHEST:  no increased WOB, CTA bl  HEART:  Regular rhythm, S1, S2  ABDOMEN:  Soft, non-tender, non-distended, normoactive bowel sounds,  no masses ,  EXTREMITIES:  no cyanosis,clubbing or edema  SKIN:  No rash/erythema/ecchymoses/petechiae/wounds/abscess/warm/dry  NEURO:  Alert, oriented    Vital Signs:  Vital Signs Last 24 Hrs  T(C): 37.1 (2019 08:43), Max: 37.1 (2019 03:45)  T(F): 98.7 (2019 08:43), Max: 98.7 (2019 03:45)  HR: 89 (2019 08:43) (67 - 89)  BP: 99/51 (2019 08:43) (99/51 - 121/86)  BP(mean): --  RR: 18 (2019 08:43) (16 - 18)  SpO2: 98% (2019 08:43) (98% - 100%)  Daily Height in cm: 162.56 (31 Mar 2019 22:03)    Daily     LABS:                        9.0    7.12  )-----------( 174      ( 2019 05:30 )             29.7         135  |  87<L>  |  132<H>  ----------------------------<  125<H>  3.4<L>   |  31  |  2.83<H>    Ca    10.1      2019 05:30  Phos  2.5       Mg     1.7         TPro  7.0  /  Alb  4.2  /  TBili  1.0  /  DBili  x   /  AST  23  /  ALT  14  /  AlkPhos  65  04    LIVER FUNCTIONS - ( 2019 05:30 )  Alb: 4.2 g/dL / Pro: 7.0 g/dL / ALK PHOS: 65 u/L / ALT: 14 u/L / AST: 23 u/L / GGT: x           PT/INR - ( 31 Mar 2019 21:50 )   PT: 21.5 SEC;   INR: 1.85          PTT - ( 31 Mar 2019 21:50 )  PTT:34.0 SEC  Urinalysis Basic - ( 31 Mar 2019 21:50 )    Color: YELLOW / Appearance: CLEAR / S.012 / pH: 6.5  Gluc: NEGATIVE / Ketone: NEGATIVE  / Bili: NEGATIVE / Urobili: NORMAL   Blood: NEGATIVE / Protein: NEGATIVE / Nitrite: NEGATIVE   Leuk Esterase: NEGATIVE / RBC: x / WBC x   Sq Epi: x / Non Sq Epi: x / Bacteria: x          Imaging: Chief Complaint:  Patient is a 63y old  Female who presents with a chief complaint of Generalized weakness (31 Mar 2019 23:28)      HPI:  64 yo woman with multiple comorbidities, including R-sided heart failure with preserved EF (EF 66% on TTE in 10/2017), severe pulm HTN, mod-severe TR, COPD (on 2L NC at night), LINH (being arranged for bilevel support), afib (on Eliquis), HTN, HLD, DM2, and gout presents with generalized weakness for the past 1 week. GI now consulted for outpatient +FOBT/cologuard test done on 3/19/2019 as well as complaints of constipation, abdominal bloating, N/V for the past month.    Pt states that she suddenly felt weak starting 1 week ago, leading to a fall on Tuesday while walking outdoors. She has also had cough and chills for 1-2 weeks. Pt says that she was drained after walking several feet and tried to hold on to a fence for support. Pt thinks that her weakness is mainly due to her poor appetite and PO intake. Pt states that after being started on Trulicity ~1 year ago, she began having weekly episodes of nausea and vomiting with increasing loss of appetite, resulting in a 75 lb weight loss over the last 1.5 years. Pt's PCP discontinued Trulicity in late February. As per patient, she reports issues with constipation, abdominal bloating, N/V, bitter taste in the mouth and decreased appetite for the last month. She says her N/V is usually only associated with motion (when she is in the car on the bus). Denies any associated with meals, vertigo, or dizziness. Over the last month, she reports intermittent episodes of abdominal bloating as well as constant bitter taste in the mouth and early satiety following meals. She has tried pepcid AC PRN with some relief of her abdominal bloating. She also reports increased constipation, going about 1x every other days with increased straining and hard stool. Denies any melena or BRBpR or abdominal pain.    Pt also notes that she had a positive Cologuard test on 3/19/19 and was given a referral to GI for a diagnostic colonoscopy. Pt had 2 previous colonoscopies, last one ~9 years ago and the other about 15 yaers ago, and was found to have polyps both times. No family hx of colon cancer but does have two aunts, one with breast cancer and the other uterine cancer.    In the ED,  T 98.5, HR 67-86, -121/70-86, RR 16, O2 sats 100% RA. FOBT positive. RVP positive for influenza A and entero/rhinovirus.    Allergies:  No Known Allergies      Home Medications:    Hospital Medications:  ALBUTerol/ipratropium for Nebulization 3 milliLiter(s) Nebulizer every 6 hours PRN  allopurinol 300 milliGRAM(s) Oral daily  benzonatate 100 milliGRAM(s) Oral every 8 hours  buDESOnide  80 MICROgram(s)/formoterol 4.5 MICROgram(s) Inhaler 2 Puff(s) Inhalation two times a day  dextrose 40% Gel 15 Gram(s) Oral once PRN  dextrose 5%. 1000 milliLiter(s) IV Continuous <Continuous>  dextrose 50% Injectable 12.5 Gram(s) IV Push once  dextrose 50% Injectable 25 Gram(s) IV Push once  dextrose 50% Injectable 25 Gram(s) IV Push once  glucagon  Injectable 1 milliGRAM(s) IntraMuscular once PRN  insulin lispro (HumaLOG) corrective regimen sliding scale   SubCutaneous three times a day before meals  insulin lispro (HumaLOG) corrective regimen sliding scale   SubCutaneous at bedtime  metoprolol succinate  milliGRAM(s) Oral <User Schedule>  metoprolol succinate  milliGRAM(s) Oral <User Schedule>  multivitamin 1 Tablet(s) Oral daily  oseltamivir 30 milliGRAM(s) Oral daily  pantoprazole  Injectable 40 milliGRAM(s) IV Push every 12 hours  torsemide 40 milliGRAM(s) Oral daily      PMHX/PSHX:  Gout  Chronic systolic right heart failure  LINH (obstructive sleep apnea)  CLL (chronic lymphocytic leukemia)  Chronic diastolic congestive heart failure  COPD (chronic obstructive pulmonary disease)  Diabetes  HTN (hypertension)  Atrial fibrillation  No significant past surgical history      Family history:  Family history of hyperlipidemia  Family history of hypertension (Sibling)  Family history of type 2 diabetes mellitus  Family history of cancer (Aunt)      Social History:     ROS:   as above      PHYSICAL EXAM:     GENERAL:  NAD, coughing  HEENT:  sclera anicteric  CHEST:  no increased WOB, CTA bl  HEART:  Regular rhythm, S1, S2  ABDOMEN:  Soft, non-tender, non-distended, normoactive bowel sounds,  no masses ,  EXTREMITIES:  no cyanosis,clubbing or edema  SKIN:  No rash/erythema/ecchymoses/petechiae/wounds/abscess/warm/dry  NEURO:  Alert, oriented    Vital Signs:  Vital Signs Last 24 Hrs  T(C): 37.1 (2019 08:43), Max: 37.1 (2019 03:45)  T(F): 98.7 (2019 08:43), Max: 98.7 (2019 03:45)  HR: 89 (2019 08:43) (67 - 89)  BP: 99/51 (2019 08:43) (99/51 - 121/86)  BP(mean): --  RR: 18 (2019 08:43) (16 - 18)  SpO2: 98% (2019 08:43) (98% - 100%)  Daily Height in cm: 162.56 (31 Mar 2019 22:03)    Daily     LABS:                        9.0    7.12  )-----------( 174      ( 2019 05:30 )             29.7     04-    135  |  87<L>  |  132<H>  ----------------------------<  125<H>  3.4<L>   |  31  |  2.83<H>    Ca    10.1      2019 05:30  Phos  2.5     04-  Mg     1.7         TPro  7.0  /  Alb  4.2  /  TBili  1.0  /  DBili  x   /  AST  23  /  ALT  14  /  AlkPhos  65  04-01    LIVER FUNCTIONS - ( 2019 05:30 )  Alb: 4.2 g/dL / Pro: 7.0 g/dL / ALK PHOS: 65 u/L / ALT: 14 u/L / AST: 23 u/L / GGT: x           PT/INR - ( 31 Mar 2019 21:50 )   PT: 21.5 SEC;   INR: 1.85          PTT - ( 31 Mar 2019 21:50 )  PTT:34.0 SEC  Urinalysis Basic - ( 31 Mar 2019 21:50 )    Color: YELLOW / Appearance: CLEAR / S.012 / pH: 6.5  Gluc: NEGATIVE / Ketone: NEGATIVE  / Bili: NEGATIVE / Urobili: NORMAL   Blood: NEGATIVE / Protein: NEGATIVE / Nitrite: NEGATIVE   Leuk Esterase: NEGATIVE / RBC: x / WBC x   Sq Epi: x / Non Sq Epi: x / Bacteria: x          Imaging: Chief Complaint:  Patient is a 63y old  Female who presents with a chief complaint of Generalized weakness (31 Mar 2019 23:28)      HPI:  64 yo woman with multiple comorbidities, including R-sided heart failure with preserved EF (EF 66% on TTE in 10/2017), severe pulm HTN, mod-severe TR, COPD (on 2L NC at night), LINH (being arranged for bilevel support), afib (on Eliquis), HTN, HLD, DM2, hx of Nonhodgkins lymphoma s/p radiation and chemotherapy, and gout presents with generalized weakness for the past 1 week. GI now consulted for outpatient +FOBT/cologuard test done on 3/19/2019 as well as complaints of constipation, abdominal bloating, N/V for the past month.    Pt states that she suddenly felt weak starting 1 week ago, leading to a fall on Tuesday while walking outdoors. She has also had cough and chills for 1-2 weeks. Pt says that she was drained after walking several feet and tried to hold on to a fence for support. Pt thinks that her weakness is mainly due to her poor appetite and PO intake. Pt states that after being started on Trulicity ~1 year ago, she began having weekly episodes of nausea and vomiting with increasing loss of appetite, resulting in a 75 lb weight loss over the last 1.5 years. Pt's PCP discontinued Trulicity in late February. As per patient, she reports issues with constipation, abdominal bloating, N/V, bitter taste in the mouth and decreased appetite for the last month. She says her N/V is usually only associated with motion (when she is in the car on the bus). Denies any associated with meals, vertigo, or dizziness. Over the last month, she reports intermittent episodes of abdominal bloating as well as constant bitter taste in the mouth and early satiety following meals. She has tried pepcid AC PRN with some relief of her abdominal bloating. She also reports increased constipation, going about 1x every other days with increased straining and hard stool. Denies any melena or BRBpR or abdominal pain.    Pt also notes that she had a positive Cologuard test on 3/19/19 and was given a referral to GI for a diagnostic colonoscopy. Pt had 2 previous colonoscopies, last one ~9 years ago and the other about 15 yaers ago, and was found to have polyps both times. No family hx of colon cancer but does have two aunts, one with breast cancer and the other uterine cancer.    In the ED,  T 98.5, HR 67-86, -121/70-86, RR 16, O2 sats 100% RA. FOBT positive. RVP positive for influenza A and entero/rhinovirus.    Allergies:  No Known Allergies      Home Medications:    Hospital Medications:  ALBUTerol/ipratropium for Nebulization 3 milliLiter(s) Nebulizer every 6 hours PRN  allopurinol 300 milliGRAM(s) Oral daily  benzonatate 100 milliGRAM(s) Oral every 8 hours  buDESOnide  80 MICROgram(s)/formoterol 4.5 MICROgram(s) Inhaler 2 Puff(s) Inhalation two times a day  dextrose 40% Gel 15 Gram(s) Oral once PRN  dextrose 5%. 1000 milliLiter(s) IV Continuous <Continuous>  dextrose 50% Injectable 12.5 Gram(s) IV Push once  dextrose 50% Injectable 25 Gram(s) IV Push once  dextrose 50% Injectable 25 Gram(s) IV Push once  glucagon  Injectable 1 milliGRAM(s) IntraMuscular once PRN  insulin lispro (HumaLOG) corrective regimen sliding scale   SubCutaneous three times a day before meals  insulin lispro (HumaLOG) corrective regimen sliding scale   SubCutaneous at bedtime  metoprolol succinate  milliGRAM(s) Oral <User Schedule>  metoprolol succinate  milliGRAM(s) Oral <User Schedule>  multivitamin 1 Tablet(s) Oral daily  oseltamivir 30 milliGRAM(s) Oral daily  pantoprazole  Injectable 40 milliGRAM(s) IV Push every 12 hours  torsemide 40 milliGRAM(s) Oral daily      PMHX/PSHX:  Gout  Chronic systolic right heart failure  LINH (obstructive sleep apnea)  CLL (chronic lymphocytic leukemia)  Chronic diastolic congestive heart failure  COPD (chronic obstructive pulmonary disease)  Diabetes  HTN (hypertension)  Atrial fibrillation  No significant past surgical history      Family history:  Family history of hyperlipidemia  Family history of hypertension (Sibling)  Family history of type 2 diabetes mellitus  Family history of cancer (Aunt)      Social History:     ROS:   as above      PHYSICAL EXAM:     GENERAL:  NAD, coughing  HEENT:  sclera anicteric  CHEST:  no increased WOB, CTA bl  HEART:  Regular rhythm, S1, S2  ABDOMEN:  Soft, non-tender, non-distended, normoactive bowel sounds,  no masses ,  EXTREMITIES:  no cyanosis,clubbing or edema  SKIN:  No rash/erythema/ecchymoses/petechiae/wounds/abscess/warm/dry  NEURO:  Alert, oriented    Vital Signs:  Vital Signs Last 24 Hrs  T(C): 37.1 (2019 08:43), Max: 37.1 (2019 03:45)  T(F): 98.7 (2019 08:43), Max: 98.7 (2019 03:45)  HR: 89 (2019 08:43) (67 - 89)  BP: 99/51 (2019 08:43) (99/51 - 121/86)  BP(mean): --  RR: 18 (2019 08:43) (16 - 18)  SpO2: 98% (2019 08:43) (98% - 100%)  Daily Height in cm: 162.56 (31 Mar 2019 22:03)    Daily     LABS:                        9.0    7.12  )-----------( 174      ( 2019 05:30 )             29.7     04-    135  |  87<L>  |  132<H>  ----------------------------<  125<H>  3.4<L>   |  31  |  2.83<H>    Ca    10.1      2019 05:30  Phos  2.5     04-  Mg     1.7         TPro  7.0  /  Alb  4.2  /  TBili  1.0  /  DBili  x   /  AST  23  /  ALT  14  /  AlkPhos  65  04-01    LIVER FUNCTIONS - ( 2019 05:30 )  Alb: 4.2 g/dL / Pro: 7.0 g/dL / ALK PHOS: 65 u/L / ALT: 14 u/L / AST: 23 u/L / GGT: x           PT/INR - ( 31 Mar 2019 21:50 )   PT: 21.5 SEC;   INR: 1.85          PTT - ( 31 Mar 2019 21:50 )  PTT:34.0 SEC  Urinalysis Basic - ( 31 Mar 2019 21:50 )    Color: YELLOW / Appearance: CLEAR / S.012 / pH: 6.5  Gluc: NEGATIVE / Ketone: NEGATIVE  / Bili: NEGATIVE / Urobili: NORMAL   Blood: NEGATIVE / Protein: NEGATIVE / Nitrite: NEGATIVE   Leuk Esterase: NEGATIVE / RBC: x / WBC x   Sq Epi: x / Non Sq Epi: x / Bacteria: x          Imaging:

## 2019-04-02 DIAGNOSIS — D62 ACUTE POSTHEMORRHAGIC ANEMIA: ICD-10-CM

## 2019-04-02 LAB
ANION GAP SERPL CALC-SCNC: 14 MMO/L — SIGNIFICANT CHANGE UP (ref 7–14)
BACTERIA UR CULT: SIGNIFICANT CHANGE UP
BUN SERPL-MCNC: 122 MG/DL — HIGH (ref 7–23)
CALCIUM SERPL-MCNC: 9.8 MG/DL — SIGNIFICANT CHANGE UP (ref 8.4–10.5)
CHLORIDE SERPL-SCNC: 92 MMOL/L — LOW (ref 98–107)
CO2 SERPL-SCNC: 30 MMOL/L — SIGNIFICANT CHANGE UP (ref 22–31)
CREAT SERPL-MCNC: 2.73 MG/DL — HIGH (ref 0.5–1.3)
FERRITIN SERPL-MCNC: 190.7 NG/ML — HIGH (ref 15–150)
GLUCOSE SERPL-MCNC: 102 MG/DL — HIGH (ref 70–99)
HBA1C BLD-MCNC: 5.7 % — HIGH (ref 4–5.6)
HCT VFR BLD CALC: 29.6 % — LOW (ref 34.5–45)
HGB BLD-MCNC: 9 G/DL — LOW (ref 11.5–15.5)
IRON SATN MFR SERPL: 278 UG/DL — SIGNIFICANT CHANGE UP (ref 140–530)
IRON SATN MFR SERPL: 34 UG/DL — SIGNIFICANT CHANGE UP (ref 30–160)
MAGNESIUM SERPL-MCNC: 1.8 MG/DL — SIGNIFICANT CHANGE UP (ref 1.6–2.6)
MCHC RBC-ENTMCNC: 19.7 PG — LOW (ref 27–34)
MCHC RBC-ENTMCNC: 30.4 % — LOW (ref 32–36)
MCV RBC AUTO: 64.6 FL — LOW (ref 80–100)
NRBC # FLD: 0.06 K/UL — SIGNIFICANT CHANGE UP (ref 0–0)
NRBC FLD-RTO: 1.1 — SIGNIFICANT CHANGE UP
PHOSPHATE SERPL-MCNC: 2.7 MG/DL — SIGNIFICANT CHANGE UP (ref 2.5–4.5)
PLATELET # BLD AUTO: 169 K/UL — SIGNIFICANT CHANGE UP (ref 150–400)
PMV BLD: SIGNIFICANT CHANGE UP FL (ref 7–13)
POTASSIUM SERPL-MCNC: 3.8 MMOL/L — SIGNIFICANT CHANGE UP (ref 3.5–5.3)
POTASSIUM SERPL-SCNC: 3.8 MMOL/L — SIGNIFICANT CHANGE UP (ref 3.5–5.3)
RBC # BLD: 4.58 M/UL — SIGNIFICANT CHANGE UP (ref 3.8–5.2)
RBC # FLD: 16.2 % — HIGH (ref 10.3–14.5)
SODIUM SERPL-SCNC: 136 MMOL/L — SIGNIFICANT CHANGE UP (ref 135–145)
SPECIMEN SOURCE: SIGNIFICANT CHANGE UP
UIBC SERPL-MCNC: 243.5 UG/DL — SIGNIFICANT CHANGE UP (ref 110–370)
WBC # BLD: 5.29 K/UL — SIGNIFICANT CHANGE UP (ref 3.8–10.5)
WBC # FLD AUTO: 5.29 K/UL — SIGNIFICANT CHANGE UP (ref 3.8–10.5)

## 2019-04-02 PROCEDURE — 99232 SBSQ HOSP IP/OBS MODERATE 35: CPT | Mod: GC

## 2019-04-02 RX ADMIN — Medication 30 MILLIGRAM(S): at 12:02

## 2019-04-02 RX ADMIN — Medication 1 TABLET(S): at 12:01

## 2019-04-02 RX ADMIN — Medication 300 MILLIGRAM(S): at 12:01

## 2019-04-02 RX ADMIN — Medication 125 MILLIGRAM(S): at 18:59

## 2019-04-02 RX ADMIN — PANTOPRAZOLE SODIUM 40 MILLIGRAM(S): 20 TABLET, DELAYED RELEASE ORAL at 05:30

## 2019-04-02 RX ADMIN — BUDESONIDE AND FORMOTEROL FUMARATE DIHYDRATE 2 PUFF(S): 160; 4.5 AEROSOL RESPIRATORY (INHALATION) at 08:20

## 2019-04-02 RX ADMIN — BUDESONIDE AND FORMOTEROL FUMARATE DIHYDRATE 2 PUFF(S): 160; 4.5 AEROSOL RESPIRATORY (INHALATION) at 21:57

## 2019-04-02 RX ADMIN — PANTOPRAZOLE SODIUM 40 MILLIGRAM(S): 20 TABLET, DELAYED RELEASE ORAL at 18:56

## 2019-04-02 RX ADMIN — Medication 100 MILLIGRAM(S): at 05:30

## 2019-04-02 RX ADMIN — Medication 100 MILLIGRAM(S): at 13:25

## 2019-04-02 RX ADMIN — Medication 100 MILLIGRAM(S): at 21:57

## 2019-04-02 NOTE — PROGRESS NOTE ADULT - SUBJECTIVE AND OBJECTIVE BOX
Patient is a 63y old  Female who presents with a chief complaint of Generalized weakness (2019 16:52)      SUBJECTIVE / OVERNIGHT EVENTS:  URI like symptoms reported. No SOB at rest. Feels dizzy and fatigued  Review of Systems:   CONSTITUTIONAL: No fever, weight loss,   EYES: No eye pain, visual disturbances, or discharge  ENMT:  No difficulty hearing, tinnitus, vertigo; No sinus or throat pain  NECK: No pain or stiffness  BREASTS: No pain, masses, or nipple discharge  RESPIRATORY: No cough, wheezing, chills or hemoptysis; No shortness of breath  CARDIOVASCULAR: No chest pain, palpitations, dizziness, or leg swelling  GASTROINTESTINAL: No abdominal or epigastric pain. No nausea, vomiting, or hematemesis; No diarrhea or constipation. No melena or hematochezia.  GENITOURINARY: No dysuria, frequency, hematuria, or incontinence  NEUROLOGICAL: No headaches, memory loss, loss of strength, numbness, or tremors  SKIN: No itching, burning, rashes, or lesions   LYMPH NODES: No enlarged glands  ENDOCRINE: No heat or cold intolerance; No hair loss  MUSCULOSKELETAL: No joint pain or swelling; No muscle, back, or extremity pain  PSYCHIATRIC: No depression, anxiety, mood swings, or difficulty sleeping  HEME/LYMPH: No easy bruising, or bleeding gums  ALLERY AND IMMUNOLOGIC: No hives or eczema    MEDICATIONS  (STANDING):  allopurinol 300 milliGRAM(s) Oral daily  benzonatate 100 milliGRAM(s) Oral every 8 hours  buDESOnide  80 MICROgram(s)/formoterol 4.5 MICROgram(s) Inhaler 2 Puff(s) Inhalation two times a day  dextrose 5%. 1000 milliLiter(s) (50 mL/Hr) IV Continuous <Continuous>  dextrose 50% Injectable 12.5 Gram(s) IV Push once  dextrose 50% Injectable 25 Gram(s) IV Push once  dextrose 50% Injectable 25 Gram(s) IV Push once  insulin lispro (HumaLOG) corrective regimen sliding scale   SubCutaneous three times a day before meals  insulin lispro (HumaLOG) corrective regimen sliding scale   SubCutaneous at bedtime  metoprolol succinate  milliGRAM(s) Oral <User Schedule>  metoprolol succinate  milliGRAM(s) Oral <User Schedule>  multivitamin 1 Tablet(s) Oral daily  oseltamivir 30 milliGRAM(s) Oral daily  pantoprazole  Injectable 40 milliGRAM(s) IV Push every 12 hours    MEDICATIONS  (PRN):  ALBUTerol/ipratropium for Nebulization 3 milliLiter(s) Nebulizer every 6 hours PRN Shortness of Breath and/or Wheezing  dextrose 40% Gel 15 Gram(s) Oral once PRN Blood Glucose LESS THAN 70 milliGRAM(s)/deciliter  glucagon  Injectable 1 milliGRAM(s) IntraMuscular once PRN Glucose LESS THAN 70 milligrams/deciliter      PHYSICAL EXAM:  Vital Signs Last 24 Hrs  T(C): 36.6 (2019 20:32), Max: 36.9 (2019 05:27)  T(F): 97.9 (2019 20:32), Max: 98.4 (2019 05:27)  HR: 81 (2019 20:32) (57 - 81)  BP: 117/81 (2019 20:32) (94/58 - 117/81)  BP(mean): --  RR: 18 (2019 20:32) (18 - 19)  SpO2: 100% (2019 20:32) (95% - 100%)  I&O's Summary    2019 07:01  -  2019 07:00  --------------------------------------------------------  IN: 720 mL / OUT: 0 mL / NET: 720 mL    2019 07:01  -  2019 20:51  --------------------------------------------------------  IN: 1311 mL / OUT: 0 mL / NET: 1311 mL      GENERAL: NAD, well-developed  HEAD:  Atraumatic, Normocephalic  EYES: EOMI, PERRLA, conjunctiva and sclera clear  NECK: Supple, No JVD  CHEST/LUNG: Clear to auscultation bilaterally; No wheeze  HEART: Regular rate and rhythm; No murmurs, rubs, or gallops  ABDOMEN: Soft, Nontender, Nondistended; Bowel sounds present  EXTREMITIES:  2+ Peripheral Pulses, No clubbing, cyanosis, or edema  PSYCH: AAOx3  NEUROLOGY: non-focal  SKIN: No rashes or lesions    LABS:  CAPILLARY BLOOD GLUCOSE      POCT Blood Glucose.: 103 mg/dL (2019 17:19)  POCT Blood Glucose.: 120 mg/dL (2019 12:47)  POCT Blood Glucose.: 104 mg/dL (2019 08:13)  POCT Blood Glucose.: 133 mg/dL (2019 22:28)                          9.0    5.29  )-----------( 169      ( 2019 07:50 )             29.6     04-02    136  |  92<L>  |  122<H>  ----------------------------<  102<H>  3.8   |  30  |  2.73<H>    Ca    9.8      2019 07:50  Phos  2.7     04-02  Mg     1.8     04-02    TPro  7.0  /  Alb  4.2  /  TBili  1.0  /  DBili  x   /  AST  23  /  ALT  14  /  AlkPhos  65  04-01    PT/INR - ( 31 Mar 2019 21:50 )   PT: 21.5 SEC;   INR: 1.85          PTT - ( 31 Mar 2019 21:50 )  PTT:34.0 SEC      Urinalysis Basic - ( 31 Mar 2019 21:50 )    Color: YELLOW / Appearance: CLEAR / S.012 / pH: 6.5  Gluc: NEGATIVE / Ketone: NEGATIVE  / Bili: NEGATIVE / Urobili: NORMAL   Blood: NEGATIVE / Protein: NEGATIVE / Nitrite: NEGATIVE   Leuk Esterase: NEGATIVE / RBC: x / WBC x   Sq Epi: x / Non Sq Epi: x / Bacteria: x        RADIOLOGY & ADDITIONAL TESTS:    Imaging Personally Reviewed:    Consultant(s) Notes Reviewed:      Care Discussed with Consultants/Other Providers:

## 2019-04-02 NOTE — PROGRESS NOTE ADULT - ASSESSMENT
64 yo woman with multiple comorbidities, including R-sided heart failure with preserved EF (EF 66% on TTE in 10/2017), severe pulm HTN, mod-severe TR, COPD (on 2L NC at night), LINH (being arranged for bilevel support), afib (on Eliquis), HTN, HLD, DM2, and gout presents with generalized weakness for the past 1 week, cough and chills now found to be influenza and enterovirus/rhinovirus +. GI now consulted for outpatient +FOBT/cologuard test done on 3/19/2019.    1) Weight loss  Patient reporting 75lb weight loss over the last 1.5 years in the setting of trulicity. Although trulicity could help with weight loss, this is a signficant amount of weight loss. Also, she does have a recent +cologuard with increased constipation as well as early satiety and bloating concerning for possible malignancy.  2) Influenza, enterovirus/rhinovirus +  3) Microcytic anemia  FOBT+ stool and cologuard positive    - f/u CTAP  - please check iron studies and ferritin. if low will need iron supplementation  - would hold off on EGD/colonoscopy for evaluation of +cologuard at this time given acute respiratory infection  - would recommend endoscopic procedures as outpatient once respiratory status improved.  - will need cardiology clearance prior to procedure  - rest of plan as per primary team

## 2019-04-02 NOTE — PROGRESS NOTE ADULT - SUBJECTIVE AND OBJECTIVE BOX
Chief Complaint:  Patient is a 63y old  Female who presents with a chief complaint of Generalized weakness (2019 13:03)      Interval Events:     Allergies:  No Known Allergies      Home Medications:    Hospital Medications:  ALBUTerol/ipratropium for Nebulization 3 milliLiter(s) Nebulizer every 6 hours PRN  allopurinol 300 milliGRAM(s) Oral daily  benzonatate 100 milliGRAM(s) Oral every 8 hours  buDESOnide  80 MICROgram(s)/formoterol 4.5 MICROgram(s) Inhaler 2 Puff(s) Inhalation two times a day  dextrose 40% Gel 15 Gram(s) Oral once PRN  dextrose 5%. 1000 milliLiter(s) IV Continuous <Continuous>  dextrose 50% Injectable 12.5 Gram(s) IV Push once  dextrose 50% Injectable 25 Gram(s) IV Push once  dextrose 50% Injectable 25 Gram(s) IV Push once  glucagon  Injectable 1 milliGRAM(s) IntraMuscular once PRN  insulin lispro (HumaLOG) corrective regimen sliding scale   SubCutaneous three times a day before meals  insulin lispro (HumaLOG) corrective regimen sliding scale   SubCutaneous at bedtime  metoprolol succinate  milliGRAM(s) Oral <User Schedule>  metoprolol succinate  milliGRAM(s) Oral <User Schedule>  multivitamin 1 Tablet(s) Oral daily  oseltamivir 30 milliGRAM(s) Oral daily  pantoprazole  Injectable 40 milliGRAM(s) IV Push every 12 hours      PMHX/PSHX:  Gout  Chronic systolic right heart failure  LINH (obstructive sleep apnea)  CLL (chronic lymphocytic leukemia)  Chronic diastolic congestive heart failure  COPD (chronic obstructive pulmonary disease)  Diabetes  HTN (hypertension)  Atrial fibrillation  No significant past surgical history      Family history:  Family history of hyperlipidemia  Family history of hypertension (Sibling)  Family history of type 2 diabetes mellitus  Family history of cancer (Aunt)      ROS:   as above      PHYSICAL EXAM:   Vital Signs:  Vital Signs Last 24 Hrs  T(C): 36.7 (2019 15:04), Max: 36.9 (2019 05:27)  T(F): 98 (2019 15:04), Max: 98.4 (2019 05:27)  HR: 62 (2019 15:04) (57 - 71)  BP: 99/64 (2019 15:04) (94/58 - 106/56)  BP(mean): --  RR: 18 (2019 15:04) (18 - 19)  SpO2: 98% (2019 15:04) (95% - 98%)  Daily     Daily Weight in k.3 (2019 05:27)    GENERAL:  NAD, coughing  HEENT:  sclera anicteric  CHEST:  no increased WOB, CTA bl  HEART:  Regular rhythm, S1, S2  ABDOMEN:  Soft, non-tender, non-distended, normoactive bowel sounds,  no masses ,  EXTREMITIES:  no cyanosis,clubbing or edema  SKIN:  No rash/erythema/ecchymoses/petechiae/wounds/abscess/warm/dry  NEURO:  Alert, oriented    LABS:                        9.0    5.29  )-----------( 169      ( 2019 07:50 )             29.6     04-02    136  |  92<L>  |  122<H>  ----------------------------<  102<H>  3.8   |  30  |  2.73<H>    Ca    9.8      2019 07:50  Phos  2.7     04-02  Mg     1.8     04-02    TPro  7.0  /  Alb  4.2  /  TBili  1.0  /  DBili  x   /  AST  23  /  ALT  14  /  AlkPhos  65  04-01    LIVER FUNCTIONS - ( 2019 05:30 )  Alb: 4.2 g/dL / Pro: 7.0 g/dL / ALK PHOS: 65 u/L / ALT: 14 u/L / AST: 23 u/L / GGT: x           PT/INR - ( 31 Mar 2019 21:50 )   PT: 21.5 SEC;   INR: 1.85          PTT - ( 31 Mar 2019 21:50 )  PTT:34.0 SEC  Urinalysis Basic - ( 31 Mar 2019 21:50 )    Color: YELLOW / Appearance: CLEAR / S.012 / pH: 6.5  Gluc: NEGATIVE / Ketone: NEGATIVE  / Bili: NEGATIVE / Urobili: NORMAL   Blood: NEGATIVE / Protein: NEGATIVE / Nitrite: NEGATIVE   Leuk Esterase: NEGATIVE / RBC: x / WBC x   Sq Epi: x / Non Sq Epi: x / Bacteria: x          Imaging:

## 2019-04-02 NOTE — PROGRESS NOTE ADULT - ASSESSMENT
62 yo woman with multiple comorbidities, including history of lymphoma, R-sided heart failure with preserved EF (EF 66% on TTE in 10/2017), severe pulm HTN, mod-severe TR, reported COPD but denies smoking? (on 2L NC at night), LINH (being arranged for bilevel support), A-Fib (on Eliquis), HTN, HLD, DM2, and gout presents with generalized weakness for the past 1 week in the setting of recent occult positive stool, OSMAR and newly diagnosed influenza/entero virus infection    #Severe pulm HTN w/ RV systolic dysfunction - on exam patient appears euvolemic   - have asked her to liberalize her fluid intake. Encouraged PO intake  - Echo to evaluate right sided pressure.  - check daily standing weights  - hold lisinopril     #Afib - currently rate controlled  - hold digoxin for now (possibly the reason she has a metallic taste)  - cont metoprolol and AC    #Viral infection - management per primary team    #Anemia - GI work up on hold in setting of acute viral illness, however, patient appears well compensated and if needed to be done on this admission we can likely proceed

## 2019-04-02 NOTE — PROGRESS NOTE ADULT - SUBJECTIVE AND OBJECTIVE BOX
Pt has improved with IVF. She denies CP, SOB.     MEDICATIONS:  metoprolol succinate  milliGRAM(s) Oral <User Schedule>  metoprolol succinate  milliGRAM(s) Oral <User Schedule>  oseltamivir 30 milliGRAM(s) Oral daily  ALBUTerol/ipratropium for Nebulization 3 milliLiter(s) Nebulizer every 6 hours PRN  benzonatate 100 milliGRAM(s) Oral every 8 hours  buDESOnide  80 MICROgram(s)/formoterol 4.5 MICROgram(s) Inhaler 2 Puff(s) Inhalation two times a day  pantoprazole  Injectable 40 milliGRAM(s) IV Push every 12 hours  allopurinol 300 milliGRAM(s) Oral daily  dextrose 40% Gel 15 Gram(s) Oral once PRN  dextrose 50% Injectable 12.5 Gram(s) IV Push once  dextrose 50% Injectable 25 Gram(s) IV Push once  dextrose 50% Injectable 25 Gram(s) IV Push once  glucagon  Injectable 1 milliGRAM(s) IntraMuscular once PRN  insulin lispro (HumaLOG) corrective regimen sliding scale   SubCutaneous three times a day before meals  insulin lispro (HumaLOG) corrective regimen sliding scale   SubCutaneous at bedtime  dextrose 5%. 1000 milliLiter(s) IV Continuous <Continuous>  multivitamin 1 Tablet(s) Oral daily    PHYSICAL EXAM:  T(C): 36.7 (04-02-19 @ 11:35), Max: 36.9 (04-02-19 @ 05:27)  HR: 57 (04-02-19 @ 05:27) (57 - 76)  BP: 106/56 (04-02-19 @ 11:35) (94/58 - 106/56)  RR: 19 (04-02-19 @ 11:35) (18 - 19)  SpO2: 98% (04-02-19 @ 11:35) (95% - 98%)  Wt(kg): --  I&O's Summary    01 Apr 2019 07:01  -  02 Apr 2019 07:00  --------------------------------------------------------  IN: 720 mL / OUT: 0 mL / NET: 720 mL    02 Apr 2019 07:01  -  02 Apr 2019 13:04  --------------------------------------------------------  IN: 621 mL / OUT: 0 mL / NET: 621 mL    Appearance: Normal	  HEENT:   Normal oral mucosa, PERRL, EOMI	  Lymphatic: No lymphadenopathy  Cardiovascular: Normal S1 S2, No JVD, No murmurs, No edema  Respiratory: Lungs clear to auscultation	  Psychiatry: A & O x 3, Mood & affect appropriate  Gastrointestinal:  Soft, Non-tender, + BS	  Skin: No rashes, No ecchymoses, No cyanosis	  Neurologic: Non-focal  Extremities: Normal range of motion, No clubbing, cyanosis or edema  Vascular: Peripheral pulses palpable 2+ bilaterally    LABS:	 	    CBC Full  -  ( 02 Apr 2019 07:50 )  WBC Count : 5.29 K/uL  Hemoglobin : 9.0 g/dL  Hematocrit : 29.6 %  Platelet Count - Automated : 169 K/uL  Mean Cell Volume : 64.6 fL  Mean Cell Hemoglobin : 19.7 pg  Mean Cell Hemoglobin Concentration : 30.4 %  Auto Neutrophil # : x  Auto Lymphocyte # : x  Auto Monocyte # : x  Auto Eosinophil # : x  Auto Basophil # : x  Auto Neutrophil % : x  Auto Lymphocyte % : x  Auto Monocyte % : x  Auto Eosinophil % : x  Auto Basophil % : x    04-02    136  |  92<L>  |  122<H>  ----------------------------<  102<H>  3.8   |  30  |  2.73<H>  04-01    135  |  87<L>  |  132<H>  ----------------------------<  125<H>  3.4<L>   |  31  |  2.83<H>    Ca    9.8      02 Apr 2019 07:50  Ca    10.1      01 Apr 2019 05:30  Phos  2.7     04-02  Phos  2.5     04-01  Mg     1.8     04-02  Mg     1.7     04-01    TPro  7.0  /  Alb  4.2  /  TBili  1.0  /  DBili  x   /  AST  23  /  ALT  14  /  AlkPhos  65  04-01  TPro  8.0  /  Alb  4.3  /  TBili  1.0  /  DBili  x   /  AST  24  /  ALT  13  /  AlkPhos  68  03-31      proBNP: Serum Pro-Brain Natriuretic Peptide: 6490 pg/mL (03-31-19 @ 21:50)

## 2019-04-03 LAB
ANION GAP SERPL CALC-SCNC: 11 MMO/L — SIGNIFICANT CHANGE UP (ref 7–14)
BUN SERPL-MCNC: 97 MG/DL — HIGH (ref 7–23)
CALCIUM SERPL-MCNC: 9.6 MG/DL — SIGNIFICANT CHANGE UP (ref 8.4–10.5)
CHLORIDE SERPL-SCNC: 94 MMOL/L — LOW (ref 98–107)
CO2 SERPL-SCNC: 30 MMOL/L — SIGNIFICANT CHANGE UP (ref 22–31)
CREAT SERPL-MCNC: 2.18 MG/DL — HIGH (ref 0.5–1.3)
GLUCOSE SERPL-MCNC: 93 MG/DL — SIGNIFICANT CHANGE UP (ref 70–99)
HCT VFR BLD CALC: 27.4 % — LOW (ref 34.5–45)
HGB BLD-MCNC: 8.4 G/DL — LOW (ref 11.5–15.5)
MAGNESIUM SERPL-MCNC: 1.8 MG/DL — SIGNIFICANT CHANGE UP (ref 1.6–2.6)
MCHC RBC-ENTMCNC: 20 PG — LOW (ref 27–34)
MCHC RBC-ENTMCNC: 30.7 % — LOW (ref 32–36)
MCV RBC AUTO: 65.1 FL — LOW (ref 80–100)
NRBC # FLD: 0.04 K/UL — SIGNIFICANT CHANGE UP (ref 0–0)
PHOSPHATE SERPL-MCNC: 2.9 MG/DL — SIGNIFICANT CHANGE UP (ref 2.5–4.5)
PLATELET # BLD AUTO: 139 K/UL — LOW (ref 150–400)
PMV BLD: SIGNIFICANT CHANGE UP FL (ref 7–13)
POTASSIUM SERPL-MCNC: 3.7 MMOL/L — SIGNIFICANT CHANGE UP (ref 3.5–5.3)
POTASSIUM SERPL-SCNC: 3.7 MMOL/L — SIGNIFICANT CHANGE UP (ref 3.5–5.3)
RBC # BLD: 4.21 M/UL — SIGNIFICANT CHANGE UP (ref 3.8–5.2)
RBC # FLD: 16.2 % — HIGH (ref 10.3–14.5)
SODIUM SERPL-SCNC: 135 MMOL/L — SIGNIFICANT CHANGE UP (ref 135–145)
WBC # BLD: 4.78 K/UL — SIGNIFICANT CHANGE UP (ref 3.8–10.5)
WBC # FLD AUTO: 4.78 K/UL — SIGNIFICANT CHANGE UP (ref 3.8–10.5)

## 2019-04-03 PROCEDURE — 74176 CT ABD & PELVIS W/O CONTRAST: CPT | Mod: 26

## 2019-04-03 PROCEDURE — 99232 SBSQ HOSP IP/OBS MODERATE 35: CPT | Mod: GC

## 2019-04-03 RX ADMIN — BUDESONIDE AND FORMOTEROL FUMARATE DIHYDRATE 2 PUFF(S): 160; 4.5 AEROSOL RESPIRATORY (INHALATION) at 21:50

## 2019-04-03 RX ADMIN — Medication 100 MILLIGRAM(S): at 21:50

## 2019-04-03 RX ADMIN — Medication 300 MILLIGRAM(S): at 12:12

## 2019-04-03 RX ADMIN — Medication 1 TABLET(S): at 12:12

## 2019-04-03 RX ADMIN — Medication 100 MILLIGRAM(S): at 05:57

## 2019-04-03 RX ADMIN — PANTOPRAZOLE SODIUM 40 MILLIGRAM(S): 20 TABLET, DELAYED RELEASE ORAL at 18:23

## 2019-04-03 RX ADMIN — BUDESONIDE AND FORMOTEROL FUMARATE DIHYDRATE 2 PUFF(S): 160; 4.5 AEROSOL RESPIRATORY (INHALATION) at 09:54

## 2019-04-03 RX ADMIN — Medication 125 MILLIGRAM(S): at 18:24

## 2019-04-03 RX ADMIN — PANTOPRAZOLE SODIUM 40 MILLIGRAM(S): 20 TABLET, DELAYED RELEASE ORAL at 05:57

## 2019-04-03 RX ADMIN — Medication 100 MILLIGRAM(S): at 13:47

## 2019-04-03 RX ADMIN — Medication 30 MILLIGRAM(S): at 12:12

## 2019-04-03 NOTE — PROGRESS NOTE ADULT - SUBJECTIVE AND OBJECTIVE BOX
Patient is a 63y old  Female who presents with a chief complaint of Generalized weakness (2019 16:52)      SUBJECTIVE / OVERNIGHT EVENTS:  Cough improved. No SOB at rest. No edema in legs  Review of Systems:   CONSTITUTIONAL: No fever, weight loss,   EYES: No eye pain, visual disturbances, or discharge  ENMT:  No difficulty hearing, tinnitus, vertigo; No sinus or throat pain  NECK: No pain or stiffness  BREASTS: No pain, masses, or nipple discharge  RESPIRATORY: No cough, wheezing, chills or hemoptysis; No shortness of breath  CARDIOVASCULAR: No chest pain, palpitations, dizziness, or leg swelling  GASTROINTESTINAL: No abdominal or epigastric pain. No nausea, vomiting, or hematemesis; No diarrhea or constipation. No melena or hematochezia.  GENITOURINARY: No dysuria, frequency, hematuria, or incontinence  NEUROLOGICAL: No headaches, memory loss, loss of strength, numbness, or tremors  SKIN: No itching, burning, rashes, or lesions   LYMPH NODES: No enlarged glands  ENDOCRINE: No heat or cold intolerance; No hair loss  MUSCULOSKELETAL: No joint pain or swelling; No muscle, back, or extremity pain  PSYCHIATRIC: No depression, anxiety, mood swings, or difficulty sleeping  HEME/LYMPH: No easy bruising, or bleeding gums  ALLERY AND IMMUNOLOGIC: No hives or eczema    MEDICATIONS  (STANDING):  allopurinol 300 milliGRAM(s) Oral daily  benzonatate 100 milliGRAM(s) Oral every 8 hours  buDESOnide  80 MICROgram(s)/formoterol 4.5 MICROgram(s) Inhaler 2 Puff(s) Inhalation two times a day  dextrose 5%. 1000 milliLiter(s) (50 mL/Hr) IV Continuous <Continuous>  dextrose 50% Injectable 12.5 Gram(s) IV Push once  dextrose 50% Injectable 25 Gram(s) IV Push once  dextrose 50% Injectable 25 Gram(s) IV Push once  insulin lispro (HumaLOG) corrective regimen sliding scale   SubCutaneous three times a day before meals  insulin lispro (HumaLOG) corrective regimen sliding scale   SubCutaneous at bedtime  metoprolol succinate  milliGRAM(s) Oral <User Schedule>  metoprolol succinate  milliGRAM(s) Oral <User Schedule>  multivitamin 1 Tablet(s) Oral daily  oseltamivir 30 milliGRAM(s) Oral daily  pantoprazole  Injectable 40 milliGRAM(s) IV Push every 12 hours    MEDICATIONS  (PRN):  ALBUTerol/ipratropium for Nebulization 3 milliLiter(s) Nebulizer every 6 hours PRN Shortness of Breath and/or Wheezing  dextrose 40% Gel 15 Gram(s) Oral once PRN Blood Glucose LESS THAN 70 milliGRAM(s)/deciliter  glucagon  Injectable 1 milliGRAM(s) IntraMuscular once PRN Glucose LESS THAN 70 milligrams/deciliter      PHYSICAL EXAM:  Vital Signs Last 24 Hrs  T(C): 36.6 (2019 20:32), Max: 36.9 (2019 05:27)  T(F): 97.9 (2019 20:32), Max: 98.4 (2019 05:27)  HR: 81 (2019 20:32) (57 - 81)  BP: 117/81 (2019 20:32) (94/58 - 117/81)  BP(mean): --  RR: 18 (2019 20:32) (18 - 19)  SpO2: 100% (2019 20:32) (95% - 100%)  I&O's Summary    2019 07:01  -  2019 07:00  --------------------------------------------------------  IN: 720 mL / OUT: 0 mL / NET: 720 mL    2019 07:01  -  2019 20:51  --------------------------------------------------------  IN: 1311 mL / OUT: 0 mL / NET: 1311 mL      GENERAL: NAD, well-developed  HEAD:  Atraumatic, Normocephalic  EYES: EOMI, PERRLA, conjunctiva and sclera clear  NECK: Supple, No JVD  CHEST/LUNG: Clear to auscultation bilaterally; No wheeze  HEART: Regular rate and rhythm; No murmurs, rubs, or gallops  ABDOMEN: Soft, Nontender, Nondistended; Bowel sounds present  EXTREMITIES:  2+ Peripheral Pulses, No clubbing, cyanosis, or edema  PSYCH: AAOx3  NEUROLOGY: non-focal  SKIN: No rashes or lesions    LABS:  CAPILLARY BLOOD GLUCOSE      POCT Blood Glucose.: 103 mg/dL (2019 17:19)  POCT Blood Glucose.: 120 mg/dL (2019 12:47)  POCT Blood Glucose.: 104 mg/dL (2019 08:13)  POCT Blood Glucose.: 133 mg/dL (2019 22:28)                          9.0    5.29  )-----------( 169      ( 2019 07:50 )             29.6     04-02    136  |  92<L>  |  122<H>  ----------------------------<  102<H>  3.8   |  30  |  2.73<H>    Ca    9.8      2019 07:50  Phos  2.7     04-02  Mg     1.8     04-02    TPro  7.0  /  Alb  4.2  /  TBili  1.0  /  DBili  x   /  AST  23  /  ALT  14  /  AlkPhos  65  04-01    PT/INR - ( 31 Mar 2019 21:50 )   PT: 21.5 SEC;   INR: 1.85          PTT - ( 31 Mar 2019 21:50 )  PTT:34.0 SEC      Urinalysis Basic - ( 31 Mar 2019 21:50 )    Color: YELLOW / Appearance: CLEAR / S.012 / pH: 6.5  Gluc: NEGATIVE / Ketone: NEGATIVE  / Bili: NEGATIVE / Urobili: NORMAL   Blood: NEGATIVE / Protein: NEGATIVE / Nitrite: NEGATIVE   Leuk Esterase: NEGATIVE / RBC: x / WBC x   Sq Epi: x / Non Sq Epi: x / Bacteria: x        RADIOLOGY & ADDITIONAL TESTS:    Imaging Personally Reviewed:    Consultant(s) Notes Reviewed:      Care Discussed with Consultants/Other Providers:

## 2019-04-03 NOTE — PROGRESS NOTE ADULT - SUBJECTIVE AND OBJECTIVE BOX
  NEPHROLOGY - NSN    Patient seen and examined.  Tolerating clear liquid diet  No SOB  Used O2 while sleeping "because my oxygen level drops when I sleep"    MEDICATIONS  (STANDING):  allopurinol 300 milliGRAM(s) Oral daily  benzonatate 100 milliGRAM(s) Oral every 8 hours  buDESOnide  80 MICROgram(s)/formoterol 4.5 MICROgram(s) Inhaler 2 Puff(s) Inhalation two times a day  dextrose 5%. 1000 milliLiter(s) (50 mL/Hr) IV Continuous <Continuous>  dextrose 50% Injectable 12.5 Gram(s) IV Push once  dextrose 50% Injectable 25 Gram(s) IV Push once  dextrose 50% Injectable 25 Gram(s) IV Push once  insulin lispro (HumaLOG) corrective regimen sliding scale   SubCutaneous three times a day before meals  insulin lispro (HumaLOG) corrective regimen sliding scale   SubCutaneous at bedtime  metoprolol succinate  milliGRAM(s) Oral <User Schedule>  metoprolol succinate  milliGRAM(s) Oral <User Schedule>  multivitamin 1 Tablet(s) Oral daily  oseltamivir 30 milliGRAM(s) Oral daily  pantoprazole  Injectable 40 milliGRAM(s) IV Push every 12 hours    VITALS:  T(C): , Max: 36.9 (04-03-19 @ 05:55)  T(F): , Max: 98.4 (04-03-19 @ 05:55)  HR: 66 (04-03-19 @ 05:55)  BP: 106/63 (04-03-19 @ 05:55)  RR: 18 (04-03-19 @ 05:55)  SpO2: 100% (04-03-19 @ 05:55)    04-02 @ 07:01  -  04-03 @ 07:00  --------------------------------------------------------  IN: 1511 mL / OUT: 250 mL / NET: 1261 mL    REVIEW OF SYSTEMS:  Full ROS done and were negative unless otherwise indicated in HPI/assessment.     PHYSICAL EXAM:  Constitutional: NAD  Respiratory: CTA B/L  Cardiovascular: S1 and S2, RRR  Gastrointestinal: + BS, soft, NT, ND  Extremities: trace edema  Neurological: AAO x 3  : no dawson    LABS:                        8.4    4.78  )-----------( 139      ( 03 Apr 2019 07:25 )             27.4     04-03    135  |  94<L>  |  97<H>  ----------------------------<  93  3.7   |  30  |  2.18<H>    Ca    9.6      03 Apr 2019 07:30  Phos  2.9     04-03  Mg     1.8     04-03    ASSESSMENT  63F with COPD (on O2), LINH, AF on A/C, HLD, gout, HFpEF, PHTN , DM and CKD3/4 p/w generalized weakness and poor appetite.   - CKD: stage 3 likely due to DM/HTN with baseline Cr ~ 1.6;  OSMAR likely pre-renal - improving   - HTN: BP on soft side   - HFrEF: euvolemic on exam; preserved EF, TTE noted - off diuretics at present  - Pulm: RVP + for influenza, rhino and enterovirus, on tamiflu, sPHTN  - anemia: in chronic disease + GIB, hold LUIS until GI w/u  - GI: + FOBT, eventual EGD/colonoscopy    RECOMMEND  - c/w tamiflu  - will consider resuming diuretics today   - may need inotropes for right sided heart failure / cor pulm  - follow up further cardiology/HF input  - on clear liquid diet; encourage intake  - f/u CT A/P no IV contrast  - avoid NSAIDs and nephrotoxins  - dose meds for a CrCl ~ 20 mL/min    Christina Lyn NP  [facility]  [phone]

## 2019-04-03 NOTE — PROGRESS NOTE ADULT - SUBJECTIVE AND OBJECTIVE BOX
PULM/MED PROGRESS NOTE:    awake, alert comfortable.  Some coughing  Patient used O2 last night overnight      MEDICATIONS  (STANDING):  allopurinol 300 milliGRAM(s) Oral daily  benzonatate 100 milliGRAM(s) Oral every 8 hours  buDESOnide  80 MICROgram(s)/formoterol 4.5 MICROgram(s) Inhaler 2 Puff(s) Inhalation two times a day  dextrose 5%. 1000 milliLiter(s) (50 mL/Hr) IV Continuous <Continuous>  dextrose 50% Injectable 12.5 Gram(s) IV Push once  dextrose 50% Injectable 25 Gram(s) IV Push once  dextrose 50% Injectable 25 Gram(s) IV Push once  insulin lispro (HumaLOG) corrective regimen sliding scale   SubCutaneous three times a day before meals  insulin lispro (HumaLOG) corrective regimen sliding scale   SubCutaneous at bedtime  metoprolol succinate  milliGRAM(s) Oral <User Schedule>  metoprolol succinate  milliGRAM(s) Oral <User Schedule>  multivitamin 1 Tablet(s) Oral daily  oseltamivir 30 milliGRAM(s) Oral daily  pantoprazole  Injectable 40 milliGRAM(s) IV Push every 12 hours      MEDICATIONS  (PRN):  ALBUTerol/ipratropium for Nebulization 3 milliLiter(s) Nebulizer every 6 hours PRN Shortness of Breath and/or Wheezing  dextrose 40% Gel 15 Gram(s) Oral once PRN Blood Glucose LESS THAN 70 milliGRAM(s)/deciliter  glucagon  Injectable 1 milliGRAM(s) IntraMuscular once PRN Glucose LESS THAN 70 milligrams/deciliter          Vital Signs Last 24 Hrs  T(C): 36.9 (03 Apr 2019 05:55), Max: 36.9 (03 Apr 2019 05:55)  T(F): 98.4 (03 Apr 2019 05:55), Max: 98.4 (03 Apr 2019 05:55)  HR: 66 (03 Apr 2019 05:55) (62 - 81)  BP: 106/63 (03 Apr 2019 05:55) (99/64 - 117/81)  BP(mean): --  RR: 18 (03 Apr 2019 05:55) (18 - 19)  SpO2: 100% (03 Apr 2019 05:55) (98% - 100%)    PHYSICAL EXAMINATION:    GENERAL: The patient is awake and alert in no apparent distress.     NECK: Supple.    LUNGS: Clear to auscultation without wheezing, rales or rhonchi; respirations unlabored    HEART: irreg irreg; CM AF with controlled rate (apparently overnight went down to high 30's)    ABDOMEN: Soft, nontender      LABS:                        8.4    4.78  )-----------( 139      ( 03 Apr 2019 07:25 )             27.4     04-03    135  |  94<L>  |  97<H>  ----------------------------<  93  3.7   |  30  |  2.18<H>    Ca    9.6      03 Apr 2019 07:30  Phos  2.9     04-03  Mg     1.8     04-03      Serum Pro-Brain Natriuretic Peptide: 6490 pg/mL (03-31-19 @ 21:50)      Patient name: KARINA SCHNEIDER  YOB: 1955   Age: 63 (F)   MR#: [id number]  Study Date: 4/1/2019  [facility] Lauren Finkelstein,  [facility]  Study quality: Technically good  Referring Physician: Manuel Palafox MD  Blood Pressure: 115/70 mmHg  Height: 162 cm  Weight: 82 kg  BSA: 1.9 m2  Heart Rate: 74 mmHg  ------------------------------------------------------------------------  PROCEDURE: Transthoracic echocardiogram with 2-D, M-Mode  and complete spectral and color flow Doppler.  INDICATION: Heart failure, unspecified (I50.9)  ------------------------------------------------------------------------  DIMENSIONS:  Dimensions:     Normal Values:  LA:     3.7 cm    2.0 - 4.0 cm  Ao:     2.9 cm    2.0 -3.8 cm  SEPTUM: 0.9 cm    0.6 - 1.2 cm  PWT:    0.9 cm    0.6 - 1.1 cm  LVIDd:  4.5 cm    3.0 - 5.6 cm  LVIDs:  2.7 cm    1.8 - 4.0 cm  Derived Variables:  LVMI: 71 g/m2  RWT: 0.40  Fractional short: 40 %  Ejection Fraction (Visual Estimate): >70 %  Peak Velocity (m/sec): AoV=1.4  ------------------------------------------------------------------------  OBSERVATIONS:  Mitral Valve: Mitral annular calcification and calcified  mitral leaflets with normal diastolic opening. Mild mitral  regurgitation.  Aortic Root: Aortic Root: 2.9 cm.  Aortic Valve: Calcified trileaflet aortic valve with normal  opening. Peak transaortic valve gradient equals 7 mm Hg.  Peak left ventricular outflow tract gradient equals 4.8 mm  Hg.  Left Atrium: Mildly dilated left atrium.  LA volume index =  37 cc/m2.  Left Ventricle: Hyperdynamic left ventricle. Flattening of  the interventricular septum in both systole and diastole is   consistent with right ventricular pressure overload.  Normal left ventricular internal dimensions and wall  thicknesses. (DT:150 ms).  Right Heart: Severe right atrial enlargement. Right  ventricular enlargement with normal right ventricular  systolic function. Normal tricuspid valve. Moderate-severe  tricuspid regurgitation. Pulmonicvalve not well  visualized. Mild pulmonic regurgitation.  Pericardium/PleuraNormal pericardium with no pericardial  effusion.  Hemodynamic: Estimated right ventricular systolic pressure  equals 84 mm Hg, assuming right atrial pressure equals 10  mm Hg, consistent with severe pulmonary hypertension.  ------------------------------------------------------------------------  CONCLUSIONS:  1. Mitral annular calcification and calcified mitral  leaflets with normal diastolic opening. Mild mitral  regurgitation.  2. Calcified trileaflet aortic valve with normal opening.  3. Normal left ventricular internal dimensions and wall  thicknesses.  4. Hyperdynamic left ventricle. Flattening of the  interventricular septum in both systole and diastole is  consistent with right ventricular pressure overload.  5. Severe right atrial enlargement.  6. Right ventricular enlargement with normal right  ventricular systolic function.  7. Normal tricuspid valve. Moderate-severe tricuspid  regurgitation.  8. Estimated pulmonary artery systolic pressure equals 79  mm Hg, assuming right atrial pressure equals 5  mm Hg,  consistent with severe pulmonary hypertension.  *** Compared with echocardiogram of 10/31/2017, no  significant changes noted.  ------------------------------------------------------------------------  Confirmed on  4/2/2019 - 08:27:07 by Shavon Sands M.D.  ------------------------------------------------------------------------

## 2019-04-03 NOTE — PROGRESS NOTE ADULT - PROBLEM SELECTOR PLAN 2
Echo shows severe pulm HTN rather than LV failure. CHF team is following. considering RHC, explained to the patient.

## 2019-04-03 NOTE — PROGRESS NOTE ADULT - ASSESSMENT
64 yo woman with multiple comorbidities, including R-sided heart failure with preserved EF (EF 66% on TTE in 10/2017), severe pulm HTN, mod-severe TR, COPD (on 2L NC at night), LINH (being arranged for bilevel support), afib (on Eliquis), HTN, HLD, DM2, and gout presents with generalized weakness for the past 1 week, found to be positive for influenza A and entero/rhinovirus, also with positive FOBT.

## 2019-04-03 NOTE — PROGRESS NOTE ADULT - ASSESSMENT
Influenza/ enterovirus +  Sleep apnea  Severe pulm htn  Hx hypoxemia - on nocturnal O2 at home; contacted [facility] re outpatient bilevel PAP  Renal failure - today's results pending    Suggest:    Would check nocturnal O2 sats, initially on room air    Consider advancing diet - currently on clears

## 2019-04-03 NOTE — PROGRESS NOTE ADULT - ASSESSMENT
62 yo woman with multiple comorbidities, including R-sided heart failure with preserved EF (EF 66% on TTE in 10/2017), severe pulm HTN, mod-severe TR, COPD (on 2L NC at night), LINH (being arranged for bilevel support), afib (on Eliquis), HTN, HLD, DM2, and gout presents with generalized weakness for the past 1 week, cough and chills now found to be influenza and enterovirus/rhinovirus +. GI now consulted for outpatient +FOBT/cologuard test done on 3/19/2019.    1) Weight loss  Patient reporting 75lb weight loss over the last 1.5 years in the setting of trulicity. Although trulicity could help with weight loss, this is a signficant amount of weight loss. Also, she does have a recent +cologuard with increased constipation as well as early satiety and bloating concerning for possible malignancy.  2) Influenza, enterovirus/rhinovirus +  3) Microcytic anemia  FOBT+ stool and cologuard positive    - f/u CTAP  - would hold off on EGD/colonoscopy for evaluation of +cologuard at this time given acute respiratory infection  - would recommend endoscopic procedures as outpatient once respiratory status improved.  - will need cardiology clearance prior to procedure  - rest of plan as per primary team

## 2019-04-03 NOTE — PROGRESS NOTE ADULT - SUBJECTIVE AND OBJECTIVE BOX
Chief Complaint:  Patient is a 63y old  Female who presents with a chief complaint of Generalized weakness (2019 20:49)      Interval Events:     Allergies:  No Known Allergies      Home Medications:    Hospital Medications:  ALBUTerol/ipratropium for Nebulization 3 milliLiter(s) Nebulizer every 6 hours PRN  allopurinol 300 milliGRAM(s) Oral daily  benzonatate 100 milliGRAM(s) Oral every 8 hours  buDESOnide  80 MICROgram(s)/formoterol 4.5 MICROgram(s) Inhaler 2 Puff(s) Inhalation two times a day  dextrose 40% Gel 15 Gram(s) Oral once PRN  dextrose 5%. 1000 milliLiter(s) IV Continuous <Continuous>  dextrose 50% Injectable 12.5 Gram(s) IV Push once  dextrose 50% Injectable 25 Gram(s) IV Push once  dextrose 50% Injectable 25 Gram(s) IV Push once  glucagon  Injectable 1 milliGRAM(s) IntraMuscular once PRN  insulin lispro (HumaLOG) corrective regimen sliding scale   SubCutaneous three times a day before meals  insulin lispro (HumaLOG) corrective regimen sliding scale   SubCutaneous at bedtime  metoprolol succinate  milliGRAM(s) Oral <User Schedule>  metoprolol succinate  milliGRAM(s) Oral <User Schedule>  multivitamin 1 Tablet(s) Oral daily  oseltamivir 30 milliGRAM(s) Oral daily  pantoprazole  Injectable 40 milliGRAM(s) IV Push every 12 hours      PMHX/PSHX:  Gout  Chronic systolic right heart failure  LINH (obstructive sleep apnea)  CLL (chronic lymphocytic leukemia)  Chronic diastolic congestive heart failure  COPD (chronic obstructive pulmonary disease)  Diabetes  HTN (hypertension)  Atrial fibrillation  No significant past surgical history      Family history:  Family history of hyperlipidemia  Family history of hypertension (Sibling)  Family history of type 2 diabetes mellitus  Family history of cancer (Aunt)      ROS:   as above    PHYSICAL EXAM:   Vital Signs:  Vital Signs Last 24 Hrs  T(C): 36.9 (2019 05:55), Max: 36.9 (2019 05:55)  T(F): 98.4 (2019 05:55), Max: 98.4 (2019 05:55)  HR: 66 (2019 05:55) (62 - 81)  BP: 106/63 (2019 05:55) (99/64 - 117/81)  BP(mean): --  RR: 18 (2019 05:55) (18 - 19)  SpO2: 100% (2019 05:55) (98% - 100%)  Daily     Daily Weight in k.4 (2019 05:55)    GENERAL:  NAD, coughing  HEENT:  sclera anicteric  CHEST:  no increased WOB, CTA bl  HEART:  Regular rhythm, S1, S2  ABDOMEN:  Soft, non-tender, non-distended, normoactive bowel sounds,  no masses ,  EXTREMITIES:  no cyanosis,clubbing or edema  SKIN:  No rash/erythema/ecchymoses/petechiae/wounds/abscess/warm/dry  NEURO:  Alert, oriented    LABS:                        9.0    5.29  )-----------( 169      ( 2019 07:50 )             29.6     04-02    136  |  92<L>  |  122<H>  ----------------------------<  102<H>  3.8   |  30  |  2.73<H>    Ca    9.8      2019 07:50  Phos  2.7     04-02  Mg     1.8     04-02                Imaging: Chief Complaint:  Patient is a 63y old  Female who presents with a chief complaint of Generalized weakness (2019 20:49)      Interval Events:   Patient still reporting cough and weakness.  Denies any N/V, melena, BRBpR, abdominal pain.    Allergies:  No Known Allergies      Home Medications:    Hospital Medications:  ALBUTerol/ipratropium for Nebulization 3 milliLiter(s) Nebulizer every 6 hours PRN  allopurinol 300 milliGRAM(s) Oral daily  benzonatate 100 milliGRAM(s) Oral every 8 hours  buDESOnide  80 MICROgram(s)/formoterol 4.5 MICROgram(s) Inhaler 2 Puff(s) Inhalation two times a day  dextrose 40% Gel 15 Gram(s) Oral once PRN  dextrose 5%. 1000 milliLiter(s) IV Continuous <Continuous>  dextrose 50% Injectable 12.5 Gram(s) IV Push once  dextrose 50% Injectable 25 Gram(s) IV Push once  dextrose 50% Injectable 25 Gram(s) IV Push once  glucagon  Injectable 1 milliGRAM(s) IntraMuscular once PRN  insulin lispro (HumaLOG) corrective regimen sliding scale   SubCutaneous three times a day before meals  insulin lispro (HumaLOG) corrective regimen sliding scale   SubCutaneous at bedtime  metoprolol succinate  milliGRAM(s) Oral <User Schedule>  metoprolol succinate  milliGRAM(s) Oral <User Schedule>  multivitamin 1 Tablet(s) Oral daily  oseltamivir 30 milliGRAM(s) Oral daily  pantoprazole  Injectable 40 milliGRAM(s) IV Push every 12 hours      PMHX/PSHX:  Gout  Chronic systolic right heart failure  LINH (obstructive sleep apnea)  CLL (chronic lymphocytic leukemia)  Chronic diastolic congestive heart failure  COPD (chronic obstructive pulmonary disease)  Diabetes  HTN (hypertension)  Atrial fibrillation  No significant past surgical history      Family history:  Family history of hyperlipidemia  Family history of hypertension (Sibling)  Family history of type 2 diabetes mellitus  Family history of cancer (Aunt)      ROS:   as above    PHYSICAL EXAM:   Vital Signs:  Vital Signs Last 24 Hrs  T(C): 36.9 (2019 05:55), Max: 36.9 (2019 05:55)  T(F): 98.4 (2019 05:55), Max: 98.4 (2019 05:55)  HR: 66 (2019 05:55) (62 - 81)  BP: 106/63 (2019 05:55) (99/64 - 117/81)  BP(mean): --  RR: 18 (2019 05:55) (18 - 19)  SpO2: 100% (2019 05:55) (98% - 100%)  Daily     Daily Weight in k.4 (2019 05:55)    GENERAL:  NAD, coughing  HEENT:  sclera anicteric  CHEST:  no increased WOB, CTA bl  HEART:  Regular rhythm, S1, S2  ABDOMEN:  Soft, non-tender, non-distended, normoactive bowel sounds,  no masses ,  EXTREMITIES:  no cyanosis,clubbing or edema  SKIN:  No rash/erythema/ecchymoses/petechiae/wounds/abscess/warm/dry  NEURO:  Alert, oriented    LABS:                        9.0    5.29  )-----------( 169      ( 2019 07:50 )             29.6     04-02    136  |  92<L>  |  122<H>  ----------------------------<  102<H>  3.8   |  30  |  2.73<H>    Ca    9.8      2019 07:50  Phos  2.7     04-02  Mg     1.8     04-02                Imaging:

## 2019-04-03 NOTE — PROGRESS NOTE ADULT - ATTENDING COMMENTS
Renal attd    Cor Pulm at present  -SHe has positive JVD at present and likely needs to be restarted on meds---defer to heart failure  -Would she benefit from Milrinone?  Renal function is improvin    35 minutes spent on total encounter and more then 50% of the visit was spent counseling and/or coordinating care by the attending physician

## 2019-04-03 NOTE — PROVIDER CONTACT NOTE (OTHER) - BACKGROUND
Pt admitted for weakness/fall a week ago. At ED pt was diagnosed with GI bleed. On Afib with eliquis but stopped due to GI bleed. PMH of DM, gout, COPD, HTN, LINH, chronic systolic right heart failure

## 2019-04-04 LAB
ANION GAP SERPL CALC-SCNC: 13 MMO/L — SIGNIFICANT CHANGE UP (ref 7–14)
BUN SERPL-MCNC: 84 MG/DL — HIGH (ref 7–23)
CALCIUM SERPL-MCNC: 10.3 MG/DL — SIGNIFICANT CHANGE UP (ref 8.4–10.5)
CHLORIDE SERPL-SCNC: 90 MMOL/L — LOW (ref 98–107)
CO2 SERPL-SCNC: 29 MMOL/L — SIGNIFICANT CHANGE UP (ref 22–31)
CREAT SERPL-MCNC: 2.15 MG/DL — HIGH (ref 0.5–1.3)
GLUCOSE SERPL-MCNC: 93 MG/DL — SIGNIFICANT CHANGE UP (ref 70–99)
HCT VFR BLD CALC: 33.1 % — LOW (ref 34.5–45)
HGB BLD-MCNC: 9.5 G/DL — LOW (ref 11.5–15.5)
MAGNESIUM SERPL-MCNC: 1.8 MG/DL — SIGNIFICANT CHANGE UP (ref 1.6–2.6)
MCHC RBC-ENTMCNC: 19.5 PG — LOW (ref 27–34)
MCHC RBC-ENTMCNC: 28.7 % — LOW (ref 32–36)
MCV RBC AUTO: 67.8 FL — LOW (ref 80–100)
NRBC # FLD: 0.04 K/UL — SIGNIFICANT CHANGE UP (ref 0–0)
NRBC FLD-RTO: 1 — SIGNIFICANT CHANGE UP
PHOSPHATE SERPL-MCNC: 2.6 MG/DL — SIGNIFICANT CHANGE UP (ref 2.5–4.5)
PLATELET # BLD AUTO: 210 K/UL — SIGNIFICANT CHANGE UP (ref 150–400)
PMV BLD: SIGNIFICANT CHANGE UP FL (ref 7–13)
POTASSIUM SERPL-MCNC: 3.8 MMOL/L — SIGNIFICANT CHANGE UP (ref 3.5–5.3)
POTASSIUM SERPL-SCNC: 3.8 MMOL/L — SIGNIFICANT CHANGE UP (ref 3.5–5.3)
RBC # BLD: 4.88 M/UL — SIGNIFICANT CHANGE UP (ref 3.8–5.2)
RBC # FLD: 15.9 % — HIGH (ref 10.3–14.5)
SODIUM SERPL-SCNC: 132 MMOL/L — LOW (ref 135–145)
WBC # BLD: 4.03 K/UL — SIGNIFICANT CHANGE UP (ref 3.8–10.5)
WBC # FLD AUTO: 4.03 K/UL — SIGNIFICANT CHANGE UP (ref 3.8–10.5)

## 2019-04-04 PROCEDURE — 99232 SBSQ HOSP IP/OBS MODERATE 35: CPT | Mod: GC

## 2019-04-04 PROCEDURE — 99233 SBSQ HOSP IP/OBS HIGH 50: CPT | Mod: GC

## 2019-04-04 RX ORDER — SODIUM CHLORIDE 9 MG/ML
3 INJECTION INTRAMUSCULAR; INTRAVENOUS; SUBCUTANEOUS EVERY 8 HOURS
Qty: 0 | Refills: 0 | Status: DISCONTINUED | OUTPATIENT
Start: 2019-04-04 | End: 2019-05-03

## 2019-04-04 RX ADMIN — PANTOPRAZOLE SODIUM 40 MILLIGRAM(S): 20 TABLET, DELAYED RELEASE ORAL at 05:06

## 2019-04-04 RX ADMIN — BUDESONIDE AND FORMOTEROL FUMARATE DIHYDRATE 2 PUFF(S): 160; 4.5 AEROSOL RESPIRATORY (INHALATION) at 21:49

## 2019-04-04 RX ADMIN — SODIUM CHLORIDE 3 MILLILITER(S): 9 INJECTION INTRAMUSCULAR; INTRAVENOUS; SUBCUTANEOUS at 13:04

## 2019-04-04 RX ADMIN — SODIUM CHLORIDE 3 MILLILITER(S): 9 INJECTION INTRAMUSCULAR; INTRAVENOUS; SUBCUTANEOUS at 21:00

## 2019-04-04 RX ADMIN — Medication 125 MILLIGRAM(S): at 18:04

## 2019-04-04 RX ADMIN — Medication 100 MILLIGRAM(S): at 13:06

## 2019-04-04 RX ADMIN — PANTOPRAZOLE SODIUM 40 MILLIGRAM(S): 20 TABLET, DELAYED RELEASE ORAL at 18:04

## 2019-04-04 RX ADMIN — Medication 300 MILLIGRAM(S): at 13:06

## 2019-04-04 RX ADMIN — Medication 1 TABLET(S): at 13:06

## 2019-04-04 RX ADMIN — Medication 600 MILLIGRAM(S): at 06:56

## 2019-04-04 RX ADMIN — BUDESONIDE AND FORMOTEROL FUMARATE DIHYDRATE 2 PUFF(S): 160; 4.5 AEROSOL RESPIRATORY (INHALATION) at 13:06

## 2019-04-04 RX ADMIN — Medication 100 MILLIGRAM(S): at 21:49

## 2019-04-04 RX ADMIN — Medication 100 MILLIGRAM(S): at 05:06

## 2019-04-04 RX ADMIN — Medication 30 MILLIGRAM(S): at 13:06

## 2019-04-04 NOTE — PROGRESS NOTE ADULT - SUBJECTIVE AND OBJECTIVE BOX
  NEPHROLOGY - NSN    Patient seen and examined.  No SOB, no pain  Wants to eat solid food  On clear liquid diet  Bradycardia noted    MEDICATIONS  (STANDING):  allopurinol 300 milliGRAM(s) Oral daily  benzonatate 100 milliGRAM(s) Oral every 8 hours  buDESOnide  80 MICROgram(s)/formoterol 4.5 MICROgram(s) Inhaler 2 Puff(s) Inhalation two times a day  metoprolol succinate  milliGRAM(s) Oral <User Schedule>  metoprolol succinate  milliGRAM(s) Oral <User Schedule>  multivitamin 1 Tablet(s) Oral daily  oseltamivir 30 milliGRAM(s) Oral daily  pantoprazole  Injectable 40 milliGRAM(s) IV Push every 12 hours    VITALS:  T(C): , Max: 36.6 (04-03-19 @ 12:14)  T(F): , Max: 97.9 (04-03-19 @ 18:22)  HR: 56 (04-04-19 @ 05:04)  BP: 102/60 (04-04-19 @ 05:04)  RR: 16 (04-04-19 @ 05:04)  SpO2: 98% (04-04-19 @ 05:04)    04-03 @ 07:01  -  04-04 @ 07:00  --------------------------------------------------------  IN: 2326 mL / OUT: 900 mL / NET: 1426 mL    REVIEW OF SYSTEMS:  Full ROS done and were negative unless otherwise indicated in HPI/assessment.     PHYSICAL EXAM:  Constitutional: NAD  HEENT: + JVD  Respiratory: CTA B/L  Cardiovascular: S1 and S2, RRR  Gastrointestinal: + BS, soft, NT, ND  Extremities: no peripheral edema  Neurological: AAO x 3  : no dawson    LABS:                        9.5    4.03  )-----------( 210      ( 04 Apr 2019 04:50 )             33.1     04-04    132<L>  |  90<L>  |  84<H>  ----------------------------<  93  3.8   |  29  |  2.15<H>    Ca    10.3      04 Apr 2019 04:50  Phos  2.6     04-04  Mg     1.8     04-04    RADIOLOGY & ADDITIONAL STUDIES:  < from: CT Abdomen and Pelvis No Cont (04.03.19 @ 18:44) >  INTERPRETATION:  Evaluation of the solid organs of the abdomen is limited   without IV contrast.  No emergent findings.  Calcified uterine fibroid.    < end of copied text >    ASSESSMENT  63F with COPD (on O2), LINH, AF on A/C, HLD, gout, HFpEF, PHTN , DM and CKD3/4 p/w generalized weakness and poor appetite.   - CKD: stage 3 likely due to DM/HTN with baseline Cr ~ 1.6;  OSMAR likely pre-renal - improving  - CV: BP on soft side and bradycardic episodes  - HFpEF: right sided heart failure / cor pulm  - Pulm: RVP + for influenza, rhino and enterovirus - on tamiflu. sPHTN  - anemia: in chronic disease + GIB, hold LUIS until GI w/u  - GI: + FOBT, eventual EGD/colonoscopy    RECOMMEND  - c/w tamiflu   - defer resuming diuretics to HF team  - would she benefit from inotropes  - consider reducing metoprolol dosing   - advance diet per GI  - avoid NSAIDs and nephrotoxins  - dose meds for a CrCl ~ 20-25 mL/min    Christina Lyn NP  [facility]  [phone]

## 2019-04-04 NOTE — PROGRESS NOTE ADULT - PROBLEM SELECTOR PLAN 2
Echo shows severe pulm HTN rather than LV failure. CHF team is following. scheduled for RHC, explained to the patient.

## 2019-04-04 NOTE — PROGRESS NOTE ADULT - ASSESSMENT
64 yo woman with multiple comorbidities, including history of lymphoma, R-sided heart failure with preserved EF (EF 66% on TTE in 10/2017), severe pulm HTN, mod-severe TR, reported COPD but denies smoking? (on 2L NC at night), LINH (being arranged for bilevel support), A-Fib (on Eliquis), HTN, HLD, DM2, and gout presents with generalized weakness for the past 1 week in the setting of recent occult positive stool, OSMAR and newly diagnosed influenza/entero virus infection    #Severe pulm HTN w/ RV systolic dysfunction - on exam patient appears euvolemic   - have asked her to liberalize her fluid intake. Encouraged PO intake  - Echo with elevated pulmonary pressures   - check daily standing weights  - Continue to hold lisinopril     #Afib - currently rate controlled  - hold digoxin for now  - cont metoprolol   - hold AC prior to RHC/LHC tomorrow     #Viral infection - management per primary team    #Anemia - GI work up on hold in setting of acute viral illness, however, patient appears well compensated and if needed to be done on this admission we can likely proceed

## 2019-04-04 NOTE — PROGRESS NOTE ADULT - ATTENDING COMMENTS
[facility] tomorrow.  Will have to decide on whether to resume AC after d/c. GI plans to do endoscopy as an outpatient.

## 2019-04-04 NOTE — PROGRESS NOTE ADULT - SUBJECTIVE AND OBJECTIVE BOX
Patient is a 63y old  Female who presents with a chief complaint of Generalized weakness (2019 16:52)      SUBJECTIVE / OVERNIGHT EVENTS:  No SOB at rest. No edema in legs  Review of Systems:   CONSTITUTIONAL: No fever, weight loss,   EYES: No eye pain, visual disturbances, or discharge  ENMT:  No difficulty hearing, tinnitus, vertigo; No sinus or throat pain  NECK: No pain or stiffness  BREASTS: No pain, masses, or nipple discharge  RESPIRATORY: No cough, wheezing, chills or hemoptysis; No shortness of breath  CARDIOVASCULAR: No chest pain, palpitations, dizziness, or leg swelling  GASTROINTESTINAL: No abdominal or epigastric pain. No nausea, vomiting, or hematemesis; No diarrhea or constipation. No melena or hematochezia.  GENITOURINARY: No dysuria, frequency, hematuria, or incontinence  NEUROLOGICAL: No headaches, memory loss, loss of strength, numbness, or tremors  SKIN: No itching, burning, rashes, or lesions   LYMPH NODES: No enlarged glands  ENDOCRINE: No heat or cold intolerance; No hair loss  MUSCULOSKELETAL: No joint pain or swelling; No muscle, back, or extremity pain  PSYCHIATRIC: No depression, anxiety, mood swings, or difficulty sleeping  HEME/LYMPH: No easy bruising, or bleeding gums  ALLERY AND IMMUNOLOGIC: No hives or eczema    MEDICATIONS  (STANDING):  allopurinol 300 milliGRAM(s) Oral daily  benzonatate 100 milliGRAM(s) Oral every 8 hours  buDESOnide  80 MICROgram(s)/formoterol 4.5 MICROgram(s) Inhaler 2 Puff(s) Inhalation two times a day  dextrose 5%. 1000 milliLiter(s) (50 mL/Hr) IV Continuous <Continuous>  dextrose 50% Injectable 12.5 Gram(s) IV Push once  dextrose 50% Injectable 25 Gram(s) IV Push once  dextrose 50% Injectable 25 Gram(s) IV Push once  insulin lispro (HumaLOG) corrective regimen sliding scale   SubCutaneous three times a day before meals  insulin lispro (HumaLOG) corrective regimen sliding scale   SubCutaneous at bedtime  metoprolol succinate  milliGRAM(s) Oral <User Schedule>  metoprolol succinate  milliGRAM(s) Oral <User Schedule>  multivitamin 1 Tablet(s) Oral daily  oseltamivir 30 milliGRAM(s) Oral daily  pantoprazole  Injectable 40 milliGRAM(s) IV Push every 12 hours    MEDICATIONS  (PRN):  ALBUTerol/ipratropium for Nebulization 3 milliLiter(s) Nebulizer every 6 hours PRN Shortness of Breath and/or Wheezing  dextrose 40% Gel 15 Gram(s) Oral once PRN Blood Glucose LESS THAN 70 milliGRAM(s)/deciliter  glucagon  Injectable 1 milliGRAM(s) IntraMuscular once PRN Glucose LESS THAN 70 milligrams/deciliter      PHYSICAL EXAM:  Vital Signs Last 24 Hrs  T(C): 36.6 (2019 20:32), Max: 36.9 (2019 05:27)  T(F): 97.9 (2019 20:32), Max: 98.4 (2019 05:27)  HR: 81 (2019 20:32) (57 - 81)  BP: 117/81 (2019 20:32) (94/58 - 117/81)  BP(mean): --  RR: 18 (2019 20:32) (18 - 19)  SpO2: 100% (2019 20:32) (95% - 100%)  I&O's Summary    2019 07:01  -  2019 07:00  --------------------------------------------------------  IN: 720 mL / OUT: 0 mL / NET: 720 mL    2019 07:  -  2019 20:51  --------------------------------------------------------  IN: 1311 mL / OUT: 0 mL / NET: 1311 mL      GENERAL: NAD, well-developed  HEAD:  Atraumatic, Normocephalic  EYES: EOMI, PERRLA, conjunctiva and sclera clear  NECK: Supple, No JVD  CHEST/LUNG: Clear to auscultation bilaterally; No wheeze  HEART: Regular rate and rhythm; No murmurs, rubs, or gallops  ABDOMEN: Soft, Nontender, Nondistended; Bowel sounds present  EXTREMITIES:  2+ Peripheral Pulses, No clubbing, cyanosis, or edema  PSYCH: AAOx3  NEUROLOGY: non-focal  SKIN: No rashes or lesions    LABS:  CAPILLARY BLOOD GLUCOSE      POCT Blood Glucose.: 103 mg/dL (2019 17:19)  POCT Blood Glucose.: 120 mg/dL (2019 12:47)  POCT Blood Glucose.: 104 mg/dL (2019 08:13)  POCT Blood Glucose.: 133 mg/dL (2019 22:28)                          9.0    5.29  )-----------( 169      ( 2019 07:50 )             29.6     04-02    136  |  92<L>  |  122<H>  ----------------------------<  102<H>  3.8   |  30  |  2.73<H>    Ca    9.8      2019 07:50  Phos  2.7     04-02  Mg     1.8     04-02    TPro  7.0  /  Alb  4.2  /  TBili  1.0  /  DBili  x   /  AST  23  /  ALT  14  /  AlkPhos  65  04-01    PT/INR - ( 31 Mar 2019 21:50 )   PT: 21.5 SEC;   INR: 1.85          PTT - ( 31 Mar 2019 21:50 )  PTT:34.0 SEC      Urinalysis Basic - ( 31 Mar 2019 21:50 )    Color: YELLOW / Appearance: CLEAR / S.012 / pH: 6.5  Gluc: NEGATIVE / Ketone: NEGATIVE  / Bili: NEGATIVE / Urobili: NORMAL   Blood: NEGATIVE / Protein: NEGATIVE / Nitrite: NEGATIVE   Leuk Esterase: NEGATIVE / RBC: x / WBC x   Sq Epi: x / Non Sq Epi: x / Bacteria: x        RADIOLOGY & ADDITIONAL TESTS:    Imaging Personally Reviewed:    Consultant(s) Notes Reviewed:      Care Discussed with Consultants/Other Providers:

## 2019-04-04 NOTE — PROGRESS NOTE ADULT - ATTENDING COMMENTS
Patient seen and examined with GI fellow. I agree with the above. CT without alarming GI findings. OK for outpatient GI follow up.

## 2019-04-04 NOTE — PROGRESS NOTE ADULT - SUBJECTIVE AND OBJECTIVE BOX
PULM/MED PROGRESS NOTE:  awake, alert comfortable.  Denies SOB;  Using O2 at night  Cough "breaking up" with addition of "Mucinex"  No wheezing  No coryza    MEDICATIONS  (STANDING):  allopurinol 300 milliGRAM(s) Oral daily  benzonatate 100 milliGRAM(s) Oral every 8 hours  buDESOnide  80 MICROgram(s)/formoterol 4.5 MICROgram(s) Inhaler 2 Puff(s) Inhalation two times a day  dextrose 5%. 1000 milliLiter(s) (50 mL/Hr) IV Continuous <Continuous>  dextrose 50% Injectable 12.5 Gram(s) IV Push once  dextrose 50% Injectable 25 Gram(s) IV Push once  dextrose 50% Injectable 25 Gram(s) IV Push once  insulin lispro (HumaLOG) corrective regimen sliding scale   SubCutaneous three times a day before meals  insulin lispro (HumaLOG) corrective regimen sliding scale   SubCutaneous at bedtime  metoprolol succinate  milliGRAM(s) Oral <User Schedule>  metoprolol succinate  milliGRAM(s) Oral <User Schedule>  multivitamin 1 Tablet(s) Oral daily  oseltamivir 30 milliGRAM(s) Oral daily  pantoprazole  Injectable 40 milliGRAM(s) IV Push every 12 hours  sodium chloride 0.9% lock flush 3 milliLiter(s) IV Push every 8 hours      MEDICATIONS  (PRN):  ALBUTerol/ipratropium for Nebulization 3 milliLiter(s) Nebulizer every 6 hours PRN Shortness of Breath and/or Wheezing  dextrose 40% Gel 15 Gram(s) Oral once PRN Blood Glucose LESS THAN 70 milliGRAM(s)/deciliter  glucagon  Injectable 1 milliGRAM(s) IntraMuscular once PRN Glucose LESS THAN 70 milligrams/deciliter  guaiFENesin  milliGRAM(s) Oral every 12 hours PRN Cough          Vital Signs Last 24 Hrs  T(C): 36.7 (04 Apr 2019 11:00), Max: 36.7 (04 Apr 2019 11:00)  T(F): 98 (04 Apr 2019 11:00), Max: 98 (04 Apr 2019 11:00)  HR: 57 (04 Apr 2019 11:00) (56 - 76)  BP: 101/60 (04 Apr 2019 11:00) (92/62 - 110/74)  BP(mean): --  RR: 18 (04 Apr 2019 11:00) (16 - 18)  SpO2: 97% (04 Apr 2019 11:00) (97% - 100%)    PHYSICAL EXAMINATION:    GENERAL: The patient is awake and alert in no apparent distress.     HEENT: Mucous membranes are moist.    NECK: Supple.    LUNGS: Bilateral breath sounds; respirations unlabored    HEART: Irreg irreg, rate in 80s    ABDOMEN: Soft, nontender    EXTREMITIES: Without significant edema.      LABS:                        9.5    4.03  )-----------( 210      ( 04 Apr 2019 04:50 )             33.1     04-04    132<L>  |  90<L>  |  84<H>  ----------------------------<  93  3.8   |  29  |  2.15<H>    Ca    10.3      04 Apr 2019 04:50  Phos  2.6     04-04  Mg     1.8     04-04

## 2019-04-04 NOTE — PROGRESS NOTE ADULT - ASSESSMENT
64 yo woman with multiple comorbidities, including R-sided heart failure with preserved EF (EF 66% on TTE in 10/2017), severe pulm HTN, mod-severe TR, COPD (on 2L NC at night), LINH (being arranged for bilevel support), afib (on Eliquis), HTN, HLD, DM2, and gout presents with generalized weakness for the past 1 week, cough and chills now found to be influenza and enterovirus/rhinovirus +. GI now consulted for outpatient +FOBT/cologuard test done on 3/19/2019.    1) Weight loss  Patient reporting 75lb weight loss over the last 1.5 years in the setting of trulicity. Although trulicity could help with weight loss, this is a signficant amount of weight loss. Also, she does have a recent +cologuard with increased constipation as well as early satiety and bloating concerning for possible malignancy.  Preliminary read showing uterine fibroids but no other abnormalities  2) Influenza, enterovirus/rhinovirus +  3) Microcytic anemia  FOBT+ stool and cologuard positive    - f/u official CTAP read  - would hold off on EGD/colonoscopy for evaluation of +cologuard at this time given acute respiratory infection  - if CTAP unremarkable, would recommend endoscopic procedures as outpatient once respiratory status improved.  - will need cardiology clearance prior to procedure  - rest of plan as per primary team

## 2019-04-04 NOTE — PROGRESS NOTE ADULT - ATTENDING COMMENTS
Renal attd    restart torsemide 40mg po qd  would a RHC be elucidate her volume status.  Heart failure to follow up   Anjali Burleson

## 2019-04-04 NOTE — PROGRESS NOTE ADULT - SUBJECTIVE AND OBJECTIVE BOX
Chief Complaint:  Patient is a 63y old  Female who presents with a chief complaint of Generalized weakness (2019 20:45)      Interval Events:     Allergies:  No Known Allergies      Home Medications:    Hospital Medications:  ALBUTerol/ipratropium for Nebulization 3 milliLiter(s) Nebulizer every 6 hours PRN  allopurinol 300 milliGRAM(s) Oral daily  benzonatate 100 milliGRAM(s) Oral every 8 hours  buDESOnide  80 MICROgram(s)/formoterol 4.5 MICROgram(s) Inhaler 2 Puff(s) Inhalation two times a day  dextrose 40% Gel 15 Gram(s) Oral once PRN  dextrose 5%. 1000 milliLiter(s) IV Continuous <Continuous>  dextrose 50% Injectable 12.5 Gram(s) IV Push once  dextrose 50% Injectable 25 Gram(s) IV Push once  dextrose 50% Injectable 25 Gram(s) IV Push once  glucagon  Injectable 1 milliGRAM(s) IntraMuscular once PRN  guaiFENesin  milliGRAM(s) Oral every 12 hours PRN  insulin lispro (HumaLOG) corrective regimen sliding scale   SubCutaneous three times a day before meals  insulin lispro (HumaLOG) corrective regimen sliding scale   SubCutaneous at bedtime  metoprolol succinate  milliGRAM(s) Oral <User Schedule>  metoprolol succinate  milliGRAM(s) Oral <User Schedule>  multivitamin 1 Tablet(s) Oral daily  oseltamivir 30 milliGRAM(s) Oral daily  pantoprazole  Injectable 40 milliGRAM(s) IV Push every 12 hours  sodium chloride 0.9% lock flush 3 milliLiter(s) IV Push every 8 hours      PMHX/PSHX:  Gout  Chronic systolic right heart failure  LINH (obstructive sleep apnea)  CLL (chronic lymphocytic leukemia)  Chronic diastolic congestive heart failure  COPD (chronic obstructive pulmonary disease)  Diabetes  HTN (hypertension)  Atrial fibrillation  No significant past surgical history      Family history:  Family history of hyperlipidemia  Family history of hypertension (Sibling)  Family history of type 2 diabetes mellitus  Family history of cancer (Aunt)      ROS:   as above    PHYSICAL EXAM:   Vital Signs:  Vital Signs Last 24 Hrs  T(C): 36.6 (2019 05:04), Max: 36.6 (2019 12:14)  T(F): 97.9 (2019 05:04), Max: 97.9 (2019 18:22)  HR: 56 (2019 05:04) (56 - 76)  BP: 102/60 (2019 05:04) (92/62 - 110/74)  BP(mean): --  RR: 16 (2019 05:04) (16 - 18)  SpO2: 98% (2019 05:04) (98% - 100%)  Daily     Daily Weight in k.3 (2019 05:04)    GENERAL:  NAD, coughing  HEENT:  sclera anicteric  CHEST:  no increased WOB, CTA bl  HEART:  Regular rhythm, S1, S2  ABDOMEN:  Soft, non-tender, non-distended, normoactive bowel sounds,  no masses ,  EXTREMITIES:  no cyanosis,clubbing or edema  SKIN:  No rash/erythema/ecchymoses/petechiae/wounds/abscess/warm/dry  NEURO:  Alert, oriented    LABS:                        9.5    4.03  )-----------( 210      ( 2019 04:50 )             33.1     04-04    132<L>  |  90<L>  |  84<H>  ----------------------------<  93  3.8   |  29  |  2.15<H>    Ca    10.3      2019 04:50  Phos  2.6     04-04  Mg     1.8     -04                Imaging:    < from: CT Abdomen and Pelvis No Cont (19 @ 18:44) >    ******PRELIMINARY REPORT******    ******PRELIMINARY REPORT******            EXAM:  CT ABDOMEN AND PELVIS        PROCEDURE DATE:  Apr  3 2019     ******PRELIMINARY REPORT******    ******PRELIMINARY REPORT******            INTERPRETATION:  Evaluationof the solid organs of the abdomen is limited   without IV contrast.  No emergent findings.  Calcified uterine fibroid.            ******PRELIMINARY REPORT******    ******PRELIMINARY REPORT******          YURIY ROBISON M.D., RADIOLOGY RESIDENT    < end of copied text >

## 2019-04-04 NOTE — PROGRESS NOTE ADULT - SUBJECTIVE AND OBJECTIVE BOX
Denies CP, SOB. Continues to have cough.     MEDICATIONS:  metoprolol succinate  milliGRAM(s) Oral <User Schedule>  metoprolol succinate  milliGRAM(s) Oral <User Schedule>  oseltamivir 30 milliGRAM(s) Oral daily  ALBUTerol/ipratropium for Nebulization 3 milliLiter(s) Nebulizer every 6 hours PRN  benzonatate 100 milliGRAM(s) Oral every 8 hours  buDESOnide  80 MICROgram(s)/formoterol 4.5 MICROgram(s) Inhaler 2 Puff(s) Inhalation two times a day  guaiFENesin  milliGRAM(s) Oral every 12 hours PRN  pantoprazole  Injectable 40 milliGRAM(s) IV Push every 12 hours  allopurinol 300 milliGRAM(s) Oral daily  dextrose 40% Gel 15 Gram(s) Oral once PRN  dextrose 50% Injectable 12.5 Gram(s) IV Push once  dextrose 50% Injectable 25 Gram(s) IV Push once  dextrose 50% Injectable 25 Gram(s) IV Push once  glucagon  Injectable 1 milliGRAM(s) IntraMuscular once PRN  insulin lispro (HumaLOG) corrective regimen sliding scale   SubCutaneous three times a day before meals  insulin lispro (HumaLOG) corrective regimen sliding scale   SubCutaneous at bedtime  dextrose 5%. 1000 milliLiter(s) IV Continuous <Continuous>  multivitamin 1 Tablet(s) Oral daily  sodium chloride 0.9% lock flush 3 milliLiter(s) IV Push every 8 hours    PHYSICAL EXAM:  T(C): 36.7 (04-04-19 @ 11:00), Max: 36.7 (04-04-19 @ 11:00)  HR: 57 (04-04-19 @ 11:00) (56 - 76)  BP: 101/60 (04-04-19 @ 11:00) (92/62 - 110/74)  RR: 18 (04-04-19 @ 11:00) (16 - 18)  SpO2: 97% (04-04-19 @ 11:00) (97% - 100%)  Wt(kg): --  I&O's Summary    03 Apr 2019 07:01  -  04 Apr 2019 07:00  --------------------------------------------------------  IN: 2326 mL / OUT: 900 mL / NET: 1426 mL    Appearance: Normal	  HEENT:   Normal oral mucosa, PERRL, EOMI	  Lymphatic: No lymphadenopathy  Cardiovascular: Normal S1 S2, No JVD, No murmurs, No edema  Respiratory: Lungs clear to auscultation	  Psychiatry: A & O x 3, Mood & affect appropriate  Gastrointestinal:  Soft, Non-tender, + BS	  Skin: No rashes, No ecchymoses, No cyanosis	  Neurologic: Non-focal  Extremities: Normal range of motion, No clubbing, cyanosis or edema  Vascular: Peripheral pulses palpable  bilaterally    LABS:	 	    CBC Full  -  ( 04 Apr 2019 04:50 )  WBC Count : 4.03 K/uL  Hemoglobin : 9.5 g/dL  Hematocrit : 33.1 %  Platelet Count - Automated : 210 K/uL  Mean Cell Volume : 67.8 fL  Mean Cell Hemoglobin : 19.5 pg  Mean Cell Hemoglobin Concentration : 28.7 %  Auto Neutrophil # : x  Auto Lymphocyte # : x  Auto Monocyte # : x  Auto Eosinophil # : x  Auto Basophil # : x  Auto Neutrophil % : x  Auto Lymphocyte % : x  Auto Monocyte % : x  Auto Eosinophil % : x  Auto Basophil % : x    04-04    132<L>  |  90<L>  |  84<H>  ----------------------------<  93  3.8   |  29  |  2.15<H>  04-03    135  |  94<L>  |  97<H>  ----------------------------<  93  3.7   |  30  |  2.18<H>    Ca    10.3      04 Apr 2019 04:50  Ca    9.6      03 Apr 2019 07:30  Phos  2.6     04-04  Phos  2.9     04-03  Mg     1.8     04-04  Mg     1.8     04-03    < from: Transthoracic Echocardiogram (04.01.19 @ 18:26) >    Patient name: KARINA SCHNEIDER  YOB: 1955   Age: 63 (F)   MR#: [id number]  Study Date: 4/1/2019  Location: [facility] DropSonographer: Lauren Finkelstein,  [facility]  Study quality: Technically good  Referring Physician: Manuel Palafox MD  Blood Pressure: 115/70 mmHg  Height: 162 cm  Weight: 82 kg  BSA: 1.9 m2  Heart Rate: 74 mmHg  ------------------------------------------------------------------------  PROCEDURE: Transthoracic echocardiogram with 2-D, M-Mode  and complete spectral and color flow Doppler.  INDICATION: Heart failure, unspecified (I50.9)  ------------------------------------------------------------------------  DIMENSIONS:  Dimensions:     Normal Values:  LA:     3.7 cm    2.0 - 4.0 cm  Ao:     2.9 cm    2.0 -3.8 cm  SEPTUM: 0.9 cm    0.6 - 1.2 cm  PWT:    0.9 cm    0.6 - 1.1 cm  LVIDd:  4.5 cm    3.0 - 5.6 cm  LVIDs:  2.7 cm    1.8 - 4.0 cm  Derived Variables:  LVMI: 71 g/m2  RWT: 0.40  Fractional short: 40 %  Ejection Fraction (Visual Estimate): >70 %  Peak Velocity (m/sec): AoV=1.4  ------------------------------------------------------------------------  OBSERVATIONS:  Mitral Valve: Mitral annular calcification and calcified  mitral leaflets with normal diastolic opening. Mild mitral  regurgitation.  Aortic Root: Aortic Root: 2.9 cm.  Aortic Valve: Calcified trileaflet aortic valve with normal  opening. Peak transaortic valve gradient equals 7 mm Hg.  Peak left ventricular outflow tract gradient equals 4.8 mm  Hg.  Left Atrium: Mildly dilated left atrium.  LA volume index =  37 cc/m2.  Left Ventricle: Hyperdynamic left ventricle. Flattening of  the interventricular septum in both systole and diastole is   consistent with right ventricular pressure overload.  Normal left ventricular internal dimensions and wall  thicknesses. (DT:150 ms).  Right Heart: Severe right atrial enlargement. Right  ventricular enlargement with normal right ventricular  systolic function. Normal tricuspid valve. Moderate-severe  tricuspid regurgitation. Pulmonicvalve not well  visualized. Mild pulmonic regurgitation.  Pericardium/PleuraNormal pericardium with no pericardial  effusion.  Hemodynamic: Estimated right ventricular systolic pressure  equals 84 mm Hg, assuming right atrial pressure equals 10  mm Hg, consistent with severe pulmonary hypertension.  ------------------------------------------------------------------------  CONCLUSIONS:  1. Mitral annular calcification and calcified mitral  leaflets with normal diastolic opening. Mild mitral  regurgitation.  2. Calcified trileaflet aortic valve with normal opening.  3. Normal left ventricular internal dimensions and wall  thicknesses.  4. Hyperdynamic left ventricle. Flattening of the  interventricular septum in both systole and diastole is  consistent with right ventricular pressure overload.  5. Severe right atrial enlargement.  6. Right ventricular enlargement with normal right  ventricular systolic function.  7. Normal tricuspid valve. Moderate-severe tricuspid  regurgitation.  8. Estimated pulmonary artery systolic pressure equals 79  mm Hg, assuming right atrial pressure equals 5  mm Hg,  consistent with severe pulmonary hypertension.  *** Compared with echocardiogram of 10/31/2017, no  significant changes noted.  ------------------------------------------------------------------------  Confirmed on  4/2/2019 - 08:27:07 by Shavon Sands M.D.  ------------------------------------------------------------------------    < end of copied text >

## 2019-04-04 NOTE — PROGRESS NOTE ADULT - ASSESSMENT
Influenza/ enterovirus infection  asthma/COPD  Sleep apnea  Pulmonary hypertension - (pressures on echo may in part be higher now due to current infection)  CRF - improving function  Hemoccult +      Pt completing Tamiflu  Per cardiology, for right heart cath  Advance diet (discussed with PA and RN)  Pt on nocturnal O2  Pt contacted by [facility] for arrangements for home bilevel PAP

## 2019-04-05 LAB
ANION GAP SERPL CALC-SCNC: 11 MMO/L — SIGNIFICANT CHANGE UP (ref 7–14)
BUN SERPL-MCNC: 77 MG/DL — HIGH (ref 7–23)
CALCIUM SERPL-MCNC: 10 MG/DL — SIGNIFICANT CHANGE UP (ref 8.4–10.5)
CHLORIDE SERPL-SCNC: 93 MMOL/L — LOW (ref 98–107)
CO2 SERPL-SCNC: 29 MMOL/L — SIGNIFICANT CHANGE UP (ref 22–31)
CREAT SERPL-MCNC: 2.33 MG/DL — HIGH (ref 0.5–1.3)
GLUCOSE SERPL-MCNC: 97 MG/DL — SIGNIFICANT CHANGE UP (ref 70–99)
HCT VFR BLD CALC: 30.7 % — LOW (ref 34.5–45)
HGB BLD-MCNC: 9.2 G/DL — LOW (ref 11.5–15.5)
MAGNESIUM SERPL-MCNC: 1.8 MG/DL — SIGNIFICANT CHANGE UP (ref 1.6–2.6)
MCHC RBC-ENTMCNC: 20 PG — LOW (ref 27–34)
MCHC RBC-ENTMCNC: 30 % — LOW (ref 32–36)
MCV RBC AUTO: 66.6 FL — LOW (ref 80–100)
NRBC # FLD: 0.04 K/UL — SIGNIFICANT CHANGE UP (ref 0–0)
PLATELET # BLD AUTO: 185 K/UL — SIGNIFICANT CHANGE UP (ref 150–400)
PMV BLD: SIGNIFICANT CHANGE UP FL (ref 7–13)
POTASSIUM SERPL-MCNC: 4.1 MMOL/L — SIGNIFICANT CHANGE UP (ref 3.5–5.3)
POTASSIUM SERPL-SCNC: 4.1 MMOL/L — SIGNIFICANT CHANGE UP (ref 3.5–5.3)
RBC # BLD: 4.61 M/UL — SIGNIFICANT CHANGE UP (ref 3.8–5.2)
RBC # FLD: 16.3 % — HIGH (ref 10.3–14.5)
SODIUM SERPL-SCNC: 133 MMOL/L — LOW (ref 135–145)
WBC # BLD: 5.57 K/UL — SIGNIFICANT CHANGE UP (ref 3.8–10.5)
WBC # FLD AUTO: 5.57 K/UL — SIGNIFICANT CHANGE UP (ref 3.8–10.5)

## 2019-04-05 PROCEDURE — 93451 RIGHT HEART CATH: CPT | Mod: 26

## 2019-04-05 PROCEDURE — 76937 US GUIDE VASCULAR ACCESS: CPT | Mod: 26

## 2019-04-05 PROCEDURE — 99233 SBSQ HOSP IP/OBS HIGH 50: CPT

## 2019-04-05 RX ADMIN — SODIUM CHLORIDE 3 MILLILITER(S): 9 INJECTION INTRAMUSCULAR; INTRAVENOUS; SUBCUTANEOUS at 05:46

## 2019-04-05 RX ADMIN — Medication 30 MILLIGRAM(S): at 12:58

## 2019-04-05 RX ADMIN — Medication 300 MILLIGRAM(S): at 12:58

## 2019-04-05 RX ADMIN — SODIUM CHLORIDE 3 MILLILITER(S): 9 INJECTION INTRAMUSCULAR; INTRAVENOUS; SUBCUTANEOUS at 13:00

## 2019-04-05 RX ADMIN — Medication 100 MILLIGRAM(S): at 13:00

## 2019-04-05 RX ADMIN — Medication 1 TABLET(S): at 12:58

## 2019-04-05 RX ADMIN — PANTOPRAZOLE SODIUM 40 MILLIGRAM(S): 20 TABLET, DELAYED RELEASE ORAL at 05:46

## 2019-04-05 RX ADMIN — Medication 10 MILLIGRAM(S): at 23:40

## 2019-04-05 RX ADMIN — SODIUM CHLORIDE 3 MILLILITER(S): 9 INJECTION INTRAMUSCULAR; INTRAVENOUS; SUBCUTANEOUS at 21:27

## 2019-04-05 RX ADMIN — BUDESONIDE AND FORMOTEROL FUMARATE DIHYDRATE 2 PUFF(S): 160; 4.5 AEROSOL RESPIRATORY (INHALATION) at 21:36

## 2019-04-05 RX ADMIN — PANTOPRAZOLE SODIUM 40 MILLIGRAM(S): 20 TABLET, DELAYED RELEASE ORAL at 19:23

## 2019-04-05 RX ADMIN — Medication 100 MILLIGRAM(S): at 21:35

## 2019-04-05 RX ADMIN — Medication 125 MILLIGRAM(S): at 19:23

## 2019-04-05 RX ADMIN — BUDESONIDE AND FORMOTEROL FUMARATE DIHYDRATE 2 PUFF(S): 160; 4.5 AEROSOL RESPIRATORY (INHALATION) at 09:38

## 2019-04-05 RX ADMIN — Medication 100 MILLIGRAM(S): at 05:46

## 2019-04-05 RX ADMIN — Medication 600 MILLIGRAM(S): at 12:58

## 2019-04-05 NOTE — PROGRESS NOTE ADULT - SUBJECTIVE AND OBJECTIVE BOX
Patient is a 63y old  Female who presents with a chief complaint of Generalized weakness (2019 16:52)      SUBJECTIVE / OVERNIGHT EVENTS:  No shortness of breath or any other symptoms suggestive of influenza or upper respiratory acting traction. Afebrile. No dizziness. Scheduled for cardiac cath today    ?  Review of Systems:   CONSTITUTIONAL: No fever, weight loss,   EYES: No eye pain, visual disturbances, or discharge  ENMT:  No difficulty hearing, tinnitus, vertigo; No sinus or throat pain  NECK: No pain or stiffness  BREASTS: No pain, masses, or nipple discharge  RESPIRATORY: No cough, wheezing, chills or hemoptysis; No shortness of breath  CARDIOVASCULAR: No chest pain, palpitations, dizziness, or leg swelling  GASTROINTESTINAL: No abdominal or epigastric pain. No nausea, vomiting, or hematemesis; No diarrhea or constipation. No melena or hematochezia.  GENITOURINARY: No dysuria, frequency, hematuria, or incontinence  NEUROLOGICAL: No headaches, memory loss, loss of strength, numbness, or tremors  SKIN: No itching, burning, rashes, or lesions   LYMPH NODES: No enlarged glands  ENDOCRINE: No heat or cold intolerance; No hair loss  MUSCULOSKELETAL: No joint pain or swelling; No muscle, back, or extremity pain  PSYCHIATRIC: No depression, anxiety, mood swings, or difficulty sleeping  HEME/LYMPH: No easy bruising, or bleeding gums  ALLERY AND IMMUNOLOGIC: No hives or eczema    MEDICATIONS  (STANDING):  allopurinol 300 milliGRAM(s) Oral daily  benzonatate 100 milliGRAM(s) Oral every 8 hours  buDESOnide  80 MICROgram(s)/formoterol 4.5 MICROgram(s) Inhaler 2 Puff(s) Inhalation two times a day  dextrose 5%. 1000 milliLiter(s) (50 mL/Hr) IV Continuous <Continuous>  dextrose 50% Injectable 12.5 Gram(s) IV Push once  dextrose 50% Injectable 25 Gram(s) IV Push once  dextrose 50% Injectable 25 Gram(s) IV Push once  insulin lispro (HumaLOG) corrective regimen sliding scale   SubCutaneous three times a day before meals  insulin lispro (HumaLOG) corrective regimen sliding scale   SubCutaneous at bedtime  metoprolol succinate  milliGRAM(s) Oral <User Schedule>  metoprolol succinate  milliGRAM(s) Oral <User Schedule>  multivitamin 1 Tablet(s) Oral daily  oseltamivir 30 milliGRAM(s) Oral daily  pantoprazole  Injectable 40 milliGRAM(s) IV Push every 12 hours    MEDICATIONS  (PRN):  ALBUTerol/ipratropium for Nebulization 3 milliLiter(s) Nebulizer every 6 hours PRN Shortness of Breath and/or Wheezing  dextrose 40% Gel 15 Gram(s) Oral once PRN Blood Glucose LESS THAN 70 milliGRAM(s)/deciliter  glucagon  Injectable 1 milliGRAM(s) IntraMuscular once PRN Glucose LESS THAN 70 milligrams/deciliter      PHYSICAL EXAM:  Vital Signs Last 24 Hrs  T(C): 36.6 (2019 20:32), Max: 36.9 (2019 05:27)  T(F): 97.9 (2019 20:32), Max: 98.4 (2019 05:27)  HR: 81 (2019 20:32) (57 - 81)  BP: 117/81 (2019 20:32) (94/58 - 117/81)  BP(mean): --  RR: 18 (2019 20:32) (18 - 19)  SpO2: 100% (2019 20:32) (95% - 100%)  I&O's Summary    2019 07:01  -  2019 07:00  --------------------------------------------------------  IN: 720 mL / OUT: 0 mL / NET: 720 mL    2019 07:01  -  2019 20:51  --------------------------------------------------------  IN: 1311 mL / OUT: 0 mL / NET: 1311 mL      GENERAL: NAD, well-developed  HEAD:  Atraumatic, Normocephalic  EYES: EOMI, PERRLA, conjunctiva and sclera clear  NECK: Supple, No JVD  CHEST/LUNG: Clear to auscultation bilaterally; No wheeze  HEART: Regular rate and rhythm; No murmurs, rubs, or gallops  ABDOMEN: Soft, Nontender, Nondistended; Bowel sounds present  EXTREMITIES:  2+ Peripheral Pulses, No clubbing, cyanosis, or edema  PSYCH: AAOx3  NEUROLOGY: non-focal  SKIN: No rashes or lesions    LABS:  CAPILLARY BLOOD GLUCOSE      POCT Blood Glucose.: 103 mg/dL (2019 17:19)  POCT Blood Glucose.: 120 mg/dL (2019 12:47)  POCT Blood Glucose.: 104 mg/dL (2019 08:13)  POCT Blood Glucose.: 133 mg/dL (2019 22:28)                          9.0    5.29  )-----------( 169      ( 2019 07:50 )             29.6     04-02    136  |  92<L>  |  122<H>  ----------------------------<  102<H>  3.8   |  30  |  2.73<H>    Ca    9.8      2019 07:50  Phos  2.7     04-02  Mg     1.8     04-02    TPro  7.0  /  Alb  4.2  /  TBili  1.0  /  DBili  x   /  AST  23  /  ALT  14  /  AlkPhos  65  04-01    PT/INR - ( 31 Mar 2019 21:50 )   PT: 21.5 SEC;   INR: 1.85          PTT - ( 31 Mar 2019 21:50 )  PTT:34.0 SEC      Urinalysis Basic - ( 31 Mar 2019 21:50 )    Color: YELLOW / Appearance: CLEAR / S.012 / pH: 6.5  Gluc: NEGATIVE / Ketone: NEGATIVE  / Bili: NEGATIVE / Urobili: NORMAL   Blood: NEGATIVE / Protein: NEGATIVE / Nitrite: NEGATIVE   Leuk Esterase: NEGATIVE / RBC: x / WBC x   Sq Epi: x / Non Sq Epi: x / Bacteria: x        RADIOLOGY & ADDITIONAL TESTS:    Imaging Personally Reviewed:    Consultant(s) Notes Reviewed:      Care Discussed with Consultants/Other Providers:

## 2019-04-05 NOTE — PROGRESS NOTE ADULT - ASSESSMENT
62 yo woman with multiple comorbidities, including history of lymphoma, R-sided heart failure with preserved EF (EF 66% on TTE in 10/2017), severe pulm HTN, mod-severe TR, reported COPD but denies smoking? (on 2L NC at night), LINH (being arranged for bilevel support), A-Fib (on Eliquis), HTN, HLD, DM2, and gout presents with generalized weakness for the past 1 week in the setting of recent occult positive stool, OSMAR and newly diagnosed influenza/entero virus infection    #Severe pulm HTN w/ RV systolic dysfunction - on exam patient appears hypovolemic  - Await LHC/RHC today.   - Echo with elevated pulmonary pressures   - check daily standing weights      #Afib - currently rate controlled  - hold digoxin for now, level 2.1  - cont metoprolol   - Will need to address AC as she has been off of it since admission due to guiac positive stool.     #Influenza- last day of Tamiflu today     #Anemia - GI work up on hold in setting of acute viral illness, however, patient appears well compensated and if needed to be done on this admission we can likely proceed 62 yo woman with multiple comorbidities, including history of lymphoma, R-sided heart failure with preserved EF (EF 66% on TTE in 10/2017), severe pulm HTN, mod-severe TR, reported COPD but denies smoking? (on 2L NC at night), LINH (being arranged for bilevel support), A-Fib (on Eliquis), HTN, HLD, DM2, and gout presents with generalized weakness for the past 1 week in the setting of recent occult positive stool, OSMAR and newly diagnosed influenza/entero virus infection    #Severe pulm HTN w/ RV systolic dysfunction - on exam patient appears hypovolemic  - Await RHC today.   - Echo with elevated pulmonary pressures   - check daily standing weights      #Afib - currently rate controlled  - hold digoxin for now, level 2.1  - cont metoprolol   - Will need to address AC as she has been off of it since admission due to guiac positive stool.     #Influenza- last day of Tamiflu today     #Anemia - GI work up on hold in setting of acute viral illness, however, patient appears well compensated and if needed to be done on this admission we can likely proceed

## 2019-04-05 NOTE — CHART NOTE - NSCHARTNOTEFT_GEN_A_CORE
PA discussed withDr Royer Gastelum regarding lifting Droplet Precaution.  Patient was positive for Influenza AH3 and Entero/Rhinovirus since 3/31/2019 and was on Tamiflu since 3/31.  Today is the last day of Tamiflu and droplet precaution can be lifted.  Keli CHANG

## 2019-04-05 NOTE — CHART NOTE - NSCHARTNOTEFT_GEN_A_CORE
CTAP read noted no alarming findings.    Recommendations  - please have patient follow up in GI clinic as outpatient for colonoscopy once respiratory symptoms improved.  - discharge plan as per primary team  - please call back with additional questions or concerns    We will sign off

## 2019-04-05 NOTE — PROGRESS NOTE ADULT - SUBJECTIVE AND OBJECTIVE BOX
  NEPHROLOGY - NSN    Patient seen and examined.  Bradycardic overnight  Awaiting RHC today  Tolerated solid foods   No SOB    MEDICATIONS  (STANDING):  allopurinol 300 milliGRAM(s) Oral daily  benzonatate 100 milliGRAM(s) Oral every 8 hours  buDESOnide  80 MICROgram(s)/formoterol 4.5 MICROgram(s) Inhaler 2 Puff(s) Inhalation two times a day  metoprolol succinate  milliGRAM(s) Oral <User Schedule>  metoprolol succinate  milliGRAM(s) Oral <User Schedule>  multivitamin 1 Tablet(s) Oral daily  oseltamivir 30 milliGRAM(s) Oral daily  pantoprazole  Injectable 40 milliGRAM(s) IV Push every 12 hours    VITALS:  T(C): , Max: 36.9 (04-05-19 @ 05:42)  T(F): , Max: 98.4 (04-05-19 @ 05:42)  HR: 51 (04-05-19 @ 05:42)  BP: 91/58 (04-05-19 @ 05:42)  RR: 18 (04-05-19 @ 05:42)  SpO2: 100% (04-05-19 @ 05:42)    04-04 @ 07:01  -  04-05 @ 07:00  --------------------------------------------------------  IN: 960 mL / OUT: 1900 mL / NET: -940 mL    04-05 @ 07:01  -  04-05 @ 10:45  --------------------------------------------------------  IN: 0 mL / OUT: 350 mL / NET: -350 mL    REVIEW OF SYSTEMS:  Full ROS done and were negative unless otherwise indicated in HPI/assessment.     PHYSICAL EXAM:  Constitutional: NAD  HEENT: + JVD  Respiratory: CTA B/L  Cardiovascular: S1 and S2, RRR  Gastrointestinal: + BS, soft, NT, ND  Extremities: no peripheral edema  Neurological: AAO x 3  : no dawson    LABS:                        9.2    5.57  )-----------( 185      ( 05 Apr 2019 06:55 )             30.7     04-05    133<L>  |  93<L>  |  77<H>  ----------------------------<  97  4.1   |  29  |  2.33<H>    Ca    10.0      05 Apr 2019 06:55  Phos  2.6     04-04  Mg     1.8     04-05    ASSESSMENT / RECOMMEND  63F with COPD (on O2), LINH, AF on A/C, HLD, gout, HFpEF, PHTN , DM and CKD3/4 p/w generalized weakness and poor appetite.   - CKD: stage 3 likely due to DM/HTN with baseline Cr ~ 1.6;  OSMAR likely pre-renal - azotemia rising in setting of bradycardia  - CV: BP on soft side - bradycardic o/n  - HFpEF: right sided heart failure / cor pulm  - Pulm: RVP + for influenza, rhino and enterovirus - on tamiflu. sPHTN  - anemia: in chronic disease + GIB, hold LUIS until GI w/u  - GI: + FOBT, eventual EGD/colonoscopy    RECOMMEND  - c/w tamiflu   - f/u RHC, adjust diuretics accordingly  - would she benefit from inotropes  - cardiology follow up for bradycardia   - EP?  - avoid NSAIDs and nephrotoxins  - dose meds for a CrCl ~ 20-25 mL/min    Christina Lyn NP  [facility]  [phone]   NEPHROLOGY - [facility]    Patient seen and examined.  Bradycardic overnight  Awaiting cardiac cath today  Tolerated solid foods   No SOB    MEDICATIONS  (STANDING):  allopurinol 300 milliGRAM(s) Oral daily  benzonatate 100 milliGRAM(s) Oral every 8 hours  buDESOnide  80 MICROgram(s)/formoterol 4.5 MICROgram(s) Inhaler 2 Puff(s) Inhalation two times a day  metoprolol succinate  milliGRAM(s) Oral <User Schedule>  metoprolol succinate  milliGRAM(s) Oral <User Schedule>  multivitamin 1 Tablet(s) Oral daily  oseltamivir 30 milliGRAM(s) Oral daily  pantoprazole  Injectable 40 milliGRAM(s) IV Push every 12 hours    VITALS:  T(C): , Max: 36.9 (04-05-19 @ 05:42)  T(F): , Max: 98.4 (04-05-19 @ 05:42)  HR: 51 (04-05-19 @ 05:42)  BP: 91/58 (04-05-19 @ 05:42)  RR: 18 (04-05-19 @ 05:42)  SpO2: 100% (04-05-19 @ 05:42)    04-04 @ 07:01  -  04-05 @ 07:00  --------------------------------------------------------  IN: 960 mL / OUT: 1900 mL / NET: -940 mL    04-05 @ 07:01  -  04-05 @ 10:45  --------------------------------------------------------  IN: 0 mL / OUT: 350 mL / NET: -350 mL    REVIEW OF SYSTEMS:  Full ROS done and were negative unless otherwise indicated in HPI/assessment.     PHYSICAL EXAM:  Constitutional: NAD  HEENT: + JVD  Respiratory: CTA B/L  Cardiovascular: S1 and S2, RRR  Gastrointestinal: + BS, soft, NT, ND  Extremities: no peripheral edema  Neurological: AAO x 3  : no dawson    LABS:                        9.2    5.57  )-----------( 185      ( 05 Apr 2019 06:55 )             30.7     04-05    133<L>  |  93<L>  |  77<H>  ----------------------------<  97  4.1   |  29  |  2.33<H>    Ca    10.0      05 Apr 2019 06:55  Phos  2.6     04-04  Mg     1.8     04-05    ASSESSMENT / RECOMMEND  63F with COPD (on O2), LINH, AF on A/C, HLD, gout, HFpEF, PHTN , DM and CKD3/4 p/w generalized weakness and poor appetite.   - CKD: stage 3 likely due to DM/HTN with baseline Cr ~ 1.6;  OSMAR likely pre-renal - azotemia rising in setting of bradycardia  - CV: BP on soft side - bradycardic o/n  - HFpEF: right sided heart failure / cor pulm  - Pulm: RVP + for influenza, rhino and enterovirus - on tamiflu. sPHTN  - anemia: in chronic disease + GIB, hold LUIS until GI w/u  - GI: + FOBT, eventual EGD/colonoscopy    RECOMMEND  - adjust diuretics accordingly to RHC findings  - would she benefit from inotropes  - cardiology follow up for bradycardia   - consider EP consult   - avoid NSAIDs and nephrotoxins  - dose meds for a CrCl ~ 20-25 mL/min    Christina Lyn NP  [facility]  [phone]

## 2019-04-05 NOTE — PROGRESS NOTE ADULT - ATTENDING COMMENTS
Renal attd    Wedge of 15 but  high pressures  Outpt with Dr Yang. Start Revatio   Restart diuretics in 24-48 hours     dc planning

## 2019-04-05 NOTE — PROGRESS NOTE ADULT - ATTENDING COMMENTS
RHC today. Further management based on results.  Anemia w/u. RHC today. Further management based on results.  Anemia w/u.    Addendum:  Results noted. Mean PA 45, Mean PCWP 15, CO 4.3, PVR~7 KISER.  Will start sildenafil 10 tid.  Pulm HTN consult (Dr. Yang).

## 2019-04-05 NOTE — PROGRESS NOTE ADULT - SUBJECTIVE AND OBJECTIVE BOX
Patient seen and examined. She said this is the best shes felt during this hospitalization No acute SOB, CP, palpitations.   Going for LHC/RHC today.   On Last day of Tamiflu.     Medications:  ALBUTerol/ipratropium for Nebulization 3 milliLiter(s) Nebulizer every 6 hours PRN  allopurinol 300 milliGRAM(s) Oral daily  benzonatate 100 milliGRAM(s) Oral every 8 hours  buDESOnide  80 MICROgram(s)/formoterol 4.5 MICROgram(s) Inhaler 2 Puff(s) Inhalation two times a day  dextrose 40% Gel 15 Gram(s) Oral once PRN  dextrose 5%. 1000 milliLiter(s) IV Continuous <Continuous>  dextrose 50% Injectable 12.5 Gram(s) IV Push once  dextrose 50% Injectable 25 Gram(s) IV Push once  dextrose 50% Injectable 25 Gram(s) IV Push once  glucagon  Injectable 1 milliGRAM(s) IntraMuscular once PRN  guaiFENesin  milliGRAM(s) Oral every 12 hours PRN  insulin lispro (HumaLOG) corrective regimen sliding scale   SubCutaneous three times a day before meals  insulin lispro (HumaLOG) corrective regimen sliding scale   SubCutaneous at bedtime  metoprolol succinate  milliGRAM(s) Oral <User Schedule>  metoprolol succinate  milliGRAM(s) Oral <User Schedule>  multivitamin 1 Tablet(s) Oral daily  pantoprazole  Injectable 40 milliGRAM(s) IV Push every 12 hours  sodium chloride 0.9% lock flush 3 milliLiter(s) IV Push every 8 hours      Vitals:  Vital Signs Last 24 Hrs  T(C): 36.9 (2019 05:42), Max: 36.9 (2019 05:42)  T(F): 98.4 (2019 05:42), Max: 98.4 (2019 05:42)  HR: 51 (2019 05:42) (51 - 77)  BP: 91/58 (2019 05:42) (91/58 - 110/64)  BP(mean): --  RR: 18 (2019 05:42) (18 - 18)  SpO2: 100% (2019 05:42) (99% - 100%)    Daily     Daily Weight in k.4 (2019 05:42)    I&O's Detail    2019 07:  -  2019 07:00  --------------------------------------------------------  IN:    Oral Fluid: 960 mL  Total IN: 960 mL    OUT:    Voided: 1900 mL  Total OUT: 1900 mL    Total NET: -940 mL      2019 07:01  -  2019 13:16  --------------------------------------------------------  IN:  Total IN: 0 mL    OUT:    Voided: 350 mL  Total OUT: 350 mL    Total NET: -350 mL          Physical Exam:     General: No distress. Comfortable.  HEENT: EOM intact.  Neck: Neck supple. JVP low. No masses  Chest: Clear to auscultation bilaterally  CV: S1 and S2. No murmurs, rub, or gallops. Radial pulses normal. No LE edema b/l.   Abdomen: Soft, non-distended, non-tender  Skin: No rashes or skin breakdown  Neurology: Alert and oriented times three. Sensation intact  Psych: Affect normal    Labs:                        9.2    5.57  )-----------( 185      ( 2019 06:55 )             30.7     04-05    133<L>  |  93<L>  |  77<H>  ----------------------------<  97  4.1   |  29  |  2.33<H>    Ca    10.0      2019 06:55  Phos  2.6     04-04  Mg     1.8     04-05    < from: Transthoracic Echocardiogram (19 @ 18:26) >  DIMENSIONS:  Dimensions:     Normal Values:  LA:     3.7 cm    2.0 - 4.0 cm  Ao:     2.9 cm    2.0 -3.8 cm  SEPTUM: 0.9 cm    0.6 - 1.2 cm  PWT:    0.9 cm    0.6 - 1.1 cm  LVIDd:  4.5 cm    3.0 - 5.6 cm  LVIDs:  2.7 cm    1.8 - 4.0 cm  Derived Variables:  LVMI: 71 g/m2  RWT: 0.40  Fractional short: 40 %  Ejection Fraction (Visual Estimate): >70 %  Peak Velocity (m/sec): AoV=1.4  ------------------------------------------------------------------------  OBSERVATIONS:  Mitral Valve: Mitral annular calcification and calcified  mitral leaflets with normal diastolic opening. Mild mitral  regurgitation.  Aortic Root: Aortic Root: 2.9 cm.  Aortic Valve: Calcified trileaflet aortic valve with normal  opening. Peak transaortic valve gradient equals 7 mm Hg.  Peak left ventricular outflow tract gradient equals 4.8 mm  Hg.  Left Atrium: Mildly dilated left atrium.  LA volume index =  37 cc/m2.  Left Ventricle: Hyperdynamic left ventricle. Flattening of  the interventricular septum in both systole and diastole is   consistent with right ventricular pressure overload.  Normal left ventricular internal dimensions and wall  thicknesses. (DT:150 ms).  Right Heart: Severe right atrial enlargement. Right  ventricular enlargement with normal right ventricular  systolic function. Normal tricuspid valve. Moderate-severe  tricuspid regurgitation. Pulmonicvalve not well  visualized. Mild pulmonic regurgitation.  Pericardium/PleuraNormal pericardium with no pericardial  effusion.  Hemodynamic: Estimated right ventricular systolic pressure  equals 84 mm Hg, assuming right atrial pressure equals 10  mm Hg, consistent with severe pulmonary hypertension.    < end of copied text > Patient seen and examined. She said this is the best shes felt during this hospitalization No acute SOB, CP, palpitations.   Going for RHC today.   On Last day of Tamiflu.     Medications:  ALBUTerol/ipratropium for Nebulization 3 milliLiter(s) Nebulizer every 6 hours PRN  allopurinol 300 milliGRAM(s) Oral daily  benzonatate 100 milliGRAM(s) Oral every 8 hours  buDESOnide  80 MICROgram(s)/formoterol 4.5 MICROgram(s) Inhaler 2 Puff(s) Inhalation two times a day  dextrose 40% Gel 15 Gram(s) Oral once PRN  dextrose 5%. 1000 milliLiter(s) IV Continuous <Continuous>  dextrose 50% Injectable 12.5 Gram(s) IV Push once  dextrose 50% Injectable 25 Gram(s) IV Push once  dextrose 50% Injectable 25 Gram(s) IV Push once  glucagon  Injectable 1 milliGRAM(s) IntraMuscular once PRN  guaiFENesin  milliGRAM(s) Oral every 12 hours PRN  insulin lispro (HumaLOG) corrective regimen sliding scale   SubCutaneous three times a day before meals  insulin lispro (HumaLOG) corrective regimen sliding scale   SubCutaneous at bedtime  metoprolol succinate  milliGRAM(s) Oral <User Schedule>  metoprolol succinate  milliGRAM(s) Oral <User Schedule>  multivitamin 1 Tablet(s) Oral daily  pantoprazole  Injectable 40 milliGRAM(s) IV Push every 12 hours  sodium chloride 0.9% lock flush 3 milliLiter(s) IV Push every 8 hours      Vitals:  Vital Signs Last 24 Hrs  T(C): 36.9 (2019 05:42), Max: 36.9 (2019 05:42)  T(F): 98.4 (2019 05:42), Max: 98.4 (2019 05:42)  HR: 51 (2019 05:42) (51 - 77)  BP: 91/58 (2019 05:42) (91/58 - 110/64)  BP(mean): --  RR: 18 (2019 05:42) (18 - 18)  SpO2: 100% (2019 05:42) (99% - 100%)    Daily     Daily Weight in k.4 (2019 05:42)    I&O's Detail    2019 07:01  -  2019 07:00  --------------------------------------------------------  IN:    Oral Fluid: 960 mL  Total IN: 960 mL    OUT:    Voided: 1900 mL  Total OUT: 1900 mL    Total NET: -940 mL      2019 07:  -  2019 13:16  --------------------------------------------------------  IN:  Total IN: 0 mL    OUT:    Voided: 350 mL  Total OUT: 350 mL    Total NET: -350 mL          Physical Exam:     General: No distress. Comfortable.  HEENT: EOM intact.  Neck: Neck supple. JVP low. No masses  Chest: Clear to auscultation bilaterally  CV: S1 and S2. No murmurs, rub, or gallops. Radial pulses normal. No LE edema b/l.   Abdomen: Soft, non-distended, non-tender  Skin: No rashes or skin breakdown  Neurology: Alert and oriented times three. Sensation intact  Psych: Affect normal    Labs:                        9.2    5.57  )-----------( 185      ( 2019 06:55 )             30.7     04-05    133<L>  |  93<L>  |  77<H>  ----------------------------<  97  4.1   |  29  |  2.33<H>    Ca    10.0      2019 06:55  Phos  2.6     04-04  Mg     1.8     04-05    < from: Transthoracic Echocardiogram (19 @ 18:26) >  DIMENSIONS:  Dimensions:     Normal Values:  LA:     3.7 cm    2.0 - 4.0 cm  Ao:     2.9 cm    2.0 -3.8 cm  SEPTUM: 0.9 cm    0.6 - 1.2 cm  PWT:    0.9 cm    0.6 - 1.1 cm  LVIDd:  4.5 cm    3.0 - 5.6 cm  LVIDs:  2.7 cm    1.8 - 4.0 cm  Derived Variables:  LVMI: 71 g/m2  RWT: 0.40  Fractional short: 40 %  Ejection Fraction (Visual Estimate): >70 %  Peak Velocity (m/sec): AoV=1.4  ------------------------------------------------------------------------  OBSERVATIONS:  Mitral Valve: Mitral annular calcification and calcified  mitral leaflets with normal diastolic opening. Mild mitral  regurgitation.  Aortic Root: Aortic Root: 2.9 cm.  Aortic Valve: Calcified trileaflet aortic valve with normal  opening. Peak transaortic valve gradient equals 7 mm Hg.  Peak left ventricular outflow tract gradient equals 4.8 mm  Hg.  Left Atrium: Mildly dilated left atrium.  LA volume index =  37 cc/m2.  Left Ventricle: Hyperdynamic left ventricle. Flattening of  the interventricular septum in both systole and diastole is   consistent with right ventricular pressure overload.  Normal left ventricular internal dimensions and wall  thicknesses. (DT:150 ms).  Right Heart: Severe right atrial enlargement. Right  ventricular enlargement with normal right ventricular  systolic function. Normal tricuspid valve. Moderate-severe  tricuspid regurgitation. Pulmonicvalve not well  visualized. Mild pulmonic regurgitation.  Pericardium/PleuraNormal pericardium with no pericardial  effusion.  Hemodynamic: Estimated right ventricular systolic pressure  equals 84 mm Hg, assuming right atrial pressure equals 10  mm Hg, consistent with severe pulmonary hypertension.    < end of copied text >

## 2019-04-06 LAB
ANION GAP SERPL CALC-SCNC: 14 MMO/L — SIGNIFICANT CHANGE UP (ref 7–14)
BUN SERPL-MCNC: 66 MG/DL — HIGH (ref 7–23)
CALCIUM SERPL-MCNC: 10 MG/DL — SIGNIFICANT CHANGE UP (ref 8.4–10.5)
CHLORIDE SERPL-SCNC: 97 MMOL/L — LOW (ref 98–107)
CO2 SERPL-SCNC: 26 MMOL/L — SIGNIFICANT CHANGE UP (ref 22–31)
CREAT SERPL-MCNC: 2.09 MG/DL — HIGH (ref 0.5–1.3)
GLUCOSE SERPL-MCNC: 93 MG/DL — SIGNIFICANT CHANGE UP (ref 70–99)
HCT VFR BLD CALC: 29.3 % — LOW (ref 34.5–45)
HGB BLD-MCNC: 8.7 G/DL — LOW (ref 11.5–15.5)
MAGNESIUM SERPL-MCNC: 1.8 MG/DL — SIGNIFICANT CHANGE UP (ref 1.6–2.6)
MCHC RBC-ENTMCNC: 19.6 PG — LOW (ref 27–34)
MCHC RBC-ENTMCNC: 29.7 % — LOW (ref 32–36)
MCV RBC AUTO: 66.1 FL — LOW (ref 80–100)
NRBC # FLD: 0.04 K/UL — SIGNIFICANT CHANGE UP (ref 0–0)
PLATELET # BLD AUTO: 187 K/UL — SIGNIFICANT CHANGE UP (ref 150–400)
PMV BLD: SIGNIFICANT CHANGE UP FL (ref 7–13)
POTASSIUM SERPL-MCNC: 3.7 MMOL/L — SIGNIFICANT CHANGE UP (ref 3.5–5.3)
POTASSIUM SERPL-SCNC: 3.7 MMOL/L — SIGNIFICANT CHANGE UP (ref 3.5–5.3)
RBC # BLD: 4.43 M/UL — SIGNIFICANT CHANGE UP (ref 3.8–5.2)
RBC # FLD: 16.4 % — HIGH (ref 10.3–14.5)
SODIUM SERPL-SCNC: 137 MMOL/L — SIGNIFICANT CHANGE UP (ref 135–145)
WBC # BLD: 5.85 K/UL — SIGNIFICANT CHANGE UP (ref 3.8–10.5)
WBC # FLD AUTO: 5.85 K/UL — SIGNIFICANT CHANGE UP (ref 3.8–10.5)

## 2019-04-06 PROCEDURE — 99233 SBSQ HOSP IP/OBS HIGH 50: CPT

## 2019-04-06 RX ORDER — POTASSIUM CHLORIDE 20 MEQ
40 PACKET (EA) ORAL ONCE
Qty: 0 | Refills: 0 | Status: COMPLETED | OUTPATIENT
Start: 2019-04-06 | End: 2019-04-06

## 2019-04-06 RX ORDER — MAGNESIUM OXIDE 400 MG ORAL TABLET 241.3 MG
400 TABLET ORAL
Qty: 0 | Refills: 0 | Status: COMPLETED | OUTPATIENT
Start: 2019-04-06 | End: 2019-04-08

## 2019-04-06 RX ADMIN — Medication 600 MILLIGRAM(S): at 22:18

## 2019-04-06 RX ADMIN — Medication 10 MILLIGRAM(S): at 13:09

## 2019-04-06 RX ADMIN — Medication 100 MILLIGRAM(S): at 22:18

## 2019-04-06 RX ADMIN — Medication 300 MILLIGRAM(S): at 13:01

## 2019-04-06 RX ADMIN — Medication 1: at 13:04

## 2019-04-06 RX ADMIN — SODIUM CHLORIDE 3 MILLILITER(S): 9 INJECTION INTRAMUSCULAR; INTRAVENOUS; SUBCUTANEOUS at 06:40

## 2019-04-06 RX ADMIN — BUDESONIDE AND FORMOTEROL FUMARATE DIHYDRATE 2 PUFF(S): 160; 4.5 AEROSOL RESPIRATORY (INHALATION) at 08:34

## 2019-04-06 RX ADMIN — Medication 10 MILLIGRAM(S): at 06:39

## 2019-04-06 RX ADMIN — Medication 1 TABLET(S): at 13:01

## 2019-04-06 RX ADMIN — PANTOPRAZOLE SODIUM 40 MILLIGRAM(S): 20 TABLET, DELAYED RELEASE ORAL at 17:41

## 2019-04-06 RX ADMIN — Medication 40 MILLIEQUIVALENT(S): at 17:41

## 2019-04-06 RX ADMIN — BUDESONIDE AND FORMOTEROL FUMARATE DIHYDRATE 2 PUFF(S): 160; 4.5 AEROSOL RESPIRATORY (INHALATION) at 20:56

## 2019-04-06 RX ADMIN — Medication 100 MILLIGRAM(S): at 13:01

## 2019-04-06 RX ADMIN — MAGNESIUM OXIDE 400 MG ORAL TABLET 400 MILLIGRAM(S): 241.3 TABLET ORAL at 13:01

## 2019-04-06 RX ADMIN — MAGNESIUM OXIDE 400 MG ORAL TABLET 400 MILLIGRAM(S): 241.3 TABLET ORAL at 17:41

## 2019-04-06 RX ADMIN — PANTOPRAZOLE SODIUM 40 MILLIGRAM(S): 20 TABLET, DELAYED RELEASE ORAL at 06:40

## 2019-04-06 RX ADMIN — SODIUM CHLORIDE 3 MILLILITER(S): 9 INJECTION INTRAMUSCULAR; INTRAVENOUS; SUBCUTANEOUS at 22:20

## 2019-04-06 RX ADMIN — Medication 100 MILLIGRAM(S): at 06:39

## 2019-04-06 RX ADMIN — Medication 125 MILLIGRAM(S): at 17:41

## 2019-04-06 RX ADMIN — Medication 10 MILLIGRAM(S): at 22:18

## 2019-04-06 RX ADMIN — SODIUM CHLORIDE 3 MILLILITER(S): 9 INJECTION INTRAMUSCULAR; INTRAVENOUS; SUBCUTANEOUS at 13:08

## 2019-04-06 RX ADMIN — Medication 600 MILLIGRAM(S): at 06:38

## 2019-04-06 NOTE — CHART NOTE - NSCHARTNOTEFT_GEN_A_CORE
Status post Cath, Right brachial site without bleeding or hematoma.  Dressing is intact.  Positive Pulses, Capillary refill less than two seconds.  Will continue to monitor.                                                                                                                                                  BIBI Pillai, RPA-C [id number]

## 2019-04-06 NOTE — PROGRESS NOTE ADULT - ATTENDING COMMENTS
Heart failure appears to be under better control   Anemia: CBC shows a worsening hemoglobin. Will follow up with cardiology if she can be cleared for an endoscopy and a colonoscopy on this admission.

## 2019-04-06 NOTE — PROGRESS NOTE ADULT - SUBJECTIVE AND OBJECTIVE BOX
[facility]-Margy Diaz NP- PROGRESS NOTE    Patient seen and examined sitting up in bed in NAD. Denies SOB. BP somewhat soft but asymptomatic. Patient had cardiac cath yesterday and was started on sildenafil.      Hospital Medications:   MEDICATIONS  (STANDING):  allopurinol 300 milliGRAM(s) Oral daily  benzonatate 100 milliGRAM(s) Oral every 8 hours  buDESOnide  80 MICROgram(s)/formoterol 4.5 MICROgram(s) Inhaler 2 Puff(s) Inhalation two times a day  dextrose 5%. 1000 milliLiter(s) (50 mL/Hr) IV Continuous <Continuous>  dextrose 50% Injectable 12.5 Gram(s) IV Push once  dextrose 50% Injectable 25 Gram(s) IV Push once  dextrose 50% Injectable 25 Gram(s) IV Push once  insulin lispro (HumaLOG) corrective regimen sliding scale   SubCutaneous three times a day before meals  insulin lispro (HumaLOG) corrective regimen sliding scale   SubCutaneous at bedtime  magnesium oxide 400 milliGRAM(s) Oral three times a day with meals  metoprolol succinate  milliGRAM(s) Oral <User Schedule>  metoprolol succinate  milliGRAM(s) Oral <User Schedule>  multivitamin 1 Tablet(s) Oral daily  pantoprazole  Injectable 40 milliGRAM(s) IV Push every 12 hours  sildenafil (REVATIO) 10 milliGRAM(s) Oral three times a day  sodium chloride 0.9% lock flush 3 milliLiter(s) IV Push every 8 hours      REVIEW OF SYSTEMS:  As per HPI, 8 out of 10 ROS were unremarkable    VITALS:  T(F): 98.2 (04-06-19 @ 13:15), Max: 98.2 (04-06-19 @ 03:00)  HR: 82 (04-06-19 @ 17:29)  BP: 106/74 (04-06-19 @ 17:29)  RR: 18 (04-06-19 @ 17:29)  SpO2: 96% (04-06-19 @ 17:29)  Wt(kg): --    04-05 @ 07:01  -  04-06 @ 07:00  --------------------------------------------------------  IN: 300 mL / OUT: 1300 mL / NET: -1000 mL    04-06 @ 07:01  -  04-06 @ 21:04  --------------------------------------------------------  IN: 690 mL / OUT: 1050 mL / NET: -360 mL          PHYSICAL EXAM:  Constitutional: NAD  HEENT: PERRL  Neck: No JVD  Respiratory: CTAB, no wheezes, rales or rhonchi  Cardiovascular: S1, S2, RRR  Gastrointestinal: BS+, soft, NT/ND  Extremities: No peripheral edema  Neurological: A/O x 3, no focal deficits  Psychiatric: Normal mood, normal affect  : No CVA tenderness. No dawson.   Skin: No rashes    LABS:      04-06    137  |  97<L>  |  66<H>  ----------------------------<  93  3.7   |  26  |  2.09<H>  04-05    133<L>  |  93<L>  |  77<H>  ----------------------------<  97  4.1   |  29  |  2.33<H>  04-04    132<L>  |  90<L>  |  84<H>  ----------------------------<  93  3.8   |  29  |  2.15<H>    Ca    10.0      06 Apr 2019 07:00  Mg     1.8     04-06  Mg     1.8     04-05  Mg     1.8     04-04                              8.7    5.85  )-----------( 187      ( 06 Apr 2019 07:00 )             29.3   eGFR if African American: 28 mL/min (04.06.19 @ 07:00)        Urine Studies:    Sodium, Random Urine: 56 mmol/L (04-01 @ 00:30)  Potassium, Random Urine: 28.7 mmol/L (04-01 @ 00:30)  Chloride, Random Urine: 65 mmol/L (04-01 @ 00:30)  Creatinine, Random Urine: 51.80 mg/dL (04-01 @ 00:30)

## 2019-04-06 NOTE — PROGRESS NOTE ADULT - ASSESSMENT
63F with COPD (on O2), LINH, AF on A/C, HLD, gout, HFpEF, PHTN , DM and CKD3/4 p/w generalized weakness and poor appetite.   - CKD: stage 3 likely due to DM/HTN with baseline Cr ~ 1.6;  OSMAR likely pre-renal -  renal parameters trending down today but had cardiac cath yesterday and was somewhat hypotensive today  - CV: BP on soft side  - HFpEF: right sided heart failure / cor pulm  - Pulm: RVP + for influenza, rhino and enterovirus - on tamiflu. sPHTN  - anemia: in chronic disease + GIB, hold LUIS until GI w/u  - GI: + FOBT, will need eventual EGD/colonoscopy    RECOMMEND  - started on sildenafil as per cardiology   - holding off on diuretics for now  - monitor BP closely  - avoid NSAIDs and nephrotoxins  - dose meds for a CrCl ~ 28 mL/min  - BMP and Mag in AM

## 2019-04-06 NOTE — PROGRESS NOTE ADULT - SUBJECTIVE AND OBJECTIVE BOX
Patient is a 63y old  Female who presents with a chief complaint of Generalized weakness (2019 16:52)      SUBJECTIVE / OVERNIGHT EVENTS:  No shortness of breath. Blood pressure is running low. Status post right heart catheterization yesterday. She has been started on  NFL.   Worried about her low blood pressure   Review of Systems:   CONSTITUTIONAL: No fever, weight loss,   EYES: No eye pain, visual disturbances, or discharge  ENMT:  No difficulty hearing, tinnitus, vertigo; No sinus or throat pain  NECK: No pain or stiffness  BREASTS: No pain, masses, or nipple discharge  RESPIRATORY: No cough, wheezing, chills or hemoptysis; No shortness of breath  CARDIOVASCULAR: No chest pain, palpitations, dizziness, or leg swelling  GASTROINTESTINAL: No abdominal or epigastric pain. No nausea, vomiting, or hematemesis; No diarrhea or constipation. No melena or hematochezia.  GENITOURINARY: No dysuria, frequency, hematuria, or incontinence  NEUROLOGICAL: No headaches, memory loss, loss of strength, numbness, or tremors  SKIN: No itching, burning, rashes, or lesions   LYMPH NODES: No enlarged glands  ENDOCRINE: No heat or cold intolerance; No hair loss  MUSCULOSKELETAL: No joint pain or swelling; No muscle, back, or extremity pain  PSYCHIATRIC: No depression, anxiety, mood swings, or difficulty sleeping  HEME/LYMPH: No easy bruising, or bleeding gums  ALLERY AND IMMUNOLOGIC: No hives or eczema    MEDICATIONS  (STANDING):  allopurinol 300 milliGRAM(s) Oral daily  benzonatate 100 milliGRAM(s) Oral every 8 hours  buDESOnide  80 MICROgram(s)/formoterol 4.5 MICROgram(s) Inhaler 2 Puff(s) Inhalation two times a day  dextrose 5%. 1000 milliLiter(s) (50 mL/Hr) IV Continuous <Continuous>  dextrose 50% Injectable 12.5 Gram(s) IV Push once  dextrose 50% Injectable 25 Gram(s) IV Push once  dextrose 50% Injectable 25 Gram(s) IV Push once  insulin lispro (HumaLOG) corrective regimen sliding scale   SubCutaneous three times a day before meals  insulin lispro (HumaLOG) corrective regimen sliding scale   SubCutaneous at bedtime  metoprolol succinate  milliGRAM(s) Oral <User Schedule>  metoprolol succinate  milliGRAM(s) Oral <User Schedule>  multivitamin 1 Tablet(s) Oral daily  oseltamivir 30 milliGRAM(s) Oral daily  pantoprazole  Injectable 40 milliGRAM(s) IV Push every 12 hours    MEDICATIONS  (PRN):  ALBUTerol/ipratropium for Nebulization 3 milliLiter(s) Nebulizer every 6 hours PRN Shortness of Breath and/or Wheezing  dextrose 40% Gel 15 Gram(s) Oral once PRN Blood Glucose LESS THAN 70 milliGRAM(s)/deciliter  glucagon  Injectable 1 milliGRAM(s) IntraMuscular once PRN Glucose LESS THAN 70 milligrams/deciliter      PHYSICAL EXAM:  Vital Signs Last 24 Hrs  T(C): 36.6 (2019 20:32), Max: 36.9 (2019 05:27)  T(F): 97.9 (2019 20:32), Max: 98.4 (2019 05:27)  HR: 81 (2019 20:32) (57 - 81)  BP: 117/81 (2019 20:32) (94/58 - 117/81)  BP(mean): --  RR: 18 (2019 20:32) (18 - 19)  SpO2: 100% (2019 20:32) (95% - 100%)  I&O's Summary    2019 07:01  -  2019 07:00  --------------------------------------------------------  IN: 720 mL / OUT: 0 mL / NET: 720 mL    2019 07:01  -  2019 20:51  --------------------------------------------------------  IN: 1311 mL / OUT: 0 mL / NET: 1311 mL      GENERAL: NAD, well-developed  HEAD:  Atraumatic, Normocephalic  EYES: EOMI, PERRLA, conjunctiva and sclera clear  NECK: Supple, No JVD  CHEST/LUNG: Clear to auscultation bilaterally; No wheeze  HEART: Regular rate and rhythm; No murmurs, rubs, or gallops  ABDOMEN: Soft, Nontender, Nondistended; Bowel sounds present  EXTREMITIES:  2+ Peripheral Pulses, No clubbing, cyanosis, or edema  PSYCH: AAOx3  NEUROLOGY: non-focal  SKIN: No rashes or lesions    LABS:  CAPILLARY BLOOD GLUCOSE      POCT Blood Glucose.: 103 mg/dL (2019 17:19)  POCT Blood Glucose.: 120 mg/dL (2019 12:47)  POCT Blood Glucose.: 104 mg/dL (2019 08:13)  POCT Blood Glucose.: 133 mg/dL (2019 22:28)                          9.0    5.29  )-----------( 169      ( 2019 07:50 )             29.6     04-02    136  |  92<L>  |  122<H>  ----------------------------<  102<H>  3.8   |  30  |  2.73<H>    Ca    9.8      2019 07:50  Phos  2.7     04-02  Mg     1.8     04-02    TPro  7.0  /  Alb  4.2  /  TBili  1.0  /  DBili  x   /  AST  23  /  ALT  14  /  AlkPhos  65  04-01    PT/INR - ( 31 Mar 2019 21:50 )   PT: 21.5 SEC;   INR: 1.85          PTT - ( 31 Mar 2019 21:50 )  PTT:34.0 SEC      Urinalysis Basic - ( 31 Mar 2019 21:50 )    Color: YELLOW / Appearance: CLEAR / S.012 / pH: 6.5  Gluc: NEGATIVE / Ketone: NEGATIVE  / Bili: NEGATIVE / Urobili: NORMAL   Blood: NEGATIVE / Protein: NEGATIVE / Nitrite: NEGATIVE   Leuk Esterase: NEGATIVE / RBC: x / WBC x   Sq Epi: x / Non Sq Epi: x / Bacteria: x        RADIOLOGY & ADDITIONAL TESTS:    Imaging Personally Reviewed:    Consultant(s) Notes Reviewed:      Care Discussed with Consultants/Other Providers:

## 2019-04-06 NOTE — CHART NOTE - NSCHARTNOTEFT_GEN_A_CORE
Patient seen and examined at the bedside. Echo and Cath results reviewed. Chart reviewed.   RH Cath reviewed - Mean PA 45, Mean PCWP 15, CO 4.3, PVR~7 KISER.  Started on sildenafil yesterday.   Last CHEST CT noted from Nov 2017. Creat currently 2, unclear baseline.     Recommendations:  - Continue sildenafil 10mg TID.  - Obtain VQ scan to assess for acute/chronic thromboembolic disease. Unable to obtain CTA due to elevated creat.   - Check TSH  - Check antinuclear antibody, rheumatoid factor, anticentromere, antitopoisomerase, anti-RNA polymerase III, anti-double stranded DNA, anti-Ro, anti-La, and RNP antibodies  - Will need outpatient PFT's/PSG  - Full consult to follow in am.    D/w Dr. Yang.    Please call with questions.    Oswaldo Gee MD  Fellow (PGY 6),  Pulmonary & Critical Care Medicine.  Pager - [phone] ([facility])/ [phone] ([place]) Patient seen and examined at the bedside. Echo and Cath results reviewed. Chart reviewed.   RH Cath reviewed - Mean PA 45, Mean PCWP 18, CO 4.3, PVR~7 KISER.   [ appears  both pre and post capillary pulmonary hypertension   Started on sildenafil yesterday.   Last CHEST CT noted from Nov 2017. Creat currently 2, unclear baseline.     Recommendations:  - Continue sildenafil 10mg TID.  - Obtain VQ scan to assess for acute/chronic thromboembolic disease. Unable to obtain CTA due to elevated creat.   - Check TSH  - Check antinuclear antibody, rheumatoid factor, anticentromere, antitopoisomerase, anti-RNA polymerase III, anti-double stranded DNA, anti-Ro, anti-La, and RNP antibodies  - Will need outpatient PFT's/PSG  - Full consult to follow in am.    D/w Dr. Yang.    Please call with questions.    Oswaldo Gee MD  Fellow (PGY 6),  Pulmonary & Critical Care Medicine.  Pager - [phone] ([facility])/ [phone] ([place])

## 2019-04-06 NOTE — PROGRESS NOTE ADULT - ASSESSMENT
63F PMHx multiple comorbidities -  lymphoma, R-sided heart failure with preserved EF (EF 66% on TTE in 10/2017), severe pulm HTN, mod-severe TR, reported COPD (on 2L NC at night), LINH , A-Fib (on Eliquis), HTN, HLD, DM2, and gout presents with generalized weakness for the past 1 week in the setting of recent occult positive stool, OSMAR and newly diagnosed influenza/entero virus infection    - c/w seldenafil  - c/w BBlocker  - OOB   - f/u GI recs

## 2019-04-06 NOTE — PROGRESS NOTE ADULT - SUBJECTIVE AND OBJECTIVE BOX
Yohan aHrris M.D.  Pager: [phone]  Off hours and weekends call -  NS: [phone]    [id number]    Overnight events: No acute Overnight Events.     Review Of Systems: Denies SOB/CHAVEZ/CP/Fevers/Chills/Nausea/Vomiting/Diarrhea    Medications:  ALBUTerol/ipratropium for Nebulization 3 milliLiter(s) Nebulizer every 6 hours PRN  allopurinol 300 milliGRAM(s) Oral daily  benzonatate 100 milliGRAM(s) Oral every 8 hours  buDESOnide  80 MICROgram(s)/formoterol 4.5 MICROgram(s) Inhaler 2 Puff(s) Inhalation two times a day  dextrose 40% Gel 15 Gram(s) Oral once PRN  dextrose 5%. 1000 milliLiter(s) IV Continuous <Continuous>  dextrose 50% Injectable 12.5 Gram(s) IV Push once  dextrose 50% Injectable 25 Gram(s) IV Push once  dextrose 50% Injectable 25 Gram(s) IV Push once  glucagon  Injectable 1 milliGRAM(s) IntraMuscular once PRN  guaiFENesin  milliGRAM(s) Oral every 12 hours PRN  insulin lispro (HumaLOG) corrective regimen sliding scale   SubCutaneous three times a day before meals  insulin lispro (HumaLOG) corrective regimen sliding scale   SubCutaneous at bedtime  magnesium oxide 400 milliGRAM(s) Oral three times a day with meals  metoprolol succinate  milliGRAM(s) Oral <User Schedule>  metoprolol succinate  milliGRAM(s) Oral <User Schedule>  multivitamin 1 Tablet(s) Oral daily  pantoprazole  Injectable 40 milliGRAM(s) IV Push every 12 hours  potassium chloride    Tablet ER 40 milliEquivalent(s) Oral once  sildenafil (REVATIO) 10 milliGRAM(s) Oral three times a day  sodium chloride 0.9% lock flush 3 milliLiter(s) IV Push every 8 hours    PMH/PSH/FH/SH: Unchanged  Vitals:  T(F): 98, Max: 98 (19 @ 06:33)  HR: 82 (82 - 82)  BP: 105/63 (105/63 - 105/63)  BP(mean): --  RR: 18 (18 - 18)  SpO2: 100% (100% - 100%)  Daily     Daily Weight in k.2 (2019 06:33)  I&O's Summary    2019 07:01  -  2019 07:00  --------------------------------------------------------  IN: 300 mL / OUT: 1300 mL / NET: -1000 mL        Physical Exam:  Appearance:  NAD  Eyes: PERRL, EOMI  HENT: NC/AT  Cardiovascular: S1, S2, RRR, No m/r/g appreciated, No edema, no elevation in JVP  Respiratory: Clear to auscultation bilaterally  Gastrointestinal: Soft, Non-tender, Non-distended, BS+  Musculoskeletal:  No clubbing, No joint deformity   Neurologic: Grossly Normal  Lymphatic: No lymphadenopathy  Psychiatry: AAOx3, Mood & affect appropriate  Skin: No rashes, No ecchymoses, No cyanosis    Labs:                        8.7    5.85  )-----------( 187      ( 2019 07:00 )             29.3     04-06    137  |  97<L>  |  66<H>  ----------------------------<  93  3.7   |  26  |  2.09<H>    Ca    10.0      2019 07:00  Mg     1.8     04-06    Serum Pro-Brain Natriuretic Peptide: 6490 pg/mL ( @ 21:50)        Echo: < from: Transthoracic Echocardiogram (19 @ 18:26) >  CONCLUSIONS:  1. Mitral annular calcification and calcified mitral  leaflets with normal diastolic opening. Mild mitral  regurgitation.  2. Calcified trileaflet aortic valve with normal opening.  3. Normal left ventricular internal dimensions and wall  thicknesses.  4. Hyperdynamic left ventricle. Flattening of the  interventricular septum in both systole and diastole is  consistent with right ventricular pressure overload.  5. Severe right atrial enlargement.  6. Right ventricular enlargement with normal right  ventricular systolic function.  7. Normal tricuspid valve. Moderate-severe tricuspid  regurgitation.  8. Estimated pulmonary artery systolic pressure equals 79  mm Hg, assuming right atrial pressure equals 5  mm Hg,  consistent with severe pulmonary hypertension.  *** Compared with echocardiogram of 10/31/2017, no  significant changes noted.  ------------------------------------------------------------------------  Confirmed on  2019 - 08:27:07 by Shavon Sands M.D.  ------------------------------------------------------------------------    < end of copied text >      Stress Testing:     Cath:    Imaging:    Interpretation of Telemetry:

## 2019-04-06 NOTE — PROGRESS NOTE ADULT - PROBLEM SELECTOR PLAN 2
Echo shows severe pulm HTN rather than LV failure. CHF team is following. RHC findings as per cardiology  Started on Revatio

## 2019-04-06 NOTE — PROGRESS NOTE ADULT - ATTENDING COMMENTS
Patient seen and examined  Agree with above assessment and plan  Patient with R sided HF with PHTN and preserved LV function  SP initiation of sildenafil yesterday at 10 mg TID  BP dropped and toprol held today  may be result of starting sildenafil-will monitor BP and reassess

## 2019-04-07 LAB
ANION GAP SERPL CALC-SCNC: 11 MMO/L — SIGNIFICANT CHANGE UP (ref 7–14)
BUN SERPL-MCNC: 62 MG/DL — HIGH (ref 7–23)
CALCIUM SERPL-MCNC: 9.4 MG/DL — SIGNIFICANT CHANGE UP (ref 8.4–10.5)
CHLORIDE SERPL-SCNC: 98 MMOL/L — SIGNIFICANT CHANGE UP (ref 98–107)
CO2 SERPL-SCNC: 26 MMOL/L — SIGNIFICANT CHANGE UP (ref 22–31)
CREAT SERPL-MCNC: 1.94 MG/DL — HIGH (ref 0.5–1.3)
GLUCOSE SERPL-MCNC: 89 MG/DL — SIGNIFICANT CHANGE UP (ref 70–99)
HCT VFR BLD CALC: 29.2 % — LOW (ref 34.5–45)
HGB BLD-MCNC: 8.5 G/DL — LOW (ref 11.5–15.5)
MAGNESIUM SERPL-MCNC: 1.7 MG/DL — SIGNIFICANT CHANGE UP (ref 1.6–2.6)
MCHC RBC-ENTMCNC: 19.5 PG — LOW (ref 27–34)
MCHC RBC-ENTMCNC: 29.1 % — LOW (ref 32–36)
MCV RBC AUTO: 67.1 FL — LOW (ref 80–100)
NRBC # FLD: 0.05 K/UL — SIGNIFICANT CHANGE UP (ref 0–0)
PLATELET # BLD AUTO: 189 K/UL — SIGNIFICANT CHANGE UP (ref 150–400)
PMV BLD: SIGNIFICANT CHANGE UP FL (ref 7–13)
POTASSIUM SERPL-MCNC: 4.3 MMOL/L — SIGNIFICANT CHANGE UP (ref 3.5–5.3)
POTASSIUM SERPL-SCNC: 4.3 MMOL/L — SIGNIFICANT CHANGE UP (ref 3.5–5.3)
RBC # BLD: 4.35 M/UL — SIGNIFICANT CHANGE UP (ref 3.8–5.2)
RBC # FLD: 16.1 % — HIGH (ref 10.3–14.5)
SODIUM SERPL-SCNC: 135 MMOL/L — SIGNIFICANT CHANGE UP (ref 135–145)
WBC # BLD: 5.65 K/UL — SIGNIFICANT CHANGE UP (ref 3.8–10.5)
WBC # FLD AUTO: 5.65 K/UL — SIGNIFICANT CHANGE UP (ref 3.8–10.5)

## 2019-04-07 PROCEDURE — 71045 X-RAY EXAM CHEST 1 VIEW: CPT | Mod: 26

## 2019-04-07 PROCEDURE — 78582 LUNG VENTILAT&PERFUS IMAGING: CPT | Mod: 26,GC

## 2019-04-07 PROCEDURE — 99233 SBSQ HOSP IP/OBS HIGH 50: CPT | Mod: GC

## 2019-04-07 RX ADMIN — SODIUM CHLORIDE 3 MILLILITER(S): 9 INJECTION INTRAMUSCULAR; INTRAVENOUS; SUBCUTANEOUS at 05:33

## 2019-04-07 RX ADMIN — PANTOPRAZOLE SODIUM 40 MILLIGRAM(S): 20 TABLET, DELAYED RELEASE ORAL at 17:40

## 2019-04-07 RX ADMIN — Medication 600 MILLIGRAM(S): at 13:22

## 2019-04-07 RX ADMIN — SODIUM CHLORIDE 3 MILLILITER(S): 9 INJECTION INTRAMUSCULAR; INTRAVENOUS; SUBCUTANEOUS at 13:22

## 2019-04-07 RX ADMIN — BUDESONIDE AND FORMOTEROL FUMARATE DIHYDRATE 2 PUFF(S): 160; 4.5 AEROSOL RESPIRATORY (INHALATION) at 09:03

## 2019-04-07 RX ADMIN — Medication 1 TABLET(S): at 13:21

## 2019-04-07 RX ADMIN — Medication 100 MILLIGRAM(S): at 21:07

## 2019-04-07 RX ADMIN — Medication 10 MILLIGRAM(S): at 21:07

## 2019-04-07 RX ADMIN — BUDESONIDE AND FORMOTEROL FUMARATE DIHYDRATE 2 PUFF(S): 160; 4.5 AEROSOL RESPIRATORY (INHALATION) at 21:07

## 2019-04-07 RX ADMIN — MAGNESIUM OXIDE 400 MG ORAL TABLET 400 MILLIGRAM(S): 241.3 TABLET ORAL at 09:02

## 2019-04-07 RX ADMIN — MAGNESIUM OXIDE 400 MG ORAL TABLET 400 MILLIGRAM(S): 241.3 TABLET ORAL at 17:41

## 2019-04-07 RX ADMIN — Medication 10 MILLIGRAM(S): at 13:21

## 2019-04-07 RX ADMIN — Medication 100 MILLIGRAM(S): at 05:24

## 2019-04-07 RX ADMIN — Medication 10 MILLIGRAM(S): at 05:25

## 2019-04-07 RX ADMIN — Medication 125 MILLIGRAM(S): at 17:41

## 2019-04-07 RX ADMIN — Medication 300 MILLIGRAM(S): at 13:21

## 2019-04-07 RX ADMIN — SODIUM CHLORIDE 3 MILLILITER(S): 9 INJECTION INTRAMUSCULAR; INTRAVENOUS; SUBCUTANEOUS at 21:06

## 2019-04-07 RX ADMIN — MAGNESIUM OXIDE 400 MG ORAL TABLET 400 MILLIGRAM(S): 241.3 TABLET ORAL at 13:21

## 2019-04-07 RX ADMIN — PANTOPRAZOLE SODIUM 40 MILLIGRAM(S): 20 TABLET, DELAYED RELEASE ORAL at 05:24

## 2019-04-07 NOTE — CONSULT NOTE ADULT - ATTENDING COMMENTS
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

## 2019-04-07 NOTE — CONSULT NOTE ADULT - ASSESSMENT
Pulmonary Hypertension. Possible contribution from Group II/III given A fib. Unclear about the underlying lung function, imaging looks normal. Will need to see PFT's. . Requires further workup to assess if any underlying CTD and also to rule out chronic thromboembolic disease (group IV). Meanwhile, will need treatment. Started on sildenafil. Can consider combination with ET antagonists, once work up completed to optimize regimen.     Recommendations:  - Continue sildenafil 10mg TID. Monitor BP closely.   - Obtain VQ scan of the lung. Eventually will need CT CHEST w/o contrast to assess lung parenchyma.   - Obtain PFT's from pulmonologist.  - Requires treatment for LINH.  - CTD workup sent, will follow results.  - Will need to restart diuretics once acute issues resolved. Currently appears euvolemic.   - Further workup for anemia per GI/primary team.  - Will need to assess exercise oximetry prior to discharge.   - Close outpatient follow up with Dr. Yang in the pulmonary hypertension clinic.     Please call with questions.    Oswaldo Gee MD  Fellow (PGY 6),  Pulmonary & Critical Care Medicine.  Pager - [phone] ([facility])/ [phone] ([place]) Pulmonary Hypertension. Has a component of both pre-capillary and post-capillary pulmonary hypertension. Unclear about the underlying lung function, imaging looks normal. Will need to see PFT's. . Requires further workup to assess if any underlying CTD and also to rule out chronic thromboembolic disease (group IV). Meanwhile, will need treatment. Started on sildenafil. Can consider combination with ET antagonists, once work up completed to optimize regimen on outpatient followup. Also needs work up for right sided thyroid nodule.     Recommendations:  - Continue sildenafil 10mg TID. Monitor BP closely.   - Obtain VQ scan of the lung. Eventually will need CT CHEST w/o contrast to assess lung parenchyma.  - CHeck TSH, HIV. Obtain thyroid ultrasound.   - Obtain PFT's from pulmonologist.  - Requires treatment for LINH.  - CTD workup sent, will follow results.  - Will need to restart diuretics once acute issues resolved. Currently appears euvolemic but needs to be discharged in maintenance diuretics.   - Further workup for anemia per GI/primary team.  - Will need to assess exercise oximetry prior to discharge.   - Close outpatient follow up with Dr. Yang in the pulmonary hypertension clinic. Please call [phone] before discharge to make follow up appointment.     Please call with questions.    Oswaldo Gee MD  Fellow (PGY 6),  Pulmonary & Critical Care Medicine.  Pager - [phone] ([facility])/ [phone] ([place])

## 2019-04-07 NOTE — CONSULT NOTE ADULT - SUBJECTIVE AND OBJECTIVE BOX
Pulmonary Hypertension Service Consult Note    HPI: 62 yo woman with multiple comorbidities, including R-sided heart failure with preserved EF (EF 66% on TTE in 10/2017), severe pulm HTN, mod-severe TR, a chart diagnosis of COPD (no history of smoking) on 2lpm NC, LINH , afib (on Eliquis), HTN, HLD, DM2, and gout presents with generalized weakness for the past 1 week. Pt states that she suddenly felt weak starting 1 week ago, leading to a fall on Tuesday while walking outdoors. Pt says that she was drained after walking several feet and tried to hold on to a fence for support, but her legs gave out, with pt dropping to her knees. Denies any head strike, LOC, or residual pain. Has had no recent fevers, headaches, vision changes, numbness/tingling, lightheadedness/dizziness, diarrhea, or urinary symptoms, but reports 1-2 weeks of a nonproductive cough and frequent chills. Pt states that after being started on Trulicity ~1 year ago, she began having weekly episodes of nausea and vomiting with increasing loss of appetite, resulting in a ~50-lb weight loss.     Due to the weakness and recent fall, pt saw her PCP this past Friday, with pt instructed to stop her metolazone and decrease her torsemide dose from 60 to 40 mg daily.  Patient had OSMAR on CKD on outpatient lab which has improved. On admission, found to be rhino/entero virus positive. Also being worked up for a GI bleed.     Based on her repeat echo showing elevated pulmonary pressures and signs of cor-pulmonale, she underwent a cardiac catheterization. Pulmonary Hypertension service was consulted given cath results with pulm HTN and normal PCWP.     On further review of history, she has been having exertional dyspnea. She is limited to a 1 block walking distance. She never smoked, but has being seeing her pulmonologist for 5 years. She has had multiple PFT's /spirometry done, we dont have results for the same. She does use inhalers. She is originally from [place] and had exposure to indoor gas stove over there. No history of childhood asthma. No history of pulmonary issues before this.     Family history strong for father with emphysema.    PAST MEDICAL & SURGICAL HISTORY:  Gout  Chronic systolic right heart failure  LINH (obstructive sleep apnea)  CLL (chronic lymphocytic leukemia)  COPD (chronic obstructive pulmonary disease)  Diabetes  HTN (hypertension)  Atrial fibrillation  No significant past surgical history      FAMILY HISTORY:  Family history of hyperlipidemia  Family history of hypertension (Sibling)  Family history of type 2 diabetes mellitus  Family history of cancer (Aunt): Breast      SOCIAL HISTORY:  As above,     Allergies    No Known Allergies    Intolerances        HOME MEDICATIONS:Home Medications:  Advair Diskus 100 mcg-50 mcg inhalation powder: 1 puff(s) inhaled 2 times a day, As Needed (01 Apr 2019 02:49)  allopurinol 300 mg oral tablet: 1 tab(s) orally once a day (01 Apr 2019 02:49)  calcium (as carbonate) 600 mg oral tablet: 1 tab(s) orally 2 times a day (01 Apr 2019 02:49)  digoxin 125 mcg (0.125 mg) oral tablet: 1 tab(s) orally once a day (01 Apr 2019 02:49)  Eliquis 5 mg oral tablet: 1 tab(s) orally 2 times a day (01 Apr 2019 02:49)  Glucosamine Chondroitin oral capsule: 1 cap(s) orally 2 times a day (01 Apr 2019 02:49)  Januvia 50 mg oral tablet: 1 tab(s) orally once a day (01 Apr 2019 02:49)  metoprolol succinate 100 mg oral tablet, extended release: 1 tab(s) orally 2 times a day (01 Apr 2019 02:49)  metoprolol succinate 25 mg oral tablet, extended release: 1 tab(s) orally once a day (in the evening) (01 Apr 2019 02:49)  Multiple Vitamins oral tablet: 1 tab(s) orally once a day (01 Apr 2019 02:49)  Rayaldee 30 mcg oral capsule, extended release: 1 cap(s) orally once a day (at bedtime) (01 Apr 2019 02:49)  Tessalon Perles 100 mg oral capsule: 1 cap(s) orally 2 times a day (01 Apr 2019 02:49)  torsemide 20 mg oral tablet: 2 tab(s) orally once a day (01 Apr 2019 02:49)  Ventolin HFA 90 mcg/inh inhalation aerosol: 2 puff(s) inhaled 4 times a day, As Needed (01 Apr 2019 02:49)      REVIEW OF SYSTEMS:  Constitutional: No fevers or chills. No weight loss. + fatigue or generalised malaise.  Eyes: No itching or discharge from the eyes  ENT: No ear pain. No ear discharge. No nasal congestion. No post nasal drip. No epistaxis. No throat pain. No sore throat. No difficulty swallowing.   CV: No chest pain. No palpitations. No lightheadedness or dizziness.   Resp: Occasional dyspnea at rest. + dyspnea on exertion. + orthopnea. No wheezing. No cough. No stridor. No sputum production. No chest pain with respiration.  GI: No nausea. No vomiting. No diarrhea.  MSK: No joint pain or pain in any extremities  Integumentary: No skin lesions. No pedal edema.  Neurological: No gross motor weakness. No sensory changes.    OBJECTIVE:  ICU Vital Signs Last 24 Hrs  T(C): 36.8 (07 Apr 2019 05:19), Max: 36.8 (06 Apr 2019 13:15)  T(F): 98.2 (07 Apr 2019 05:19), Max: 98.2 (06 Apr 2019 13:15)  HR: 79 (07 Apr 2019 05:19) (74 - 82)  BP: 100/64 (07 Apr 2019 05:19) (100/64 - 106/74)  RR: 16 (07 Apr 2019 05:19) (16 - 18)  SpO2: 95% (06 Apr 2019 23:36) (95% - 99%)        04-06 @ 07:01  -  04-07 @ 07:00  --------------------------------------------------------  IN: 690 mL / OUT: 1720 mL / NET: -1030 mL      CAPILLARY BLOOD GLUCOSE      POCT Blood Glucose.: 118 mg/dL (06 Apr 2019 21:23)      PHYSICAL EXAM:  General: Awake, alert, oriented X 3.   HEENT: Atraumatic, normocephalic.                 Mallampatti Grade 3  Lymph Nodes: No palpable lymphadenopathy  Neck: No JVD. No carotid bruit.   Respiratory: Normal chest expansion                     Normal and equal air entry  Cardiovascular: S1 S2 normal. No murmurs, rubs or gallops.   Abdomen: Soft, non-tender, non-distended. No organomegaly.  Extremities: Warm to touch. Peripheral pulse palpable. No pedal edema.   Skin: No rashes or skin lesions  Neurological: Motor and sensory examination equal and normal in all four extremities.  Psychiatry: Appropriate mood and affect.    HOSPITAL MEDICATIONS:  MEDICATIONS  (STANDING):  allopurinol 300 milliGRAM(s) Oral daily  benzonatate 100 milliGRAM(s) Oral every 8 hours  buDESOnide  80 MICROgram(s)/formoterol 4.5 MICROgram(s) Inhaler 2 Puff(s) Inhalation two times a day  dextrose 5%. 1000 milliLiter(s) (50 mL/Hr) IV Continuous <Continuous>  dextrose 50% Injectable 12.5 Gram(s) IV Push once  dextrose 50% Injectable 25 Gram(s) IV Push once  dextrose 50% Injectable 25 Gram(s) IV Push once  insulin lispro (HumaLOG) corrective regimen sliding scale   SubCutaneous three times a day before meals  insulin lispro (HumaLOG) corrective regimen sliding scale   SubCutaneous at bedtime  magnesium oxide 400 milliGRAM(s) Oral three times a day with meals  metoprolol succinate  milliGRAM(s) Oral <User Schedule>  metoprolol succinate  milliGRAM(s) Oral <User Schedule>  multivitamin 1 Tablet(s) Oral daily  pantoprazole  Injectable 40 milliGRAM(s) IV Push every 12 hours  sildenafil (REVATIO) 10 milliGRAM(s) Oral three times a day  sodium chloride 0.9% lock flush 3 milliLiter(s) IV Push every 8 hours    MEDICATIONS  (PRN):  ALBUTerol/ipratropium for Nebulization 3 milliLiter(s) Nebulizer every 6 hours PRN Shortness of Breath and/or Wheezing  dextrose 40% Gel 15 Gram(s) Oral once PRN Blood Glucose LESS THAN 70 milliGRAM(s)/deciliter  glucagon  Injectable 1 milliGRAM(s) IntraMuscular once PRN Glucose LESS THAN 70 milligrams/deciliter  guaiFENesin  milliGRAM(s) Oral every 12 hours PRN Cough      LABS:                        8.5    5.65  )-----------( 189      ( 07 Apr 2019 04:20 )             29.2     04-07    135  |  98  |  62<H>  ----------------------------<  89  4.3   |  26  |  1.94<H>    Ca    9.4      07 Apr 2019 04:22  Mg     1.7     04-07      Cardiac Catheterisation Results:    Aortic Pressure S/D/M - 133/74/101  PAP S/D/M - 78/29/45  PCWP - 20/21/18  RA (a/v/M) - 12/12/10  RV (S/EDP) - 78/14    CO by Ficks - 4.39L  PVR - 6.84 KISER    RADIOLOGY:  [X] Reviewed and interpreted by me  Xray wnl.   Lowe lung cuts on CT abdomen appear normal    ECHO: < from: Transthoracic Echocardiogram (04.01.19 @ 18:26) >  Mitral Valve: Mitral annular calcification and calcified  mitral leaflets with normal diastolic opening. Mild mitral  regurgitation.  Aortic Root: Aortic Root: 2.9 cm.  Aortic Valve: Calcified trileaflet aortic valve with normal  opening. Peak transaortic valve gradient equals 7 mm Hg.  Peak left ventricular outflow tract gradient equals 4.8 mm  Hg.  Left Atrium: Mildly dilated left atrium.  LA volume index =  37 cc/m2.  Left Ventricle: Hyperdynamic left ventricle. Flattening of  the interventricular septum in both systole and diastole is   consistent with right ventricular pressure overload.  Normal left ventricular internal dimensions and wall  thicknesses. (DT:150 ms).  Right Heart: Severe right atrial enlargement. Right  ventricular enlargement with normal right ventricular  systolic function. Normal tricuspid valve. Moderate-severe  tricuspid regurgitation. Pulmonicvalve not well  visualized. Mild pulmonic regurgitation.  Pericardium/PleuraNormal pericardium with no pericardial  effusion.  Hemodynamic: Estimated right ventricular systolic pressure  equals 84 mm Hg, assuming right atrial pressure equals 10  mm Hg, consistent with severe pulmonary hypertension.    < end of copied text > Pulmonary Hypertension Service Consult Note    HPI: 62 yo woman with multiple comorbidities, including R-sided heart failure with preserved EF (EF 66% on TTE in 10/2017), severe pulm HTN, mod-severe TR, a chart diagnosis of COPD (no history of smoking) on 2lpm NC, LINH , afib (on Eliquis), HTN, HLD, DM2, and gout presents with generalized weakness for the past 1 week. Pt states that she suddenly felt weak starting 1 week ago, leading to a fall on Tuesday while walking outdoors. Pt says that she was drained after walking several feet and tried to hold on to a fence for support, but her legs gave out, with pt dropping to her knees. Denies any head strike, LOC, or residual pain. Has had no recent fevers, headaches, vision changes, numbness/tingling, lightheadedness/dizziness, diarrhea, or urinary symptoms, but reports 1-2 weeks of a nonproductive cough and frequent chills. Pt states that after being started on Trulicity ~1 year ago, she began having weekly episodes of nausea and vomiting with increasing loss of appetite, resulting in a ~50-lb weight loss.     Due to the weakness and recent fall, pt saw her PCP this past Friday, with pt instructed to stop her metolazone and decrease her torsemide dose from 60 to 40 mg daily.  Patient had OSMAR on CKD on outpatient lab which has improved. On admission, found to be rhino/entero virus positive. Also being worked up for a GI bleed.     Based on her repeat echo showing elevated pulmonary pressures and signs of cor-pulmonale, she underwent a cardiac catheterization. Pulmonary Hypertension service was consulted given cath results with pulm HTN and normal PCWP.     On further review of history, she has been having exertional dyspnea. She is limited to a 1 block walking distance. She never smoked, but has being seeing her pulmonologist for 5 years. She has had multiple PFT's /spirometry done, we dont have results for the same. She does use inhalers. She is originally from [place] and had exposure to indoor gas stove over there. No history of childhood asthma. No history of pulmonary issues before this.     Family history strong for father with emphysema.    PAST MEDICAL & SURGICAL HISTORY:  Gout  Chronic systolic right heart failure  LINH (obstructive sleep apnea)  CLL (chronic lymphocytic leukemia)  COPD (chronic obstructive pulmonary disease)  Diabetes  HTN (hypertension)  Atrial fibrillation  No significant past surgical history      FAMILY HISTORY:  Family history of hyperlipidemia  Family history of hypertension (Sibling)  Family history of type 2 diabetes mellitus  Family history of cancer (Aunt): Breast      SOCIAL HISTORY:  As above,     Allergies    No Known Allergies    Intolerances        HOME MEDICATIONS:Home Medications:  Advair Diskus 100 mcg-50 mcg inhalation powder: 1 puff(s) inhaled 2 times a day, As Needed (01 Apr 2019 02:49)  allopurinol 300 mg oral tablet: 1 tab(s) orally once a day (01 Apr 2019 02:49)  calcium (as carbonate) 600 mg oral tablet: 1 tab(s) orally 2 times a day (01 Apr 2019 02:49)  digoxin 125 mcg (0.125 mg) oral tablet: 1 tab(s) orally once a day (01 Apr 2019 02:49)  Eliquis 5 mg oral tablet: 1 tab(s) orally 2 times a day (01 Apr 2019 02:49)  Glucosamine Chondroitin oral capsule: 1 cap(s) orally 2 times a day (01 Apr 2019 02:49)  Januvia 50 mg oral tablet: 1 tab(s) orally once a day (01 Apr 2019 02:49)  metoprolol succinate 100 mg oral tablet, extended release: 1 tab(s) orally 2 times a day (01 Apr 2019 02:49)  metoprolol succinate 25 mg oral tablet, extended release: 1 tab(s) orally once a day (in the evening) (01 Apr 2019 02:49)  Multiple Vitamins oral tablet: 1 tab(s) orally once a day (01 Apr 2019 02:49)  Rayaldee 30 mcg oral capsule, extended release: 1 cap(s) orally once a day (at bedtime) (01 Apr 2019 02:49)  Tessalon Perles 100 mg oral capsule: 1 cap(s) orally 2 times a day (01 Apr 2019 02:49)  torsemide 20 mg oral tablet: 2 tab(s) orally once a day (01 Apr 2019 02:49)  Ventolin HFA 90 mcg/inh inhalation aerosol: 2 puff(s) inhaled 4 times a day, As Needed (01 Apr 2019 02:49)      REVIEW OF SYSTEMS:  Constitutional: No fevers or chills. No weight loss. + fatigue or generalised malaise.  Eyes: No itching or discharge from the eyes  ENT: No ear pain. No ear discharge. No nasal congestion. No post nasal drip. No epistaxis. No throat pain. No sore throat. No difficulty swallowing.   CV: No chest pain. No palpitations. No lightheadedness or dizziness.   Resp: Occasional dyspnea at rest. + dyspnea on exertion. + orthopnea. No wheezing. No cough. No stridor. No sputum production. No chest pain with respiration.  GI: No nausea. No vomiting. No diarrhea.  MSK: No joint pain or pain in any extremities  Integumentary: No skin lesions. No pedal edema.  Neurological: No gross motor weakness. No sensory changes.    OBJECTIVE:  ICU Vital Signs Last 24 Hrs  T(C): 36.8 (07 Apr 2019 05:19), Max: 36.8 (06 Apr 2019 13:15)  T(F): 98.2 (07 Apr 2019 05:19), Max: 98.2 (06 Apr 2019 13:15)  HR: 79 (07 Apr 2019 05:19) (74 - 82)  BP: 100/64 (07 Apr 2019 05:19) (100/64 - 106/74)  RR: 16 (07 Apr 2019 05:19) (16 - 18)  SpO2: 95% (06 Apr 2019 23:36) (95% - 99%)        04-06 @ 07:01  -  04-07 @ 07:00  --------------------------------------------------------  IN: 690 mL / OUT: 1720 mL / NET: -1030 mL      CAPILLARY BLOOD GLUCOSE      POCT Blood Glucose.: 118 mg/dL (06 Apr 2019 21:23)      PHYSICAL EXAM:  General: Awake, alert, oriented X 3.   HEENT: Atraumatic, normocephalic.                 Mallampatti Grade 3  Lymph Nodes: No palpable lymphadenopathy  Neck: No JVD. No carotid bruit.   Respiratory: Normal chest expansion                     Normal and equal air entry  Cardiovascular: S1 S2 normal. No murmurs, rubs or gallops.   Abdomen: Soft, non-tender, non-distended. No organomegaly.  Extremities: Warm to touch. Peripheral pulse palpable. No pedal edema.   Skin: No rashes or skin lesions  Neurological: Motor and sensory examination equal and normal in all four extremities.  Psychiatry: Appropriate mood and affect.    HOSPITAL MEDICATIONS:  MEDICATIONS  (STANDING):  allopurinol 300 milliGRAM(s) Oral daily  benzonatate 100 milliGRAM(s) Oral every 8 hours  buDESOnide  80 MICROgram(s)/formoterol 4.5 MICROgram(s) Inhaler 2 Puff(s) Inhalation two times a day  dextrose 5%. 1000 milliLiter(s) (50 mL/Hr) IV Continuous <Continuous>  dextrose 50% Injectable 12.5 Gram(s) IV Push once  dextrose 50% Injectable 25 Gram(s) IV Push once  dextrose 50% Injectable 25 Gram(s) IV Push once  insulin lispro (HumaLOG) corrective regimen sliding scale   SubCutaneous three times a day before meals  insulin lispro (HumaLOG) corrective regimen sliding scale   SubCutaneous at bedtime  magnesium oxide 400 milliGRAM(s) Oral three times a day with meals  metoprolol succinate  milliGRAM(s) Oral <User Schedule>  metoprolol succinate  milliGRAM(s) Oral <User Schedule>  multivitamin 1 Tablet(s) Oral daily  pantoprazole  Injectable 40 milliGRAM(s) IV Push every 12 hours  sildenafil (REVATIO) 10 milliGRAM(s) Oral three times a day  sodium chloride 0.9% lock flush 3 milliLiter(s) IV Push every 8 hours    MEDICATIONS  (PRN):  ALBUTerol/ipratropium for Nebulization 3 milliLiter(s) Nebulizer every 6 hours PRN Shortness of Breath and/or Wheezing  dextrose 40% Gel 15 Gram(s) Oral once PRN Blood Glucose LESS THAN 70 milliGRAM(s)/deciliter  glucagon  Injectable 1 milliGRAM(s) IntraMuscular once PRN Glucose LESS THAN 70 milligrams/deciliter  guaiFENesin  milliGRAM(s) Oral every 12 hours PRN Cough      LABS:                        8.5    5.65  )-----------( 189      ( 07 Apr 2019 04:20 )             29.2     04-07    135  |  98  |  62<H>  ----------------------------<  89  4.3   |  26  |  1.94<H>    Ca    9.4      07 Apr 2019 04:22  Mg     1.7     04-07      Cardiac Catheterisation Results:    Aortic Pressure S/D/M - 133/74/101  PAP S/D/M - 78/29/45  PCWP - 20/21/18  RA (a/v/M) - 12/12/10  RV (S/EDP) - 78/14    CO by Ficks - 4.39L  PVR - 6.84 KISER    RADIOLOGY:  [X] Reviewed and interpreted by me  Xray wnl.   Lowe lung cuts on CT abdomen appear normal    ECHO: < from: Transthoracic Echocardiogram (04.01.19 @ 18:26) >  Mitral Valve: Mitral annular calcification and calcified  mitral leaflets with normal diastolic opening. Mild mitral  regurgitation.  Aortic Root: Aortic Root: 2.9 cm.  Aortic Valve: Calcified trileaflet aortic valve with normal  opening. Peak transaortic valve gradient equals 7 mm Hg.  Peak left ventricular outflow tract gradient equals 4.8 mm  Hg.  Left Atrium: Mildly dilated left atrium.  LA volume index =  37 cc/m2.  Left Ventricle: Hyperdynamic left ventricle. Flattening of  the interventricular septum in both systole and diastole is   consistent with right ventricular pressure overload.  Normal left ventricular internal dimensions and wall  thicknesses. (DT:150 ms).  Right Heart: Severe right atrial enlargement. Right  ventricular enlargement with normal right ventricular  systolic function. Normal tricuspid valve. Moderate-severe  tricuspid regurgitation. Pulmonicvalve not well  visualized. Mild pulmonic regurgitation.  Pericardium/PleuraNormal pericardium with no pericardial  effusion.  Hemodynamic: Estimated right ventricular systolic pressure  equals 84 mm Hg, assuming right atrial pressure equals 10  mm Hg, consistent with severe pulmonary hypertension.    < end of copied text > Pulmonary Hypertension Service Consult Note    HPI: 64 yo woman with multiple comorbidities, including R-sided heart failure with preserved EF (EF 66% on TTE in 10/2017), severe pulm HTN, mod-severe TR, a chart diagnosis of COPD (no history of smoking) on 2lpm NC, LINH , afib (on Eliquis), HTN, HLD, DM2, and gout presents with generalized weakness for the past 1 week. Pt states that she suddenly felt weak starting 1 week ago, leading to a fall on Tuesday while walking outdoors. Pt says that she was drained after walking several feet and tried to hold on to a fence for support, but her legs gave out, with pt dropping to her knees. Denies any head strike, LOC, or residual pain. Has had no recent fevers, headaches, vision changes, numbness/tingling, lightheadedness/dizziness, diarrhea, or urinary symptoms, but reports 1-2 weeks of a nonproductive cough and frequent chills. Pt states that after being started on Trulicity ~1 year ago, she began having weekly episodes of nausea and vomiting with increasing loss of appetite, resulting in a ~50-lb weight loss.     Due to the weakness and recent fall, pt saw her PCP this past Friday, with pt instructed to stop her metolazone and decrease her torsemide dose from 60 to 40 mg daily.  Patient had OSMAR on CKD on outpatient lab which has improved. On admission, found to be rhino/entero virus positive. Also being worked up for a GI bleed.     Based on her repeat echo showing elevated pulmonary pressures and signs of cor-pulmonale, she underwent a cardiac catheterization. Pulmonary Hypertension service was consulted given cath results with pulm HTN and normal PCWP.     On further review of history, she has been having exertional dyspnea. She is limited to a 1 block walking distance. She never smoked, but has being seeing her pulmonologist for 5 years. She has had multiple PFT's /spirometry done, we dont have results for the same. She does use inhalers. She is originally from [place] and had exposure to indoor gas stove over there. No history of childhood asthma. No history of pulmonary issues before this.     Family history strong for father with emphysema.    PAST MEDICAL & SURGICAL HISTORY:  Gout  Chronic systolic right heart failure  LINH (obstructive sleep apnea)  CLL (chronic lymphocytic leukemia)  COPD (chronic obstructive pulmonary disease)  Diabetes  HTN (hypertension)  Atrial fibrillation  No significant past surgical history      FAMILY HISTORY:  Family history of hyperlipidemia  Family history of hypertension (Sibling)  Family history of type 2 diabetes mellitus  Family history of cancer (Aunt): Breast      SOCIAL HISTORY:  As above,     Allergies    No Known Allergies    Intolerances        HOME MEDICATIONS:Home Medications:  Advair Diskus 100 mcg-50 mcg inhalation powder: 1 puff(s) inhaled 2 times a day, As Needed (01 Apr 2019 02:49)  allopurinol 300 mg oral tablet: 1 tab(s) orally once a day (01 Apr 2019 02:49)  calcium (as carbonate) 600 mg oral tablet: 1 tab(s) orally 2 times a day (01 Apr 2019 02:49)  digoxin 125 mcg (0.125 mg) oral tablet: 1 tab(s) orally once a day (01 Apr 2019 02:49)  Eliquis 5 mg oral tablet: 1 tab(s) orally 2 times a day (01 Apr 2019 02:49)  Glucosamine Chondroitin oral capsule: 1 cap(s) orally 2 times a day (01 Apr 2019 02:49)  Januvia 50 mg oral tablet: 1 tab(s) orally once a day (01 Apr 2019 02:49)  metoprolol succinate 100 mg oral tablet, extended release: 1 tab(s) orally 2 times a day (01 Apr 2019 02:49)  metoprolol succinate 25 mg oral tablet, extended release: 1 tab(s) orally once a day (in the evening) (01 Apr 2019 02:49)  Multiple Vitamins oral tablet: 1 tab(s) orally once a day (01 Apr 2019 02:49)  Rayaldee 30 mcg oral capsule, extended release: 1 cap(s) orally once a day (at bedtime) (01 Apr 2019 02:49)  Tessalon Perles 100 mg oral capsule: 1 cap(s) orally 2 times a day (01 Apr 2019 02:49)  torsemide 20 mg oral tablet: 2 tab(s) orally once a day (01 Apr 2019 02:49)  Ventolin HFA 90 mcg/inh inhalation aerosol: 2 puff(s) inhaled 4 times a day, As Needed (01 Apr 2019 02:49)      REVIEW OF SYSTEMS:  Constitutional: No fevers or chills. No weight loss. + fatigue or generalised malaise.  Eyes: No itching or discharge from the eyes  ENT: No ear pain. No ear discharge. No nasal congestion. No post nasal drip. No epistaxis. No throat pain. No sore throat. No difficulty swallowing.   CV: No chest pain. No palpitations. No lightheadedness or dizziness.   Resp: Occasional dyspnea at rest. + dyspnea on exertion. + orthopnea. No wheezing. No cough. No stridor. No sputum production. No chest pain with respiration.  GI: No nausea. No vomiting. No diarrhea.  MSK: No joint pain or pain in any extremities  Integumentary: No skin lesions. No pedal edema.  Neurological: No gross motor weakness. No sensory changes.    OBJECTIVE:  ICU Vital Signs Last 24 Hrs  T(C): 36.8 (07 Apr 2019 05:19), Max: 36.8 (06 Apr 2019 13:15)  T(F): 98.2 (07 Apr 2019 05:19), Max: 98.2 (06 Apr 2019 13:15)  HR: 79 (07 Apr 2019 05:19) (74 - 82)  BP: 100/64 (07 Apr 2019 05:19) (100/64 - 106/74)  RR: 16 (07 Apr 2019 05:19) (16 - 18)  SpO2: 95% (06 Apr 2019 23:36) (95% - 99%)        04-06 @ 07:01  -  04-07 @ 07:00  --------------------------------------------------------  IN: 690 mL / OUT: 1720 mL / NET: -1030 mL      CAPILLARY BLOOD GLUCOSE      POCT Blood Glucose.: 118 mg/dL (06 Apr 2019 21:23)      PHYSICAL EXAM:  General: Awake, alert, oriented X 3.   HEENT: Atraumatic, normocephalic.                 Mallampatti Grade 3  	  ENLARGED RIGHT SIDED THYROID NODULE.  Lymph Nodes: No palpable lymphadenopathy  Neck: No JVD. No carotid bruit.   Respiratory: Normal chest expansion                     Normal and equal air entry  Cardiovascular: S1 S2 normal. No murmurs, rubs or gallops.   Abdomen: Soft, non-tender, non-distended. No organomegaly.  Extremities: Warm to touch. Peripheral pulse palpable. No pedal edema.   Skin: No rashes or skin lesions  Neurological: Motor and sensory examination equal and normal in all four extremities.  Psychiatry: Appropriate mood and affect.    HOSPITAL MEDICATIONS:  MEDICATIONS  (STANDING):  allopurinol 300 milliGRAM(s) Oral daily  benzonatate 100 milliGRAM(s) Oral every 8 hours  buDESOnide  80 MICROgram(s)/formoterol 4.5 MICROgram(s) Inhaler 2 Puff(s) Inhalation two times a day  dextrose 5%. 1000 milliLiter(s) (50 mL/Hr) IV Continuous <Continuous>  dextrose 50% Injectable 12.5 Gram(s) IV Push once  dextrose 50% Injectable 25 Gram(s) IV Push once  dextrose 50% Injectable 25 Gram(s) IV Push once  insulin lispro (HumaLOG) corrective regimen sliding scale   SubCutaneous three times a day before meals  insulin lispro (HumaLOG) corrective regimen sliding scale   SubCutaneous at bedtime  magnesium oxide 400 milliGRAM(s) Oral three times a day with meals  metoprolol succinate  milliGRAM(s) Oral <User Schedule>  metoprolol succinate  milliGRAM(s) Oral <User Schedule>  multivitamin 1 Tablet(s) Oral daily  pantoprazole  Injectable 40 milliGRAM(s) IV Push every 12 hours  sildenafil (REVATIO) 10 milliGRAM(s) Oral three times a day  sodium chloride 0.9% lock flush 3 milliLiter(s) IV Push every 8 hours    MEDICATIONS  (PRN):  ALBUTerol/ipratropium for Nebulization 3 milliLiter(s) Nebulizer every 6 hours PRN Shortness of Breath and/or Wheezing  dextrose 40% Gel 15 Gram(s) Oral once PRN Blood Glucose LESS THAN 70 milliGRAM(s)/deciliter  glucagon  Injectable 1 milliGRAM(s) IntraMuscular once PRN Glucose LESS THAN 70 milligrams/deciliter  guaiFENesin  milliGRAM(s) Oral every 12 hours PRN Cough      LABS:                        8.5    5.65  )-----------( 189      ( 07 Apr 2019 04:20 )             29.2     04-07    135  |  98  |  62<H>  ----------------------------<  89  4.3   |  26  |  1.94<H>    Ca    9.4      07 Apr 2019 04:22  Mg     1.7     04-07      Cardiac Catheterisation Results:    Aortic Pressure S/D/M - 133/74/101  PAP S/D/M - 78/29/45  PCWP - 20/21/18  RA (a/v/M) - 12/12/10  RV (S/EDP) - 78/14    CO by Ficks - 4.39L  PVR - 6.84 KISER    RADIOLOGY:  [X] Reviewed and interpreted by me  Xrrebecca wnl.   Lowe lung cuts on CT abdomen appear normal    ECHO: < from: Transthoracic Echocardiogram (04.01.19 @ 18:26) >  Mitral Valve: Mitral annular calcification and calcified  mitral leaflets with normal diastolic opening. Mild mitral  regurgitation.  Aortic Root: Aortic Root: 2.9 cm.  Aortic Valve: Calcified trileaflet aortic valve with normal  opening. Peak transaortic valve gradient equals 7 mm Hg.  Peak left ventricular outflow tract gradient equals 4.8 mm  Hg.  Left Atrium: Mildly dilated left atrium.  LA volume index =  37 cc/m2.  Left Ventricle: Hyperdynamic left ventricle. Flattening of  the interventricular septum in both systole and diastole is   consistent with right ventricular pressure overload.  Normal left ventricular internal dimensions and wall  thicknesses. (DT:150 ms).  Right Heart: Severe right atrial enlargement. Right  ventricular enlargement with normal right ventricular  systolic function. Normal tricuspid valve. Moderate-severe  tricuspid regurgitation. Pulmonicvalve not well  visualized. Mild pulmonic regurgitation.  Pericardium/PleuraNormal pericardium with no pericardial  effusion.  Hemodynamic: Estimated right ventricular systolic pressure  equals 84 mm Hg, assuming right atrial pressure equals 10  mm Hg, consistent with severe pulmonary hypertension.    < end of copied text >

## 2019-04-08 DIAGNOSIS — I48.91 UNSPECIFIED ATRIAL FIBRILLATION: ICD-10-CM

## 2019-04-08 DIAGNOSIS — I50.32 CHRONIC DIASTOLIC (CONGESTIVE) HEART FAILURE: ICD-10-CM

## 2019-04-08 DIAGNOSIS — I27.20 PULMONARY HYPERTENSION, UNSPECIFIED: ICD-10-CM

## 2019-04-08 DIAGNOSIS — D64.9 ANEMIA, UNSPECIFIED: ICD-10-CM

## 2019-04-08 LAB
ANION GAP SERPL CALC-SCNC: 12 MMO/L — SIGNIFICANT CHANGE UP (ref 7–14)
BUN SERPL-MCNC: 62 MG/DL — HIGH (ref 7–23)
CALCIUM SERPL-MCNC: 9.9 MG/DL — SIGNIFICANT CHANGE UP (ref 8.4–10.5)
CHLORIDE SERPL-SCNC: 97 MMOL/L — LOW (ref 98–107)
CO2 SERPL-SCNC: 24 MMOL/L — SIGNIFICANT CHANGE UP (ref 22–31)
CREAT SERPL-MCNC: 2.19 MG/DL — HIGH (ref 0.5–1.3)
DSDNA AB FLD-ACNC: <0.2 — SIGNIFICANT CHANGE UP
ENA SCL70 AB SER-ACNC: <0.2 — SIGNIFICANT CHANGE UP
ENA SS-A AB FLD IA-ACNC: <0.2 — SIGNIFICANT CHANGE UP
GLUCOSE SERPL-MCNC: 84 MG/DL — SIGNIFICANT CHANGE UP (ref 70–99)
HCT VFR BLD CALC: 27.9 % — LOW (ref 34.5–45)
HGB BLD-MCNC: 8.5 G/DL — LOW (ref 11.5–15.5)
HIV 1+2 AB+HIV1 P24 AG SERPL QL IA: SIGNIFICANT CHANGE UP
MAGNESIUM SERPL-MCNC: 1.7 MG/DL — SIGNIFICANT CHANGE UP (ref 1.6–2.6)
MCHC RBC-ENTMCNC: 20.1 PG — LOW (ref 27–34)
MCHC RBC-ENTMCNC: 30.5 % — LOW (ref 32–36)
MCV RBC AUTO: 66 FL — LOW (ref 80–100)
NRBC # FLD: 0.06 K/UL — SIGNIFICANT CHANGE UP (ref 0–0)
NRBC FLD-RTO: 1.1 — SIGNIFICANT CHANGE UP
PLATELET # BLD AUTO: 205 K/UL — SIGNIFICANT CHANGE UP (ref 150–400)
PMV BLD: SIGNIFICANT CHANGE UP FL (ref 7–13)
POTASSIUM SERPL-MCNC: 4.2 MMOL/L — SIGNIFICANT CHANGE UP (ref 3.5–5.3)
POTASSIUM SERPL-SCNC: 4.2 MMOL/L — SIGNIFICANT CHANGE UP (ref 3.5–5.3)
RBC # BLD: 4.23 M/UL — SIGNIFICANT CHANGE UP (ref 3.8–5.2)
RBC # FLD: 16.4 % — HIGH (ref 10.3–14.5)
SODIUM SERPL-SCNC: 133 MMOL/L — LOW (ref 135–145)
TSH SERPL-MCNC: 3.74 UIU/ML — SIGNIFICANT CHANGE UP (ref 0.27–4.2)
WBC # BLD: 5.42 K/UL — SIGNIFICANT CHANGE UP (ref 3.8–10.5)
WBC # FLD AUTO: 5.42 K/UL — SIGNIFICANT CHANGE UP (ref 3.8–10.5)

## 2019-04-08 PROCEDURE — 76536 US EXAM OF HEAD AND NECK: CPT | Mod: 26

## 2019-04-08 PROCEDURE — 71250 CT THORAX DX C-: CPT | Mod: 26

## 2019-04-08 PROCEDURE — 99233 SBSQ HOSP IP/OBS HIGH 50: CPT | Mod: GC

## 2019-04-08 PROCEDURE — 99233 SBSQ HOSP IP/OBS HIGH 50: CPT

## 2019-04-08 RX ORDER — IRON SUCROSE 20 MG/ML
200 INJECTION, SOLUTION INTRAVENOUS EVERY 24 HOURS
Qty: 0 | Refills: 0 | Status: COMPLETED | OUTPATIENT
Start: 2019-04-08 | End: 2019-04-12

## 2019-04-08 RX ADMIN — MAGNESIUM OXIDE 400 MG ORAL TABLET 400 MILLIGRAM(S): 241.3 TABLET ORAL at 09:29

## 2019-04-08 RX ADMIN — Medication 100 MILLIGRAM(S): at 21:18

## 2019-04-08 RX ADMIN — Medication 10 MILLIGRAM(S): at 06:12

## 2019-04-08 RX ADMIN — Medication 1 TABLET(S): at 14:05

## 2019-04-08 RX ADMIN — PANTOPRAZOLE SODIUM 40 MILLIGRAM(S): 20 TABLET, DELAYED RELEASE ORAL at 06:11

## 2019-04-08 RX ADMIN — SODIUM CHLORIDE 3 MILLILITER(S): 9 INJECTION INTRAMUSCULAR; INTRAVENOUS; SUBCUTANEOUS at 21:11

## 2019-04-08 RX ADMIN — BUDESONIDE AND FORMOTEROL FUMARATE DIHYDRATE 2 PUFF(S): 160; 4.5 AEROSOL RESPIRATORY (INHALATION) at 09:30

## 2019-04-08 RX ADMIN — PANTOPRAZOLE SODIUM 40 MILLIGRAM(S): 20 TABLET, DELAYED RELEASE ORAL at 18:19

## 2019-04-08 RX ADMIN — Medication 100 MILLIGRAM(S): at 09:30

## 2019-04-08 RX ADMIN — Medication 100 MILLIGRAM(S): at 06:12

## 2019-04-08 RX ADMIN — BUDESONIDE AND FORMOTEROL FUMARATE DIHYDRATE 2 PUFF(S): 160; 4.5 AEROSOL RESPIRATORY (INHALATION) at 21:16

## 2019-04-08 RX ADMIN — Medication 100 MILLIGRAM(S): at 14:06

## 2019-04-08 RX ADMIN — Medication 300 MILLIGRAM(S): at 14:06

## 2019-04-08 RX ADMIN — Medication 10 MILLIGRAM(S): at 21:16

## 2019-04-08 RX ADMIN — Medication 10 MILLIGRAM(S): at 14:06

## 2019-04-08 RX ADMIN — SODIUM CHLORIDE 3 MILLILITER(S): 9 INJECTION INTRAMUSCULAR; INTRAVENOUS; SUBCUTANEOUS at 14:07

## 2019-04-08 RX ADMIN — IRON SUCROSE 110 MILLIGRAM(S): 20 INJECTION, SOLUTION INTRAVENOUS at 14:07

## 2019-04-08 RX ADMIN — SODIUM CHLORIDE 3 MILLILITER(S): 9 INJECTION INTRAMUSCULAR; INTRAVENOUS; SUBCUTANEOUS at 06:07

## 2019-04-08 NOTE — PROGRESS NOTE ADULT - ASSESSMENT
62 yo woman with multiple comorbidities, including R-sided heart failure with preserved EF (EF 66% on TTE in 10/2017), severe pulm HTN, mod-severe TR, COPD (on 2L NC and Advair BID), LINH , afib (on Eliquis), HTN, HLD, DM2, and gout who presented with 1 week of generalized weakness, and several months of decreased appetite and weight loss. She was EV/RV+ on admission and worked up for GI bleed. She had an ECHO that showed elevated pulmonary pressures and signs of cor-pulmonale, so she underwent a cardiac catheterization. Pulmonary Hypertension service was consulted given cath results with pulm HTN and normal PCWP.     Pulmonary Hypertension:  Has a component of both pre-capillary and post-capillary pulmonary hypertension. Unclear about the underlying lung function, imaging looks normal. VQ scan with a very low probability of PE. Was started on sildenafil, has not had any adverse effects form this. Will need to see PFT's. Requires further workup to assess if any underlying CTD. Also needs work up for right sided thyroid nodule.     Recommendations:  - Continue sildenafil 10mg TID. Monitor BP closely.   - F/u CT CHEST w/o contrast to assess lung parenchyma.  - F/u TSH, HIV  - Obtain thyroid ultrasound.   - Obtain PFT's from pulmonologist.  - Requires treatment for LINH.  - CTD workup sent, will follow results.  - Will need to restart diuretics once acute issues resolved. Currently appears euvolemic but needs to be discharged in maintenance diuretics.   - Further workup for anemia per GI/primary team.  - Will need to assess exercise oximetry prior to discharge.   - Close outpatient follow up with Dr. Yang in the pulmonary hypertension clinic.   - Please call [phone] before discharge to make follow up appointment.     Please call with questions.    Talya Alcantara MD  Pulmonary & Critical Care Medicine Fellow | PGY-4  [phone] 64 yo woman with multiple comorbidities, including R-sided heart failure with preserved EF (EF 66% on TTE in 10/2017), severe pulm HTN, mod-severe TR, COPD (on 2L NC and Advair BID), LINH , afib (on Eliquis), HTN, HLD, DM2, and gout who presented with 1 week of generalized weakness, and several months of decreased appetite and weight loss. She was EV/RV+ on admission and worked up for GI bleed. She had an ECHO that showed elevated pulmonary pressures and signs of cor-pulmonale, so she underwent a cardiac catheterization. Pulmonary Hypertension service was consulted given cath results with pulm HTN and normal PCWP.     Pulmonary Hypertension:  Has a component of both pre-capillary and post-capillary pulmonary hypertension. Suspect combination of group II/III. Unclear about the underlying lung function, imaging looks normal. VQ scan with a very low probability of PE. Was started on sildenafil, has not had any adverse effects form this. Will need to see PFT's. Requires further workup to assess if any underlying CTD. Also needs work up for right sided thyroid nodule.     Recommendations:  - Restart diuretics, aim for net even to slightly negative.  - Continue sildenafil 10mg TID. Monitor BP closely.   - F/u CT CHEST w/o contrast to assess lung parenchyma.  - F/u TSH, HIV  - Obtain thyroid ultrasound.   - Obtain PFT's from pulmonologist.  - Requires treatment for LINH.  - CTD workup sent, will follow results.  - Further workup for anemia per GI/primary team.  - Will need to assess exercise oximetry prior to discharge.   - Close outpatient follow up with Dr. Yang in the pulmonary hypertension clinic.   - Please call [phone] before discharge to make follow up appointment.     Please call with questions.    Talya Alcantara MD  Pulmonary & Critical Care Medicine Fellow | PGY-4  [phone]

## 2019-04-08 NOTE — PROGRESS NOTE ADULT - PROBLEM SELECTOR PLAN 2
Echo shows severe pulm HTN rather than LV failure. CHF team is following. RHC findings as per cardiology  Started on Revatio.

## 2019-04-08 NOTE — PROGRESS NOTE ADULT - SUBJECTIVE AND OBJECTIVE BOX
Patient is a 63y old  Female who presents with a chief complaint of Generalized weakness (2019 16:52)      SUBJECTIVE / OVERNIGHT EVENTS:  No shortness of breath. Blood pressure is improved now.Worried about her low blood pressure   Review of Systems:   CONSTITUTIONAL: No fever, weight loss,   EYES: No eye pain, visual disturbances, or discharge  ENMT:  No difficulty hearing, tinnitus, vertigo; No sinus or throat pain  NECK: No pain or stiffness  BREASTS: No pain, masses, or nipple discharge  RESPIRATORY: No cough, wheezing, chills or hemoptysis; No shortness of breath  CARDIOVASCULAR: No chest pain, palpitations, dizziness, or leg swelling  GASTROINTESTINAL: No abdominal or epigastric pain. No nausea, vomiting, or hematemesis; No diarrhea or constipation. No melena or hematochezia.  GENITOURINARY: No dysuria, frequency, hematuria, or incontinence  NEUROLOGICAL: No headaches, memory loss, loss of strength, numbness, or tremors  SKIN: No itching, burning, rashes, or lesions   LYMPH NODES: No enlarged glands  ENDOCRINE: No heat or cold intolerance; No hair loss  MUSCULOSKELETAL: No joint pain or swelling; No muscle, back, or extremity pain  PSYCHIATRIC: No depression, anxiety, mood swings, or difficulty sleeping  HEME/LYMPH: No easy bruising, or bleeding gums  ALLERY AND IMMUNOLOGIC: No hives or eczema    MEDICATIONS  (STANDING):  allopurinol 300 milliGRAM(s) Oral daily  benzonatate 100 milliGRAM(s) Oral every 8 hours  buDESOnide  80 MICROgram(s)/formoterol 4.5 MICROgram(s) Inhaler 2 Puff(s) Inhalation two times a day  dextrose 5%. 1000 milliLiter(s) (50 mL/Hr) IV Continuous <Continuous>  dextrose 50% Injectable 12.5 Gram(s) IV Push once  dextrose 50% Injectable 25 Gram(s) IV Push once  dextrose 50% Injectable 25 Gram(s) IV Push once  insulin lispro (HumaLOG) corrective regimen sliding scale   SubCutaneous three times a day before meals  insulin lispro (HumaLOG) corrective regimen sliding scale   SubCutaneous at bedtime  metoprolol succinate  milliGRAM(s) Oral <User Schedule>  metoprolol succinate  milliGRAM(s) Oral <User Schedule>  multivitamin 1 Tablet(s) Oral daily  oseltamivir 30 milliGRAM(s) Oral daily  pantoprazole  Injectable 40 milliGRAM(s) IV Push every 12 hours    MEDICATIONS  (PRN):  ALBUTerol/ipratropium for Nebulization 3 milliLiter(s) Nebulizer every 6 hours PRN Shortness of Breath and/or Wheezing  dextrose 40% Gel 15 Gram(s) Oral once PRN Blood Glucose LESS THAN 70 milliGRAM(s)/deciliter  glucagon  Injectable 1 milliGRAM(s) IntraMuscular once PRN Glucose LESS THAN 70 milligrams/deciliter      PHYSICAL EXAM:  Vital Signs Last 24 Hrs  T(C): 36.6 (2019 20:32), Max: 36.9 (2019 05:27)  T(F): 97.9 (2019 20:32), Max: 98.4 (2019 05:27)  HR: 81 (2019 20:32) (57 - 81)  BP: 117/81 (2019 20:32) (94/58 - 117/81)  BP(mean): --  RR: 18 (2019 20:32) (18 - 19)  SpO2: 100% (2019 20:32) (95% - 100%)  I&O's Summary    2019 07:01  -  2019 07:00  --------------------------------------------------------  IN: 720 mL / OUT: 0 mL / NET: 720 mL    2019 07:01  -  2019 20:51  --------------------------------------------------------  IN: 1311 mL / OUT: 0 mL / NET: 1311 mL      GENERAL: NAD, well-developed  HEAD:  Atraumatic, Normocephalic  EYES: EOMI, PERRLA, conjunctiva and sclera clear  NECK: Supple, No JVD  CHEST/LUNG: Clear to auscultation bilaterally; No wheeze  HEART: Regular rate and rhythm; No murmurs, rubs, or gallops  ABDOMEN: Soft, Nontender, Nondistended; Bowel sounds present  EXTREMITIES:  2+ Peripheral Pulses, No clubbing, cyanosis, or edema  PSYCH: AAOx3  NEUROLOGY: non-focal  SKIN: No rashes or lesions    LABS:  CAPILLARY BLOOD GLUCOSE      POCT Blood Glucose.: 103 mg/dL (2019 17:19)  POCT Blood Glucose.: 120 mg/dL (2019 12:47)  POCT Blood Glucose.: 104 mg/dL (2019 08:13)  POCT Blood Glucose.: 133 mg/dL (2019 22:28)                          9.0    5.29  )-----------( 169      ( 2019 07:50 )             29.6     04-02    136  |  92<L>  |  122<H>  ----------------------------<  102<H>  3.8   |  30  |  2.73<H>    Ca    9.8      2019 07:50  Phos  2.7     04-02  Mg     1.8     04-02    TPro  7.0  /  Alb  4.2  /  TBili  1.0  /  DBili  x   /  AST  23  /  ALT  14  /  AlkPhos  65  04-01    PT/INR - ( 31 Mar 2019 21:50 )   PT: 21.5 SEC;   INR: 1.85          PTT - ( 31 Mar 2019 21:50 )  PTT:34.0 SEC      Urinalysis Basic - ( 31 Mar 2019 21:50 )    Color: YELLOW / Appearance: CLEAR / S.012 / pH: 6.5  Gluc: NEGATIVE / Ketone: NEGATIVE  / Bili: NEGATIVE / Urobili: NORMAL   Blood: NEGATIVE / Protein: NEGATIVE / Nitrite: NEGATIVE   Leuk Esterase: NEGATIVE / RBC: x / WBC x   Sq Epi: x / Non Sq Epi: x / Bacteria: x        RADIOLOGY & ADDITIONAL TESTS:    Imaging Personally Reviewed:    Consultant(s) Notes Reviewed:      Care Discussed with Consultants/Other Providers:

## 2019-04-08 NOTE — PROGRESS NOTE ADULT - PROBLEM SELECTOR PLAN 4
Occult positive blood  Await GI recs on further workup. Need to restart AC. Occult positive blood  Await GI recs on further workup. Need to restart AC.  Also iron deficient- ordered venofer 200 mg IVPB q 24 hrs for 5 days.

## 2019-04-08 NOTE — PROGRESS NOTE ADULT - SUBJECTIVE AND OBJECTIVE BOX
NEPHROLOGY - NSN    Patient seen and examined.  NAD  S/P CT-C non-contrast    MEDICATIONS  (STANDING):  allopurinol 300 milliGRAM(s) Oral daily  benzonatate 100 milliGRAM(s) Oral every 8 hours  buDESOnide  80 MICROgram(s)/formoterol 4.5 MICROgram(s) Inhaler 2 Puff(s) Inhalation two times a day  dextrose 5%. 1000 milliLiter(s) (50 mL/Hr) IV Continuous <Continuous>  dextrose 50% Injectable 12.5 Gram(s) IV Push once  dextrose 50% Injectable 25 Gram(s) IV Push once  dextrose 50% Injectable 25 Gram(s) IV Push once  insulin lispro (HumaLOG) corrective regimen sliding scale   SubCutaneous three times a day before meals  insulin lispro (HumaLOG) corrective regimen sliding scale   SubCutaneous at bedtime  iron sucrose IVPB 200 milliGRAM(s) IV Intermittent every 24 hours  metoprolol succinate  milliGRAM(s) Oral <User Schedule>  metoprolol succinate  milliGRAM(s) Oral <User Schedule>  multivitamin 1 Tablet(s) Oral daily  pantoprazole  Injectable 40 milliGRAM(s) IV Push every 12 hours  sildenafil (REVATIO) 10 milliGRAM(s) Oral three times a day  sodium chloride 0.9% lock flush 3 milliLiter(s) IV Push every 8 hours    VITALS:  T(C): , Max: 36.6 (04-08-19 @ 02:22)  T(F): , Max: 97.9 (04-08-19 @ 02:22)  HR: 69 (04-08-19 @ 09:28)  BP: 111/70 (04-08-19 @ 09:28)  BP(mean): --  RR: 18 (04-08-19 @ 06:04)  SpO2: 99% (04-08-19 @ 06:04)  Wt(kg): --  I and O's:    04-07 @ 07:01  -  04-08 @ 07:00  --------------------------------------------------------  IN: 318 mL / OUT: 1050 mL / NET: -732 mL    REVIEW OF SYSTEMS:  Full ROS done and were negative unless otherwise indicated in HPI/assessment.     PHYSICAL EXAM:  Constitutional: NAD  Respiratory: CTA B/L  Cardiovascular: S1 and S2, RRR  Gastrointestinal: + BS, soft, NT, ND  Extremities: no peripheral edema  Neurological: AAO x 3  : no dawson    LABS:                        8.5    5.42  )-----------( 205      ( 08 Apr 2019 06:22 )             27.9     04-08    133<L>  |  97<L>  |  62<H>  ----------------------------<  84  4.2   |  24  |  2.19<H>    Ca    9.9      08 Apr 2019 06:22  Mg     1.7     04-08    ASSESSMENT  63F with COPD (on O2), LINH, AF on A/C, HLD, gout, HFpEF, PHTN , DM and CKD3/4 p/w generalized weakness and poor appetite.   - CKD: stage 3 likely due to DM/HTN with baseline Cr ~ 1.6;  OSMAR likely pre-renal - azotemia rising   - CV: BP on soft side  - HFpEF: right sided heart failure / cor pulm  - Pulm: RVP + for influenza, rhino and enterovirus - s/p tamiflu. sPHTN on sildenafil   - anemia: in chronic disease + GIB, hold LUIS until GI w/u - mild iron def - supplemented   - GI: + FOBT, will need eventual EGD/colonoscopy    RECOMMEND  - sildenafil per pulm  - avoid NSAIDs and nephrotoxins  - dose meds for a CrCl ~ 25 mL/min    Christina Lyn NP  [facility]  [phone]

## 2019-04-08 NOTE — PROGRESS NOTE ADULT - SUBJECTIVE AND OBJECTIVE BOX
PULM/MED PROGRESS NOTE:    awake, alert comfortable.      MEDICATIONS  (STANDING):  allopurinol 300 milliGRAM(s) Oral daily  benzonatate 100 milliGRAM(s) Oral every 8 hours  buDESOnide  80 MICROgram(s)/formoterol 4.5 MICROgram(s) Inhaler 2 Puff(s) Inhalation two times a day  dextrose 5%. 1000 milliLiter(s) (50 mL/Hr) IV Continuous <Continuous>  dextrose 50% Injectable 12.5 Gram(s) IV Push once  dextrose 50% Injectable 25 Gram(s) IV Push once  dextrose 50% Injectable 25 Gram(s) IV Push once  insulin lispro (HumaLOG) corrective regimen sliding scale   SubCutaneous three times a day before meals  insulin lispro (HumaLOG) corrective regimen sliding scale   SubCutaneous at bedtime  iron sucrose IVPB 200 milliGRAM(s) IV Intermittent every 24 hours  metoprolol succinate  milliGRAM(s) Oral <User Schedule>  metoprolol succinate  milliGRAM(s) Oral <User Schedule>  multivitamin 1 Tablet(s) Oral daily  pantoprazole  Injectable 40 milliGRAM(s) IV Push every 12 hours  sildenafil (REVATIO) 10 milliGRAM(s) Oral three times a day  sodium chloride 0.9% lock flush 3 milliLiter(s) IV Push every 8 hours      MEDICATIONS  (PRN):  ALBUTerol/ipratropium for Nebulization 3 milliLiter(s) Nebulizer every 6 hours PRN Shortness of Breath and/or Wheezing  dextrose 40% Gel 15 Gram(s) Oral once PRN Blood Glucose LESS THAN 70 milliGRAM(s)/deciliter  glucagon  Injectable 1 milliGRAM(s) IntraMuscular once PRN Glucose LESS THAN 70 milligrams/deciliter  guaiFENesin  milliGRAM(s) Oral every 12 hours PRN Cough          Vital Signs Last 24 Hrs  T(C): 36.6 (08 Apr 2019 06:04), Max: 36.9 (07 Apr 2019 13:23)  T(F): 97.8 (08 Apr 2019 06:04), Max: 98.5 (07 Apr 2019 13:23)  HR: 69 (08 Apr 2019 09:28) (62 - 86)  BP: 111/70 (08 Apr 2019 09:28) (90/51 - 111/70)  BP(mean): --  RR: 18 (08 Apr 2019 06:04) (18 - 18)  SpO2: 99% (08 Apr 2019 06:04) (96% - 99%)    PHYSICAL EXAMINATION:    GENERAL: The patient is awake and alert in no apparent distress.     LUNGS: Bilateral breath sounds; ; respirations unlabored    HEART: Regular rate and rhythm     ABDOMEN: Soft, nontender,     EXTREMITIES: Without sig edema.          LABS:                        8.5    5.42  )-----------( 205      ( 08 Apr 2019 06:22 )             27.9     04-08    133<L>  |  97<L>  |  62<H>  ----------------------------<  84  4.2   |  24  |  2.19<H>    Ca    9.9      08 Apr 2019 06:22  Mg     1.7     04-08        RADIOLOGY & ADDITIONAL STUDIES:    EXAM:  US THYROID PARATHYROID        PROCEDURE DATE:  Apr 8 2019         INTERPRETATION:  CLINICAL INFORMATION: 63-year-old female with right   thyroid nodule    COMPARISON: None available.    TECHNIQUE: Sonography of the thyroid.     FINDINGS:    Right Lobe: 4 x 2.8 x 3.7 cm. Mainly solid nodule with small central   cystic component 3.3 x 3.3 x 2.6 cm    Left Lobe: 4.8 x 1.8 x 1.7 cm. Mid posterior heterogeneous nodule 1.6 x 1   x 0.8 cm, indeterminant.    Isthmus: 4 mm.        Cervical LymphNodes: No enlarged or abnormal morphology cervical nodes.    IMPRESSION:     Bilateral indeterminant thyroid nodules.                            RUBY FARAH M.D., ATTENDING RADIOLOGIST  This document has been electronically signed. Apr 8 2019 11:22AM

## 2019-04-08 NOTE — PROGRESS NOTE ADULT - ATTENDING COMMENTS
Briefly,64 y/o F w/ h/o lymphoma s/p radiation therapy, severe pulmonary HTN, HFpEF, ?obstructive lung disease (never smoker), anemia (? thalassemia trait) who presented on 3/31 with weight loss over past year (? 2/2 Trulicity vs. occult malignancy) as well as 1 month of decreased appetite and abdominal complaints, found to be in acute on chronic kidney injury in setting of influenza/enterovirus infection. Renal function improved with cessation of diuretics but given notable pulmonary HTN, underwent RHC on 3/6/19 (w/o vasodilator study) which revealed RA 10, RV 78/14, PA 78/29/45, PCWP 15, sat 47.4%, CO 4.39, CI 2.3>SVR 1659. Was started on sildenafil 10 mg three times/day and underwent V/Q study which was low probability and workup of PH is ongoing. Currently feels overwhelmed but denies symptoms of dypspnea/orthopnea. Was also noted to have iron def anemia with guaiac positive stool. Exam JVD approx 8 cm with V waves, irreg irreg rhythm, prominent P2, grade III/VI systolic murmur in LLSB, diffuse wheezing b/l, no pedal edema. Labs reviewed - BUN/Cr 62/2.1 (improved from admission). Overall has undetermined etiology of pulmonary HTN (possibly contributed to by LINH, small airway disease).   - appreciate pulm f/u  - will pursue non-contrast high-res chest CT to evaluate further  - check alpha-1 anti-trypsin level  - would ideally need vasodilator study to evaluate PH further and determine appropriate medications if determined to be also WHO group I  - continue sildenafil 10 mg q8h; ideally would increase to 20 mg q8h and will assess if pt can tolerate  - hold diuretics for now; likely will need to restart   - possible d/c soon

## 2019-04-08 NOTE — PROGRESS NOTE ADULT - SUBJECTIVE AND OBJECTIVE BOX
Patient seen and examined.       Medications:  ALBUTerol/ipratropium for Nebulization 3 milliLiter(s) Nebulizer every 6 hours PRN  allopurinol 300 milliGRAM(s) Oral daily  benzonatate 100 milliGRAM(s) Oral every 8 hours  buDESOnide  80 MICROgram(s)/formoterol 4.5 MICROgram(s) Inhaler 2 Puff(s) Inhalation two times a day  dextrose 40% Gel 15 Gram(s) Oral once PRN  dextrose 5%. 1000 milliLiter(s) IV Continuous <Continuous>  dextrose 50% Injectable 12.5 Gram(s) IV Push once  dextrose 50% Injectable 25 Gram(s) IV Push once  dextrose 50% Injectable 25 Gram(s) IV Push once  glucagon  Injectable 1 milliGRAM(s) IntraMuscular once PRN  guaiFENesin  milliGRAM(s) Oral every 12 hours PRN  insulin lispro (HumaLOG) corrective regimen sliding scale   SubCutaneous three times a day before meals  insulin lispro (HumaLOG) corrective regimen sliding scale   SubCutaneous at bedtime  iron sucrose IVPB 200 milliGRAM(s) IV Intermittent every 24 hours  metoprolol succinate  milliGRAM(s) Oral <User Schedule>  metoprolol succinate  milliGRAM(s) Oral <User Schedule>  multivitamin 1 Tablet(s) Oral daily  pantoprazole  Injectable 40 milliGRAM(s) IV Push every 12 hours  sildenafil (REVATIO) 10 milliGRAM(s) Oral three times a day  sodium chloride 0.9% lock flush 3 milliLiter(s) IV Push every 8 hours      Vitals:  Vital Signs Last 24 Hrs  T(C): 36.6 (2019 06:04), Max: 36.9 (2019 13:23)  T(F): 97.8 (2019 06:04), Max: 98.5 (2019 13:23)  HR: 69 (2019 09:28) (62 - 86)  BP: 111/70 (2019 09:28) (90/51 - 111/70)  BP(mean): --  RR: 18 (2019 06:04) (18 - 18)  SpO2: 99% (2019 06:04) (96% - 99%)    Daily     Daily Weight in k.6 (2019 06:04)    I&O's Detail    2019 07:01  -  2019 07:00  --------------------------------------------------------  IN:    Oral Fluid: 318 mL  Total IN: 318 mL    OUT:    Voided: 1050 mL  Total OUT: 1050 mL    Total NET: -732 mL        Physical Exam:     General: No distress. Comfortable.  HEENT: EOM intact.  Neck: Neck supple. JVP not elevated. No masses  Chest: Clear to auscultation bilaterally  CV: S1 and S2 . No murmurs, rub, or gallops. Radial pulses normal. No LE edema b/l.   Abdomen: Soft, non-distended, non-tender  Skin: No rashes or skin breakdown  Neurology: Alert and oriented times three. Sensation intact  Psych: Affect normal    Labs:                        8.5    5.42  )-----------( 205      ( 2019 06:22 )             27.9     04-08    133<L>  |  97<L>  |  62<H>  ----------------------------<  84  4.2   |  24  |  2.19<H>    Ca    9.9      2019 06:22  Mg     1.7     04-08      < from: Transthoracic Echocardiogram (19 @ 18:26) >  DIMENSIONS:  Dimensions:     Normal Values:  LA:     3.7 cm    2.0 - 4.0 cm  Ao:     2.9 cm    2.0 -3.8 cm  SEPTUM: 0.9 cm    0.6 - 1.2 cm  PWT:    0.9 cm    0.6 - 1.1 cm  LVIDd:  4.5 cm    3.0 - 5.6 cm  LVIDs:  2.7 cm    1.8 - 4.0 cm  Derived Variables:  LVMI: 71 g/m2  RWT: 0.40  Fractional short: 40 %  Ejection Fraction (Visual Estimate): >70 %  Peak Velocity (m/sec): AoV=1.4  ------------------------------------------------------------------------  OBSERVATIONS:  Mitral Valve: Mitral annular calcification and calcified  mitral leaflets with normal diastolic opening. Mild mitral  regurgitation.  Aortic Root: Aortic Root: 2.9 cm.  Aortic Valve: Calcified trileaflet aortic valve with normal  opening. Peak transaortic valve gradient equals 7 mm Hg.  Peak left ventricular outflow tract gradient equals 4.8 mm  Hg.  Left Atrium: Mildly dilated left atrium.  LA volume index =  37 cc/m2.  Left Ventricle: Hyperdynamic left ventricle. Flattening of  the interventricular septum in both systole and diastole is   consistent with right ventricular pressure overload.  Normal left ventricular internal dimensions and wall  thicknesses. (DT:150 ms).  Right Heart: Severe right atrial enlargement. Right  ventricular enlargement with normal right ventricular  systolic function. Normal tricuspid valve. Moderate-severe  tricuspid regurgitation. Pulmonicvalve not well  visualized. Mild pulmonic regurgitation.  Pericardium/PleuraNormal pericardium with no pericardial  effusion.  Hemodynamic: Estimated right ventricular systolic pressure  equals 84 mm Hg, assuming right atrial pressure equals 10  mm Hg, consistent with severe pulmonary hypertension.  ------------------------------------------------------------------------    < end of copied text >

## 2019-04-08 NOTE — PROGRESS NOTE ADULT - ASSESSMENT
Influenza improving    Sleep apnea - [facility] P[facility]    B/L thyroid nodules; hx lymphoma in past treated by RT;  Head and neck or endo eval per Dr. Jackson    Pulm htn - had right heart cath 4/5 - we requested Pulm htn eval with Dr. Yang - pt started on sidenifil;  case discussed with Alesha Ambrocio on 4/5 and dr. Hanna today

## 2019-04-08 NOTE — PROGRESS NOTE ADULT - ASSESSMENT
64 yo woman with multiple comorbidities, including history of lymphoma, R-sided heart failure with preserved EF (EF 66% on TTE in 10/2017), severe pulm HTN, mod-severe TR, reported COPD but denies smoking? (on 2L NC at night), LINH (being arranged for bilevel support), A-Fib (on Eliquis), HTN, HLD, DM2, and gout presents with generalized weakness for the past 1 week in the setting of recent occult positive stool, OSMAR and newly diagnosed influenza/entero virus infection    #Severe pulm HTN w/ RV systolic dysfunction - on exam patient appears hypovolemic  - Await RHC today.   - Echo with elevated pulmonary pressures   - check daily standing weights      #Afib - currently rate controlled  - hold digoxin for now, level 2.1  - cont metoprolol   - Will need to address AC as she has been off of it since admission due to guiac positive stool. 62 yo woman with multiple comorbidities, including history of lymphoma, R-sided heart failure with preserved EF (EF 66% on TTE in 10/2017), severe pulm HTN, mod-severe TR, reported COPD but denies smoking? (on 2L NC at night), LINH (being arranged for bilevel support), A-Fib (on Eliquis), HTN, HLD, DM2, and gout presents with generalized weakness for the past 1 week in the setting of recent occult positive stool, OSMAR and newly diagnosed influenza/entero virus infection

## 2019-04-08 NOTE — PROGRESS NOTE ADULT - ATTENDING COMMENTS
I have seen and examined the patient. I agree with the above history, physical exam, and plan of care except for as detailed below.    62 y/o morbidly obese F w/nocturnal hypoxemia on supplemental O2 at night, HFpEF, possible COPD (lifelong nonsmoker, but prior biomass exposure)?, LINH not on CPAP, admitted for weakness found to have severe pulmonary hypertension w/cor pulmonale. Likely combination of group II + III, still undergoing evaluation for group I (HIV, rheum panel pending), VQ scan negative for chronic thromboembolic disease.     - Continue sildenafil  - Restart lasix goal net negative to even  - Nocturnal CPAP  - Supplemental O2 as needed  - Outpatient follow up with Dr. Yang

## 2019-04-08 NOTE — PROGRESS NOTE ADULT - SUBJECTIVE AND OBJECTIVE BOX
Pulmonary Progress Note     Interval Events:  No acute events overnight  Afebrile overnight    SUBJECTIVE:  Feels a little better.   Denies chest pain, SOB or CHAVEZ.     OBJECTIVE:  ICU Vital Signs Last 24 Hrs  T(C): 36.6 (08 Apr 2019 06:04), Max: 36.9 (07 Apr 2019 13:23)  T(F): 97.8 (08 Apr 2019 06:04), Max: 98.5 (07 Apr 2019 13:23)  HR: 70 (08 Apr 2019 06:04) (62 - 86)  BP: 102/61 (08 Apr 2019 06:04) (90/51 - 109/63)  RR: 18 (08 Apr 2019 06:04) (18 - 18)  SpO2: 99% (08 Apr 2019 06:04) (96% - 99%)    04-07 @ 07:01  -  04-08 @ 07:00  --------------------------------------------------------  IN: 318 mL / OUT: 1050 mL / NET: -732 mL    CAPILLARY BLOOD GLUCOSE  POCT Blood Glucose.: 100 mg/dL (07 Apr 2019 22:39)    PHYSICAL EXAM:  General: Awake, alert, oriented X 3.   HEENT: Atraumatic, normocephalic.                 Mallampatti Grade 3  	  ENLARGED RIGHT SIDED THYROID NODULE.  Lymph Nodes: No palpable lymphadenopathy  Neck: No JVD. No carotid bruit.   Respiratory: Normal chest expansion                     Normal and equal air entry  Cardiovascular: S1 S2 normal. No murmurs, rubs or gallops.   Abdomen: Soft, non-tender, non-distended. No organomegaly.  Extremities: Warm to touch. Peripheral pulse palpable. No pedal edema.   Skin: No rashes or skin lesions  Neurological: Motor examination equal and normal in all four extremities.  Psychiatry: Appropriate mood and affect.    HOSPITAL MEDICATIONS:  MEDICATIONS  (STANDING):  allopurinol 300 milliGRAM(s) Oral daily  benzonatate 100 milliGRAM(s) Oral every 8 hours  buDESOnide  80 MICROgram(s)/formoterol 4.5 MICROgram(s) Inhaler 2 Puff(s) Inhalation two times a day  dextrose 5%. 1000 milliLiter(s) (50 mL/Hr) IV Continuous <Continuous>  dextrose 50% Injectable 12.5 Gram(s) IV Push once  dextrose 50% Injectable 25 Gram(s) IV Push once  dextrose 50% Injectable 25 Gram(s) IV Push once  insulin lispro (HumaLOG) corrective regimen sliding scale   SubCutaneous three times a day before meals  insulin lispro (HumaLOG) corrective regimen sliding scale   SubCutaneous at bedtime  magnesium oxide 400 milliGRAM(s) Oral three times a day with meals  metoprolol succinate  milliGRAM(s) Oral <User Schedule>  metoprolol succinate  milliGRAM(s) Oral <User Schedule>  multivitamin 1 Tablet(s) Oral daily  pantoprazole  Injectable 40 milliGRAM(s) IV Push every 12 hours  sildenafil (REVATIO) 10 milliGRAM(s) Oral three times a day  sodium chloride 0.9% lock flush 3 milliLiter(s) IV Push every 8 hours    MEDICATIONS  (PRN):  ALBUTerol/ipratropium for Nebulization 3 milliLiter(s) Nebulizer every 6 hours PRN Shortness of Breath and/or Wheezing  dextrose 40% Gel 15 Gram(s) Oral once PRN Blood Glucose LESS THAN 70 milliGRAM(s)/deciliter  glucagon  Injectable 1 milliGRAM(s) IntraMuscular once PRN Glucose LESS THAN 70 milligrams/deciliter  guaiFENesin  milliGRAM(s) Oral every 12 hours PRN Cough      LABS:                        8.5    5.65  )-----------( 189      ( 07 Apr 2019 04:20 )             29.2     Hgb Trend: 8.5<--, 8.7<--, 9.2<--, 9.5<--, 8.4<--  04-07    135  |  98  |  62<H>  ----------------------------<  89  4.3   |  26  |  1.94<H>    Ca    9.4      07 Apr 2019 04:22  Mg     1.7     04-07  Creatinine Trend: 1.94<--, 2.09<--, 2.33<--, 2.15<--, 2.18<--, 2.73<--    RADIOLOGY:  [ ] Reviewed and interpreted by me

## 2019-04-09 LAB
A1AT SERPL-MCNC: 143 MG/DL — SIGNIFICANT CHANGE UP (ref 90–200)
ANION GAP SERPL CALC-SCNC: 10 MMO/L — SIGNIFICANT CHANGE UP (ref 7–14)
BUN SERPL-MCNC: 62 MG/DL — HIGH (ref 7–23)
CALCIUM SERPL-MCNC: 9.6 MG/DL — SIGNIFICANT CHANGE UP (ref 8.4–10.5)
CHLORIDE SERPL-SCNC: 97 MMOL/L — LOW (ref 98–107)
CO2 SERPL-SCNC: 26 MMOL/L — SIGNIFICANT CHANGE UP (ref 22–31)
CREAT SERPL-MCNC: 2.14 MG/DL — HIGH (ref 0.5–1.3)
GLUCOSE SERPL-MCNC: 83 MG/DL — SIGNIFICANT CHANGE UP (ref 70–99)
HCT VFR BLD CALC: 28.7 % — LOW (ref 34.5–45)
HGB BLD-MCNC: 8.6 G/DL — LOW (ref 11.5–15.5)
MCHC RBC-ENTMCNC: 19.9 PG — LOW (ref 27–34)
MCHC RBC-ENTMCNC: 30 % — LOW (ref 32–36)
MCV RBC AUTO: 66.3 FL — LOW (ref 80–100)
NRBC # FLD: 0.07 K/UL — SIGNIFICANT CHANGE UP (ref 0–0)
NRBC FLD-RTO: 1.7 — SIGNIFICANT CHANGE UP
PLATELET # BLD AUTO: 204 K/UL — SIGNIFICANT CHANGE UP (ref 150–400)
PMV BLD: SIGNIFICANT CHANGE UP FL (ref 7–13)
POTASSIUM SERPL-MCNC: 4.2 MMOL/L — SIGNIFICANT CHANGE UP (ref 3.5–5.3)
POTASSIUM SERPL-SCNC: 4.2 MMOL/L — SIGNIFICANT CHANGE UP (ref 3.5–5.3)
RBC # BLD: 4.33 M/UL — SIGNIFICANT CHANGE UP (ref 3.8–5.2)
RBC # FLD: 16.3 % — HIGH (ref 10.3–14.5)
SODIUM SERPL-SCNC: 133 MMOL/L — LOW (ref 135–145)
WBC # BLD: 4.04 K/UL — SIGNIFICANT CHANGE UP (ref 3.8–10.5)
WBC # FLD AUTO: 4.04 K/UL — SIGNIFICANT CHANGE UP (ref 3.8–10.5)

## 2019-04-09 PROCEDURE — 99233 SBSQ HOSP IP/OBS HIGH 50: CPT

## 2019-04-09 PROCEDURE — 99232 SBSQ HOSP IP/OBS MODERATE 35: CPT | Mod: GC

## 2019-04-09 RX ORDER — METOPROLOL TARTRATE 50 MG
50 TABLET ORAL
Qty: 0 | Refills: 0 | Status: DISCONTINUED | OUTPATIENT
Start: 2019-04-09 | End: 2019-04-10

## 2019-04-09 RX ADMIN — Medication 10 MILLIGRAM(S): at 16:05

## 2019-04-09 RX ADMIN — PANTOPRAZOLE SODIUM 40 MILLIGRAM(S): 20 TABLET, DELAYED RELEASE ORAL at 06:00

## 2019-04-09 RX ADMIN — SODIUM CHLORIDE 3 MILLILITER(S): 9 INJECTION INTRAMUSCULAR; INTRAVENOUS; SUBCUTANEOUS at 21:54

## 2019-04-09 RX ADMIN — Medication 10 MILLIGRAM(S): at 21:51

## 2019-04-09 RX ADMIN — Medication 20 MILLIGRAM(S): at 19:43

## 2019-04-09 RX ADMIN — PANTOPRAZOLE SODIUM 40 MILLIGRAM(S): 20 TABLET, DELAYED RELEASE ORAL at 17:52

## 2019-04-09 RX ADMIN — BUDESONIDE AND FORMOTEROL FUMARATE DIHYDRATE 2 PUFF(S): 160; 4.5 AEROSOL RESPIRATORY (INHALATION) at 21:50

## 2019-04-09 RX ADMIN — SODIUM CHLORIDE 3 MILLILITER(S): 9 INJECTION INTRAMUSCULAR; INTRAVENOUS; SUBCUTANEOUS at 16:03

## 2019-04-09 RX ADMIN — Medication 10 MILLIGRAM(S): at 05:58

## 2019-04-09 RX ADMIN — BUDESONIDE AND FORMOTEROL FUMARATE DIHYDRATE 2 PUFF(S): 160; 4.5 AEROSOL RESPIRATORY (INHALATION) at 09:36

## 2019-04-09 RX ADMIN — Medication 600 MILLIGRAM(S): at 21:51

## 2019-04-09 RX ADMIN — Medication 1 TABLET(S): at 11:39

## 2019-04-09 RX ADMIN — Medication 100 MILLIGRAM(S): at 16:05

## 2019-04-09 RX ADMIN — Medication 300 MILLIGRAM(S): at 11:39

## 2019-04-09 RX ADMIN — IRON SUCROSE 110 MILLIGRAM(S): 20 INJECTION, SOLUTION INTRAVENOUS at 17:52

## 2019-04-09 RX ADMIN — Medication 100 MILLIGRAM(S): at 21:51

## 2019-04-09 RX ADMIN — SODIUM CHLORIDE 3 MILLILITER(S): 9 INJECTION INTRAMUSCULAR; INTRAVENOUS; SUBCUTANEOUS at 05:57

## 2019-04-09 RX ADMIN — Medication 100 MILLIGRAM(S): at 05:59

## 2019-04-09 NOTE — PROGRESS NOTE ADULT - ASSESSMENT
62 yo woman with multiple comorbidities, including R-sided heart failure with preserved EF (EF 66% on TTE in 10/2017), severe pulm HTN, mod-severe TR, COPD (on 2L NC and Advair BID), LINH , afib (on Eliquis), HTN, HLD, DM2, and gout who presented with 1 week of generalized weakness, and several months of decreased appetite and weight loss. She was EV/RV+ on admission and worked up for GI bleed. She had an ECHO that showed elevated pulmonary pressures and signs of cor-pulmonale, so she underwent a cardiac catheterization. Pulmonary Hypertension service was consulted given cath results with pulm HTN and normal PCWP.     Pulmonary Hypertension:  Has a component of both pre-capillary and post-capillary pulmonary hypertension. Suspect combination of group II/III. Unclear about the underlying lung function, imaging looks normal. VQ scan with a very low probability of PE. HIV negative. TSH wnl. Was started on sildenafil, has not had any adverse effects from this. Measured BP have been ~/50-60, but she has been asymptomatic. Requires further workup to assess if any underlying CTD. Also needs work up for right sided thyroid nodule (bilateral indeterminate nodules on US with normal TSH, patient asking for further work up)      Recommendations:  - Restart diuretics, aim for net even to slightly negative.  - Continue sildenafil 10mg TID. Monitor BP closely.   - Requires treatment for LINH.  - CTD workup sent, will follow results.  - Further workup for anemia per GI/primary team.  - Will need to assess exercise oximetry prior to discharge.   - Close outpatient follow up with Dr. Yang in pulmonary hypertension clinic.   - Please call [phone] before discharge to make follow up appointment.     Please call with questions.    Talya Alcantara MD  Pulmonary & Critical Care Medicine Fellow | PGY-4  [phone]

## 2019-04-09 NOTE — PROGRESS NOTE ADULT - ASSESSMENT
Briefly,64 y/o F w/ h/o lymphoma s/p radiation therapy, severe pulmonary HTN, HFpEF, ?obstructive lung disease (never smoker), anemia (? thalassemia trait) who presented on 3/31 with weight loss over past year (? 2/2 Trulicity vs. occult malignancy) as well as 1 month of decreased appetite and abdominal complaints, found to be in acute on chronic kidney injury in setting of influenza/enterovirus infection and possible excessive metolazone use. Renal function improved with cessation of diuretics but given notable pulmonary HTN, underwent RHC on 3/6/19 (w/o vasodilator study) which revealed RA 10, RV 78/14, PA 78/29/45, PCWP 15, sat 47.4%, CO 4.39, CI 2.3>SVR 1659, PVR 6.8 (TPG 30). Was started on sildenafil 10 mg three times/day and underwent V/Q study which was low probability and workup of PH is ongoing. Currently feels overwhelmed but denies symptoms of dypspnea/orthopnea. Was also noted to have iron def anemia with guaiac positive stool. Exam JVD approx 8 cm with V waves, irreg irreg rhythm, prominent P2, grade III/VI systolic murmur in LLSB, diffuse wheezing b/l, no pedal edema. Labs reviewed - BUN/Cr 62/2.2 (improved from admission). Overall has undetermined etiology of pulmonary HTN (possibly contributed to by LINH, small airway disease).   - appreciate pulm f/u  - alpha-1 anti-trypsin level pending  - would ideally need vasodilator study to evaluate PH further and determine appropriate medications if determined to be also WHO group I  - continue sildenafil 10 mg q8h; ideally would increase to 20 mg q8h but limited by BP  - afib; hasn't been receiving toprol due to BP parameters; reduce to toprol XL 50 mg bid (hold for SBP <85); resume eliquis at 5 mg q12 if ok per GI  - restart torsemide at 20 mg every other day

## 2019-04-09 NOTE — CHART NOTE - NSCHARTNOTEFT_GEN_A_CORE
Would not oppose to starting AC as clinically indicated, as pt currently with no overt bleeding, with the understanding that there is a risk of bleeding after starting AC.

## 2019-04-09 NOTE — PROGRESS NOTE ADULT - ATTENDING COMMENTS
Heart failure appears to be under better control   Anemia: CBC shows a worsening hemoglobin. Will follow up with cardiology if she can be cleared for an endoscopy and a colonoscopy on this admission.  Thyroid nodule: Out patient FU

## 2019-04-09 NOTE — PROGRESS NOTE ADULT - ATTENDING COMMENTS
I have seen and examined the patient. I agree with the above history, physical exam, and plan of care except for as detailed below.    64 y/o morbidly obese F w/nocturnal hypoxemia on supplemental O2 at night, HFpEF, possible COPD (lifelong nonsmoker, but prior biomass exposure)?, LINH not on CPAP, admitted for weakness found to have severe pulmonary hypertension w/cor pulmonale. Likely combination of group II + III, still undergoing evaluation for group I (HIV, rheum panel pending), VQ scan negative for chronic thromboembolic disease.     - Continue sildenafil  - Continue diuresis  - Nocturnal CPAP  - Supplemental O2 as needed  - Outpatient follow up with Dr. Yang

## 2019-04-09 NOTE — PROGRESS NOTE ADULT - SUBJECTIVE AND OBJECTIVE BOX
Interval History:  frustrated with multiple tests  denies dypsnea  concerned about BP; denies lightheadedness/dizziness    Medications:  ALBUTerol/ipratropium for Nebulization 3 milliLiter(s) Nebulizer every 6 hours PRN  allopurinol 300 milliGRAM(s) Oral daily  benzonatate 100 milliGRAM(s) Oral every 8 hours  buDESOnide  80 MICROgram(s)/formoterol 4.5 MICROgram(s) Inhaler 2 Puff(s) Inhalation two times a day  dextrose 40% Gel 15 Gram(s) Oral once PRN  dextrose 5%. 1000 milliLiter(s) IV Continuous <Continuous>  dextrose 50% Injectable 12.5 Gram(s) IV Push once  dextrose 50% Injectable 25 Gram(s) IV Push once  dextrose 50% Injectable 25 Gram(s) IV Push once  glucagon  Injectable 1 milliGRAM(s) IntraMuscular once PRN  guaiFENesin  milliGRAM(s) Oral every 12 hours PRN  insulin lispro (HumaLOG) corrective regimen sliding scale   SubCutaneous three times a day before meals  insulin lispro (HumaLOG) corrective regimen sliding scale   SubCutaneous at bedtime  iron sucrose IVPB 200 milliGRAM(s) IV Intermittent every 24 hours  metoprolol succinate ER 50 milliGRAM(s) Oral two times a day  multivitamin 1 Tablet(s) Oral daily  pantoprazole  Injectable 40 milliGRAM(s) IV Push every 12 hours  sildenafil (REVATIO) 10 milliGRAM(s) Oral three times a day  sodium chloride 0.9% lock flush 3 milliLiter(s) IV Push every 8 hours  torsemide 20 milliGRAM(s) Oral <User Schedule>    Vitals:  T(C): 36.9 (19 @ 14:42), Max: 36.9 (19 @ 18:19)  HR: 82 (19 @ 14:42) (61 - 82)  BP: 94/59 (19 @ 14:42) (90/54 - 109/55)  BP(mean): --  RR: 18 (19 @ 14:42) (18 - 20)  SpO2: 100% (19 @ 14:42) (97% - 100%)    Daily     Daily Weight in k.4 (2019 05:55)    I&O's Summary    2019 07:01  -  2019 07:00  --------------------------------------------------------  IN: 600 mL / OUT: 300 mL / NET: 300 mL    Physical Exam:  Appearance: No Acute Distress  HEENT: PERRL  Neck: JVP approx 8-10 cm with mild HJR  Cardiovascular: irreg irreg rhythm; grade III/VI systolic murmur in LLSB  Respiratory: Clear to auscultation bilaterally  Gastrointestinal: Soft, Non-tender	  Skin: No cyanosis	  Neurologic: Non-focal  Extremities: No LE edema  Psychiatry: A & O x 3, Mood & affect appropriate    Labs:                        8.6    4.04  )-----------( 204      ( 2019 07:10 )             28.7     04-09    133<L>  |  97<L>  |  62<H>  ----------------------------<  83  4.2   |  26  |  2.14<H>    Ca    9.6      2019 07:10  Mg     1.7     08    TELEMETRY:  rapid afib; HR 80-130s

## 2019-04-09 NOTE — PROGRESS NOTE ADULT - SUBJECTIVE AND OBJECTIVE BOX
Pulmonary Progress Note     Interval Events:  No acute events    SUBJECTIVE:  Feeling depressed about her situation    OBJECTIVE:  ICU Vital Signs Last 24 Hrs  T(C): 36.3 (09 Apr 2019 05:55), Max: 36.9 (08 Apr 2019 18:19)  T(F): 97.4 (09 Apr 2019 05:55), Max: 98.4 (08 Apr 2019 18:19)  HR: 65 (09 Apr 2019 05:55) (61 - 80)  BP: 90/54 (09 Apr 2019 05:55) (90/54 - 109/55)  RR: 18 (09 Apr 2019 05:55) (16 - 20)  SpO2: 100% (09 Apr 2019 05:55) (97% - 100%)    04-08 @ 07:01  -  04-09 @ 07:00  --------------------------------------------------------  IN: 600 mL / OUT: 300 mL / NET: 300 mL    CAPILLARY BLOOD GLUCOSE  POCT Blood Glucose.: 96 mg/dL (09 Apr 2019 09:16)      PHYSICAL EXAM:  General: Awake, alert, oriented X 3.   HEENT: Atraumatic, normocephalic.                 Mallampatti Grade 3  	  ENLARGED RIGHT SIDED THYROID NODULE.  Lymph Nodes: No palpable lymphadenopathy  Neck: No JVD. No carotid bruit.   Respiratory: Normal chest expansion                     Normal and equal air entry  Cardiovascular: S1 S2 normal. No murmurs, rubs or gallops.   Abdomen: Soft, non-tender, non-distended. No organomegaly.  Extremities: Warm to touch. Peripheral pulse palpable. No pedal edema.   Skin: No rashes or skin lesions  Neurological: Motor examination equal and normal in all four extremities.  Psychiatry: Appropriate mood and affect.    HOSPITAL MEDICATIONS:  MEDICATIONS  (STANDING):  allopurinol 300 milliGRAM(s) Oral daily  benzonatate 100 milliGRAM(s) Oral every 8 hours  buDESOnide  80 MICROgram(s)/formoterol 4.5 MICROgram(s) Inhaler 2 Puff(s) Inhalation two times a day  dextrose 5%. 1000 milliLiter(s) (50 mL/Hr) IV Continuous <Continuous>  dextrose 50% Injectable 12.5 Gram(s) IV Push once  dextrose 50% Injectable 25 Gram(s) IV Push once  dextrose 50% Injectable 25 Gram(s) IV Push once  insulin lispro (HumaLOG) corrective regimen sliding scale   SubCutaneous three times a day before meals  insulin lispro (HumaLOG) corrective regimen sliding scale   SubCutaneous at bedtime  iron sucrose IVPB 200 milliGRAM(s) IV Intermittent every 24 hours  metoprolol succinate  milliGRAM(s) Oral <User Schedule>  metoprolol succinate  milliGRAM(s) Oral <User Schedule>  multivitamin 1 Tablet(s) Oral daily  pantoprazole  Injectable 40 milliGRAM(s) IV Push every 12 hours  sildenafil (REVATIO) 10 milliGRAM(s) Oral three times a day  sodium chloride 0.9% lock flush 3 milliLiter(s) IV Push every 8 hours    MEDICATIONS  (PRN):  ALBUTerol/ipratropium for Nebulization 3 milliLiter(s) Nebulizer every 6 hours PRN Shortness of Breath and/or Wheezing  dextrose 40% Gel 15 Gram(s) Oral once PRN Blood Glucose LESS THAN 70 milliGRAM(s)/deciliter  glucagon  Injectable 1 milliGRAM(s) IntraMuscular once PRN Glucose LESS THAN 70 milligrams/deciliter  guaiFENesin  milliGRAM(s) Oral every 12 hours PRN Cough      LABS:                        8.6    4.04  )-----------( 204      ( 09 Apr 2019 07:10 )             28.7     Hgb Trend: 8.6<--, 8.5<--, 8.5<--, 8.7<--, 9.2<--  04-09    133<L>  |  97<L>  |  62<H>  ----------------------------<  83  4.2   |  26  |  2.14<H>    Ca    9.6      09 Apr 2019 07:10  Mg     1.7     04-08  Creatinine Trend: 2.14<--, 2.19<--, 1.94<--, 2.09<--, 2.33<--, 2.15<--

## 2019-04-09 NOTE — PROGRESS NOTE ADULT - SUBJECTIVE AND OBJECTIVE BOX
[facility] [phone]      Patient seen and examined in bed. No pain, no SOB.      MEDICATIONS  (STANDING):  allopurinol 300 milliGRAM(s) Oral daily  benzonatate 100 milliGRAM(s) Oral every 8 hours  buDESOnide  80 MICROgram(s)/formoterol 4.5 MICROgram(s) Inhaler 2 Puff(s) Inhalation two times a day  dextrose 5%. 1000 milliLiter(s) (50 mL/Hr) IV Continuous <Continuous>  dextrose 50% Injectable 12.5 Gram(s) IV Push once  dextrose 50% Injectable 25 Gram(s) IV Push once  dextrose 50% Injectable 25 Gram(s) IV Push once  insulin lispro (HumaLOG) corrective regimen sliding scale   SubCutaneous three times a day before meals  insulin lispro (HumaLOG) corrective regimen sliding scale   SubCutaneous at bedtime  iron sucrose IVPB 200 milliGRAM(s) IV Intermittent every 24 hours  metoprolol succinate  milliGRAM(s) Oral <User Schedule>  metoprolol succinate  milliGRAM(s) Oral <User Schedule>  multivitamin 1 Tablet(s) Oral daily  pantoprazole  Injectable 40 milliGRAM(s) IV Push every 12 hours  sildenafil (REVATIO) 10 milliGRAM(s) Oral three times a day  sodium chloride 0.9% lock flush 3 milliLiter(s) IV Push every 8 hours      VITAL:  T(C): , Max: 36.9 (04-08-19 @ 18:19)  T(F): , Max: 98.4 (04-08-19 @ 18:19)  HR: 65 (04-09-19 @ 05:55)  BP: 90/54 (04-09-19 @ 05:55)  RR: 18 (04-09-19 @ 05:55)  SpO2: 100% (04-09-19 @ 05:55)    I and O's:    04-08 @ 07:01  -  04-09 @ 07:00  --------------------------------------------------------  IN: 600 mL / OUT: 300 mL / NET: 300 mL          PHYSICAL EXAM:    Constitutional: NAD  Neck:  No JVD  Respiratory: CTAB/L  Cardiovascular: S1 and S2  Gastrointestinal: BS+, soft, NT/ND  Extremities: No peripheral edema  : No Corey  Skin: No rashes    LABS:                        8.6    4.04  )-----------( 204      ( 09 Apr 2019 07:10 )             28.7     04-09    133<L>  |  97<L>  |  62<H>  ----------------------------<  83  4.2   |  26  |  2.14<H>    Ca    9.6      09 Apr 2019 07:10  Mg     1.7     04-08      ASSESSMENT:  63F with COPD (on O2), LIHN, AF on A/C, HLD, gout, HFpEF, PHTN , DM and CKD3/4 p/w generalized weakness and poor appetite.   - CKD: stage 3 likely due to DM/HTN with baseline Cr ~ 1.6;  OSMAR likely pre-renal - renal function the same as yesterday  - CV: BP on soft side  - HFpEF: right sided heart failure / cor pulm  - Pulm: RVP + for influenza, rhino and enterovirus - s/p tamiflu. sPHTN on sildenafil   - anemia: in chronic disease + GIB, hold LUIS until GI w/u - mild iron def - supplemented   - GI: + FOBT, will need eventual EGD/colonoscopy    RECOMMEND:  - sildenafil per pulm  - avoid NSAIDs and nephrotoxins  - dose meds for a CrCl ~ 25 mL/min      Suzan Warren, NP-C  [facility]  [phone] [facility] [phone]      Patient seen and examined in bed. No pain, no SOB.      MEDICATIONS  (STANDING):  allopurinol 300 milliGRAM(s) Oral daily  benzonatate 100 milliGRAM(s) Oral every 8 hours  buDESOnide  80 MICROgram(s)/formoterol 4.5 MICROgram(s) Inhaler 2 Puff(s) Inhalation two times a day  dextrose 5%. 1000 milliLiter(s) (50 mL/Hr) IV Continuous <Continuous>  dextrose 50% Injectable 12.5 Gram(s) IV Push once  dextrose 50% Injectable 25 Gram(s) IV Push once  dextrose 50% Injectable 25 Gram(s) IV Push once  insulin lispro (HumaLOG) corrective regimen sliding scale   SubCutaneous three times a day before meals  insulin lispro (HumaLOG) corrective regimen sliding scale   SubCutaneous at bedtime  iron sucrose IVPB 200 milliGRAM(s) IV Intermittent every 24 hours  metoprolol succinate  milliGRAM(s) Oral <User Schedule>  metoprolol succinate  milliGRAM(s) Oral <User Schedule>  multivitamin 1 Tablet(s) Oral daily  pantoprazole  Injectable 40 milliGRAM(s) IV Push every 12 hours  sildenafil (REVATIO) 10 milliGRAM(s) Oral three times a day  sodium chloride 0.9% lock flush 3 milliLiter(s) IV Push every 8 hours      VITAL:  T(C): , Max: 36.9 (04-08-19 @ 18:19)  T(F): , Max: 98.4 (04-08-19 @ 18:19)  HR: 65 (04-09-19 @ 05:55)  BP: 90/54 (04-09-19 @ 05:55)  RR: 18 (04-09-19 @ 05:55)  SpO2: 100% (04-09-19 @ 05:55)    I and O's:    04-08 @ 07:01  -  04-09 @ 07:00  --------------------------------------------------------  IN: 600 mL / OUT: 300 mL / NET: 300 mL          PHYSICAL EXAM:    Constitutional: NAD  Neck:  No JVD  Respiratory: CTAB/L  Cardiovascular: S1 and S2  Gastrointestinal: BS+, soft, NT/ND  Extremities: No peripheral edema  : No Corey  Skin: No rashes    LABS:                        8.6    4.04  )-----------( 204      ( 09 Apr 2019 07:10 )             28.7     04-09    133<L>  |  97<L>  |  62<H>  ----------------------------<  83  4.2   |  26  |  2.14<H>    Ca    9.6      09 Apr 2019 07:10  Mg     1.7     04-08      ASSESSMENT:  63F with COPD (on O2), LINH, AF on A/C, HLD, gout, HFpEF, PHTN , DM and CKD3/4 p/w generalized weakness and poor appetite.   - CKD: stage 3 likely due to DM/HTN with baseline Cr ~ 1.6;  OSMAR likely pre-renal - renal function the same as yesterday  - CV: BP on soft side  - HFpEF: right sided heart failure / cor pulm  - Pulm: RVP + for influenza, rhino and enterovirus - s/p tamiflu. sPHTN on sildenafil   - anemia: in chronic disease + GIB, hold LUIS until GI w/u - mild iron def - on IV iron  - GI: + FOBT, will need eventual EGD/colonoscopy    RECOMMEND:  - restart diuretics?  - sildenafil per pulm  - avoid NSAIDs and nephrotoxins  - dose meds for a CrCl ~ 25 mL/min      Suzan Warren, NP-C  [facility]  [phone]

## 2019-04-10 LAB
ANA PAT FLD IF-IMP: SIGNIFICANT CHANGE UP
ANA TITR SER: SIGNIFICANT CHANGE UP
ANION GAP SERPL CALC-SCNC: 11 MMO/L — SIGNIFICANT CHANGE UP (ref 7–14)
BUN SERPL-MCNC: 65 MG/DL — HIGH (ref 7–23)
CALCIUM SERPL-MCNC: 9.8 MG/DL — SIGNIFICANT CHANGE UP (ref 8.4–10.5)
CHLORIDE SERPL-SCNC: 101 MMOL/L — SIGNIFICANT CHANGE UP (ref 98–107)
CO2 SERPL-SCNC: 24 MMOL/L — SIGNIFICANT CHANGE UP (ref 22–31)
CREAT SERPL-MCNC: 2.16 MG/DL — HIGH (ref 0.5–1.3)
DSDNA AB SER-ACNC: <12 IU/ML — SIGNIFICANT CHANGE UP
GLUCOSE SERPL-MCNC: 82 MG/DL — SIGNIFICANT CHANGE UP (ref 70–99)
HCT VFR BLD CALC: 29.6 % — LOW (ref 34.5–45)
HGB BLD-MCNC: 8.7 G/DL — LOW (ref 11.5–15.5)
MAGNESIUM SERPL-MCNC: 1.6 MG/DL — SIGNIFICANT CHANGE UP (ref 1.6–2.6)
MCHC RBC-ENTMCNC: 19.7 PG — LOW (ref 27–34)
MCHC RBC-ENTMCNC: 29.4 % — LOW (ref 32–36)
MCV RBC AUTO: 67.1 FL — LOW (ref 80–100)
NRBC # FLD: 0.05 K/UL — SIGNIFICANT CHANGE UP (ref 0–0)
NRBC FLD-RTO: 1.1 — SIGNIFICANT CHANGE UP
PLATELET # BLD AUTO: 209 K/UL — SIGNIFICANT CHANGE UP (ref 150–400)
PMV BLD: SIGNIFICANT CHANGE UP FL (ref 7–13)
POTASSIUM SERPL-MCNC: 4.2 MMOL/L — SIGNIFICANT CHANGE UP (ref 3.5–5.3)
POTASSIUM SERPL-SCNC: 4.2 MMOL/L — SIGNIFICANT CHANGE UP (ref 3.5–5.3)
RBC # BLD: 4.41 M/UL — SIGNIFICANT CHANGE UP (ref 3.8–5.2)
RBC # FLD: 16.3 % — HIGH (ref 10.3–14.5)
SODIUM SERPL-SCNC: 136 MMOL/L — SIGNIFICANT CHANGE UP (ref 135–145)
WBC # BLD: 4.65 K/UL — SIGNIFICANT CHANGE UP (ref 3.8–10.5)
WBC # FLD AUTO: 4.65 K/UL — SIGNIFICANT CHANGE UP (ref 3.8–10.5)

## 2019-04-10 PROCEDURE — 99233 SBSQ HOSP IP/OBS HIGH 50: CPT

## 2019-04-10 PROCEDURE — 99232 SBSQ HOSP IP/OBS MODERATE 35: CPT | Mod: GC

## 2019-04-10 RX ORDER — METOPROLOL TARTRATE 50 MG
50 TABLET ORAL
Qty: 0 | Refills: 0 | Status: DISCONTINUED | OUTPATIENT
Start: 2019-04-10 | End: 2019-04-18

## 2019-04-10 RX ADMIN — BUDESONIDE AND FORMOTEROL FUMARATE DIHYDRATE 2 PUFF(S): 160; 4.5 AEROSOL RESPIRATORY (INHALATION) at 10:28

## 2019-04-10 RX ADMIN — SODIUM CHLORIDE 3 MILLILITER(S): 9 INJECTION INTRAMUSCULAR; INTRAVENOUS; SUBCUTANEOUS at 21:12

## 2019-04-10 RX ADMIN — Medication 10 MILLIGRAM(S): at 14:49

## 2019-04-10 RX ADMIN — Medication 100 MILLIGRAM(S): at 05:18

## 2019-04-10 RX ADMIN — Medication 10 MILLIGRAM(S): at 21:10

## 2019-04-10 RX ADMIN — SODIUM CHLORIDE 3 MILLILITER(S): 9 INJECTION INTRAMUSCULAR; INTRAVENOUS; SUBCUTANEOUS at 15:00

## 2019-04-10 RX ADMIN — Medication 3 MILLILITER(S): at 08:45

## 2019-04-10 RX ADMIN — PANTOPRAZOLE SODIUM 40 MILLIGRAM(S): 20 TABLET, DELAYED RELEASE ORAL at 17:41

## 2019-04-10 RX ADMIN — Medication 100 MILLIGRAM(S): at 21:10

## 2019-04-10 RX ADMIN — BUDESONIDE AND FORMOTEROL FUMARATE DIHYDRATE 2 PUFF(S): 160; 4.5 AEROSOL RESPIRATORY (INHALATION) at 21:09

## 2019-04-10 RX ADMIN — Medication 1 TABLET(S): at 11:55

## 2019-04-10 RX ADMIN — Medication 50 MILLIGRAM(S): at 05:19

## 2019-04-10 RX ADMIN — Medication 50 MILLIGRAM(S): at 17:41

## 2019-04-10 RX ADMIN — Medication 600 MILLIGRAM(S): at 21:10

## 2019-04-10 RX ADMIN — Medication 300 MILLIGRAM(S): at 11:55

## 2019-04-10 RX ADMIN — SODIUM CHLORIDE 3 MILLILITER(S): 9 INJECTION INTRAMUSCULAR; INTRAVENOUS; SUBCUTANEOUS at 05:23

## 2019-04-10 RX ADMIN — Medication 100 MILLIGRAM(S): at 14:49

## 2019-04-10 RX ADMIN — PANTOPRAZOLE SODIUM 40 MILLIGRAM(S): 20 TABLET, DELAYED RELEASE ORAL at 05:19

## 2019-04-10 RX ADMIN — IRON SUCROSE 110 MILLIGRAM(S): 20 INJECTION, SOLUTION INTRAVENOUS at 14:50

## 2019-04-10 RX ADMIN — Medication 10 MILLIGRAM(S): at 05:18

## 2019-04-10 NOTE — DIETITIAN INITIAL EVALUATION ADULT. - PROBLEM SELECTOR PLAN 2
Positive FOBT in ED. Given positive Cologuard test as outpatient on 3/19/19, concern for malignancy of colon. Can also be due to upper GI bleed, especially in setting of recent multiple episodes of nausea and vomiting. Pt currently HD stable.  - Start IV protonix 40 mg bid given possibility of upper GI bleed   - Rectal exam unable to be performed given pt's location in hallway of ED and no private rooms available  - Monitor H/H, at least daily for now, and transfuse pRBCs to keep Hgb > 7-8  - GI consult in AM for colonoscopy +/- EGD  - Given that pt ate a couple meals on Sunday, will start clear liquid diet for possible colonoscopy prep on Monday; pt, however, has mod-severe TR and should get TTE if one has not been performed within last 6 mos-1 yr. Pt also high risk (RCRI score 2, 10.1% 30-day risk of death, MI, or cardiac arrest) for endoscopic procedure.  - Hold Eliquis for now given positive FOBT and possibility of biopsies with colonoscopy

## 2019-04-10 NOTE — PROGRESS NOTE ADULT - ASSESSMENT
Improving influenza  asthma/COPD - continue neb tx  anemia - for EGD/colonoscopy  Pulm htn/ sleep apnea - on sildenafil; being followed by Pulm Htn/ pulm division.

## 2019-04-10 NOTE — PROGRESS NOTE ADULT - SUBJECTIVE AND OBJECTIVE BOX
[facility] [phone]      Patient seen and examined in bed. No pain, no SOB.      MEDICATIONS  (STANDING):  allopurinol 300 milliGRAM(s) Oral daily  benzonatate 100 milliGRAM(s) Oral every 8 hours  buDESOnide  80 MICROgram(s)/formoterol 4.5 MICROgram(s) Inhaler 2 Puff(s) Inhalation two times a day  dextrose 5%. 1000 milliLiter(s) (50 mL/Hr) IV Continuous <Continuous>  dextrose 50% Injectable 12.5 Gram(s) IV Push once  dextrose 50% Injectable 25 Gram(s) IV Push once  dextrose 50% Injectable 25 Gram(s) IV Push once  insulin lispro (HumaLOG) corrective regimen sliding scale   SubCutaneous three times a day before meals  insulin lispro (HumaLOG) corrective regimen sliding scale   SubCutaneous at bedtime  iron sucrose IVPB 200 milliGRAM(s) IV Intermittent every 24 hours  metoprolol succinate ER 50 milliGRAM(s) Oral two times a day  multivitamin 1 Tablet(s) Oral daily  pantoprazole  Injectable 40 milliGRAM(s) IV Push every 12 hours  sildenafil (REVATIO) 10 milliGRAM(s) Oral three times a day  sodium chloride 0.9% lock flush 3 milliLiter(s) IV Push every 8 hours  torsemide 20 milliGRAM(s) Oral <User Schedule>      VITAL:  T(C): , Max: 36.9 (04-09-19 @ 14:42)  T(F): , Max: 98.5 (04-09-19 @ 14:42)  HR: 80 (04-10-19 @ 04:32)  BP: 107/62 (04-10-19 @ 04:32)  RR: 18 (04-10-19 @ 04:32)  SpO2: 100% (04-10-19 @ 04:32)    I and O's:    04-09 @ 07:01  -  04-10 @ 07:00  --------------------------------------------------------  IN: 440 mL / OUT: 1100 mL / NET: -660 mL          PHYSICAL EXAM:    Constitutional: NAD  Neck:  No JVD  Respiratory: CTAB/L  Cardiovascular: S1 and S2  Gastrointestinal: BS+, soft, NT/ND  Extremities: No peripheral edema  Neurological: A/O x 3, no focal deficits  Psychiatric: Normal mood, normal affect  : No Corey  Skin: No rashes    LABS:                        8.6    4.04  )-----------( 204      ( 09 Apr 2019 07:10 )             28.7     04-09    133<L>  |  97<L>  |  62<H>  ----------------------------<  83  4.2   |  26  |  2.14<H>    Ca    9.6      09 Apr 2019 07:10      ASSESSMENT:  63F with COPD (on O2), LINH, AF on A/C, HLD, gout, HFpEF, PHTN , DM and CKD3/4 p/w generalized weakness and poor appetite.   - CKD: stage 3 likely due to DM/HTN with baseline Cr ~ 1.6;  OSMAR likely pre-renal - labs pending for today  - CV: BP on soft side  - HFpEF: right sided heart failure / cor pulm  - Pulm: RVP + for influenza, rhino and enterovirus - s/p tamiflu. sPHTN on sildenafil   - anemia: in chronic disease + GIB, hold LUIS until GI w/u - mild iron def - on IV iron  - GI: + FOBT, will need eventual EGD/colonoscopy    RECOMMEND:  - continue torsemide as ordered (20mg po on Sunday, Tuesday, Thursday)  - sildenafil per pulm  - avoid NSAIDs and nephrotoxins  - dose meds for a CrCl ~ 25 mL/min      Suzan Warren, NP-C  [facility]  [phone] [facility] [phone]      Patient seen and examined in bed. No pain, no SOB.      MEDICATIONS  (STANDING):  allopurinol 300 milliGRAM(s) Oral daily  benzonatate 100 milliGRAM(s) Oral every 8 hours  buDESOnide  80 MICROgram(s)/formoterol 4.5 MICROgram(s) Inhaler 2 Puff(s) Inhalation two times a day  dextrose 5%. 1000 milliLiter(s) (50 mL/Hr) IV Continuous <Continuous>  dextrose 50% Injectable 12.5 Gram(s) IV Push once  dextrose 50% Injectable 25 Gram(s) IV Push once  dextrose 50% Injectable 25 Gram(s) IV Push once  insulin lispro (HumaLOG) corrective regimen sliding scale   SubCutaneous three times a day before meals  insulin lispro (HumaLOG) corrective regimen sliding scale   SubCutaneous at bedtime  iron sucrose IVPB 200 milliGRAM(s) IV Intermittent every 24 hours  metoprolol succinate ER 50 milliGRAM(s) Oral two times a day  multivitamin 1 Tablet(s) Oral daily  pantoprazole  Injectable 40 milliGRAM(s) IV Push every 12 hours  sildenafil (REVATIO) 10 milliGRAM(s) Oral three times a day  sodium chloride 0.9% lock flush 3 milliLiter(s) IV Push every 8 hours  torsemide 20 milliGRAM(s) Oral <User Schedule>      VITAL:  T(C): , Max: 36.9 (04-09-19 @ 14:42)  T(F): , Max: 98.5 (04-09-19 @ 14:42)  HR: 80 (04-10-19 @ 04:32)  BP: 107/62 (04-10-19 @ 04:32)  RR: 18 (04-10-19 @ 04:32)  SpO2: 100% (04-10-19 @ 04:32)    I and O's:    04-09 @ 07:01  -  04-10 @ 07:00  --------------------------------------------------------  IN: 440 mL / OUT: 1100 mL / NET: -660 mL          PHYSICAL EXAM:    Constitutional: NAD  Neck:  No JVD  Respiratory: CTAB/L  Cardiovascular: S1 and S2  Gastrointestinal: BS+, soft, NT/ND  Extremities: No peripheral edema  Neurological: A/O x 3, no focal deficits  Psychiatric: Normal mood, normal affect  : No Corey  Skin: No rashes    LABS:                        8.6    4.04  )-----------( 204      ( 09 Apr 2019 07:10 )             28.7     04-09    133<L>  |  97<L>  |  62<H>  ----------------------------<  83  4.2   |  26  |  2.14<H>    Ca    9.6      09 Apr 2019 07:10      ASSESSMENT:  63F with COPD (on O2), LINH, AF on A/C, HLD, gout, HFpEF, PHTN , DM and CKD3/4 p/w generalized weakness and poor appetite.   - CKD: stage 3 likely due to DM/HTN with baseline Cr ~ 1.6;  OSMAR likely pre-renal - renal function about the same  - CV: BP on soft side  - HFpEF: right sided heart failure / cor pulm  - Pulm: RVP + for influenza, rhino and enterovirus - s/p tamiflu. sPHTN on sildenafil   - anemia: in chronic disease + GIB, hold LUIS until GI w/u - mild iron def - on IV iron  - GI: + FOBT, will need eventual EGD/colonoscopy    RECOMMEND:  - continue torsemide as ordered (20mg po on Sunday, Tuesday, Thursday)  - sildenafil per pulm  - avoid NSAIDs and nephrotoxins  - dose meds for a CrCl ~ 25 mL/min      Suzan Warren, NP-C  [facility]  [phone]

## 2019-04-10 NOTE — PROGRESS NOTE ADULT - SUBJECTIVE AND OBJECTIVE BOX
Patient seen and examined. She has no complaints today. No acute SOB, orthopnea, PND.         Medications:  ALBUTerol/ipratropium for Nebulization 3 milliLiter(s) Nebulizer every 6 hours PRN  allopurinol 300 milliGRAM(s) Oral daily  benzonatate 100 milliGRAM(s) Oral every 8 hours  buDESOnide  80 MICROgram(s)/formoterol 4.5 MICROgram(s) Inhaler 2 Puff(s) Inhalation two times a day  dextrose 40% Gel 15 Gram(s) Oral once PRN  dextrose 5%. 1000 milliLiter(s) IV Continuous <Continuous>  dextrose 50% Injectable 12.5 Gram(s) IV Push once  dextrose 50% Injectable 25 Gram(s) IV Push once  dextrose 50% Injectable 25 Gram(s) IV Push once  glucagon  Injectable 1 milliGRAM(s) IntraMuscular once PRN  guaiFENesin  milliGRAM(s) Oral every 12 hours PRN  insulin lispro (HumaLOG) corrective regimen sliding scale   SubCutaneous three times a day before meals  insulin lispro (HumaLOG) corrective regimen sliding scale   SubCutaneous at bedtime  iron sucrose IVPB 200 milliGRAM(s) IV Intermittent every 24 hours  metoprolol succinate ER 50 milliGRAM(s) Oral two times a day  multivitamin 1 Tablet(s) Oral daily  pantoprazole  Injectable 40 milliGRAM(s) IV Push every 12 hours  sildenafil (REVATIO) 10 milliGRAM(s) Oral three times a day  sodium chloride 0.9% lock flush 3 milliLiter(s) IV Push every 8 hours  torsemide 20 milliGRAM(s) Oral <User Schedule>      Vitals:  Vital Signs Last 24 Hrs  T(C): 36.6 (10 Apr 2019 14:27), Max: 36.6 (2019 22:32)  T(F): 97.8 (10 Apr 2019 14:27), Max: 97.9 (2019 22:32)  HR: 87 (10 Apr 2019 14:27) (77 - 90)  BP: 97/57 (10 Apr 2019 14:27) (90/61 - 107/62)  BP(mean): --  RR: 18 (10 Apr 2019 14:27) (18 - 18)  SpO2: 100% (10 Apr 2019 14:27) (96% - 100%)    Daily     Daily Weight in k.9 (10 Apr 2019 12:56)    I&O's Detail    2019 07:  -  10 Apr 2019 07:00  --------------------------------------------------------  IN:    Oral Fluid: 440 mL  Total IN: 440 mL    OUT:    Voided: 1100 mL  Total OUT: 1100 mL    Total NET: -660 mL      10 Apr 2019 07:  -  10 Apr 2019 15:42  --------------------------------------------------------  IN:    IV PiggyBack: 110 mL  Total IN: 110 mL    OUT:    Voided: 250 mL  Total OUT: 250 mL    Total NET: -140 mL        Physical Exam:     General: No distress. Comfortable.  HEENT: EOM intact.  Neck: Neck supple. JVP not elevated. No masses  Chest: Clear to auscultation bilaterally  CV: S1 and S2 irregular. No murmurs, rub, or gallops. Radial pulses normal. No LE edema b/l.   Abdomen: Soft, non-distended, non-tender  Skin: No rashes or skin breakdown  Neurology: Alert and oriented times three. Sensation intact  Psych: Affect normal    Labs:                        8.7    4.65  )-----------( 209      ( 10 Apr 2019 07:55 )             29.6     04-10    136  |  101  |  65<H>  ----------------------------<  82  4.2   |  24  |  2.16<H>    Ca    9.8      10 Apr 2019 07:55  Mg     1.6     04-10    < from: Transthoracic Echocardiogram (19 @ 18:26) >  DIMENSIONS:  Dimensions:     Normal Values:  LA:     3.7 cm    2.0 - 4.0 cm  Ao:     2.9 cm    2.0 -3.8 cm  SEPTUM: 0.9 cm    0.6 - 1.2 cm  PWT:    0.9 cm    0.6 - 1.1 cm  LVIDd:  4.5 cm    3.0 - 5.6 cm  LVIDs:  2.7 cm    1.8 - 4.0 cm  Derived Variables:  LVMI: 71 g/m2  RWT: 0.40  Fractional short: 40 %  Ejection Fraction (Visual Estimate): >70 %  Peak Velocity (m/sec): AoV=1.4  ------------------------------------------------------------------------  OBSERVATIONS:  Mitral Valve: Mitral annular calcification and calcified  mitral leaflets with normal diastolic opening. Mild mitral  regurgitation.  Aortic Root: Aortic Root: 2.9 cm.  Aortic Valve: Calcified trileaflet aortic valve with normal  opening. Peak transaortic valve gradient equals 7 mm Hg.  Peak left ventricular outflow tract gradient equals 4.8 mm  Hg.  Left Atrium: Mildly dilated left atrium.  LA volume index =  37 cc/m2.  Left Ventricle: Hyperdynamic left ventricle. Flattening of  the interventricular septum in both systole and diastole is   consistent with right ventricular pressure overload.  Normal left ventricular internal dimensions and wall  thicknesses. (DT:150 ms).  Right Heart: Severe right atrial enlargement. Right  ventricular enlargement with normal right ventricular  systolic function. Normal tricuspid valve. Moderate-severe  tricuspid regurgitation. Pulmonicvalve not well  visualized. Mild pulmonic regurgitation.  Pericardium/PleuraNormal pericardium with no pericardial  effusion.  Hemodynamic: Estimated right ventricular systolic pressure  equals 84 mm Hg, assuming right atrial pressure equals 10  mm Hg, consistent with severe pulmonary hypertension.  ------------------------------------------------------------------------    < end of copied text >

## 2019-04-10 NOTE — PROGRESS NOTE ADULT - ATTENDING COMMENTS
Patient with COPD of unclear severity ( PFT's not available for a review) and unknown severity of LINH ( not available for a review, patient is in process of getting nocturnal positive airway pressure device cpap or Bipap? at home)  She'll benefit from full airway protection during endoscopy to avoid complications related to sedation and LINH/COPD.  She has moderate-severe PHT, will benefit from close monitoring of perfusing pressures during sedation and avoidance of fluid challenge.

## 2019-04-10 NOTE — PROGRESS NOTE ADULT - SUBJECTIVE AND OBJECTIVE BOX
Chief Complaint:  Patient is a 63y old  Female who presents with a chief complaint of Generalized weakness (10 Apr 2019 08:51)      Interval Events: GI reconsulted by primary team for endoscopic evaluation. When examined, pt appears comfortable. Pt denies melena, hematochezia, abdominal pain, nausea, vomiting, SOB.   ROS: All 12 point system except listed above were otherwise negative.    Allergies:  No Known Allergies        Hospital Medications:  ALBUTerol/ipratropium for Nebulization 3 milliLiter(s) Nebulizer every 6 hours PRN  allopurinol 300 milliGRAM(s) Oral daily  benzonatate 100 milliGRAM(s) Oral every 8 hours  buDESOnide  80 MICROgram(s)/formoterol 4.5 MICROgram(s) Inhaler 2 Puff(s) Inhalation two times a day  dextrose 40% Gel 15 Gram(s) Oral once PRN  dextrose 5%. 1000 milliLiter(s) IV Continuous <Continuous>  dextrose 50% Injectable 12.5 Gram(s) IV Push once  dextrose 50% Injectable 25 Gram(s) IV Push once  dextrose 50% Injectable 25 Gram(s) IV Push once  glucagon  Injectable 1 milliGRAM(s) IntraMuscular once PRN  guaiFENesin  milliGRAM(s) Oral every 12 hours PRN  insulin lispro (HumaLOG) corrective regimen sliding scale   SubCutaneous three times a day before meals  insulin lispro (HumaLOG) corrective regimen sliding scale   SubCutaneous at bedtime  iron sucrose IVPB 200 milliGRAM(s) IV Intermittent every 24 hours  metoprolol succinate ER 50 milliGRAM(s) Oral two times a day  multivitamin 1 Tablet(s) Oral daily  pantoprazole  Injectable 40 milliGRAM(s) IV Push every 12 hours  sildenafil (REVATIO) 10 milliGRAM(s) Oral three times a day  sodium chloride 0.9% lock flush 3 milliLiter(s) IV Push every 8 hours  torsemide 20 milliGRAM(s) Oral <User Schedule>      PMHX/PSHX:  Gout  Chronic systolic right heart failure  LINH (obstructive sleep apnea)  CLL (chronic lymphocytic leukemia)  Chronic diastolic congestive heart failure  COPD (chronic obstructive pulmonary disease)  Diabetes  HTN (hypertension)  Atrial fibrillation  No significant past surgical history      Family history:  Family history of hyperlipidemia  Family history of hypertension (Sibling)  Family history of type 2 diabetes mellitus  Family history of cancer (Aunt)    There is no family history of peptic ulcer disease, gastric cancer, colon polyps, colon cancer, celiac disease, biliary, hepatic, or pancreatic disease.  None of the female relatives have breast, uterine, or ovarian cancer.     PHYSICAL EXAM:   Vital Signs:  Vital Signs Last 24 Hrs  T(C): 36.6 (10 Apr 2019 04:32), Max: 36.9 (2019 14:42)  T(F): 97.8 (10 Apr 2019 04:32), Max: 98.5 (2019 14:42)  HR: 80 (10 Apr 2019 04:32) (77 - 82)  BP: 107/62 (10 Apr 2019 04:32) (90/61 - 107/62)  BP(mean): --  RR: 18 (10 Apr 2019 04:32) (18 - 18)  SpO2: 100% (10 Apr 2019 04:) (100% - 100%)  Daily     Daily Weight in k.4 (10 Apr 2019 04:32)    GENERAL:  Appears stated age, well-groomed  HEENT:  NC/AT,  conjunctivae clear and pink, sclera -anicteric  CHEST:  Full & symmetric excursion, no increased effort, breath sounds clear  HEART:  Regular rhythm, S1, S2, no murmur/rub/S3/S4  ABDOMEN:  Soft, non-tender, non-distended, normoactive bowel sounds  EXTREMITIES:  no cyanosis,clubbing or edema  SKIN:  No rash/erythema/ecchymoses  NEURO:  Alert, oriented, no asterixis, no tremor, no encephalopathy  PSYCH: Alert and oriented    LABS:                        8.7    4.65  )-----------( 209      ( 10 Apr 2019 07:55 )             29.6     Mean Cell Volume: 67.1 fL (04-10-19 @ 07:55)    04-10    136  |  101  |  65<H>  ----------------------------<  82  4.2   |  24  |  2.16<H>    Ca    9.8      10 Apr 2019 07:55  Mg     1.6     04-10                                    8.7    4.65  )-----------( 209      ( 10 Apr 2019 07:55 )             29.6                         8.6    4.04  )-----------( 204      ( 2019 07:10 )             28.7                         8.5    5.42  )-----------( 205      ( 2019 06:22 )             27.9     Imaging:

## 2019-04-10 NOTE — PROGRESS NOTE ADULT - SUBJECTIVE AND OBJECTIVE BOX
Patient is a 63y old  Female who presents with a chief complaint of Generalized weakness (2019 16:52)      SUBJECTIVE / OVERNIGHT EVENTS:  No shortness of breath. Blood pressure is improved now.  Review of Systems:   CONSTITUTIONAL: No fever, weight loss,   EYES: No eye pain, visual disturbances, or discharge  ENMT:  No difficulty hearing, tinnitus, vertigo; No sinus or throat pain  NECK: No pain or stiffness  BREASTS: No pain, masses, or nipple discharge  RESPIRATORY: No cough, wheezing, chills or hemoptysis; No shortness of breath  CARDIOVASCULAR: No chest pain, palpitations, dizziness, or leg swelling  GASTROINTESTINAL: No abdominal or epigastric pain. No nausea, vomiting, or hematemesis; No diarrhea or constipation. No melena or hematochezia.  GENITOURINARY: No dysuria, frequency, hematuria, or incontinence  NEUROLOGICAL: No headaches, memory loss, loss of strength, numbness, or tremors  SKIN: No itching, burning, rashes, or lesions   LYMPH NODES: No enlarged glands  ENDOCRINE: No heat or cold intolerance; No hair loss  MUSCULOSKELETAL: No joint pain or swelling; No muscle, back, or extremity pain  PSYCHIATRIC: No depression, anxiety, mood swings, or difficulty sleeping  HEME/LYMPH: No easy bruising, or bleeding gums  ALLERY AND IMMUNOLOGIC: No hives or eczema    MEDICATIONS  (STANDING):  allopurinol 300 milliGRAM(s) Oral daily  benzonatate 100 milliGRAM(s) Oral every 8 hours  buDESOnide  80 MICROgram(s)/formoterol 4.5 MICROgram(s) Inhaler 2 Puff(s) Inhalation two times a day  dextrose 5%. 1000 milliLiter(s) (50 mL/Hr) IV Continuous <Continuous>  dextrose 50% Injectable 12.5 Gram(s) IV Push once  dextrose 50% Injectable 25 Gram(s) IV Push once  dextrose 50% Injectable 25 Gram(s) IV Push once  insulin lispro (HumaLOG) corrective regimen sliding scale   SubCutaneous three times a day before meals  insulin lispro (HumaLOG) corrective regimen sliding scale   SubCutaneous at bedtime  metoprolol succinate  milliGRAM(s) Oral <User Schedule>  metoprolol succinate  milliGRAM(s) Oral <User Schedule>  multivitamin 1 Tablet(s) Oral daily  oseltamivir 30 milliGRAM(s) Oral daily  pantoprazole  Injectable 40 milliGRAM(s) IV Push every 12 hours    MEDICATIONS  (PRN):  ALBUTerol/ipratropium for Nebulization 3 milliLiter(s) Nebulizer every 6 hours PRN Shortness of Breath and/or Wheezing  dextrose 40% Gel 15 Gram(s) Oral once PRN Blood Glucose LESS THAN 70 milliGRAM(s)/deciliter  glucagon  Injectable 1 milliGRAM(s) IntraMuscular once PRN Glucose LESS THAN 70 milligrams/deciliter      PHYSICAL EXAM:  Vital Signs Last 24 Hrs  T(C): 36.6 (2019 20:32), Max: 36.9 (2019 05:27)  T(F): 97.9 (2019 20:32), Max: 98.4 (2019 05:27)  HR: 81 (2019 20:32) (57 - 81)  BP: 117/81 (2019 20:32) (94/58 - 117/81)  BP(mean): --  RR: 18 (2019 20:32) (18 - 19)  SpO2: 100% (2019 20:32) (95% - 100%)  I&O's Summary    2019 07:01  -  2019 07:00  --------------------------------------------------------  IN: 720 mL / OUT: 0 mL / NET: 720 mL    2019 07:01  -  2019 20:51  --------------------------------------------------------  IN: 1311 mL / OUT: 0 mL / NET: 1311 mL      GENERAL: NAD, well-developed  HEAD:  Atraumatic, Normocephalic  EYES: EOMI, PERRLA, conjunctiva and sclera clear  NECK: Supple, No JVD  CHEST/LUNG: Clear to auscultation bilaterally; No wheeze  HEART: Regular rate and rhythm; No murmurs, rubs, or gallops  ABDOMEN: Soft, Nontender, Nondistended; Bowel sounds present  EXTREMITIES:  2+ Peripheral Pulses, No clubbing, cyanosis, or edema  PSYCH: AAOx3  NEUROLOGY: non-focal  SKIN: No rashes or lesions    LABS:  CAPILLARY BLOOD GLUCOSE      POCT Blood Glucose.: 103 mg/dL (2019 17:19)  POCT Blood Glucose.: 120 mg/dL (2019 12:47)  POCT Blood Glucose.: 104 mg/dL (2019 08:13)  POCT Blood Glucose.: 133 mg/dL (2019 22:28)                          9.0    5.29  )-----------( 169      ( 2019 07:50 )             29.6     04-02    136  |  92<L>  |  122<H>  ----------------------------<  102<H>  3.8   |  30  |  2.73<H>    Ca    9.8      2019 07:50  Phos  2.7     04-02  Mg     1.8     04-02    TPro  7.0  /  Alb  4.2  /  TBili  1.0  /  DBili  x   /  AST  23  /  ALT  14  /  AlkPhos  65  04-01    PT/INR - ( 31 Mar 2019 21:50 )   PT: 21.5 SEC;   INR: 1.85          PTT - ( 31 Mar 2019 21:50 )  PTT:34.0 SEC      Urinalysis Basic - ( 31 Mar 2019 21:50 )    Color: YELLOW / Appearance: CLEAR / S.012 / pH: 6.5  Gluc: NEGATIVE / Ketone: NEGATIVE  / Bili: NEGATIVE / Urobili: NORMAL   Blood: NEGATIVE / Protein: NEGATIVE / Nitrite: NEGATIVE   Leuk Esterase: NEGATIVE / RBC: x / WBC x   Sq Epi: x / Non Sq Epi: x / Bacteria: x        RADIOLOGY & ADDITIONAL TESTS:    Imaging Personally Reviewed:    Consultant(s) Notes Reviewed:      Care Discussed with Consultants/Other Providers:

## 2019-04-10 NOTE — DIETITIAN INITIAL EVALUATION ADULT. - ADHERENCE
Pt reports intolerance to some yogurts, does not drink milk. Pt with GI distress for past couple months, unclear if some foods exacerbates GI distress.

## 2019-04-10 NOTE — DIETITIAN INITIAL EVALUATION ADULT. - OTHER INFO
Length Of Stay nutrition assessment. 64 y/o F presenting with generalized weakness and fall. Influenza A+ s/p Tamiflu. PMH includes R sided HF, severe pulmonary HTN, COPD, HTN., HLD, DM2. Pt states her appetite has improved during admission. No GI distress (nausea/vomiting/diarrhea/constipation.) No chewing or swallowing difficulties at this time. Pt confirms wt loss. However, pt may have had further wt loss due to recent weights ~157 pounds. Possible 75 pound wt loss (31.5 %) in 1 year.

## 2019-04-10 NOTE — DIETITIAN INITIAL EVALUATION ADULT. - NS AS NUTRI INTERV MEALS SNACK
Change diet to low sodium. Phos and K+ WDL. POCT also WNL- monitor need for consistent carbohydrate diet.

## 2019-04-10 NOTE — DIETITIAN INITIAL EVALUATION ADULT. - PROBLEM SELECTOR PLAN 6
[id number] 5. On Eliquis at home.  - Hold Eliquis for now given positive FOBT and possibility of biopsies with colonoscopy  - C/w Toprol 100 mg in AM and 125 mg in PM

## 2019-04-10 NOTE — CHART NOTE - NSCHARTNOTEFT_GEN_A_CORE
NUTRITION SERVICES     Upon Nutritional Assessment by the Registered Dietitian your patient was determined to meet criteria/ has evidence of the following diagnosis/diagnoses:  [ ] Mild Protein Calorie Malnutrition   [ ] Moderate Protein Calorie Malnutrition   [ X] Severe Protein Calorie Malnutrition   [ ] Unspecified Protein Calorie Malnutrition   [ ] Underweight / BMI <19  [ ] Morbid Obesity / BMI >40    Findings as based on:  •  Comprehensive nutritional assessment and consultation    Please refer to Initial Dietitian Evaluation via documents section of [facility] EMR for further recommendations.

## 2019-04-10 NOTE — DIETITIAN INITIAL EVALUATION ADULT. - PERTINENT MEDS FT
MEDICATIONS  (STANDING):  allopurinol 300 milliGRAM(s) Oral daily  benzonatate 100 milliGRAM(s) Oral every 8 hours  buDESOnide  80 MICROgram(s)/formoterol 4.5 MICROgram(s) Inhaler 2 Puff(s) Inhalation two times a day  dextrose 5%. 1000 milliLiter(s) (50 mL/Hr) IV Continuous <Continuous>  dextrose 50% Injectable 12.5 Gram(s) IV Push once  dextrose 50% Injectable 25 Gram(s) IV Push once  dextrose 50% Injectable 25 Gram(s) IV Push once  insulin lispro (HumaLOG) corrective regimen sliding scale   SubCutaneous three times a day before meals  insulin lispro (HumaLOG) corrective regimen sliding scale   SubCutaneous at bedtime  iron sucrose IVPB 200 milliGRAM(s) IV Intermittent every 24 hours  metoprolol succinate ER 50 milliGRAM(s) Oral two times a day  multivitamin 1 Tablet(s) Oral daily  pantoprazole  Injectable 40 milliGRAM(s) IV Push every 12 hours  sildenafil (REVATIO) 10 milliGRAM(s) Oral three times a day  sodium chloride 0.9% lock flush 3 milliLiter(s) IV Push every 8 hours  torsemide 20 milliGRAM(s) Oral <User Schedule>

## 2019-04-10 NOTE — DIETITIAN INITIAL EVALUATION ADULT. - NS AS NUTRI INTERV ED CONTENT
Encouraged high biological value proteins, small frequent meals. Discussed high fiber foods to increase GI motility, and adequate hydration.

## 2019-04-10 NOTE — PROGRESS NOTE ADULT - PROBLEM SELECTOR PLAN 2
Echo shows severe pulm HTN rather than LV failure. CHF team is following. RHC findings as per cardiology  Started on Revatio.  Monitor BP, she is not dizzy

## 2019-04-10 NOTE — PROGRESS NOTE ADULT - PROBLEM SELECTOR PLAN 4
Occult positive blood  Ok to proceed for endoscopy/ colonoscopy w/o further cardiac testing.   Also iron deficient- ordered venofer 200 mg IVPB q 24 hrs for 5 days.

## 2019-04-10 NOTE — PROGRESS NOTE ADULT - ASSESSMENT
64 yo woman with multiple comorbidities, including history of lymphoma, R-sided heart failure with preserved EF (EF 66% on TTE in 10/2017), severe pulm HTN, mod-severe TR, reported COPD but denies smoking? (on 2L NC at night), LINH (being arranged for bilevel support), A-Fib (on Eliquis), HTN, HLD, DM2, and gout presents with generalized weakness for the past 1 week in the setting of recent occult positive stool, OSMAR and newly diagnosed influenza/entero virus infection. RHC on 3/6/19-RA 10, RV 78/2/14, PCWP 15, PA 78/29/45, sat 47.4%, CO 4.39, CI 2.3>SVR 1659 62 yo woman with multiple comorbidities, including history of lymphoma, R-sided heart failure with preserved EF (EF 66% on TTE in 10/2017), severe pulm HTN, mod-severe TR, reported COPD but denies smoking? (on 2L NC at night), LINH (being arranged for bilevel support), A-Fib (on Eliquis), HTN, HLD, DM2, and gout presents with generalized weakness for the past 1 week in the setting of recent occult positive stool, OSMAR and newly diagnosed influenza/entero virus infection. RHC on 3/6/19-RA 10, RV 78/2/14, PCWP 15, PA 78/29/45, sat 47.4%, CO 4.39, CI 2.3>SVR 1659, PVR 6.6

## 2019-04-10 NOTE — DIETITIAN INITIAL EVALUATION ADULT. - PERTINENT LABORATORY DATA
04-10 Na 136 mmol/L Glu 82 mg/dL K+ 4.2 mmol/L Cr 2.16 mg/dL<H> BUN 65 mg/dL<H>  Hgb 8.7 g/dL<L> Hct 29.6 %<L>  Hemoglobin A1C, Whole Blood: 5.7 % (04-02-19 @ 07:50)

## 2019-04-10 NOTE — DIETITIAN INITIAL EVALUATION ADULT. - PROBLEM SELECTOR PLAN 10
- DVT ppx: Pt on therapeutic AC with Eliquis  - Diet: Clear liquid pending GI consult  - PT eval pending

## 2019-04-10 NOTE — DIETITIAN INITIAL EVALUATION ADULT. - PROBLEM SELECTOR PLAN 3
Pt with outpatient labs on 3/23/19 revealing OSMAR on CKD with  and Cr 3.7. Baseline Cr appears to be 2-2.5. Currently,  and Cr 2.87.  - Urine studies obtained. Fe Urea 17.9% < 35%, consistent with pre-renal etiology. Can be due to overdiuresis while on metolazone and torsemide and given recent episodes of vomiting with decreased PO intake.  - C/w decreased dose of torsemide 40 mg daily  - Renal US ordered and pending  - Renal consult in AM (Dr. Heart)  - Monitor Cr, UOP, and electrolytes  - Avoid nephrotoxins  - Renally dose meds  - Metabolic alkalosis due to chronic CO2 retention from COPD, diuretic use, and hypokalemia  - Metallic taste likely from uremia

## 2019-04-10 NOTE — PROGRESS NOTE ADULT - ASSESSMENT
62 yo woman with multiple comorbidities, including R-sided heart failure with preserved EF (EF 66% on TTE in 10/2017), severe pulm HTN, mod-severe TR, COPD (on 2L NC and Advair BID), LINH , afib (on Eliquis), HTN, HLD, DM2, and gout who presented with 1 week of generalized weakness, and several months of decreased appetite and weight loss. She was EV/RV+ on admission and worked up for GI bleed. She had an ECHO that showed elevated pulmonary pressures and signs of cor-pulmonale, so she underwent a cardiac catheterization. Pulmonary Hypertension service was consulted given cath results with pulm HTN and normal PCWP.       Pulmonary Clearance:  ARISCAT Score is 31 deeming patient at intermediate risk for intermediate risk procedure.    Pulmonary Hypertension:  Has a component of both pre-capillary and post-capillary pulmonary hypertension. Suspect combination of group II/III. Unclear about the underlying lung function, imaging looks normal. VQ scan with a very low probability of PE. HIV negative. TSH wnl. Was started on sildenafil, has not had any adverse effects from this. Measured BP have been ~/50-60, but she has been asymptomatic. Requires further workup to assess if any underlying CTD. Also needs work up for right sided thyroid nodule (bilateral indeterminate nodules on US with normal TSH, patient asking for further work up)      Recommendations:  - Restart diuretics, aim for net even to slightly negative.  - Continue sildenafil 10mg TID. Monitor BP closely.   - Requires treatment for LINH.  - CTD workup sent, will follow results.  - Further workup for anemia per GI/primary team.  - Will need to assess exercise oximetry prior to discharge.   - Close outpatient follow up with Dr. Yang in pulmonary hypertension clinic.   - Please call [phone] before discharge to make follow up appointment. 64 yo woman with multiple comorbidities, including R-sided heart failure with preserved EF (EF 66% on TTE in 10/2017), severe pulm HTN, mod-severe TR, COPD (on 2L NC and Advair BID), LINH , afib (on Eliquis), HTN, HLD, DM2, and gout who presented with 1 week of generalized weakness, and several months of decreased appetite and weight loss. She was EV/RV+ on admission and worked up for GI bleed. She had an ECHO that showed elevated pulmonary pressures and signs of cor-pulmonale, so she underwent a cardiac catheterization. Pulmonary Hypertension service was consulted given cath results with pulm HTN and normal PCWP.       Pulmonary Clearance:  -ARISCAT Score is 31 deeming patient at intermediate risk for intermediate risk procedure.  -Given history of LINH would recommend intubation for airway protection during procedure  -Must keep close eye on blood pressure given pulmonary HTN in setting of increased intrathoracic pressure and sedation patient is high risk for hypotension requiring pressor support.     Pulmonary Hypertension:  Has a component of both pre-capillary and post-capillary pulmonary hypertension. Suspect group III. Unclear about the underlying lung function, imaging looks normal. VQ scan with a very low probability of PE. HIV negative. TSH wnl. Was started on sildenafil, has not had any adverse effects from this. Measured BP have been ~/50-60, but she has been asymptomatic. Requires further workup to assess if any underlying CTD. Also needs work up for right sided thyroid nodule (bilateral indeterminate nodules on US with normal TSH, patient asking for further work up)      Recommendations:  - Restart diuretics, aim for net even to slightly negative.  - Continue sildenafil 10mg TID. Monitor BP closely.   - Requires treatment for LINH.  - CTD workup sent, will follow results.  - Further workup for anemia per GI/primary team.  - Will need to assess exercise oximetry prior to discharge.   - Close outpatient follow up with Dr. Yang in pulmonary hypertension clinic.   - Please call [phone] before discharge to make follow up appointment.

## 2019-04-10 NOTE — DIETITIAN INITIAL EVALUATION ADULT. - ENERGY NEEDS
Ht: 62 in Wt: (4/10) 157.4 pounds  BMI: 27.0  IBW: 120 pounds   Skin: No edema, no pressure injuries noted.

## 2019-04-10 NOTE — PROGRESS NOTE ADULT - PROBLEM SELECTOR PLAN 2
Euvolemic. Weight today is 186 lbs standing (bed weights not correct), last office weight 196 lbs.   Tx for pulm HTN as above  Continue torsemide 20 mg every Sun, Tues, and Thursday.  Please check daily standing weight.

## 2019-04-10 NOTE — PROGRESS NOTE ADULT - SUBJECTIVE AND OBJECTIVE BOX
PULMONARY PROGRESS/PULMONARY CLEARANCE NOTE    CHIEF COMPLAINT: WEAKNESS      Interval Events: No acute events.       OBJECTIVE:  ICU Vital Signs Last 24 Hrs  T(C): 36.6 (10 Apr 2019 10:07), Max: 36.9 (09 Apr 2019 14:42)  T(F): 97.9 (10 Apr 2019 10:07), Max: 98.5 (09 Apr 2019 14:42)  HR: 90 (10 Apr 2019 10:07) (77 - 90)  BP: 99/64 (10 Apr 2019 10:07) (90/61 - 107/62)  BP(mean): --  ABP: --  ABP(mean): --  RR: 18 (10 Apr 2019 10:07) (18 - 18)  SpO2: 96% (10 Apr 2019 10:07) (96% - 100%)        04-09 @ 07:01  -  04-10 @ 07:00  --------------------------------------------------------  IN: 440 mL / OUT: 1100 mL / NET: -660 mL    04-10 @ 07:01  -  04-10 @ 13:19  --------------------------------------------------------  IN: 0 mL / OUT: 250 mL / NET: -250 mL      CAPILLARY BLOOD GLUCOSE      POCT Blood Glucose.: 111 mg/dL (10 Apr 2019 12:50)      PHYSICAL EXAM:  General: Awake, alert, oriented X 3.   HEENT: Atraumatic, normocephalic.                 Mallampatti Grade 3  	  ENLARGED RIGHT SIDED THYROID NODULE.  Lymph Nodes: No palpable lymphadenopathy  Neck: No JVD. No carotid bruit.   Respiratory: Normal chest expansion                     Normal and equal air entry  Cardiovascular: S1 S2 normal. No murmurs, rubs or gallops.   Abdomen: Soft, non-tender, non-distended. No organomegaly.  Extremities: Warm to touch. Peripheral pulse palpable. No pedal edema.   Skin: No rashes or skin lesions  Neurological: Motor examination equal and normal in all four extremities.  Psychiatry: Appropriate mood and affect.    HOSPITAL MEDICATIONS:  Standing Meds:  allopurinol 300 milliGRAM(s) Oral daily  benzonatate 100 milliGRAM(s) Oral every 8 hours  buDESOnide  80 MICROgram(s)/formoterol 4.5 MICROgram(s) Inhaler 2 Puff(s) Inhalation two times a day  dextrose 5%. 1000 milliLiter(s) IV Continuous <Continuous>  dextrose 50% Injectable 12.5 Gram(s) IV Push once  dextrose 50% Injectable 25 Gram(s) IV Push once  dextrose 50% Injectable 25 Gram(s) IV Push once  insulin lispro (HumaLOG) corrective regimen sliding scale   SubCutaneous three times a day before meals  insulin lispro (HumaLOG) corrective regimen sliding scale   SubCutaneous at bedtime  iron sucrose IVPB 200 milliGRAM(s) IV Intermittent every 24 hours  metoprolol succinate ER 50 milliGRAM(s) Oral two times a day  multivitamin 1 Tablet(s) Oral daily  pantoprazole  Injectable 40 milliGRAM(s) IV Push every 12 hours  sildenafil (REVATIO) 10 milliGRAM(s) Oral three times a day  sodium chloride 0.9% lock flush 3 milliLiter(s) IV Push every 8 hours  torsemide 20 milliGRAM(s) Oral <User Schedule>      PRN Meds:  ALBUTerol/ipratropium for Nebulization 3 milliLiter(s) Nebulizer every 6 hours PRN  dextrose 40% Gel 15 Gram(s) Oral once PRN  glucagon  Injectable 1 milliGRAM(s) IntraMuscular once PRN  guaiFENesin  milliGRAM(s) Oral every 12 hours PRN      LABS:                        8.7    4.65  )-----------( 209      ( 10 Apr 2019 07:55 )             29.6     Hgb Trend: 8.7<--, 8.6<--, 8.5<--, 8.5<--, 8.7<--  04-10    136  |  101  |  65<H>  ----------------------------<  82  4.2   |  24  |  2.16<H>    Ca    9.8      10 Apr 2019 07:55  Mg     1.6     04-10      Creatinine Trend: 2.16<--, 2.14<--, 2.19<--, 1.94<--, 2.09<--, 2.33<--            MICROBIOLOGY:     RADIOLOGY:  [ ] Reviewed and interpreted by me    PULMONARY FUNCTION TESTS:    EKG:

## 2019-04-10 NOTE — PROGRESS NOTE ADULT - ATTENDING COMMENTS
Briefly, 62 y/o F w/ h/o lymphoma s/p radiation therapy, severe pulmonary HTN, HFpEF, ?obstructive lung disease (never smoker), anemia (? thalassemia trait) who presented on 3/31 with weight loss over past year (? 2/2 Trulicity vs. occult malignancy) as well as 1 month of decreased appetite and abdominal complaints, found to be in acute on chronic kidney injury in setting of influenza/enterovirus infection and possible excessive metolazone use. Renal function improved with cessation of diuretics but given notable pulmonary HTN, underwent RHC on 3/6/19 (w/o vasodilator study) which revealed RA 10, RV 78/14, PA 78/29/45, PCWP 15, sat 47.4%, CO 4.39, CI 2.3>SVR 1659, PVR 6.8 (TPG 30). Was started on sildenafil 10 mg three times/day and underwent V/Q study which was low probability and workup of PH is ongoing. Currently feels overwhelmed but denies symptoms of dypspnea/orthopnea. Was also noted to have iron def anemia with guaiac positive stool. Exam JVD approx 8 cm with V waves, irreg irreg rhythm, prominent P2, grade III/VI systolic murmur in LLSB, CTAB no pedal edema. Labs reviewed - BUN/Cr 62/2.2 (improved from admission). Overall has undetermined etiology of pulmonary HTN (possibly contributed to by LINH, small airway disease). Awaiting possible EGD/colonoscopy which she is medically optimized for. Would be cautious regarding sedation as may lead to hypotension in a patient with severe pulmonary HTN.   - appreciate pulm f/u  - alpha-1 anti-trypsin level nl   - would ideally need vasodilator study to evaluate PH further and determine appropriate medications if determined to be also WHO group I; component of group III  - continue sildenafil 10 mg q8h  - afib; on toprol XL 50 mg bid (hold for SBP <85); resume eliquis at 5 mg q12 if ok per GI  - would proceed with EGD/colonoscopy in monitored setting with anesthesiology present given LINH; defer to pulmonary regarding need to be intubated; would be cautious in light of sedation and may require pressor support  - continue torsemide at 20 mg every other day

## 2019-04-10 NOTE — PROGRESS NOTE ADULT - ASSESSMENT
Impression:  1) Anemia, microcytic- Pt with iron sat of 12%, also with elevated ferritin, likely with mixed iron deficiency anemia and acute on chronic inflammation. Pt currently has no overt bleeding but could have occult bleeding from PUD, Malignancy (stomach, small bowel, colon, lymphoma), telangiectasia, celiac disease. Pt was found to have positive cologuard, however that is more used for colorectal cancer screening  2) Severe pHTN- On sildenafil  3) HFpEF on Torsemide  4) Obstructive lung disease  5) Afib on Eliquis    Recommendations:  -Requested by primary team to perform endoscopy as an inpatient however given multiple comorbidities at present (with ongoing work up pending), risk likely outweighs benefit at this time  -Would recommend obtaining cardiology and pulmonary clearance if EGD/colonoscopy is wanted  -Monitor BMs, check H/H  -Check Hgb electrophoresis to r/o thalessemia  -Rest of care per primary team Impression:  1) Anemia, microcytic- Pt with iron sat of 12%, also with elevated ferritin, likely with mixed iron deficiency anemia and acute on chronic inflammation. Pt currently has no overt bleeding but could have occult bleeding from PUD, Malignancy (stomach, small bowel, colon, lymphoma), telangiectasia, celiac disease. Pt was found to have positive cologuard, however that is more used for colorectal cancer screening  2) Severe pHTN- On sildenafil  3) HFpEF on Torsemide  4) Obstructive lung disease  5) Afib on Eliquis    Recommendations:  -Requested by primary team to perform endoscopy as an inpatient given multiple comorbidities (with ongoing work up pending)  -Would recommend obtaining cardiology and pulmonary clearance prior to EGD/colonoscopy  -Monitor BMs, check H/H  -Check Hgb electrophoresis to r/o thalessemia  -Rest of care per primary team

## 2019-04-10 NOTE — PROGRESS NOTE ADULT - SUBJECTIVE AND OBJECTIVE BOX
PULM/MED PROGRESS NOTE:      awake, alert comfortable.  continues to have cough, but improving      MEDICATIONS  (STANDING):  allopurinol 300 milliGRAM(s) Oral daily  benzonatate 100 milliGRAM(s) Oral every 8 hours  buDESOnide  80 MICROgram(s)/formoterol 4.5 MICROgram(s) Inhaler 2 Puff(s) Inhalation two times a day  dextrose 5%. 1000 milliLiter(s) (50 mL/Hr) IV Continuous <Continuous>  dextrose 50% Injectable 12.5 Gram(s) IV Push once  dextrose 50% Injectable 25 Gram(s) IV Push once  dextrose 50% Injectable 25 Gram(s) IV Push once  insulin lispro (HumaLOG) corrective regimen sliding scale   SubCutaneous three times a day before meals  insulin lispro (HumaLOG) corrective regimen sliding scale   SubCutaneous at bedtime  iron sucrose IVPB 200 milliGRAM(s) IV Intermittent every 24 hours  metoprolol succinate ER 50 milliGRAM(s) Oral two times a day  multivitamin 1 Tablet(s) Oral daily  pantoprazole  Injectable 40 milliGRAM(s) IV Push every 12 hours  sildenafil (REVATIO) 10 milliGRAM(s) Oral three times a day  sodium chloride 0.9% lock flush 3 milliLiter(s) IV Push every 8 hours  torsemide 20 milliGRAM(s) Oral <User Schedule>      MEDICATIONS  (PRN):  ALBUTerol/ipratropium for Nebulization 3 milliLiter(s) Nebulizer every 6 hours PRN Shortness of Breath and/or Wheezing  dextrose 40% Gel 15 Gram(s) Oral once PRN Blood Glucose LESS THAN 70 milliGRAM(s)/deciliter  glucagon  Injectable 1 milliGRAM(s) IntraMuscular once PRN Glucose LESS THAN 70 milligrams/deciliter  guaiFENesin  milliGRAM(s) Oral every 12 hours PRN Cough          Vital Signs Last 24 Hrs  T(C): 36.6 (10 Apr 2019 17:36), Max: 36.6 (09 Apr 2019 22:32)  T(F): 97.9 (10 Apr 2019 17:36), Max: 97.9 (09 Apr 2019 22:32)  HR: 100 (10 Apr 2019 17:36) (77 - 100)  BP: 96/63 (10 Apr 2019 17:36) (90/61 - 107/62)  BP(mean): --  RR: 18 (10 Apr 2019 17:36) (18 - 18)  SpO2: 100% (10 Apr 2019 17:36) (96% - 100%)    PHYSICAL EXAMINATION:    GENERAL: The patient is awake and alert in no apparent distress.       NECK: Supple.    LUNGS: Bilateral BS respirations unlabored    HEART: S1 S2    ABDOMEN: Soft, nontender    EXTREMITIES: Without significant edema.      LABS:                        8.7    4.65  )-----------( 209      ( 10 Apr 2019 07:55 )             29.6     04-10    136  |  101  |  65<H>  ----------------------------<  82  4.2   |  24  |  2.16<H>    Ca    9.8      10 Apr 2019 07:55  Mg     1.6     04-10                          MICROBIOLOGY:      RADIOLOGY & ADDITIONAL STUDIES:

## 2019-04-11 LAB
ANA PAT FLD IF-IMP: SIGNIFICANT CHANGE UP
ANA TITR SER: SIGNIFICANT CHANGE UP
ANION GAP SERPL CALC-SCNC: 13 MMO/L — SIGNIFICANT CHANGE UP (ref 7–14)
BUN SERPL-MCNC: 61 MG/DL — HIGH (ref 7–23)
CALCIUM SERPL-MCNC: 9.7 MG/DL — SIGNIFICANT CHANGE UP (ref 8.4–10.5)
CHLORIDE SERPL-SCNC: 101 MMOL/L — SIGNIFICANT CHANGE UP (ref 98–107)
CO2 SERPL-SCNC: 22 MMOL/L — SIGNIFICANT CHANGE UP (ref 22–31)
CREAT SERPL-MCNC: 1.94 MG/DL — HIGH (ref 0.5–1.3)
GLUCOSE SERPL-MCNC: 77 MG/DL — SIGNIFICANT CHANGE UP (ref 70–99)
HCT VFR BLD CALC: 27.5 % — LOW (ref 34.5–45)
HGB BLD-MCNC: 8.1 G/DL — LOW (ref 11.5–15.5)
MAGNESIUM SERPL-MCNC: 1.4 MG/DL — LOW (ref 1.6–2.6)
MCHC RBC-ENTMCNC: 19.6 PG — LOW (ref 27–34)
MCHC RBC-ENTMCNC: 29.5 % — LOW (ref 32–36)
MCV RBC AUTO: 66.4 FL — LOW (ref 80–100)
NRBC # FLD: 0.07 K/UL — SIGNIFICANT CHANGE UP (ref 0–0)
NRBC FLD-RTO: 1.5 — SIGNIFICANT CHANGE UP
PLATELET # BLD AUTO: 244 K/UL — SIGNIFICANT CHANGE UP (ref 150–400)
PMV BLD: SIGNIFICANT CHANGE UP FL (ref 7–13)
POTASSIUM SERPL-MCNC: 4 MMOL/L — SIGNIFICANT CHANGE UP (ref 3.5–5.3)
POTASSIUM SERPL-SCNC: 4 MMOL/L — SIGNIFICANT CHANGE UP (ref 3.5–5.3)
RBC # BLD: 4.14 M/UL — SIGNIFICANT CHANGE UP (ref 3.8–5.2)
RBC # FLD: 16.4 % — HIGH (ref 10.3–14.5)
SODIUM SERPL-SCNC: 136 MMOL/L — SIGNIFICANT CHANGE UP (ref 135–145)
WBC # BLD: 4.74 K/UL — SIGNIFICANT CHANGE UP (ref 3.8–10.5)
WBC # FLD AUTO: 4.74 K/UL — SIGNIFICANT CHANGE UP (ref 3.8–10.5)

## 2019-04-11 PROCEDURE — 99233 SBSQ HOSP IP/OBS HIGH 50: CPT

## 2019-04-11 RX ORDER — MAGNESIUM SULFATE 500 MG/ML
2 VIAL (ML) INJECTION ONCE
Qty: 0 | Refills: 0 | Status: DISCONTINUED | OUTPATIENT
Start: 2019-04-11 | End: 2019-04-11

## 2019-04-11 RX ORDER — MAGNESIUM OXIDE 400 MG ORAL TABLET 241.3 MG
400 TABLET ORAL
Qty: 0 | Refills: 0 | Status: DISCONTINUED | OUTPATIENT
Start: 2019-04-11 | End: 2019-04-11

## 2019-04-11 RX ORDER — SOD SULF/SODIUM/NAHCO3/KCL/PEG
1000 SOLUTION, RECONSTITUTED, ORAL ORAL EVERY 4 HOURS
Qty: 0 | Refills: 0 | Status: COMPLETED | OUTPATIENT
Start: 2019-04-11 | End: 2019-04-11

## 2019-04-11 RX ORDER — MAGNESIUM OXIDE 400 MG ORAL TABLET 241.3 MG
400 TABLET ORAL
Qty: 0 | Refills: 0 | Status: DISCONTINUED | OUTPATIENT
Start: 2019-04-11 | End: 2019-04-12

## 2019-04-11 RX ADMIN — BUDESONIDE AND FORMOTEROL FUMARATE DIHYDRATE 2 PUFF(S): 160; 4.5 AEROSOL RESPIRATORY (INHALATION) at 22:56

## 2019-04-11 RX ADMIN — BUDESONIDE AND FORMOTEROL FUMARATE DIHYDRATE 2 PUFF(S): 160; 4.5 AEROSOL RESPIRATORY (INHALATION) at 11:24

## 2019-04-11 RX ADMIN — Medication 10 MILLIGRAM(S): at 22:56

## 2019-04-11 RX ADMIN — Medication 50 MILLIGRAM(S): at 05:41

## 2019-04-11 RX ADMIN — SODIUM CHLORIDE 3 MILLILITER(S): 9 INJECTION INTRAMUSCULAR; INTRAVENOUS; SUBCUTANEOUS at 05:31

## 2019-04-11 RX ADMIN — Medication 1000 MILLILITER(S): at 17:43

## 2019-04-11 RX ADMIN — Medication 1 TABLET(S): at 11:28

## 2019-04-11 RX ADMIN — Medication 3 MILLILITER(S): at 07:22

## 2019-04-11 RX ADMIN — Medication 10 MILLIGRAM(S): at 05:41

## 2019-04-11 RX ADMIN — Medication 300 MILLIGRAM(S): at 11:28

## 2019-04-11 RX ADMIN — MAGNESIUM OXIDE 400 MG ORAL TABLET 400 MILLIGRAM(S): 241.3 TABLET ORAL at 17:40

## 2019-04-11 RX ADMIN — Medication 1000 MILLILITER(S): at 22:56

## 2019-04-11 RX ADMIN — PANTOPRAZOLE SODIUM 40 MILLIGRAM(S): 20 TABLET, DELAYED RELEASE ORAL at 05:41

## 2019-04-11 RX ADMIN — SODIUM CHLORIDE 3 MILLILITER(S): 9 INJECTION INTRAMUSCULAR; INTRAVENOUS; SUBCUTANEOUS at 22:56

## 2019-04-11 RX ADMIN — Medication 20 MILLIGRAM(S): at 08:20

## 2019-04-11 RX ADMIN — Medication 5 MILLIGRAM(S): at 17:40

## 2019-04-11 RX ADMIN — PANTOPRAZOLE SODIUM 40 MILLIGRAM(S): 20 TABLET, DELAYED RELEASE ORAL at 17:41

## 2019-04-11 RX ADMIN — SODIUM CHLORIDE 3 MILLILITER(S): 9 INJECTION INTRAMUSCULAR; INTRAVENOUS; SUBCUTANEOUS at 15:02

## 2019-04-11 RX ADMIN — MAGNESIUM OXIDE 400 MG ORAL TABLET 400 MILLIGRAM(S): 241.3 TABLET ORAL at 11:30

## 2019-04-11 RX ADMIN — Medication 100 MILLIGRAM(S): at 22:56

## 2019-04-11 RX ADMIN — Medication 100 MILLIGRAM(S): at 05:41

## 2019-04-11 RX ADMIN — Medication 10 MILLIGRAM(S): at 13:49

## 2019-04-11 RX ADMIN — Medication 50 MILLIGRAM(S): at 17:41

## 2019-04-11 RX ADMIN — Medication 100 MILLIGRAM(S): at 13:50

## 2019-04-11 RX ADMIN — IRON SUCROSE 110 MILLIGRAM(S): 20 INJECTION, SOLUTION INTRAVENOUS at 13:50

## 2019-04-11 NOTE — PROGRESS NOTE ADULT - SUBJECTIVE AND OBJECTIVE BOX
    Patient seen and examined in bed. No pain, no SOB.       MEDICATIONS  (STANDING):  allopurinol 300 milliGRAM(s) Oral daily  benzonatate 100 milliGRAM(s) Oral every 8 hours  buDESOnide  80 MICROgram(s)/formoterol 4.5 MICROgram(s) Inhaler 2 Puff(s) Inhalation two times a day  dextrose 5%. 1000 milliLiter(s) (50 mL/Hr) IV Continuous <Continuous>  dextrose 50% Injectable 12.5 Gram(s) IV Push once  dextrose 50% Injectable 25 Gram(s) IV Push once  dextrose 50% Injectable 25 Gram(s) IV Push once  insulin lispro (HumaLOG) corrective regimen sliding scale   SubCutaneous three times a day before meals  insulin lispro (HumaLOG) corrective regimen sliding scale   SubCutaneous at bedtime  iron sucrose IVPB 200 milliGRAM(s) IV Intermittent every 24 hours  metoprolol succinate ER 50 milliGRAM(s) Oral two times a day  multivitamin 1 Tablet(s) Oral daily  pantoprazole  Injectable 40 milliGRAM(s) IV Push every 12 hours  sildenafil (REVATIO) 10 milliGRAM(s) Oral three times a day  sodium chloride 0.9% lock flush 3 milliLiter(s) IV Push every 8 hours  torsemide 20 milliGRAM(s) Oral <User Schedule>      VITAL:  T(C): , Max: 37 (04-11-19 @ 02:35)  T(F): , Max: 98.6 (04-11-19 @ 02:35)  HR: 81 (04-11-19 @ 08:17)  BP: 99/69 (04-11-19 @ 08:17)  RR: 18 (04-11-19 @ 08:17)  SpO2: 98% (04-11-19 @ 08:17)    I and O's:    04-10 @ 07:01  -  04-11 @ 07:00  --------------------------------------------------------  IN: 650 mL / OUT: 1750 mL / NET: -1100 mL    04-11 @ 07:01  -  04-11 @ 09:40  --------------------------------------------------------  IN: 0 mL / OUT: 100 mL / NET: -100 mL          PHYSICAL EXAM:    Constitutional: NAD  Neck:  No JVD  Respiratory: CTAB/L  Cardiovascular: S1 and S2  Gastrointestinal: BS+, soft, NT/ND  Extremities: No peripheral edema  Neurological: A/O x 3, no focal deficits  Psychiatric: Normal mood, normal affect  : No Corey  Skin: No rashes    LABS:                        8.1    4.74  )-----------( 244      ( 11 Apr 2019 05:45 )             27.5     04-11    136  |  101  |  61<H>  ----------------------------<  77  4.0   |  22  |  1.94<H>    Ca    9.7      11 Apr 2019 05:45  Mg     1.4     04-11      ASSESSMENT:  63F with COPD (on O2), LINH, AF on A/C, HLD, gout, HFpEF, PHTN , DM and CKD3/4 p/w generalized weakness and poor appetite.   - CKD: stage 3 likely due to DM/HTN with baseline Cr ~ 1.6;  OSMAR likely pre-renal - renal function improving  - CV: BP on soft side  - HFpEF: right sided heart failure / cor pulm  - Pulm: RVP + for influenza, rhino and enterovirus - s/p tamiflu. sPHTN on sildenafil   - anemia: in chronic disease + GIB, hold LUIS until GI w/u - mild iron def - on IV iron x 5 days  - GI: + FOBT, plan for EGD/colonoscopy tomorrow    RECOMMEND:  - continue torsemide as ordered (20mg po on Sunday, Tuesday, Thursday)  - sildenafil per pulm  - avoid NSAIDs and nephrotoxins  - dose meds for a CrCl ~ 25-30 mL/min      Suzan Warren, NP-C  [facility]  [phone]    Patient seen and examined in bed. No pain, no SOB.       MEDICATIONS  (STANDING):  allopurinol 300 milliGRAM(s) Oral daily  benzonatate 100 milliGRAM(s) Oral every 8 hours  buDESOnide  80 MICROgram(s)/formoterol 4.5 MICROgram(s) Inhaler 2 Puff(s) Inhalation two times a day  dextrose 5%. 1000 milliLiter(s) (50 mL/Hr) IV Continuous <Continuous>  dextrose 50% Injectable 12.5 Gram(s) IV Push once  dextrose 50% Injectable 25 Gram(s) IV Push once  dextrose 50% Injectable 25 Gram(s) IV Push once  insulin lispro (HumaLOG) corrective regimen sliding scale   SubCutaneous three times a day before meals  insulin lispro (HumaLOG) corrective regimen sliding scale   SubCutaneous at bedtime  iron sucrose IVPB 200 milliGRAM(s) IV Intermittent every 24 hours  metoprolol succinate ER 50 milliGRAM(s) Oral two times a day  multivitamin 1 Tablet(s) Oral daily  pantoprazole  Injectable 40 milliGRAM(s) IV Push every 12 hours  sildenafil (REVATIO) 10 milliGRAM(s) Oral three times a day  sodium chloride 0.9% lock flush 3 milliLiter(s) IV Push every 8 hours  torsemide 20 milliGRAM(s) Oral <User Schedule>      VITAL:  T(C): , Max: 37 (04-11-19 @ 02:35)  T(F): , Max: 98.6 (04-11-19 @ 02:35)  HR: 81 (04-11-19 @ 08:17)  BP: 99/69 (04-11-19 @ 08:17)  RR: 18 (04-11-19 @ 08:17)  SpO2: 98% (04-11-19 @ 08:17)    I and O's:    04-10 @ 07:01  -  04-11 @ 07:00  --------------------------------------------------------  IN: 650 mL / OUT: 1750 mL / NET: -1100 mL    04-11 @ 07:01  -  04-11 @ 09:40  --------------------------------------------------------  IN: 0 mL / OUT: 100 mL / NET: -100 mL          PHYSICAL EXAM:    Constitutional: NAD  Neck:  No JVD  Respiratory: CTAB/L  Cardiovascular: S1 and S2  Gastrointestinal: BS+, soft, NT/ND  Extremities: No peripheral edema  Neurological: A/O x 3, no focal deficits  Psychiatric: Normal mood, normal affect  : No Corey  Skin: No rashes    LABS:                        8.1    4.74  )-----------( 244      ( 11 Apr 2019 05:45 )             27.5     04-11    136  |  101  |  61<H>  ----------------------------<  77  4.0   |  22  |  1.94<H>    Ca    9.7      11 Apr 2019 05:45  Mg     1.4     04-11      ASSESSMENT:  63F with COPD (on O2), LINH, AF on A/C, HLD, gout, HFpEF, PHTN , DM and CKD3/4 p/w generalized weakness and poor appetite.   - CKD: stage 3 likely due to DM/HTN with baseline Cr ~ 1.6;  OSMAR likely pre-renal - renal function improving  - CV: BP on soft side  - HFpEF: right sided heart failure / cor pulm  - Pulm: RVP + for influenza, rhino and enterovirus - s/p tamiflu. sPHTN on sildenafil   - anemia: in chronic disease + GIB, hold LUIS until GI w/u - mild iron def - on IV iron x 5 days  - GI: + FOBT, plan for EGD/colonoscopy tomorrow  - hypomagnesemia: likely nutritional, indicated for repletion    RECOMMEND:  - continue torsemide as ordered (20mg po on Sunday, Tuesday, Thursday)  - sildenafil per pulm  - avoid NSAIDs and nephrotoxins  - dose meds for a CrCl ~30 mL/min  - give mag oxide 400mg po tid x 2 days      Suzan Warren, NP-C  [facility]  [phone]

## 2019-04-11 NOTE — PROGRESS NOTE ADULT - ASSESSMENT
64 yo woman with multiple comorbidities, including history of lymphoma, R-sided heart failure with preserved EF (EF 66% on TTE in 10/2017), severe pulm HTN, mod-severe TR, reported COPD but denies smoking? (on 2L NC at night), LINH (being arranged for bilevel support), A-Fib (on Eliquis), HTN, HLD, DM2, and gout presents with generalized weakness for the past 1 week in the setting of recent occult positive stool, OSMAR and newly diagnosed influenza/entero virus infection. RHC on 3/6/19-RA 10, RV 78/2/14, PCWP 15, PA 78/29/45, sat 47.4%, CO 4.39, CI 2.3>SVR 1659, PVR 6.6

## 2019-04-11 NOTE — PROGRESS NOTE ADULT - PROBLEM SELECTOR PLAN 2
Euvolemic. Weight today is 186 lbs standing (bed weights not correct), last office weight 196 lbs.   Tx for pulm HTN as above.  Euvolemic.  Continue torsemide 20 mg every Sun, Tues, and Thursday.  hypomagnesemic. Added mag ox 400 mg po BID. keep mag >2.0.   Keep K 4.0-4.5

## 2019-04-11 NOTE — PROGRESS NOTE ADULT - SUBJECTIVE AND OBJECTIVE BOX
Patient seen and examined. She feels well. Awaiting GI procedure tomorrow.   No acute SOB, orthopnea, PND, CP, palpitations.   tele afib rate controlled.       Medications:  ALBUTerol/ipratropium for Nebulization 3 milliLiter(s) Nebulizer every 6 hours PRN  allopurinol 300 milliGRAM(s) Oral daily  benzonatate 100 milliGRAM(s) Oral every 8 hours  bisacodyl 5 milliGRAM(s) Oral every 12 hours  buDESOnide  80 MICROgram(s)/formoterol 4.5 MICROgram(s) Inhaler 2 Puff(s) Inhalation two times a day  dextrose 40% Gel 15 Gram(s) Oral once PRN  dextrose 5%. 1000 milliLiter(s) IV Continuous <Continuous>  dextrose 50% Injectable 12.5 Gram(s) IV Push once  dextrose 50% Injectable 25 Gram(s) IV Push once  dextrose 50% Injectable 25 Gram(s) IV Push once  glucagon  Injectable 1 milliGRAM(s) IntraMuscular once PRN  guaiFENesin  milliGRAM(s) Oral every 12 hours PRN  insulin lispro (HumaLOG) corrective regimen sliding scale   SubCutaneous three times a day before meals  insulin lispro (HumaLOG) corrective regimen sliding scale   SubCutaneous at bedtime  iron sucrose IVPB 200 milliGRAM(s) IV Intermittent every 24 hours  magnesium oxide 400 milliGRAM(s) Oral three times a day with meals  metoprolol succinate ER 50 milliGRAM(s) Oral two times a day  multivitamin 1 Tablet(s) Oral daily  pantoprazole  Injectable 40 milliGRAM(s) IV Push every 12 hours  polyethylene glycol/electrolyte Solution 1000 milliLiter(s) Oral every 4 hours  sildenafil (REVATIO) 10 milliGRAM(s) Oral three times a day  sodium chloride 0.9% lock flush 3 milliLiter(s) IV Push every 8 hours  torsemide 20 milliGRAM(s) Oral <User Schedule>      Vitals:  Vital Signs Last 24 Hrs  T(C): 36.5 (2019 14:24), Max: 37 (2019 02:35)  T(F): 97.7 (2019 14:24), Max: 98.6 (2019 02:35)  HR: 87 (2019 14:24) (65 - 100)  BP: 90/56 (2019 14:24) (90/56 - 102/75)  BP(mean): --  RR: 17 (2019 14:24) (17 - 18)  SpO2: 97% (2019 14:24) (95% - 100%)    Daily     Daily Weight in k.4 (2019 05:31)    I&O's Detail    10 Apr 2019 07:01  -  2019 07:00  --------------------------------------------------------  IN:    IV PiggyBack: 110 mL    Oral Fluid: 540 mL  Total IN: 650 mL    OUT:    Voided: 1750 mL  Total OUT: 1750 mL    Total NET: -1100 mL      2019 07:01  -  2019 17:03  --------------------------------------------------------  IN:    IV PiggyBack: 110 mL  Total IN: 110 mL    OUT:    Voided: 100 mL  Total OUT: 100 mL    Total NET: 10 mL          Physical Exam:     General: No distress. Comfortable.  HEENT: EOM intact.  Neck: Neck supple. JVP not elevated. No masses  Chest: Clear to auscultation bilaterally  CV: Normal S1 and S2. No murmurs, rub, or gallops. Radial pulses normal.  Abdomen: Soft, non-distended, non-tender  Skin: No rashes or skin breakdown  Neurology: Alert and oriented times three. Sensation intact  Psych: Affect normal    Labs:                        8.1    4.74  )-----------( 244      ( 2019 05:45 )             27.5     04-11    136  |  101  |  61<H>  ----------------------------<  77  4.0   |  22  |  1.94<H>    Ca    9.7      2019 05:45  Mg     1.4     04- Patient seen and examined. She feels well. Awaiting GI procedure tomorrow.   No acute SOB, orthopnea, PND, CP, palpitations.   tele afib rate controlled.       Medications:  ALBUTerol/ipratropium for Nebulization 3 milliLiter(s) Nebulizer every 6 hours PRN  allopurinol 300 milliGRAM(s) Oral daily  benzonatate 100 milliGRAM(s) Oral every 8 hours  bisacodyl 5 milliGRAM(s) Oral every 12 hours  buDESOnide  80 MICROgram(s)/formoterol 4.5 MICROgram(s) Inhaler 2 Puff(s) Inhalation two times a day  dextrose 40% Gel 15 Gram(s) Oral once PRN  dextrose 5%. 1000 milliLiter(s) IV Continuous <Continuous>  dextrose 50% Injectable 12.5 Gram(s) IV Push once  dextrose 50% Injectable 25 Gram(s) IV Push once  dextrose 50% Injectable 25 Gram(s) IV Push once  glucagon  Injectable 1 milliGRAM(s) IntraMuscular once PRN  guaiFENesin  milliGRAM(s) Oral every 12 hours PRN  insulin lispro (HumaLOG) corrective regimen sliding scale   SubCutaneous three times a day before meals  insulin lispro (HumaLOG) corrective regimen sliding scale   SubCutaneous at bedtime  iron sucrose IVPB 200 milliGRAM(s) IV Intermittent every 24 hours  magnesium oxide 400 milliGRAM(s) Oral three times a day with meals  metoprolol succinate ER 50 milliGRAM(s) Oral two times a day  multivitamin 1 Tablet(s) Oral daily  pantoprazole  Injectable 40 milliGRAM(s) IV Push every 12 hours  polyethylene glycol/electrolyte Solution 1000 milliLiter(s) Oral every 4 hours  sildenafil (REVATIO) 10 milliGRAM(s) Oral three times a day  sodium chloride 0.9% lock flush 3 milliLiter(s) IV Push every 8 hours  torsemide 20 milliGRAM(s) Oral <User Schedule>      Vitals:  Vital Signs Last 24 Hrs  T(C): 36.5 (2019 14:24), Max: 37 (2019 02:35)  T(F): 97.7 (2019 14:24), Max: 98.6 (2019 02:35)  HR: 87 (2019 14:24) (65 - 100)  BP: 90/56 (2019 14:24) (90/56 - 102/75)  BP(mean): --  RR: 17 (2019 14:24) (17 - 18)  SpO2: 97% (2019 14:24) (95% - 100%)    Daily     Daily Weight in k.4 (2019 05:31)    I&O's Detail    10 Apr 2019 07:01  -  2019 07:00  --------------------------------------------------------  IN:    IV PiggyBack: 110 mL    Oral Fluid: 540 mL  Total IN: 650 mL    OUT:    Voided: 1750 mL  Total OUT: 1750 mL    Total NET: -1100 mL      2019 07:01  -  2019 17:03  --------------------------------------------------------  IN:    IV PiggyBack: 110 mL  Total IN: 110 mL    OUT:    Voided: 100 mL  Total OUT: 100 mL    Total NET: 10 mL          Physical Exam:     General: No distress. Comfortable.  HEENT: EOM intact.  Neck: Neck supple. JVP not elevated. No masses  Chest: Clear to auscultation bilaterally  CV: S1 and S2 irregularly irregular. No murmurs, rub, or gallops. Radial pulses normal. No LE edema b/l.   Abdomen: Soft, non-distended, non-tender  Skin: No rashes or skin breakdown  Neurology: Alert and oriented times three. Sensation intact  Psych: Affect normal    Labs:                        8.1    4.74  )-----------( 244      ( 2019 05:45 )             27.5     04-11    136  |  101  |  61<H>  ----------------------------<  77  4.0   |  22  |  1.94<H>    Ca    9.7      2019 05:45  Mg     1.4

## 2019-04-11 NOTE — PROGRESS NOTE ADULT - ATTENDING COMMENTS
Renal attd    -Pulse ox was low and now on oxygen.  Check CXR in am   -Colonoscopy in am.  IV iron   -Check CXR please    35 minutes spent on total encounter and more then 50% of the visit was spent counseling and/or coordinating care by the attending physician

## 2019-04-11 NOTE — PROGRESS NOTE ADULT - PROBLEM SELECTOR PLAN 2
Echo shows severe pulm HTN rather than LV failure. CHF team is following. RHC findings as per cardiology  on Revatio.  Monitor BP, she is not dizzy

## 2019-04-11 NOTE — PROGRESS NOTE ADULT - ATTENDING COMMENTS
Briefly, 62 y/o F w/ h/o lymphoma s/p radiation therapy, severe pulmonary HTN, HFpEF, ?obstructive lung disease (never smoker), anemia (? thalassemia trait) who presented on 3/31 with weight loss over past year (? 2/2 Trulicity vs. occult malignancy) as well as 1 month of decreased appetite and abdominal complaints, found to be in acute on chronic kidney injury in setting of influenza/enterovirus infection and possible excessive metolazone use. Renal function improved with cessation of diuretics but given notable pulmonary HTN, underwent RHC on 3/6/19 (w/o vasodilator study) which revealed RA 10, RV 78/14, PA 78/29/45, PCWP 15, sat 47.4%, CO 4.39, CI 2.3>SVR 1659, PVR 6.8 (TPG 30). Was started on sildenafil 10 mg three times/day and underwent V/Q study which was low probability and workup of PH is ongoing. Denies symptoms of dypspnea/orthopnea. Was also noted to have iron def anemia with guaiac positive stool. Exam JVD approx 6-8 cm with V waves, irreg irreg rhythm, prominent P2, grade III/VI systolic murmur in LLSB, CTAB no pedal edema. Labs reviewed - BUN/Cr 62/1.9 (improved from admission). Overall has undetermined etiology of pulmonary HTN (possibly contributed to by LINH, small airway disease). Awaiting possible EGD/colonoscopy which she is medically optimized for. Would be cautious regarding sedation as may lead to hypotension in a patient with severe pulmonary HTN.   - appreciate pulm f/u  - alpha-1 anti-trypsin level nl   - would ideally need vasodilator study to evaluate PH further and determine appropriate medications if determined to be also WHO group I; component of group III  - continue sildenafil 10 mg q8h  - afib; on toprol XL 50 mg bid (hold for SBP <85); resume eliquis at 5 mg q12 if ok per GI  - would proceed with EGD/colonoscopy in monitored setting with anesthesiology present given LINH; defer to pulmonary regarding need to be intubated; would be cautious in light of sedation and may require pressor support  - continue torsemide at 20 mg every other day

## 2019-04-11 NOTE — CHART NOTE - NSCHARTNOTEFT_GEN_A_CORE
Discussed case with anesthesiologist regarding this patient as she is high risk for endoscopy; Per anesthesiology, ok to proceed with endoscopic evaluation with full airway protection. Please start clear liquid diet today, NPO past midnight. Prep to be ordered by GI. Discussed case with anesthesiologist regarding this patient as she is high risk for endoscopy; Per anesthesiology, ok to proceed with endoscopic evaluation with full airway protection. Pulm and cards' recommendations noted as well (see note from 4/10). Please start clear liquid diet today, NPO past midnight. Prep to be ordered by GI.

## 2019-04-12 ENCOUNTER — RESULT REVIEW (OUTPATIENT)
Age: 64
End: 2019-04-12

## 2019-04-12 LAB
ANION GAP SERPL CALC-SCNC: 12 MMO/L — SIGNIFICANT CHANGE UP (ref 7–14)
BUN SERPL-MCNC: 48 MG/DL — HIGH (ref 7–23)
CALCIUM SERPL-MCNC: 9.5 MG/DL — SIGNIFICANT CHANGE UP (ref 8.4–10.5)
CENTROMERE AB SER-ACNC: <0.2 — SIGNIFICANT CHANGE UP
CHLORIDE SERPL-SCNC: 105 MMOL/L — SIGNIFICANT CHANGE UP (ref 98–107)
CO2 SERPL-SCNC: 23 MMOL/L — SIGNIFICANT CHANGE UP (ref 22–31)
CREAT SERPL-MCNC: 1.74 MG/DL — HIGH (ref 0.5–1.3)
GLUCOSE SERPL-MCNC: 75 MG/DL — SIGNIFICANT CHANGE UP (ref 70–99)
HCT VFR BLD CALC: 30 % — LOW (ref 34.5–45)
HGB BLD-MCNC: 8.8 G/DL — LOW (ref 11.5–15.5)
MAGNESIUM SERPL-MCNC: 1.4 MG/DL — LOW (ref 1.6–2.6)
MCHC RBC-ENTMCNC: 19.7 PG — LOW (ref 27–34)
MCHC RBC-ENTMCNC: 29.3 % — LOW (ref 32–36)
MCV RBC AUTO: 67.3 FL — LOW (ref 80–100)
NRBC # FLD: 0.08 K/UL — SIGNIFICANT CHANGE UP (ref 0–0)
NRBC FLD-RTO: 1.9 — SIGNIFICANT CHANGE UP
PLATELET # BLD AUTO: 250 K/UL — SIGNIFICANT CHANGE UP (ref 150–400)
PMV BLD: SIGNIFICANT CHANGE UP FL (ref 7–13)
POTASSIUM SERPL-MCNC: 3.9 MMOL/L — SIGNIFICANT CHANGE UP (ref 3.5–5.3)
POTASSIUM SERPL-SCNC: 3.9 MMOL/L — SIGNIFICANT CHANGE UP (ref 3.5–5.3)
RBC # BLD: 4.46 M/UL — SIGNIFICANT CHANGE UP (ref 3.8–5.2)
RBC # FLD: 16.9 % — HIGH (ref 10.3–14.5)
SODIUM SERPL-SCNC: 140 MMOL/L — SIGNIFICANT CHANGE UP (ref 135–145)
WBC # BLD: 4.28 K/UL — SIGNIFICANT CHANGE UP (ref 3.8–10.5)
WBC # FLD AUTO: 4.28 K/UL — SIGNIFICANT CHANGE UP (ref 3.8–10.5)

## 2019-04-12 PROCEDURE — 45380 COLONOSCOPY AND BIOPSY: CPT | Mod: GC

## 2019-04-12 PROCEDURE — 88305 TISSUE EXAM BY PATHOLOGIST: CPT | Mod: 26

## 2019-04-12 PROCEDURE — 71045 X-RAY EXAM CHEST 1 VIEW: CPT | Mod: 26

## 2019-04-12 RX ORDER — MAGNESIUM OXIDE 400 MG ORAL TABLET 241.3 MG
400 TABLET ORAL
Qty: 0 | Refills: 0 | Status: DISCONTINUED | OUTPATIENT
Start: 2019-04-13 | End: 2019-04-25

## 2019-04-12 RX ORDER — MAGNESIUM SULFATE 500 MG/ML
1 VIAL (ML) INJECTION ONCE
Qty: 0 | Refills: 0 | Status: COMPLETED | OUTPATIENT
Start: 2019-04-12 | End: 2019-04-12

## 2019-04-12 RX ORDER — FUROSEMIDE 40 MG
60 TABLET ORAL ONCE
Qty: 0 | Refills: 0 | Status: COMPLETED | OUTPATIENT
Start: 2019-04-12 | End: 2019-04-12

## 2019-04-12 RX ADMIN — Medication 100 GRAM(S): at 18:41

## 2019-04-12 RX ADMIN — Medication 600 MILLIGRAM(S): at 22:37

## 2019-04-12 RX ADMIN — SODIUM CHLORIDE 3 MILLILITER(S): 9 INJECTION INTRAMUSCULAR; INTRAVENOUS; SUBCUTANEOUS at 13:24

## 2019-04-12 RX ADMIN — SODIUM CHLORIDE 3 MILLILITER(S): 9 INJECTION INTRAMUSCULAR; INTRAVENOUS; SUBCUTANEOUS at 06:14

## 2019-04-12 RX ADMIN — Medication 1 TABLET(S): at 14:24

## 2019-04-12 RX ADMIN — Medication 50 MILLIGRAM(S): at 18:39

## 2019-04-12 RX ADMIN — Medication 300 MILLIGRAM(S): at 14:24

## 2019-04-12 RX ADMIN — IRON SUCROSE 110 MILLIGRAM(S): 20 INJECTION, SOLUTION INTRAVENOUS at 17:15

## 2019-04-12 RX ADMIN — Medication 60 MILLIGRAM(S): at 18:37

## 2019-04-12 RX ADMIN — BUDESONIDE AND FORMOTEROL FUMARATE DIHYDRATE 2 PUFF(S): 160; 4.5 AEROSOL RESPIRATORY (INHALATION) at 22:37

## 2019-04-12 RX ADMIN — SODIUM CHLORIDE 3 MILLILITER(S): 9 INJECTION INTRAMUSCULAR; INTRAVENOUS; SUBCUTANEOUS at 22:34

## 2019-04-12 RX ADMIN — Medication 100 MILLIGRAM(S): at 22:37

## 2019-04-12 RX ADMIN — PANTOPRAZOLE SODIUM 40 MILLIGRAM(S): 20 TABLET, DELAYED RELEASE ORAL at 18:39

## 2019-04-12 RX ADMIN — Medication 10 MILLIGRAM(S): at 22:37

## 2019-04-12 NOTE — PROGRESS NOTE ADULT - SUBJECTIVE AND OBJECTIVE BOX
Patient is a 63y old  Female who presents with a chief complaint of Generalized weakness (12 Apr 2019 15:38)      INTERVAL HPI/OVERNIGHT EVENTS:  T(C): 36.6 (04-12-19 @ 14:46), Max: 36.7 (04-11-19 @ 20:24)  HR: 79 (04-12-19 @ 14:46) (64 - 79)  BP: 102/63 (04-12-19 @ 14:46) (102/63 - 127/68)  RR: 18 (04-12-19 @ 14:46) (18 - 18)  SpO2: 100% (04-12-19 @ 14:46) (95% - 100%)  Wt(kg): --  I&O's Summary    11 Apr 2019 07:01  -  12 Apr 2019 07:00  --------------------------------------------------------  IN: 1110 mL / OUT: 100 mL / NET: 1010 mL        LABS:                        8.8    4.28  )-----------( 250      ( 12 Apr 2019 07:16 )             30.0     04-12    140  |  105  |  48<H>  ----------------------------<  75  3.9   |  23  |  1.74<H>    Ca    9.5      12 Apr 2019 07:16  Mg     1.4     04-12          CAPILLARY BLOOD GLUCOSE      POCT Blood Glucose.: 85 mg/dL (12 Apr 2019 17:38)  POCT Blood Glucose.: 70 mg/dL (12 Apr 2019 12:47)  POCT Blood Glucose.: 119 mg/dL (11 Apr 2019 21:03)            MEDICATIONS  (STANDING):  allopurinol 300 milliGRAM(s) Oral daily  benzonatate 100 milliGRAM(s) Oral every 8 hours  buDESOnide  80 MICROgram(s)/formoterol 4.5 MICROgram(s) Inhaler 2 Puff(s) Inhalation two times a day  dextrose 5%. 1000 milliLiter(s) (50 mL/Hr) IV Continuous <Continuous>  dextrose 50% Injectable 12.5 Gram(s) IV Push once  dextrose 50% Injectable 25 Gram(s) IV Push once  dextrose 50% Injectable 25 Gram(s) IV Push once  furosemide   Injectable 60 milliGRAM(s) IV Push once  insulin lispro (HumaLOG) corrective regimen sliding scale   SubCutaneous three times a day before meals  insulin lispro (HumaLOG) corrective regimen sliding scale   SubCutaneous at bedtime  magnesium sulfate  IVPB 1 Gram(s) IV Intermittent once  metoprolol succinate ER 50 milliGRAM(s) Oral two times a day  multivitamin 1 Tablet(s) Oral daily  pantoprazole  Injectable 40 milliGRAM(s) IV Push every 12 hours  sildenafil (REVATIO) 10 milliGRAM(s) Oral three times a day  sodium chloride 0.9% lock flush 3 milliLiter(s) IV Push every 8 hours    MEDICATIONS  (PRN):  ALBUTerol/ipratropium for Nebulization 3 milliLiter(s) Nebulizer every 6 hours PRN Shortness of Breath and/or Wheezing  dextrose 40% Gel 15 Gram(s) Oral once PRN Blood Glucose LESS THAN 70 milliGRAM(s)/deciliter  glucagon  Injectable 1 milliGRAM(s) IntraMuscular once PRN Glucose LESS THAN 70 milligrams/deciliter  guaiFENesin  milliGRAM(s) Oral every 12 hours PRN Cough          PHYSICAL EXAM:  GENERAL: NAD, well-groomed, well-developed  HEAD:  Atraumatic, Normocephalic  CHEST/LUNG: Clear to percussion bilaterally; No rales, rhonchi, wheezing, or rubs  HEART: Regular rate and rhythm; No murmurs, rubs, or gallops  ABDOMEN: Soft, Nontender, Nondistended; Bowel sounds present  EXTREMITIES:  2+ Peripheral Pulses, No clubbing, cyanosis, or edema  LYMPH: No lymphadenopathy noted  SKIN: No rashes or lesions    Care Discussed with Consultants/Other Providers [ ] YES  [ ] NO

## 2019-04-12 NOTE — PROGRESS NOTE ADULT - SUBJECTIVE AND OBJECTIVE BOX
    Patient seen and examined in bed. s/p colonoscopy this AM.      MEDICATIONS  (STANDING):  allopurinol 300 milliGRAM(s) Oral daily  benzonatate 100 milliGRAM(s) Oral every 8 hours  buDESOnide  80 MICROgram(s)/formoterol 4.5 MICROgram(s) Inhaler 2 Puff(s) Inhalation two times a day  dextrose 5%. 1000 milliLiter(s) (50 mL/Hr) IV Continuous <Continuous>  dextrose 50% Injectable 12.5 Gram(s) IV Push once  dextrose 50% Injectable 25 Gram(s) IV Push once  dextrose 50% Injectable 25 Gram(s) IV Push once  insulin lispro (HumaLOG) corrective regimen sliding scale   SubCutaneous three times a day before meals  insulin lispro (HumaLOG) corrective regimen sliding scale   SubCutaneous at bedtime  iron sucrose IVPB 200 milliGRAM(s) IV Intermittent every 24 hours  magnesium sulfate  IVPB 1 Gram(s) IV Intermittent once  metoprolol succinate ER 50 milliGRAM(s) Oral two times a day  multivitamin 1 Tablet(s) Oral daily  pantoprazole  Injectable 40 milliGRAM(s) IV Push every 12 hours  sildenafil (REVATIO) 10 milliGRAM(s) Oral three times a day  sodium chloride 0.9% lock flush 3 milliLiter(s) IV Push every 8 hours  torsemide 20 milliGRAM(s) Oral <User Schedule>      VITAL:  T(C): , Max: 36.9 (04-11-19 @ 17:38)  T(F): , Max: 98.4 (04-11-19 @ 17:38)  HR: 79 (04-12-19 @ 14:46)  BP: 102/63 (04-12-19 @ 14:46)  RR: 18 (04-12-19 @ 14:46)  SpO2: 100% (04-12-19 @ 14:46)    I and O's:    04-11 @ 07:01  -  04-12 @ 07:00  --------------------------------------------------------  IN: 1110 mL / OUT: 100 mL / NET: 1010 mL          PHYSICAL EXAM:    Constitutional: NAD  Neck:  No JVD  Respiratory: CTAB/L  Cardiovascular: S1 and S2  Gastrointestinal: BS+, soft, NT/ND  Extremities: No peripheral edema  Neurological: A/O x 3, no focal deficits  Psychiatric: Normal mood, normal affect  : No Corey  Skin: No rashes    LABS:                        8.8    4.28  )-----------( 250      ( 12 Apr 2019 07:16 )             30.0     04-12    140  |  105  |  48<H>  ----------------------------<  75  3.9   |  23  |  1.74<H>    Ca    9.5      12 Apr 2019 07:16  Mg     1.4     04-12      RADIOLOGY & ADDITIONAL STUDIES:    < from: Xray Chest 1 View- PORTABLE-Routine (04.12.19 @ 08:03) >  IMPRESSION:    Increased bibasilar hazy opacities which may represent layering   effusions, or pulmonary edema. No pneumothorax.    < end of copied text >    < from: Colonoscopy (04.12.19 @ 09:52) >  Impression:          - Polypoid mass in the proximal sigmoid colon, rule out                        malignancy. Biopsied.                       - Non-bleeding internal hemorrhoids.  Recommendation:      - [facility]                       - Advance diet as tolerated.             - Await pathology results.                       - If malignancy confirmed, consider colo-rectal surgery                        evaluation.    < end of copied text >    ASSESSMENT:  63F with COPD (on O2), LINH, AF on A/C, HLD, gout, HFpEF, PHTN , DM and CKD3/4 p/w generalized weakness and poor appetite.   - CKD: stage 3 likely due to DM/HTN with baseline Cr ~ 1.6;  OSMAR likely pre-renal - renal function improving  - CV: BP on soft side  - HFpEF: right sided heart failure / cor pulm  - Pulm: RVP + for influenza, rhino and enterovirus - s/p tamiflu. sPHTN on sildenafil   - anemia: in chronic disease + GIB, hold LUIS until GI w/u - mild iron def - on IV iron x 5 days  - GI: + FOBT, s/p colonoscopy this AM - with polypoid mass in the proximal sigmoid colon - r/o malignancy  - hypomagnesemia: likely nutritional, indicated for repletion    RECOMMEND:  - continue torsemide per HF team (20mg po on Sunday, Tuesday, Thursday)  - sildenafil per pulm  - avoid NSAIDs and nephrotoxins  - dose meds for a CrCl ~30 mL/min  - f/u biopsy results  - a/w mag sulfate 1 gram IV x 1 dose      Suzan Warren, NP-C  [facility]  [phone]    Patient seen and examined sitting up in chair. s/p colonoscopy this AM. She is tearful regarding what she was told regarding her colonoscopy.       MEDICATIONS  (STANDING):  allopurinol 300 milliGRAM(s) Oral daily  benzonatate 100 milliGRAM(s) Oral every 8 hours  buDESOnide  80 MICROgram(s)/formoterol 4.5 MICROgram(s) Inhaler 2 Puff(s) Inhalation two times a day  dextrose 5%. 1000 milliLiter(s) (50 mL/Hr) IV Continuous <Continuous>  dextrose 50% Injectable 12.5 Gram(s) IV Push once  dextrose 50% Injectable 25 Gram(s) IV Push once  dextrose 50% Injectable 25 Gram(s) IV Push once  insulin lispro (HumaLOG) corrective regimen sliding scale   SubCutaneous three times a day before meals  insulin lispro (HumaLOG) corrective regimen sliding scale   SubCutaneous at bedtime  iron sucrose IVPB 200 milliGRAM(s) IV Intermittent every 24 hours  magnesium sulfate  IVPB 1 Gram(s) IV Intermittent once  metoprolol succinate ER 50 milliGRAM(s) Oral two times a day  multivitamin 1 Tablet(s) Oral daily  pantoprazole  Injectable 40 milliGRAM(s) IV Push every 12 hours  sildenafil (REVATIO) 10 milliGRAM(s) Oral three times a day  sodium chloride 0.9% lock flush 3 milliLiter(s) IV Push every 8 hours  torsemide 20 milliGRAM(s) Oral <User Schedule>      VITAL:  T(C): , Max: 36.9 (04-11-19 @ 17:38)  T(F): , Max: 98.4 (04-11-19 @ 17:38)  HR: 79 (04-12-19 @ 14:46)  BP: 102/63 (04-12-19 @ 14:46)  RR: 18 (04-12-19 @ 14:46)  SpO2: 100% (04-12-19 @ 14:46)    I and O's:    04-11 @ 07:01  -  04-12 @ 07:00  --------------------------------------------------------  IN: 1110 mL / OUT: 100 mL / NET: 1010 mL          PHYSICAL EXAM:    Constitutional: NAD  Neck:  No JVD  Respiratory: CTAB/L  Cardiovascular: S1 and S2  Gastrointestinal: BS+, soft, NT/ND  Extremities: + peripheral edema  Neurological: A/O x 3, no focal deficits  Psychiatric: Normal mood, normal affect  : No Corey  Skin: No rashes    LABS:                        8.8    4.28  )-----------( 250      ( 12 Apr 2019 07:16 )             30.0     04-12    140  |  105  |  48<H>  ----------------------------<  75  3.9   |  23  |  1.74<H>    Ca    9.5      12 Apr 2019 07:16  Mg     1.4     04-12      RADIOLOGY & ADDITIONAL STUDIES:    < from: Xray Chest 1 View- PORTABLE-Routine (04.12.19 @ 08:03) >  IMPRESSION:    Increased bibasilar hazy opacities which may represent layering   effusions, or pulmonary edema. No pneumothorax.    < end of copied text >    < from: Colonoscopy (04.12.19 @ 09:52) >  Impression:          - Polypoid mass in the proximal sigmoid colon, rule out                        malignancy. Biopsied.                       - Non-bleeding internal hemorrhoids.  Recommendation:      - [facility].                       - Advance diet as tolerated.             - Await pathology results.                       - If malignancy confirmed, consider colo-rectal surgery                        evaluation.    < end of copied text >    ASSESSMENT:  63F with COPD (on O2), LINH, AF on A/C, HLD, gout, HFpEF, PHTN , DM and CKD3/4 p/w generalized weakness and poor appetite.   - CKD: stage 3 likely due to DM/HTN with baseline Cr ~ 1.6;  OSMAR likely pre-renal - renal function improving  - CV: BP on soft side  - HFpEF: right sided heart failure / cor pulm  - Pulm: RVP + for influenza, rhino and enterovirus - s/p tamiflu. sPHTN on sildenafil   - anemia: in chronic disease + GIB, hold LUIS until GI w/u - mild iron def - on IV iron x 5 days  - GI: + FOBT, s/p colonoscopy this AM - with polypoid mass in the proximal sigmoid colon - r/o malignancy  - hypomagnesemia: likely nutritional, indicated for repletion    RECOMMEND:  - continue torsemide per HF team (20mg po on Sunday, Tuesday, Thursday)  - sildenafil per pulm  - avoid NSAIDs and nephrotoxins  - dose meds for a CrCl ~30 mL/min  - f/u biopsy results  - a/w mag sulfate 1 gram IV x 1 dose      Suzan Warren, NP-C  [facility]  [phone]

## 2019-04-12 NOTE — PROGRESS NOTE ADULT - ATTENDING COMMENTS
Renal attd    -Congestion on cxr.  Lasix 60 mg ivp x 1 n increase demadex to 20mg once a day  -Awaiting path  -Monitor pulse ox

## 2019-04-12 NOTE — PROGRESS NOTE ADULT - ASSESSMENT
64 yo woman with multiple comorbidities, including R-sided heart failure with preserved EF (EF 66% on TTE in 10/2017), severe pulm HTN, mod-severe TR, COPD (on 2L NC at night), LINH (being arranged for bilevel support), afib (on Eliquis), HTN, HLD, DM2, and gout presents with generalized weakness for the past 1 week, found to be positive for influenza A and entero/rhinovirus, also with positive FOBT.    Problem/Plan - 1:  ·  Problem: Acute kidney injury superimposed on CKD.  Plan: Renal function is stable. torsemide has been restarted.     Problem/Plan - 2:  ·  Problem: Chronic systolic right heart failure.  Plan: Echo shows severe pulm HTN rather than LV failure. CHF team is following. RHC findings as per cardiology  on Revatio.  Monitor BP, she is not dizzy.     Problem/Plan - 3:  (Data referenced from "Progress Note Adult" 10-Apr-2019 11:30)  ·  Problem: COPD (chronic obstructive pulmonary disease).  Plan: Cont nebs  Pulm FU noted  Resp status is stable,     Problem/Plan - 4:  (Data referenced from "Progress Note Adult" 10-Apr-2019 11:30)  ·  Problem: Atrial fibrillation.  Plan: Patient has chronic a fib. NOAC held due to GI bleeding. FU HR.     Problem/Plan - 5:  (Data referenced from "Progress Note Adult" 10-Apr-2019 11:30)  ·  Problem: LINH (obstructive sleep apnea).  Plan: Bipap at night.     Problem/Plan - 6:  (Data referenced from "Progress Note Adult" 10-Apr-2019 11:30)  Problem: Diabetes mellitus. Plan: ISS with coverage.

## 2019-04-13 LAB
ANION GAP SERPL CALC-SCNC: 12 MMO/L — SIGNIFICANT CHANGE UP (ref 7–14)
BUN SERPL-MCNC: 44 MG/DL — HIGH (ref 7–23)
CALCIUM SERPL-MCNC: 9.5 MG/DL — SIGNIFICANT CHANGE UP (ref 8.4–10.5)
CEA SERPL-MCNC: 1.6 NG/ML — SIGNIFICANT CHANGE UP (ref 1–3.8)
CHLORIDE SERPL-SCNC: 104 MMOL/L — SIGNIFICANT CHANGE UP (ref 98–107)
CO2 SERPL-SCNC: 22 MMOL/L — SIGNIFICANT CHANGE UP (ref 22–31)
CREAT SERPL-MCNC: 1.84 MG/DL — HIGH (ref 0.5–1.3)
GLUCOSE SERPL-MCNC: 91 MG/DL — SIGNIFICANT CHANGE UP (ref 70–99)
HCT VFR BLD CALC: 28.2 % — LOW (ref 34.5–45)
HGB BLD-MCNC: 8.4 G/DL — LOW (ref 11.5–15.5)
MAGNESIUM SERPL-MCNC: 1.6 MG/DL — SIGNIFICANT CHANGE UP (ref 1.6–2.6)
MCHC RBC-ENTMCNC: 20 PG — LOW (ref 27–34)
MCHC RBC-ENTMCNC: 29.8 % — LOW (ref 32–36)
MCV RBC AUTO: 67.3 FL — LOW (ref 80–100)
NRBC # FLD: 0.08 K/UL — SIGNIFICANT CHANGE UP (ref 0–0)
NRBC FLD-RTO: 1.7 — SIGNIFICANT CHANGE UP
PLATELET # BLD AUTO: 242 K/UL — SIGNIFICANT CHANGE UP (ref 150–400)
PMV BLD: SIGNIFICANT CHANGE UP FL (ref 7–13)
POTASSIUM SERPL-MCNC: 4.5 MMOL/L — SIGNIFICANT CHANGE UP (ref 3.5–5.3)
POTASSIUM SERPL-SCNC: 4.5 MMOL/L — SIGNIFICANT CHANGE UP (ref 3.5–5.3)
RBC # BLD: 4.19 M/UL — SIGNIFICANT CHANGE UP (ref 3.8–5.2)
RBC # FLD: 17.1 % — HIGH (ref 10.3–14.5)
SODIUM SERPL-SCNC: 138 MMOL/L — SIGNIFICANT CHANGE UP (ref 135–145)
WBC # BLD: 4.8 K/UL — SIGNIFICANT CHANGE UP (ref 3.8–10.5)
WBC # FLD AUTO: 4.8 K/UL — SIGNIFICANT CHANGE UP (ref 3.8–10.5)

## 2019-04-13 PROCEDURE — 99233 SBSQ HOSP IP/OBS HIGH 50: CPT

## 2019-04-13 RX ADMIN — PANTOPRAZOLE SODIUM 40 MILLIGRAM(S): 20 TABLET, DELAYED RELEASE ORAL at 18:14

## 2019-04-13 RX ADMIN — Medication 300 MILLIGRAM(S): at 13:25

## 2019-04-13 RX ADMIN — BUDESONIDE AND FORMOTEROL FUMARATE DIHYDRATE 2 PUFF(S): 160; 4.5 AEROSOL RESPIRATORY (INHALATION) at 10:31

## 2019-04-13 RX ADMIN — Medication 100 MILLIGRAM(S): at 22:29

## 2019-04-13 RX ADMIN — SODIUM CHLORIDE 3 MILLILITER(S): 9 INJECTION INTRAMUSCULAR; INTRAVENOUS; SUBCUTANEOUS at 05:10

## 2019-04-13 RX ADMIN — Medication 600 MILLIGRAM(S): at 13:25

## 2019-04-13 RX ADMIN — MAGNESIUM OXIDE 400 MG ORAL TABLET 400 MILLIGRAM(S): 241.3 TABLET ORAL at 13:25

## 2019-04-13 RX ADMIN — SODIUM CHLORIDE 3 MILLILITER(S): 9 INJECTION INTRAMUSCULAR; INTRAVENOUS; SUBCUTANEOUS at 13:20

## 2019-04-13 RX ADMIN — Medication 10 MILLIGRAM(S): at 13:25

## 2019-04-13 RX ADMIN — MAGNESIUM OXIDE 400 MG ORAL TABLET 400 MILLIGRAM(S): 241.3 TABLET ORAL at 18:14

## 2019-04-13 RX ADMIN — Medication 50 MILLIGRAM(S): at 05:11

## 2019-04-13 RX ADMIN — BUDESONIDE AND FORMOTEROL FUMARATE DIHYDRATE 2 PUFF(S): 160; 4.5 AEROSOL RESPIRATORY (INHALATION) at 22:27

## 2019-04-13 RX ADMIN — Medication 20 MILLIGRAM(S): at 05:11

## 2019-04-13 RX ADMIN — Medication 10 MILLIGRAM(S): at 22:28

## 2019-04-13 RX ADMIN — Medication 100 MILLIGRAM(S): at 05:11

## 2019-04-13 RX ADMIN — PANTOPRAZOLE SODIUM 40 MILLIGRAM(S): 20 TABLET, DELAYED RELEASE ORAL at 05:12

## 2019-04-13 RX ADMIN — Medication 50 MILLIGRAM(S): at 18:14

## 2019-04-13 RX ADMIN — Medication 1 TABLET(S): at 13:25

## 2019-04-13 RX ADMIN — SODIUM CHLORIDE 3 MILLILITER(S): 9 INJECTION INTRAMUSCULAR; INTRAVENOUS; SUBCUTANEOUS at 22:31

## 2019-04-13 RX ADMIN — Medication 10 MILLIGRAM(S): at 05:11

## 2019-04-13 NOTE — CONSULT NOTE ADULT - ASSESSMENT
64 yo woman with multiple medical comorbidities including afib, right-sided CHF with severe pulmonary HTN, COPD on nocturnal O2, LINH newly initiated on bilevel support found to have a non-obstructing sigmoid mass.     - f/u biopsies from colonoscopy (4/12)  - CT c/a/p with IV contrast to evaluate for metastases   - NOAC currently held; resume when able as per primary team    Patient seen and examined with senior surgical resident, Dr. Ralph Jordan.   Will discuss above with Surgical Attending, Dr. Hunt.   MAXWELL Sanchez MD PGY3  [id number] 64 yo woman with multiple medical comorbidities including afib, right-sided CHF with severe pulmonary HTN, COPD on nocturnal O2, LINH newly initiated on bilevel support found to have a non-obstructing sigmoid mass.     - f/u biopsies from colonoscopy (4/12)  - CT c/a/p with IV contrast to evaluate for metastases   - NOAC currently held; resume when able as per primary team  - Pulmonary and Cardiology optimization and risk stratification for possible surgery    Patient seen and examined with senior surgical resident, Dr. Ralph Jordan.   Will discuss above with Surgical Attending, Dr. Hunt.   MAXWELL Sanchez MD PGY3  [id number]

## 2019-04-13 NOTE — PROGRESS NOTE ADULT - SUBJECTIVE AND OBJECTIVE BOX
Patient is a 63y old  Female who presents with a chief complaint of Generalized weakness (2019 16:52)      SUBJECTIVE / OVERNIGHT EVENTS:  No shortness of breath. Upset about colonoscopy results  Review of Systems:   CONSTITUTIONAL: No fever, weight loss,   EYES: No eye pain, visual disturbances, or discharge  ENMT:  No difficulty hearing, tinnitus, vertigo; No sinus or throat pain  NECK: No pain or stiffness  BREASTS: No pain, masses, or nipple discharge  RESPIRATORY: No cough, wheezing, chills or hemoptysis; No shortness of breath  CARDIOVASCULAR: No chest pain, palpitations, dizziness, or leg swelling  GASTROINTESTINAL: No abdominal or epigastric pain. No nausea, vomiting, or hematemesis; No diarrhea or constipation. No melena or hematochezia.  GENITOURINARY: No dysuria, frequency, hematuria, or incontinence  NEUROLOGICAL: No headaches, memory loss, loss of strength, numbness, or tremors  SKIN: No itching, burning, rashes, or lesions   LYMPH NODES: No enlarged glands  ENDOCRINE: No heat or cold intolerance; No hair loss  MUSCULOSKELETAL: No joint pain or swelling; No muscle, back, or extremity pain  PSYCHIATRIC: No depression, anxiety, mood swings, or difficulty sleeping  HEME/LYMPH: No easy bruising, or bleeding gums  ALLERY AND IMMUNOLOGIC: No hives or eczema    MEDICATIONS  (STANDING):  allopurinol 300 milliGRAM(s) Oral daily  benzonatate 100 milliGRAM(s) Oral every 8 hours  buDESOnide  80 MICROgram(s)/formoterol 4.5 MICROgram(s) Inhaler 2 Puff(s) Inhalation two times a day  dextrose 5%. 1000 milliLiter(s) (50 mL/Hr) IV Continuous <Continuous>  dextrose 50% Injectable 12.5 Gram(s) IV Push once  dextrose 50% Injectable 25 Gram(s) IV Push once  dextrose 50% Injectable 25 Gram(s) IV Push once  insulin lispro (HumaLOG) corrective regimen sliding scale   SubCutaneous three times a day before meals  insulin lispro (HumaLOG) corrective regimen sliding scale   SubCutaneous at bedtime  metoprolol succinate  milliGRAM(s) Oral <User Schedule>  metoprolol succinate  milliGRAM(s) Oral <User Schedule>  multivitamin 1 Tablet(s) Oral daily  oseltamivir 30 milliGRAM(s) Oral daily  pantoprazole  Injectable 40 milliGRAM(s) IV Push every 12 hours    MEDICATIONS  (PRN):  ALBUTerol/ipratropium for Nebulization 3 milliLiter(s) Nebulizer every 6 hours PRN Shortness of Breath and/or Wheezing  dextrose 40% Gel 15 Gram(s) Oral once PRN Blood Glucose LESS THAN 70 milliGRAM(s)/deciliter  glucagon  Injectable 1 milliGRAM(s) IntraMuscular once PRN Glucose LESS THAN 70 milligrams/deciliter      PHYSICAL EXAM:  Vital Signs Last 24 Hrs  T(C): 36.6 (2019 20:32), Max: 36.9 (2019 05:27)  T(F): 97.9 (2019 20:32), Max: 98.4 (2019 05:27)  HR: 81 (2019 20:32) (57 - 81)  BP: 117/81 (2019 20:32) (94/58 - 117/81)  BP(mean): --  RR: 18 (2019 20:32) (18 - 19)  SpO2: 100% (2019 20:32) (95% - 100%)  I&O's Summary    2019 07:01  -  2019 07:00  --------------------------------------------------------  IN: 720 mL / OUT: 0 mL / NET: 720 mL    2019 07:01  -  2019 20:51  --------------------------------------------------------  IN: 1311 mL / OUT: 0 mL / NET: 1311 mL      GENERAL: NAD, well-developed  HEAD:  Atraumatic, Normocephalic  EYES: EOMI, PERRLA, conjunctiva and sclera clear  NECK: Supple, No JVD  CHEST/LUNG: Clear to auscultation bilaterally; No wheeze  HEART: Regular rate and rhythm; No murmurs, rubs, or gallops  ABDOMEN: Soft, Nontender, Nondistended; Bowel sounds present  EXTREMITIES:  2+ Peripheral Pulses, No clubbing, cyanosis, or edema  PSYCH: AAOx3  NEUROLOGY: non-focal  SKIN: No rashes or lesions    LABS:  CAPILLARY BLOOD GLUCOSE      POCT Blood Glucose.: 103 mg/dL (2019 17:19)  POCT Blood Glucose.: 120 mg/dL (2019 12:47)  POCT Blood Glucose.: 104 mg/dL (2019 08:13)  POCT Blood Glucose.: 133 mg/dL (2019 22:28)                          9.0    5.29  )-----------( 169      ( 2019 07:50 )             29.6     04-02    136  |  92<L>  |  122<H>  ----------------------------<  102<H>  3.8   |  30  |  2.73<H>    Ca    9.8      2019 07:50  Phos  2.7     04-02  Mg     1.8     04-02    TPro  7.0  /  Alb  4.2  /  TBili  1.0  /  DBili  x   /  AST  23  /  ALT  14  /  AlkPhos  65  04-01    PT/INR - ( 31 Mar 2019 21:50 )   PT: 21.5 SEC;   INR: 1.85          PTT - ( 31 Mar 2019 21:50 )  PTT:34.0 SEC      Urinalysis Basic - ( 31 Mar 2019 21:50 )    Color: YELLOW / Appearance: CLEAR / S.012 / pH: 6.5  Gluc: NEGATIVE / Ketone: NEGATIVE  / Bili: NEGATIVE / Urobili: NORMAL   Blood: NEGATIVE / Protein: NEGATIVE / Nitrite: NEGATIVE   Leuk Esterase: NEGATIVE / RBC: x / WBC x   Sq Epi: x / Non Sq Epi: x / Bacteria: x        RADIOLOGY & ADDITIONAL TESTS:    Imaging Personally Reviewed:    Consultant(s) Notes Reviewed:      Care Discussed with Consultants/Other Providers:

## 2019-04-13 NOTE — PROGRESS NOTE ADULT - SUBJECTIVE AND OBJECTIVE BOX
24H hour events: No acute events.    MEDICATIONS:  metoprolol succinate ER 50 milliGRAM(s) Oral two times a day  sildenafil (REVATIO) 10 milliGRAM(s) Oral three times a day  torsemide 20 milliGRAM(s) Oral daily  ALBUTerol/ipratropium for Nebulization 3 milliLiter(s) Nebulizer every 6 hours PRN  benzonatate 100 milliGRAM(s) Oral every 8 hours  buDESOnide  80 MICROgram(s)/formoterol 4.5 MICROgram(s) Inhaler 2 Puff(s) Inhalation two times a day  guaiFENesin  milliGRAM(s) Oral every 12 hours PRN  pantoprazole  Injectable 40 milliGRAM(s) IV Push every 12 hours  allopurinol 300 milliGRAM(s) Oral daily  dextrose 40% Gel 15 Gram(s) Oral once PRN  dextrose 50% Injectable 12.5 Gram(s) IV Push once  dextrose 50% Injectable 25 Gram(s) IV Push once  dextrose 50% Injectable 25 Gram(s) IV Push once  glucagon  Injectable 1 milliGRAM(s) IntraMuscular once PRN  insulin lispro (HumaLOG) corrective regimen sliding scale   SubCutaneous three times a day before meals  insulin lispro (HumaLOG) corrective regimen sliding scale   SubCutaneous at bedtime  dextrose 5%. 1000 milliLiter(s) IV Continuous <Continuous>  magnesium oxide 400 milliGRAM(s) Oral two times a day with meals  multivitamin 1 Tablet(s) Oral daily  sodium chloride 0.9% lock flush 3 milliLiter(s) IV Push every 8 hours    REVIEW OF SYSTEMS:  Complete 10point ROS negative. No active medical complaints. Ambulates down hallway. She feels overwhelmed emotionally.    PHYSICAL EXAM:  T(C): 36.8 (04-13-19 @ 05:09), Max: 36.9 (04-12-19 @ 21:33)  HR: 83 (04-13-19 @ 05:09) (56 - 86)  BP: 94/63 (04-13-19 @ 05:09) (94/63 - 107/66)  RR: 18 (04-13-19 @ 05:09) (18 - 18)  SpO2: 100% (04-13-19 @ 05:09) (94% - 100%)  Wt(kg): --  I&O's Summary    12 Apr 2019 07:01  -  13 Apr 2019 07:00  --------------------------------------------------------  IN: 480 mL / OUT: 2100 mL / NET: -1620 mL      Appearance: NAD  HEENT:   Normal oral mucosa, PERRL, EOMI	  Cardiovascular: Normal S1 S2, No JVD, No murmurs, No edema  Respiratory: Lungs clear to auscultation	  Gastrointestinal:  Soft, Non-tender, + BS	  Neurologic: Non-focal  Vascular: Peripheral pulses palpable 2+ bilaterally    LABS:	 	    CBC Full  -  ( 13 Apr 2019 07:20 )  WBC Count : 4.80 K/uL  Hemoglobin : 8.4 g/dL  Hematocrit : 28.2 %  Platelet Count - Automated : 242 K/uL  Mean Cell Volume : 67.3 fL  Mean Cell Hemoglobin : 20.0 pg  Mean Cell Hemoglobin Concentration : 29.8 %  04-13    138  |  104  |  44<H>  ----------------------------<  91  4.5   |  22  |  1.84<H>  04-12    140  |  105  |  48<H>  ----------------------------<  75  3.9   |  23  |  1.74<H>    Ca    9.5      13 Apr 2019 07:20  Ca    9.5      12 Apr 2019 07:16  Mg     1.6     04-13  Mg     1.4     04-12    TELEMETRY: AF 60-70s, [place] 40s overnight     < from: Colonoscopy (04.12.19 @ 09:52) >  Impression:          - Polypoid mass in the proximal sigmoid colon, rule out                        malignancy. Biopsied.                       - Non-bleeding internal hemorrhoids.  Recommendation:      - [facility].                       - Advance diet as tolerated.             - Await pathology results.                       - If malignancy confirmed, consider colo-rectal surgery                        evaluation.    < end of copied text >

## 2019-04-13 NOTE — CONSULT NOTE ADULT - SUBJECTIVE AND OBJECTIVE BOX
HPI:    PMHx:                     PSHx:     Medications (inpatient): allopurinol 300 milliGRAM(s) Oral daily  benzonatate 100 milliGRAM(s) Oral every 8 hours  buDESOnide  80 MICROgram(s)/formoterol 4.5 MICROgram(s) Inhaler 2 Puff(s) Inhalation two times a day  dextrose 5%. 1000 milliLiter(s) IV Continuous <Continuous>  dextrose 50% Injectable 12.5 Gram(s) IV Push once  dextrose 50% Injectable 25 Gram(s) IV Push once  dextrose 50% Injectable 25 Gram(s) IV Push once  insulin lispro (HumaLOG) corrective regimen sliding scale   SubCutaneous three times a day before meals  insulin lispro (HumaLOG) corrective regimen sliding scale   SubCutaneous at bedtime  magnesium oxide 400 milliGRAM(s) Oral two times a day with meals  metoprolol succinate ER 50 milliGRAM(s) Oral two times a day  multivitamin 1 Tablet(s) Oral daily  pantoprazole  Injectable 40 milliGRAM(s) IV Push every 12 hours  sildenafil (REVATIO) 10 milliGRAM(s) Oral three times a day  sodium chloride 0.9% lock flush 3 milliLiter(s) IV Push every 8 hours  torsemide 20 milliGRAM(s) Oral <User Schedule>    Medications (PRN):ALBUTerol/ipratropium for Nebulization 3 milliLiter(s) Nebulizer every 6 hours PRN  dextrose 40% Gel 15 Gram(s) Oral once PRN  glucagon  Injectable 1 milliGRAM(s) IntraMuscular once PRN  guaiFENesin  milliGRAM(s) Oral every 12 hours PRN    Allergies: No Known Allergies  (Intolerances: )  Social Hx:     Physical Exam  T(C): 37.2 (04-13-19 @ 20:33)  HR: 79 (04-13-19 @ 20:33) (66 - 87)  BP: 103/55 (04-13-19 @ 20:33) (94/63 - 103/55)  RR: 18 (04-13-19 @ 20:33) (18 - 19)  SpO2: 100% (04-13-19 @ 20:33) (98% - 100%)  Tmax: T(C): , Max: 37.2 (04-13-19 @ 20:33)    04-12-19  -  04-13-19  --------------------------------------------------------  IN:    Oral Fluid: 480 mL  Total IN: 480 mL    OUT:    Voided: 2100 mL  Total OUT: 2100 mL    Total NET: -1620 mL        General: well developed, well nourished, NAD  Neuro: alert and oriented, no focal deficits, moves all extremities spontaneously  HEENT: NCAT, EOMI, anicteric, mucosa moist  Respiratory: airway patent, respirations unlabored  CVS: regular rate and rhythm  Abdomen: soft, nontender, nondistended.   Extremities: no edema, sensation and movement grossly intact  Skin: warm, dry, appropriate color    Labs:                        8.4    4.80  )-----------( 242      ( 13 Apr 2019 07:20 )             28.2       04-13    138  |  104  |  44<H>  ----------------------------<  91  4.5   |  22  |  1.84<H>    Ca    9.5      13 Apr 2019 07:20  Mg     1.6     04-13    Imaging and other studies: < from: Colonoscopy (04.12.19 @ 09:52) >    [facility]  _______________________________________________________________________________  Patient Name: Dinah Marion          Procedure Date: 4/12/2019 9:52 AM  MRN: [id number]                     Account Number: [id number]  YOB: 1955              Admit Type: Inpatient  Room: [facility] 02                         Gender: Female  Attending MD: FELICIANO JOHNSON MD       _______________________________________________________________________________     Procedure:           Colonoscopy  Indications:         Heme positive stool, Positive Cologuard test, Anemia  Providers:           FELICIANO JOHNSON MD, VI LOU MD (Fellow)  Medicines:           Midazolam 2 mg IV  Complications:       No immediate complications.  Procedure:           Pre-Anesthesia Assessment:                       - Pre-procedure Verification: Prior to the procedure,                        the patient's identity was verified by full name, date                        of birth and medical record number. The patient's                        identity was verified on all pertinent medical records,                        including History and Physical, nursing assessment and                        pre-anesthesia assessment. Also prior to the procedure,                        a History and Physical was performed, and patient                        medications, allergies and sensitivities were reviewed.                        The patient's tolerance of previous anesthesia was                  reviewed. The risks and benefits of the procedure and                        the sedation options and risks were discussed with the                        patient. All questions were answered and informed                        consentwas obtained.                       - Time-Out: Prior to the start of the procedure, the                        patient's identification, proposed procedure, accurate                        signed consent, correctly labeled images and records,                   and need for prophylactic antibiotics were verified by                        the physician, the nurse and the anesthesiologist in the                        procedure room.                       After I obtained informed consent, thescope was passed                        under direct vision. Throughout the procedure, the                        patient's blood pressure, pulse, and oxygen saturations                        were monitored continuously. The Colonoscope was                 introduced through the anus and advanced to the cecum,                        identified by appendiceal orifice and ileocecal valve.                        The colonoscopy was performed without difficulty. The                        patient tolerated the procedure well. The quality of the                        bowel preparation was good.                                                                                   Findings:       A small fungating, polypoid non-obstructing masswas found in the        proximal sigmoid colon approximately 40 cm from the anal verge. The mass        was partially circumferential (involving one-half of the lumen        circumference) on one fold. Biopsies were taken with a cold forceps for     histology.       Non-bleeding internal hemorrhoids were found. The hemorrhoids were        moderate.                                                                                   Impression:          - Polypoid mass in the proximal sigmoid colon, rule out                        malignancy. Biopsied.                       - Non-bleeding internal hemorrhoids.  Recommendation:      - [facility]                       - Advance diet as tolerated.             - Await pathology results.                       - If malignancy confirmed, consider colo-rectal surgery                        evaluation.                                                                                   Attending Participation:       I was present and participated during the entire procedure, including        non-key portions.                                                                                     __________________  FELICIANO JOHNSON MD  4/12/2019 11:05:12AM  This report has been signed electronically.  Number of Addenda: 0    Note Initiated On: 4/12/2019 9:52 AM    < end of copied text > HPI: 62 yo woman with multiple medical comorbidities including Colorectal surgery consulted given finding     Patient denies melena, hematochezia, abdominal pain, nausea or vomiting. She has had intentional weight loss over the past year. Patient with a personal history of lymphoma s/p chemo/radiation (1997) and family history significant for two of her Aunt's having had breast cancer and cervical cancer, respectively.     PMHx:   lymphoma s/p chemo/radiation (1997, [facility])  moderate-severe TR  Chronic diastolic heart failure  Afib  COPD on nocturnal O2  LINH  HTN  HLD  T2DM    PSHx: D&C     Medications (inpatient): allopurinol 300 milliGRAM(s) Oral daily  benzonatate 100 milliGRAM(s) Oral every 8 hours  buDESOnide  80 MICROgram(s)/formoterol 4.5 MICROgram(s) Inhaler 2 Puff(s) Inhalation two times a day  dextrose 5%. 1000 milliLiter(s) IV Continuous <Continuous>  dextrose 50% Injectable 12.5 Gram(s) IV Push once  dextrose 50% Injectable 25 Gram(s) IV Push once  dextrose 50% Injectable 25 Gram(s) IV Push once  insulin lispro (HumaLOG) corrective regimen sliding scale   SubCutaneous three times a day before meals  insulin lispro (HumaLOG) corrective regimen sliding scale   SubCutaneous at bedtime  magnesium oxide 400 milliGRAM(s) Oral two times a day with meals  metoprolol succinate ER 50 milliGRAM(s) Oral two times a day  multivitamin 1 Tablet(s) Oral daily  pantoprazole  Injectable 40 milliGRAM(s) IV Push every 12 hours  sildenafil (REVATIO) 10 milliGRAM(s) Oral three times a day  sodium chloride 0.9% lock flush 3 milliLiter(s) IV Push every 8 hours  torsemide 20 milliGRAM(s) Oral <User Schedule>    Medications (PRN):ALBUTerol/ipratropium for Nebulization 3 milliLiter(s) Nebulizer every 6 hours PRN  dextrose 40% Gel 15 Gram(s) Oral once PRN  glucagon  Injectable 1 milliGRAM(s) IntraMuscular once PRN  guaiFENesin  milliGRAM(s) Oral every 12 hours PRN    Allergies: No Known Allergies  (Intolerances: )    Social Hx: Non-smoker. No Alcohol. No recreational drugs. Employed as a school nurse.     Physical Exam  T(C): 37.2 (04-13-19 @ 20:33)  HR: 79 (04-13-19 @ 20:33) (66 - 87)  BP: 103/55 (04-13-19 @ 20:33) (94/63 - 103/55)  RR: 18 (04-13-19 @ 20:33) (18 - 19)  SpO2: 100% (04-13-19 @ 20:33) (98% - 100%)  Tmax: T(C): , Max: 37.2 (04-13-19 @ 20:33)    04-12-19  -  04-13-19  --------------------------------------------------------  IN:    Oral Fluid: 480 mL  Total IN: 480 mL    OUT:    Voided: 2100 mL  Total OUT: 2100 mL    Total NET: -1620 mL    General: well developed, well nourished, NAD  Neuro: alert and oriented, no focal deficits, moves all extremities spontaneously  HEENT: NCAT, EOMI, anicteric, mucosa moist  Respiratory: airway patent, respirations unlabored  CVS: regular rate and rhythm  Abdomen: Obese, soft, nontender, nondistended. No abdominal scars.   Extremities: no edema, sensation and movement grossly intact  Skin: warm, dry, appropriate color    Labs:                        8.4    4.80  )-----------( 242      ( 13 Apr 2019 07:20 )             28.2       04-13    138  |  104  |  44<H>  ----------------------------<  91  4.5   |  22  |  1.84<H>    Ca    9.5      13 Apr 2019 07:20  Mg     1.6     04-13    Imaging and other studies: < from: Colonoscopy (04.12.19 @ 09:52) >    [facility]  _______________________________________________________________________________  Patient Name: Dinah Marion          Procedure Date: 4/12/2019 9:52 AM  MRN: [id number]                     Account Number: [id number]  YOB: 1955              Admit Type: Inpatient  Room: [place] [address]                         Gender: Female  Attending MD: FELICIANO JOHNSON MD       _______________________________________________________________________________     Procedure:           Colonoscopy  Indications:         Heme positive stool, Positive Cologuard test, Anemia  Providers:           FELICIANO JOHNSON MD, VI LOU MD (Fellow)  Medicines:           Midazolam 2 mg IV  Complications:       No immediate complications.  Procedure:           Pre-Anesthesia Assessment:                       - Pre-procedure Verification: Prior to the procedure,                        the patient's identity was verified by full name, date                        of birth and medical record number. The patient's                        identity was verified on all pertinent medical records,                        including History and Physical, nursing assessment and                        pre-anesthesia assessment. Also prior to the procedure,                        a History and Physical was performed, and patient                        medications, allergies and sensitivities were reviewed.                        The patient's tolerance of previous anesthesia was                  reviewed. The risks and benefits of the procedure and                        the sedation options and risks were discussed with the                        patient. All questions were answered and informed                        consentwas obtained.                       - Time-Out: Prior to the start of the procedure, the                        patient's identification, proposed procedure, accurate                        signed consent, correctly labeled images and records,                   and need for prophylactic antibiotics were verified by                        the physician, the nurse and the anesthesiologist in the                        procedure room.                       After I obtained informed consent, thescope was passed                        under direct vision. Throughout the procedure, the                        patient's blood pressure, pulse, and oxygen saturations                        were monitored continuously. The Colonoscope was                 introduced through the anus and advanced to the cecum,                        identified by appendiceal orifice and ileocecal valve.                        The colonoscopy was performed without difficulty. The                        patient tolerated the procedure well. The quality of the                        bowel preparation was good.                                                                                   Findings:       A small fungating, polypoid non-obstructing masswas found in the        proximal sigmoid colon approximately 40 cm from the anal verge. The mass        was partially circumferential (involving one-half of the lumen        circumference) on one fold. Biopsies were taken with a cold forceps for     histology.       Non-bleeding internal hemorrhoids were found. The hemorrhoids were        moderate.                                                                                   Impression:          - Polypoid mass in the proximal sigmoid colon, rule out                        malignancy. Biopsied.                       - Non-bleeding internal hemorrhoids.  Recommendation:      - [facility].                       - Advance diet as tolerated.             - Await pathology results.                       - If malignancy confirmed, consider colo-rectal surgery                        evaluation.                                                                                   Attending Participation:       I was present and participated during the entire procedure, including        non-key portions.                                                                                     __________________  FELICIANO JOHNSON MD  4/12/2019 11:05:12AM  This report has been signed electronically.  Number of Addenda: 0    Note Initiated On: 4/12/2019 9:52 AM    < end of copied text > HPI: 64 yo woman with multiple medical comorbidities including afib, diastolic heart failure, severe pulmonary HTN,  Colorectal surgery consulted given colonoscopy findings.     Patient denies melena, hematochezia, abdominal pain, nausea or vomiting. She has had intentional weight loss over the past year. Patient with a personal history of lymphoma s/p chemo/radiation (1997) and family history significant for two of her Aunt's having had breast cancer and cervical cancer, respectively.     PMHx:   lymphoma s/p chemo/radiation (1997, [facility])  moderate-severe TR  Chronic diastolic heart failure  Pulmonary HTN  Afib  COPD on nocturnal O2  LINH  HTN  HLD  T2DM    PSHx: D&C     Medications (inpatient): allopurinol 300 milliGRAM(s) Oral daily  benzonatate 100 milliGRAM(s) Oral every 8 hours  buDESOnide  80 MICROgram(s)/formoterol 4.5 MICROgram(s) Inhaler 2 Puff(s) Inhalation two times a day  dextrose 5%. 1000 milliLiter(s) IV Continuous <Continuous>  dextrose 50% Injectable 12.5 Gram(s) IV Push once  dextrose 50% Injectable 25 Gram(s) IV Push once  dextrose 50% Injectable 25 Gram(s) IV Push once  insulin lispro (HumaLOG) corrective regimen sliding scale   SubCutaneous three times a day before meals  insulin lispro (HumaLOG) corrective regimen sliding scale   SubCutaneous at bedtime  magnesium oxide 400 milliGRAM(s) Oral two times a day with meals  metoprolol succinate ER 50 milliGRAM(s) Oral two times a day  multivitamin 1 Tablet(s) Oral daily  pantoprazole  Injectable 40 milliGRAM(s) IV Push every 12 hours  sildenafil (REVATIO) 10 milliGRAM(s) Oral three times a day  sodium chloride 0.9% lock flush 3 milliLiter(s) IV Push every 8 hours  torsemide 20 milliGRAM(s) Oral <User Schedule>    Medications (PRN):ALBUTerol/ipratropium for Nebulization 3 milliLiter(s) Nebulizer every 6 hours PRN  dextrose 40% Gel 15 Gram(s) Oral once PRN  glucagon  Injectable 1 milliGRAM(s) IntraMuscular once PRN  guaiFENesin  milliGRAM(s) Oral every 12 hours PRN    Allergies: No Known Allergies  (Intolerances: )    Social Hx: Non-smoker. No Alcohol. No recreational drugs. Employed as a school nurse.     Physical Exam  T(C): 37.2 (04-13-19 @ 20:33)  HR: 79 (04-13-19 @ 20:33) (66 - 87)  BP: 103/55 (04-13-19 @ 20:33) (94/63 - 103/55)  RR: 18 (04-13-19 @ 20:33) (18 - 19)  SpO2: 100% (04-13-19 @ 20:33) (98% - 100%)  Tmax: T(C): , Max: 37.2 (04-13-19 @ 20:33)    04-12-19  -  04-13-19  --------------------------------------------------------  IN:    Oral Fluid: 480 mL  Total IN: 480 mL    OUT:    Voided: 2100 mL  Total OUT: 2100 mL    Total NET: -1620 mL    General: well developed, well nourished, NAD  Neuro: alert and oriented, no focal deficits, moves all extremities spontaneously  HEENT: NCAT, EOMI, anicteric, mucosa moist  Respiratory: airway patent, respirations unlabored  CVS: regular rate and rhythm  Abdomen: Obese, soft, nontender, nondistended. No abdominal scars.   Extremities: no edema, sensation and movement grossly intact  Skin: warm, dry, appropriate color    Labs:                        8.4    4.80  )-----------( 242      ( 13 Apr 2019 07:20 )             28.2       04-13    138  |  104  |  44<H>  ----------------------------<  91  4.5   |  22  |  1.84<H>    Ca    9.5      13 Apr 2019 07:20  Mg     1.6     04-13    Imaging and other studies: < from: Colonoscopy (04.12.19 @ 09:52) >    [facility]  _______________________________________________________________________________  Patient Name: Dinah Marion          Procedure Date: 4/12/2019 9:52 AM  MRN: [id number]                     Account Number: [id number]  YOB: 1955              Admit Type: Inpatient  Room: [place] [address]                         Gender: Female  Attending MD: FELICIANO JOHNSON MD       _______________________________________________________________________________     Procedure:           Colonoscopy  Indications:         Heme positive stool, Positive Cologuard test, Anemia  Providers:           FELICIANO JOHNSON MD, VI LOU MD (Fellow)  Medicines:           Midazolam 2 mg IV  Complications:       No immediate complications.  Procedure:           Pre-Anesthesia Assessment:                       - Pre-procedure Verification: Prior to the procedure,                        the patient's identity was verified by full name, date                        of birth and medical record number. The patient's                        identity was verified on all pertinent medical records,                        including History and Physical, nursing assessment and                        pre-anesthesia assessment. Also prior to the procedure,                        a History and Physical was performed, and patient                        medications, allergies and sensitivities were reviewed.                        The patient's tolerance of previous anesthesia was                  reviewed. The risks and benefits of the procedure and                        the sedation options and risks were discussed with the                        patient. All questions were answered and informed                        consentwas obtained.                       - Time-Out: Prior to the start of the procedure, the                        patient's identification, proposed procedure, accurate                        signed consent, correctly labeled images and records,                   and need for prophylactic antibiotics were verified by                        the physician, the nurse and the anesthesiologist in the                        procedure room.                       After I obtained informed consent, thescope was passed                        under direct vision. Throughout the procedure, the                        patient's blood pressure, pulse, and oxygen saturations                        were monitored continuously. The Colonoscope was                 introduced through the anus and advanced to the cecum,                        identified by appendiceal orifice and ileocecal valve.                        The colonoscopy was performed without difficulty. The                        patient tolerated the procedure well. The quality of the                        bowel preparation was good.                                                                                   Findings:       A small fungating, polypoid non-obstructing masswas found in the        proximal sigmoid colon approximately 40 cm from the anal verge. The mass        was partially circumferential (involving one-half of the lumen        circumference) on one fold. Biopsies were taken with a cold forceps for     histology.       Non-bleeding internal hemorrhoids were found. The hemorrhoids were        moderate.                                                                                   Impression:          - Polypoid mass in the proximal sigmoid colon, rule out                        malignancy. Biopsied.                       - Non-bleeding internal hemorrhoids.  Recommendation:      - [facility].                       - Advance diet as tolerated.             - Await pathology results.                       - If malignancy confirmed, consider colo-rectal surgery                        evaluation.                                                                                   Attending Participation:       I was present and participated during the entire procedure, including        non-key portions.                                                                                     __________________  FELICIANO JOHNSON MD  4/12/2019 11:05:12AM  This report has been signed electronically.  Number of Addenda: 0    Note Initiated On: 4/12/2019 9:52 AM    < end of copied text > HPI: 62 yo woman with multiple medical comorbidities including afib, diastolic heart failure, severe pulmonary HTN, COPD on nocturnal O2, LINH (arranged for new home bilevel support), presenting with generalized weakness and anemia. Patient notes that she had positive Cologuard test (3/19/2019). Colonoscopy performed 4/12 indicated a fungating, non-obstructing mass at the proximal sigmoid colon s/p biopsy (4/12). Her last colonoscopy was approximately 10 years ago s/p polypectomy. Colorectal surgery consulted given colonoscopy findings. She is tolerating a regular diet without N/V and having regular bowel movements.     Patient denies melena, hematochezia, abdominal pain, nausea or vomiting. She has had intentional weight loss over the past year. Patient with a personal history of lymphoma s/p chemo/radiation (1997) and family history significant for two of her Aunt's having had breast cancer and cervical cancer, respectively. Patient can walk 1-2 blocks with her cane before being limited by shortness of breath. She can climb stairs.     PMHx:   lymphoma s/p chemo/radiation (1997, [facility])  moderate-severe TR  Chronic diastolic heart failure  Pulmonary HTN  Afib  COPD on nocturnal O2  LINH  HTN  HLD  T2DM    PSHx: D&C     Medications (inpatient): allopurinol 300 milliGRAM(s) Oral daily  benzonatate 100 milliGRAM(s) Oral every 8 hours  buDESOnide  80 MICROgram(s)/formoterol 4.5 MICROgram(s) Inhaler 2 Puff(s) Inhalation two times a day  dextrose 5%. 1000 milliLiter(s) IV Continuous <Continuous>  dextrose 50% Injectable 12.5 Gram(s) IV Push once  dextrose 50% Injectable 25 Gram(s) IV Push once  dextrose 50% Injectable 25 Gram(s) IV Push once  insulin lispro (HumaLOG) corrective regimen sliding scale   SubCutaneous three times a day before meals  insulin lispro (HumaLOG) corrective regimen sliding scale   SubCutaneous at bedtime  magnesium oxide 400 milliGRAM(s) Oral two times a day with meals  metoprolol succinate ER 50 milliGRAM(s) Oral two times a day  multivitamin 1 Tablet(s) Oral daily  pantoprazole  Injectable 40 milliGRAM(s) IV Push every 12 hours  sildenafil (REVATIO) 10 milliGRAM(s) Oral three times a day  sodium chloride 0.9% lock flush 3 milliLiter(s) IV Push every 8 hours  torsemide 20 milliGRAM(s) Oral <User Schedule>    Medications (PRN):ALBUTerol/ipratropium for Nebulization 3 milliLiter(s) Nebulizer every 6 hours PRN  dextrose 40% Gel 15 Gram(s) Oral once PRN  glucagon  Injectable 1 milliGRAM(s) IntraMuscular once PRN  guaiFENesin  milliGRAM(s) Oral every 12 hours PRN    Allergies: No Known Allergies  (Intolerances: )    Social Hx: Non-smoker. No Alcohol. No recreational drugs. Employed as a school nurse.     Physical Exam  T(C): 37.2 (04-13-19 @ 20:33)  HR: 79 (04-13-19 @ 20:33) (66 - 87)  BP: 103/55 (04-13-19 @ 20:33) (94/63 - 103/55)  RR: 18 (04-13-19 @ 20:33) (18 - 19)  SpO2: 100% (04-13-19 @ 20:33) (98% - 100%)  Tmax: T(C): , Max: 37.2 (04-13-19 @ 20:33)    04-12-19  -  04-13-19  --------------------------------------------------------  IN:    Oral Fluid: 480 mL  Total IN: 480 mL    OUT:    Voided: 2100 mL  Total OUT: 2100 mL    Total NET: -1620 mL    General: well developed, well nourished, NAD  Neuro: alert and oriented, no focal deficits, moves all extremities spontaneously  HEENT: NCAT, EOMI, anicteric, mucosa moist  Respiratory: airway patent, respirations unlabored  CVS: regular rate and rhythm  Abdomen: Obese, soft, nontender, nondistended. No abdominal scars.   Extremities: no edema, sensation and movement grossly intact  Skin: warm, dry, appropriate color    Labs:                        8.4    4.80  )-----------( 242      ( 13 Apr 2019 07:20 )             28.2       04-13    138  |  104  |  44<H>  ----------------------------<  91  4.5   |  22  |  1.84<H>    Ca    9.5      13 Apr 2019 07:20  Mg     1.6     04-13    Imaging and other studies: < from: Colonoscopy (04.12.19 @ 09:52) >    [facility]  _______________________________________________________________________________  Patient Name: Dinah Marion          Procedure Date: 4/12/2019 9:52 AM  MRN: [id number]                     Account Number: [id number]  YOB: 1955              Admit Type: Inpatient  Room: [place] [address]                         Gender: Female  Attending MD: FELICIANO JOHNSON MD       _______________________________________________________________________________     Procedure:           Colonoscopy  Indications:         Heme positive stool, Positive Cologuard test, Anemia  Providers:           FELICIANO JOHNSON MD, VI LOU MD (Fellow)  Medicines:           Midazolam 2 mg IV  Complications:       No immediate complications.  Procedure:           Pre-Anesthesia Assessment:                       - Pre-procedure Verification: Prior to the procedure,                        the patient's identity was verified by full name, date                        of birth and medical record number. The patient's                        identity was verified on all pertinent medical records,                        including History and Physical, nursing assessment and                        pre-anesthesia assessment. Also prior to the procedure,                        a History and Physical was performed, and patient                        medications, allergies and sensitivities were reviewed.                        The patient's tolerance of previous anesthesia was                  reviewed. The risks and benefits of the procedure and                        the sedation options and risks were discussed with the                        patient. All questions were answered and informed                        consentwas obtained.                       - Time-Out: Prior to the start of the procedure, the                        patient's identification, proposed procedure, accurate                        signed consent, correctly labeled images and records,                   and need for prophylactic antibiotics were verified by                        the physician, the nurse and the anesthesiologist in the                        procedure room.                       After I obtained informed consent, thescope was passed                        under direct vision. Throughout the procedure, the                        patient's blood pressure, pulse, and oxygen saturations                        were monitored continuously. The Colonoscope was                 introduced through the anus and advanced to the cecum,                        identified by appendiceal orifice and ileocecal valve.                        The colonoscopy was performed without difficulty. The                        patient tolerated the procedure well. The quality of the                        bowel preparation was good.                                                                                   Findings:       A small fungating, polypoid non-obstructing masswas found in the        proximal sigmoid colon approximately 40 cm from the anal verge. The mass        was partially circumferential (involving one-half of the lumen        circumference) on one fold. Biopsies were taken with a cold forceps for     histology.       Non-bleeding internal hemorrhoids were found. The hemorrhoids were        moderate.                                                                                   Impression:          - Polypoid mass in the proximal sigmoid colon, rule out                        malignancy. Biopsied.                       - Non-bleeding internal hemorrhoids.  Recommendation:      - [facility].                       - Advance diet as tolerated.             - Await pathology results.                       - If malignancy confirmed, consider colo-rectal surgery                        evaluation.                                                                                   Attending Participation:       I was present and participated during the entire procedure, including        non-key portions.                                                                                     __________________  FELICIANO JOHNSON MD  4/12/2019 11:05:12AM  This report has been signed electronically.  Number of Addenda: 0    Note Initiated On: 4/12/2019 9:52 AM    < end of copied text >

## 2019-04-13 NOTE — PROGRESS NOTE ADULT - ASSESSMENT
62 yo woman with multiple comorbidities, including history of lymphoma, R-sided heart failure with preserved EF (EF 66% on TTE in 10/2017), severe pulm HTN, mod-severe TR, reported COPD but denies smoking? (on 2L NC at night), LINH (being arranged for bilevel support), A-Fib (on Eliquis), HTN, HLD, DM2, and gout presents with generalized weakness for the past 1 week in the setting of recent occult positive stool, OSMAR and newly diagnosed influenza/entero virus infection. RHC on 3/6/19-RA 10, RV 78/2/14, PCWP 15, PA 78/29/45, sat 47.4%, CO 4.39, CI 2.3>SVR 1659, PVR 6.6

## 2019-04-13 NOTE — PROGRESS NOTE ADULT - PROBLEM SELECTOR PLAN 2
Euvolemic. Weight continues to downtrend  **can increase fluid restriction to 2L  Tx for pulm HTN as above.  Continue torsemide 20 mg every Sun, Tues, and Thursday.  hypomagnesemic. Added mag ox 400 mg po BID. keep mag >2.0.   Keep K 4.0-4.5

## 2019-04-14 LAB
ANION GAP SERPL CALC-SCNC: 12 MMO/L — SIGNIFICANT CHANGE UP (ref 7–14)
BLD GP AB SCN SERPL QL: NEGATIVE — SIGNIFICANT CHANGE UP
BUN SERPL-MCNC: 47 MG/DL — HIGH (ref 7–23)
CALCIUM SERPL-MCNC: 9.3 MG/DL — SIGNIFICANT CHANGE UP (ref 8.4–10.5)
CHLORIDE SERPL-SCNC: 103 MMOL/L — SIGNIFICANT CHANGE UP (ref 98–107)
CO2 SERPL-SCNC: 21 MMOL/L — LOW (ref 22–31)
CREAT SERPL-MCNC: 1.76 MG/DL — HIGH (ref 0.5–1.3)
GLUCOSE SERPL-MCNC: 83 MG/DL — SIGNIFICANT CHANGE UP (ref 70–99)
HCT VFR BLD CALC: 28.6 % — LOW (ref 34.5–45)
HCT VFR BLD CALC: 29.4 % — LOW (ref 34.5–45)
HCT VFR BLD CALC: 29.6 % — LOW (ref 34.5–45)
HGB BLD-MCNC: 8.7 G/DL — LOW (ref 11.5–15.5)
HGB BLD-MCNC: 8.7 G/DL — LOW (ref 11.5–15.5)
HGB BLD-MCNC: 8.9 G/DL — LOW (ref 11.5–15.5)
MAGNESIUM SERPL-MCNC: 1.5 MG/DL — LOW (ref 1.6–2.6)
MCHC RBC-ENTMCNC: 19.8 PG — LOW (ref 27–34)
MCHC RBC-ENTMCNC: 19.9 PG — LOW (ref 27–34)
MCHC RBC-ENTMCNC: 20.1 PG — LOW (ref 27–34)
MCHC RBC-ENTMCNC: 29.6 % — LOW (ref 32–36)
MCHC RBC-ENTMCNC: 30.1 % — LOW (ref 32–36)
MCHC RBC-ENTMCNC: 30.4 % — LOW (ref 32–36)
MCV RBC AUTO: 65.1 FL — LOW (ref 80–100)
MCV RBC AUTO: 66.8 FL — LOW (ref 80–100)
MCV RBC AUTO: 67.3 FL — LOW (ref 80–100)
NRBC # FLD: 0.07 K/UL — SIGNIFICANT CHANGE UP (ref 0–0)
NRBC # FLD: 0.07 K/UL — SIGNIFICANT CHANGE UP (ref 0–0)
NRBC # FLD: 0.1 K/UL — SIGNIFICANT CHANGE UP (ref 0–0)
NRBC FLD-RTO: 1.3 — SIGNIFICANT CHANGE UP
NRBC FLD-RTO: 1.6 — SIGNIFICANT CHANGE UP
NRBC FLD-RTO: 1.7 — SIGNIFICANT CHANGE UP
PLATELET # BLD AUTO: 252 K/UL — SIGNIFICANT CHANGE UP (ref 150–400)
PLATELET # BLD AUTO: 280 K/UL — SIGNIFICANT CHANGE UP (ref 150–400)
PLATELET # BLD AUTO: 292 K/UL — SIGNIFICANT CHANGE UP (ref 150–400)
PMV BLD: 10.3 FL — SIGNIFICANT CHANGE UP (ref 7–13)
PMV BLD: SIGNIFICANT CHANGE UP FL (ref 7–13)
PMV BLD: SIGNIFICANT CHANGE UP FL (ref 7–13)
POTASSIUM SERPL-MCNC: 4.2 MMOL/L — SIGNIFICANT CHANGE UP (ref 3.5–5.3)
POTASSIUM SERPL-SCNC: 4.2 MMOL/L — SIGNIFICANT CHANGE UP (ref 3.5–5.3)
RBC # BLD: 4.37 M/UL — SIGNIFICANT CHANGE UP (ref 3.8–5.2)
RBC # BLD: 4.39 M/UL — SIGNIFICANT CHANGE UP (ref 3.8–5.2)
RBC # BLD: 4.43 M/UL — SIGNIFICANT CHANGE UP (ref 3.8–5.2)
RBC # FLD: 17.2 % — HIGH (ref 10.3–14.5)
RBC # FLD: 17.3 % — HIGH (ref 10.3–14.5)
RBC # FLD: 17.4 % — HIGH (ref 10.3–14.5)
RH IG SCN BLD-IMP: POSITIVE — SIGNIFICANT CHANGE UP
SODIUM SERPL-SCNC: 136 MMOL/L — SIGNIFICANT CHANGE UP (ref 135–145)
WBC # BLD: 4.5 K/UL — SIGNIFICANT CHANGE UP (ref 3.8–10.5)
WBC # BLD: 5.59 K/UL — SIGNIFICANT CHANGE UP (ref 3.8–10.5)
WBC # BLD: 6.03 K/UL — SIGNIFICANT CHANGE UP (ref 3.8–10.5)
WBC # FLD AUTO: 4.5 K/UL — SIGNIFICANT CHANGE UP (ref 3.8–10.5)
WBC # FLD AUTO: 5.59 K/UL — SIGNIFICANT CHANGE UP (ref 3.8–10.5)
WBC # FLD AUTO: 6.03 K/UL — SIGNIFICANT CHANGE UP (ref 3.8–10.5)

## 2019-04-14 PROCEDURE — 74177 CT ABD & PELVIS W/CONTRAST: CPT | Mod: 26

## 2019-04-14 PROCEDURE — 99232 SBSQ HOSP IP/OBS MODERATE 35: CPT

## 2019-04-14 PROCEDURE — 71260 CT THORAX DX C+: CPT | Mod: 26

## 2019-04-14 PROCEDURE — 99223 1ST HOSP IP/OBS HIGH 75: CPT

## 2019-04-14 RX ORDER — MAGNESIUM SULFATE 500 MG/ML
1 VIAL (ML) INJECTION ONCE
Qty: 0 | Refills: 0 | Status: COMPLETED | OUTPATIENT
Start: 2019-04-14 | End: 2019-04-14

## 2019-04-14 RX ADMIN — Medication 10 MILLIGRAM(S): at 14:35

## 2019-04-14 RX ADMIN — MAGNESIUM OXIDE 400 MG ORAL TABLET 400 MILLIGRAM(S): 241.3 TABLET ORAL at 18:40

## 2019-04-14 RX ADMIN — Medication 100 MILLIGRAM(S): at 14:35

## 2019-04-14 RX ADMIN — SODIUM CHLORIDE 3 MILLILITER(S): 9 INJECTION INTRAMUSCULAR; INTRAVENOUS; SUBCUTANEOUS at 21:05

## 2019-04-14 RX ADMIN — PANTOPRAZOLE SODIUM 40 MILLIGRAM(S): 20 TABLET, DELAYED RELEASE ORAL at 18:40

## 2019-04-14 RX ADMIN — Medication 20 MILLIGRAM(S): at 08:42

## 2019-04-14 RX ADMIN — Medication 600 MILLIGRAM(S): at 21:04

## 2019-04-14 RX ADMIN — Medication 50 MILLIGRAM(S): at 05:56

## 2019-04-14 RX ADMIN — Medication 10 MILLIGRAM(S): at 05:56

## 2019-04-14 RX ADMIN — PANTOPRAZOLE SODIUM 40 MILLIGRAM(S): 20 TABLET, DELAYED RELEASE ORAL at 08:12

## 2019-04-14 RX ADMIN — Medication 10 MILLIGRAM(S): at 21:05

## 2019-04-14 RX ADMIN — BUDESONIDE AND FORMOTEROL FUMARATE DIHYDRATE 2 PUFF(S): 160; 4.5 AEROSOL RESPIRATORY (INHALATION) at 08:42

## 2019-04-14 RX ADMIN — SODIUM CHLORIDE 3 MILLILITER(S): 9 INJECTION INTRAMUSCULAR; INTRAVENOUS; SUBCUTANEOUS at 14:38

## 2019-04-14 RX ADMIN — Medication 300 MILLIGRAM(S): at 12:17

## 2019-04-14 RX ADMIN — Medication 1 TABLET(S): at 12:18

## 2019-04-14 RX ADMIN — MAGNESIUM OXIDE 400 MG ORAL TABLET 400 MILLIGRAM(S): 241.3 TABLET ORAL at 08:42

## 2019-04-14 RX ADMIN — Medication 100 MILLIGRAM(S): at 21:05

## 2019-04-14 RX ADMIN — Medication 100 GRAM(S): at 12:18

## 2019-04-14 RX ADMIN — Medication 100 MILLIGRAM(S): at 05:56

## 2019-04-14 RX ADMIN — BUDESONIDE AND FORMOTEROL FUMARATE DIHYDRATE 2 PUFF(S): 160; 4.5 AEROSOL RESPIRATORY (INHALATION) at 21:05

## 2019-04-14 NOTE — PROGRESS NOTE ADULT - SUBJECTIVE AND OBJECTIVE BOX
24H hour events:   pt resting  no complaints  no acute events overnight  tele: afib 70-80s  MEDICATIONS:  metoprolol succinate ER 50 milliGRAM(s) Oral two times a day  sildenafil (REVATIO) 10 milliGRAM(s) Oral three times a day  torsemide 20 milliGRAM(s) Oral <User Schedule>      ALBUTerol/ipratropium for Nebulization 3 milliLiter(s) Nebulizer every 6 hours PRN  benzonatate 100 milliGRAM(s) Oral every 8 hours  buDESOnide  80 MICROgram(s)/formoterol 4.5 MICROgram(s) Inhaler 2 Puff(s) Inhalation two times a day  guaiFENesin  milliGRAM(s) Oral every 12 hours PRN      pantoprazole  Injectable 40 milliGRAM(s) IV Push every 12 hours    allopurinol 300 milliGRAM(s) Oral daily  dextrose 40% Gel 15 Gram(s) Oral once PRN  dextrose 50% Injectable 12.5 Gram(s) IV Push once  dextrose 50% Injectable 25 Gram(s) IV Push once  dextrose 50% Injectable 25 Gram(s) IV Push once  glucagon  Injectable 1 milliGRAM(s) IntraMuscular once PRN  insulin lispro (HumaLOG) corrective regimen sliding scale   SubCutaneous three times a day before meals  insulin lispro (HumaLOG) corrective regimen sliding scale   SubCutaneous at bedtime    dextrose 5%. 1000 milliLiter(s) IV Continuous <Continuous>  magnesium oxide 400 milliGRAM(s) Oral two times a day with meals  magnesium sulfate  IVPB 1 Gram(s) IV Intermittent once  multivitamin 1 Tablet(s) Oral daily  sodium chloride 0.9% lock flush 3 milliLiter(s) IV Push every 8 hours          PHYSICAL EXAM:  T(C): 36.9 (04-14-19 @ 08:40), Max: 37.2 (04-13-19 @ 20:33)  HR: 71 (04-14-19 @ 08:40) (66 - 87)  BP: 110/75 (04-14-19 @ 08:40) (97/58 - 111/60)  RR: 17 (04-14-19 @ 08:40) (16 - 19)  SpO2: 98% (04-14-19 @ 08:40) (98% - 100%)  Wt(kg): --  I&O's Summary    13 Apr 2019 07:01 - 14 Apr 2019 07:00  --------------------------------------------------------  IN: 0 mL / OUT: 1000 mL / NET: -1000 mL        Appearance: Normal	  HEENT:   Normal oral mucosa, PERRL, EOMI	  Lymphatic: No lymphadenopathy  Cardiovascular: irregular, Normal S1 S2, No JVD, No murmurs, No edema  Respiratory: Lungs clear to auscultation	  Psychiatry: A & O x 3, Mood & affect appropriate  Gastrointestinal:  Soft, Non-tender, + BS	  Skin: No rashes, No ecchymoses, No cyanosis	  Neurologic: Non-focal  Extremities: Normal range of motion, No clubbing, cyanosis       LABS:	 	    CBC Full  -  ( 14 Apr 2019 05:45 )  WBC Count : 4.50 K/uL  Hemoglobin : 8.9 g/dL  Hematocrit : 29.6 %  Platelet Count - Automated : 292 K/uL  Mean Cell Volume : 66.8 fL  Mean Cell Hemoglobin : 20.1 pg  Mean Cell Hemoglobin Concentration : 30.1 %  Auto Neutrophil # : x  Auto Lymphocyte # : x  Auto Monocyte # : x  Auto Eosinophil # : x  Auto Basophil # : x  Auto Neutrophil % : x  Auto Lymphocyte % : x  Auto Monocyte % : x  Auto Eosinophil % : x  Auto Basophil % : x    04-14    136  |  103  |  47<H>  ----------------------------<  83  4.2   |  21<L>  |  1.76<H>  04-13    138  |  104  |  44<H>  ----------------------------<  91  4.5   |  22  |  1.84<H>    Ca    9.3      14 Apr 2019 05:45  Ca    9.5      13 Apr 2019 07:20  Mg     1.5     04-14  Mg     1.6     04-13        proBNP:   Lipid Profile:   HgA1c:   TSH:       CARDIAC MARKERS:            TELEMETRY: 	    ECG:  	  RADIOLOGY:  OTHER: 	    PREVIOUS DIAGNOSTIC TESTING:    [ ] Echocardiogram:  [ ]  Catheterization:  [ ] Stress Test:  	  	  ASSESSMENT/PLAN:

## 2019-04-14 NOTE — PROGRESS NOTE ADULT - ASSESSMENT
64 yo woman with multiple comorbidities, including history of lymphoma, R-sided heart failure with preserved EF (EF 66% on TTE in 10/2017), severe pulm HTN, mod-severe TR, reported COPD but denies smoking? (on 2L NC at night), LINH (being arranged for bilevel support), A-Fib (on Eliquis), HTN, HLD, DM2, and gout presents with generalized weakness for the past 1 week in the setting of recent occult positive stool, OSMAR and newly diagnosed influenza/entero virus infection. RHC on 3/6/19-RA 10, RV 78/2/14, PCWP 15, PA 78/29/45, sat 47.4%, CO 4.39, CI 2.3>SVR 1659, PVR 6.6            Problem/Plan - 1:  ·  Problem: Pulmonary hypertension.  Plan: Continue sildenafil 10 mg po TID, will defer uptitration to pulmonary team.   Appreciate pulm HTN workup-normal TSH, normal antiscleroderma ab, no HIV.   No alpha 1 antitrypsin deficiency.     Problem/Plan - 2:  ·  Problem: Chronic diastolic heart failure.  Plan: Euvolemic. Weight continues to downtrend  **can increase fluid restriction to 2L  Tx for pulm HTN as above.  Continue torsemide 20 mg every Sun, Tues, and Thursday.  hypomagnesemic. Added mag ox 400 mg po BID. keep mag >2.0.   Keep K 4.0-4.5.     Problem/Plan - 3:  ·  Problem: Afib.  Plan: Continue Toprol, hold  parameters changed for SBP <90, HR <50.  Off AC due to anemia/colonic mass, *as soon as able from GI perspective please restart for cva ppx.   pt still waiting path report results

## 2019-04-14 NOTE — PROGRESS NOTE ADULT - SUBJECTIVE AND OBJECTIVE BOX
Patient seen and examined in bed; no new events.  NAD.    REVIEW OF SYSTEMS:  As per HPI, otherwise 8 full 10 ROS were unremarkable    MEDICATIONS  (STANDING):  allopurinol 300 milliGRAM(s) Oral daily  benzonatate 100 milliGRAM(s) Oral every 8 hours  buDESOnide  80 MICROgram(s)/formoterol 4.5 MICROgram(s) Inhaler 2 Puff(s) Inhalation two times a day  dextrose 5%. 1000 milliLiter(s) (50 mL/Hr) IV Continuous <Continuous>  dextrose 50% Injectable 12.5 Gram(s) IV Push once  dextrose 50% Injectable 25 Gram(s) IV Push once  dextrose 50% Injectable 25 Gram(s) IV Push once  insulin lispro (HumaLOG) corrective regimen sliding scale   SubCutaneous three times a day before meals  insulin lispro (HumaLOG) corrective regimen sliding scale   SubCutaneous at bedtime  magnesium oxide 400 milliGRAM(s) Oral two times a day with meals  magnesium sulfate  IVPB 1 Gram(s) IV Intermittent once  metoprolol succinate ER 50 milliGRAM(s) Oral two times a day  multivitamin 1 Tablet(s) Oral daily  pantoprazole  Injectable 40 milliGRAM(s) IV Push every 12 hours  sildenafil (REVATIO) 10 milliGRAM(s) Oral three times a day  sodium chloride 0.9% lock flush 3 milliLiter(s) IV Push every 8 hours  torsemide 20 milliGRAM(s) Oral <User Schedule>      VITAL:  T(C): , Max: 37.2 (04-13-19 @ 20:33)  T(F): , Max: 99 (04-13-19 @ 20:33)  HR: 71 (04-14-19 @ 08:40)  BP: 110/75 (04-14-19 @ 08:40)  BP(mean): --  RR: 17 (04-14-19 @ 08:40)  SpO2: 98% (04-14-19 @ 08:40)  Wt(kg): --    I and O's:    04-13 @ 07:01  -  04-14 @ 07:00  --------------------------------------------------------  IN: 0 mL / OUT: 1000 mL / NET: -1000 mL          PHYSICAL EXAM:    Constitutional: NAD  Neck:  No JVD  Respiratory: CTAB/L  Cardiovascular: S1 and S2  Gastrointestinal: BS+, soft, NT/ND  Extremities: + peripheral edema  Neurological: A/O x 3, no focal deficits  Psychiatric: Normal mood, normal affect  : No Corey  Skin: No rashes      LABS:                        8.9    4.50  )-----------( 292      ( 14 Apr 2019 05:45 )             29.6     04-14    136  |  103  |  47<H>  ----------------------------<  83  4.2   |  21<L>  |  1.76<H>    Ca    9.3      14 Apr 2019 05:45  Mg     1.5     04-14      ASSESSMENT:  63F with COPD (on O2), LINH, AF on A/C, HLD, gout, HFpEF, PHTN , DM and CKD3/4 p/w generalized weakness and poor appetite.   - CKD: stage 3 likely due to DM/HTN with baseline Cr ~ 1.6;  OSMAR likely pre-renal - renal function improving; near baseline   - CV: BP acceptable   - HFpEF: right sided heart failure / cor pulm; on Torsemide Tues/Thurs/Sun; CHF on board   - Pulm: RVP + for influenza, rhino and enterovirus - s/p tamiflu. sPHTN on sildenafil   - anemia: in chronic disease + GIB, hold LUIS until GI w/u - mild iron def - s/p IV iron; Hgb stable   - GI: + FOBT, s/p colonoscopy this AM - with polypoid mass in the proximal sigmoid colon - r/o malignancy; awaiting bx reults   - hypomagnesemia: likely nutritional +/- diuresis, indicated for repletion    RECOMMEND:  - no objection to continue torsemide per HF team (20mg po on Sunday, Tuesday, Thursday)  - sildenafil per pulm  - avoid NSAIDs and nephrotoxins  - f/u biopsy results  - a/w mag sulfate 1 gram IV x 1 dose  - continue Mag ox 400 mg BID for now   - dose meds for a CrCl ~30 mL/min

## 2019-04-14 NOTE — PROGRESS NOTE ADULT - SUBJECTIVE AND OBJECTIVE BOX
Patient is a 63y old  Female who presents with a chief complaint of Generalized weakness (02 Apr 2019 16:52)      SUBJECTIVE / OVERNIGHT EVENTS: pt denies chest pain, shortness of breath     MEDICATIONS  (STANDING):  allopurinol 300 milliGRAM(s) Oral daily  benzonatate 100 milliGRAM(s) Oral every 8 hours  buDESOnide  80 MICROgram(s)/formoterol 4.5 MICROgram(s) Inhaler 2 Puff(s) Inhalation two times a day  dextrose 5%. 1000 milliLiter(s) (50 mL/Hr) IV Continuous <Continuous>  dextrose 50% Injectable 12.5 Gram(s) IV Push once  dextrose 50% Injectable 25 Gram(s) IV Push once  dextrose 50% Injectable 25 Gram(s) IV Push once  insulin lispro (HumaLOG) corrective regimen sliding scale   SubCutaneous three times a day before meals  insulin lispro (HumaLOG) corrective regimen sliding scale   SubCutaneous at bedtime  magnesium oxide 400 milliGRAM(s) Oral two times a day with meals  metoprolol succinate ER 50 milliGRAM(s) Oral two times a day  multivitamin 1 Tablet(s) Oral daily  pantoprazole  Injectable 40 milliGRAM(s) IV Push every 12 hours  sildenafil (REVATIO) 10 milliGRAM(s) Oral three times a day  sodium chloride 0.9% lock flush 3 milliLiter(s) IV Push every 8 hours  torsemide 20 milliGRAM(s) Oral <User Schedule>    MEDICATIONS  (PRN):  ALBUTerol/ipratropium for Nebulization 3 milliLiter(s) Nebulizer every 6 hours PRN Shortness of Breath and/or Wheezing  dextrose 40% Gel 15 Gram(s) Oral once PRN Blood Glucose LESS THAN 70 milliGRAM(s)/deciliter  glucagon  Injectable 1 milliGRAM(s) IntraMuscular once PRN Glucose LESS THAN 70 milligrams/deciliter  guaiFENesin  milliGRAM(s) Oral every 12 hours PRN Cough    Vital Signs Last 24 Hrs  T(C): 36.8 (14 Apr 2019 20:28), Max: 36.9 (14 Apr 2019 08:40)  T(F): 98.2 (14 Apr 2019 20:28), Max: 98.4 (14 Apr 2019 08:40)  HR: 82 (14 Apr 2019 20:28) (71 - 82)  BP: 103/59 (14 Apr 2019 20:28) (96/50 - 111/60)  BP(mean): --  RR: 18 (14 Apr 2019 20:28) (16 - 18)  SpO2: 97% (14 Apr 2019 20:28) (97% - 100%)    Constitutional: No fever, fatigue  Skin: No rash.  Eyes: No recent vision problems or eye pain.  ENT: No congestion, ear pain, or sore throat.  Cardiovascular: No chest pain or palpation.  Respiratory: No cough, shortness of breath, congestion, or wheezing.  Gastrointestinal: No abdominal pain, nausea, vomiting, or diarrhea.  Genitourinary: No dysuria.  Musculoskeletal: No joint swelling.  Neurologic: No headache.      GENERAL: NAD, well-developed  HEAD:  Atraumatic, Normocephalic  EYES: EOMI, PERRLA, conjunctiva and sclera clear  NECK: Supple, No JVD  CHEST/LUNG: Clear to auscultation bilaterally; No wheeze  HEART: Regular rate and rhythm; No murmurs, rubs, or gallops  ABDOMEN: Soft, Nontender, Nondistended; Bowel sounds present  EXTREMITIES:  2+ Peripheral Pulses, No clubbing, cyanosis, or edema  PSYCH: AAOx3  NEUROLOGY: non-focal  SKIN: No rashes or lesions    LABS:  04-14    136  |  103  |  47<H>  ----------------------------<  83  4.2   |  21<L>  |  1.76<H>    Ca    9.3      14 Apr 2019 05:45  Mg     1.5     04-14      Creatinine Trend: 1.76 <--, 1.84 <--, 1.74 <--, 1.94 <--, 2.16 <--, 2.14 <--, 2.19 <--                        8.7    5.59  )-----------( 252      ( 14 Apr 2019 20:24 )             28.6     Urine Studies:

## 2019-04-14 NOTE — PROVIDER CONTACT NOTE (OTHER) - BACKGROUND
Patient came in for acute renal failure, history of gout, chronic systolic right heart failure LINH, copd, hypertension and diabetes.

## 2019-04-14 NOTE — CHART NOTE - NSCHARTNOTEFT_GEN_A_CORE
Received call from radiology resident regarding preliminary report for CT scan. No metastases noted, contrast material noted in antrum of stomach, unable to rule out active extravasation vs food content vs other source. Also noted right gluteal finding, possible ulcer, merits clinical correlation.    Spoke to covering PA and communicated findings and recommendations.     Vital Signs Last 24 Hrs  T(C): 36.4 (14 Apr 2019 14:28), Max: 37.2 (13 Apr 2019 20:33)  T(F): 97.5 (14 Apr 2019 14:28), Max: 99 (13 Apr 2019 20:33)  HR: 82 (14 Apr 2019 14:28) (66 - 82)  BP: 97/63 (14 Apr 2019 14:28) (97/63 - 111/60)  BP(mean): --  RR: 18 (14 Apr 2019 14:28) (16 - 18)  SpO2: 100% (14 Apr 2019 14:28) (98% - 100%)                          8.9    4.50  )-----------( 292      ( 14 Apr 2019 05:45 )             29.6       Recommendations:  - F/u final CT scan read  - Repeat/trend Hct - has been stable thus far: 8.9/29.6 (8.4/28.2)  - Monitor vital signs for deviations from baseline and physical exam changes (altered mental status, visual defects, dizziness)  - Discussed with Marilee Lau, PGY3    Edilberto Arriaza, PGY1  General Surgery - A Team Surgery

## 2019-04-15 LAB
ANION GAP SERPL CALC-SCNC: 12 MMO/L — SIGNIFICANT CHANGE UP (ref 7–14)
BUN SERPL-MCNC: 59 MG/DL — HIGH (ref 7–23)
CALCIUM SERPL-MCNC: 9.3 MG/DL — SIGNIFICANT CHANGE UP (ref 8.4–10.5)
CHLORIDE SERPL-SCNC: 107 MMOL/L — SIGNIFICANT CHANGE UP (ref 98–107)
CO2 SERPL-SCNC: 21 MMOL/L — LOW (ref 22–31)
CREAT SERPL-MCNC: 2.29 MG/DL — HIGH (ref 0.5–1.3)
GLUCOSE SERPL-MCNC: 96 MG/DL — SIGNIFICANT CHANGE UP (ref 70–99)
HCT VFR BLD CALC: 29 % — LOW (ref 34.5–45)
HGB BLD-MCNC: 8.7 G/DL — LOW (ref 11.5–15.5)
MAGNESIUM SERPL-MCNC: 1.7 MG/DL — SIGNIFICANT CHANGE UP (ref 1.6–2.6)
MCHC RBC-ENTMCNC: 19.8 PG — LOW (ref 27–34)
MCHC RBC-ENTMCNC: 30 % — LOW (ref 32–36)
MCV RBC AUTO: 66.1 FL — LOW (ref 80–100)
NRBC # FLD: 0.04 K/UL — SIGNIFICANT CHANGE UP (ref 0–0)
PLATELET # BLD AUTO: 309 K/UL — SIGNIFICANT CHANGE UP (ref 150–400)
PMV BLD: SIGNIFICANT CHANGE UP FL (ref 7–13)
POTASSIUM SERPL-MCNC: 4.3 MMOL/L — SIGNIFICANT CHANGE UP (ref 3.5–5.3)
POTASSIUM SERPL-SCNC: 4.3 MMOL/L — SIGNIFICANT CHANGE UP (ref 3.5–5.3)
RBC # BLD: 4.39 M/UL — SIGNIFICANT CHANGE UP (ref 3.8–5.2)
RBC # FLD: 17.5 % — HIGH (ref 10.3–14.5)
SODIUM SERPL-SCNC: 140 MMOL/L — SIGNIFICANT CHANGE UP (ref 135–145)
WBC # BLD: 5.25 K/UL — SIGNIFICANT CHANGE UP (ref 3.8–10.5)
WBC # FLD AUTO: 5.25 K/UL — SIGNIFICANT CHANGE UP (ref 3.8–10.5)

## 2019-04-15 PROCEDURE — 99232 SBSQ HOSP IP/OBS MODERATE 35: CPT

## 2019-04-15 PROCEDURE — 99223 1ST HOSP IP/OBS HIGH 75: CPT

## 2019-04-15 RX ADMIN — Medication 10 MILLIGRAM(S): at 22:36

## 2019-04-15 RX ADMIN — Medication 10 MILLIGRAM(S): at 13:24

## 2019-04-15 RX ADMIN — Medication 10 MILLIGRAM(S): at 05:52

## 2019-04-15 RX ADMIN — SODIUM CHLORIDE 3 MILLILITER(S): 9 INJECTION INTRAMUSCULAR; INTRAVENOUS; SUBCUTANEOUS at 05:52

## 2019-04-15 RX ADMIN — Medication 100 MILLIGRAM(S): at 13:24

## 2019-04-15 RX ADMIN — BUDESONIDE AND FORMOTEROL FUMARATE DIHYDRATE 2 PUFF(S): 160; 4.5 AEROSOL RESPIRATORY (INHALATION) at 09:11

## 2019-04-15 RX ADMIN — MAGNESIUM OXIDE 400 MG ORAL TABLET 400 MILLIGRAM(S): 241.3 TABLET ORAL at 09:12

## 2019-04-15 RX ADMIN — MAGNESIUM OXIDE 400 MG ORAL TABLET 400 MILLIGRAM(S): 241.3 TABLET ORAL at 17:59

## 2019-04-15 RX ADMIN — PANTOPRAZOLE SODIUM 40 MILLIGRAM(S): 20 TABLET, DELAYED RELEASE ORAL at 18:00

## 2019-04-15 RX ADMIN — Medication 1 TABLET(S): at 12:11

## 2019-04-15 RX ADMIN — Medication 100 MILLIGRAM(S): at 22:33

## 2019-04-15 RX ADMIN — SODIUM CHLORIDE 3 MILLILITER(S): 9 INJECTION INTRAMUSCULAR; INTRAVENOUS; SUBCUTANEOUS at 22:32

## 2019-04-15 RX ADMIN — Medication 50 MILLIGRAM(S): at 05:52

## 2019-04-15 RX ADMIN — Medication 100 MILLIGRAM(S): at 05:52

## 2019-04-15 RX ADMIN — PANTOPRAZOLE SODIUM 40 MILLIGRAM(S): 20 TABLET, DELAYED RELEASE ORAL at 05:55

## 2019-04-15 RX ADMIN — BUDESONIDE AND FORMOTEROL FUMARATE DIHYDRATE 2 PUFF(S): 160; 4.5 AEROSOL RESPIRATORY (INHALATION) at 22:32

## 2019-04-15 RX ADMIN — Medication 300 MILLIGRAM(S): at 12:11

## 2019-04-15 RX ADMIN — SODIUM CHLORIDE 3 MILLILITER(S): 9 INJECTION INTRAMUSCULAR; INTRAVENOUS; SUBCUTANEOUS at 13:27

## 2019-04-15 RX ADMIN — Medication 50 MILLIGRAM(S): at 17:59

## 2019-04-15 NOTE — PROGRESS NOTE ADULT - PROBLEM SELECTOR PLAN 2
Euvolemic. Weight today is 186 lbs standing, last office weight 196 lbs.   Tx for pulm HTN as above.  Euvolemic.  Continue torsemide 20 mg every Sun, Tues, and Thursday.  hypomagnesemic. Increased mag ox 400 mg po TID. keep mag >2.0.   Keep K 4.0-4.5

## 2019-04-15 NOTE — PROGRESS NOTE ADULT - SUBJECTIVE AND OBJECTIVE BOX
Medications:  ALBUTerol/ipratropium for Nebulization 3 milliLiter(s) Nebulizer every 6 hours PRN  allopurinol 300 milliGRAM(s) Oral daily  benzonatate 100 milliGRAM(s) Oral every 8 hours  buDESOnide  80 MICROgram(s)/formoterol 4.5 MICROgram(s) Inhaler 2 Puff(s) Inhalation two times a day  dextrose 40% Gel 15 Gram(s) Oral once PRN  dextrose 5%. 1000 milliLiter(s) IV Continuous <Continuous>  dextrose 50% Injectable 12.5 Gram(s) IV Push once  dextrose 50% Injectable 25 Gram(s) IV Push once  dextrose 50% Injectable 25 Gram(s) IV Push once  glucagon  Injectable 1 milliGRAM(s) IntraMuscular once PRN  guaiFENesin  milliGRAM(s) Oral every 12 hours PRN  insulin lispro (HumaLOG) corrective regimen sliding scale   SubCutaneous three times a day before meals  insulin lispro (HumaLOG) corrective regimen sliding scale   SubCutaneous at bedtime  magnesium oxide 400 milliGRAM(s) Oral two times a day with meals  metoprolol succinate ER 50 milliGRAM(s) Oral two times a day  multivitamin 1 Tablet(s) Oral daily  pantoprazole  Injectable 40 milliGRAM(s) IV Push every 12 hours  sildenafil (REVATIO) 10 milliGRAM(s) Oral three times a day  sodium chloride 0.9% lock flush 3 milliLiter(s) IV Push every 8 hours  torsemide 20 milliGRAM(s) Oral <User Schedule>      Vitals:  Vital Signs Last 24 Hrs  T(C): 36.9 (15 Apr 2019 05:51), Max: 36.9 (15 Apr 2019 05:51)  T(F): 98.5 (15 Apr 2019 05:51), Max: 98.5 (15 Apr 2019 05:51)  HR: 82 (15 Apr 2019 05:51) (82 - 82)  BP: 118/79 (15 Apr 2019 05:51) (96/50 - 118/79)  BP(mean): --  RR: 18 (15 Apr 2019 05:51) (18 - 18)  SpO2: 100% (15 Apr 2019 05:51) (97% - 100%)    Daily     Daily Weight in k.4 (15 Apr 2019 05:51)    I&O's Detail    2019 07:01  -  15 Apr 2019 07:00  --------------------------------------------------------  IN:    Oral Fluid: 400 mL  Total IN: 400 mL    OUT:    Voided: 1350 mL  Total OUT: 1350 mL    Total NET: -950 mL          Physical Exam:     General: No distress. Comfortable.  HEENT: EOM intact.  Neck: Neck supple. JVP not elevated. No masses  Chest: Clear to auscultation bilaterally  CV: Normal S1 and S2. No murmurs, rub, or gallops. Radial pulses normal.  Abdomen: Soft, non-distended, non-tender  Skin: No rashes or skin breakdown  Neurology: Alert and oriented times three. Sensation intact  Psych: Affect normal    Labs:                        8.7    5.25  )-----------( 309      ( 15 Apr 2019 05:43 )             29.0     04-15    140  |  107  |  59<H>  ----------------------------<  96  4.3   |  21<L>  |  2.29<H>    Ca    9.3      15 Apr 2019 05:43  Mg     1.7     04-15 Patient seen and examined. No complaints of SOB, orthopnea, PND, CP, palpitations.   GI work up in progress for anemia.    Medications:  ALBUTerol/ipratropium for Nebulization 3 milliLiter(s) Nebulizer every 6 hours PRN  allopurinol 300 milliGRAM(s) Oral daily  benzonatate 100 milliGRAM(s) Oral every 8 hours  buDESOnide  80 MICROgram(s)/formoterol 4.5 MICROgram(s) Inhaler 2 Puff(s) Inhalation two times a day  dextrose 40% Gel 15 Gram(s) Oral once PRN  dextrose 5%. 1000 milliLiter(s) IV Continuous <Continuous>  dextrose 50% Injectable 12.5 Gram(s) IV Push once  dextrose 50% Injectable 25 Gram(s) IV Push once  dextrose 50% Injectable 25 Gram(s) IV Push once  glucagon  Injectable 1 milliGRAM(s) IntraMuscular once PRN  guaiFENesin  milliGRAM(s) Oral every 12 hours PRN  insulin lispro (HumaLOG) corrective regimen sliding scale   SubCutaneous three times a day before meals  insulin lispro (HumaLOG) corrective regimen sliding scale   SubCutaneous at bedtime  magnesium oxide 400 milliGRAM(s) Oral two times a day with meals  metoprolol succinate ER 50 milliGRAM(s) Oral two times a day  multivitamin 1 Tablet(s) Oral daily  pantoprazole  Injectable 40 milliGRAM(s) IV Push every 12 hours  sildenafil (REVATIO) 10 milliGRAM(s) Oral three times a day  sodium chloride 0.9% lock flush 3 milliLiter(s) IV Push every 8 hours  torsemide 20 milliGRAM(s) Oral <User Schedule>      Vitals:  Vital Signs Last 24 Hrs  T(C): 36.9 (15 Apr 2019 05:51), Max: 36.9 (15 Apr 2019 05:51)  T(F): 98.5 (15 Apr 2019 05:51), Max: 98.5 (15 Apr 2019 05:51)  HR: 82 (15 Apr 2019 05:51) (82 - 82)  BP: 118/79 (15 Apr 2019 05:51) (96/50 - 118/79)  BP(mean): --  RR: 18 (15 Apr 2019 05:51) (18 - 18)  SpO2: 100% (15 Apr 2019 05:51) (97% - 100%)    Daily     Daily Weight in k.4 (15 Apr 2019 05:51)    I&O's Detail    2019 07:01  -  15 Apr 2019 07:00  --------------------------------------------------------  IN:    Oral Fluid: 400 mL  Total IN: 400 mL    OUT:    Voided: 1350 mL  Total OUT: 1350 mL    Total NET: -950 mL          Physical Exam:     General: No distress. Comfortable.  HEENT: EOM intact.  Neck: Neck supple. JVP not elevated. No masses  Chest: Clear to auscultation bilaterally  CV:  S1 and S2 irregularly irregular. No murmurs, rub, or gallops. Radial pulses normal. No LE edema b/l.   Abdomen: Soft, non-distended, non-tender  Skin: No rashes or skin breakdown  Neurology: Alert and oriented times three. Sensation intact  Psych: Affect normal    Labs:                        8.7    5.25  )-----------( 309      ( 15 Apr 2019 05:43 )             29.0     04-15    140  |  107  |  59<H>  ----------------------------<  96  4.3   |  21<L>  |  2.29<H>    Ca    9.3      15 Apr 2019 05:43  Mg     1.7     04-15

## 2019-04-15 NOTE — PROGRESS NOTE ADULT - SUBJECTIVE AND OBJECTIVE BOX
Patient seen and examined in bed.       MEDICATIONS  (STANDING):  allopurinol 300 milliGRAM(s) Oral daily  benzonatate 100 milliGRAM(s) Oral every 8 hours  buDESOnide  80 MICROgram(s)/formoterol 4.5 MICROgram(s) Inhaler 2 Puff(s) Inhalation two times a day  dextrose 5%. 1000 milliLiter(s) (50 mL/Hr) IV Continuous <Continuous>  dextrose 50% Injectable 12.5 Gram(s) IV Push once  dextrose 50% Injectable 25 Gram(s) IV Push once  dextrose 50% Injectable 25 Gram(s) IV Push once  insulin lispro (HumaLOG) corrective regimen sliding scale   SubCutaneous three times a day before meals  insulin lispro (HumaLOG) corrective regimen sliding scale   SubCutaneous at bedtime  magnesium oxide 400 milliGRAM(s) Oral two times a day with meals  metoprolol succinate ER 50 milliGRAM(s) Oral two times a day  multivitamin 1 Tablet(s) Oral daily  pantoprazole  Injectable 40 milliGRAM(s) IV Push every 12 hours  sildenafil (REVATIO) 10 milliGRAM(s) Oral three times a day  sodium chloride 0.9% lock flush 3 milliLiter(s) IV Push every 8 hours  torsemide 20 milliGRAM(s) Oral <User Schedule>      VITAL:  T(C): , Max: 37 (04-15-19 @ 15:07)  T(F): , Max: 98.6 (04-15-19 @ 15:07)  HR: 89 (04-15-19 @ 15:07)  BP: 113/75 (04-15-19 @ 15:07)  RR: 18 (04-15-19 @ 15:07)  SpO2: 100% (04-15-19 @ 15:07)    I and O's:    04-14 @ 07:01  -  04-15 @ 07:00  --------------------------------------------------------  IN: 400 mL / OUT: 1350 mL / NET: -950 mL          PHYSICAL EXAM:    Constitutional: NAD  Neck:  No JVD  Respiratory: CTAB/L  Cardiovascular: S1 and S2  Gastrointestinal: BS+, soft, NT/ND  Extremities: No peripheral edema  Neurological: A/O x 3, no focal deficits  Psychiatric: Normal mood, normal affect  : No Corey  Skin: No rashes    LABS:                        8.7    5.25  )-----------( 309      ( 15 Apr 2019 05:43 )             29.0     04-15    140  |  107  |  59<H>  ----------------------------<  96  4.3   |  21<L>  |  2.29<H>    Ca    9.3      15 Apr 2019 05:43  Mg     1.7     04-15      ASSESSMENT:  63F with COPD (on O2), LINH, AF on A/C, HLD, gout, HFpEF, PHTN , DM and CKD3/4 p/w generalized weakness and poor appetite.   - CKD: stage 3 likely due to DM/HTN with baseline Cr ~ 1.6;  OSMAR likely pre-renal - azotemia rising  - CV: BP acceptable   - HFpEF: right sided heart failure / cor pulm; on Torsemide Tues/Thurs/Sun; CHF on board   - Pulm: RVP + for influenza, rhino and enterovirus - s/p tamiflu. sPHTN on sildenafil   - anemia: in chronic disease + GIB, hold LUIS until GI w/u - mild iron def - s/p IV iron; Hgb stable   - GI: + FOBT, s/p colonoscopy this AM - with polypoid mass in the proximal sigmoid colon - r/o malignancy; awaiting bx reults   - hypomagnesemia: likely nutritional +/- diuresis, improved s/p repletion    RECOMMEND:  - no objection to continue torsemide per HF team (20mg po on Sunday, Tuesday, Thursday)  - sildenafil per pulm  - avoid NSAIDs and nephrotoxins  - f/u biopsy results  - continue Mag ox 400 mg BID for now   - dose meds for a CrCl ~25-30 mL/min      Suzan Warren, NP-C  [facility],   [phone] Patient seen and examined in bed. She is about the same.       MEDICATIONS  (STANDING):  allopurinol 300 milliGRAM(s) Oral daily  benzonatate 100 milliGRAM(s) Oral every 8 hours  buDESOnide  80 MICROgram(s)/formoterol 4.5 MICROgram(s) Inhaler 2 Puff(s) Inhalation two times a day  dextrose 5%. 1000 milliLiter(s) (50 mL/Hr) IV Continuous <Continuous>  dextrose 50% Injectable 12.5 Gram(s) IV Push once  dextrose 50% Injectable 25 Gram(s) IV Push once  dextrose 50% Injectable 25 Gram(s) IV Push once  insulin lispro (HumaLOG) corrective regimen sliding scale   SubCutaneous three times a day before meals  insulin lispro (HumaLOG) corrective regimen sliding scale   SubCutaneous at bedtime  magnesium oxide 400 milliGRAM(s) Oral two times a day with meals  metoprolol succinate ER 50 milliGRAM(s) Oral two times a day  multivitamin 1 Tablet(s) Oral daily  pantoprazole  Injectable 40 milliGRAM(s) IV Push every 12 hours  sildenafil (REVATIO) 10 milliGRAM(s) Oral three times a day  sodium chloride 0.9% lock flush 3 milliLiter(s) IV Push every 8 hours  torsemide 20 milliGRAM(s) Oral <User Schedule>      VITAL:  T(C): , Max: 37 (04-15-19 @ 15:07)  T(F): , Max: 98.6 (04-15-19 @ 15:07)  HR: 89 (04-15-19 @ 15:07)  BP: 113/75 (04-15-19 @ 15:07)  RR: 18 (04-15-19 @ 15:07)  SpO2: 100% (04-15-19 @ 15:07)    I and O's:    04-14 @ 07:01  -  04-15 @ 07:00  --------------------------------------------------------  IN: 400 mL / OUT: 1350 mL / NET: -950 mL          PHYSICAL EXAM:    Constitutional: NAD  Neck:  No JVD  Respiratory: CTAB/L  Cardiovascular: S1 and S2  Gastrointestinal: BS+, soft, NT/ND  Extremities: No peripheral edema  Neurological: A/O x 3, no focal deficits  Psychiatric: Normal mood, normal affect  : No Corey  Skin: No rashes    LABS:                        8.7    5.25  )-----------( 309      ( 15 Apr 2019 05:43 )             29.0     04-15    140  |  107  |  59<H>  ----------------------------<  96  4.3   |  21<L>  |  2.29<H>    Ca    9.3      15 Apr 2019 05:43  Mg     1.7     04-15    RADIOLOGY:    < from: CT Chest w/ IV Cont (04.14.19 @ 11:32) >  IMPRESSION:     Unchanged prominent mediastinal nodes, nonspecific. Otherwise, no   evidenceof metastatic disease in the chest, abdomen, or pelvis.    High-density material layering within the gastric antrum may be secondary   to ingested material.    Questionable skin defect of the left gluteal soft tissues not seen on   prior CT, likely skinfold however clinical correlation is recommended.    Findings discussed by Dr. Avila with Dr. Arriaza on 4/14/2019 at 2:00 PM   with read back.      < end of copied text >      ASSESSMENT:  63F with COPD (on O2), LINH, AF on A/C, HLD, gout, HFpEF, PHTN , DM and CKD3/4 p/w generalized weakness and poor appetite.   - CKD: stage 3 likely due to DM/HTN with baseline Cr ~ 1.6;  OSMAR likely due to CHARLIE s/p CT yesterday  - CV: BP acceptable   - HFpEF: right sided heart failure / cor pulm; on Torsemide Tues/Thurs/Sun; CHF on board   - Pulm: RVP + for influenza, rhino and enterovirus - s/p tamiflu. sPHTN on sildenafil   - anemia: in chronic disease + GIB, hold LUIS until GI w/u - mild iron def - s/p IV iron; Hgb stable   - GI: + FOBT, s/p colonoscopy - with polypoid mass in the proximal sigmoid colon - r/o malignancy; awaiting bx results   - hypomagnesemia: likely nutritional +/- diuresis, improved s/p repletion    RECOMMEND:  - no objection to continue torsemide per HF team (20mg po on Sunday, Tuesday, Thursday)  - sildenafil per pulm  - avoid NSAIDs and nephrotoxins  - f/u biopsy results  - continue Mag ox 400 mg BID for now   - dose meds for a CrCl ~25-30 mL/min      Suzan Warren, NP-C  [facility]  [phone] Patient seen and examined in bed. She is about the same.  She had anxiety       MEDICATIONS  (STANDING):  allopurinol 300 milliGRAM(s) Oral daily  benzonatate 100 milliGRAM(s) Oral every 8 hours  buDESOnide  80 MICROgram(s)/formoterol 4.5 MICROgram(s) Inhaler 2 Puff(s) Inhalation two times a day  dextrose 5%. 1000 milliLiter(s) (50 mL/Hr) IV Continuous <Continuous>  dextrose 50% Injectable 12.5 Gram(s) IV Push once  dextrose 50% Injectable 25 Gram(s) IV Push once  dextrose 50% Injectable 25 Gram(s) IV Push once  insulin lispro (HumaLOG) corrective regimen sliding scale   SubCutaneous three times a day before meals  insulin lispro (HumaLOG) corrective regimen sliding scale   SubCutaneous at bedtime  magnesium oxide 400 milliGRAM(s) Oral two times a day with meals  metoprolol succinate ER 50 milliGRAM(s) Oral two times a day  multivitamin 1 Tablet(s) Oral daily  pantoprazole  Injectable 40 milliGRAM(s) IV Push every 12 hours  sildenafil (REVATIO) 10 milliGRAM(s) Oral three times a day  sodium chloride 0.9% lock flush 3 milliLiter(s) IV Push every 8 hours  torsemide 20 milliGRAM(s) Oral <User Schedule>      VITAL:  T(C): , Max: 37 (04-15-19 @ 15:07)  T(F): , Max: 98.6 (04-15-19 @ 15:07)  HR: 89 (04-15-19 @ 15:07)  BP: 113/75 (04-15-19 @ 15:07)  RR: 18 (04-15-19 @ 15:07)  SpO2: 100% (04-15-19 @ 15:07)    I and O's:    04-14 @ 07:01  -  04-15 @ 07:00  --------------------------------------------------------  IN: 400 mL / OUT: 1350 mL / NET: -950 mL          PHYSICAL EXAM:    Constitutional: NAD  Neck:  No JVD  Respiratory: CTAB/L  Cardiovascular: S1 and S2  Gastrointestinal: BS+, soft, NT/ND  Extremities: +  peripheral edema  Neurological: A/O x 3, no focal deficits  Psychiatric: Normal mood, normal affect  : No Corey  Skin: No rashes    LABS:                        8.7    5.25  )-----------( 309      ( 15 Apr 2019 05:43 )             29.0     04-15    140  |  107  |  59<H>  ----------------------------<  96  4.3   |  21<L>  |  2.29<H>    Ca    9.3      15 Apr 2019 05:43  Mg     1.7     04-15    RADIOLOGY:    < from: CT Chest w/ IV Cont (04.14.19 @ 11:32) >  IMPRESSION:     Unchanged prominent mediastinal nodes, nonspecific. Otherwise, no   evidenceof metastatic disease in the chest, abdomen, or pelvis.    High-density material layering within the gastric antrum may be secondary   to ingested material.    Questionable skin defect of the left gluteal soft tissues not seen on   prior CT, likely skinfold however clinical correlation is recommended.    Findings discussed by Dr. Avila with Dr. Arriaza on 4/14/2019 at 2:00 PM   with read back.      < end of copied text >      ASSESSMENT:  63F with COPD (on O2), LINH, AF on A/C, HLD, gout, HFpEF, PHTN , DM and CKD3/4 p/w generalized weakness and poor appetite.   - CKD: stage 3 likely due to DM/HTN with baseline Cr ~ 1.6;  OSMAR likely due to CHARLIE s/p CT yesterday  - CV: BP acceptable   - HFpEF: right sided heart failure / cor pulm; on Torsemide Tues/Thurs/Sun; CHF on board   - Pulm: RVP + for influenza, rhino and enterovirus - s/p tamiflu. sPHTN on sildenafil   - anemia: in chronic disease + GIB, hold LUIS until GI w/u - mild iron def - s/p IV iron; Hgb stable   - GI: + FOBT, s/p colonoscopy - with polypoid mass in the proximal sigmoid colon - r/o malignancy; awaiting bx results   - hypomagnesemia: likely nutritional +/- diuresis, improved s/p repletion    RECOMMEND:  - no objection to continue torsemide per HF team (20mg po on Sunday, Tuesday, Thursday)  - sildenafil per pulm  - avoid NSAIDs and nephrotoxins  - f/u biopsy results  - continue Mag ox 400 mg BID for now   - dose meds for a CrCl ~25-30 mL/min  -Colorectal surgery to see today    Suzan Warren NP-C  [facility]  [phone]

## 2019-04-15 NOTE — PROGRESS NOTE ADULT - PROBLEM SELECTOR PLAN 2
Echo shows severe pulm HTN rather than LV failure. CHF team is following. RHC findings as per cardiology  on Revatio.

## 2019-04-15 NOTE — PROGRESS NOTE ADULT - SUBJECTIVE AND OBJECTIVE BOX
no events    exam stable      Objective:    MEDICATIONS  (STANDING):  allopurinol 300 milliGRAM(s) Oral daily  benzonatate 100 milliGRAM(s) Oral every 8 hours  buDESOnide  80 MICROgram(s)/formoterol 4.5 MICROgram(s) Inhaler 2 Puff(s) Inhalation two times a day  dextrose 5%. 1000 milliLiter(s) (50 mL/Hr) IV Continuous <Continuous>  dextrose 50% Injectable 12.5 Gram(s) IV Push once  dextrose 50% Injectable 25 Gram(s) IV Push once  dextrose 50% Injectable 25 Gram(s) IV Push once  insulin lispro (HumaLOG) corrective regimen sliding scale   SubCutaneous three times a day before meals  insulin lispro (HumaLOG) corrective regimen sliding scale   SubCutaneous at bedtime  magnesium oxide 400 milliGRAM(s) Oral two times a day with meals  metoprolol succinate ER 50 milliGRAM(s) Oral two times a day  multivitamin 1 Tablet(s) Oral daily  pantoprazole  Injectable 40 milliGRAM(s) IV Push every 12 hours  sildenafil (REVATIO) 10 milliGRAM(s) Oral three times a day  sodium chloride 0.9% lock flush 3 milliLiter(s) IV Push every 8 hours  torsemide 20 milliGRAM(s) Oral <User Schedule>    MEDICATIONS  (PRN):  ALBUTerol/ipratropium for Nebulization 3 milliLiter(s) Nebulizer every 6 hours PRN Shortness of Breath and/or Wheezing  dextrose 40% Gel 15 Gram(s) Oral once PRN Blood Glucose LESS THAN 70 milliGRAM(s)/deciliter  glucagon  Injectable 1 milliGRAM(s) IntraMuscular once PRN Glucose LESS THAN 70 milligrams/deciliter  guaiFENesin  milliGRAM(s) Oral every 12 hours PRN Cough      Vital Signs Last 24 Hrs  T(C): 37 (15 Apr 2019 15:07), Max: 37 (15 Apr 2019 15:07)  T(F): 98.6 (15 Apr 2019 15:07), Max: 98.6 (15 Apr 2019 15:07)  HR: 89 (15 Apr 2019 15:07) (82 - 89)  BP: 113/75 (15 Apr 2019 15:07) (96/50 - 118/79)  BP(mean): --  RR: 18 (15 Apr 2019 15:07) (18 - 18)  SpO2: 100% (15 Apr 2019 15:07) (97% - 100%)    I&O's Detail    2019 07:01  -  15 Apr 2019 07:00  --------------------------------------------------------  IN:    Oral Fluid: 400 mL  Total IN: 400 mL    OUT:    Voided: 1350 mL  Total OUT: 1350 mL    Total NET: -950 mL          Daily     Daily Weight in k.4 (15 Apr 2019 05:51)    LABS:                        8.7    5.25  )-----------( 309      ( 15 Apr 2019 05:43 )             29.0     0415    140  |  107  |  59<H>  ----------------------------<  96  4.3   |  21<L>  |  2.29<H>    Ca    9.3      15 Apr 2019 05:43  Mg     1.7     15            RADIOLOGY & ADDITIONAL STUDIES:

## 2019-04-15 NOTE — PROGRESS NOTE ADULT - SUBJECTIVE AND OBJECTIVE BOX
Patient is a 63y old  Female who presents with a chief complaint of Generalized weakness (02 Apr 2019 16:52)      SUBJECTIVE / OVERNIGHT EVENTS: pt denies chest pain, shortness of breath     MEDICATIONS  (STANDING):  allopurinol 300 milliGRAM(s) Oral daily  benzonatate 100 milliGRAM(s) Oral every 8 hours  buDESOnide  80 MICROgram(s)/formoterol 4.5 MICROgram(s) Inhaler 2 Puff(s) Inhalation two times a day  dextrose 5%. 1000 milliLiter(s) (50 mL/Hr) IV Continuous <Continuous>  dextrose 50% Injectable 12.5 Gram(s) IV Push once  dextrose 50% Injectable 25 Gram(s) IV Push once  dextrose 50% Injectable 25 Gram(s) IV Push once  insulin lispro (HumaLOG) corrective regimen sliding scale   SubCutaneous three times a day before meals  insulin lispro (HumaLOG) corrective regimen sliding scale   SubCutaneous at bedtime  magnesium oxide 400 milliGRAM(s) Oral two times a day with meals  metoprolol succinate ER 50 milliGRAM(s) Oral two times a day  multivitamin 1 Tablet(s) Oral daily  pantoprazole  Injectable 40 milliGRAM(s) IV Push every 12 hours  sildenafil (REVATIO) 10 milliGRAM(s) Oral three times a day  sodium chloride 0.9% lock flush 3 milliLiter(s) IV Push every 8 hours  torsemide 20 milliGRAM(s) Oral <User Schedule>    MEDICATIONS  (PRN):  ALBUTerol/ipratropium for Nebulization 3 milliLiter(s) Nebulizer every 6 hours PRN Shortness of Breath and/or Wheezing  dextrose 40% Gel 15 Gram(s) Oral once PRN Blood Glucose LESS THAN 70 milliGRAM(s)/deciliter  glucagon  Injectable 1 milliGRAM(s) IntraMuscular once PRN Glucose LESS THAN 70 milligrams/deciliter  guaiFENesin  milliGRAM(s) Oral every 12 hours PRN Cough    Vital Signs Last 24 Hrs  T(C): 37 (15 Apr 2019 15:07), Max: 37 (15 Apr 2019 15:07)  T(F): 98.6 (15 Apr 2019 15:07), Max: 98.6 (15 Apr 2019 15:07)  HR: 77 (15 Apr 2019 17:57) (77 - 89)  BP: 108/73 (15 Apr 2019 17:57) (108/73 - 118/79)  BP(mean): --  RR: 18 (15 Apr 2019 15:07) (18 - 18)  SpO2: 100% (15 Apr 2019 15:07) (100% - 100%)  Constitutional: No fever, fatigue  Skin: No rash.  Eyes: No recent vision problems or eye pain.  ENT: No congestion, ear pain, or sore throat.  Cardiovascular: No chest pain or palpation.  Respiratory: No cough, shortness of breath, congestion, or wheezing.  Gastrointestinal: No abdominal pain, nausea, vomiting, or diarrhea.  Genitourinary: No dysuria.  Musculoskeletal: No joint swelling.  Neurologic: No headache.      GENERAL: NAD, well-developed  HEAD:  Atraumatic, Normocephalic  EYES: EOMI, PERRLA, conjunctiva and sclera clear  NECK: Supple, No JVD  CHEST/LUNG: Clear to auscultation bilaterally; No wheeze  HEART: Regular rate and rhythm; No murmurs, rubs, or gallops  ABDOMEN: Soft, Nontender, Nondistended; Bowel sounds present  EXTREMITIES:  2+ Peripheral Pulses, No clubbing, cyanosis, or edema  PSYCH: AAOx3  NEUROLOGY: non-focal  SKIN: No rashes or lesions    LABS:  04-15    140  |  107  |  59<H>  ----------------------------<  96  4.3   |  21<L>  |  2.29<H>    Ca    9.3      15 Apr 2019 05:43  Mg     1.7     04-15      Creatinine Trend: 2.29 <--, 1.76 <--, 1.84 <--, 1.74 <--, 1.94 <--, 2.16 <--, 2.14 <--                        8.7    5.25  )-----------( 309      ( 15 Apr 2019 05:43 )             29.0     Urine Studies:

## 2019-04-16 LAB
ANION GAP SERPL CALC-SCNC: 12 MMO/L — SIGNIFICANT CHANGE UP (ref 7–14)
BUN SERPL-MCNC: 63 MG/DL — HIGH (ref 7–23)
CALCIUM SERPL-MCNC: 9.7 MG/DL — SIGNIFICANT CHANGE UP (ref 8.4–10.5)
CHLORIDE SERPL-SCNC: 106 MMOL/L — SIGNIFICANT CHANGE UP (ref 98–107)
CO2 SERPL-SCNC: 22 MMOL/L — SIGNIFICANT CHANGE UP (ref 22–31)
CREAT SERPL-MCNC: 2.25 MG/DL — HIGH (ref 0.5–1.3)
GLUCOSE SERPL-MCNC: 89 MG/DL — SIGNIFICANT CHANGE UP (ref 70–99)
HCT VFR BLD CALC: 25.8 % — LOW (ref 34.5–45)
HCT VFR BLD CALC: 30.5 % — LOW (ref 34.5–45)
HGB BLD-MCNC: 7.8 G/DL — LOW (ref 11.5–15.5)
HGB BLD-MCNC: 9 G/DL — LOW (ref 11.5–15.5)
MCHC RBC-ENTMCNC: 19.9 PG — LOW (ref 27–34)
MCHC RBC-ENTMCNC: 20 PG — LOW (ref 27–34)
MCHC RBC-ENTMCNC: 29.5 % — LOW (ref 32–36)
MCHC RBC-ENTMCNC: 30.2 % — LOW (ref 32–36)
MCV RBC AUTO: 65.8 FL — LOW (ref 80–100)
MCV RBC AUTO: 67.6 FL — LOW (ref 80–100)
NRBC # FLD: 0.07 K/UL — SIGNIFICANT CHANGE UP (ref 0–0)
NRBC # FLD: 0.11 K/UL — SIGNIFICANT CHANGE UP (ref 0–0)
NRBC FLD-RTO: 1.4 — SIGNIFICANT CHANGE UP
NRBC FLD-RTO: 1.9 — SIGNIFICANT CHANGE UP
PLATELET # BLD AUTO: 220 K/UL — SIGNIFICANT CHANGE UP (ref 150–400)
PLATELET # BLD AUTO: 278 K/UL — SIGNIFICANT CHANGE UP (ref 150–400)
PMV BLD: 10.3 FL — SIGNIFICANT CHANGE UP (ref 7–13)
PMV BLD: SIGNIFICANT CHANGE UP FL (ref 7–13)
POTASSIUM SERPL-MCNC: 4.3 MMOL/L — SIGNIFICANT CHANGE UP (ref 3.5–5.3)
POTASSIUM SERPL-SCNC: 4.3 MMOL/L — SIGNIFICANT CHANGE UP (ref 3.5–5.3)
RBC # BLD: 3.92 M/UL — SIGNIFICANT CHANGE UP (ref 3.8–5.2)
RBC # BLD: 4.51 M/UL — SIGNIFICANT CHANGE UP (ref 3.8–5.2)
RBC # FLD: 17.3 % — HIGH (ref 10.3–14.5)
RBC # FLD: 17.4 % — HIGH (ref 10.3–14.5)
SODIUM SERPL-SCNC: 140 MMOL/L — SIGNIFICANT CHANGE UP (ref 135–145)
WBC # BLD: 4.98 K/UL — SIGNIFICANT CHANGE UP (ref 3.8–10.5)
WBC # BLD: 5.85 K/UL — SIGNIFICANT CHANGE UP (ref 3.8–10.5)
WBC # FLD AUTO: 4.98 K/UL — SIGNIFICANT CHANGE UP (ref 3.8–10.5)
WBC # FLD AUTO: 5.85 K/UL — SIGNIFICANT CHANGE UP (ref 3.8–10.5)

## 2019-04-16 PROCEDURE — 99233 SBSQ HOSP IP/OBS HIGH 50: CPT

## 2019-04-16 RX ADMIN — MAGNESIUM OXIDE 400 MG ORAL TABLET 400 MILLIGRAM(S): 241.3 TABLET ORAL at 18:10

## 2019-04-16 RX ADMIN — Medication 10 MILLIGRAM(S): at 06:23

## 2019-04-16 RX ADMIN — Medication 50 MILLIGRAM(S): at 18:11

## 2019-04-16 RX ADMIN — Medication 300 MILLIGRAM(S): at 13:37

## 2019-04-16 RX ADMIN — Medication 10 MILLIGRAM(S): at 13:36

## 2019-04-16 RX ADMIN — SODIUM CHLORIDE 3 MILLILITER(S): 9 INJECTION INTRAMUSCULAR; INTRAVENOUS; SUBCUTANEOUS at 13:37

## 2019-04-16 RX ADMIN — Medication 100 MILLIGRAM(S): at 06:23

## 2019-04-16 RX ADMIN — SODIUM CHLORIDE 3 MILLILITER(S): 9 INJECTION INTRAMUSCULAR; INTRAVENOUS; SUBCUTANEOUS at 21:18

## 2019-04-16 RX ADMIN — PANTOPRAZOLE SODIUM 40 MILLIGRAM(S): 20 TABLET, DELAYED RELEASE ORAL at 06:23

## 2019-04-16 RX ADMIN — Medication 10 MILLIGRAM(S): at 22:30

## 2019-04-16 RX ADMIN — SODIUM CHLORIDE 3 MILLILITER(S): 9 INJECTION INTRAMUSCULAR; INTRAVENOUS; SUBCUTANEOUS at 06:50

## 2019-04-16 RX ADMIN — Medication 100 MILLIGRAM(S): at 21:23

## 2019-04-16 RX ADMIN — Medication 100 MILLIGRAM(S): at 13:37

## 2019-04-16 RX ADMIN — BUDESONIDE AND FORMOTEROL FUMARATE DIHYDRATE 2 PUFF(S): 160; 4.5 AEROSOL RESPIRATORY (INHALATION) at 10:14

## 2019-04-16 RX ADMIN — Medication 1 TABLET(S): at 13:36

## 2019-04-16 RX ADMIN — BUDESONIDE AND FORMOTEROL FUMARATE DIHYDRATE 2 PUFF(S): 160; 4.5 AEROSOL RESPIRATORY (INHALATION) at 21:21

## 2019-04-16 RX ADMIN — PANTOPRAZOLE SODIUM 40 MILLIGRAM(S): 20 TABLET, DELAYED RELEASE ORAL at 18:10

## 2019-04-16 RX ADMIN — MAGNESIUM OXIDE 400 MG ORAL TABLET 400 MILLIGRAM(S): 241.3 TABLET ORAL at 10:14

## 2019-04-16 RX ADMIN — Medication 50 MILLIGRAM(S): at 06:23

## 2019-04-16 NOTE — PROGRESS NOTE ADULT - ATTENDING COMMENTS
Sigmoid colon mass  -Awaiting pathology results  -Pt will need cardiac and pulmonary optimization and risk stratification before surgical planning

## 2019-04-16 NOTE — PROGRESS NOTE ADULT - SUBJECTIVE AND OBJECTIVE BOX
Patient is a 63y old  Female who presents with a chief complaint of Generalized weakness (2019 16:52)      SUBJECTIVE / OVERNIGHT EVENTS:  No shortness of breath. Awaiting biopsy results  Review of Systems:   CONSTITUTIONAL: No fever, weight loss,   EYES: No eye pain, visual disturbances, or discharge  ENMT:  No difficulty hearing, tinnitus, vertigo; No sinus or throat pain  NECK: No pain or stiffness  BREASTS: No pain, masses, or nipple discharge  RESPIRATORY: No cough, wheezing, chills or hemoptysis; No shortness of breath  CARDIOVASCULAR: No chest pain, palpitations, dizziness, or leg swelling  GASTROINTESTINAL: No abdominal or epigastric pain. No nausea, vomiting, or hematemesis; No diarrhea or constipation. No melena or hematochezia.  GENITOURINARY: No dysuria, frequency, hematuria, or incontinence  NEUROLOGICAL: No headaches, memory loss, loss of strength, numbness, or tremors  SKIN: No itching, burning, rashes, or lesions   LYMPH NODES: No enlarged glands  ENDOCRINE: No heat or cold intolerance; No hair loss  MUSCULOSKELETAL: No joint pain or swelling; No muscle, back, or extremity pain  PSYCHIATRIC: No depression, anxiety, mood swings, or difficulty sleeping  HEME/LYMPH: No easy bruising, or bleeding gums  ALLERY AND IMMUNOLOGIC: No hives or eczema    MEDICATIONS  (STANDING):  allopurinol 300 milliGRAM(s) Oral daily  benzonatate 100 milliGRAM(s) Oral every 8 hours  buDESOnide  80 MICROgram(s)/formoterol 4.5 MICROgram(s) Inhaler 2 Puff(s) Inhalation two times a day  dextrose 5%. 1000 milliLiter(s) (50 mL/Hr) IV Continuous <Continuous>  dextrose 50% Injectable 12.5 Gram(s) IV Push once  dextrose 50% Injectable 25 Gram(s) IV Push once  dextrose 50% Injectable 25 Gram(s) IV Push once  insulin lispro (HumaLOG) corrective regimen sliding scale   SubCutaneous three times a day before meals  insulin lispro (HumaLOG) corrective regimen sliding scale   SubCutaneous at bedtime  metoprolol succinate  milliGRAM(s) Oral <User Schedule>  metoprolol succinate  milliGRAM(s) Oral <User Schedule>  multivitamin 1 Tablet(s) Oral daily  oseltamivir 30 milliGRAM(s) Oral daily  pantoprazole  Injectable 40 milliGRAM(s) IV Push every 12 hours    MEDICATIONS  (PRN):  ALBUTerol/ipratropium for Nebulization 3 milliLiter(s) Nebulizer every 6 hours PRN Shortness of Breath and/or Wheezing  dextrose 40% Gel 15 Gram(s) Oral once PRN Blood Glucose LESS THAN 70 milliGRAM(s)/deciliter  glucagon  Injectable 1 milliGRAM(s) IntraMuscular once PRN Glucose LESS THAN 70 milligrams/deciliter      PHYSICAL EXAM:  Vital Signs Last 24 Hrs  T(C): 36.6 (2019 20:32), Max: 36.9 (2019 05:27)  T(F): 97.9 (2019 20:32), Max: 98.4 (2019 05:27)  HR: 81 (2019 20:32) (57 - 81)  BP: 117/81 (2019 20:32) (94/58 - 117/81)  BP(mean): --  RR: 18 (2019 20:32) (18 - 19)  SpO2: 100% (2019 20:32) (95% - 100%)  I&O's Summary    2019 07:01  -  2019 07:00  --------------------------------------------------------  IN: 720 mL / OUT: 0 mL / NET: 720 mL    2019 07:01  -  2019 20:51  --------------------------------------------------------  IN: 1311 mL / OUT: 0 mL / NET: 1311 mL      GENERAL: NAD, well-developed  HEAD:  Atraumatic, Normocephalic  EYES: EOMI, PERRLA, conjunctiva and sclera clear  NECK: Supple, No JVD  CHEST/LUNG: Clear to auscultation bilaterally; No wheeze  HEART: Regular rate and rhythm; No murmurs, rubs, or gallops  ABDOMEN: Soft, Nontender, Nondistended; Bowel sounds present  EXTREMITIES:  2+ Peripheral Pulses, No clubbing, cyanosis, or edema  PSYCH: AAOx3  NEUROLOGY: non-focal  SKIN: No rashes or lesions    LABS:  CAPILLARY BLOOD GLUCOSE      POCT Blood Glucose.: 103 mg/dL (2019 17:19)  POCT Blood Glucose.: 120 mg/dL (2019 12:47)  POCT Blood Glucose.: 104 mg/dL (2019 08:13)  POCT Blood Glucose.: 133 mg/dL (2019 22:28)                          9.0    5.29  )-----------( 169      ( 2019 07:50 )             29.6     04-02    136  |  92<L>  |  122<H>  ----------------------------<  102<H>  3.8   |  30  |  2.73<H>    Ca    9.8      2019 07:50  Phos  2.7     04-02  Mg     1.8     04-02    TPro  7.0  /  Alb  4.2  /  TBili  1.0  /  DBili  x   /  AST  23  /  ALT  14  /  AlkPhos  65  04-01    PT/INR - ( 31 Mar 2019 21:50 )   PT: 21.5 SEC;   INR: 1.85          PTT - ( 31 Mar 2019 21:50 )  PTT:34.0 SEC      Urinalysis Basic - ( 31 Mar 2019 21:50 )    Color: YELLOW / Appearance: CLEAR / S.012 / pH: 6.5  Gluc: NEGATIVE / Ketone: NEGATIVE  / Bili: NEGATIVE / Urobili: NORMAL   Blood: NEGATIVE / Protein: NEGATIVE / Nitrite: NEGATIVE   Leuk Esterase: NEGATIVE / RBC: x / WBC x   Sq Epi: x / Non Sq Epi: x / Bacteria: x        RADIOLOGY & ADDITIONAL TESTS:    Imaging Personally Reviewed:    Consultant(s) Notes Reviewed:      Care Discussed with Consultants/Other Providers:

## 2019-04-16 NOTE — PROGRESS NOTE ADULT - SUBJECTIVE AND OBJECTIVE BOX
NEPHROLOGY - [facility] (formerly known as [facility])    Patient seen and examined.  Good appetite  No SOB    MEDICATIONS  (STANDING):  allopurinol 300 milliGRAM(s) Oral daily  benzonatate 100 milliGRAM(s) Oral every 8 hours  buDESOnide  80 MICROgram(s)/formoterol 4.5 MICROgram(s) Inhaler 2 Puff(s) Inhalation two times a day  magnesium oxide 400 milliGRAM(s) Oral two times a day with meals  metoprolol succinate ER 50 milliGRAM(s) Oral two times a day  multivitamin 1 Tablet(s) Oral daily  pantoprazole  Injectable 40 milliGRAM(s) IV Push every 12 hours  sildenafil (REVATIO) 10 milliGRAM(s) Oral three times a day  sodium chloride 0.9% lock flush 3 milliLiter(s) IV Push every 8 hours  torsemide 20 milliGRAM(s) Oral <User Schedule>    VITALS:  T(C): , Max: 37 (04-15-19 @ 15:07)  T(F): , Max: 98.6 (04-15-19 @ 15:07)  HR: 87 (04-16-19 @ 10:18)  BP: 93/64 (04-16-19 @ 10:18)  RR: 16 (04-16-19 @ 10:18)  SpO2: 100% (04-16-19 @ 10:18)    04-15 @ 07:01  -  04-16 @ 07:00  --------------------------------------------------------  IN: 120 mL / OUT: 400 mL / NET: -280 mL    REVIEW OF SYSTEMS:  Full ROS done and were negative unless otherwise indicated in HPI/assessment.     PHYSICAL EXAM:  Constitutional: NAD  Respiratory: CTA B/L  Cardiovascular: S1 and S2, RRR  Gastrointestinal: + BS, soft, NT, ND  Extremities: trace edema  Neurological: AAO x 3  : no dawson    LABS:                        7.8    4.98  )-----------( 220      ( 16 Apr 2019 06:00 )             25.8     04-16    140  |  106  |  63<H>  ----------------------------<  89  4.3   |  22  |  2.25<H>    Ca    9.7      16 Apr 2019 06:00  Mg     1.7     04-15    ASSESSMENT  63F with COPD (on O2), LINH, AF on A/C, HLD, gout, HFpEF, PHTN , DM and CKD3/4 p/w generalized weakness and poor appetite.   - CKD: stage 3 with baseline Cr ~ 1.6  - OSMAR: likely due to CHARLIE   - CV: BP acceptable - HFpEF: right sided heart failure / cor pulm  - Pulm: RVP + for influenza, rhino and enterovirus - s/p tamiflu. sPHTN    - anemia: in chronic disease + iron def + GIB, hold LUIS until GI w/u - s/p IV iron; Hgb < goal   - GI: + FOBT, s/p colonoscopy - with polypoid mass in the proximal sigmoid colon - biopsied  - hypomagnesemia: likely nutritional +/- diuresis, supplemented    RECOMMEND:  - diuresis per HF (torsemide TTS)  - sildenafil per pulm  - f/u pathology from sigmoid mass   - for possible colorectal surgery  - continue Mag ox 400 mg BID  - dose meds for a CrCl ~25-30 mL/min      Christina Lyn NP  [facility] ([facility], )  [phone]

## 2019-04-16 NOTE — PROGRESS NOTE ADULT - SUBJECTIVE AND OBJECTIVE BOX
GENERAL SURGERY DAILY PROGRESS NOTE:       Subjective: No acute events overnight.          Objective:        Vital Signs Last 24 Hrs  T(C): 36.3 (16 Apr 2019 06:20), Max: 37 (15 Apr 2019 15:07)  T(F): 97.4 (16 Apr 2019 06:20), Max: 98.6 (15 Apr 2019 15:07)  HR: 75 (16 Apr 2019 06:20) (75 - 89)  BP: 104/69 (16 Apr 2019 06:20) (104/69 - 113/75)  BP(mean): --  RR: 18 (16 Apr 2019 06:20) (18 - 18)  SpO2: 97% (16 Apr 2019 06:20) (97% - 100%)    I&O's Detail    15 Apr 2019 07:01  -  16 Apr 2019 07:00  --------------------------------------------------------  IN:    Oral Fluid: 120 mL  Total IN: 120 mL    OUT:    Voided: 400 mL  Total OUT: 400 mL    Total NET: -280 mL            General: NAD, well-nourished  HEENT: Atraumatic, EOMI  Resp: Breathing comfortably on RA  CV: Normal sinus rhythm  Abd: Obese, soft, nontender, nondistended. No abdominal scars.         LABS:                        7.8    4.98  )-----------( 220      ( 16 Apr 2019 06:00 )             25.8     04-16    140  |  106  |  63<H>  ----------------------------<  89  4.3   |  22  |  2.25<H>    Ca    9.7      16 Apr 2019 06:00  Mg     1.7     04-15            RADIOLOGY & ADDITIONAL STUDIES:    MEDICATIONS  (STANDING):  allopurinol 300 milliGRAM(s) Oral daily  benzonatate 100 milliGRAM(s) Oral every 8 hours  buDESOnide  80 MICROgram(s)/formoterol 4.5 MICROgram(s) Inhaler 2 Puff(s) Inhalation two times a day  dextrose 5%. 1000 milliLiter(s) (50 mL/Hr) IV Continuous <Continuous>  dextrose 50% Injectable 12.5 Gram(s) IV Push once  dextrose 50% Injectable 25 Gram(s) IV Push once  dextrose 50% Injectable 25 Gram(s) IV Push once  insulin lispro (HumaLOG) corrective regimen sliding scale   SubCutaneous three times a day before meals  insulin lispro (HumaLOG) corrective regimen sliding scale   SubCutaneous at bedtime  magnesium oxide 400 milliGRAM(s) Oral two times a day with meals  metoprolol succinate ER 50 milliGRAM(s) Oral two times a day  multivitamin 1 Tablet(s) Oral daily  pantoprazole  Injectable 40 milliGRAM(s) IV Push every 12 hours  sildenafil (REVATIO) 10 milliGRAM(s) Oral three times a day  sodium chloride 0.9% lock flush 3 milliLiter(s) IV Push every 8 hours  torsemide 20 milliGRAM(s) Oral <User Schedule>    MEDICATIONS  (PRN):  ALBUTerol/ipratropium for Nebulization 3 milliLiter(s) Nebulizer every 6 hours PRN Shortness of Breath and/or Wheezing  dextrose 40% Gel 15 Gram(s) Oral once PRN Blood Glucose LESS THAN 70 milliGRAM(s)/deciliter  glucagon  Injectable 1 milliGRAM(s) IntraMuscular once PRN Glucose LESS THAN 70 milligrams/deciliter  guaiFENesin  milliGRAM(s) Oral every 12 hours PRN Cough

## 2019-04-16 NOTE — PROGRESS NOTE ADULT - ASSESSMENT
64 yo woman with multiple medical comorbidities including afib, right-sided CHF with severe pulmonary HTN, COPD on nocturnal O2, LINH newly initiated on bilevel support found to have a non-obstructing sigmoid mass.     - Awaiting pathology - biopsies from colonoscopy (4/12)  - Need Pulmonary and Cardiology optimization and risk stratification for possible surgery    [facility] Surgery  [id number]

## 2019-04-17 LAB
ANION GAP SERPL CALC-SCNC: 10 MMO/L — SIGNIFICANT CHANGE UP (ref 7–14)
BUN SERPL-MCNC: 59 MG/DL — HIGH (ref 7–23)
CALCIUM SERPL-MCNC: 9.2 MG/DL — SIGNIFICANT CHANGE UP (ref 8.4–10.5)
CEA SERPL-MCNC: 1.3 NG/ML — SIGNIFICANT CHANGE UP (ref 1–3.8)
CHLORIDE SERPL-SCNC: 104 MMOL/L — SIGNIFICANT CHANGE UP (ref 98–107)
CO2 SERPL-SCNC: 23 MMOL/L — SIGNIFICANT CHANGE UP (ref 22–31)
CREAT SERPL-MCNC: 2 MG/DL — HIGH (ref 0.5–1.3)
GLUCOSE SERPL-MCNC: 86 MG/DL — SIGNIFICANT CHANGE UP (ref 70–99)
HCT VFR BLD CALC: 26.1 % — LOW (ref 34.5–45)
HGB BLD-MCNC: 7.8 G/DL — LOW (ref 11.5–15.5)
MAGNESIUM SERPL-MCNC: 1.8 MG/DL — SIGNIFICANT CHANGE UP (ref 1.6–2.6)
MCHC RBC-ENTMCNC: 19.8 PG — LOW (ref 27–34)
MCHC RBC-ENTMCNC: 29.9 % — LOW (ref 32–36)
MCV RBC AUTO: 66.4 FL — LOW (ref 80–100)
NRBC # FLD: 0.09 K/UL — SIGNIFICANT CHANGE UP (ref 0–0)
NRBC FLD-RTO: 2 — SIGNIFICANT CHANGE UP
PLATELET # BLD AUTO: 289 K/UL — SIGNIFICANT CHANGE UP (ref 150–400)
PMV BLD: SIGNIFICANT CHANGE UP FL (ref 7–13)
POTASSIUM SERPL-MCNC: 4.3 MMOL/L — SIGNIFICANT CHANGE UP (ref 3.5–5.3)
POTASSIUM SERPL-SCNC: 4.3 MMOL/L — SIGNIFICANT CHANGE UP (ref 3.5–5.3)
RBC # BLD: 3.93 M/UL — SIGNIFICANT CHANGE UP (ref 3.8–5.2)
RBC # FLD: 17.4 % — HIGH (ref 10.3–14.5)
SODIUM SERPL-SCNC: 137 MMOL/L — SIGNIFICANT CHANGE UP (ref 135–145)
WBC # BLD: 4.51 K/UL — SIGNIFICANT CHANGE UP (ref 3.8–10.5)
WBC # FLD AUTO: 4.51 K/UL — SIGNIFICANT CHANGE UP (ref 3.8–10.5)

## 2019-04-17 PROCEDURE — 99232 SBSQ HOSP IP/OBS MODERATE 35: CPT

## 2019-04-17 RX ADMIN — SODIUM CHLORIDE 3 MILLILITER(S): 9 INJECTION INTRAMUSCULAR; INTRAVENOUS; SUBCUTANEOUS at 05:50

## 2019-04-17 RX ADMIN — Medication 100 MILLIGRAM(S): at 05:49

## 2019-04-17 RX ADMIN — SODIUM CHLORIDE 3 MILLILITER(S): 9 INJECTION INTRAMUSCULAR; INTRAVENOUS; SUBCUTANEOUS at 21:30

## 2019-04-17 RX ADMIN — Medication 10 MILLIGRAM(S): at 05:49

## 2019-04-17 RX ADMIN — Medication 10 MILLIGRAM(S): at 21:32

## 2019-04-17 RX ADMIN — Medication 1 TABLET(S): at 12:33

## 2019-04-17 RX ADMIN — SODIUM CHLORIDE 3 MILLILITER(S): 9 INJECTION INTRAMUSCULAR; INTRAVENOUS; SUBCUTANEOUS at 14:00

## 2019-04-17 RX ADMIN — MAGNESIUM OXIDE 400 MG ORAL TABLET 400 MILLIGRAM(S): 241.3 TABLET ORAL at 18:37

## 2019-04-17 RX ADMIN — Medication 100 MILLIGRAM(S): at 12:33

## 2019-04-17 RX ADMIN — BUDESONIDE AND FORMOTEROL FUMARATE DIHYDRATE 2 PUFF(S): 160; 4.5 AEROSOL RESPIRATORY (INHALATION) at 09:31

## 2019-04-17 RX ADMIN — Medication 100 MILLIGRAM(S): at 21:31

## 2019-04-17 RX ADMIN — PANTOPRAZOLE SODIUM 40 MILLIGRAM(S): 20 TABLET, DELAYED RELEASE ORAL at 05:49

## 2019-04-17 RX ADMIN — Medication 50 MILLIGRAM(S): at 05:49

## 2019-04-17 RX ADMIN — PANTOPRAZOLE SODIUM 40 MILLIGRAM(S): 20 TABLET, DELAYED RELEASE ORAL at 18:37

## 2019-04-17 RX ADMIN — Medication 300 MILLIGRAM(S): at 12:34

## 2019-04-17 RX ADMIN — BUDESONIDE AND FORMOTEROL FUMARATE DIHYDRATE 2 PUFF(S): 160; 4.5 AEROSOL RESPIRATORY (INHALATION) at 21:32

## 2019-04-17 RX ADMIN — MAGNESIUM OXIDE 400 MG ORAL TABLET 400 MILLIGRAM(S): 241.3 TABLET ORAL at 09:31

## 2019-04-17 RX ADMIN — Medication 10 MILLIGRAM(S): at 12:34

## 2019-04-17 RX ADMIN — Medication 50 MILLIGRAM(S): at 18:37

## 2019-04-17 NOTE — PROGRESS NOTE ADULT - SUBJECTIVE AND OBJECTIVE BOX
Subjective:    Medications:  ALBUTerol/ipratropium for Nebulization 3 milliLiter(s) Nebulizer every 6 hours PRN  allopurinol 300 milliGRAM(s) Oral daily  benzonatate 100 milliGRAM(s) Oral every 8 hours  buDESOnide  80 MICROgram(s)/formoterol 4.5 MICROgram(s) Inhaler 2 Puff(s) Inhalation two times a day  dextrose 40% Gel 15 Gram(s) Oral once PRN  dextrose 5%. 1000 milliLiter(s) IV Continuous <Continuous>  dextrose 50% Injectable 12.5 Gram(s) IV Push once  dextrose 50% Injectable 25 Gram(s) IV Push once  dextrose 50% Injectable 25 Gram(s) IV Push once  glucagon  Injectable 1 milliGRAM(s) IntraMuscular once PRN  guaiFENesin  milliGRAM(s) Oral every 12 hours PRN  insulin lispro (HumaLOG) corrective regimen sliding scale   SubCutaneous three times a day before meals  insulin lispro (HumaLOG) corrective regimen sliding scale   SubCutaneous at bedtime  magnesium oxide 400 milliGRAM(s) Oral two times a day with meals  metoprolol succinate ER 50 milliGRAM(s) Oral two times a day  multivitamin 1 Tablet(s) Oral daily  pantoprazole  Injectable 40 milliGRAM(s) IV Push every 12 hours  sildenafil (REVATIO) 10 milliGRAM(s) Oral three times a day  sodium chloride 0.9% lock flush 3 milliLiter(s) IV Push every 8 hours  torsemide 20 milliGRAM(s) Oral <User Schedule>      Physical Exam:    Vitals:  T(C): 36.7 (19 @ 12:36), Max: 36.8 (19 @ 21:17)  HR: 87 (19 @ 12:36) (80 - 87)  BP: 100/64 (19 @ 12:36) (99/62 - 123/72)  BP(mean): --  ABP: --  ABP(mean): --  RR: 16 (19 @ 12:36) (16 - 18)  SpO2: 98% (19 @ 12:36) (98% - 100%)  Wt(kg): --  CVP(cm H2O): --  CO: --  CI: --  PA: --  PA(mean): --  PCWP: --  SVR: --  PVR: --  Vital Signs Last 24 Hrs  T(C): 36.7 (2019 12:36), Max: 36.8 (2019 21:17)  T(F): 98 (2019 12:36), Max: 98.3 (2019 21:17)  HR: 87 (2019 12:36) (80 - 87)  BP: 100/64 (2019 12:36) (99/62 - 123/72)  BP(mean): --  RR: 16 (2019 12:36) (16 - 18)  SpO2: 98% (2019 12:36) (98% - 100%)    Daily     Daily Weight in k (2019 05:44)    I&O's Summary    2019 07:  -  2019 07:00  --------------------------------------------------------  IN: 580 mL / OUT: 600 mL / NET: -20 mL    2019 07:01  -  2019 13:41  --------------------------------------------------------  IN: 200 mL / OUT: 200 mL / NET: 0 mL        General: No distress. Comfortable.  HEENT: EOM intact.  Neck: Neck supple. JVP not elevated. No masses  Chest: Clear to auscultation bilaterally  CV: Normal S1 and S2. No murmurs, rub, or gallops. Radial pulses normal.  Abdomen: Soft, non-distended, non-tender  Skin: No rashes or skin breakdown  Neurology: Alert and oriented times three. Sensation intact  Psych: Affect normal    Labs:                        7.8    4.51  )-----------( 289      ( 2019 06:23 )             26.1     04-17    137  |  104  |  59<H>  ----------------------------<  86  4.3   |  23  |  2.00<H>    Ca    9.2      2019 06:23  Mg     1.8     04-17

## 2019-04-17 NOTE — PROGRESS NOTE ADULT - SUBJECTIVE AND OBJECTIVE BOX
PULM/MED PROGRESS NOTE:    awake, alert comfortable lying in bed      MEDICATIONS  (STANDING):  allopurinol 300 milliGRAM(s) Oral daily  benzonatate 100 milliGRAM(s) Oral every 8 hours  buDESOnide  80 MICROgram(s)/formoterol 4.5 MICROgram(s) Inhaler 2 Puff(s) Inhalation two times a day  dextrose 5%. 1000 milliLiter(s) (50 mL/Hr) IV Continuous <Continuous>  dextrose 50% Injectable 12.5 Gram(s) IV Push once  dextrose 50% Injectable 25 Gram(s) IV Push once  dextrose 50% Injectable 25 Gram(s) IV Push once  insulin lispro (HumaLOG) corrective regimen sliding scale   SubCutaneous three times a day before meals  insulin lispro (HumaLOG) corrective regimen sliding scale   SubCutaneous at bedtime  magnesium oxide 400 milliGRAM(s) Oral two times a day with meals  metoprolol succinate ER 50 milliGRAM(s) Oral two times a day  multivitamin 1 Tablet(s) Oral daily  pantoprazole  Injectable 40 milliGRAM(s) IV Push every 12 hours  sildenafil (REVATIO) 10 milliGRAM(s) Oral three times a day  sodium chloride 0.9% lock flush 3 milliLiter(s) IV Push every 8 hours  torsemide 20 milliGRAM(s) Oral <User Schedule>      MEDICATIONS  (PRN):  ALBUTerol/ipratropium for Nebulization 3 milliLiter(s) Nebulizer every 6 hours PRN Shortness of Breath and/or Wheezing  dextrose 40% Gel 15 Gram(s) Oral once PRN Blood Glucose LESS THAN 70 milliGRAM(s)/deciliter  glucagon  Injectable 1 milliGRAM(s) IntraMuscular once PRN Glucose LESS THAN 70 milligrams/deciliter  guaiFENesin  milliGRAM(s) Oral every 12 hours PRN Cough          Vital Signs Last 24 Hrs  T(C): 37 (17 Apr 2019 21:30), Max: 37.2 (17 Apr 2019 18:35)  T(F): 98.6 (17 Apr 2019 21:30), Max: 98.9 (17 Apr 2019 18:35)  HR: 89 (17 Apr 2019 21:30) (80 - 91)  BP: 102/65 (17 Apr 2019 21:30) (97/62 - 115/73)  BP(mean): --  RR: 16 (17 Apr 2019 21:30) (16 - 18)  SpO2: 100% (17 Apr 2019 21:30) (98% - 100%)    PHYSICAL EXAMINATION:    GENERAL: The patient is awake and alert in no apparent distress.     NECK: Supple.    LUNGS: Bilateral breath sounds; some rhonchi; no wheezing; respirations unlabored    HEART: Irreg irreg    ABDOMEN: Soft, nontender    EXTREMITIES: 1+ edema.          LABS:                        7.8    4.51  )-----------( 289      ( 17 Apr 2019 06:23 )             26.1     04-17    137  |  104  |  59<H>  ----------------------------<  86  4.3   |  23  |  2.00<H>    Ca    9.2      17 Apr 2019 06:23  Mg     1.8     04-17    Surgical Pathology Report (04.12.19 @ 15:15)    Surgical Pathology Report:   ACCESSION No:  [id number]    KARINA SCHNEIDER                       1    Surgical Final Report      Final Diagnosis  Colon, sigmoid mass at 40 cm, biopsy  - Invasive moderately differentiated adenocarcinoma,  fragmented.    Verified by: Tamara Mata M.D.  (Electronic Signature)  Reported on: 04/16/19 16:38 EDT, [address] [place] [address], [place]  [id number]  _________________________________________________________________    Clinical History  Pre anemia  Post sigmoid    Specimen(s) Submitted  1  1-Sigmoid mass @ 40cm    Gross Description  The specimen is received in formalin and the specimen container  is labeled: Sigmoid mass at 40 cm.  It  consists of multiple  fragments of soft, tan-pink tissue ranging from 0.2 x 0.1 x 0.1  to 0.3 cm inmaximum dimension.  Entirely submitted.  One  cassette.    In addition to other data that may appear on the specimen  container, the label has been inspected to confirm the presence  of the patient's name and date of birth.    Pepper Mazariegos 04/12/201917:27            RADIOLOGY & ADDITIONAL STUDIES:      EXAM:  CT ABDOMEN AND PELVIS IC      EXAM:  CT CHEST IC        PROCEDURE DATE:  Apr 14 2019         INTERPRETATION:  CLINICAL INFORMATION: Sigmoid mass diagnosed on   colonoscopy. Evaluate for extent of disease.    COMPARISON: CT chest 4/8/2019. CTabdomen pelvis 4/3/2019.    PROCEDURE:   CT of the Chest, Abdomen and Pelvis was performed with intravenous   contrast.   Intravenous contrast: 90 ml Omnipaque 350. 10 ml discarded.  Oral contrast: positive contrast was administered.  Sagittal and coronal reformats were performed.    FINDINGS:    CHEST:     LUNGS AND LARGE AIRWAYS: Patent central airways. No pulmonary nodules.  PLEURA: No pleural effusion. No pneumothorax  VESSELS: Atherosclerotic disease. Coronary calcifications.  HEART: Heart size is enlarged. No pericardial effusion.  MEDIASTINUM AND CLAUDIA: Prominent mediastinal and hilar lymph nodes,   without change.  CHEST WALL AND LOWER NECK: Heterogeneously enhancing right thyroid mass   measuring 3.5 x 2.9 cm, partially imaged (series2 image 1).    ABDOMEN AND PELVIS:    LIVER: Within normal limits.  BILE DUCTS: Normal caliber.  GALLBLADDER: Within normal limits.  SPLEEN: Within normal limits.  PANCREAS: Within normal limits.  ADRENALS: Within normal limits.  KIDNEYS/URETERS: Atrophic. Subcentimeter hypoattenuating right renal   lesions are too small to characterize. No hydronephrosis.    BLADDER: Within normal limits.  REPRODUCTIVE ORGANS: Calcified uterine fibroid. No adnexal masses.    BOWEL: No bowel obstruction. Narrowing of the sigmoid colon may represent   new sigmoid mass. Normal appendix. There is high-density material along   the posterior aspect of the gastric fundus/antrum.  PERITONEUM: No ascites.  VESSELS:  Atherosclerotic disease.  RETROPERITONEUM: No lymphadenopathy.    ABDOMINAL WALL: Questionable skin defect of the left gluteal soft tissues   not seen on prior CT.  BONES: Degenerative changes of the spine.    IMPRESSION:     Unchanged prominent mediastinal nodes, nonspecific. Otherwise, no   evidenceof metastatic disease in the chest, abdomen, or pelvis.    High-density material layering within the gastric antrum may be secondary   to ingested material.    Questionable skin defect of the left gluteal soft tissues not seen on   prior CT, likely skinfold however clinical correlation is recommended.    Findings discussed by Dr. Forrester with Dr. Arriaza on 4/14/2019 at 2:00 PM   with read back.                GURINDER FORRESTER M.D., RADIOLOGY RESIDENT  This document has been electronically signed.  MARYELLEN SALCEDO, ATTENDING RADIOLOGIST  This document has been electronically signed. Apr 14 2019  7:54PM  --------------------------------------------------------------------------------------------------------------------------------------------------------------------------    PFTs from 12/2018:   Mild restrictive pattern  No change demonstrated after bronchodilator.   Reduced lung volumes  Reduced diffusing capacity.

## 2019-04-17 NOTE — PROGRESS NOTE ADULT - ATTENDING COMMENTS
Continue sildenafil and torsemide.  F/u with pulm htn specialist.    Please call with questions. Asked to provide clearance for resection of sigmoid adenocarcinoma.  She feels well. She hasn't walked much in halls but feels like she could if she were allowed.  No evidence of significant volume overload.  Can proceed with colorectal surgery w/o further cardiac testing.  Continue sildenafil and torsemide.    Please call with questions.

## 2019-04-17 NOTE — PROGRESS NOTE ADULT - ASSESSMENT
Sigmoid colon mass   The current plan is for surgical excision    The patient is at least at intermediate risk for pulmonary complications, armen in view of her multiple medical problems and comorbidities, including pulmonary hypertension and sleep apnea.     To aim to reduce this risk,    -would suggest maintain Hgb to > 10 armen closer to surgery  -recent respiratory infections appears resolved;  No pneumonia on recent chest CT  -Would ask pulmonary hypertension team  to followup - the patient may benefit from an up-titration of the sildenifil, aiming towards a dose of 20 TID.  (Usually no renal adjustment necessary)  -Would consider Preanesthesia consult/evaluation   -Continue bronchodilators.   -D/C benzonatate    The potential risks were discussed.

## 2019-04-17 NOTE — PROGRESS NOTE ADULT - SUBJECTIVE AND OBJECTIVE BOX
NEPHROLOGY - [facility] (formerly known as [facility])    Patient seen and examined.  NAD    MEDICATIONS  (STANDING):  allopurinol 300 milliGRAM(s) Oral daily  benzonatate 100 milliGRAM(s) Oral every 8 hours  buDESOnide  80 MICROgram(s)/formoterol 4.5 MICROgram(s) Inhaler 2 Puff(s) Inhalation two times a day  dextrose 5%. 1000 milliLiter(s) (50 mL/Hr) IV Continuous <Continuous>  dextrose 50% Injectable 12.5 Gram(s) IV Push once  dextrose 50% Injectable 25 Gram(s) IV Push once  dextrose 50% Injectable 25 Gram(s) IV Push once  insulin lispro (HumaLOG) corrective regimen sliding scale   SubCutaneous three times a day before meals  insulin lispro (HumaLOG) corrective regimen sliding scale   SubCutaneous at bedtime  magnesium oxide 400 milliGRAM(s) Oral two times a day with meals  metoprolol succinate ER 50 milliGRAM(s) Oral two times a day  multivitamin 1 Tablet(s) Oral daily  pantoprazole  Injectable 40 milliGRAM(s) IV Push every 12 hours  sildenafil (REVATIO) 10 milliGRAM(s) Oral three times a day  sodium chloride 0.9% lock flush 3 milliLiter(s) IV Push every 8 hours  torsemide 20 milliGRAM(s) Oral <User Schedule>    VITALS:  T(C): , Max: 36.8 (04-16-19 @ 21:17)  T(F): , Max: 98.3 (04-16-19 @ 21:17)  HR: 87 (04-17-19 @ 12:36)  BP: 100/64 (04-17-19 @ 12:36)  RR: 16 (04-17-19 @ 12:36)  SpO2: 98% (04-17-19 @ 12:36)    04-16 @ 07:01  -  04-17 @ 07:00  --------------------------------------------------------  IN: 580 mL / OUT: 600 mL / NET: -20 mL    04-17 @ 07:01  -  04-17 @ 12:42  --------------------------------------------------------  IN: 200 mL / OUT: 200 mL / NET: 0 mL    REVIEW OF SYSTEMS:  Full ROS done and were negative unless otherwise indicated in HPI/assessment.     PHYSICAL EXAM:  Constitutional: NAD  Respiratory: CTA B/L  Cardiovascular: S1 and S2, RRR  Gastrointestinal: + BS, soft, NT, ND  Extremities: trace edema  Neurological: AAO x 3  : no dawson      LABS:                        7.8    4.51  )-----------( 289      ( 17 Apr 2019 06:23 )             26.1     04-17    137  |  104  |  59<H>  ----------------------------<  86  4.3   |  23  |  2.00<H>    Ca    9.2      17 Apr 2019 06:23  Mg     1.8     04-17    Surgical Pathology Report:   ACCESSION No:  [id number]    KARINA SCHNEIDER                       1        Surgical Final Report          Final Diagnosis  Colon, sigmoid mass at 40 cm, biopsy  - Invasive moderately differentiated adenocarcinoma,  fragmented.    Verified by: Tamara Mata M.D.  (Electronic Signature)  Reported on: 04/16/19 16:38 EDT, [address] [place] [address]  [id number]    ASSESSMENT /  63F with COPD (on O2), LINH, AF on A/C, HLD, gout, HFpEF, PHTN , DM and CKD3/4 p/w generalized weakness and poor appetite.   - CKD: stage 3 with baseline Cr ~ 1.6  - OSMAR: likely due to CHARLIE - improving  - CV: BP acceptable - HFpEF: right sided heart failure / cor pulm  - Pulm: RVP + for influenza, rhino and enterovirus - s/p tamiflu. sPHTN    - anemia: in chronic disease + iron def + GIB + malignancy, hold LUIS - s/p IV iron; Hgb < goal   - GI: + FOBT, s/p colonoscopy - with polypoid mass in the proximal sigmoid colon - biopsied  - hypomagnesemia: likely nutritional +/- diuresis, supplemented    RECOMMEND:  - diuresis per HF (torsemide TTS)  - sildenafil per pulm  - f/u pathology from sigmoid mass   - for possible colorectal surgery  - continue Mag ox 400 mg BID  - dose meds for a CrCl ~25-30 mL/min  Christina Lyn NP  [facility] ([facility], )  [phone] NEPHROLOGY - [facility] (formerly known as [facility])    Patient seen and examined.  NAD    MEDICATIONS  (STANDING):  allopurinol 300 milliGRAM(s) Oral daily  benzonatate 100 milliGRAM(s) Oral every 8 hours  buDESOnide  80 MICROgram(s)/formoterol 4.5 MICROgram(s) Inhaler 2 Puff(s) Inhalation two times a day  magnesium oxide 400 milliGRAM(s) Oral two times a day with meals  metoprolol succinate ER 50 milliGRAM(s) Oral two times a day  multivitamin 1 Tablet(s) Oral daily  pantoprazole  Injectable 40 milliGRAM(s) IV Push every 12 hours  sildenafil (REVATIO) 10 milliGRAM(s) Oral three times a day  sodium chloride 0.9% lock flush 3 milliLiter(s) IV Push every 8 hours  torsemide 20 milliGRAM(s) Oral <User Schedule>    VITALS:  T(C): , Max: 36.8 (04-16-19 @ 21:17)  T(F): , Max: 98.3 (04-16-19 @ 21:17)  HR: 87 (04-17-19 @ 12:36)  BP: 100/64 (04-17-19 @ 12:36)  RR: 16 (04-17-19 @ 12:36)  SpO2: 98% (04-17-19 @ 12:36)    04-16 @ 07:01  -  04-17 @ 07:00  --------------------------------------------------------  IN: 580 mL / OUT: 600 mL / NET: -20 mL    04-17 @ 07:01  -  04-17 @ 12:42  --------------------------------------------------------  IN: 200 mL / OUT: 200 mL / NET: 0 mL    REVIEW OF SYSTEMS:  Full ROS done and were negative unless otherwise indicated in HPI/assessment.     PHYSICAL EXAM:  Constitutional: NAD  Respiratory: CTA B/L  Cardiovascular: S1 and S2, RRR  Gastrointestinal: + BS, soft, NT, ND  Extremities: trace edema  Neurological: AAO x 3  : no dawson      LABS:                        7.8    4.51  )-----------( 289      ( 17 Apr 2019 06:23 )             26.1     04-17    137  |  104  |  59<H>  ----------------------------<  86  4.3   |  23  |  2.00<H>    Ca    9.2      17 Apr 2019 06:23  Mg     1.8     04-17    Surgical Pathology Report:   ACCESSION No:  [id number]  SCHNEIDERKARINA                       1  Surgical Final Report  Final Diagnosis  Colon, sigmoid mass at 40 cm, biopsy  - Invasive moderately differentiated adenocarcinoma, fragmented.    Verified by: Tamara Mata M.D.  (Electronic Signature)  Reported on: 04/16/19 16:38 EDT, [address] [place] [address]  [id number]    ASSESSMENT  63F with COPD (on O2), LINH, AF on A/C, HLD, gout, HFpEF, PHTN , DM and CKD3/4 p/w generalized weakness and poor appetite.   - CKD: stage 3 with baseline Cr ~ 1.6  - OSMAR: likely due to CHARLIE - improving  - CV: BP acceptable - HFpEF: right sided heart failure / cor pulm  - Pulm: RVP + for influenza, rhino and enterovirus - s/p tamiflu. sPHTN    - anemia: in chronic disease + iron def + GIB + malignancy, hold LUIS - s/p IV iron; Hgb < goal   - GI: + FOBT, s/p colonoscopy - with polypoid mass in the proximal sigmoid colon - adenocarcinoma   - hypomagnesemia: likely nutritional +/- diuresis, supplemented    RECOMMEND:  - diuresis per HF (torsemide TTS)  - sildenafil per pulm  - f/u colorectal surgery plan  - continue Mag ox 400 mg BID  - dose meds for a CrCl ~25-30 mL/min    Christina Lyn NP  [facility] ([facility], )  [phone]

## 2019-04-17 NOTE — PROGRESS NOTE ADULT - SUBJECTIVE AND OBJECTIVE BOX
Patient is a 63y old  Female who presents with a chief complaint of Generalized weakness (2019 16:52)      SUBJECTIVE / OVERNIGHT EVENTS:  No shortness of breath.   Review of Systems:   CONSTITUTIONAL: No fever, weight loss,   EYES: No eye pain, visual disturbances, or discharge  ENMT:  No difficulty hearing, tinnitus, vertigo; No sinus or throat pain  NECK: No pain or stiffness  BREASTS: No pain, masses, or nipple discharge  RESPIRATORY: No cough, wheezing, chills or hemoptysis; No shortness of breath  CARDIOVASCULAR: No chest pain, palpitations, dizziness, or leg swelling  GASTROINTESTINAL: No abdominal or epigastric pain. No nausea, vomiting, or hematemesis; No diarrhea or constipation. No melena or hematochezia.  GENITOURINARY: No dysuria, frequency, hematuria, or incontinence  NEUROLOGICAL: No headaches, memory loss, loss of strength, numbness, or tremors  SKIN: No itching, burning, rashes, or lesions   LYMPH NODES: No enlarged glands  ENDOCRINE: No heat or cold intolerance; No hair loss  MUSCULOSKELETAL: No joint pain or swelling; No muscle, back, or extremity pain  PSYCHIATRIC: No depression, anxiety, mood swings, or difficulty sleeping  HEME/LYMPH: No easy bruising, or bleeding gums  ALLERY AND IMMUNOLOGIC: No hives or eczema    MEDICATIONS  (STANDING):  allopurinol 300 milliGRAM(s) Oral daily  benzonatate 100 milliGRAM(s) Oral every 8 hours  buDESOnide  80 MICROgram(s)/formoterol 4.5 MICROgram(s) Inhaler 2 Puff(s) Inhalation two times a day  dextrose 5%. 1000 milliLiter(s) (50 mL/Hr) IV Continuous <Continuous>  dextrose 50% Injectable 12.5 Gram(s) IV Push once  dextrose 50% Injectable 25 Gram(s) IV Push once  dextrose 50% Injectable 25 Gram(s) IV Push once  insulin lispro (HumaLOG) corrective regimen sliding scale   SubCutaneous three times a day before meals  insulin lispro (HumaLOG) corrective regimen sliding scale   SubCutaneous at bedtime  metoprolol succinate  milliGRAM(s) Oral <User Schedule>  metoprolol succinate  milliGRAM(s) Oral <User Schedule>  multivitamin 1 Tablet(s) Oral daily  oseltamivir 30 milliGRAM(s) Oral daily  pantoprazole  Injectable 40 milliGRAM(s) IV Push every 12 hours    MEDICATIONS  (PRN):  ALBUTerol/ipratropium for Nebulization 3 milliLiter(s) Nebulizer every 6 hours PRN Shortness of Breath and/or Wheezing  dextrose 40% Gel 15 Gram(s) Oral once PRN Blood Glucose LESS THAN 70 milliGRAM(s)/deciliter  glucagon  Injectable 1 milliGRAM(s) IntraMuscular once PRN Glucose LESS THAN 70 milligrams/deciliter      PHYSICAL EXAM:  Vital Signs Last 24 Hrs  T(C): 36.6 (2019 20:32), Max: 36.9 (2019 05:27)  T(F): 97.9 (2019 20:32), Max: 98.4 (2019 05:27)  HR: 81 (2019 20:32) (57 - 81)  BP: 117/81 (2019 20:32) (94/58 - 117/81)  BP(mean): --  RR: 18 (2019 20:32) (18 - 19)  SpO2: 100% (2019 20:32) (95% - 100%)  I&O's Summary    2019 07:01  -  2019 07:00  --------------------------------------------------------  IN: 720 mL / OUT: 0 mL / NET: 720 mL    2019 07:01  -  2019 20:51  --------------------------------------------------------  IN: 1311 mL / OUT: 0 mL / NET: 1311 mL      GENERAL: NAD, well-developed  HEAD:  Atraumatic, Normocephalic  EYES: EOMI, PERRLA, conjunctiva and sclera clear  NECK: Supple, No JVD  CHEST/LUNG: Clear to auscultation bilaterally; No wheeze  HEART: Regular rate and rhythm; No murmurs, rubs, or gallops  ABDOMEN: Soft, Nontender, Nondistended; Bowel sounds present  EXTREMITIES:  2+ Peripheral Pulses, No clubbing, cyanosis, or edema  PSYCH: AAOx3  NEUROLOGY: non-focal  SKIN: No rashes or lesions    LABS:  CAPILLARY BLOOD GLUCOSE      POCT Blood Glucose.: 103 mg/dL (2019 17:19)  POCT Blood Glucose.: 120 mg/dL (2019 12:47)  POCT Blood Glucose.: 104 mg/dL (2019 08:13)  POCT Blood Glucose.: 133 mg/dL (2019 22:28)                          9.0    5.29  )-----------( 169      ( 2019 07:50 )             29.6     04-02    136  |  92<L>  |  122<H>  ----------------------------<  102<H>  3.8   |  30  |  2.73<H>    Ca    9.8      2019 07:50  Phos  2.7     04-02  Mg     1.8     04-02    TPro  7.0  /  Alb  4.2  /  TBili  1.0  /  DBili  x   /  AST  23  /  ALT  14  /  AlkPhos  65  04-01    PT/INR - ( 31 Mar 2019 21:50 )   PT: 21.5 SEC;   INR: 1.85          PTT - ( 31 Mar 2019 21:50 )  PTT:34.0 SEC      Urinalysis Basic - ( 31 Mar 2019 21:50 )    Color: YELLOW / Appearance: CLEAR / S.012 / pH: 6.5  Gluc: NEGATIVE / Ketone: NEGATIVE  / Bili: NEGATIVE / Urobili: NORMAL   Blood: NEGATIVE / Protein: NEGATIVE / Nitrite: NEGATIVE   Leuk Esterase: NEGATIVE / RBC: x / WBC x   Sq Epi: x / Non Sq Epi: x / Bacteria: x        RADIOLOGY & ADDITIONAL TESTS:    Imaging Personally Reviewed:    Consultant(s) Notes Reviewed:      Care Discussed with Consultants/Other Providers:

## 2019-04-18 PROBLEM — I50.22 CHRONIC SYSTOLIC (CONGESTIVE) HEART FAILURE: Chronic | Status: ACTIVE | Noted: 2019-04-01

## 2019-04-18 PROBLEM — M10.9 GOUT, UNSPECIFIED: Chronic | Status: ACTIVE | Noted: 2019-04-01

## 2019-04-18 LAB
ANION GAP SERPL CALC-SCNC: 11 MMO/L — SIGNIFICANT CHANGE UP (ref 7–14)
BLD GP AB SCN SERPL QL: NEGATIVE — SIGNIFICANT CHANGE UP
BUN SERPL-MCNC: 65 MG/DL — HIGH (ref 7–23)
CALCIUM SERPL-MCNC: 9.5 MG/DL — SIGNIFICANT CHANGE UP (ref 8.4–10.5)
CHLORIDE SERPL-SCNC: 105 MMOL/L — SIGNIFICANT CHANGE UP (ref 98–107)
CO2 SERPL-SCNC: 22 MMOL/L — SIGNIFICANT CHANGE UP (ref 22–31)
CREAT SERPL-MCNC: 1.99 MG/DL — HIGH (ref 0.5–1.3)
GLUCOSE SERPL-MCNC: 82 MG/DL — SIGNIFICANT CHANGE UP (ref 70–99)
HCT VFR BLD CALC: 25.2 % — LOW (ref 34.5–45)
HGB BLD-MCNC: 7.5 G/DL — LOW (ref 11.5–15.5)
MAGNESIUM SERPL-MCNC: 1.8 MG/DL — SIGNIFICANT CHANGE UP (ref 1.6–2.6)
MCHC RBC-ENTMCNC: 19.9 PG — LOW (ref 27–34)
MCHC RBC-ENTMCNC: 29.8 % — LOW (ref 32–36)
MCV RBC AUTO: 67 FL — LOW (ref 80–100)
NRBC # FLD: 0.08 K/UL — SIGNIFICANT CHANGE UP (ref 0–0)
NRBC FLD-RTO: 1.9 — SIGNIFICANT CHANGE UP
PHOSPHATE SERPL-MCNC: 2.7 MG/DL — SIGNIFICANT CHANGE UP (ref 2.5–4.5)
PLATELET # BLD AUTO: 211 K/UL — SIGNIFICANT CHANGE UP (ref 150–400)
PMV BLD: SIGNIFICANT CHANGE UP FL (ref 7–13)
POTASSIUM SERPL-MCNC: 4.2 MMOL/L — SIGNIFICANT CHANGE UP (ref 3.5–5.3)
POTASSIUM SERPL-SCNC: 4.2 MMOL/L — SIGNIFICANT CHANGE UP (ref 3.5–5.3)
RBC # BLD: 3.76 M/UL — LOW (ref 3.8–5.2)
RBC # FLD: 17.1 % — HIGH (ref 10.3–14.5)
RH IG SCN BLD-IMP: POSITIVE — SIGNIFICANT CHANGE UP
SODIUM SERPL-SCNC: 138 MMOL/L — SIGNIFICANT CHANGE UP (ref 135–145)
WBC # BLD: 4.17 K/UL — SIGNIFICANT CHANGE UP (ref 3.8–10.5)
WBC # FLD AUTO: 4.17 K/UL — SIGNIFICANT CHANGE UP (ref 3.8–10.5)

## 2019-04-18 RX ORDER — METOPROLOL TARTRATE 50 MG
25 TABLET ORAL
Qty: 0 | Refills: 0 | Status: DISCONTINUED | OUTPATIENT
Start: 2019-04-18 | End: 2019-04-26

## 2019-04-18 RX ADMIN — Medication 50 MILLIGRAM(S): at 05:08

## 2019-04-18 RX ADMIN — Medication 10 MILLIGRAM(S): at 05:08

## 2019-04-18 RX ADMIN — BUDESONIDE AND FORMOTEROL FUMARATE DIHYDRATE 2 PUFF(S): 160; 4.5 AEROSOL RESPIRATORY (INHALATION) at 10:06

## 2019-04-18 RX ADMIN — MAGNESIUM OXIDE 400 MG ORAL TABLET 400 MILLIGRAM(S): 241.3 TABLET ORAL at 18:17

## 2019-04-18 RX ADMIN — BUDESONIDE AND FORMOTEROL FUMARATE DIHYDRATE 2 PUFF(S): 160; 4.5 AEROSOL RESPIRATORY (INHALATION) at 22:10

## 2019-04-18 RX ADMIN — Medication 300 MILLIGRAM(S): at 13:17

## 2019-04-18 RX ADMIN — MAGNESIUM OXIDE 400 MG ORAL TABLET 400 MILLIGRAM(S): 241.3 TABLET ORAL at 10:06

## 2019-04-18 RX ADMIN — SODIUM CHLORIDE 3 MILLILITER(S): 9 INJECTION INTRAMUSCULAR; INTRAVENOUS; SUBCUTANEOUS at 13:16

## 2019-04-18 RX ADMIN — SODIUM CHLORIDE 3 MILLILITER(S): 9 INJECTION INTRAMUSCULAR; INTRAVENOUS; SUBCUTANEOUS at 05:07

## 2019-04-18 RX ADMIN — Medication 1 TABLET(S): at 13:17

## 2019-04-18 RX ADMIN — Medication 10 MILLIGRAM(S): at 22:10

## 2019-04-18 RX ADMIN — PANTOPRAZOLE SODIUM 40 MILLIGRAM(S): 20 TABLET, DELAYED RELEASE ORAL at 05:08

## 2019-04-18 RX ADMIN — Medication 10 MILLIGRAM(S): at 13:17

## 2019-04-18 RX ADMIN — PANTOPRAZOLE SODIUM 40 MILLIGRAM(S): 20 TABLET, DELAYED RELEASE ORAL at 18:17

## 2019-04-18 RX ADMIN — SODIUM CHLORIDE 3 MILLILITER(S): 9 INJECTION INTRAMUSCULAR; INTRAVENOUS; SUBCUTANEOUS at 22:06

## 2019-04-18 NOTE — PROVIDER CONTACT NOTE (OTHER) - BACKGROUND
63Fwith multiple comorbidities, including R-sided heart failure with preserved EF (EF 66% on TTE in 10/2017), severe pulm HTN, mod-severe TR, COPD.

## 2019-04-18 NOTE — PROGRESS NOTE ADULT - SUBJECTIVE AND OBJECTIVE BOX
[facility]ROLOGY - [facility] (formerly known as [facility])    Patient seen and examined.  C/O not being able to walk around  No SOB, but she noticed gaining weight w/o increasing her caloric intake  BP soft; did not receive torsemide today    MEDICATIONS  (STANDING):  allopurinol 300 milliGRAM(s) Oral daily  buDESOnide  80 MICROgram(s)/formoterol 4.5 MICROgram(s) Inhaler 2 Puff(s) Inhalation two times a day  magnesium oxide 400 milliGRAM(s) Oral two times a day with meals  metoprolol succinate ER 50 milliGRAM(s) Oral two times a day  multivitamin 1 Tablet(s) Oral daily  pantoprazole  Injectable 40 milliGRAM(s) IV Push every 12 hours  sildenafil (REVATIO) 10 milliGRAM(s) Oral three times a day  sodium chloride 0.9% lock flush 3 milliLiter(s) IV Push every 8 hours  torsemide 20 milliGRAM(s) Oral <User Schedule>    VITALS:  T(C): , Max: 37.2 (04-17-19 @ 18:35)  T(F): , Max: 98.9 (04-17-19 @ 18:35)  HR: 85 (04-18-19 @ 10:11)  BP: 81/51 (04-18-19 @ 10:11)  RR: 16 (04-18-19 @ 10:11)  SpO2: 98% (04-18-19 @ 10:11)    04-17 @ 07:01  -  04-18 @ 07:00  --------------------------------------------------------  IN: 370 mL / OUT: 1100 mL / NET: -730 mL    04-18 @ 07:01  -  04-18 @ 10:36  --------------------------------------------------------  IN: 250 mL / OUT: 300 mL / NET: -50 mL      REVIEW OF SYSTEMS:  Full ROS done and were negative unless otherwise indicated in HPI/assessment.     PHYSICAL EXAM:  Constitutional: NAD  Respiratory: CTA B/L  Cardiovascular: S1 and S2, RRR  Gastrointestinal: + BS, soft, NT, ND  Extremities: trace edema  Neurological: AAO x 3  : no dawson    LABS:                        7.5    4.17  )-----------( 211      ( 18 Apr 2019 07:03 )             25.2     04-18    138  |  105  |  65<H>  ----------------------------<  82  4.2   |  22  |  1.99<H>    Ca    9.5      18 Apr 2019 07:03  Phos  2.7     04-18  Mg     1.8     04-18    ASSESSMENT   63F with COPD (on O2), LINH, AF on A/C, HLD, gout, HFpEF, PHTN , DM and CKD3/4 p/w generalized weakness and poor appetite.   - CKD: stage 3 with baseline Cr ~ 1.6  - OSMAR: likely due to CHARLIE - improving  - CV: BP soft  - HFpEF: right sided heart failure / cor pulm; no SOB but 2 kg weight gain  - Pulm: RVP + for influenza, rhino and enterovirus - s/p tamiflu. sPHTN    - anemia: in chronic disease + iron def + GIB + malignancy, hold LUIS - s/p IV iron; Hgb < goal   - GI: + FOBT, s/p colonoscopy - with polypoid mass in the proximal sigmoid colon - adenocarcinoma   - hypomagnesemia: likely nutritional +/- diuresis, supplemented    RECOMMEND:  - HF team f/u; on torsemide 20 mg PO TTS  - sildenafil per pulm  - f/u colorectal surgery plan  - continue Mag ox 400 mg BID  - dose meds for a CrCl ~25-30 mL/min    Christina Lyn NP  [facility] ([facility], )  [phone]

## 2019-04-18 NOTE — PROGRESS NOTE ADULT - SUBJECTIVE AND OBJECTIVE BOX
Patient is a 63y old  Female who presents with a chief complaint of Generalized weakness (18 Apr 2019 10:35)      SUBJECTIVE / OVERNIGHT EVENTS: no overnight events    ROS:  Resp: No cough no sputum production  CVS: No chest pain no palpitations no orthopnea  GI: no N/V/D  : no dysuria, no hematuria  Neuro: no weakness no paresthesias  Heme: No petechiae no easy bruising  Msk: No joint pain no swelling  Skin: No rash no itching        MEDICATIONS  (STANDING):  allopurinol 300 milliGRAM(s) Oral daily  buDESOnide  80 MICROgram(s)/formoterol 4.5 MICROgram(s) Inhaler 2 Puff(s) Inhalation two times a day  dextrose 5%. 1000 milliLiter(s) (50 mL/Hr) IV Continuous <Continuous>  dextrose 50% Injectable 12.5 Gram(s) IV Push once  dextrose 50% Injectable 25 Gram(s) IV Push once  dextrose 50% Injectable 25 Gram(s) IV Push once  insulin lispro (HumaLOG) corrective regimen sliding scale   SubCutaneous three times a day before meals  insulin lispro (HumaLOG) corrective regimen sliding scale   SubCutaneous at bedtime  magnesium oxide 400 milliGRAM(s) Oral two times a day with meals  metoprolol succinate ER 25 milliGRAM(s) Oral two times a day  multivitamin 1 Tablet(s) Oral daily  pantoprazole  Injectable 40 milliGRAM(s) IV Push every 12 hours  sildenafil (REVATIO) 10 milliGRAM(s) Oral three times a day  sodium chloride 0.9% lock flush 3 milliLiter(s) IV Push every 8 hours  torsemide 20 milliGRAM(s) Oral <User Schedule>    MEDICATIONS  (PRN):  ALBUTerol/ipratropium for Nebulization 3 milliLiter(s) Nebulizer every 6 hours PRN Shortness of Breath and/or Wheezing  dextrose 40% Gel 15 Gram(s) Oral once PRN Blood Glucose LESS THAN 70 milliGRAM(s)/deciliter  glucagon  Injectable 1 milliGRAM(s) IntraMuscular once PRN Glucose LESS THAN 70 milligrams/deciliter  guaiFENesin  milliGRAM(s) Oral every 12 hours PRN Cough        CAPILLARY BLOOD GLUCOSE      POCT Blood Glucose.: 139 mg/dL (18 Apr 2019 17:16)  POCT Blood Glucose.: 126 mg/dL (18 Apr 2019 13:00)  POCT Blood Glucose.: 89 mg/dL (18 Apr 2019 08:57)  POCT Blood Glucose.: 155 mg/dL (17 Apr 2019 21:13)    I&O's Summary    17 Apr 2019 07:01  -  18 Apr 2019 07:00  --------------------------------------------------------  IN: 370 mL / OUT: 1100 mL / NET: -730 mL    18 Apr 2019 07:01  -  18 Apr 2019 18:05  --------------------------------------------------------  IN: 250 mL / OUT: 475 mL / NET: -225 mL        Vital Signs Last 24 Hrs  T(C): 36.5 (18 Apr 2019 13:20), Max: 37.2 (17 Apr 2019 18:35)  T(F): 97.7 (18 Apr 2019 13:20), Max: 98.9 (17 Apr 2019 18:35)  HR: 94 (18 Apr 2019 13:20) (73 - 94)  BP: 95/560 (18 Apr 2019 13:20) (81/51 - 107/69)  BP(mean): --  RR: 16 (18 Apr 2019 13:20) (16 - 16)  SpO2: 98% (18 Apr 2019 13:20) (98% - 100%)    PHYSICAL EXAM:  GENERAL: NAD, well-developed  HEAD:  Atraumatic, Normocephalic  EYES: EOMI, PERRLA, conjunctiva and sclera clear  NECK: Supple, No JVD  CHEST/LUNG: no rhonchi, no wheeze, clear to auscultation bilaterally  HEART: S1 S2; No rubs or gallops, no murmurs appreciated  ABDOMEN: Soft, Nontender, Nondistended; Bowel sounds present  EXTREMITIES:  No clubbing or cyanosis, + Peripheral Pulses,  1 + bilateral edema  PSYCH: AO x 3 appropriate affect  NEUROLOGY: non-focal, motor and sensory systems intact  SKIN: No rashes or lesions    LABS:                        7.5    4.17  )-----------( 211      ( 18 Apr 2019 07:03 )             25.2     04-18    138  |  105  |  65<H>  ----------------------------<  82  4.2   |  22  |  1.99<H>    Ca    9.5      18 Apr 2019 07:03  Phos  2.7     04-18  Mg     1.8     04-18                  All consultant(s) notes reviewed and care discussed with other providers        Contact Number, Dr Hanna [phone]

## 2019-04-19 LAB
ANION GAP SERPL CALC-SCNC: 11 MMO/L — SIGNIFICANT CHANGE UP (ref 7–14)
BUN SERPL-MCNC: 59 MG/DL — HIGH (ref 7–23)
CALCIUM SERPL-MCNC: 9.8 MG/DL — SIGNIFICANT CHANGE UP (ref 8.4–10.5)
CHLORIDE SERPL-SCNC: 109 MMOL/L — HIGH (ref 98–107)
CO2 SERPL-SCNC: 20 MMOL/L — LOW (ref 22–31)
CREAT SERPL-MCNC: 1.9 MG/DL — HIGH (ref 0.5–1.3)
GLUCOSE SERPL-MCNC: 83 MG/DL — SIGNIFICANT CHANGE UP (ref 70–99)
HCT VFR BLD CALC: 29.4 % — LOW (ref 34.5–45)
HCT VFR BLD CALC: 30.4 % — LOW (ref 34.5–45)
HGB BLD-MCNC: 8.9 G/DL — LOW (ref 11.5–15.5)
HGB BLD-MCNC: 9.3 G/DL — LOW (ref 11.5–15.5)
MAGNESIUM SERPL-MCNC: 1.8 MG/DL — SIGNIFICANT CHANGE UP (ref 1.6–2.6)
MCHC RBC-ENTMCNC: 20.4 PG — LOW (ref 27–34)
MCHC RBC-ENTMCNC: 21.6 PG — LOW (ref 27–34)
MCHC RBC-ENTMCNC: 30.3 % — LOW (ref 32–36)
MCHC RBC-ENTMCNC: 30.6 % — LOW (ref 32–36)
MCV RBC AUTO: 67.4 FL — LOW (ref 80–100)
MCV RBC AUTO: 70.7 FL — LOW (ref 80–100)
NRBC # FLD: 0.06 K/UL — SIGNIFICANT CHANGE UP (ref 0–0)
NRBC # FLD: 0.08 K/UL — SIGNIFICANT CHANGE UP (ref 0–0)
NRBC FLD-RTO: 1.2 — SIGNIFICANT CHANGE UP
NRBC FLD-RTO: 1.5 — SIGNIFICANT CHANGE UP
PLATELET # BLD AUTO: 201 K/UL — SIGNIFICANT CHANGE UP (ref 150–400)
PLATELET # BLD AUTO: 221 K/UL — SIGNIFICANT CHANGE UP (ref 150–400)
PMV BLD: 10.1 FL — SIGNIFICANT CHANGE UP (ref 7–13)
PMV BLD: SIGNIFICANT CHANGE UP FL (ref 7–13)
POTASSIUM SERPL-MCNC: 4.3 MMOL/L — SIGNIFICANT CHANGE UP (ref 3.5–5.3)
POTASSIUM SERPL-SCNC: 4.3 MMOL/L — SIGNIFICANT CHANGE UP (ref 3.5–5.3)
RBC # BLD: 4.3 M/UL — SIGNIFICANT CHANGE UP (ref 3.8–5.2)
RBC # BLD: 4.36 M/UL — SIGNIFICANT CHANGE UP (ref 3.8–5.2)
RBC # FLD: 20.1 % — HIGH (ref 10.3–14.5)
RBC # FLD: 21.4 % — HIGH (ref 10.3–14.5)
SODIUM SERPL-SCNC: 140 MMOL/L — SIGNIFICANT CHANGE UP (ref 135–145)
WBC # BLD: 4.82 K/UL — SIGNIFICANT CHANGE UP (ref 3.8–10.5)
WBC # BLD: 5.32 K/UL — SIGNIFICANT CHANGE UP (ref 3.8–10.5)
WBC # FLD AUTO: 4.82 K/UL — SIGNIFICANT CHANGE UP (ref 3.8–10.5)
WBC # FLD AUTO: 5.32 K/UL — SIGNIFICANT CHANGE UP (ref 3.8–10.5)

## 2019-04-19 RX ADMIN — MAGNESIUM OXIDE 400 MG ORAL TABLET 400 MILLIGRAM(S): 241.3 TABLET ORAL at 09:13

## 2019-04-19 RX ADMIN — Medication 1 TABLET(S): at 13:50

## 2019-04-19 RX ADMIN — SODIUM CHLORIDE 3 MILLILITER(S): 9 INJECTION INTRAMUSCULAR; INTRAVENOUS; SUBCUTANEOUS at 13:49

## 2019-04-19 RX ADMIN — SODIUM CHLORIDE 3 MILLILITER(S): 9 INJECTION INTRAMUSCULAR; INTRAVENOUS; SUBCUTANEOUS at 21:10

## 2019-04-19 RX ADMIN — Medication 20 MILLIGRAM(S): at 13:51

## 2019-04-19 RX ADMIN — Medication 25 MILLIGRAM(S): at 18:32

## 2019-04-19 RX ADMIN — MAGNESIUM OXIDE 400 MG ORAL TABLET 400 MILLIGRAM(S): 241.3 TABLET ORAL at 18:32

## 2019-04-19 RX ADMIN — PANTOPRAZOLE SODIUM 40 MILLIGRAM(S): 20 TABLET, DELAYED RELEASE ORAL at 18:33

## 2019-04-19 RX ADMIN — Medication 300 MILLIGRAM(S): at 13:51

## 2019-04-19 RX ADMIN — Medication 10 MILLIGRAM(S): at 05:58

## 2019-04-19 RX ADMIN — SODIUM CHLORIDE 3 MILLILITER(S): 9 INJECTION INTRAMUSCULAR; INTRAVENOUS; SUBCUTANEOUS at 05:51

## 2019-04-19 RX ADMIN — Medication 10 MILLIGRAM(S): at 21:08

## 2019-04-19 RX ADMIN — BUDESONIDE AND FORMOTEROL FUMARATE DIHYDRATE 2 PUFF(S): 160; 4.5 AEROSOL RESPIRATORY (INHALATION) at 09:13

## 2019-04-19 RX ADMIN — BUDESONIDE AND FORMOTEROL FUMARATE DIHYDRATE 2 PUFF(S): 160; 4.5 AEROSOL RESPIRATORY (INHALATION) at 21:08

## 2019-04-19 RX ADMIN — Medication 10 MILLIGRAM(S): at 13:50

## 2019-04-19 RX ADMIN — PANTOPRAZOLE SODIUM 40 MILLIGRAM(S): 20 TABLET, DELAYED RELEASE ORAL at 05:58

## 2019-04-19 NOTE — PROGRESS NOTE ADULT - PROBLEM SELECTOR PLAN 2
continue Revatio  BP much improved after 2 units PRBC  will continue decreased metoprolol 25 BID  d/w pulmonary  as per pulm HTN team no increase in Revatio dose  change hold parameters for Lasix so patient is able to get more Lasix

## 2019-04-19 NOTE — PROGRESS NOTE ADULT - SUBJECTIVE AND OBJECTIVE BOX
PULM/MED PROGRESS NOTE:    awake, alert comfortable in bed      MEDICATIONS  (STANDING):  allopurinol 300 milliGRAM(s) Oral daily  buDESOnide  80 MICROgram(s)/formoterol 4.5 MICROgram(s) Inhaler 2 Puff(s) Inhalation two times a day  dextrose 5%. 1000 milliLiter(s) (50 mL/Hr) IV Continuous <Continuous>  dextrose 50% Injectable 12.5 Gram(s) IV Push once  dextrose 50% Injectable 25 Gram(s) IV Push once  dextrose 50% Injectable 25 Gram(s) IV Push once  insulin lispro (HumaLOG) corrective regimen sliding scale   SubCutaneous three times a day before meals  insulin lispro (HumaLOG) corrective regimen sliding scale   SubCutaneous at bedtime  magnesium oxide 400 milliGRAM(s) Oral two times a day with meals  metoprolol succinate ER 25 milliGRAM(s) Oral two times a day  multivitamin 1 Tablet(s) Oral daily  pantoprazole  Injectable 40 milliGRAM(s) IV Push every 12 hours  sildenafil (REVATIO) 10 milliGRAM(s) Oral three times a day  sodium chloride 0.9% lock flush 3 milliLiter(s) IV Push every 8 hours  torsemide 20 milliGRAM(s) Oral <User Schedule>      MEDICATIONS  (PRN):  ALBUTerol/ipratropium for Nebulization 3 milliLiter(s) Nebulizer every 6 hours PRN Shortness of Breath and/or Wheezing  dextrose 40% Gel 15 Gram(s) Oral once PRN Blood Glucose LESS THAN 70 milliGRAM(s)/deciliter  glucagon  Injectable 1 milliGRAM(s) IntraMuscular once PRN Glucose LESS THAN 70 milligrams/deciliter  guaiFENesin  milliGRAM(s) Oral every 12 hours PRN Cough          Vital Signs Last 24 Hrs  T(C): 36.4 (19 Apr 2019 05:55), Max: 37.3 (18 Apr 2019 19:57)  T(F): 97.5 (19 Apr 2019 05:55), Max: 99.1 (18 Apr 2019 19:57)  HR: 82 (19 Apr 2019 05:55) (78 - 94)  BP: 107/69 (19 Apr 2019 05:55) (81/51 - 128/71)  BP(mean): --  RR: 18 (19 Apr 2019 05:55) (16 - 18)  SpO2: 100% (19 Apr 2019 05:55) (98% - 100%)    PHYSICAL EXAMINATION:    GENERAL: The patient is awake and alert in no apparent distress.     LUNGS: Clear to auscultation without wheezing, rales or rhonchi; respirations unlabored    HEART: irreg irreg    ABDOMEN: Soft, nontender, and nondistended.      EXTREMITIES: 1 - 2+ ankle edema.          LABS:                        9.3    4.82  )-----------( 201      ( 19 Apr 2019 07:47 )             30.4     04-19    140  |  109<H>  |  59<H>  ----------------------------<  83  4.3   |  20<L>  |  1.90<H>    Ca    9.8      19 Apr 2019 07:47  Phos  2.7     04-18  Mg     1.8     04-19 PULM/MED PROGRESS NOTE:    awake, alert comfortable in bed      MEDICATIONS  (STANDING):  allopurinol 300 milliGRAM(s) Oral daily  buDESOnide  80 MICROgram(s)/formoterol 4.5 MICROgram(s) Inhaler 2 Puff(s) Inhalation two times a day  dextrose 5%. 1000 milliLiter(s) (50 mL/Hr) IV Continuous <Continuous>  dextrose 50% Injectable 12.5 Gram(s) IV Push once  dextrose 50% Injectable 25 Gram(s) IV Push once  dextrose 50% Injectable 25 Gram(s) IV Push once  insulin lispro (HumaLOG) corrective regimen sliding scale   SubCutaneous three times a day before meals  insulin lispro (HumaLOG) corrective regimen sliding scale   SubCutaneous at bedtime  magnesium oxide 400 milliGRAM(s) Oral two times a day with meals  metoprolol succinate ER 25 milliGRAM(s) Oral two times a day  multivitamin 1 Tablet(s) Oral daily  pantoprazole  Injectable 40 milliGRAM(s) IV Push every 12 hours  sildenafil (REVATIO) 10 milliGRAM(s) Oral three times a day  sodium chloride 0.9% lock flush 3 milliLiter(s) IV Push every 8 hours  torsemide 20 milliGRAM(s) Oral <User Schedule>      MEDICATIONS  (PRN):  ALBUTerol/ipratropium for Nebulization 3 milliLiter(s) Nebulizer every 6 hours PRN Shortness of Breath and/or Wheezing  dextrose 40% Gel 15 Gram(s) Oral once PRN Blood Glucose LESS THAN 70 milliGRAM(s)/deciliter  glucagon  Injectable 1 milliGRAM(s) IntraMuscular once PRN Glucose LESS THAN 70 milligrams/deciliter  guaiFENesin  milliGRAM(s) Oral every 12 hours PRN Cough          Vital Signs Last 24 Hrs  T(C): 36.4 (19 Apr 2019 05:55), Max: 37.3 (18 Apr 2019 19:57)  T(F): 97.5 (19 Apr 2019 05:55), Max: 99.1 (18 Apr 2019 19:57)  HR: 82 (19 Apr 2019 05:55) (78 - 94)  BP: 107/69 (19 Apr 2019 05:55) (81/51 - 128/71)  BP(mean): --  RR: 18 (19 Apr 2019 05:55) (16 - 18)  SpO2: 100% (19 Apr 2019 05:55) (98% - 100%)    PHYSICAL EXAMINATION:    GENERAL: The patient is awake and alert in no apparent distress.     LUNGS: Clear to auscultation without wheezing, rales or rhonchi; respirations unlabored    HEART: irreg irreg    ABDOMEN: Soft, nontender, and nondistended.      EXTREMITIES: 1 - 2+ ankle edema.    LABS:                        9.3    4.82  )-----------( 201      ( 19 Apr 2019 07:47 )             30.4     04-19    140  |  109<H>  |  59<H>  ----------------------------<  83  4.3   |  20<L>  |  1.90<H>    Ca    9.8      19 Apr 2019 07:47  Phos  2.7     04-18  Mg     1.8     04-19

## 2019-04-19 NOTE — CHART NOTE - NSCHARTNOTEFT_GEN_A_CORE
Discussed up-titration of Sildenafil to 20mg TID with house pulm, they do not recommend up-titration but rather keep patient on 10mg TID

## 2019-04-19 NOTE — PROGRESS NOTE ADULT - SUBJECTIVE AND OBJECTIVE BOX
[facility]OLOGY - [facility] (formerly known as [facility])    Patient seen and examined.  NAD  S/P 2units PRBC yesterday    MEDICATIONS  (STANDING):  allopurinol 300 milliGRAM(s) Oral daily  buDESOnide  80 MICROgram(s)/formoterol 4.5 MICROgram(s) Inhaler 2 Puff(s) Inhalation two times a day  magnesium oxide 400 milliGRAM(s) Oral two times a day with meals  metoprolol succinate ER 25 milliGRAM(s) Oral two times a day  multivitamin 1 Tablet(s) Oral daily  pantoprazole  Injectable 40 milliGRAM(s) IV Push every 12 hours  sildenafil (REVATIO) 10 milliGRAM(s) Oral three times a day  torsemide 20 milliGRAM(s) Oral <User Schedule>    VITALS:  T(C): , Max: 37.3 (04-18-19 @ 19:57)  T(F): , Max: 99.1 (04-18-19 @ 19:57)  HR: 82 (04-19-19 @ 05:55)  BP: 107/69 (04-19-19 @ 05:55)  RR: 18 (04-19-19 @ 05:55)  SpO2: 100% (04-19-19 @ 05:55)    04-18 @ 07:01  -  04-19 @ 07:00  --------------------------------------------------------  IN: 250 mL / OUT: 1075 mL / NET: -825 mL    REVIEW OF SYSTEMS:  Full ROS done and were negative unless otherwise indicated in HPI/assessment.     PHYSICAL EXAM:  Constitutional: NAD  Respiratory: CTA B/L  Cardiovascular: S1 and S2, RRR  Gastrointestinal: + BS, soft, NT, ND  Extremities: no peripheral edema  Neurological: AAO x 3  : no dawson    LABS:                        9.3    4.82  )-----------( 201      ( 19 Apr 2019 07:47 )             30.4     04-19    140  |  109<H>  |  59<H>  ----------------------------<  83  4.3   |  20<L>  |  1.90<H>    Ca    9.8      19 Apr 2019 07:47  Phos  2.7     04-18  Mg     1.8     04-19    ASSESSMENT  63F with COPD (on O2), LINH, AF on A/C, HLD, gout, HFpEF, PHTN , DM and CKD3/4 p/w generalized weakness and poor appetite.   - CKD: stage 3 with baseline Cr ~ 1.6  - OSMAR: likely due to CHARLIE - improving  - CV: BP better s/p PRBC  - HFpEF: right sided heart failure / cor pulm; no SOB; volume status acceptable   - Pulm: RVP + for influenza, rhino and enterovirus - s/p tamiflu. sPHTN    - anemia: in chronic disease + iron def + GIB + malignancy, hold LUIS - s/p IV iron; Hgb improving s/p 2 units PRBC   - GI: + FOBT, s/p colonoscopy - with polypoid mass in the proximal sigmoid colon - adenocarcinoma   - hypomagnesemia: likely nutritional +/- diuresis, supplemented    RECOMMEND:  - HF team f/u; on torsemide 20 mg PO TTSun  - sildenafil per pulm  - f/u colorectal surgery plan  - awaiting anesthesia evaluation  - continue Mag ox 400 mg BID  - dose meds for a CrCl ~25-30 mL/min    Christina Lyn NP  [facility] ([facility], )  [phone] [facility]OLOGY - [facility] (formerly known as [facility])    Patient seen and examined.  NAD  S/P 2units PRBC yesterday    MEDICATIONS  (STANDING):  allopurinol 300 milliGRAM(s) Oral daily  buDESOnide  80 MICROgram(s)/formoterol 4.5 MICROgram(s) Inhaler 2 Puff(s) Inhalation two times a day  magnesium oxide 400 milliGRAM(s) Oral two times a day with meals  metoprolol succinate ER 25 milliGRAM(s) Oral two times a day  multivitamin 1 Tablet(s) Oral daily  pantoprazole  Injectable 40 milliGRAM(s) IV Push every 12 hours  sildenafil (REVATIO) 10 milliGRAM(s) Oral three times a day  torsemide 20 milliGRAM(s) Oral <User Schedule>    VITALS:  T(C): , Max: 37.3 (04-18-19 @ 19:57)  T(F): , Max: 99.1 (04-18-19 @ 19:57)  HR: 82 (04-19-19 @ 05:55)  BP: 107/69 (04-19-19 @ 05:55)  RR: 18 (04-19-19 @ 05:55)  SpO2: 100% (04-19-19 @ 05:55)    04-18 @ 07:01  -  04-19 @ 07:00  --------------------------------------------------------  IN: 250 mL / OUT: 1075 mL / NET: -825 mL    REVIEW OF SYSTEMS:  Full ROS done and were negative unless otherwise indicated in HPI/assessment.     PHYSICAL EXAM:  Constitutional: NAD  Respiratory: CTA B/L  Cardiovascular: S1 and S2, RRR  Gastrointestinal: + BS, soft, NT, ND  Extremities: no peripheral edema  Neurological: AAO x 3  : no dawson    LABS:                        9.3    4.82  )-----------( 201      ( 19 Apr 2019 07:47 )             30.4     04-19    140  |  109<H>  |  59<H>  ----------------------------<  83  4.3   |  20<L>  |  1.90<H>    Ca    9.8      19 Apr 2019 07:47  Phos  2.7     04-18  Mg     1.8     04-19    ASSESSMENT  63F with COPD (on O2), LINH, AF on A/C, HLD, gout, HFpEF, PHTN , DM and CKD3/4 p/w generalized weakness and poor appetite.   - CKD: stage 3 with baseline Cr ~ 1.6  - OSMAR: likely due to CHARLIE - improving  - CV: BP better s/p PRBC  - HFpEF: right sided heart failure / cor pulm; no SOB; volume status acceptable   - Pulm: RVP + for influenza, rhino and enterovirus - s/p tamiflu. sPHTN    - anemia: in chronic disease + iron def + GIB + malignancy, hold LUIS - s/p IV iron; Hgb improving s/p 2 units PRBC   - GI: + FOBT, s/p colonoscopy - with polypoid mass in the proximal sigmoid colon - adenocarcinoma   - hypomagnesemia: likely nutritional +/- diuresis, supplemented    RECOMMEND:  - HF team f/u; on torsemide 20 mg PO TTSun; consider changing to daily dosing  - sildenafil per pulm  - f/u colorectal surgery plan  - awaiting anesthesia evaluation  - continue Mag ox 400 mg BID  - dose meds for a CrCl ~25-30 mL/min    Christina Lyn NP  [facility] ([facility], )  [phone]

## 2019-04-19 NOTE — PROGRESS NOTE ADULT - SUBJECTIVE AND OBJECTIVE BOX
Patient is a 63y old  Female who presents with a chief complaint of Generalized weakness influenza (19 Apr 2019 09:01)      SUBJECTIVE / OVERNIGHT EVENTS: no overnight events    ROS:  Resp: No cough no sputum production  CVS: No chest pain no palpitations no orthopnea  GI: no N/V/D  : no dysuria, no hematuria  Neuro: no weakness no paresthesias  Heme: No petechiae no easy bruising  Msk: No joint pain no swelling  Skin: No rash no itching        MEDICATIONS  (STANDING):  allopurinol 300 milliGRAM(s) Oral daily  buDESOnide  80 MICROgram(s)/formoterol 4.5 MICROgram(s) Inhaler 2 Puff(s) Inhalation two times a day  dextrose 5%. 1000 milliLiter(s) (50 mL/Hr) IV Continuous <Continuous>  dextrose 50% Injectable 12.5 Gram(s) IV Push once  dextrose 50% Injectable 25 Gram(s) IV Push once  dextrose 50% Injectable 25 Gram(s) IV Push once  insulin lispro (HumaLOG) corrective regimen sliding scale   SubCutaneous three times a day before meals  insulin lispro (HumaLOG) corrective regimen sliding scale   SubCutaneous at bedtime  magnesium oxide 400 milliGRAM(s) Oral two times a day with meals  metoprolol succinate ER 25 milliGRAM(s) Oral two times a day  multivitamin 1 Tablet(s) Oral daily  pantoprazole  Injectable 40 milliGRAM(s) IV Push every 12 hours  sildenafil (REVATIO) 10 milliGRAM(s) Oral three times a day  sodium chloride 0.9% lock flush 3 milliLiter(s) IV Push every 8 hours  torsemide 20 milliGRAM(s) Oral <User Schedule>    MEDICATIONS  (PRN):  ALBUTerol/ipratropium for Nebulization 3 milliLiter(s) Nebulizer every 6 hours PRN Shortness of Breath and/or Wheezing  dextrose 40% Gel 15 Gram(s) Oral once PRN Blood Glucose LESS THAN 70 milliGRAM(s)/deciliter  glucagon  Injectable 1 milliGRAM(s) IntraMuscular once PRN Glucose LESS THAN 70 milligrams/deciliter  guaiFENesin  milliGRAM(s) Oral every 12 hours PRN Cough        CAPILLARY BLOOD GLUCOSE      POCT Blood Glucose.: 116 mg/dL (19 Apr 2019 12:40)  POCT Blood Glucose.: 86 mg/dL (19 Apr 2019 08:43)  POCT Blood Glucose.: 151 mg/dL (18 Apr 2019 21:51)  POCT Blood Glucose.: 139 mg/dL (18 Apr 2019 17:16)    I&O's Summary    18 Apr 2019 07:01  -  19 Apr 2019 07:00  --------------------------------------------------------  IN: 250 mL / OUT: 1075 mL / NET: -825 mL        Vital Signs Last 24 Hrs  T(C): 36.7 (19 Apr 2019 13:49), Max: 37.3 (18 Apr 2019 19:57)  T(F): 98.1 (19 Apr 2019 13:49), Max: 99.1 (18 Apr 2019 19:57)  HR: 93 (19 Apr 2019 13:49) (78 - 93)  BP: 99/64 (19 Apr 2019 13:49) (90/50 - 128/71)  BP(mean): --  RR: 16 (19 Apr 2019 13:49) (16 - 18)  SpO2: 96% (19 Apr 2019 13:49) (96% - 100%)    PHYSICAL EXAM:  GENERAL: NAD, well-developed  HEAD:  Atraumatic, Normocephalic  EYES: EOMI, PERRLA, conjunctiva and sclera clear  NECK: Supple, No JVD  CHEST/LUNG: no rhonchi, no wheeze, clear to auscultation bilaterally  HEART: S1 S2; No rubs or gallops, no murmurs appreciated  ABDOMEN: Soft, Nontender, Nondistended; Bowel sounds present  EXTREMITIES:  No clubbing or cyanosis, + Peripheral Pulses,  2 + bilateral edema  PSYCH: AO x 3 appropriate affect  NEUROLOGY: non-focal, motor and sensory systems intact  SKIN: No rashes or lesions    LABS:                        9.3    4.82  )-----------( 201      ( 19 Apr 2019 07:47 )             30.4     04-19    140  |  109<H>  |  59<H>  ----------------------------<  83  4.3   |  20<L>  |  1.90<H>    Ca    9.8      19 Apr 2019 07:47  Phos  2.7     04-18  Mg     1.8     04-19                  All consultant(s) notes reviewed and care discussed with other providers        Contact Number, Dr Hanna [phone]

## 2019-04-19 NOTE — PROGRESS NOTE ADULT - ASSESSMENT
Discussed yesterday with Dr. Hanna  s/ p Transfusion    Continue present regimen    Monitor BP    Anesthesia evaluation Discussed yesterday with Dr. Hanna  s/ p Transfusion    Maintain Hgb close to 10 armen close to surgery date      Continue present regimen - Consider increasing sildenafil dose as tolerated    Monitor BP     (Note that ARISCAT preop pulmonary risk score puts the patient at "intermediate risk" - for pulmonary risks which was discussed with pt)    Presurgery Anesthesia evaluation    Consider  pulm htn group followup (seen earlier during this adm )

## 2019-04-19 NOTE — CHART NOTE - NSCHARTNOTEFT_GEN_A_CORE
Discussed case with HF and Dr. Hanna as pt had noticed her weight increasing from earlier in week. Pt has not been given Torsemide due to low BP, will dose Torsemide x1 today as per HF recs and change hold parameters to hold for SBP <90.

## 2019-04-19 NOTE — PROGRESS NOTE ADULT - ASSESSMENT
64 yo woman with multiple medical comorbidities including afib, right-sided CHF with severe pulmonary HTN, COPD on nocturnal O2, LINH newly initiated on bilevel support found to have a non-obstructing sigmoid mass.     - Appreciate cardiac clearance  - Appreciate Pulmonary evaluation - Needs Pulmonary optimization and risk stratification for possible surgery  - Plan for OR on Wednesday, 4/24/19     A Team Surgery  [id number]

## 2019-04-20 LAB
ANION GAP SERPL CALC-SCNC: 12 MMO/L — SIGNIFICANT CHANGE UP (ref 7–14)
BUN SERPL-MCNC: 53 MG/DL — HIGH (ref 7–23)
CALCIUM SERPL-MCNC: 9.7 MG/DL — SIGNIFICANT CHANGE UP (ref 8.4–10.5)
CHLORIDE SERPL-SCNC: 108 MMOL/L — HIGH (ref 98–107)
CO2 SERPL-SCNC: 20 MMOL/L — LOW (ref 22–31)
CREAT SERPL-MCNC: 1.92 MG/DL — HIGH (ref 0.5–1.3)
GLUCOSE SERPL-MCNC: 75 MG/DL — SIGNIFICANT CHANGE UP (ref 70–99)
HCT VFR BLD CALC: 31.1 % — LOW (ref 34.5–45)
HGB BLD-MCNC: 9.5 G/DL — LOW (ref 11.5–15.5)
MAGNESIUM SERPL-MCNC: 1.7 MG/DL — SIGNIFICANT CHANGE UP (ref 1.6–2.6)
MCHC RBC-ENTMCNC: 21.6 PG — LOW (ref 27–34)
MCHC RBC-ENTMCNC: 30.5 % — LOW (ref 32–36)
MCV RBC AUTO: 70.7 FL — LOW (ref 80–100)
NRBC # FLD: 0.06 K/UL — SIGNIFICANT CHANGE UP (ref 0–0)
NRBC FLD-RTO: 1.3 — SIGNIFICANT CHANGE UP
PLATELET # BLD AUTO: 189 K/UL — SIGNIFICANT CHANGE UP (ref 150–400)
PMV BLD: SIGNIFICANT CHANGE UP FL (ref 7–13)
POTASSIUM SERPL-MCNC: 4.5 MMOL/L — SIGNIFICANT CHANGE UP (ref 3.5–5.3)
POTASSIUM SERPL-SCNC: 4.5 MMOL/L — SIGNIFICANT CHANGE UP (ref 3.5–5.3)
RBC # BLD: 4.4 M/UL — SIGNIFICANT CHANGE UP (ref 3.8–5.2)
RBC # FLD: 21.2 % — HIGH (ref 10.3–14.5)
SODIUM SERPL-SCNC: 140 MMOL/L — SIGNIFICANT CHANGE UP (ref 135–145)
WBC # BLD: 4.66 K/UL — SIGNIFICANT CHANGE UP (ref 3.8–10.5)
WBC # FLD AUTO: 4.66 K/UL — SIGNIFICANT CHANGE UP (ref 3.8–10.5)

## 2019-04-20 RX ADMIN — Medication 25 MILLIGRAM(S): at 05:50

## 2019-04-20 RX ADMIN — Medication 300 MILLIGRAM(S): at 13:05

## 2019-04-20 RX ADMIN — SODIUM CHLORIDE 3 MILLILITER(S): 9 INJECTION INTRAMUSCULAR; INTRAVENOUS; SUBCUTANEOUS at 13:00

## 2019-04-20 RX ADMIN — Medication 10 MILLIGRAM(S): at 21:04

## 2019-04-20 RX ADMIN — BUDESONIDE AND FORMOTEROL FUMARATE DIHYDRATE 2 PUFF(S): 160; 4.5 AEROSOL RESPIRATORY (INHALATION) at 09:16

## 2019-04-20 RX ADMIN — SODIUM CHLORIDE 3 MILLILITER(S): 9 INJECTION INTRAMUSCULAR; INTRAVENOUS; SUBCUTANEOUS at 05:49

## 2019-04-20 RX ADMIN — MAGNESIUM OXIDE 400 MG ORAL TABLET 400 MILLIGRAM(S): 241.3 TABLET ORAL at 17:15

## 2019-04-20 RX ADMIN — Medication 25 MILLIGRAM(S): at 17:15

## 2019-04-20 RX ADMIN — PANTOPRAZOLE SODIUM 40 MILLIGRAM(S): 20 TABLET, DELAYED RELEASE ORAL at 17:15

## 2019-04-20 RX ADMIN — Medication 1 TABLET(S): at 13:05

## 2019-04-20 RX ADMIN — Medication 10 MILLIGRAM(S): at 13:05

## 2019-04-20 RX ADMIN — SODIUM CHLORIDE 3 MILLILITER(S): 9 INJECTION INTRAMUSCULAR; INTRAVENOUS; SUBCUTANEOUS at 21:03

## 2019-04-20 RX ADMIN — BUDESONIDE AND FORMOTEROL FUMARATE DIHYDRATE 2 PUFF(S): 160; 4.5 AEROSOL RESPIRATORY (INHALATION) at 21:04

## 2019-04-20 RX ADMIN — Medication 10 MILLIGRAM(S): at 05:50

## 2019-04-20 RX ADMIN — MAGNESIUM OXIDE 400 MG ORAL TABLET 400 MILLIGRAM(S): 241.3 TABLET ORAL at 09:16

## 2019-04-20 RX ADMIN — PANTOPRAZOLE SODIUM 40 MILLIGRAM(S): 20 TABLET, DELAYED RELEASE ORAL at 05:50

## 2019-04-20 NOTE — PROGRESS NOTE ADULT - SUBJECTIVE AND OBJECTIVE BOX
Patient is a 63y old  Female who presents with a chief complaint of Generalized weakness (19 Apr 2019 17:01)      SUBJECTIVE / OVERNIGHT EVENTS: no overnight events    ROS:  Resp: No cough no sputum production  CVS: No chest pain no palpitations no orthopnea  GI: no N/V/D  : no dysuria, no hematuria  Neuro: no weakness no paresthesias  Heme: No petechiae no easy bruising  Msk: No joint pain no swelling  Skin: No rash no itching        MEDICATIONS  (STANDING):  allopurinol 300 milliGRAM(s) Oral daily  buDESOnide  80 MICROgram(s)/formoterol 4.5 MICROgram(s) Inhaler 2 Puff(s) Inhalation two times a day  dextrose 5%. 1000 milliLiter(s) (50 mL/Hr) IV Continuous <Continuous>  dextrose 50% Injectable 12.5 Gram(s) IV Push once  dextrose 50% Injectable 25 Gram(s) IV Push once  dextrose 50% Injectable 25 Gram(s) IV Push once  insulin lispro (HumaLOG) corrective regimen sliding scale   SubCutaneous three times a day before meals  insulin lispro (HumaLOG) corrective regimen sliding scale   SubCutaneous at bedtime  magnesium oxide 400 milliGRAM(s) Oral two times a day with meals  metoprolol succinate ER 25 milliGRAM(s) Oral two times a day  multivitamin 1 Tablet(s) Oral daily  pantoprazole  Injectable 40 milliGRAM(s) IV Push every 12 hours  sildenafil (REVATIO) 10 milliGRAM(s) Oral three times a day  sodium chloride 0.9% lock flush 3 milliLiter(s) IV Push every 8 hours  torsemide 20 milliGRAM(s) Oral <User Schedule>    MEDICATIONS  (PRN):  ALBUTerol/ipratropium for Nebulization 3 milliLiter(s) Nebulizer every 6 hours PRN Shortness of Breath and/or Wheezing  dextrose 40% Gel 15 Gram(s) Oral once PRN Blood Glucose LESS THAN 70 milliGRAM(s)/deciliter  glucagon  Injectable 1 milliGRAM(s) IntraMuscular once PRN Glucose LESS THAN 70 milligrams/deciliter  guaiFENesin  milliGRAM(s) Oral every 12 hours PRN Cough        CAPILLARY BLOOD GLUCOSE      POCT Blood Glucose.: 99 mg/dL (20 Apr 2019 17:36)  POCT Blood Glucose.: 104 mg/dL (20 Apr 2019 12:34)  POCT Blood Glucose.: 87 mg/dL (20 Apr 2019 09:07)  POCT Blood Glucose.: 135 mg/dL (19 Apr 2019 21:39)    I&O's Summary    19 Apr 2019 07:01  -  20 Apr 2019 07:00  --------------------------------------------------------  IN: 500 mL / OUT: 1500 mL / NET: -1000 mL        Vital Signs Last 24 Hrs  T(C): 37 (20 Apr 2019 13:04), Max: 37 (20 Apr 2019 13:04)  T(F): 98.6 (20 Apr 2019 13:04), Max: 98.6 (20 Apr 2019 13:04)  HR: 85 (20 Apr 2019 17:14) (79 - 86)  BP: 118/83 (20 Apr 2019 17:14) (105/72 - 118/83)  BP(mean): --  RR: 16 (20 Apr 2019 13:04) (16 - 17)  SpO2: 98% (20 Apr 2019 13:04) (98% - 100%)    PHYSICAL EXAM:  GENERAL: NAD, well-developed  HEAD:  Atraumatic, Normocephalic  EYES: EOMI, PERRLA, conjunctiva and sclera clear  NECK: Supple, No JVD  CHEST/LUNG: no rhonchi, no wheeze, clear to auscultation bilaterally  HEART: S1 S2; No rubs or gallops, no murmurs appreciated  ABDOMEN: Soft, Nontender, Nondistended; Bowel sounds present  EXTREMITIES:  No clubbing or cyanosis, + Peripheral Pulses,  2 + bilateral edema  PSYCH: AO x 3 appropriate affect  NEUROLOGY: non-focal, motor and sensory systems intact  SKIN: No rashes or lesions    LABS:                        9.5    4.66  )-----------( 189      ( 20 Apr 2019 06:16 )             31.1     04-20    140  |  108<H>  |  53<H>  ----------------------------<  75  4.5   |  20<L>  |  1.92<H>    Ca    9.7      20 Apr 2019 06:16  Mg     1.7     04-20                  All consultant(s) notes reviewed and care discussed with other providers        Contact Number, Dr Hanna [phone]

## 2019-04-20 NOTE — PROGRESS NOTE ADULT - PROBLEM SELECTOR PLAN 2
continue Revatio  BP much improved after 2 units PRBC  will continue decreased metoprolol 25 BID  d/w pulmonary  BP improved

## 2019-04-21 LAB
ANION GAP SERPL CALC-SCNC: 11 MMO/L — SIGNIFICANT CHANGE UP (ref 7–14)
BUN SERPL-MCNC: 48 MG/DL — HIGH (ref 7–23)
CALCIUM SERPL-MCNC: 9.3 MG/DL — SIGNIFICANT CHANGE UP (ref 8.4–10.5)
CHLORIDE SERPL-SCNC: 107 MMOL/L — SIGNIFICANT CHANGE UP (ref 98–107)
CO2 SERPL-SCNC: 21 MMOL/L — LOW (ref 22–31)
CREAT SERPL-MCNC: 1.61 MG/DL — HIGH (ref 0.5–1.3)
GLUCOSE SERPL-MCNC: 74 MG/DL — SIGNIFICANT CHANGE UP (ref 70–99)
HCT VFR BLD CALC: 30.8 % — LOW (ref 34.5–45)
HGB BLD-MCNC: 9.4 G/DL — LOW (ref 11.5–15.5)
MAGNESIUM SERPL-MCNC: 1.8 MG/DL — SIGNIFICANT CHANGE UP (ref 1.6–2.6)
MCHC RBC-ENTMCNC: 21.6 PG — LOW (ref 27–34)
MCHC RBC-ENTMCNC: 30.5 % — LOW (ref 32–36)
MCV RBC AUTO: 70.6 FL — LOW (ref 80–100)
NRBC # FLD: 0.07 K/UL — SIGNIFICANT CHANGE UP (ref 0–0)
NRBC FLD-RTO: 1.5 — SIGNIFICANT CHANGE UP
PLATELET # BLD AUTO: 178 K/UL — SIGNIFICANT CHANGE UP (ref 150–400)
PMV BLD: SIGNIFICANT CHANGE UP FL (ref 7–13)
POTASSIUM SERPL-MCNC: 4.3 MMOL/L — SIGNIFICANT CHANGE UP (ref 3.5–5.3)
POTASSIUM SERPL-SCNC: 4.3 MMOL/L — SIGNIFICANT CHANGE UP (ref 3.5–5.3)
RBC # BLD: 4.36 M/UL — SIGNIFICANT CHANGE UP (ref 3.8–5.2)
RBC # FLD: 22 % — HIGH (ref 10.3–14.5)
SODIUM SERPL-SCNC: 139 MMOL/L — SIGNIFICANT CHANGE UP (ref 135–145)
WBC # BLD: 4.74 K/UL — SIGNIFICANT CHANGE UP (ref 3.8–10.5)
WBC # FLD AUTO: 4.74 K/UL — SIGNIFICANT CHANGE UP (ref 3.8–10.5)

## 2019-04-21 RX ADMIN — SODIUM CHLORIDE 3 MILLILITER(S): 9 INJECTION INTRAMUSCULAR; INTRAVENOUS; SUBCUTANEOUS at 13:38

## 2019-04-21 RX ADMIN — SODIUM CHLORIDE 3 MILLILITER(S): 9 INJECTION INTRAMUSCULAR; INTRAVENOUS; SUBCUTANEOUS at 22:10

## 2019-04-21 RX ADMIN — Medication 20 MILLIGRAM(S): at 08:58

## 2019-04-21 RX ADMIN — MAGNESIUM OXIDE 400 MG ORAL TABLET 400 MILLIGRAM(S): 241.3 TABLET ORAL at 18:28

## 2019-04-21 RX ADMIN — MAGNESIUM OXIDE 400 MG ORAL TABLET 400 MILLIGRAM(S): 241.3 TABLET ORAL at 08:58

## 2019-04-21 RX ADMIN — Medication 10 MILLIGRAM(S): at 22:13

## 2019-04-21 RX ADMIN — BUDESONIDE AND FORMOTEROL FUMARATE DIHYDRATE 2 PUFF(S): 160; 4.5 AEROSOL RESPIRATORY (INHALATION) at 22:13

## 2019-04-21 RX ADMIN — SODIUM CHLORIDE 3 MILLILITER(S): 9 INJECTION INTRAMUSCULAR; INTRAVENOUS; SUBCUTANEOUS at 05:31

## 2019-04-21 RX ADMIN — Medication 300 MILLIGRAM(S): at 13:42

## 2019-04-21 RX ADMIN — Medication 1 TABLET(S): at 13:44

## 2019-04-21 RX ADMIN — BUDESONIDE AND FORMOTEROL FUMARATE DIHYDRATE 2 PUFF(S): 160; 4.5 AEROSOL RESPIRATORY (INHALATION) at 08:58

## 2019-04-21 RX ADMIN — PANTOPRAZOLE SODIUM 40 MILLIGRAM(S): 20 TABLET, DELAYED RELEASE ORAL at 05:32

## 2019-04-21 RX ADMIN — PANTOPRAZOLE SODIUM 40 MILLIGRAM(S): 20 TABLET, DELAYED RELEASE ORAL at 18:28

## 2019-04-21 RX ADMIN — Medication 25 MILLIGRAM(S): at 05:32

## 2019-04-21 RX ADMIN — Medication 10 MILLIGRAM(S): at 05:31

## 2019-04-21 RX ADMIN — Medication 10 MILLIGRAM(S): at 13:42

## 2019-04-21 NOTE — PROGRESS NOTE ADULT - SUBJECTIVE AND OBJECTIVE BOX
[facility]Y - [facility] (formerly known as [facility])    Patient seen and examined.    MEDICATIONS  (STANDING):  allopurinol 300 milliGRAM(s) Oral daily  buDESOnide  80 MICROgram(s)/formoterol 4.5 MICROgram(s) Inhaler 2 Puff(s) Inhalation two times a day  magnesium oxide 400 milliGRAM(s) Oral two times a day with meals  metoprolol succinate ER 25 milliGRAM(s) Oral two times a day  multivitamin 1 Tablet(s) Oral daily  pantoprazole  Injectable 40 milliGRAM(s) IV Push every 12 hours  sildenafil (REVATIO) 10 milliGRAM(s) Oral three times a day  torsemide 20 milliGRAM(s) Oral <User Schedule>    VITALS:  T(C): , Max: 37 (04-20-19 @ 13:04)  T(F): , Max: 98.6 (04-20-19 @ 13:04)  HR: 80 (04-21-19 @ 08:56)  BP: 118/75 (04-21-19 @ 08:56)  RR: 16 (04-21-19 @ 08:56)  SpO2: 99% (04-21-19 @ 08:56)    04-20 @ 07:01  -  04-21 @ 07:00  --------------------------------------------------------  IN: 250 mL / OUT: 1400 mL / NET: -1150 mL    04-21 @ 07:01  -  04-21 @ 11:21  --------------------------------------------------------  IN: 0 mL / OUT: 750 mL / NET: -750 mL    PHYSICAL EXAM:  Constitutional: NAD  Respiratory: CTA B/L  Cardiovascular: S1 and S2, RRR  Gastrointestinal: + BS, soft, NT, ND  Extremities: no peripheral edema  Neurological: AAO x 3  : no dawson    LABS:                        9.4    4.74  )-----------( 178      ( 21 Apr 2019 05:23 )             30.8     04-21    139  |  107  |  48<H>  ----------------------------<  74  4.3   |  21<L>  |  1.61<H>    Ca    9.3      21 Apr 2019 05:23  Mg     1.8     04-21    ASSESSMENT  63F with COPD (on O2), LINH, AF on A/C, HLD, gout, HFpEF, PHTN , DM and CKD3/4 p/w generalized weakness and poor appetite.   - CKD: stage 3 with baseline Cr ~ 1.6  - OSMAR: likely due to CHARLIE - resolved  - CV: BP acceptable  - HFpEF: right sided heart failure / cor pulm; no SOB; volume status acceptable   - Pulm: RVP + for influenza, rhino and enterovirus - s/p tamiflu. sPHTN    - anemia: in chronic disease + iron def + GIB + malignancy, hold LUIS - s/p IV iron; Hgb acceptable  - GI: + FOBT, s/p colonoscopy - with polypoid mass in the proximal sigmoid colon - adenocarcinoma   - hypomagnesemia: likely nutritional +/- diuresis, supplemented    RECOMMEND:  - HF team f/u; on torsemide 20 mg PO TTSun  - sildenafil per pulm  - continue Mag ox 400 mg BID  - f/u colorectal surgery plan  - dose meds for a CrCl ~25-30 mL/min    Christina Lyn NP  [facility] ([facility], )  [phone] [facility]OLOGY - [facility] (formerly known as [facility])    Patient seen and examined.  NAD    MEDICATIONS  (STANDING):  allopurinol 300 milliGRAM(s) Oral daily  buDESOnide  80 MICROgram(s)/formoterol 4.5 MICROgram(s) Inhaler 2 Puff(s) Inhalation two times a day  magnesium oxide 400 milliGRAM(s) Oral two times a day with meals  metoprolol succinate ER 25 milliGRAM(s) Oral two times a day  multivitamin 1 Tablet(s) Oral daily  pantoprazole  Injectable 40 milliGRAM(s) IV Push every 12 hours  sildenafil (REVATIO) 10 milliGRAM(s) Oral three times a day  torsemide 20 milliGRAM(s) Oral <User Schedule>    VITALS:  T(C): , Max: 37 (04-20-19 @ 13:04)  T(F): , Max: 98.6 (04-20-19 @ 13:04)  HR: 80 (04-21-19 @ 08:56)  BP: 118/75 (04-21-19 @ 08:56)  RR: 16 (04-21-19 @ 08:56)  SpO2: 99% (04-21-19 @ 08:56)    04-20 @ 07:01  -  04-21 @ 07:00  --------------------------------------------------------  IN: 250 mL / OUT: 1400 mL / NET: -1150 mL    04-21 @ 07:01  -  04-21 @ 11:21  --------------------------------------------------------  IN: 0 mL / OUT: 750 mL / NET: -750 mL    PHYSICAL EXAM:  Constitutional: NAD  Respiratory: CTA B/L  Cardiovascular: S1 and S2, RRR  Gastrointestinal: + BS, soft, NT, ND  Extremities: trace edema  Neurological: AAO x 3  : no dawson    LABS:                        9.4    4.74  )-----------( 178      ( 21 Apr 2019 05:23 )             30.8     04-21    139  |  107  |  48<H>  ----------------------------<  74  4.3   |  21<L>  |  1.61<H>    Ca    9.3      21 Apr 2019 05:23  Mg     1.8     04-21    ASSESSMENT  63F with COPD (on O2), LINH, AF on A/C, HLD, gout, HFpEF, PHTN , DM and CKD3/4 p/w generalized weakness and poor appetite.   - CKD: stage 3 with baseline Cr ~ 1.6  - OSMAR: likely due to CHARLIE - resolved  - CV: BP acceptable  - HFpEF: right sided heart failure / cor pulm; no SOB; volume status acceptable   - Pulm: RVP + for influenza, rhino and enterovirus - s/p tamiflu. sPHTN    - anemia: in chronic disease + iron def + GIB + malignancy, hold LUIS - s/p IV iron; Hgb acceptable  - GI: + FOBT, s/p colonoscopy - with polypoid mass in the proximal sigmoid colon - adenocarcinoma   - hypomagnesemia: likely nutritional +/- diuresis, supplemented    RECOMMEND:  - HF team f/u; on torsemide 20 mg PO TTSun  - sildenafil per pulm  - continue Mag ox 400 mg BID  - f/u colorectal surgery plan (OR tentatively Wednesday)  - dose meds for a CrCl ~25-30 mL/min    Christina Lyn NP  [facility] ([facility], )  [phone]

## 2019-04-21 NOTE — PROGRESS NOTE ADULT - SUBJECTIVE AND OBJECTIVE BOX
Patient is a 63y old  Female who presents with a chief complaint of Generalized weakness (20 Apr 2019 18:24)      SUBJECTIVE / OVERNIGHT EVENTS: no overnight events    ROS:  Resp: No cough no sputum production  CVS: No chest pain no palpitations no orthopnea  GI: no N/V/D  : no dysuria, no hematuria  Neuro: no weakness no paresthesias  Heme: No petechiae no easy bruising  Msk: No joint pain no swelling  Skin: No rash no itching        MEDICATIONS  (STANDING):  allopurinol 300 milliGRAM(s) Oral daily  buDESOnide  80 MICROgram(s)/formoterol 4.5 MICROgram(s) Inhaler 2 Puff(s) Inhalation two times a day  dextrose 5%. 1000 milliLiter(s) (50 mL/Hr) IV Continuous <Continuous>  dextrose 50% Injectable 12.5 Gram(s) IV Push once  dextrose 50% Injectable 25 Gram(s) IV Push once  dextrose 50% Injectable 25 Gram(s) IV Push once  insulin lispro (HumaLOG) corrective regimen sliding scale   SubCutaneous three times a day before meals  insulin lispro (HumaLOG) corrective regimen sliding scale   SubCutaneous at bedtime  magnesium oxide 400 milliGRAM(s) Oral two times a day with meals  metoprolol succinate ER 25 milliGRAM(s) Oral two times a day  multivitamin 1 Tablet(s) Oral daily  pantoprazole  Injectable 40 milliGRAM(s) IV Push every 12 hours  sildenafil (REVATIO) 10 milliGRAM(s) Oral three times a day  sodium chloride 0.9% lock flush 3 milliLiter(s) IV Push every 8 hours  torsemide 20 milliGRAM(s) Oral <User Schedule>    MEDICATIONS  (PRN):  ALBUTerol/ipratropium for Nebulization 3 milliLiter(s) Nebulizer every 6 hours PRN Shortness of Breath and/or Wheezing  dextrose 40% Gel 15 Gram(s) Oral once PRN Blood Glucose LESS THAN 70 milliGRAM(s)/deciliter  glucagon  Injectable 1 milliGRAM(s) IntraMuscular once PRN Glucose LESS THAN 70 milligrams/deciliter  guaiFENesin  milliGRAM(s) Oral every 12 hours PRN Cough        CAPILLARY BLOOD GLUCOSE      POCT Blood Glucose.: 96 mg/dL (21 Apr 2019 12:23)  POCT Blood Glucose.: 93 mg/dL (21 Apr 2019 09:00)  POCT Blood Glucose.: 123 mg/dL (20 Apr 2019 23:00)  POCT Blood Glucose.: 99 mg/dL (20 Apr 2019 17:36)    I&O's Summary    20 Apr 2019 07:01  -  21 Apr 2019 07:00  --------------------------------------------------------  IN: 250 mL / OUT: 1400 mL / NET: -1150 mL    21 Apr 2019 07:01  -  21 Apr 2019 16:43  --------------------------------------------------------  IN: 0 mL / OUT: 1250 mL / NET: -1250 mL        Vital Signs Last 24 Hrs  T(C): 36.6 (21 Apr 2019 15:07), Max: 36.8 (20 Apr 2019 20:50)  T(F): 97.8 (21 Apr 2019 15:07), Max: 98.2 (20 Apr 2019 20:50)  HR: 90 (21 Apr 2019 15:07) (80 - 90)  BP: 105/72 (21 Apr 2019 15:07) (105/72 - 118/83)  BP(mean): --  RR: 18 (21 Apr 2019 15:07) (16 - 18)  SpO2: 100% (21 Apr 2019 15:07) (99% - 100%)    PHYSICAL EXAM:  GENERAL: NAD, well-developed  HEAD:  Atraumatic, Normocephalic  EYES: EOMI, PERRLA, conjunctiva and sclera clear  NECK: Supple, No JVD  CHEST/LUNG: no rhonchi, no wheeze, clear to auscultation bilaterally  HEART: S1 S2; No rubs or gallops, no murmurs appreciated  ABDOMEN: Soft, Nontender, Nondistended; Bowel sounds present  EXTREMITIES:  No clubbing or cyanosis, + Peripheral Pulses,  trace bilateral edema much improved   PSYCH: AO x 3 appropriate affect  NEUROLOGY: non-focal, motor and sensory systems intact  SKIN: No rashes or lesions    LABS:                        9.4    4.74  )-----------( 178      ( 21 Apr 2019 05:23 )             30.8     04-21    139  |  107  |  48<H>  ----------------------------<  74  4.3   |  21<L>  |  1.61<H>    Ca    9.3      21 Apr 2019 05:23  Mg     1.8     04-21                  All consultant(s) notes reviewed and care discussed with other providers        Contact Number, Dr Hanna [phone]

## 2019-04-22 LAB
ANION GAP SERPL CALC-SCNC: 14 MMO/L — SIGNIFICANT CHANGE UP (ref 7–14)
BUN SERPL-MCNC: 55 MG/DL — HIGH (ref 7–23)
CALCIUM SERPL-MCNC: 10.2 MG/DL — SIGNIFICANT CHANGE UP (ref 8.4–10.5)
CHLORIDE SERPL-SCNC: 106 MMOL/L — SIGNIFICANT CHANGE UP (ref 98–107)
CO2 SERPL-SCNC: 20 MMOL/L — LOW (ref 22–31)
CREAT SERPL-MCNC: 1.99 MG/DL — HIGH (ref 0.5–1.3)
GLUCOSE SERPL-MCNC: 76 MG/DL — SIGNIFICANT CHANGE UP (ref 70–99)
HCT VFR BLD CALC: 33.6 % — LOW (ref 34.5–45)
HGB BLD-MCNC: 10.3 G/DL — LOW (ref 11.5–15.5)
MAGNESIUM SERPL-MCNC: 1.7 MG/DL — SIGNIFICANT CHANGE UP (ref 1.6–2.6)
MCHC RBC-ENTMCNC: 21.1 PG — LOW (ref 27–34)
MCHC RBC-ENTMCNC: 30.7 % — LOW (ref 32–36)
MCV RBC AUTO: 69 FL — LOW (ref 80–100)
NRBC # FLD: 0.11 K/UL — SIGNIFICANT CHANGE UP (ref 0–0)
NRBC FLD-RTO: 2 — SIGNIFICANT CHANGE UP
PLATELET # BLD AUTO: 193 K/UL — SIGNIFICANT CHANGE UP (ref 150–400)
PMV BLD: SIGNIFICANT CHANGE UP FL (ref 7–13)
POTASSIUM SERPL-MCNC: 4.8 MMOL/L — SIGNIFICANT CHANGE UP (ref 3.5–5.3)
POTASSIUM SERPL-SCNC: 4.8 MMOL/L — SIGNIFICANT CHANGE UP (ref 3.5–5.3)
RBC # BLD: 4.87 M/UL — SIGNIFICANT CHANGE UP (ref 3.8–5.2)
RBC # FLD: 22.5 % — HIGH (ref 10.3–14.5)
SODIUM SERPL-SCNC: 140 MMOL/L — SIGNIFICANT CHANGE UP (ref 135–145)
WBC # BLD: 5.38 K/UL — SIGNIFICANT CHANGE UP (ref 3.8–10.5)
WBC # FLD AUTO: 5.38 K/UL — SIGNIFICANT CHANGE UP (ref 3.8–10.5)

## 2019-04-22 RX ADMIN — BUDESONIDE AND FORMOTEROL FUMARATE DIHYDRATE 2 PUFF(S): 160; 4.5 AEROSOL RESPIRATORY (INHALATION) at 09:23

## 2019-04-22 RX ADMIN — Medication 1 TABLET(S): at 13:19

## 2019-04-22 RX ADMIN — PANTOPRAZOLE SODIUM 40 MILLIGRAM(S): 20 TABLET, DELAYED RELEASE ORAL at 05:17

## 2019-04-22 RX ADMIN — Medication 300 MILLIGRAM(S): at 13:19

## 2019-04-22 RX ADMIN — SODIUM CHLORIDE 3 MILLILITER(S): 9 INJECTION INTRAMUSCULAR; INTRAVENOUS; SUBCUTANEOUS at 05:17

## 2019-04-22 RX ADMIN — SODIUM CHLORIDE 3 MILLILITER(S): 9 INJECTION INTRAMUSCULAR; INTRAVENOUS; SUBCUTANEOUS at 23:05

## 2019-04-22 RX ADMIN — Medication 10 MILLIGRAM(S): at 05:17

## 2019-04-22 RX ADMIN — SODIUM CHLORIDE 3 MILLILITER(S): 9 INJECTION INTRAMUSCULAR; INTRAVENOUS; SUBCUTANEOUS at 13:16

## 2019-04-22 RX ADMIN — Medication 10 MILLIGRAM(S): at 22:08

## 2019-04-22 RX ADMIN — MAGNESIUM OXIDE 400 MG ORAL TABLET 400 MILLIGRAM(S): 241.3 TABLET ORAL at 17:53

## 2019-04-22 RX ADMIN — PANTOPRAZOLE SODIUM 40 MILLIGRAM(S): 20 TABLET, DELAYED RELEASE ORAL at 17:53

## 2019-04-22 RX ADMIN — Medication 25 MILLIGRAM(S): at 05:17

## 2019-04-22 RX ADMIN — BUDESONIDE AND FORMOTEROL FUMARATE DIHYDRATE 2 PUFF(S): 160; 4.5 AEROSOL RESPIRATORY (INHALATION) at 22:09

## 2019-04-22 RX ADMIN — MAGNESIUM OXIDE 400 MG ORAL TABLET 400 MILLIGRAM(S): 241.3 TABLET ORAL at 09:22

## 2019-04-22 RX ADMIN — Medication 10 MILLIGRAM(S): at 13:19

## 2019-04-22 NOTE — PROGRESS NOTE ADULT - SUBJECTIVE AND OBJECTIVE BOX
Patient seen and examined in bed. No pain, no SOB.      MEDICATIONS  (STANDING):  allopurinol 300 milliGRAM(s) Oral daily  buDESOnide  80 MICROgram(s)/formoterol 4.5 MICROgram(s) Inhaler 2 Puff(s) Inhalation two times a day  dextrose 5%. 1000 milliLiter(s) (50 mL/Hr) IV Continuous <Continuous>  dextrose 50% Injectable 12.5 Gram(s) IV Push once  dextrose 50% Injectable 25 Gram(s) IV Push once  dextrose 50% Injectable 25 Gram(s) IV Push once  insulin lispro (HumaLOG) corrective regimen sliding scale   SubCutaneous three times a day before meals  insulin lispro (HumaLOG) corrective regimen sliding scale   SubCutaneous at bedtime  magnesium oxide 400 milliGRAM(s) Oral two times a day with meals  metoprolol succinate ER 25 milliGRAM(s) Oral two times a day  multivitamin 1 Tablet(s) Oral daily  pantoprazole  Injectable 40 milliGRAM(s) IV Push every 12 hours  sildenafil (REVATIO) 10 milliGRAM(s) Oral three times a day  sodium chloride 0.9% lock flush 3 milliLiter(s) IV Push every 8 hours  torsemide 20 milliGRAM(s) Oral <User Schedule>      VITAL:  T(C): , Max: 37 (04-22-19 @ 05:16)  T(F): , Max: 98.6 (04-22-19 @ 05:16)  HR: 87 (04-22-19 @ 05:16)  BP: 113/75 (04-22-19 @ 05:16)  RR: 18 (04-22-19 @ 05:16)  SpO2: 99% (04-22-19 @ 05:16)    I and O's:    04-21 @ 07:01  -  04-22 @ 07:00  --------------------------------------------------------  IN: 1340 mL / OUT: 2450 mL / NET: -1110 mL          PHYSICAL EXAM:    Constitutional: NAD  Neck:  No JVD  Respiratory: CTAB/L  Cardiovascular: S1 and S2  Gastrointestinal: BS+, soft, NT/ND  Extremities: No peripheral edema  Neurological: A/O x 3, no focal deficits  Psychiatric: Normal mood, normal affect  : No Corey  Skin: No rashes    LABS:                        10.3   5.38  )-----------( 193      ( 22 Apr 2019 06:08 )             33.6     04-22    140  |  106  |  55<H>  ----------------------------<  76  4.8   |  20<L>  |  1.99<H>    Ca    10.2      22 Apr 2019 06:08  Mg     1.7     04-22      ASSESSMENT:  63F with COPD (on O2), LINH, AF on A/C, HLD, gout, HFpEF, PHTN , DM and CKD3/4 p/w generalized weakness and poor appetite.   - CKD: stage 3 with baseline Cr ~ 1.6  - OSMAR: likely due to CHARLIE - azotemia rising this AM  - CV: BP acceptable  - HFpEF: right sided heart failure / cor pulm; no SOB; volume status acceptable   - Pulm: RVP + for influenza, rhino and enterovirus - s/p tamiflu. sPHTN    - anemia: in chronic disease + iron def + GIB + malignancy, hold LUIS - s/p IV iron; Hgb acceptable  - GI: + FOBT, s/p colonoscopy - with polypoid mass in the proximal sigmoid colon - adenocarcinoma   - hypomagnesemia: likely nutritional +/- diuresis - improved      RECOMMEND:  - HF team f/u; on torsemide 20 mg PO TTSun  - sildenafil per pulm  - continue Mag ox 400 mg BID  - f/u colorectal surgery plan (OR tentatively Wednesday 4/24)  - dose meds for a CrCl ~25-30 mL/min        Suzan Warren NP-C  [facility]  [phone] Patient seen and examined in bed. No pain, no SOB.      MEDICATIONS  (STANDING):  allopurinol 300 milliGRAM(s) Oral daily  buDESOnide  80 MICROgram(s)/formoterol 4.5 MICROgram(s) Inhaler 2 Puff(s) Inhalation two times a day  dextrose 5%. 1000 milliLiter(s) (50 mL/Hr) IV Continuous <Continuous>  dextrose 50% Injectable 12.5 Gram(s) IV Push once  dextrose 50% Injectable 25 Gram(s) IV Push once  dextrose 50% Injectable 25 Gram(s) IV Push once  insulin lispro (HumaLOG) corrective regimen sliding scale   SubCutaneous three times a day before meals  insulin lispro (HumaLOG) corrective regimen sliding scale   SubCutaneous at bedtime  magnesium oxide 400 milliGRAM(s) Oral two times a day with meals  metoprolol succinate ER 25 milliGRAM(s) Oral two times a day  multivitamin 1 Tablet(s) Oral daily  pantoprazole  Injectable 40 milliGRAM(s) IV Push every 12 hours  sildenafil (REVATIO) 10 milliGRAM(s) Oral three times a day  sodium chloride 0.9% lock flush 3 milliLiter(s) IV Push every 8 hours  torsemide 20 milliGRAM(s) Oral <User Schedule>      VITAL:  T(C): , Max: 37 (04-22-19 @ 05:16)  T(F): , Max: 98.6 (04-22-19 @ 05:16)  HR: 87 (04-22-19 @ 05:16)  BP: 113/75 (04-22-19 @ 05:16)  RR: 18 (04-22-19 @ 05:16)  SpO2: 99% (04-22-19 @ 05:16)    I and O's:    04-21 @ 07:01  -  04-22 @ 07:00  --------------------------------------------------------  IN: 1340 mL / OUT: 2450 mL / NET: -1110 mL          PHYSICAL EXAM:    Constitutional: NAD  Neck:  No JVD  Respiratory: CTAB/L  Cardiovascular: S1 and S2  Gastrointestinal: BS+, soft, NT/ND  Extremities: No peripheral edema  Neurological: A/O x 3, no focal deficits  Psychiatric: Normal mood, normal affect  : No Corey  Skin: No rashes    LABS:                        10.3   5.38  )-----------( 193      ( 22 Apr 2019 06:08 )             33.6     04-22    140  |  106  |  55<H>  ----------------------------<  76  4.8   |  20<L>  |  1.99<H>    Ca    10.2      22 Apr 2019 06:08  Mg     1.7     04-22      ASSESSMENT:  63F with COPD (on O2), LINH, AF on A/C, HLD, gout, HFpEF, PHTN , DM and CKD3/4 p/w generalized weakness and poor appetite.   - CKD: stage 3 with baseline Cr ~ 1.6  - OSMAR: likely due to CHARLIE - azotemia rising this AM  - CV: BP acceptable  - HFpEF: right sided heart failure / cor pulm; no SOB; volume status acceptable   - Pulm: RVP + for influenza, rhino and enterovirus - s/p tamiflu. sPHTN    - anemia: in chronic disease + iron def + GIB + malignancy, hold LUIS - s/p IV iron; Hgb acceptable  - GI: + FOBT, s/p colonoscopy - with polypoid mass in the proximal sigmoid colon - adenocarcinoma   - hypomagnesemia: likely nutritional +/- diuresis - improved      RECOMMEND:  - HF team f/u; on torsemide 20 mg PO TTSun  - sildenafil per pulm  - continue Mag ox 400 mg BID  - f/u colorectal surgery plan (OR tentatively Wednesday 4/24)  - dose meds for a CrCl ~25-30 mL/min        Suzan Warren NP-C  [facility]  [phone]      Attending Note    Patient was seen and examined  agree with NP note and the plan  c/w Torsemide as TIW  NO objection to colorectal surgery on Wednesday if renal function remains stable     Tuan Camargo MD  [facility]   [phone]

## 2019-04-22 NOTE — PROGRESS NOTE ADULT - SUBJECTIVE AND OBJECTIVE BOX
GENERAL SURGERY DAILY PROGRESS NOTE:       Subjective: No acute events overnight. Patient reports feeling well, no nausea/vomiting. +flatus/+BM      Objective:  Vital Signs Last 24 Hrs  T(C): 37 (22 Apr 2019 05:16), Max: 37 (22 Apr 2019 05:16)  T(F): 98.6 (22 Apr 2019 05:16), Max: 98.6 (22 Apr 2019 05:16)  HR: 87 (22 Apr 2019 05:16) (64 - 90)  BP: 113/75 (22 Apr 2019 05:16) (97/62 - 118/75)  BP(mean): --  RR: 18 (22 Apr 2019 05:16) (16 - 18)  SpO2: 99% (22 Apr 2019 05:16) (99% - 100%)    I&O's Detail    21 Apr 2019 07:01  -  22 Apr 2019 07:00  --------------------------------------------------------  IN:    Oral Fluid: 1340 mL  Total IN: 1340 mL    OUT:    Voided: 2450 mL  Total OUT: 2450 mL    Total NET: -1110 mL          MEDICATIONS  (STANDING):  allopurinol 300 milliGRAM(s) Oral daily  buDESOnide  80 MICROgram(s)/formoterol 4.5 MICROgram(s) Inhaler 2 Puff(s) Inhalation two times a day  dextrose 5%. 1000 milliLiter(s) (50 mL/Hr) IV Continuous <Continuous>  dextrose 50% Injectable 12.5 Gram(s) IV Push once  dextrose 50% Injectable 25 Gram(s) IV Push once  dextrose 50% Injectable 25 Gram(s) IV Push once  insulin lispro (HumaLOG) corrective regimen sliding scale   SubCutaneous three times a day before meals  insulin lispro (HumaLOG) corrective regimen sliding scale   SubCutaneous at bedtime  magnesium oxide 400 milliGRAM(s) Oral two times a day with meals  metoprolol succinate ER 25 milliGRAM(s) Oral two times a day  multivitamin 1 Tablet(s) Oral daily  pantoprazole  Injectable 40 milliGRAM(s) IV Push every 12 hours  sildenafil (REVATIO) 10 milliGRAM(s) Oral three times a day  sodium chloride 0.9% lock flush 3 milliLiter(s) IV Push every 8 hours  torsemide 20 milliGRAM(s) Oral <User Schedule>    MEDICATIONS  (PRN):  ALBUTerol/ipratropium for Nebulization 3 milliLiter(s) Nebulizer every 6 hours PRN Shortness of Breath and/or Wheezing  dextrose 40% Gel 15 Gram(s) Oral once PRN Blood Glucose LESS THAN 70 milliGRAM(s)/deciliter  glucagon  Injectable 1 milliGRAM(s) IntraMuscular once PRN Glucose LESS THAN 70 milligrams/deciliter  guaiFENesin  milliGRAM(s) Oral every 12 hours PRN Cough                            10.3   5.38  )-----------( 193      ( 22 Apr 2019 06:08 )             33.6       04-21    139  |  107  |  48<H>  ----------------------------<  74  4.3   |  21<L>  |  1.61<H>    Ca    9.3      21 Apr 2019 05:23  Mg     1.8     04-21            General: NAD, well-nourished  HEENT: Atraumatic, EOMI  Resp: Breathing comfortably on RA  CV: Normal sinus rhythm  Abd: Obese, soft, nontender, nondistended. No abdominal scars.

## 2019-04-22 NOTE — PROGRESS NOTE ADULT - SUBJECTIVE AND OBJECTIVE BOX
Patient is a 63y old  Female who presents with a chief complaint of Generalized weakness (22 Apr 2019 12:32)      SUBJECTIVE / OVERNIGHT EVENTS: overnight events noted     ROS:  Resp: No cough no sputum production  CVS: No chest pain no palpitations no orthopnea  GI: no N/V/D  : no dysuria, no hematuria  Neuro: no weakness no paresthesias  Heme: No petechiae no easy bruising  Msk: No joint pain no swelling  Skin: No rash no itching        MEDICATIONS  (STANDING):  allopurinol 300 milliGRAM(s) Oral daily  buDESOnide  80 MICROgram(s)/formoterol 4.5 MICROgram(s) Inhaler 2 Puff(s) Inhalation two times a day  dextrose 5%. 1000 milliLiter(s) (50 mL/Hr) IV Continuous <Continuous>  dextrose 50% Injectable 12.5 Gram(s) IV Push once  dextrose 50% Injectable 25 Gram(s) IV Push once  dextrose 50% Injectable 25 Gram(s) IV Push once  insulin lispro (HumaLOG) corrective regimen sliding scale   SubCutaneous three times a day before meals  insulin lispro (HumaLOG) corrective regimen sliding scale   SubCutaneous at bedtime  magnesium oxide 400 milliGRAM(s) Oral two times a day with meals  metoprolol succinate ER 25 milliGRAM(s) Oral two times a day  multivitamin 1 Tablet(s) Oral daily  pantoprazole  Injectable 40 milliGRAM(s) IV Push every 12 hours  sildenafil (REVATIO) 10 milliGRAM(s) Oral three times a day  sodium chloride 0.9% lock flush 3 milliLiter(s) IV Push every 8 hours  torsemide 20 milliGRAM(s) Oral <User Schedule>    MEDICATIONS  (PRN):  ALBUTerol/ipratropium for Nebulization 3 milliLiter(s) Nebulizer every 6 hours PRN Shortness of Breath and/or Wheezing  dextrose 40% Gel 15 Gram(s) Oral once PRN Blood Glucose LESS THAN 70 milliGRAM(s)/deciliter  glucagon  Injectable 1 milliGRAM(s) IntraMuscular once PRN Glucose LESS THAN 70 milligrams/deciliter  guaiFENesin  milliGRAM(s) Oral every 12 hours PRN Cough        CAPILLARY BLOOD GLUCOSE      POCT Blood Glucose.: 95 mg/dL (22 Apr 2019 12:57)  POCT Blood Glucose.: 75 mg/dL (22 Apr 2019 09:04)  POCT Blood Glucose.: 124 mg/dL (21 Apr 2019 22:12)  POCT Blood Glucose.: 118 mg/dL (21 Apr 2019 17:52)    I&O's Summary    21 Apr 2019 07:01  -  22 Apr 2019 07:00  --------------------------------------------------------  IN: 1340 mL / OUT: 2450 mL / NET: -1110 mL        Vital Signs Last 24 Hrs  T(C): 36.7 (22 Apr 2019 13:00), Max: 37 (22 Apr 2019 05:16)  T(F): 98.1 (22 Apr 2019 13:00), Max: 98.6 (22 Apr 2019 05:16)  HR: 80 (22 Apr 2019 13:00) (64 - 87)  BP: 111/72 (22 Apr 2019 13:00) (97/62 - 113/75)  BP(mean): --  RR: 18 (22 Apr 2019 13:00) (16 - 18)  SpO2: 98% (22 Apr 2019 13:00) (98% - 100%)    PHYSICAL EXAM:  GENERAL: NAD, well-developed  HEAD:  Atraumatic, Normocephalic  EYES: EOMI, PERRLA, conjunctiva and sclera clear  NECK: Supple, No JVD  CHEST/LUNG: no rhonchi, no wheeze, clear to auscultation bilaterally  HEART: S1 S2; No rubs or gallops, no murmurs appreciated  ABDOMEN: Soft, Nontender, Nondistended; Bowel sounds present  EXTREMITIES:  No clubbing or cyanosis, + Peripheral Pulses,  no edema  PSYCH: AO x 3 appropriate affect  NEUROLOGY: non-focal, motor and sensory systems intact  SKIN: No rashes or lesions    LABS:                        10.3   5.38  )-----------( 193      ( 22 Apr 2019 06:08 )             33.6     04-22    140  |  106  |  55<H>  ----------------------------<  76  4.8   |  20<L>  |  1.99<H>    Ca    10.2      22 Apr 2019 06:08  Mg     1.7     04-22                  All consultant(s) notes reviewed and care discussed with other providers        Contact Number, Dr Hanna [phone]

## 2019-04-22 NOTE — PROGRESS NOTE ADULT - ASSESSMENT
62 yo woman with multiple medical comorbidities including afib, right-sided CHF with severe pulmonary HTN, COPD on nocturnal O2, LINH newly initiated on bilevel support found to have a non-obstructing sigmoid mass.     - Appreciate cardiac clearance  - Appreciate Pulmonary evaluation - Needs Pulmonary optimization and risk stratification for possible surgery  - Plan for OR on Wednesday, 4/24/19     [facility]  [id number]

## 2019-04-23 ENCOUNTER — TRANSCRIPTION ENCOUNTER (OUTPATIENT)
Age: 64
End: 2019-04-23

## 2019-04-23 DIAGNOSIS — C18.9 MALIGNANT NEOPLASM OF COLON, UNSPECIFIED: ICD-10-CM

## 2019-04-23 DIAGNOSIS — N18.3 CHRONIC KIDNEY DISEASE, STAGE 3 (MODERATE): ICD-10-CM

## 2019-04-23 LAB
ANION GAP SERPL CALC-SCNC: 11 MMO/L — SIGNIFICANT CHANGE UP (ref 7–14)
BUN SERPL-MCNC: 51 MG/DL — HIGH (ref 7–23)
CALCIUM SERPL-MCNC: 9.5 MG/DL — SIGNIFICANT CHANGE UP (ref 8.4–10.5)
CHLORIDE SERPL-SCNC: 110 MMOL/L — HIGH (ref 98–107)
CO2 SERPL-SCNC: 22 MMOL/L — SIGNIFICANT CHANGE UP (ref 22–31)
CREAT SERPL-MCNC: 1.75 MG/DL — HIGH (ref 0.5–1.3)
GLUCOSE SERPL-MCNC: 84 MG/DL — SIGNIFICANT CHANGE UP (ref 70–99)
HCT VFR BLD CALC: 30.4 % — LOW (ref 34.5–45)
HGB BLD-MCNC: 9.3 G/DL — LOW (ref 11.5–15.5)
MAGNESIUM SERPL-MCNC: 1.8 MG/DL — SIGNIFICANT CHANGE UP (ref 1.6–2.6)
MCHC RBC-ENTMCNC: 21.5 PG — LOW (ref 27–34)
MCHC RBC-ENTMCNC: 30.6 % — LOW (ref 32–36)
MCV RBC AUTO: 70.2 FL — LOW (ref 80–100)
NRBC # FLD: 0.05 K/UL — SIGNIFICANT CHANGE UP (ref 0–0)
PLATELET # BLD AUTO: 198 K/UL — SIGNIFICANT CHANGE UP (ref 150–400)
PMV BLD: SIGNIFICANT CHANGE UP FL (ref 7–13)
POTASSIUM SERPL-MCNC: 4.5 MMOL/L — SIGNIFICANT CHANGE UP (ref 3.5–5.3)
POTASSIUM SERPL-SCNC: 4.5 MMOL/L — SIGNIFICANT CHANGE UP (ref 3.5–5.3)
RBC # BLD: 4.33 M/UL — SIGNIFICANT CHANGE UP (ref 3.8–5.2)
RBC # FLD: 22.9 % — HIGH (ref 10.3–14.5)
SODIUM SERPL-SCNC: 143 MMOL/L — SIGNIFICANT CHANGE UP (ref 135–145)
WBC # BLD: 5.82 K/UL — SIGNIFICANT CHANGE UP (ref 3.8–10.5)
WBC # FLD AUTO: 5.82 K/UL — SIGNIFICANT CHANGE UP (ref 3.8–10.5)

## 2019-04-23 PROCEDURE — 99233 SBSQ HOSP IP/OBS HIGH 50: CPT | Mod: 57

## 2019-04-23 PROCEDURE — 71045 X-RAY EXAM CHEST 1 VIEW: CPT | Mod: 26

## 2019-04-23 RX ORDER — SOD SULF/SODIUM/NAHCO3/KCL/PEG
2000 SOLUTION, RECONSTITUTED, ORAL ORAL ONCE
Qty: 0 | Refills: 0 | Status: COMPLETED | OUTPATIENT
Start: 2019-04-23 | End: 2019-04-23

## 2019-04-23 RX ORDER — SODIUM CHLORIDE 9 MG/ML
1000 INJECTION, SOLUTION INTRAVENOUS
Qty: 0 | Refills: 0 | Status: DISCONTINUED | OUTPATIENT
Start: 2019-04-23 | End: 2019-04-24

## 2019-04-23 RX ORDER — METRONIDAZOLE 500 MG
500 TABLET ORAL ONCE
Qty: 0 | Refills: 0 | Status: COMPLETED | OUTPATIENT
Start: 2019-04-23 | End: 2019-04-23

## 2019-04-23 RX ORDER — NEOMYCIN SULFATE 500 MG/1
1000 TABLET ORAL ONCE
Qty: 0 | Refills: 0 | Status: COMPLETED | OUTPATIENT
Start: 2019-04-23 | End: 2019-04-23

## 2019-04-23 RX ADMIN — SODIUM CHLORIDE 3 MILLILITER(S): 9 INJECTION INTRAMUSCULAR; INTRAVENOUS; SUBCUTANEOUS at 21:28

## 2019-04-23 RX ADMIN — Medication 10 MILLIGRAM(S): at 21:33

## 2019-04-23 RX ADMIN — Medication 500 MILLIGRAM(S): at 23:52

## 2019-04-23 RX ADMIN — Medication 2000 MILLILITER(S): at 18:24

## 2019-04-23 RX ADMIN — PANTOPRAZOLE SODIUM 40 MILLIGRAM(S): 20 TABLET, DELAYED RELEASE ORAL at 06:08

## 2019-04-23 RX ADMIN — Medication 10 MILLIGRAM(S): at 06:08

## 2019-04-23 RX ADMIN — SODIUM CHLORIDE 3 MILLILITER(S): 9 INJECTION INTRAMUSCULAR; INTRAVENOUS; SUBCUTANEOUS at 13:46

## 2019-04-23 RX ADMIN — NEOMYCIN SULFATE 1000 MILLIGRAM(S): 500 TABLET ORAL at 15:02

## 2019-04-23 RX ADMIN — BUDESONIDE AND FORMOTEROL FUMARATE DIHYDRATE 2 PUFF(S): 160; 4.5 AEROSOL RESPIRATORY (INHALATION) at 21:33

## 2019-04-23 RX ADMIN — Medication 500 MILLIGRAM(S): at 18:24

## 2019-04-23 RX ADMIN — Medication 25 MILLIGRAM(S): at 18:24

## 2019-04-23 RX ADMIN — Medication 500 MILLIGRAM(S): at 15:01

## 2019-04-23 RX ADMIN — Medication 10 MILLIGRAM(S): at 14:13

## 2019-04-23 RX ADMIN — Medication 300 MILLIGRAM(S): at 14:13

## 2019-04-23 RX ADMIN — Medication 1 TABLET(S): at 14:12

## 2019-04-23 RX ADMIN — Medication 20 MILLIGRAM(S): at 09:30

## 2019-04-23 RX ADMIN — SODIUM CHLORIDE 3 MILLILITER(S): 9 INJECTION INTRAMUSCULAR; INTRAVENOUS; SUBCUTANEOUS at 06:19

## 2019-04-23 RX ADMIN — Medication 25 MILLIGRAM(S): at 06:08

## 2019-04-23 RX ADMIN — PANTOPRAZOLE SODIUM 40 MILLIGRAM(S): 20 TABLET, DELAYED RELEASE ORAL at 18:27

## 2019-04-23 RX ADMIN — NEOMYCIN SULFATE 1000 MILLIGRAM(S): 500 TABLET ORAL at 18:23

## 2019-04-23 RX ADMIN — MAGNESIUM OXIDE 400 MG ORAL TABLET 400 MILLIGRAM(S): 241.3 TABLET ORAL at 18:27

## 2019-04-23 RX ADMIN — BUDESONIDE AND FORMOTEROL FUMARATE DIHYDRATE 2 PUFF(S): 160; 4.5 AEROSOL RESPIRATORY (INHALATION) at 09:31

## 2019-04-23 RX ADMIN — MAGNESIUM OXIDE 400 MG ORAL TABLET 400 MILLIGRAM(S): 241.3 TABLET ORAL at 09:30

## 2019-04-23 RX ADMIN — NEOMYCIN SULFATE 1000 MILLIGRAM(S): 500 TABLET ORAL at 23:52

## 2019-04-23 NOTE — PROGRESS NOTE ADULT - ATTENDING COMMENTS
Left Colon cancer  -Pt for laparoscopic partial colectomy tomorrow. Risks and benefits were discussed at length including bleeding, infection, risk of Anastomotic dehiscence and possible colostomy creation.  -All questions were answered  -Bowel prep and antibiotics to be given today  -Medical clearance on short  -Anesthesia evaluation

## 2019-04-23 NOTE — PROGRESS NOTE ADULT - SUBJECTIVE AND OBJECTIVE BOX
Patient is a 63y old  Female who presents with a chief complaint of Generalized weakness (23 Apr 2019 10:55)      SUBJECTIVE / OVERNIGHT EVENTS: no overnight events    ROS:  Resp: No cough no sputum production  CVS: No chest pain no palpitations no orthopnea  GI: no N/V/D  : no dysuria, no hematuria  Neuro: no weakness no paresthesias  Heme: No petechiae no easy bruising  Msk: No joint pain no swelling  Skin: No rash no itching        MEDICATIONS  (STANDING):  allopurinol 300 milliGRAM(s) Oral daily  buDESOnide  80 MICROgram(s)/formoterol 4.5 MICROgram(s) Inhaler 2 Puff(s) Inhalation two times a day  dextrose 5% + sodium chloride 0.9%. 1000 milliLiter(s) (40 mL/Hr) IV Continuous <Continuous>  dextrose 5%. 1000 milliLiter(s) (50 mL/Hr) IV Continuous <Continuous>  dextrose 50% Injectable 12.5 Gram(s) IV Push once  dextrose 50% Injectable 25 Gram(s) IV Push once  dextrose 50% Injectable 25 Gram(s) IV Push once  insulin lispro (HumaLOG) corrective regimen sliding scale   SubCutaneous three times a day before meals  insulin lispro (HumaLOG) corrective regimen sliding scale   SubCutaneous at bedtime  magnesium oxide 400 milliGRAM(s) Oral two times a day with meals  metoprolol succinate ER 25 milliGRAM(s) Oral two times a day  metroNIDAZOLE    Tablet 500 milliGRAM(s) Oral once  metroNIDAZOLE    Tablet 500 milliGRAM(s) Oral once  multivitamin 1 Tablet(s) Oral daily  neomycin 1000 milliGRAM(s) Oral once  neomycin 1000 milliGRAM(s) Oral once  pantoprazole  Injectable 40 milliGRAM(s) IV Push every 12 hours  polyethylene glycol/electrolyte Solution 2000 milliLiter(s) Oral once  sildenafil (REVATIO) 10 milliGRAM(s) Oral three times a day  sodium chloride 0.9% lock flush 3 milliLiter(s) IV Push every 8 hours  torsemide 20 milliGRAM(s) Oral <User Schedule>    MEDICATIONS  (PRN):  ALBUTerol/ipratropium for Nebulization 3 milliLiter(s) Nebulizer every 6 hours PRN Shortness of Breath and/or Wheezing  dextrose 40% Gel 15 Gram(s) Oral once PRN Blood Glucose LESS THAN 70 milliGRAM(s)/deciliter  glucagon  Injectable 1 milliGRAM(s) IntraMuscular once PRN Glucose LESS THAN 70 milligrams/deciliter  guaiFENesin  milliGRAM(s) Oral every 12 hours PRN Cough        CAPILLARY BLOOD GLUCOSE      POCT Blood Glucose.: 107 mg/dL (23 Apr 2019 12:56)  POCT Blood Glucose.: 87 mg/dL (23 Apr 2019 08:54)  POCT Blood Glucose.: 137 mg/dL (22 Apr 2019 21:14)  POCT Blood Glucose.: 112 mg/dL (22 Apr 2019 17:25)    I&O's Summary    22 Apr 2019 07:01  -  23 Apr 2019 07:00  --------------------------------------------------------  IN: 958 mL / OUT: 2000 mL / NET: -1042 mL    23 Apr 2019 07:01  -  23 Apr 2019 17:06  --------------------------------------------------------  IN: 0 mL / OUT: 400 mL / NET: -400 mL        Vital Signs Last 24 Hrs  T(C): 36.8 (23 Apr 2019 13:31), Max: 36.8 (22 Apr 2019 21:15)  T(F): 98.3 (23 Apr 2019 13:31), Max: 98.3 (23 Apr 2019 13:31)  HR: 94 (23 Apr 2019 14:20) (72 - 110)  BP: 109/70 (23 Apr 2019 14:20) (98/67 - 123/76)  BP(mean): --  RR: 18 (23 Apr 2019 13:31) (18 - 18)  SpO2: 98% (23 Apr 2019 13:31) (96% - 100%)    PHYSICAL EXAM:  GENERAL: NAD, well-developed  HEAD:  Atraumatic, Normocephalic  EYES: EOMI, PERRLA, conjunctiva and sclera clear  NECK: Supple, No JVD  CHEST/LUNG: no rhonchi, no wheeze, clear to auscultation bilaterally  HEART: S1 S2; No rubs or gallops, no murmurs appreciated  ABDOMEN: Soft, Nontender, Nondistended; Bowel sounds present  EXTREMITIES:  No clubbing or cyanosis, + Peripheral Pulses,  trace bilateral edema  PSYCH: AO x 3 appropriate affect  NEUROLOGY: non-focal, motor and sensory systems intact  SKIN: No rashes or lesions    LABS:                        9.3    5.82  )-----------( 198      ( 23 Apr 2019 06:15 )             30.4     04-23    143  |  110<H>  |  51<H>  ----------------------------<  84  4.5   |  22  |  1.75<H>    Ca    9.5      23 Apr 2019 06:15  Mg     1.8     04-23                  All consultant(s) notes reviewed and care discussed with other providers        Contact Number, Dr Hanna [phone]

## 2019-04-23 NOTE — PROVIDER CONTACT NOTE (OTHER) - ASSESSMENT
Patient got up and was using commode when rapid A Fib occurred (). No s/s of acute distress. Patient went back to sleep afterwards, cardiac monitored showed HR in 80s-90s.

## 2019-04-23 NOTE — PROGRESS NOTE ADULT - ASSESSMENT
The severe pulmonary hypertension puts the patient at intermediate to high risk for pulmonary complications.     No further intervention needed at this time.  The patient is scheduled for laparoscopic partial colectomy tomorrow    The high risk was discussed with the patient and with the covering physician Dr. Hanna.  (The patient wishes to proceed.)

## 2019-04-23 NOTE — PROGRESS NOTE ADULT - SUBJECTIVE AND OBJECTIVE BOX
brief episode of afib, self resolved    PE  A&Ox3  LAZARO no edema  abd soft  no rashes  no LAD      Objective:    MEDICATIONS  (STANDING):  allopurinol 300 milliGRAM(s) Oral daily  buDESOnide  80 MICROgram(s)/formoterol 4.5 MICROgram(s) Inhaler 2 Puff(s) Inhalation two times a day  dextrose 5%. 1000 milliLiter(s) (50 mL/Hr) IV Continuous <Continuous>  dextrose 50% Injectable 12.5 Gram(s) IV Push once  dextrose 50% Injectable 25 Gram(s) IV Push once  dextrose 50% Injectable 25 Gram(s) IV Push once  insulin lispro (HumaLOG) corrective regimen sliding scale   SubCutaneous three times a day before meals  insulin lispro (HumaLOG) corrective regimen sliding scale   SubCutaneous at bedtime  magnesium oxide 400 milliGRAM(s) Oral two times a day with meals  metoprolol succinate ER 25 milliGRAM(s) Oral two times a day  multivitamin 1 Tablet(s) Oral daily  pantoprazole  Injectable 40 milliGRAM(s) IV Push every 12 hours  sildenafil (REVATIO) 10 milliGRAM(s) Oral three times a day  sodium chloride 0.9% lock flush 3 milliLiter(s) IV Push every 8 hours  torsemide 20 milliGRAM(s) Oral <User Schedule>    MEDICATIONS  (PRN):  ALBUTerol/ipratropium for Nebulization 3 milliLiter(s) Nebulizer every 6 hours PRN Shortness of Breath and/or Wheezing  dextrose 40% Gel 15 Gram(s) Oral once PRN Blood Glucose LESS THAN 70 milliGRAM(s)/deciliter  glucagon  Injectable 1 milliGRAM(s) IntraMuscular once PRN Glucose LESS THAN 70 milligrams/deciliter  guaiFENesin  milliGRAM(s) Oral every 12 hours PRN Cough      Vital Signs Last 24 Hrs  T(C): 36.3 (2019 06:06), Max: 36.8 (2019 21:15)  T(F): 97.3 (2019 06:06), Max: 98.2 (2019 21:15)  HR: 72 (2019 09:28) (72 - 95)  BP: 123/76 (2019 09:28) (107/70 - 123/76)  BP(mean): --  RR: 18 (2019 09:28) (18 - 18)  SpO2: 97% (2019 09:28) (96% - 100%)    I&O's Detail    2019 07:01  -  2019 07:00  --------------------------------------------------------  IN:    Oral Fluid: 958 mL  Total IN: 958 mL    OUT:    Voided: 2000 mL  Total OUT: 2000 mL    Total NET: -1042 mL          Daily     Daily Weight in k.6 (2019 06:43)    LABS:                        9.3    5.82  )-----------( 198      ( 2019 06:15 )             30.4     04-23    143  |  110<H>  |  51<H>  ----------------------------<  84  4.5   |  22  |  1.75<H>    Ca    9.5      2019 06:15  Mg     1.8                 RADIOLOGY & ADDITIONAL STUDIES:

## 2019-04-23 NOTE — PROGRESS NOTE ADULT - SUBJECTIVE AND OBJECTIVE BOX
Patient seen and examined in bed. No pain, no SOB.      MEDICATIONS  (STANDING):  allopurinol 300 milliGRAM(s) Oral daily  buDESOnide  80 MICROgram(s)/formoterol 4.5 MICROgram(s) Inhaler 2 Puff(s) Inhalation two times a day  dextrose 5%. 1000 milliLiter(s) (50 mL/Hr) IV Continuous <Continuous>  dextrose 50% Injectable 12.5 Gram(s) IV Push once  dextrose 50% Injectable 25 Gram(s) IV Push once  dextrose 50% Injectable 25 Gram(s) IV Push once  insulin lispro (HumaLOG) corrective regimen sliding scale   SubCutaneous three times a day before meals  insulin lispro (HumaLOG) corrective regimen sliding scale   SubCutaneous at bedtime  magnesium oxide 400 milliGRAM(s) Oral two times a day with meals  metoprolol succinate ER 25 milliGRAM(s) Oral two times a day  multivitamin 1 Tablet(s) Oral daily  pantoprazole  Injectable 40 milliGRAM(s) IV Push every 12 hours  sildenafil (REVATIO) 10 milliGRAM(s) Oral three times a day  sodium chloride 0.9% lock flush 3 milliLiter(s) IV Push every 8 hours  torsemide 20 milliGRAM(s) Oral <User Schedule>      VITAL:  T(C): , Max: 36.8 (04-22-19 @ 21:15)  T(F): , Max: 98.2 (04-22-19 @ 21:15)  HR: 72 (04-23-19 @ 09:28)  BP: 123/76 (04-23-19 @ 09:28)  RR: 18 (04-23-19 @ 09:28)  SpO2: 97% (04-23-19 @ 09:28)    I and O's:    04-22 @ 07:01  -  04-23 @ 07:00  --------------------------------------------------------  IN: 958 mL / OUT: 2000 mL / NET: -1042 mL          PHYSICAL EXAM:    Constitutional: NAD  Neck:  No JVD  Respiratory: diminished BS at bases  Cardiovascular: S1 and S2  Gastrointestinal: BS+, soft, NT/ND  Extremities: No peripheral edema  Neurological: A/O x 3, no focal deficits  Psychiatric: Normal mood, normal affect  : No Corey  Skin: No rashes    LABS:                        9.3    5.82  )-----------( 198      ( 23 Apr 2019 06:15 )             30.4     04-23    143  |  110<H>  |  51<H>  ----------------------------<  84  4.5   |  22  |  1.75<H>    Ca    9.5      23 Apr 2019 06:15  Mg     1.8     04-23      ASSESSMENT:  63F with COPD (on O2), LINH, AF on A/C, HLD, gout, HFpEF, PHTN , DM and CKD3/4 p/w generalized weakness and poor appetite.   - CKD: stage 3 with baseline Cr ~ 1.6  - OSMAR: likely due to CHARLIE - azotemia improving this AM  - CV: BP acceptable  - HFpEF: right sided heart failure / cor pulm; no SOB; volume status acceptable   - Pulm: RVP + for influenza, rhino and enterovirus - s/p tamiflu. sPHTN    - anemia: in chronic disease + iron def + GIB + malignancy, hold LUIS - s/p IV iron; Hgb acceptable  - GI: + FOBT, s/p colonoscopy - with polypoid mass in the proximal sigmoid colon - adenocarcinoma   - hypomagnesemia: likely nutritional +/- diuresis - improved      RECOMMEND:  - HF team f/u; on torsemide 20 mg PO TTSun  - sildenafil per pulm  - continue Mag ox 400 mg BID  - no objection to colorectal surgery on Wednesday 4/24 if renal function remains stable  - dose meds for a CrCl ~25-35 mL/min      Suzan Warren, NP-C  [facility]  [phone]

## 2019-04-23 NOTE — PROGRESS NOTE ADULT - SUBJECTIVE AND OBJECTIVE BOX
awake, alert comfortable.      MEDICATIONS  (STANDING):  allopurinol 300 milliGRAM(s) Oral daily  buDESOnide  80 MICROgram(s)/formoterol 4.5 MICROgram(s) Inhaler 2 Puff(s) Inhalation two times a day  dextrose 5%. 1000 milliLiter(s) (50 mL/Hr) IV Continuous <Continuous>  dextrose 50% Injectable 12.5 Gram(s) IV Push once  dextrose 50% Injectable 25 Gram(s) IV Push once  dextrose 50% Injectable 25 Gram(s) IV Push once  insulin lispro (HumaLOG) corrective regimen sliding scale   SubCutaneous three times a day before meals  insulin lispro (HumaLOG) corrective regimen sliding scale   SubCutaneous at bedtime  magnesium oxide 400 milliGRAM(s) Oral two times a day with meals  metoprolol succinate ER 25 milliGRAM(s) Oral two times a day  multivitamin 1 Tablet(s) Oral daily  pantoprazole  Injectable 40 milliGRAM(s) IV Push every 12 hours  sildenafil (REVATIO) 10 milliGRAM(s) Oral three times a day  sodium chloride 0.9% lock flush 3 milliLiter(s) IV Push every 8 hours  torsemide 20 milliGRAM(s) Oral <User Schedule>      MEDICATIONS  (PRN):  ALBUTerol/ipratropium for Nebulization 3 milliLiter(s) Nebulizer every 6 hours PRN Shortness of Breath and/or Wheezing  dextrose 40% Gel 15 Gram(s) Oral once PRN Blood Glucose LESS THAN 70 milliGRAM(s)/deciliter  glucagon  Injectable 1 milliGRAM(s) IntraMuscular once PRN Glucose LESS THAN 70 milligrams/deciliter  guaiFENesin  milliGRAM(s) Oral every 12 hours PRN Cough          Vital Signs Last 24 Hrs  T(C): 36.3 (23 Apr 2019 06:06), Max: 36.8 (22 Apr 2019 21:15)  T(F): 97.3 (23 Apr 2019 06:06), Max: 98.2 (22 Apr 2019 21:15)  HR: 95 (23 Apr 2019 06:06) (80 - 95)  BP: 114/75 (23 Apr 2019 06:06) (107/70 - 114/75)  BP(mean): --  RR: 18 (23 Apr 2019 06:06) (18 - 18)  SpO2: 96% (23 Apr 2019 06:06) (96% - 100%)    PHYSICAL EXAMINATION:    GENERAL: The patient is awake and alert in no apparent distress.     HEENT: Head is normocephalic and atraumatic. Extraocular muscles are intact. Mucous membranes are moist.    NECK: Supple.    LUNGS: Clear to auscultation without wheezing, rales or rhonchi; respirations unlabored    HEART: Regular rate and rhythm without murmur.    ABDOMEN: Soft, nontender, and nondistended.      EXTREMITIES: Without any cyanosis, clubbing, rash, lesions or edema.    NEUROLOGIC: Grossly intact.    SKIN: No ulceration or induration present.      LABS:                        9.3    5.82  )-----------( 198      ( 23 Apr 2019 06:15 )             30.4     04-23    143  |  110<H>  |  51<H>  ----------------------------<  84  4.5   |  22  |  1.75<H>    Ca    9.5      23 Apr 2019 06:15  Mg     1.8     04-23                          MICROBIOLOGY:      RADIOLOGY & ADDITIONAL STUDIES: awake, alert comfortable.      MEDICATIONS  (STANDING):  allopurinol 300 milliGRAM(s) Oral daily  buDESOnide  80 MICROgram(s)/formoterol 4.5 MICROgram(s) Inhaler 2 Puff(s) Inhalation two times a day  dextrose 5%. 1000 milliLiter(s) (50 mL/Hr) IV Continuous <Continuous>  dextrose 50% Injectable 12.5 Gram(s) IV Push once  dextrose 50% Injectable 25 Gram(s) IV Push once  dextrose 50% Injectable 25 Gram(s) IV Push once  insulin lispro (HumaLOG) corrective regimen sliding scale   SubCutaneous three times a day before meals  insulin lispro (HumaLOG) corrective regimen sliding scale   SubCutaneous at bedtime  magnesium oxide 400 milliGRAM(s) Oral two times a day with meals  metoprolol succinate ER 25 milliGRAM(s) Oral two times a day  multivitamin 1 Tablet(s) Oral daily  pantoprazole  Injectable 40 milliGRAM(s) IV Push every 12 hours  sildenafil (REVATIO) 10 milliGRAM(s) Oral three times a day  sodium chloride 0.9% lock flush 3 milliLiter(s) IV Push every 8 hours  torsemide 20 milliGRAM(s) Oral <User Schedule>      MEDICATIONS  (PRN):  ALBUTerol/ipratropium for Nebulization 3 milliLiter(s) Nebulizer every 6 hours PRN Shortness of Breath and/or Wheezing  dextrose 40% Gel 15 Gram(s) Oral once PRN Blood Glucose LESS THAN 70 milliGRAM(s)/deciliter  glucagon  Injectable 1 milliGRAM(s) IntraMuscular once PRN Glucose LESS THAN 70 milligrams/deciliter  guaiFENesin  milliGRAM(s) Oral every 12 hours PRN Cough          Vital Signs Last 24 Hrs  T(C): 36.3 (23 Apr 2019 06:06), Max: 36.8 (22 Apr 2019 21:15)  T(F): 97.3 (23 Apr 2019 06:06), Max: 98.2 (22 Apr 2019 21:15)  HR: 95 (23 Apr 2019 06:06) (80 - 95)  BP: 114/75 (23 Apr 2019 06:06) (107/70 - 114/75)  BP(mean): --  RR: 18 (23 Apr 2019 06:06) (18 - 18)  SpO2: 96% (23 Apr 2019 06:06) (96% - 100%)    PHYSICAL EXAMINATION:    GENERAL: The patient is awake and alert in no apparent distress. Comfortable in bed lying flat.    HEENT: Mucous membranes are moist.    NECK: Supple.    LUNGS: Clear to auscultation without wheezing, rales or rhonchi; respirations unlabored    HEART: Regular rate and rhythm without murmur.    ABDOMEN: Soft, nontender, and nondistended.      EXTREMITIES: Without edema.    NEUROLOGIC: Grossly intact.    SKIN: No ulceration or induration present.      LABS:                        9.3    5.82  )-----------( 198      ( 23 Apr 2019 06:15 )             30.4     04-23    143  |  110<H>  |  51<H>  ----------------------------<  84  4.5   |  22  |  1.75<H>    Ca    9.5      23 Apr 2019 06:15  Mg     1.8     04-23                          MICROBIOLOGY:      RADIOLOGY & ADDITIONAL STUDIES: awake, alert comfortable.      MEDICATIONS  (STANDING):  allopurinol 300 milliGRAM(s) Oral daily  buDESOnide  80 MICROgram(s)/formoterol 4.5 MICROgram(s) Inhaler 2 Puff(s) Inhalation two times a day  dextrose 5%. 1000 milliLiter(s) (50 mL/Hr) IV Continuous <Continuous>  dextrose 50% Injectable 12.5 Gram(s) IV Push once  dextrose 50% Injectable 25 Gram(s) IV Push once  dextrose 50% Injectable 25 Gram(s) IV Push once  insulin lispro (HumaLOG) corrective regimen sliding scale   SubCutaneous three times a day before meals  insulin lispro (HumaLOG) corrective regimen sliding scale   SubCutaneous at bedtime  magnesium oxide 400 milliGRAM(s) Oral two times a day with meals  metoprolol succinate ER 25 milliGRAM(s) Oral two times a day  multivitamin 1 Tablet(s) Oral daily  pantoprazole  Injectable 40 milliGRAM(s) IV Push every 12 hours  sildenafil (REVATIO) 10 milliGRAM(s) Oral three times a day  sodium chloride 0.9% lock flush 3 milliLiter(s) IV Push every 8 hours  torsemide 20 milliGRAM(s) Oral <User Schedule>      MEDICATIONS  (PRN):  ALBUTerol/ipratropium for Nebulization 3 milliLiter(s) Nebulizer every 6 hours PRN Shortness of Breath and/or Wheezing  dextrose 40% Gel 15 Gram(s) Oral once PRN Blood Glucose LESS THAN 70 milliGRAM(s)/deciliter  glucagon  Injectable 1 milliGRAM(s) IntraMuscular once PRN Glucose LESS THAN 70 milligrams/deciliter  guaiFENesin  milliGRAM(s) Oral every 12 hours PRN Cough          Vital Signs Last 24 Hrs  T(C): 36.3 (2019 06:06), Max: 36.8 (2019 21:15)  T(F): 97.3 (2019 06:06), Max: 98.2 (2019 21:15)  HR: 95 (2019 06:06) (80 - 95)  BP: 114/75 (2019 06:06) (107/70 - 114/75)  BP(mean): --  RR: 18 (2019 06:06) (18 - 18)  SpO2: 96% (2019 06:06) (96% - 100%)    PHYSICAL EXAMINATION:    GENERAL: The patient is awake and alert in no apparent distress. Comfortable in bed lying flat.    HEENT: Mucous membranes are moist.    NECK: Supple.    LUNGS: Clear to auscultation without wheezing, rales or rhonchi; respirations unlabored    HEART: irreg irreg  rate controlled    ABDOMEN: Soft, nontender, and nondistended.      EXTREMITIES: Without edema.      LABS:                        9.3    5.82  )-----------( 198      ( 2019 06:15 )             30.4     04-    143  |  110<H>  |  51<H>  ----------------------------<  84  4.5   |  22  |  1.75<H>    Ca    9.5      2019 06:15  Mg     1.8               RADIOLOGY & ADDITIONAL STUDIES:     EXAM:  CT ABDOMEN AND PELVIS IC      EXAM:  CT CHEST IC        PROCEDURE DATE:  2019         INTERPRETATION:  CLINICAL INFORMATION: Sigmoid mass diagnosed on   colonoscopy. Evaluate for extent of disease.    COMPARISON: CT chest 2019. CTabdomen pelvis 4/3/2019.    PROCEDURE:   CT of the Chest, Abdomen and Pelvis was performed with intravenous   contrast.   Intravenous contrast: 90 ml Omnipaque 350. 10 ml discarded.  Oral contrast: positive contrast was administered.  Sagittal and coronal reformats were performed.    FINDINGS:    CHEST:     LUNGS AND LARGE AIRWAYS: Patent central airways. No pulmonary nodules.  PLEURA: No pleural effusion. No pneumothorax  VESSELS: Atherosclerotic disease. Coronary calcifications.  HEART: Heart size is enlarged. No pericardial effusion.  MEDIASTINUM AND CLAUDIA: Prominent mediastinal and hilar lymph nodes,   without change.  CHEST WALL AND LOWER NECK: Heterogeneously enhancing right thyroid mass   measuring 3.5 x 2.9 cm, partially imaged (series2 image 1).    ABDOMEN AND PELVIS:    LIVER: Within normal limits.  BILE DUCTS: Normal caliber.  GALLBLADDER: Within normal limits.  SPLEEN: Within normal limits.  PANCREAS: Within normal limits.  ADRENALS: Within normal limits.  KIDNEYS/URETERS: Atrophic. Subcentimeter hypoattenuating right renal   lesions are too small to characterize. No hydronephrosis.    BLADDER: Within normal limits.  REPRODUCTIVE ORGANS: Calcified uterine fibroid. No adnexal masses.    BOWEL: No bowel obstruction. Narrowing of the sigmoid colon may represent   new sigmoid mass. Normal appendix. There is high-density material along   the posterior aspect of the gastric fundus/antrum.  PERITONEUM: No ascites.  VESSELS:  Atherosclerotic disease.  RETROPERITONEUM: No lymphadenopathy.    ABDOMINAL WALL: Questionable skin defect of the left gluteal soft tissues   not seen on prior CT.  BONES: Degenerative changes of the spine.    IMPRESSION:     Unchanged prominent mediastinal nodes, nonspecific. Otherwise, no   evidenceof metastatic disease in the chest, abdomen, or pelvis.    High-density material layering within the gastric antrum may be secondary   to ingested material.    Questionable skin defect of the left gluteal soft tissues not seen on   prior CT, likely skinfold however clinical correlation is recommended.    Findings discussed by Dr. Forrester with Dr. Arriaza on 2019 at 2:00 PM   with read back.        GURINDER FORRESTER M.D., RADIOLOGY RESIDENT  This document has been electronically signed.  MARYELLEN SALCEDO, ATTENDING RADIOLOGIST  This document has been electronically signed. 2019  7:54PM    -------------------------------------------------------------------------------------------------------------      Patient name: KARINA SCHNEIDER  YOB: 1955   Age: 63 (F)   MR#: [id number]  Study Date: 2019  [facility]  CHAR[facility]  Study quality: Technically good  Referring Physician: Manuel Palafox MD  Blood Pressure: 115/70 mmHg  Height: 162 cm  Weight: 82 kg  BSA: 1.9 m2  Heart Rate: 74 mmHg  ------------------------------------------------------------------------  PROCEDURE: Transthoracic echocardiogram with 2-D, M-Mode  and complete spectral and color flow Doppler.  INDICATION: Heart failure, unspecified (I50.9)  ------------------------------------------------------------------------  DIMENSIONS:  Dimensions:     Normal Values:  LA:     3.7 cm    2.0 - 4.0 cm  Ao:     2.9 cm    2.0 -3.8 cm  SEPTUM: 0.9 cm    0.6 - 1.2 cm  PWT:    0.9 cm    0.6 - 1.1 cm  LVIDd:  4.5 cm    3.0 - 5.6 cm  LVIDs:  2.7 cm    1.8 - 4.0 cm  Derived Variables:  LVMI: 71 g/m2  RWT: 0.40  Fractional short: 40 %  Ejection Fraction (Visual Estimate): >70 %  Peak Velocity (m/sec): AoV=1.4  ------------------------------------------------------------------------  OBSERVATIONS:  Mitral Valve: Mitral annular calcification and calcified  mitral leaflets with normal diastolic opening. Mild mitral  regurgitation.  Aortic Root: Aortic Root: 2.9 cm.  Aortic Valve: Calcified trileaflet aortic valve with normal  opening. Peak transaortic valve gradient equals 7 mm Hg.  Peak left ventricular outflow tract gradient equals 4.8 mm  Hg.  Left Atrium: Mildly dilated left atrium.  LA volume index =  37 cc/m2.  Left Ventricle: Hyperdynamic left ventricle. Flattening of  the interventricular septum in both systole and diastole is   consistent with right ventricular pressure overload.  Normal left ventricular internal dimensions and wall  thicknesses. (DT:150 ms).  Right Heart: Severe right atrial enlargement. Right  ventricular enlargement with normal right ventricular  systolic function. Normal tricuspid valve. Moderate-severe  tricuspid regurgitation. Pulmonicvalve not well  visualized. Mild pulmonic regurgitation.  Pericardium/PleuraNormal pericardium with no pericardial  effusion.  Hemodynamic: Estimated right ventricular systolic pressure  equals 84 mm Hg, assuming right atrial pressure equals 10  mm Hg, consistent with severe pulmonary hypertension.  ------------------------------------------------------------------------  CONCLUSIONS:  1. Mitral annular calcification and calcified mitral  leaflets with normal diastolic opening. Mild mitral  regurgitation.  2. Calcified trileaflet aortic valve with normal opening.  3. Normal left ventricular internal dimensions and wall  thicknesses.  4. Hyperdynamic left ventricle. Flattening of the  interventricular septum in both systole and diastole is  consistent with right ventricular pressure overload.  5. Severe right atrial enlargement.  6. Right ventricular enlargement with normal right  ventricular systolic function.  7. Normal tricuspid valve. Moderate-severe tricuspid  regurgitation.  8. Estimated pulmonary artery systolic pressure equals 79  mm Hg, assuming right atrial pressure equals 5  mm Hg,  consistent with severe pulmonary hypertension.  *** Compared with echocardiogram of 10/31/2017, no  significant changes noted.  ------------------------------------------------------------------------  Confirmed on  2019 - 08:27:07 by Shavon Sands M.D.  ------------------------------------------------------------------------    ------------------------------------------------------------------------------------------------------------------------------------------------------    [facility]  [address] [place] [address] [id number] [phone]  Cath Lab Report -- Comprehensive Report  Patient: KARINA SCHNEIDER  Study date: 2019  Account number: [id number]  MR number: [id number]  : 1955  Gender: Female  Race: B  Case Physician(s):  Ilene Devlin M.D.  Fellow:  Ruben Hernandez M.D.  Referring Physician:  DENISSE Turner M.D.  INDICATIONS: Acute on chronic systolic congestive heart failure.  PROCEDURE:  --  Right heart catheterization.  --  Sonosite - Diagnostic.  TECHNIQUE: The risks and alternatives of the procedures and conscious  sedation were explained to the patient and informed consent was obtained.  Cardiac catheterization performed electively.  Local anesthetic given. Right brachial vein access. Local anesthetic given.  A 5 Fr. Radial Glidesheath was inserted in the vessel, utilizing the  modified Seldinger technique. Right heart catheterization. The procedure  was performed utilizing a 5 Fr. X 110cm Bln Wedge Pressure Cath catheter  under fluoroscopic guidance. Measurements of pressures were obtained.  Sonosite - Diagnostic. RADIATION EXPOSURE: 2.8 min.  MEDICATIONS GIVEN: 2% Lidocaine, 3 ml, subcutaneously. 0.9% Normal saline,  5 ml, IV.  COMPLICATIONS: There were no complications. No complications occurred  during the cath lab visit.  DIAGNOSTIC IMPRESSIONS: Severe PHT  PCWP 18mmhg  INTERVENTIONAL IMPRESSIONS: Severe PHT  PCWP 18mmhg  Prepared and signed by  Ilene Devlin M.D.  Signed 2019 17:49:31  HEMODYNAMIC TABLES  Pressures:  Baseline  Pressures:  - HR: 52  Pressures:  - Rhythm:  Pressures:  -- Aortic Pressure (S/D/M): 133/74/101  Pressures:  -- Pulmonary Artery (S/D/M): 78/29/45  Pressures:  -- Pulmonary Capillary Wedge: 20/21/18  Pressures:  -- Right Atrium (a/v/M): 12/12/10  Pressures:  -- Right Ventricle (s/edp): 78/14/--  O2 Sats:  Baseline  O2 Sats:  - HR: 52  O2 Sats:  - Rhythm:  O2 Sats:  -- AO: 8.1/96/10.58  O2 Sats:  -- PA: 8.1/47.4/5.22  Outputs:  Baseline  Outputs:  -- CALCULATIONS[age]  Outputs:  -- CALCULATIONS: Body Surface Area: 1.88  Outputs:  -- CALCULATIONS: Height in cm: 163.00  Outputs:  -- CALCULATIONS: Sex: Female  Outputs:  -- CALCULATIONS: Weight in k.10  Outputs:  -- OUTPUTS: CO by Juan Carlos: 4.39  Outputs:  -- OUTPUTS: Juan Carlos cardiac index: 2.33  Outputs:  -- OUTPUTS: O2 consumption: 234.81  Outputs:  -- OUTPUTS: Vo2 Indexed: 125.00  Outputs:  -- RESISTANCES: Left ventricular stroke work: 80.15  Outputs:  -- RESISTANCES: Left Ventricular Stroke Work index: 42.67  Outputs:  -- RESISTANCES: Pulmonary vascular index (dsc): 1027.67  Outputs:  -- RESISTANCES: Pulmonary vascular index (Wood Units): 12.85  Outputs:  -- RESISTANCES: Pulmonary vascular resistance (dsc): 547.08  Outputs:  -- RESISTANCES: Pulmonary vascular resistance (Wood Units): 6.84  Outputs:  -- RESISTANCES: PVR_SVR Ratio: 0.33  Outputs:  -- RESISTANCES: Right ventricular stroke work: 37.96  Outputs:  -- RESISTANCES: Right ventricular stroke work index:20.21  Outputs:  -- RESISTANCES: Systemic vascular index (dsc): 3117.26  Outputs:  -- RESISTANCES: Systemic vascular index (Wood Units): 38.98  Outputs:  -- RESISTANCES: Systemic vascular resistance (dsc): 1659.49  Outputs:  -- RESISTANCES: Systemic vascular resistance (Wood Units): 20.75  Outputs:  -- RESISTANCES: Total pulmonary index (dsc): 1541.50  Outputs:  -- RESISTANCES: Total pulmonary index (Wood Units): 19.27  Outputs:  -- RESISTANCES: Total pulmonary resistance (dsc): 820.63  Outputs:-- RESISTANCES: Total pulmonary resistance (Wood Units): 10.26  Outputs:  -- RESISTANCES: Total vascular index (Wood Units): 43.26  Outputs:  -- RESISTANCES: Total vascular resistance (dsc): 1841.85  Outputs:  -- RESISTANCES: Total vascular resistance (Wood Units): 23.03  Outputs:  -- RESISTANCES: Total vascular resistance index (dsc): 3459.82  Outputs:  -- RESISTANCES: TPR_TVR Ratio: 0.45  Outputs:  -- SHUNTS: Pulmonary flow: 4.39  Outputs:  -- SHUNTS: Qp Indexed: 2.33  Outputs:  -- SHUNTS: Qs Indexed: 2.33  Outputs:  -- SHUNTS: Systemic flow: 4.39

## 2019-04-24 ENCOUNTER — RESULT REVIEW (OUTPATIENT)
Age: 64
End: 2019-04-24

## 2019-04-24 ENCOUNTER — APPOINTMENT (OUTPATIENT)
Dept: COLORECTAL SURGERY | Facility: HOSPITAL | Age: 64
End: 2019-04-24

## 2019-04-24 LAB
ANION GAP SERPL CALC-SCNC: 13 MMO/L — SIGNIFICANT CHANGE UP (ref 7–14)
ANION GAP SERPL CALC-SCNC: 13 MMO/L — SIGNIFICANT CHANGE UP (ref 7–14)
APTT BLD: 34.1 SEC — SIGNIFICANT CHANGE UP (ref 27.5–36.3)
BASE EXCESS BLDA CALC-SCNC: -5.7 MMOL/L — SIGNIFICANT CHANGE UP
BASE EXCESS BLDA CALC-SCNC: -6.1 MMOL/L — SIGNIFICANT CHANGE UP
BLD GP AB SCN SERPL QL: NEGATIVE — SIGNIFICANT CHANGE UP
BUN SERPL-MCNC: 35 MG/DL — HIGH (ref 7–23)
BUN SERPL-MCNC: 44 MG/DL — HIGH (ref 7–23)
CA-I BLDA-SCNC: 1.26 MMOL/L — SIGNIFICANT CHANGE UP (ref 1.15–1.29)
CA-I BLDA-SCNC: 1.35 MMOL/L — HIGH (ref 1.15–1.29)
CALCIUM SERPL-MCNC: 8.9 MG/DL — SIGNIFICANT CHANGE UP (ref 8.4–10.5)
CALCIUM SERPL-MCNC: 9.3 MG/DL — SIGNIFICANT CHANGE UP (ref 8.4–10.5)
CHLORIDE SERPL-SCNC: 109 MMOL/L — HIGH (ref 98–107)
CHLORIDE SERPL-SCNC: 115 MMOL/L — HIGH (ref 98–107)
CO2 SERPL-SCNC: 15 MMOL/L — LOW (ref 22–31)
CO2 SERPL-SCNC: 19 MMOL/L — LOW (ref 22–31)
CREAT SERPL-MCNC: 1.38 MG/DL — HIGH (ref 0.5–1.3)
CREAT SERPL-MCNC: 1.62 MG/DL — HIGH (ref 0.5–1.3)
GLUCOSE BLDA-MCNC: 106 MG/DL — HIGH (ref 70–99)
GLUCOSE BLDA-MCNC: 129 MG/DL — HIGH (ref 70–99)
GLUCOSE SERPL-MCNC: 134 MG/DL — HIGH (ref 70–99)
GLUCOSE SERPL-MCNC: 85 MG/DL — SIGNIFICANT CHANGE UP (ref 70–99)
HCO3 BLDA-SCNC: 19 MMOL/L — LOW (ref 22–26)
HCO3 BLDA-SCNC: 19 MMOL/L — LOW (ref 22–26)
HCT VFR BLD CALC: 29.6 % — LOW (ref 34.5–45)
HCT VFR BLD CALC: 31.8 % — LOW (ref 34.5–45)
HCT VFR BLDA CALC: 28.9 % — LOW (ref 34.5–46.5)
HCT VFR BLDA CALC: 30.1 % — LOW (ref 34.5–46.5)
HGB BLD-MCNC: 9.2 G/DL — LOW (ref 11.5–15.5)
HGB BLD-MCNC: 9.5 G/DL — LOW (ref 11.5–15.5)
HGB BLDA-MCNC: 9.3 G/DL — LOW (ref 11.5–15.5)
HGB BLDA-MCNC: 9.7 G/DL — LOW (ref 11.5–15.5)
INR BLD: 1.22 — HIGH (ref 0.88–1.17)
MAGNESIUM SERPL-MCNC: 1.7 MG/DL — SIGNIFICANT CHANGE UP (ref 1.6–2.6)
MCHC RBC-ENTMCNC: 21.4 PG — LOW (ref 27–34)
MCHC RBC-ENTMCNC: 21.7 PG — LOW (ref 27–34)
MCHC RBC-ENTMCNC: 29.9 % — LOW (ref 32–36)
MCHC RBC-ENTMCNC: 31.1 % — LOW (ref 32–36)
MCV RBC AUTO: 69.8 FL — LOW (ref 80–100)
MCV RBC AUTO: 71.6 FL — LOW (ref 80–100)
NRBC # FLD: 0.04 K/UL — SIGNIFICANT CHANGE UP (ref 0–0)
NRBC # FLD: 0.06 K/UL — SIGNIFICANT CHANGE UP (ref 0–0)
PCO2 BLDA: 40 MMHG — SIGNIFICANT CHANGE UP (ref 32–48)
PCO2 BLDA: 47 MMHG — SIGNIFICANT CHANGE UP (ref 32–48)
PH BLDA: 7.26 PH — LOW (ref 7.35–7.45)
PH BLDA: 7.3 PH — LOW (ref 7.35–7.45)
PHOSPHATE SERPL-MCNC: 3 MG/DL — SIGNIFICANT CHANGE UP (ref 2.5–4.5)
PLATELET # BLD AUTO: 173 K/UL — SIGNIFICANT CHANGE UP (ref 150–400)
PLATELET # BLD AUTO: 173 K/UL — SIGNIFICANT CHANGE UP (ref 150–400)
PMV BLD: SIGNIFICANT CHANGE UP FL (ref 7–13)
PMV BLD: SIGNIFICANT CHANGE UP FL (ref 7–13)
PO2 BLDA: 173 MMHG — HIGH (ref 83–108)
PO2 BLDA: 176 MMHG — HIGH (ref 83–108)
POTASSIUM BLDA-SCNC: 3.8 MMOL/L — SIGNIFICANT CHANGE UP (ref 3.4–4.5)
POTASSIUM BLDA-SCNC: 5.1 MMOL/L — HIGH (ref 3.4–4.5)
POTASSIUM SERPL-MCNC: 3.9 MMOL/L — SIGNIFICANT CHANGE UP (ref 3.5–5.3)
POTASSIUM SERPL-MCNC: 4.1 MMOL/L — SIGNIFICANT CHANGE UP (ref 3.5–5.3)
POTASSIUM SERPL-SCNC: 3.9 MMOL/L — SIGNIFICANT CHANGE UP (ref 3.5–5.3)
POTASSIUM SERPL-SCNC: 4.1 MMOL/L — SIGNIFICANT CHANGE UP (ref 3.5–5.3)
PROTHROM AB SERPL-ACNC: 13.6 SEC — HIGH (ref 9.8–13.1)
RBC # BLD: 4.24 M/UL — SIGNIFICANT CHANGE UP (ref 3.8–5.2)
RBC # BLD: 4.44 M/UL — SIGNIFICANT CHANGE UP (ref 3.8–5.2)
RBC # FLD: 22.7 % — HIGH (ref 10.3–14.5)
RBC # FLD: 22.8 % — HIGH (ref 10.3–14.5)
RH IG SCN BLD-IMP: POSITIVE — SIGNIFICANT CHANGE UP
SAO2 % BLDA: 98.8 % — SIGNIFICANT CHANGE UP (ref 95–99)
SAO2 % BLDA: 98.9 % — SIGNIFICANT CHANGE UP (ref 95–99)
SODIUM BLDA-SCNC: 137 MMOL/L — SIGNIFICANT CHANGE UP (ref 136–146)
SODIUM BLDA-SCNC: 143 MMOL/L — SIGNIFICANT CHANGE UP (ref 136–146)
SODIUM SERPL-SCNC: 141 MMOL/L — SIGNIFICANT CHANGE UP (ref 135–145)
SODIUM SERPL-SCNC: 143 MMOL/L — SIGNIFICANT CHANGE UP (ref 135–145)
WBC # BLD: 5.21 K/UL — SIGNIFICANT CHANGE UP (ref 3.8–10.5)
WBC # BLD: 7.25 K/UL — SIGNIFICANT CHANGE UP (ref 3.8–10.5)
WBC # FLD AUTO: 5.21 K/UL — SIGNIFICANT CHANGE UP (ref 3.8–10.5)
WBC # FLD AUTO: 7.25 K/UL — SIGNIFICANT CHANGE UP (ref 3.8–10.5)

## 2019-04-24 PROCEDURE — 88341 IMHCHEM/IMCYTCHM EA ADD ANTB: CPT | Mod: 26

## 2019-04-24 PROCEDURE — 44204 LAPARO PARTIAL COLECTOMY: CPT

## 2019-04-24 PROCEDURE — 88309 TISSUE EXAM BY PATHOLOGIST: CPT | Mod: 26

## 2019-04-24 PROCEDURE — 88342 IMHCHEM/IMCYTCHM 1ST ANTB: CPT | Mod: 26

## 2019-04-24 PROCEDURE — 45378 DIAGNOSTIC COLONOSCOPY: CPT

## 2019-04-24 PROCEDURE — 99223 1ST HOSP IP/OBS HIGH 75: CPT | Mod: 25

## 2019-04-24 PROCEDURE — 49560: CPT

## 2019-04-24 PROCEDURE — 44213 LAP MOBIL SPLENIC FL ADD-ON: CPT

## 2019-04-24 RX ORDER — ACETAMINOPHEN 500 MG
1000 TABLET ORAL ONCE
Qty: 0 | Refills: 0 | Status: COMPLETED | OUTPATIENT
Start: 2019-04-25 | End: 2019-04-25

## 2019-04-24 RX ORDER — HEPARIN SODIUM 5000 [USP'U]/ML
5000 INJECTION INTRAVENOUS; SUBCUTANEOUS EVERY 8 HOURS
Qty: 0 | Refills: 0 | Status: DISCONTINUED | OUTPATIENT
Start: 2019-04-24 | End: 2019-04-26

## 2019-04-24 RX ORDER — SODIUM CHLORIDE 9 MG/ML
1000 INJECTION, SOLUTION INTRAVENOUS
Qty: 0 | Refills: 0 | Status: DISCONTINUED | OUTPATIENT
Start: 2019-04-24 | End: 2019-04-25

## 2019-04-24 RX ORDER — HYDROMORPHONE HYDROCHLORIDE 2 MG/ML
0.5 INJECTION INTRAMUSCULAR; INTRAVENOUS; SUBCUTANEOUS
Qty: 0 | Refills: 0 | Status: DISCONTINUED | OUTPATIENT
Start: 2019-04-24 | End: 2019-04-24

## 2019-04-24 RX ORDER — MORPHINE SULFATE 50 MG/1
2 CAPSULE, EXTENDED RELEASE ORAL EVERY 4 HOURS
Qty: 0 | Refills: 0 | Status: DISCONTINUED | OUTPATIENT
Start: 2019-04-24 | End: 2019-04-24

## 2019-04-24 RX ORDER — CHLORHEXIDINE GLUCONATE 213 G/1000ML
1 SOLUTION TOPICAL DAILY
Qty: 0 | Refills: 0 | Status: DISCONTINUED | OUTPATIENT
Start: 2019-04-24 | End: 2019-04-25

## 2019-04-24 RX ORDER — SODIUM CHLORIDE 9 MG/ML
250 INJECTION, SOLUTION INTRAVENOUS ONCE
Qty: 0 | Refills: 0 | Status: COMPLETED | OUTPATIENT
Start: 2019-04-24 | End: 2019-04-24

## 2019-04-24 RX ORDER — HYDROMORPHONE HYDROCHLORIDE 2 MG/ML
1 INJECTION INTRAMUSCULAR; INTRAVENOUS; SUBCUTANEOUS EVERY 4 HOURS
Qty: 0 | Refills: 0 | Status: DISCONTINUED | OUTPATIENT
Start: 2019-04-24 | End: 2019-04-26

## 2019-04-24 RX ORDER — HYDROMORPHONE HYDROCHLORIDE 2 MG/ML
0.5 INJECTION INTRAMUSCULAR; INTRAVENOUS; SUBCUTANEOUS EVERY 4 HOURS
Qty: 0 | Refills: 0 | Status: DISCONTINUED | OUTPATIENT
Start: 2019-04-24 | End: 2019-04-26

## 2019-04-24 RX ORDER — HYDROMORPHONE HYDROCHLORIDE 2 MG/ML
1 INJECTION INTRAMUSCULAR; INTRAVENOUS; SUBCUTANEOUS
Qty: 0 | Refills: 0 | Status: DISCONTINUED | OUTPATIENT
Start: 2019-04-24 | End: 2019-04-25

## 2019-04-24 RX ORDER — ONDANSETRON 8 MG/1
4 TABLET, FILM COATED ORAL ONCE
Qty: 0 | Refills: 0 | Status: DISCONTINUED | OUTPATIENT
Start: 2019-04-24 | End: 2019-04-25

## 2019-04-24 RX ADMIN — SODIUM CHLORIDE 40 MILLILITER(S): 9 INJECTION, SOLUTION INTRAVENOUS at 06:22

## 2019-04-24 RX ADMIN — HYDROMORPHONE HYDROCHLORIDE 0.5 MILLIGRAM(S): 2 INJECTION INTRAMUSCULAR; INTRAVENOUS; SUBCUTANEOUS at 21:36

## 2019-04-24 RX ADMIN — SODIUM CHLORIDE 100 MILLILITER(S): 9 INJECTION, SOLUTION INTRAVENOUS at 20:00

## 2019-04-24 RX ADMIN — SODIUM CHLORIDE 1000 MILLILITER(S): 9 INJECTION, SOLUTION INTRAVENOUS at 20:15

## 2019-04-24 RX ADMIN — HEPARIN SODIUM 5000 UNIT(S): 5000 INJECTION INTRAVENOUS; SUBCUTANEOUS at 22:27

## 2019-04-24 RX ADMIN — MAGNESIUM OXIDE 400 MG ORAL TABLET 400 MILLIGRAM(S): 241.3 TABLET ORAL at 09:43

## 2019-04-24 RX ADMIN — SODIUM CHLORIDE 3 MILLILITER(S): 9 INJECTION INTRAMUSCULAR; INTRAVENOUS; SUBCUTANEOUS at 22:00

## 2019-04-24 RX ADMIN — HYDROMORPHONE HYDROCHLORIDE 0.5 MILLIGRAM(S): 2 INJECTION INTRAMUSCULAR; INTRAVENOUS; SUBCUTANEOUS at 21:00

## 2019-04-24 RX ADMIN — PANTOPRAZOLE SODIUM 40 MILLIGRAM(S): 20 TABLET, DELAYED RELEASE ORAL at 06:22

## 2019-04-24 RX ADMIN — Medication 25 MILLIGRAM(S): at 06:22

## 2019-04-24 RX ADMIN — Medication 1 TABLET(S): at 13:04

## 2019-04-24 RX ADMIN — Medication 300 MILLIGRAM(S): at 13:04

## 2019-04-24 RX ADMIN — SODIUM CHLORIDE 40 MILLILITER(S): 9 INJECTION, SOLUTION INTRAVENOUS at 00:00

## 2019-04-24 RX ADMIN — BUDESONIDE AND FORMOTEROL FUMARATE DIHYDRATE 2 PUFF(S): 160; 4.5 AEROSOL RESPIRATORY (INHALATION) at 09:43

## 2019-04-24 RX ADMIN — SODIUM CHLORIDE 40 MILLILITER(S): 9 INJECTION, SOLUTION INTRAVENOUS at 13:05

## 2019-04-24 RX ADMIN — Medication 10 MILLIGRAM(S): at 13:04

## 2019-04-24 RX ADMIN — Medication 10 MILLIGRAM(S): at 06:22

## 2019-04-24 RX ADMIN — SODIUM CHLORIDE 3 MILLILITER(S): 9 INJECTION INTRAMUSCULAR; INTRAVENOUS; SUBCUTANEOUS at 06:23

## 2019-04-24 NOTE — BRIEF OPERATIVE NOTE - OPERATION/FINDINGS
colonoscopy and identification of splenic flexure mass, diagnostic laparoscopy and mobilization of the left colon and splenic flexure, exlap, partial left hemicolectomy, side to side stapled and hand sewn anastomosis

## 2019-04-24 NOTE — PROGRESS NOTE ADULT - SUBJECTIVE AND OBJECTIVE BOX
Patient seen and examined  No complaints     REVIEW OF SYSTEMS:  As per HPI, otherwise 8 full 10 ROS were unremarkable    MEDICATIONS  (STANDING):  allopurinol 300 milliGRAM(s) Oral daily  buDESOnide  80 MICROgram(s)/formoterol 4.5 MICROgram(s) Inhaler 2 Puff(s) Inhalation two times a day  dextrose 5% + sodium chloride 0.9%. 1000 milliLiter(s) (40 mL/Hr) IV Continuous <Continuous>  dextrose 5%. 1000 milliLiter(s) (50 mL/Hr) IV Continuous <Continuous>  dextrose 50% Injectable 12.5 Gram(s) IV Push once  dextrose 50% Injectable 25 Gram(s) IV Push once  dextrose 50% Injectable 25 Gram(s) IV Push once  insulin lispro (HumaLOG) corrective regimen sliding scale   SubCutaneous three times a day before meals  insulin lispro (HumaLOG) corrective regimen sliding scale   SubCutaneous at bedtime  magnesium oxide 400 milliGRAM(s) Oral two times a day with meals  metoprolol succinate ER 25 milliGRAM(s) Oral two times a day  multivitamin 1 Tablet(s) Oral daily  pantoprazole  Injectable 40 milliGRAM(s) IV Push every 12 hours  sildenafil (REVATIO) 10 milliGRAM(s) Oral three times a day  sodium chloride 0.9% lock flush 3 milliLiter(s) IV Push every 8 hours  torsemide 20 milliGRAM(s) Oral <User Schedule>      VITAL:  T(C): , Max: 36.8 (04-23-19 @ 13:31)  T(F): , Max: 98.3 (04-23-19 @ 13:31)  HR: 80 (04-24-19 @ 06:21)  BP: 123/82 (04-24-19 @ 06:21)  BP(mean): --  RR: 18 (04-24-19 @ 06:21)  SpO2: 100% (04-24-19 @ 06:21)  Wt(kg): --    I and O's:    04-23 @ 07:01  -  04-24 @ 07:00  --------------------------------------------------------  IN: 750 mL / OUT: 400 mL / NET: 350 mL          PHYSICAL EXAM:    Constitutional: NAD  HEENT: PERRLA, EOMI,  MMM  Neck: No LAD, No JVD  Respiratory: CTAB  Cardiovascular: S1 and S2  Gastrointestinal: BS+, soft, NT/ND  Extremities: No peripheral edema  Neurological: A/O x 3, no focal deficits      LABS:                        9.5    5.21  )-----------( 173      ( 24 Apr 2019 06:20 )             31.8     04-24    141  |  109<H>  |  44<H>  ----------------------------<  85  4.1   |  19<L>  |  1.62<H>    Ca    9.3      24 Apr 2019 06:20  Phos  3.0     04-24  Mg     1.7     04-24      ASSESSMENT:  63F with COPD (on O2), LINH, AF on A/C, HLD, gout, HFpEF, PHTN , DM and CKD3/4 p/w generalized weakness and poor appetite.     - CKD: stage 3 with baseline Cr ~ 1.6  - OSMAR: likely due to CHARLIE - resolved   - CV: BP acceptable  - HFpEF: right sided heart failure / cor pulm; no SOB; volume status acceptable   - Pulm: RVP + for influenza, rhino and enterovirus - s/p tamiflu. sPHTN    - anemia: in chronic disease + iron def + GIB + malignancy, hold LUIS - s/p IV iron; Hgb acceptable  - GI: + FOBT, s/p colonoscopy - with polypoid mass in the proximal sigmoid colon - adenocarcinoma      RECOMMEND:  - HF team f/u; on torsemide 20 mg PO TTSun  - sildenafil per pulm  - continue Mag ox 400 mg BID  - no objection to colorectal surgery today from renal standpoint   - dose meds for a CrCl ~30-35 mL/min      Tuan Camargo MD   [facility]  [phone]

## 2019-04-24 NOTE — CONSULT NOTE ADULT - ASSESSMENT
62 yo woman with multiple comorbidities, including history of lymphoma, R-sided heart failure with preserved EF (EF 66% on TTE in 10/2017), severe pulm HTN, mod-severe TR, reported COPD but denies smoking? (on 2L NC at night), LINH (being arranged for bilevel support), A-Fib (on Eliquis), HTN, HLD, DM2, and gout presents with generalized weakness for the past 1 week in the setting of recent occult positive stool, OSMAR and newly diagnosed influenza/entero virus infection s/p treatment with Tamiflu, found to have non-obstructing mass at the proximal sigmoid colon s/p  s/p colonoscopy diagnostic lap converted to exlap, partial left hemicolectomy, side to side stapled anastomosis (4/24/19)      PLAN:    Neurologic:   -pain control PRN, Tylenol, Dilaudid IV    Respiratory:   -Saturating well on face tent. keep O2>92%, 2L NC while asleep, CPAP if needed  -IS  -continue sildenafil for PHTN  -duoneb PRN; Symbicort for COPD    Cardiovascular:   -keep MAP>65  -gave 250ml Bolus for BP in 80s  Afib- c/w metroprolol  -hold torsemide (next dose due 4/25/19 @9:00)    Gastrointestinal/Nutrition:   -NPO  -protonix, zofran    Renal/Genitourinary:   -strict I/Os  -dawson  -IVF LR @100ml/hr    Hematologic:   -H/H stable  -dvt ppx HSQ, SCDs    Infectious Disease:   -afebrile, off Abx    Tubes/Lines/Drains: samir lynn PIV    Endocrine: monitor glucose, ISS    Disposition: SICU    --------------------------------------------------------------------------------------    Critical Care Diagnoses:

## 2019-04-24 NOTE — PROGRESS NOTE ADULT - SUBJECTIVE AND OBJECTIVE BOX
Patient is a 63y old  Female who presents with a chief complaint of Generalized weakness (24 Apr 2019 11:37)      SUBJECTIVE / OVERNIGHT EVENTS: no overnight events  feels well    ROS:  Resp: No cough no sputum production  CVS: No chest pain no palpitations no orthopnea  GI: no N/V/D  : no dysuria, no hematuria  Neuro: no weakness no paresthesias  Heme: No petechiae no easy bruising  Msk: No joint pain no swelling  Skin: No rash no itching        MEDICATIONS  (STANDING):  allopurinol 300 milliGRAM(s) Oral daily  buDESOnide  80 MICROgram(s)/formoterol 4.5 MICROgram(s) Inhaler 2 Puff(s) Inhalation two times a day  chlorhexidine 2% Cloths 1 Application(s) Topical daily  dextrose 5% + sodium chloride 0.9%. 1000 milliLiter(s) (40 mL/Hr) IV Continuous <Continuous>  dextrose 5%. 1000 milliLiter(s) (50 mL/Hr) IV Continuous <Continuous>  dextrose 50% Injectable 12.5 Gram(s) IV Push once  dextrose 50% Injectable 25 Gram(s) IV Push once  dextrose 50% Injectable 25 Gram(s) IV Push once  insulin lispro (HumaLOG) corrective regimen sliding scale   SubCutaneous three times a day before meals  insulin lispro (HumaLOG) corrective regimen sliding scale   SubCutaneous at bedtime  magnesium oxide 400 milliGRAM(s) Oral two times a day with meals  metoprolol succinate ER 25 milliGRAM(s) Oral two times a day  multivitamin 1 Tablet(s) Oral daily  pantoprazole  Injectable 40 milliGRAM(s) IV Push every 12 hours  sildenafil (REVATIO) 10 milliGRAM(s) Oral three times a day  sodium chloride 0.9% lock flush 3 milliLiter(s) IV Push every 8 hours  torsemide 20 milliGRAM(s) Oral <User Schedule>    MEDICATIONS  (PRN):  ALBUTerol/ipratropium for Nebulization 3 milliLiter(s) Nebulizer every 6 hours PRN Shortness of Breath and/or Wheezing  dextrose 40% Gel 15 Gram(s) Oral once PRN Blood Glucose LESS THAN 70 milliGRAM(s)/deciliter  glucagon  Injectable 1 milliGRAM(s) IntraMuscular once PRN Glucose LESS THAN 70 milligrams/deciliter  guaiFENesin  milliGRAM(s) Oral every 12 hours PRN Cough        CAPILLARY BLOOD GLUCOSE      POCT Blood Glucose.: 82 mg/dL (24 Apr 2019 12:24)  POCT Blood Glucose.: 99 mg/dL (24 Apr 2019 06:25)  POCT Blood Glucose.: 122 mg/dL (23 Apr 2019 21:14)    I&O's Summary    23 Apr 2019 07:01  -  24 Apr 2019 07:00  --------------------------------------------------------  IN: 750 mL / OUT: 400 mL / NET: 350 mL        Vital Signs Last 24 Hrs  T(C): 36.7 (24 Apr 2019 14:10), Max: 36.7 (24 Apr 2019 14:10)  T(F): 98 (24 Apr 2019 14:10), Max: 98 (24 Apr 2019 14:10)  HR: 89 (24 Apr 2019 14:10) (80 - 90)  BP: 145/92 (24 Apr 2019 14:10) (116/75 - 145/92)  BP(mean): --  RR: 16 (24 Apr 2019 14:10) (16 - 18)  SpO2: 97% (24 Apr 2019 14:10) (97% - 100%)    PHYSICAL EXAM:  GENERAL: NAD, well-developed  HEAD:  Atraumatic, Normocephalic  EYES: EOMI, PERRLA, conjunctiva and sclera clear  NECK: Supple, No JVD  CHEST/LUNG: no rhonchi, no wheeze, clear to auscultation bilaterally  HEART: S1 S2; No rubs or gallops, no murmurs appreciated  ABDOMEN: Soft, Nontender, Nondistended; Bowel sounds present  EXTREMITIES:  No clubbing or cyanosis, + Peripheral Pulses,  trace bilateral edema  PSYCH: AO x 3 appropriate affect  NEUROLOGY: non-focal, motor and sensory systems intact  SKIN: No rashes or lesions    LABS:                        9.5    5.21  )-----------( 173      ( 24 Apr 2019 06:20 )             31.8     04-24    141  |  109<H>  |  44<H>  ----------------------------<  85  4.1   |  19<L>  |  1.62<H>    Ca    9.3      24 Apr 2019 06:20  Phos  3.0     04-24  Mg     1.7     04-24      PT/INR - ( 24 Apr 2019 06:20 )   PT: 13.6 SEC;   INR: 1.22          PTT - ( 24 Apr 2019 06:20 )  PTT:34.1 SEC            All consultant(s) notes reviewed and care discussed with other providers        Contact Number, Dr Hanna [phone]

## 2019-04-24 NOTE — CONSULT NOTE ADULT - SUBJECTIVE AND OBJECTIVE BOX
SICU Consultation Note  =====================================================  HPI:   62 yo woman with multiple medical comorbidities including afib, Right sided heart failure with preserved EF (EF 66% on TTE in 10/2017), severe pulmonary HTN, COPD on nocturnal O2, LINH (arranged for new home bilevel support), HTN, HLD, DM2, lymphoma s/p RT and gout presenting with generalized weakness and anemia. Patient notes that she had positive Cologuard test (3/19/2019). Colonoscopy performed 4/12 indicated a fungating, non-obstructing mass at the proximal sigmoid colon s/p biopsy (4/12). Her last colonoscopy was approximately 10 years ago s/p polypectomy. Colorectal surgery consulted given colonoscopy findings. She is tolerating a regular diet without N/V and having regular bowel movements.     Patient denies melena, hematochezia, abdominal pain, nausea or vomiting. She has had intentional weight loss over the past year. Patient with a personal history of lymphoma s/p chemo/radiation (1997) and family history significant for two of her Aunt's having had breast cancer and cervical cancer, respectively. Patient can walk 1-2 blocks with her cane before being limited by shortness of breath. She can climb stairs.     Pt had RHC on 4/5/19 and was started on Sildenafil    Pt with EGD on 4/12/19 showing Polypoid mass in the proximal sigmoid colon    4/24/19 s/p colonoscopy diagnostic lap converted to exlap, partial left hemicolectomy, side to side stapled anastomosis    Surgery Information  ***  Case Duration:      EBL: 200ml      IV Fluids: 2300ml    UOP: 400ml  Complications:        HISTORY  63y Female  Allergies:   PAST MEDICAL & SURGICAL HISTORY:  Gout  Chronic systolic right heart failure  LINH (obstructive sleep apnea)  CLL (chronic lymphocytic leukemia)  COPD (chronic obstructive pulmonary disease)  Diabetes  HTN (hypertension)  Atrial fibrillation  No significant past surgical history    FAMILY HISTORY:  Family history of hyperlipidemia  Family history of hypertension (Sibling)  Family history of type 2 diabetes mellitus  Family history of cancer (Aunt): Breast      SOCIAL HISTORY:  ***    ADVANCE DIRECTIVES: Presumed Full Code  ***    REVIEW OF SYSTEMS:  endorses some abdominal pain, denies difficulty breathing, chest pain, N/V    General: Non-Contributory  Skin/Breast: Non-Contributory  Ophthalmologic: Non-Contributory  ENMT: Non-Contributory  Respiratory and Thorax: Non-Contributory  Cardiovascular: Non-Contributory  Gastrointestinal: Non-Contributory  Genitourinary: Non-Contributory  Musculoskeletal: Non-Contributory  Neurological: Non-Contributory  Psychiatric: Non-Contributory  Hematology/Lymphatics: Non-Contributory  Endocrine: Non-Contributory  Allergic/Immunologic: Non-Contributory    HOME MEDICATIONS:     CURRENT MEDICATIONS:   --------------------------------------------------------------------------------------  Neurologic Medications  HYDROmorphone  Injectable 0.5 milliGRAM(s) IV Push every 4 hours PRN Moderate Pain (4 - 6)  HYDROmorphone  Injectable 1 milliGRAM(s) IV Push every 4 hours PRN Severe Pain (7 - 10)    Respiratory Medications  ALBUTerol/ipratropium for Nebulization 3 milliLiter(s) Nebulizer every 6 hours PRN Shortness of Breath and/or Wheezing  buDESOnide  80 MICROgram(s)/formoterol 4.5 MICROgram(s) Inhaler 2 Puff(s) Inhalation two times a day  guaiFENesin  milliGRAM(s) Oral every 12 hours PRN Cough    Cardiovascular Medications  metoprolol succinate ER 25 milliGRAM(s) Oral two times a day  sildenafil (REVATIO) 10 milliGRAM(s) Oral three times a day  torsemide 20 milliGRAM(s) Oral <User Schedule>    Gastrointestinal Medications  dextrose 5%. 1000 milliLiter(s) IV Continuous <Continuous>  lactated ringers. 1000 milliLiter(s) IV Continuous <Continuous>  magnesium oxide 400 milliGRAM(s) Oral two times a day with meals  multivitamin 1 Tablet(s) Oral daily  pantoprazole  Injectable 40 milliGRAM(s) IV Push every 12 hours  sodium chloride 0.9% lock flush 3 milliLiter(s) IV Push every 8 hours    Genitourinary Medications    Hematologic/Oncologic Medications  heparin  Injectable 5000 Unit(s) SubCutaneous every 8 hours    Antimicrobial/Immunologic Medications    Endocrine/Metabolic Medications  allopurinol 300 milliGRAM(s) Oral daily  dextrose 40% Gel 15 Gram(s) Oral once PRN Blood Glucose LESS THAN 70 milliGRAM(s)/deciliter  dextrose 50% Injectable 12.5 Gram(s) IV Push once  dextrose 50% Injectable 25 Gram(s) IV Push once  dextrose 50% Injectable 25 Gram(s) IV Push once  glucagon  Injectable 1 milliGRAM(s) IntraMuscular once PRN Glucose LESS THAN 70 milligrams/deciliter  insulin lispro (HumaLOG) corrective regimen sliding scale   SubCutaneous three times a day before meals  insulin lispro (HumaLOG) corrective regimen sliding scale   SubCutaneous at bedtime    Topical/Other Medications  chlorhexidine 2% Cloths 1 Application(s) Topical daily    --------------------------------------------------------------------------------------    VITAL SIGNS, INS/OUTS (last 24 hours):  --------------------------------------------------------------------------------------  T(C): 37 (04-24-19 @ 19:40), Max: 37 (04-24-19 @ 19:40)  HR: 95 (04-24-19 @ 21:00) (80 - 112)  BP: 101/71 (04-24-19 @ 21:00) (74/38 - 145/92)  BP(mean): 77 (04-24-19 @ 21:00) (48 - 83)  ABP: 119/73 (04-24-19 @ 21:00) (81/51 - 119/73)  ABP(mean): 88 (04-24-19 @ 21:00) (60 - 88)  RR: 19 (04-24-19 @ 21:00) (12 - 25)  SpO2: 99% (04-24-19 @ 21:00) (90% - 100%)  Wt(kg): --  CVP(mm Hg): --  CI: --  CAPILLARY BLOOD GLUCOSE      POCT Blood Glucose.: 82 mg/dL (24 Apr 2019 12:24)  POCT Blood Glucose.: 99 mg/dL (24 Apr 2019 06:25)   N/A      04-23 @ 07:01  -  04-24 @ 07:00  --------------------------------------------------------  IN:    Oral Fluid: 750 mL  Total IN: 750 mL    OUT:    Voided: 400 mL  Total OUT: 400 mL    Total NET: 350 mL      04-24 @ 07:01 - 04-24 @ 21:26  --------------------------------------------------------  IN:    IV PiggyBack: 250 mL    lactated ringers.: 100 mL  Total IN: 350 mL    OUT:  Total OUT: 0 mL    Total NET: 350 mL        --------------------------------------------------------------------------------------    EXAM  NEUROLOGY  RASS:   	GCS:    Exam: Normal, NAD, alert, oriented x 3, no focal deficits.     HEENT  Exam: Normocephalic, atraumatic.  EOMI     RESPIRATORY  Exam: mild rales on R>L. Normal expansion/effort.    Mechanical Ventilation:     CARDIOVASCULAR  Exam: S1, S2.  Regular rate and rhythm. trace  Peripheral edema      GI/NUTRITION  Exam: Abdomen soft, Non-tender, Non-distended.     Wound:  dressing c/d/i  Current Diet:  NPO    VASCULAR  Exam: Extremities warm, pink, well-perfused.      MUSCULOSKELETAL  Exam: All extremities moving spontaneously without limitations.      SKIN:  Exam: Good skin turgor, no skin breakdown.      METABOLIC/FLUIDS/ELECTROLYTES  dextrose 5%. 1000 milliLiter(s) IV Continuous <Continuous>  lactated ringers. 1000 milliLiter(s) IV Continuous <Continuous>  magnesium oxide 400 milliGRAM(s) Oral two times a day with meals  multivitamin 1 Tablet(s) Oral daily  sodium chloride 0.9% lock flush 3 milliLiter(s) IV Push every 8 hours      HEMATOLOGIC  [x] DVT Prophylaxis: heparin  Injectable 5000 Unit(s) SubCutaneous every 8 hours    Transfusions:	[] PRBC	[] Platelets		[] FFP	[] Cryoprecipitate    INFECTIOUS DISEASE  Antimicrobials/Immunologic Medications:    Day #  	of    ***    Tubes/Lines/Drains  ***  [x] Peripheral IV  [] Central Venous Line     	[] R	[] L	[] IJ	[] Fem	[] SC	Date Placed:   [X] Arterial Line		[] R	[X] L	[] Fem	[] Rad	[] Ax	Date Placed:   [] PICC:         	[] Midline		[] Mediport  [] Urinary Catheter		Date Placed:     LABS  --------------------------------------------------------------------------------------  CBC (04-24 @ 20:25)                          9.2<L>                   7.25    )--------------(  173        --    % Neuts, --    % Lymphs, ANC: --                              29.6<L>  CBC (04-24 @ 06:20)                          9.5<L>                   5.21    )--------------(  173        --    % Neuts, --    % Lymphs, ANC: --                              31.8<L>    BMP (04-24 @ 20:25)       143     |  115<H>  |  35<H> 			Ca++ --      Ca 8.9          ---------------------------------( 134<H>		Mg --           3.9     |  15<L>   |  1.38<H>			Ph --      BMP (04-24 @ 06:20)       141     |  109<H>  |  44<H> 			Ca++ --      Ca 9.3          ---------------------------------( 85    		Mg 1.7          4.1     |  19<L>   |  1.62<H>			Ph 3.0         Coags (04-24 @ 06:20)  aPTT 34.1 / INR 1.22<H> / PT 13.6<H>      ABG (04-24 @ 17:58)     7.30<L> / 40 / 176<H> / 19<L> / -6.1 / 98.9%     Lactate:    ABG (04-24 @ 16:25)     7.26<L> / 47 / 173<H> / 19<L> / -5.7 / 98.8%     Lactate:            --------------------------------------------------------------------------------------    OTHER LABS    IMAGING RESULTS  Echo: < from: Transthoracic Echocardiogram (04.01.19 @ 18:26) >  1. Mitral annular calcification and calcified mitral  leaflets with normal diastolic opening. Mild mitral  regurgitation.  2. Calcified trileaflet aortic valve with normal opening.  3. Normal left ventricular internal dimensions and wall  thicknesses.  4. Hyperdynamic left ventricle. Flattening of the  interventricular septum in both systole and diastole is  consistent with right ventricular pressure overload.  5. Severe right atrial enlargement.  6. Right ventricular enlargement with normal right  ventricular systolic function.  7. Normal tricuspid valve. Moderate-severe tricuspid  regurgitation.  8. Estimated pulmonary artery systolic pressure equals 79  mm Hg, assuming right atrial pressure equals 5  mm Hg,  consistent with severe pulmonary hypertension.    < end of copied text >    CT:   Xray:

## 2019-04-24 NOTE — CONSULT NOTE ADULT - ATTENDING COMMENTS
64 yo woman with multiple comorbidities, including history of lymphoma, R-sided heart failure with preserved EF (EF 66% on TTE in 10/2017), severe pulm HTN, mod-severe TR, reported COPD but denies smoking? (on 2L NC at night), LINH (being arranged for bilevel support), A-Fib (on Eliquis), HTN, HLD, DM2, and gout presents with generalized weakness for the past 1 week in the setting of recent occult positive stool, OSMAR and newly diagnosed influenza/entero virus infection s/p treatment with Tamiflu, found to have non-obstructing mass at the proximal sigmoid colon s/p  s/p colonoscopy diagnostic lap converted to exlap, partial left hemicolectomy, side to side stapled anastomosis (4/24/19)      PLAN:    Neurologic:   -pain control PRN, Tylenol, Dilaudid IV    Respiratory:   -Saturating well on face tent. keep O2>92%, 2L NC while asleep, CPAP if needed  -IS  -continue sildenafil for PHTN  -duoneb PRN; Symbicort for COPD    Cardiovascular:   -keep MAP>65  -gave 250ml Bolus for BP in 80s  Afib- c/w metroprolol  -hold torsemide (next dose due 4/25/19 @9:00)    Gastrointestinal/Nutrition:   -NPO  -protonix, zofran    Renal/Genitourinary:   -strict I/Os  -dawson  -IVF LR @100ml/hr    Hematologic:   -H/H stable  -dvt ppx HSQ, SCDs    Infectious Disease:   -afebrile, off Abx    Tubes/Lines/Drains: samir lynn PIV    Endocrine: monitor glucose, ISS    Disposition: SICU      Critical Care Diagnoses: AFib, respiratory abnormality.  The patient is a critical care patient with life threatening hemodynamic and metabolic instability in SICU.  I have personally interviewed when possible and examined the patient, reviewed data and laboratory tests/x-rays and all pertinent electronic images.  I was physically present for the key portions of the evaluation and management (E/M) service provided.   The SICU team has a constant risk benefit analyzes discussion with the primary team, all consultants, House Staff and PA's on all decisions.  These diagnoses are unrelated to the surgical procedure noted above.  I meet with family if needed to get further history, discuss the case and make care decisions for this patient who might not be able to participate.  Time involved in performance of separately billable procedures was not counted toward my critical care time. There is no overlap.  I spent 55-75 minutes of critical care time for the diagnoses, assessment, plan and interventions.

## 2019-04-25 ENCOUNTER — APPOINTMENT (OUTPATIENT)
Dept: CARDIOLOGY | Facility: CLINIC | Age: 64
End: 2019-04-25

## 2019-04-25 LAB
ANION GAP SERPL CALC-SCNC: 13 MMO/L — SIGNIFICANT CHANGE UP (ref 7–14)
ANISOCYTOSIS BLD QL: SLIGHT — SIGNIFICANT CHANGE UP
BASOPHILS # BLD AUTO: 0.02 K/UL — SIGNIFICANT CHANGE UP (ref 0–0.2)
BASOPHILS NFR BLD AUTO: 0.2 % — SIGNIFICANT CHANGE UP (ref 0–2)
BASOPHILS NFR SPEC: 1.8 % — SIGNIFICANT CHANGE UP (ref 0–2)
BLASTS # FLD: 0 % — SIGNIFICANT CHANGE UP (ref 0–0)
BUN SERPL-MCNC: 34 MG/DL — HIGH (ref 7–23)
CALCIUM SERPL-MCNC: 9.1 MG/DL — SIGNIFICANT CHANGE UP (ref 8.4–10.5)
CHLORIDE SERPL-SCNC: 113 MMOL/L — HIGH (ref 98–107)
CO2 SERPL-SCNC: 17 MMOL/L — LOW (ref 22–31)
CREAT SERPL-MCNC: 1.48 MG/DL — HIGH (ref 0.5–1.3)
DACRYOCYTES BLD QL SMEAR: SLIGHT — SIGNIFICANT CHANGE UP
EOSINOPHIL # BLD AUTO: 0.06 K/UL — SIGNIFICANT CHANGE UP (ref 0–0.5)
EOSINOPHIL NFR BLD AUTO: 0.6 % — SIGNIFICANT CHANGE UP (ref 0–6)
EOSINOPHIL NFR FLD: 0 % — SIGNIFICANT CHANGE UP (ref 0–6)
GIANT PLATELETS BLD QL SMEAR: PRESENT — SIGNIFICANT CHANGE UP
GLUCOSE BLDC GLUCOMTR-MCNC: 100 MG/DL — HIGH (ref 70–99)
GLUCOSE BLDC GLUCOMTR-MCNC: 102 MG/DL — HIGH (ref 70–99)
GLUCOSE BLDC GLUCOMTR-MCNC: 103 MG/DL — HIGH (ref 70–99)
GLUCOSE SERPL-MCNC: 124 MG/DL — HIGH (ref 70–99)
HCT VFR BLD CALC: 31 % — LOW (ref 34.5–45)
HGB BLD-MCNC: 9.4 G/DL — LOW (ref 11.5–15.5)
HYPOCHROMIA BLD QL: SLIGHT — SIGNIFICANT CHANGE UP
IMM GRANULOCYTES NFR BLD AUTO: 0.3 % — SIGNIFICANT CHANGE UP (ref 0–1.5)
LYMPHOCYTES # BLD AUTO: 0.67 K/UL — LOW (ref 1–3.3)
LYMPHOCYTES # BLD AUTO: 6.8 % — LOW (ref 13–44)
LYMPHOCYTES NFR SPEC AUTO: 6.2 % — LOW (ref 13–44)
MACROCYTES BLD QL: SLIGHT — SIGNIFICANT CHANGE UP
MAGNESIUM SERPL-MCNC: 1.5 MG/DL — LOW (ref 1.6–2.6)
MCHC RBC-ENTMCNC: 21.4 PG — LOW (ref 27–34)
MCHC RBC-ENTMCNC: 30.3 % — LOW (ref 32–36)
MCV RBC AUTO: 70.6 FL — LOW (ref 80–100)
METAMYELOCYTES # FLD: 0.9 % — SIGNIFICANT CHANGE UP (ref 0–1)
MICROCYTES BLD QL: SLIGHT — SIGNIFICANT CHANGE UP
MONOCYTES # BLD AUTO: 0.76 K/UL — SIGNIFICANT CHANGE UP (ref 0–0.9)
MONOCYTES NFR BLD AUTO: 7.7 % — SIGNIFICANT CHANGE UP (ref 2–14)
MONOCYTES NFR BLD: 2.6 % — SIGNIFICANT CHANGE UP (ref 2–9)
MYELOCYTES NFR BLD: 0 % — SIGNIFICANT CHANGE UP (ref 0–0)
NEUTROPHIL AB SER-ACNC: 71.7 % — SIGNIFICANT CHANGE UP (ref 43–77)
NEUTROPHILS # BLD AUTO: 8.34 K/UL — HIGH (ref 1.8–7.4)
NEUTROPHILS NFR BLD AUTO: 84.4 % — HIGH (ref 43–77)
NEUTS BAND # BLD: 16.8 % — HIGH (ref 0–6)
NRBC # BLD: 4 /100WBC — SIGNIFICANT CHANGE UP
NRBC # FLD: 0.05 K/UL — SIGNIFICANT CHANGE UP (ref 0–0)
OTHER - HEMATOLOGY %: 0 — SIGNIFICANT CHANGE UP
OVALOCYTES BLD QL SMEAR: SIGNIFICANT CHANGE UP
PHOSPHATE SERPL-MCNC: 3.2 MG/DL — SIGNIFICANT CHANGE UP (ref 2.5–4.5)
PLATELET # BLD AUTO: 200 K/UL — SIGNIFICANT CHANGE UP (ref 150–400)
PLATELET COUNT - ESTIMATE: NORMAL — SIGNIFICANT CHANGE UP
PMV BLD: SIGNIFICANT CHANGE UP FL (ref 7–13)
POIKILOCYTOSIS BLD QL AUTO: SLIGHT — SIGNIFICANT CHANGE UP
POTASSIUM SERPL-MCNC: 4.5 MMOL/L — SIGNIFICANT CHANGE UP (ref 3.5–5.3)
POTASSIUM SERPL-SCNC: 4.5 MMOL/L — SIGNIFICANT CHANGE UP (ref 3.5–5.3)
PROMYELOCYTES # FLD: 0 % — SIGNIFICANT CHANGE UP (ref 0–0)
RBC # BLD: 4.39 M/UL — SIGNIFICANT CHANGE UP (ref 3.8–5.2)
RBC # FLD: 22.6 % — HIGH (ref 10.3–14.5)
SCHISTOCYTES BLD QL AUTO: SLIGHT — SIGNIFICANT CHANGE UP
SODIUM SERPL-SCNC: 143 MMOL/L — SIGNIFICANT CHANGE UP (ref 135–145)
TARGETS BLD QL SMEAR: SIGNIFICANT CHANGE UP
VARIANT LYMPHS # BLD: 0 % — SIGNIFICANT CHANGE UP
WBC # BLD: 9.88 K/UL — SIGNIFICANT CHANGE UP (ref 3.8–10.5)
WBC # FLD AUTO: 9.88 K/UL — SIGNIFICANT CHANGE UP (ref 3.8–10.5)

## 2019-04-25 PROCEDURE — 99291 CRITICAL CARE FIRST HOUR: CPT

## 2019-04-25 PROCEDURE — 71045 X-RAY EXAM CHEST 1 VIEW: CPT | Mod: 26

## 2019-04-25 RX ORDER — MAGNESIUM SULFATE 500 MG/ML
2 VIAL (ML) INJECTION ONCE
Qty: 0 | Refills: 0 | Status: COMPLETED | OUTPATIENT
Start: 2019-04-25 | End: 2019-04-25

## 2019-04-25 RX ORDER — METOPROLOL TARTRATE 50 MG
5 TABLET ORAL ONCE
Qty: 0 | Refills: 0 | Status: COMPLETED | OUTPATIENT
Start: 2019-04-25 | End: 2019-04-25

## 2019-04-25 RX ORDER — CHLORHEXIDINE GLUCONATE 213 G/1000ML
1 SOLUTION TOPICAL
Qty: 0 | Refills: 0 | Status: DISCONTINUED | OUTPATIENT
Start: 2019-04-25 | End: 2019-04-28

## 2019-04-25 RX ORDER — SODIUM CHLORIDE 9 MG/ML
1000 INJECTION, SOLUTION INTRAVENOUS
Qty: 0 | Refills: 0 | Status: DISCONTINUED | OUTPATIENT
Start: 2019-04-25 | End: 2019-04-27

## 2019-04-25 RX ORDER — ALBUMIN HUMAN 25 %
250 VIAL (ML) INTRAVENOUS ONCE
Qty: 0 | Refills: 0 | Status: COMPLETED | OUTPATIENT
Start: 2019-04-25 | End: 2019-04-25

## 2019-04-25 RX ADMIN — PANTOPRAZOLE SODIUM 40 MILLIGRAM(S): 20 TABLET, DELAYED RELEASE ORAL at 06:35

## 2019-04-25 RX ADMIN — Medication 400 MILLIGRAM(S): at 06:02

## 2019-04-25 RX ADMIN — Medication 300 MILLIGRAM(S): at 13:49

## 2019-04-25 RX ADMIN — Medication 25 MILLIGRAM(S): at 04:47

## 2019-04-25 RX ADMIN — SODIUM CHLORIDE 3 MILLILITER(S): 9 INJECTION INTRAMUSCULAR; INTRAVENOUS; SUBCUTANEOUS at 15:47

## 2019-04-25 RX ADMIN — Medication 400 MILLIGRAM(S): at 13:49

## 2019-04-25 RX ADMIN — BUDESONIDE AND FORMOTEROL FUMARATE DIHYDRATE 2 PUFF(S): 160; 4.5 AEROSOL RESPIRATORY (INHALATION) at 08:07

## 2019-04-25 RX ADMIN — Medication 1000 MILLIGRAM(S): at 14:20

## 2019-04-25 RX ADMIN — Medication 1 TABLET(S): at 12:45

## 2019-04-25 RX ADMIN — Medication 1000 MILLIGRAM(S): at 01:10

## 2019-04-25 RX ADMIN — HEPARIN SODIUM 5000 UNIT(S): 5000 INJECTION INTRAVENOUS; SUBCUTANEOUS at 06:02

## 2019-04-25 RX ADMIN — Medication 5 MILLIGRAM(S): at 08:15

## 2019-04-25 RX ADMIN — Medication 10 MILLIGRAM(S): at 15:56

## 2019-04-25 RX ADMIN — SODIUM CHLORIDE 50 MILLILITER(S): 9 INJECTION, SOLUTION INTRAVENOUS at 18:57

## 2019-04-25 RX ADMIN — Medication 10 MILLIGRAM(S): at 07:06

## 2019-04-25 RX ADMIN — Medication 10 MILLIGRAM(S): at 22:39

## 2019-04-25 RX ADMIN — Medication 1000 MILLIGRAM(S): at 06:25

## 2019-04-25 RX ADMIN — Medication 125 MILLILITER(S): at 08:07

## 2019-04-25 RX ADMIN — SODIUM CHLORIDE 3 MILLILITER(S): 9 INJECTION INTRAMUSCULAR; INTRAVENOUS; SUBCUTANEOUS at 06:19

## 2019-04-25 RX ADMIN — Medication 50 GRAM(S): at 08:08

## 2019-04-25 RX ADMIN — CHLORHEXIDINE GLUCONATE 1 APPLICATION(S): 213 SOLUTION TOPICAL at 15:56

## 2019-04-25 RX ADMIN — Medication 400 MILLIGRAM(S): at 00:36

## 2019-04-25 RX ADMIN — HEPARIN SODIUM 5000 UNIT(S): 5000 INJECTION INTRAVENOUS; SUBCUTANEOUS at 22:39

## 2019-04-25 RX ADMIN — HYDROMORPHONE HYDROCHLORIDE 0.5 MILLIGRAM(S): 2 INJECTION INTRAMUSCULAR; INTRAVENOUS; SUBCUTANEOUS at 19:55

## 2019-04-25 RX ADMIN — Medication 5 MILLIGRAM(S): at 04:50

## 2019-04-25 RX ADMIN — BUDESONIDE AND FORMOTEROL FUMARATE DIHYDRATE 2 PUFF(S): 160; 4.5 AEROSOL RESPIRATORY (INHALATION) at 23:24

## 2019-04-25 RX ADMIN — PANTOPRAZOLE SODIUM 40 MILLIGRAM(S): 20 TABLET, DELAYED RELEASE ORAL at 18:08

## 2019-04-25 RX ADMIN — SODIUM CHLORIDE 3 MILLILITER(S): 9 INJECTION INTRAMUSCULAR; INTRAVENOUS; SUBCUTANEOUS at 21:08

## 2019-04-25 RX ADMIN — SODIUM CHLORIDE 75 MILLILITER(S): 9 INJECTION, SOLUTION INTRAVENOUS at 12:45

## 2019-04-25 RX ADMIN — HEPARIN SODIUM 5000 UNIT(S): 5000 INJECTION INTRAVENOUS; SUBCUTANEOUS at 15:55

## 2019-04-25 RX ADMIN — HYDROMORPHONE HYDROCHLORIDE 0.5 MILLIGRAM(S): 2 INJECTION INTRAMUSCULAR; INTRAVENOUS; SUBCUTANEOUS at 20:10

## 2019-04-25 NOTE — PROGRESS NOTE ADULT - SUBJECTIVE AND OBJECTIVE BOX
Patient seen and examined  Seen in PACU  c/w abd pain  not passing gas  was hypotensive earlier sp IVF and IV albumin     REVIEW OF SYSTEMS:  As per HPI, otherwise 8 full 10 ROS were unremarkable    MEDICATIONS  (STANDING):  allopurinol 300 milliGRAM(s) Oral daily  buDESOnide  80 MICROgram(s)/formoterol 4.5 MICROgram(s) Inhaler 2 Puff(s) Inhalation two times a day  chlorhexidine 4% Liquid 1 Application(s) Topical <User Schedule>  dextrose 50% Injectable 25 Gram(s) IV Push once  dextrose 50% Injectable 25 Gram(s) IV Push once  heparin  Injectable 5000 Unit(s) SubCutaneous every 8 hours  insulin lispro (HumaLOG) corrective regimen sliding scale   SubCutaneous three times a day before meals  insulin lispro (HumaLOG) corrective regimen sliding scale   SubCutaneous at bedtime  lactated ringers. 1000 milliLiter(s) (75 mL/Hr) IV Continuous <Continuous>  metoprolol succinate ER 25 milliGRAM(s) Oral two times a day  multivitamin 1 Tablet(s) Oral daily  pantoprazole  Injectable 40 milliGRAM(s) IV Push every 12 hours  sildenafil (REVATIO) 10 milliGRAM(s) Oral three times a day  sodium chloride 0.9% lock flush 3 milliLiter(s) IV Push every 8 hours  torsemide 20 milliGRAM(s) Oral <User Schedule>      VITAL:  T(C): , Max: 37.4 (04-25-19 @ 11:30)  T(F): , Max: 99.3 (04-25-19 @ 11:30)  HR: 90 (04-25-19 @ 12:00)  BP: 105/57 (04-25-19 @ 11:30)  BP(mean): 68 (04-25-19 @ 11:30)  RR: 18 (04-25-19 @ 12:00)  SpO2: 98% (04-25-19 @ 12:00)  Wt(kg): --    I and O's:    04-24 @ 07:01  -  04-25 @ 07:00  --------------------------------------------------------  IN: 1480 mL / OUT: 495 mL / NET: 985 mL    04-25 @ 07:01  -  04-25 @ 13:56  --------------------------------------------------------  IN: 700 mL / OUT: 285 mL / NET: 415 mL      Height (cm): 162.56 (04-24 @ 14:06)  Weight (kg): [id number] (04-24 @ 14:06)  BMI (kg/m2): 32.4 (04-24 @ 14:06)  BSA (m2): 1.91 (04-24 @ 14:06)    PHYSICAL EXAM:    Constitutional: NAD  HEENT: PERRLA, EOMI,  MMM  Neck: No LAD, No JVD  Respiratory: faint crackles   Cardiovascular: S1 and S2  Gastrointestinal: BS absent, soft, NT/ND  Extremities: No peripheral edema  Neurological: A/O x 3, no focal deficits  : + dawson     LABS:                        9.4    9.88  )-----------( 200      ( 25 Apr 2019 06:00 )             31.0     04-25    143  |  113<H>  |  34<H>  ----------------------------<  124<H>  4.5   |  17<L>  |  1.48<H>    Ca    9.1      25 Apr 2019 06:00  Phos  3.2     04-25  Mg     1.5     04-25        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 and now s/p exlap, partial left hemicolectomy and side to side stapled anastomosis    - CKD: stage 3 with baseline Cr ~ 1.6  - OSMAR: likely due to pre-renal azotemia likely 2ry to transient hypotension - UOP ~ 40/hr   - Hypotension- resolved   - HFpEF: right sided heart failure / cor pulm; no SOB; volume status acceptable I/O positive   - anemia: in chronic disease + iron def + GIB + malignancy, hold LUIS   - Hypomagnesemia replaced     RECOMMEND:  - Decrease LR to 50 cc/hr while the patient is NPO  - Continue torsemide 20 mg PO TTSun  - sildenafil per pulm  - dose meds for a CrCl ~30-35 mL/min    d/w SICU resident     Tuan Camargo MD   [facility]  [phone] Patient seen and examined    REVIEW OF SYSTEMS:  As per HPI, otherwise 8 full 10 ROS were unremarkable    MEDICATIONS  (STANDING):  allopurinol 300 milliGRAM(s) Oral daily  buDESOnide  80 MICROgram(s)/formoterol 4.5 MICROgram(s) Inhaler 2 Puff(s) Inhalation two times a day  chlorhexidine 4% Liquid 1 Application(s) Topical <User Schedule>  dextrose 50% Injectable 25 Gram(s) IV Push once  dextrose 50% Injectable 25 Gram(s) IV Push once  heparin  Injectable 5000 Unit(s) SubCutaneous every 8 hours  insulin lispro (HumaLOG) corrective regimen sliding scale   SubCutaneous three times a day before meals  insulin lispro (HumaLOG) corrective regimen sliding scale   SubCutaneous at bedtime  lactated ringers. 1000 milliLiter(s) (75 mL/Hr) IV Continuous <Continuous>  metoprolol succinate ER 25 milliGRAM(s) Oral two times a day  multivitamin 1 Tablet(s) Oral daily  pantoprazole  Injectable 40 milliGRAM(s) IV Push every 12 hours  sildenafil (REVATIO) 10 milliGRAM(s) Oral three times a day  sodium chloride 0.9% lock flush 3 milliLiter(s) IV Push every 8 hours  torsemide 20 milliGRAM(s) Oral <User Schedule>      VITAL:  T(C): , Max: 37.4 (04-25-19 @ 11:30)  T(F): , Max: 99.3 (04-25-19 @ 11:30)  HR: 90 (04-25-19 @ 12:00)  BP: 105/57 (04-25-19 @ 11:30)  BP(mean): 68 (04-25-19 @ 11:30)  RR: 18 (04-25-19 @ 12:00)  SpO2: 98% (04-25-19 @ 12:00)      I and O's:    04-24 @ 07:01  -  04-25 @ 07:00  --------------------------------------------------------  IN: 1480 mL / OUT: 495 mL / NET: 985 mL    04-25 @ 07:01  -  04-25 @ 13:56  --------------------------------------------------------  IN: 700 mL / OUT: 285 mL / NET: 415 mL    Height (cm): 162.56 (04-24 @ 14:06)  Weight (kg): [id number] (04-24 @ 14:06)  BMI (kg/m2): 32.4 (04-24 @ 14:06)  BSA (m2): 1.91 (04-24 @ 14:06)    PHYSICAL EXAM:    Constitutional: NAD  HEENT: PERRLA, EOMI,  MMM  Neck: No LAD, No JVD  Respiratory: faint crackles   Cardiovascular: S1 and S2  Gastrointestinal: BS absent, soft, NT/ND  Extremities: No peripheral edema  Neurological: A/O x 3, no focal deficits  : + dawson     LABS:                        9.4    9.88  )-----------( 200      ( 25 Apr 2019 06:00 )             31.0     04-25    143  |  113<H>  |  34<H>  ----------------------------<  124<H>  4.5   |  17<L>  |  1.48<H>    Ca    9.1      25 Apr 2019 06:00  Phos  3.2     04-25  Mg     1.5     04-25

## 2019-04-25 NOTE — PROGRESS NOTE ADULT - ATTENDING COMMENTS
62 yo woman with multiple comorbidities, including history of lymphoma, R-sided heart failure with preserved EF (EF 66% on TTE in 10/2017), severe pulm HTN, mod-severe TR, reported COPD but denies smoking? (on 2L NC at night), LINH (being arranged for bilevel support), A-Fib (on Eliquis), HTN, HLD, DM2, and gout presents with generalized weakness for the past 1 week in the setting of recent occult positive stool, OSMAR and newly diagnosed influenza/entero virus infection s/p treatment with Tamiflu, found to have non-obstructing mass at the proximal sigmoid colon s/p  s/p colonoscopy diagnostic lap converted to exlap, partial left hemicolectomy, side to side stapled anastomosis (4/24/19)      PLAN:    Neurologic:   -pain control PRN, Tylenol, Dilaudid IV    Respiratory:   -Saturating well on face tent. keep O2>92%, 2L NC while asleep, CPAP if needed  -IS  -continue sildenafil for PHTN  -duoneb PRN; Symbicort for COPD    Cardiovascular:   -keep MAP>65  Afib- c/w metroprolol  -hold torsemide (next dose due 4/25/19 @9:00)    Gastrointestinal/Nutrition:   -NPO  -protonix, zofran    Renal/Genitourinary:   -strict I/Os  -dawson  -decreased IVF to 75ml/hr overnigtht    Hematologic:   -H/H stable  -dvt ppx HSQ, SCDs    Infectious Disease:   -afebrile, off Abx    Tubes/Lines/Drains: d/c samir lynn, PIV    Endocrine: monitor glucose, ISS    Disposition: floor    Critical Care Diagnoses:  Afib, port procedure hypotension, respiratory abnormality.  The patient is a critical care patient with life threatening hemodynamic and metabolic instability in SICU.  I have personally interviewed when possible and examined the patient, reviewed data and laboratory tests/x-rays and all pertinent electronic images.  I was physically present for the key portions of the evaluation and management (E/M) service provided.   The SICU team has a constant risk benefit analyzes discussion with the primary team, all consultants, House Staff and PA's on all decisions.  These diagnoses are unrelated to the surgical procedure noted above.  I meet with family if needed to get further history, discuss the case and make care decisions for this patient who might not be able to participate.  Time involved in performance of separately billable procedures was not counted toward my critical care time. There is no overlap.  I spent 55-75 minutes of critical care time for the diagnoses, assessment, plan and interventions.

## 2019-04-25 NOTE — PROGRESS NOTE ADULT - ASSESSMENT
62 yo woman with multiple comorbidities, including history of lymphoma, R-sided heart failure with preserved EF (EF 66% on TTE in 10/2017), severe pulm HTN, mod-severe TR, reported COPD but denies smoking? (on 2L NC at night), LINH (being arranged for bilevel support), A-Fib (on Eliquis), HTN, HLD, DM2, and gout presents with generalized weakness for the past 1 week in the setting of recent occult positive stool, OSMAR and newly diagnosed influenza/entero virus infection s/p treatment with Tamiflu, found to have non-obstructing mass at the proximal sigmoid colon s/p  s/p colonoscopy diagnostic lap converted to exlap, partial left hemicolectomy, side to side stapled anastomosis (4/24/19)      PLAN:    Neurologic:   -pain control PRN, Tylenol, Dilaudid IV    Respiratory:   -Saturating well on face tent. keep O2>92%, 2L NC while asleep, CPAP if needed  -IS  -continue sildenafil for PHTN  -duoneb PRN; Symbicort for COPD    Cardiovascular:   -keep MAP>65  Afib- c/w metroprolol  -hold torsemide (next dose due 4/25/19 @9:00)    Gastrointestinal/Nutrition:   -NPO  -protonix, zofran    Renal/Genitourinary:   -strict I/Os  -dawson  -decreased IVF to 75ml/hr overnigtht    Hematologic:   -H/H stable  -dvt ppx HSQ, SCDs    Infectious Disease:   -afebrile, off Abx    Tubes/Lines/Drains: d/c samir lynn, MELLY    Endocrine: monitor glucose, ISS    Disposition: floor    --------------------------------------------------------------------------------------    Critical Care Diagnoses:

## 2019-04-25 NOTE — PROGRESS NOTE ADULT - ASSESSMENT
62 yo woman with multiple medical comorbidities including afib, right-sided CHF with severe pulmonary HTN, COPD on nocturnal O2, LINH newly initiated on bilevel support found to have a non-obstructing sigmoid mass s/p left hemicolectomy (4/24/19)    - Diet: NPO except meds/IVF  - Monitor bowel function/abdominal exam  - Home meds  - F/u AM labs  - DVT ppx: HepSubQ    A Team Surgery  [id number]

## 2019-04-25 NOTE — PROGRESS NOTE ADULT - ASSESSMENT
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 and now s/p exlap, partial left hemicolectomy and side to side stapled anastomosis    - CKD: stage 3 with baseline Cr ~ 1.6  - OSMAR: likely due to pre-renal azotemia likely 2ry to transient hypotension - UOP ~ 40/hr   - Hypotension- resolved   - HFpEF: right sided heart failure / cor pulm; no SOB; volume status acceptable I/O positive   - anemia: in chronic disease + iron def + GIB + malignancy, hold LUIS   - Hypomagnesemia-improved     RECOMMEND:  - Continue torsemide 20 mg PO TTSun  - sildenafil per pulm  - dose meds for a CrCl ~30-35 mL/min    Stephanie Moody NP  [facility]  [phone]

## 2019-04-25 NOTE — PROGRESS NOTE ADULT - ASSESSMENT
s/p surgery 4/24 for colon ca  --to monitor VS and temps  --incentive spirometry    COPD/asthma  --on Symbicort and PRn neb treatments    Mild hypoxemia - on nasal O2  --monitor O2 sats    Hx sleep apnea (report on paper chart)  --monitor for apneas and or hypoxemia    Atrial fibrillatrion - rate presently controlled.   --required addl metoprolol overnight    case discussed with SICU staff and Dr. Robles    (NB: I will be away through 5/6; if pulm needed please contact my ofc for covering pulm physician)

## 2019-04-25 NOTE — PROGRESS NOTE ADULT - SUBJECTIVE AND OBJECTIVE BOX
SICU Daily Progress Note  =====================================================  Interval/Overnight Events:   Gave 250ml bolus x2 for BP in 80s. No overnight events    POD #          	SICU Day #    ***    HPI:  62 yo woman with multiple medical comorbidities including afib, Right sided heart failure with preserved EF (EF 66% on TTE in 10/2017), severe pulmonary HTN, COPD on nocturnal O2, LINH (arranged for new home bilevel support), HTN, HLD, DM2, lymphoma s/p RT and gout presenting with generalized weakness and anemia. Patient notes that she had positive Cologuard test (3/19/2019). Colonoscopy performed 4/12 indicated a fungating, non-obstructing mass at the proximal sigmoid colon s/p biopsy (4/12). Her last colonoscopy was approximately 10 years ago s/p polypectomy. Colorectal surgery consulted given colonoscopy findings. She is tolerating a regular diet without N/V and having regular bowel movements.     Patient denies melena, hematochezia, abdominal pain, nausea or vomiting. She has had intentional weight loss over the past year. Patient with a personal history of lymphoma s/p chemo/radiation (1997) and family history significant for two of her Aunt's having had breast cancer and cervical cancer, respectively. Patient can walk 1-2 blocks with her cane before being limited by shortness of breath. She can climb stairs.     Pt had RHC on 4/5/19 and was started on Sildenafil    Pt with EGD on 4/12/19 showing Polypoid mass in the proximal sigmoid colon    4/24/19 s/p colonoscopy diagnostic lap converted to exlap, partial left hemicolectomy, side to side stapled anastomosis      PMH:   ***    Allergies: No Known Allergies      MEDICATIONS:   --------------------------------------------------------------------------------------  Neurologic Medications  acetaminophen  IVPB .. 1000 milliGRAM(s) IV Intermittent once  acetaminophen  IVPB .. 1000 milliGRAM(s) IV Intermittent once  HYDROmorphone  Injectable 0.5 milliGRAM(s) IV Push every 4 hours PRN Moderate Pain (4 - 6)  HYDROmorphone  Injectable 1 milliGRAM(s) IV Push every 4 hours PRN Severe Pain (7 - 10)  HYDROmorphone  Injectable 1 milliGRAM(s) IV Push every 10 minutes PRN Severe Pain (7 - 10)  ondansetron Injectable 4 milliGRAM(s) IV Push once PRN Nausea and/or Vomiting    Respiratory Medications  ALBUTerol/ipratropium for Nebulization 3 milliLiter(s) Nebulizer every 6 hours PRN Shortness of Breath and/or Wheezing  buDESOnide  80 MICROgram(s)/formoterol 4.5 MICROgram(s) Inhaler 2 Puff(s) Inhalation two times a day  guaiFENesin  milliGRAM(s) Oral every 12 hours PRN Cough    Cardiovascular Medications  metoprolol succinate ER 25 milliGRAM(s) Oral two times a day  sildenafil (REVATIO) 10 milliGRAM(s) Oral three times a day  torsemide 20 milliGRAM(s) Oral <User Schedule>    Gastrointestinal Medications  dextrose 5%. 1000 milliLiter(s) IV Continuous <Continuous>  lactated ringers. 1000 milliLiter(s) IV Continuous <Continuous>  magnesium oxide 400 milliGRAM(s) Oral two times a day with meals  multivitamin 1 Tablet(s) Oral daily  pantoprazole  Injectable 40 milliGRAM(s) IV Push every 12 hours  sodium chloride 0.9% lock flush 3 milliLiter(s) IV Push every 8 hours    Genitourinary Medications    Hematologic/Oncologic Medications  heparin  Injectable 5000 Unit(s) SubCutaneous every 8 hours    Antimicrobial/Immunologic Medications    Endocrine/Metabolic Medications  allopurinol 300 milliGRAM(s) Oral daily  dextrose 40% Gel 15 Gram(s) Oral once PRN Blood Glucose LESS THAN 70 milliGRAM(s)/deciliter  dextrose 50% Injectable 12.5 Gram(s) IV Push once  dextrose 50% Injectable 25 Gram(s) IV Push once  dextrose 50% Injectable 25 Gram(s) IV Push once  glucagon  Injectable 1 milliGRAM(s) IntraMuscular once PRN Glucose LESS THAN 70 milligrams/deciliter  insulin lispro (HumaLOG) corrective regimen sliding scale   SubCutaneous three times a day before meals  insulin lispro (HumaLOG) corrective regimen sliding scale   SubCutaneous at bedtime    Topical/Other Medications  chlorhexidine 2% Cloths 1 Application(s) Topical daily    --------------------------------------------------------------------------------------    VITAL SIGNS, INS/OUTS (last 24 hours):  --------------------------------------------------------------------------------------  ((Insert SICU Vitals/Is+Os here))***  --------------------------------------------------------------------------------------    EXAM  NEUROLOGY  RASS:   	GCS:    Exam: Normal, NAD, alert, oriented x3, no focal deficits.     HEENT  Exam: Normocephalic, atraumatic, EOMI.      RESPIRATORY  Exam: Lungs clear to auscultation, Normal expansion/effort.   Mechanical Ventilation:     CARDIOVASCULAR  Exam: S1, S2.  Regular rate and rhythm. trace peripheral edema    GI/NUTRITION  Exam: Abdomen soft, Non-distended. tender around incisions  Wound:  dressing c/d/i  Current Diet:  NPO    VASCULAR  Exam: Extremities warm, pink, well-perfused.    MUSCULOSKELETAL  Exam: All extremities moving spontaneously without limitations.     SKIN  Exam: Good skin turgor, no skin breakdown.     METABOLIC/FLUIDS/ELECTROLYTES  dextrose 5%. 1000 milliLiter(s) IV Continuous <Continuous>  lactated ringers. 1000 milliLiter(s) IV Continuous <Continuous>  magnesium oxide 400 milliGRAM(s) Oral two times a day with meals  multivitamin 1 Tablet(s) Oral daily  sodium chloride 0.9% lock flush 3 milliLiter(s) IV Push every 8 hours      HEMATOLOGIC  [x] VTE Prophylaxis: heparin  Injectable 5000 Unit(s) SubCutaneous every 8 hours    Transfusions:	[] PRBC	[] Platelets		[] FFP	[] Cryoprecipitate    INFECTIOUS DISEASE  Antimicrobials/Immunologic Medications:    Day #      of     ***    Tubes/Lines/Drains  ***  [x] Peripheral IV  [] Central Venous Line     	[] R	[] L	[] IJ	[] Fem	[] SC	Date Placed:   [] Arterial Line		[] R	[] L	[] Fem	[] Rad	[] Ax	Date Placed:   [] PICC		[] Midline		[] Mediport  [] Urinary Catheter		Date Placed:   [x] Necessity of urinary, arterial, and venous catheters discussed    LABS  --------------------------------------------------------------------------------------  ((Insert SICU Labs here))***  --------------------------------------------------------------------------------------    OTHER LABORATORY:     IMAGING STUDIES:   CXR:         --------------------------------------------------------------------------------------    Critical Care Diagnoses: SICU Daily Progress Note  =====================================================  Interval/Overnight Events:   Gave 250ml bolus x2 for BP in 80s. No overnight events. AFIB upto 140s intermittently. Metoprolol 5mg IV given following am PO metoporolol 25mg     POD #          	SICU Day #    ***    HPI:  62 yo woman with multiple medical comorbidities including afib, Right sided heart failure with preserved EF (EF 66% on TTE in 10/2017), severe pulmonary HTN, COPD on nocturnal O2, LINH (arranged for new home bilevel support), HTN, HLD, DM2, lymphoma s/p RT and gout presenting with generalized weakness and anemia. Patient notes that she had positive Cologuard test (3/19/2019). Colonoscopy performed 4/12 indicated a fungating, non-obstructing mass at the proximal sigmoid colon s/p biopsy (4/12). Her last colonoscopy was approximately 10 years ago s/p polypectomy. Colorectal surgery consulted given colonoscopy findings. She is tolerating a regular diet without N/V and having regular bowel movements.     Patient denies melena, hematochezia, abdominal pain, nausea or vomiting. She has had intentional weight loss over the past year. Patient with a personal history of lymphoma s/p chemo/radiation (1997) and family history significant for two of her Aunt's having had breast cancer and cervical cancer, respectively. Patient can walk 1-2 blocks with her cane before being limited by shortness of breath. She can climb stairs.     Pt had RHC on 4/5/19 and was started on Sildenafil    Pt with EGD on 4/12/19 showing Polypoid mass in the proximal sigmoid colon    4/24/19 s/p colonoscopy diagnostic lap converted to exlap, partial left hemicolectomy, side to side stapled anastomosis      PMH:   ***    Allergies: No Known Allergies      MEDICATIONS:   --------------------------------------------------------------------------------------  Neurologic Medications  acetaminophen  IVPB .. 1000 milliGRAM(s) IV Intermittent once  acetaminophen  IVPB .. 1000 milliGRAM(s) IV Intermittent once  HYDROmorphone  Injectable 0.5 milliGRAM(s) IV Push every 4 hours PRN Moderate Pain (4 - 6)  HYDROmorphone  Injectable 1 milliGRAM(s) IV Push every 4 hours PRN Severe Pain (7 - 10)  HYDROmorphone  Injectable 1 milliGRAM(s) IV Push every 10 minutes PRN Severe Pain (7 - 10)  ondansetron Injectable 4 milliGRAM(s) IV Push once PRN Nausea and/or Vomiting    Respiratory Medications  ALBUTerol/ipratropium for Nebulization 3 milliLiter(s) Nebulizer every 6 hours PRN Shortness of Breath and/or Wheezing  buDESOnide  80 MICROgram(s)/formoterol 4.5 MICROgram(s) Inhaler 2 Puff(s) Inhalation two times a day  guaiFENesin  milliGRAM(s) Oral every 12 hours PRN Cough    Cardiovascular Medications  metoprolol succinate ER 25 milliGRAM(s) Oral two times a day  sildenafil (REVATIO) 10 milliGRAM(s) Oral three times a day  torsemide 20 milliGRAM(s) Oral <User Schedule>    Gastrointestinal Medications  dextrose 5%. 1000 milliLiter(s) IV Continuous <Continuous>  lactated ringers. 1000 milliLiter(s) IV Continuous <Continuous>  magnesium oxide 400 milliGRAM(s) Oral two times a day with meals  multivitamin 1 Tablet(s) Oral daily  pantoprazole  Injectable 40 milliGRAM(s) IV Push every 12 hours  sodium chloride 0.9% lock flush 3 milliLiter(s) IV Push every 8 hours    Genitourinary Medications    Hematologic/Oncologic Medications  heparin  Injectable 5000 Unit(s) SubCutaneous every 8 hours    Antimicrobial/Immunologic Medications    Endocrine/Metabolic Medications  allopurinol 300 milliGRAM(s) Oral daily  dextrose 40% Gel 15 Gram(s) Oral once PRN Blood Glucose LESS THAN 70 milliGRAM(s)/deciliter  dextrose 50% Injectable 12.5 Gram(s) IV Push once  dextrose 50% Injectable 25 Gram(s) IV Push once  dextrose 50% Injectable 25 Gram(s) IV Push once  glucagon  Injectable 1 milliGRAM(s) IntraMuscular once PRN Glucose LESS THAN 70 milligrams/deciliter  insulin lispro (HumaLOG) corrective regimen sliding scale   SubCutaneous three times a day before meals  insulin lispro (HumaLOG) corrective regimen sliding scale   SubCutaneous at bedtime    Topical/Other Medications  chlorhexidine 2% Cloths 1 Application(s) Topical daily    --------------------------------------------------------------------------------------    VITAL SIGNS, INS/OUTS (last 24 hours):  --------------------------------------------------------------------------------------  ((Insert SICU Vitals/Is+Os here))***  --------------------------------------------------------------------------------------    EXAM  NEUROLOGY  RASS:   	GCS:    Exam: Normal, NAD, alert, oriented x3, no focal deficits.     HEENT  Exam: Normocephalic, atraumatic, EOMI.      RESPIRATORY  Exam: Lungs clear to auscultation, Normal expansion/effort.   Mechanical Ventilation:     CARDIOVASCULAR  Exam: S1, S2.  Regular rate and rhythm. trace peripheral edema    GI/NUTRITION  Exam: Abdomen soft, Non-distended. tender around incisions  Wound:  dressing c/d/i  Current Diet:  NPO    VASCULAR  Exam: Extremities warm, pink, well-perfused.    MUSCULOSKELETAL  Exam: All extremities moving spontaneously without limitations.     SKIN  Exam: Good skin turgor, no skin breakdown.     METABOLIC/FLUIDS/ELECTROLYTES  dextrose 5%. 1000 milliLiter(s) IV Continuous <Continuous>  lactated ringers. 1000 milliLiter(s) IV Continuous <Continuous>  magnesium oxide 400 milliGRAM(s) Oral two times a day with meals  multivitamin 1 Tablet(s) Oral daily  sodium chloride 0.9% lock flush 3 milliLiter(s) IV Push every 8 hours      HEMATOLOGIC  [x] VTE Prophylaxis: heparin  Injectable 5000 Unit(s) SubCutaneous every 8 hours    Transfusions:	[] PRBC	[] Platelets		[] FFP	[] Cryoprecipitate    INFECTIOUS DISEASE  Antimicrobials/Immunologic Medications:    Day #      of     ***    Tubes/Lines/Drains  ***  [x] Peripheral IV  [] Central Venous Line     	[] R	[] L	[] IJ	[] Fem	[] SC	Date Placed:   [] Arterial Line		[] R	[] L	[] Fem	[] Rad	[] Ax	Date Placed:   [] PICC		[] Midline		[] Mediport  [] Urinary Catheter		Date Placed:   [x] Necessity of urinary, arterial, and venous catheters discussed    LABS  --------------------------------------------------------------------------------------  ((Insert SICU Labs here))***  --------------------------------------------------------------------------------------    OTHER LABORATORY:     IMAGING STUDIES:   CXR:         --------------------------------------------------------------------------------------    Critical Care Diagnoses:

## 2019-04-25 NOTE — PROGRESS NOTE ADULT - SUBJECTIVE AND OBJECTIVE BOX
Surgery Post-Operative Note    Procedure: Partial surgical removal of left hemicolon    Surgeon: Patient examined at bedside in PACU. Being observed by SICU. Having bouts of AFib, otherwise hemodynamically stable. Denies nausea/vomiting. Pain is well controlled with medication. No other complaints at this time.    Subjective:    Vital Signs Last 24 Hrs  T(C): 36.6 (25 Apr 2019 00:00), Max: 37 (24 Apr 2019 19:40)  T(F): 97.8 (25 Apr 2019 00:00), Max: 98.6 (24 Apr 2019 19:40)  HR: 107 (25 Apr 2019 00:00) (80 - 112)  BP: 113/78 (25 Apr 2019 00:00) (74/38 - 145/92)  BP(mean): 86 (25 Apr 2019 00:00) (48 - 89)  RR: 24 (25 Apr 2019 00:00) (12 - 25)  SpO2: 95% (25 Apr 2019 00:00) (90% - 100%)    Physical Exam:  General: NAD, resting comfortably in bed  Pulmonary: Nonlabored breathing, no respiratory distress  Cardiovascular: NSR  Abdominal: soft, ND, appropriately tender to palpation, no RT, no guarding, incisions/dressing clean, dry, intact  Ext: JUANCHO      LABS:                        9.2    7.25  )-----------( 173      ( 24 Apr 2019 20:25 )             29.6     04-24    143  |  115<H>  |  35<H>  ----------------------------<  134<H>  3.9   |  15<L>  |  1.38<H>    Ca    8.9      24 Apr 2019 20:25  Phos  3.0     04-24  Mg     1.7     04-24      PT/INR - ( 24 Apr 2019 06:20 )   PT: 13.6 SEC;   INR: 1.22          PTT - ( 24 Apr 2019 06:20 )  PTT:34.1 SEC  CAPILLARY BLOOD GLUCOSE      POCT Blood Glucose.: 82 mg/dL (24 Apr 2019 12:24)  POCT Blood Glucose.: 99 mg/dL (24 Apr 2019 06:25)        ABO Interpretation: O (04-24 @ 06:00)        Radiology and Additional Studies:    Assessment:63y Female s/p     Plan:  - Pain control   - Regular diet  - Monitor vital signs  - Monitor abdominal exam  - OOB as tolerated  -            General: NAD, well-nourished  HEENT: Atraumatic, EOMI  Resp: Breathing comfortably on RA  CV: Normal sinus rhythm  Abd: Obese, soft, nontender, nondistended. No abdominal scars. Surgery Post-Operative Note    Procedure: Partial surgical removal of left hemicolon    Surgeon: Patient examined at bedside in PACU. Being observed by SICU. Having bouts of AFib, otherwise hemodynamically stable. Denies nausea/vomiting. Pain is well controlled with medication. No other complaints at this time.    Subjective:    Vital Signs Last 24 Hrs  T(C): 36.6 (25 Apr 2019 00:00), Max: 37 (24 Apr 2019 19:40)  T(F): 97.8 (25 Apr 2019 00:00), Max: 98.6 (24 Apr 2019 19:40)  HR: 107 (25 Apr 2019 00:00) (80 - 112)  BP: 113/78 (25 Apr 2019 00:00) (74/38 - 145/92)  BP(mean): 86 (25 Apr 2019 00:00) (48 - 89)  RR: 24 (25 Apr 2019 00:00) (12 - 25)  SpO2: 95% (25 Apr 2019 00:00) (90% - 100%)    Physical Exam:  General: NAD, resting comfortably in bed  Pulmonary: Nonlabored breathing, no respiratory distress  Cardiovascular: NSR  Abdominal: soft, ND, appropriately tender to palpation, no RT, no guarding, incisions/dressing clean, dry, intact  Ext: JUANCHO      LABS:                        9.2    7.25  )-----------( 173      ( 24 Apr 2019 20:25 )             29.6     04-24    143  |  115<H>  |  35<H>  ----------------------------<  134<H>  3.9   |  15<L>  |  1.38<H>    Ca    8.9      24 Apr 2019 20:25  Phos  3.0     04-24  Mg     1.7     04-24      PT/INR - ( 24 Apr 2019 06:20 )   PT: 13.6 SEC;   INR: 1.22          PTT - ( 24 Apr 2019 06:20 )  PTT:34.1 SEC  CAPILLARY BLOOD GLUCOSE      POCT Blood Glucose.: 82 mg/dL (24 Apr 2019 12:24)  POCT Blood Glucose.: 99 mg/dL (24 Apr 2019 06:25)        ABO Interpretation: O (04-24 @ 06:00)

## 2019-04-25 NOTE — PROGRESS NOTE ADULT - SUBJECTIVE AND OBJECTIVE BOX
PULM/MED PROGRESS NOTE/SICU:  s/p surgery 4/24    awake, alert comfortable.  On nasal O2      MEDICATIONS  (STANDING):  allopurinol 300 milliGRAM(s) Oral daily  buDESOnide  80 MICROgram(s)/formoterol 4.5 MICROgram(s) Inhaler 2 Puff(s) Inhalation two times a day  chlorhexidine 4% Liquid 1 Application(s) Topical <User Schedule>  dextrose 50% Injectable 25 Gram(s) IV Push once  dextrose 50% Injectable 25 Gram(s) IV Push once  heparin  Injectable 5000 Unit(s) SubCutaneous every 8 hours  insulin lispro (HumaLOG) corrective regimen sliding scale   SubCutaneous three times a day before meals  insulin lispro (HumaLOG) corrective regimen sliding scale   SubCutaneous at bedtime  lactated ringers. 1000 milliLiter(s) (75 mL/Hr) IV Continuous <Continuous>  metoprolol succinate ER 25 milliGRAM(s) Oral two times a day  multivitamin 1 Tablet(s) Oral daily  pantoprazole  Injectable 40 milliGRAM(s) IV Push every 12 hours  sildenafil (REVATIO) 10 milliGRAM(s) Oral three times a day  sodium chloride 0.9% lock flush 3 milliLiter(s) IV Push every 8 hours  torsemide 20 milliGRAM(s) Oral <User Schedule>      MEDICATIONS  (PRN):  ALBUTerol/ipratropium for Nebulization 3 milliLiter(s) Nebulizer every 6 hours PRN Shortness of Breath and/or Wheezing  dextrose 40% Gel 15 Gram(s) Oral once PRN Blood Glucose LESS THAN 70 milliGRAM(s)/deciliter  glucagon  Injectable 1 milliGRAM(s) IntraMuscular once PRN Glucose LESS THAN 70 milligrams/deciliter  guaiFENesin  milliGRAM(s) Oral every 12 hours PRN Cough  HYDROmorphone  Injectable 0.5 milliGRAM(s) IV Push every 4 hours PRN Moderate Pain (4 - 6)  HYDROmorphone  Injectable 1 milliGRAM(s) IV Push every 4 hours PRN Severe Pain (7 - 10)          Vital Signs Last 24 Hrs  T(C): 37.8 (25 Apr 2019 16:00), Max: 37.8 (25 Apr 2019 16:00)  T(F): 100.1 (25 Apr 2019 16:00), Max: 100.1 (25 Apr 2019 16:00)  HR: 93 (25 Apr 2019 15:00) (87 - 115)  BP: 105/57 (25 Apr 2019 11:30) (74/38 - 119/80)  BP(mean): 68 (25 Apr 2019 11:30) (48 - 89)  RR: 17 (25 Apr 2019 15:00) (12 - 25)  SpO2: 98% (25 Apr 2019 15:00) (90% - 99%)    PHYSICAL EXAMINATION:    GENERAL: The patient is awake and alert in no apparent distress.     HEENT: Mucous membranes are moist.    NECK: Supple.    LUNGS: Bilateral breath sounds; mostly clear; No  wheezing.  Few right posterio basilar rales.  No rhonchi; respirations unlabored    HEART: irregularly irregular.   CM: A fib 80's    ABDOMEN: Soft, verena tender; left wound bandaged.  nondistended; BS present.     EXTREMITIES: Without significant edema.          LABS:                        9.4    9.88  )-----------( 200      ( 25 Apr 2019 06:00 )             31.0     04-25    143  |  113<H>  |  34<H>  ----------------------------<  124<H>  4.5   |  17<L>  |  1.48<H>    Ca    9.1      25 Apr 2019 06:00  Phos  3.2     04-25  Mg     1.5     04-25      PT/INR - ( 24 Apr 2019 06:20 )   PT: 13.6 SEC;   INR: 1.22          PTT - ( 24 Apr 2019 06:20 )  PTT:34.1 SEC    ABG - ( 24 Apr 2019 17:58 )  pH, Arterial: 7.30  pH, Blood: x     /  pCO2: 40    /  pO2: 176   / HCO3: 19    / Base Excess: -6.1  /  SaO2: 98.9              RADIOLOGY & ADDITIONAL STUDIES:      EXAM:  XR CHEST PORTABLE ROUTINE 1V        PROCEDURE DATE:  Apr 25 2019         INTERPRETATION:  TIME OF EXAM: April 25, 2019 at 1:01 PM.    CLINICAL INFORMATION: Status post hemicolectomy. Wheezing.    COMPARISON:  April 23, 2019.    TECHNIQUE:   AP Portable chest x-ray. The chin obscures the superior   mediastinum and medial apices.    INTERPRETATION:     Heart size and the mediastinum cannot be accurately evaluated on this   projection. The thoracic aorta is calcified.  No focal lung consolidation, pleural effusion, or pneumothorax is seen.            IMPRESSION:  Clear lungs.      JAI MIRANDA M.D., ATTENDING RADIOLOGIST  This document has been electronically signed. Apr 25 2019  2:11PM

## 2019-04-25 NOTE — CHART NOTE - NSCHARTNOTEFT_GEN_A_CORE
: Arsh Doherty M.D.     INDICATION: Hypoxia     PROCEDURE:  [ ] LIMITED ECHO  [X ] LIMITED CHEST  [ ] LIMITED RETROPERITONEAL  [ ] LIMITED ABDOMINAL  [ ] LIMITED DVT  [ ] NEEDLE GUIDANCE VASCULAR  [ ] NEEDLE GUIDANCE THORACENTESIS  [ ] NEEDLE GUIDANCE PARACENTESIS  [ ] NEEDLE GUIDANCE PERICARDIOCENTESIS  [ ] OTHER    FINDINGS: Anterior chest ultrasound performed at bedside for hypoxia to low 90s on NC. Ultrasound showed bilateral A-lines without evidence of B-lines.       INTERPRETATION: Limited anterior lung ultrasound shows lungs without fluid overload.

## 2019-04-26 LAB
ANION GAP SERPL CALC-SCNC: 12 MMO/L — SIGNIFICANT CHANGE UP (ref 7–14)
APTT BLD: 33.8 SEC — SIGNIFICANT CHANGE UP (ref 27.5–36.3)
APTT BLD: 68.5 SEC — HIGH (ref 27.5–36.3)
BUN SERPL-MCNC: 29 MG/DL — HIGH (ref 7–23)
CALCIUM SERPL-MCNC: 9.3 MG/DL — SIGNIFICANT CHANGE UP (ref 8.4–10.5)
CHLORIDE SERPL-SCNC: 112 MMOL/L — HIGH (ref 98–107)
CO2 SERPL-SCNC: 18 MMOL/L — LOW (ref 22–31)
CREAT SERPL-MCNC: 1.53 MG/DL — HIGH (ref 0.5–1.3)
GLUCOSE BLDC GLUCOMTR-MCNC: 123 MG/DL — HIGH (ref 70–99)
GLUCOSE BLDC GLUCOMTR-MCNC: 128 MG/DL — HIGH (ref 70–99)
GLUCOSE BLDC GLUCOMTR-MCNC: 136 MG/DL — HIGH (ref 70–99)
GLUCOSE BLDC GLUCOMTR-MCNC: 153 MG/DL — HIGH (ref 70–99)
GLUCOSE SERPL-MCNC: 111 MG/DL — HIGH (ref 70–99)
HCT VFR BLD CALC: 29.2 % — LOW (ref 34.5–45)
HCT VFR BLD CALC: 29.6 % — LOW (ref 34.5–45)
HGB BLD-MCNC: 8.8 G/DL — LOW (ref 11.5–15.5)
HGB BLD-MCNC: 9 G/DL — LOW (ref 11.5–15.5)
MAGNESIUM SERPL-MCNC: 2.1 MG/DL — SIGNIFICANT CHANGE UP (ref 1.6–2.6)
MAGNESIUM SERPL-MCNC: 2.2 MG/DL — SIGNIFICANT CHANGE UP (ref 1.6–2.6)
MCHC RBC-ENTMCNC: 21.3 PG — LOW (ref 27–34)
MCHC RBC-ENTMCNC: 21.7 PG — LOW (ref 27–34)
MCHC RBC-ENTMCNC: 30.1 % — LOW (ref 32–36)
MCHC RBC-ENTMCNC: 30.4 % — LOW (ref 32–36)
MCV RBC AUTO: 70.7 FL — LOW (ref 80–100)
MCV RBC AUTO: 71.5 FL — LOW (ref 80–100)
NRBC # FLD: 0.04 K/UL — SIGNIFICANT CHANGE UP (ref 0–0)
NRBC # FLD: 0.08 K/UL — SIGNIFICANT CHANGE UP (ref 0–0)
PHOSPHATE SERPL-MCNC: 2.8 MG/DL — SIGNIFICANT CHANGE UP (ref 2.5–4.5)
PLATELET # BLD AUTO: 184 K/UL — SIGNIFICANT CHANGE UP (ref 150–400)
PLATELET # BLD AUTO: 248 K/UL — SIGNIFICANT CHANGE UP (ref 150–400)
PMV BLD: SIGNIFICANT CHANGE UP FL (ref 7–13)
PMV BLD: SIGNIFICANT CHANGE UP FL (ref 7–13)
POTASSIUM SERPL-MCNC: 4.4 MMOL/L — SIGNIFICANT CHANGE UP (ref 3.5–5.3)
POTASSIUM SERPL-SCNC: 4.4 MMOL/L — SIGNIFICANT CHANGE UP (ref 3.5–5.3)
RBC # BLD: 4.13 M/UL — SIGNIFICANT CHANGE UP (ref 3.8–5.2)
RBC # BLD: 4.14 M/UL — SIGNIFICANT CHANGE UP (ref 3.8–5.2)
RBC # FLD: 23 % — HIGH (ref 10.3–14.5)
RBC # FLD: 24 % — HIGH (ref 10.3–14.5)
SODIUM SERPL-SCNC: 142 MMOL/L — SIGNIFICANT CHANGE UP (ref 135–145)
WBC # BLD: 7.24 K/UL — SIGNIFICANT CHANGE UP (ref 3.8–10.5)
WBC # BLD: 8.85 K/UL — SIGNIFICANT CHANGE UP (ref 3.8–10.5)
WBC # FLD AUTO: 7.24 K/UL — SIGNIFICANT CHANGE UP (ref 3.8–10.5)
WBC # FLD AUTO: 8.85 K/UL — SIGNIFICANT CHANGE UP (ref 3.8–10.5)

## 2019-04-26 PROCEDURE — 99233 SBSQ HOSP IP/OBS HIGH 50: CPT

## 2019-04-26 RX ORDER — DIGOXIN 250 MCG
0.12 TABLET ORAL ONCE
Qty: 0 | Refills: 0 | Status: COMPLETED | OUTPATIENT
Start: 2019-04-26 | End: 2019-04-26

## 2019-04-26 RX ORDER — METOPROLOL TARTRATE 50 MG
50 TABLET ORAL
Qty: 0 | Refills: 0 | Status: DISCONTINUED | OUTPATIENT
Start: 2019-04-26 | End: 2019-04-29

## 2019-04-26 RX ORDER — HEPARIN SODIUM 5000 [USP'U]/ML
1500 INJECTION INTRAVENOUS; SUBCUTANEOUS
Qty: 25000 | Refills: 0 | Status: DISCONTINUED | OUTPATIENT
Start: 2019-04-26 | End: 2019-05-01

## 2019-04-26 RX ORDER — HYDROMORPHONE HYDROCHLORIDE 2 MG/ML
0.25 INJECTION INTRAMUSCULAR; INTRAVENOUS; SUBCUTANEOUS EVERY 4 HOURS
Qty: 0 | Refills: 0 | Status: DISCONTINUED | OUTPATIENT
Start: 2019-04-26 | End: 2019-04-27

## 2019-04-26 RX ORDER — METOPROLOL TARTRATE 50 MG
50 TABLET ORAL
Qty: 0 | Refills: 0 | Status: DISCONTINUED | OUTPATIENT
Start: 2019-04-26 | End: 2019-04-26

## 2019-04-26 RX ORDER — METOPROLOL TARTRATE 50 MG
5 TABLET ORAL ONCE
Qty: 0 | Refills: 0 | Status: COMPLETED | OUTPATIENT
Start: 2019-04-26 | End: 2019-04-26

## 2019-04-26 RX ORDER — HYDROMORPHONE HYDROCHLORIDE 2 MG/ML
0.5 INJECTION INTRAMUSCULAR; INTRAVENOUS; SUBCUTANEOUS EVERY 4 HOURS
Qty: 0 | Refills: 0 | Status: DISCONTINUED | OUTPATIENT
Start: 2019-04-26 | End: 2019-04-27

## 2019-04-26 RX ORDER — DIGOXIN 250 MCG
0.25 TABLET ORAL ONCE
Qty: 0 | Refills: 0 | Status: COMPLETED | OUTPATIENT
Start: 2019-04-26 | End: 2019-04-26

## 2019-04-26 RX ORDER — INSULIN LISPRO 100/ML
VIAL (ML) SUBCUTANEOUS EVERY 6 HOURS
Qty: 0 | Refills: 0 | Status: DISCONTINUED | OUTPATIENT
Start: 2019-04-26 | End: 2019-04-28

## 2019-04-26 RX ADMIN — HEPARIN SODIUM 5000 UNIT(S): 5000 INJECTION INTRAVENOUS; SUBCUTANEOUS at 05:42

## 2019-04-26 RX ADMIN — Medication 0.25 MILLIGRAM(S): at 18:19

## 2019-04-26 RX ADMIN — PANTOPRAZOLE SODIUM 40 MILLIGRAM(S): 20 TABLET, DELAYED RELEASE ORAL at 05:42

## 2019-04-26 RX ADMIN — CHLORHEXIDINE GLUCONATE 1 APPLICATION(S): 213 SOLUTION TOPICAL at 10:00

## 2019-04-26 RX ADMIN — Medication 25 MILLIGRAM(S): at 05:41

## 2019-04-26 RX ADMIN — Medication 1 TABLET(S): at 11:17

## 2019-04-26 RX ADMIN — SODIUM CHLORIDE 3 MILLILITER(S): 9 INJECTION INTRAMUSCULAR; INTRAVENOUS; SUBCUTANEOUS at 14:00

## 2019-04-26 RX ADMIN — HYDROMORPHONE HYDROCHLORIDE 0.25 MILLIGRAM(S): 2 INJECTION INTRAMUSCULAR; INTRAVENOUS; SUBCUTANEOUS at 11:17

## 2019-04-26 RX ADMIN — Medication 0.12 MILLIGRAM(S): at 22:35

## 2019-04-26 RX ADMIN — PANTOPRAZOLE SODIUM 40 MILLIGRAM(S): 20 TABLET, DELAYED RELEASE ORAL at 18:20

## 2019-04-26 RX ADMIN — HYDROMORPHONE HYDROCHLORIDE 0.25 MILLIGRAM(S): 2 INJECTION INTRAMUSCULAR; INTRAVENOUS; SUBCUTANEOUS at 22:35

## 2019-04-26 RX ADMIN — Medication 50 MILLIGRAM(S): at 18:19

## 2019-04-26 RX ADMIN — BUDESONIDE AND FORMOTEROL FUMARATE DIHYDRATE 2 PUFF(S): 160; 4.5 AEROSOL RESPIRATORY (INHALATION) at 23:38

## 2019-04-26 RX ADMIN — HYDROMORPHONE HYDROCHLORIDE 0.25 MILLIGRAM(S): 2 INJECTION INTRAMUSCULAR; INTRAVENOUS; SUBCUTANEOUS at 05:50

## 2019-04-26 RX ADMIN — Medication 10 MILLIGRAM(S): at 22:35

## 2019-04-26 RX ADMIN — Medication 10 MILLIGRAM(S): at 06:17

## 2019-04-26 RX ADMIN — HYDROMORPHONE HYDROCHLORIDE 0.25 MILLIGRAM(S): 2 INJECTION INTRAMUSCULAR; INTRAVENOUS; SUBCUTANEOUS at 06:05

## 2019-04-26 RX ADMIN — HEPARIN SODIUM 15 UNIT(S)/HR: 5000 INJECTION INTRAVENOUS; SUBCUTANEOUS at 19:53

## 2019-04-26 RX ADMIN — Medication 10 MILLIGRAM(S): at 13:18

## 2019-04-26 RX ADMIN — Medication 5 MILLIGRAM(S): at 06:05

## 2019-04-26 RX ADMIN — SODIUM CHLORIDE 50 MILLILITER(S): 9 INJECTION, SOLUTION INTRAVENOUS at 19:52

## 2019-04-26 RX ADMIN — BUDESONIDE AND FORMOTEROL FUMARATE DIHYDRATE 2 PUFF(S): 160; 4.5 AEROSOL RESPIRATORY (INHALATION) at 10:44

## 2019-04-26 RX ADMIN — SODIUM CHLORIDE 3 MILLILITER(S): 9 INJECTION INTRAMUSCULAR; INTRAVENOUS; SUBCUTANEOUS at 05:59

## 2019-04-26 RX ADMIN — HEPARIN SODIUM 15 UNIT(S)/HR: 5000 INJECTION INTRAVENOUS; SUBCUTANEOUS at 13:19

## 2019-04-26 RX ADMIN — HYDROMORPHONE HYDROCHLORIDE 0.25 MILLIGRAM(S): 2 INJECTION INTRAMUSCULAR; INTRAVENOUS; SUBCUTANEOUS at 11:30

## 2019-04-26 RX ADMIN — Medication 300 MILLIGRAM(S): at 11:17

## 2019-04-26 NOTE — PROGRESS NOTE ADULT - ASSESSMENT
64 yo woman with multiple medical comorbidities including afib, right-sided CHF with severe pulmonary HTN, COPD on nocturnal O2, LINH newly initiated on bilevel support found to have a non-obstructing sigmoid mass s/p left hemicolectomy (4/24/19), pt now in SICU due to uncontrolled a fib.     - Diet: NPO except meds/IVF  - Monitor bowel function/abdominal exam  - cardiology  - Home meds  - DVT ppx: HepSubQ  - care per SICU    [facility]  [id number]

## 2019-04-26 NOTE — PROGRESS NOTE ADULT - SUBJECTIVE AND OBJECTIVE BOX
Patient seen and examined  + abd discomfort  no SOB  not passing gas yet     REVIEW OF SYSTEMS:  As per HPI, otherwise 8 full 10 ROS were unremarkable    MEDICATIONS  (STANDING):  allopurinol 300 milliGRAM(s) Oral daily  buDESOnide  80 MICROgram(s)/formoterol 4.5 MICROgram(s) Inhaler 2 Puff(s) Inhalation two times a day  chlorhexidine 4% Liquid 1 Application(s) Topical <User Schedule>  dextrose 5% + sodium chloride 0.45%. 1000 milliLiter(s) (50 mL/Hr) IV Continuous <Continuous>  dextrose 50% Injectable 25 Gram(s) IV Push once  dextrose 50% Injectable 25 Gram(s) IV Push once  heparin  Injectable 5000 Unit(s) SubCutaneous every 8 hours  insulin lispro (HumaLOG) corrective regimen sliding scale   SubCutaneous three times a day before meals  insulin lispro (HumaLOG) corrective regimen sliding scale   SubCutaneous at bedtime  metoprolol tartrate 50 milliGRAM(s) Oral two times a day  multivitamin 1 Tablet(s) Oral daily  pantoprazole  Injectable 40 milliGRAM(s) IV Push every 12 hours  sildenafil (REVATIO) 10 milliGRAM(s) Oral three times a day  sodium chloride 0.9% lock flush 3 milliLiter(s) IV Push every 8 hours  torsemide 20 milliGRAM(s) Oral <User Schedule>      VITAL:  T(C): , Max: 37.8 (04-25-19 @ 16:00)  T(F): , Max: 100.1 (04-25-19 @ 16:00)  HR: 102 (04-26-19 @ 10:00)  BP: 112/59 (04-26-19 @ 10:00)  BP(mean): 71 (04-26-19 @ 10:00)  RR: 16 (04-26-19 @ 10:00)  SpO2: 97% (04-26-19 @ 10:00)  Wt(kg): --    I and O's:    04-25 @ 07:01  -  04-26 @ 07:00  --------------------------------------------------------  IN: 1825 mL / OUT: 1130 mL / NET: 695 mL    04-26 @ 07:01 - 04-26 @ 10:36  --------------------------------------------------------  IN: 150 mL / OUT: 60 mL / NET: 90 mL          PHYSICAL EXAM:    Constitutional: NAD  HEENT: PERRLA, EOMI,  MMM  Neck: No LAD, No JVD  Respiratory: CTAB  Cardiovascular: S1 and S2  Gastrointestinal: BS+, soft, NT/ND  Extremities: No peripheral edema  Neurological: A/O x 3, no focal deficits  Psychiatric: Normal mood, normal affect  : + dawson     LABS:                        9.0    7.24  )-----------( 184      ( 26 Apr 2019 03:00 )             29.6     04-26    142  |  112<H>  |  29<H>  ----------------------------<  111<H>  4.4   |  18<L>  |  1.53<H>    Ca    9.3      26 Apr 2019 03:00  Phos  2.8     04-26  Mg     2.2     04-26        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 and now s/p exlap, partial left hemicolectomy and side to side stapled anastomosis    - CKD: stage 3 with baseline Cr ~ 1.6  - OSMAR: likely due to pre-renal azotemia likely 2ry to transient hypotension - UOP is fluctuating  - Hypotension- patient in Afib with RVR  - HFpEF: right sided heart failure / cor pulm; no SOB; volume status acceptable  - anemia: in chronic disease + iron def + GIB + malignancy, hold LUIS   - Hypomagnesemia replaced     RECOMMEND:  - a/w D5 1/2 NS at 50 cc/hr  - cardiology input re: afib  - Would hold torsemide 20 mg PO TTSun  - sildenafil per pulm  - dose meds for a CrCl ~30-35 mL/min      Tuan Camargo MD   [facility]  [phone] Patient seen and examined  + abd discomfort  no SOB  not passing gas yet     REVIEW OF SYSTEMS:  As per HPI, otherwise 8 full 10 ROS were unremarkable    MEDICATIONS  (STANDING):  allopurinol 300 milliGRAM(s) Oral daily  buDESOnide  80 MICROgram(s)/formoterol 4.5 MICROgram(s) Inhaler 2 Puff(s) Inhalation two times a day  chlorhexidine 4% Liquid 1 Application(s) Topical <User Schedule>  dextrose 5% + sodium chloride 0.45%. 1000 milliLiter(s) (50 mL/Hr) IV Continuous <Continuous>  dextrose 50% Injectable 25 Gram(s) IV Push once  dextrose 50% Injectable 25 Gram(s) IV Push once  heparin  Injectable 5000 Unit(s) SubCutaneous every 8 hours  insulin lispro (HumaLOG) corrective regimen sliding scale   SubCutaneous three times a day before meals  insulin lispro (HumaLOG) corrective regimen sliding scale   SubCutaneous at bedtime  metoprolol tartrate 50 milliGRAM(s) Oral two times a day  multivitamin 1 Tablet(s) Oral daily  pantoprazole  Injectable 40 milliGRAM(s) IV Push every 12 hours  sildenafil (REVATIO) 10 milliGRAM(s) Oral three times a day  sodium chloride 0.9% lock flush 3 milliLiter(s) IV Push every 8 hours  torsemide 20 milliGRAM(s) Oral <User Schedule>      VITAL:  T(C): , Max: 37.8 (04-25-19 @ 16:00)  T(F): , Max: 100.1 (04-25-19 @ 16:00)  HR: 102 (04-26-19 @ 10:00)  BP: 112/59 (04-26-19 @ 10:00)  BP(mean): 71 (04-26-19 @ 10:00)  RR: 16 (04-26-19 @ 10:00)  SpO2: 97% (04-26-19 @ 10:00)  Wt(kg): --    I and O's:    04-25 @ 07:01  -  04-26 @ 07:00  --------------------------------------------------------  IN: 1825 mL / OUT: 1130 mL / NET: 695 mL    04-26 @ 07:01 - 04-26 @ 10:36  --------------------------------------------------------  IN: 150 mL / OUT: 60 mL / NET: 90 mL          PHYSICAL EXAM:    Constitutional: NAD  HEENT: PERRLA, EOMI,  MMM  Neck: No LAD, No JVD  Respiratory: CTAB  Cardiovascular: S1 and S2  Gastrointestinal: BS+, soft, NT/ND  Extremities: No peripheral edema  Neurological: A/O x 3, no focal deficits  Psychiatric: Normal mood, normal affect  : + dawson     LABS:                        9.0    7.24  )-----------( 184      ( 26 Apr 2019 03:00 )             29.6     04-26    142  |  112<H>  |  29<H>  ----------------------------<  111<H>  4.4   |  18<L>  |  1.53<H>    Ca    9.3      26 Apr 2019 03:00  Phos  2.8     04-26  Mg     2.2     04-26        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 and now s/p exlap, partial left hemicolectomy and side to side stapled anastomosis    - CKD: stage 3 with baseline Cr ~ 1.6  - OSMAR: likely due to pre-renal azotemia likely 2ry to transient hypotension - UOP is fluctuating  - Hypotension- patient in Afib with RVR   - HFpEF: right sided heart failure / cor pulm; no SOB; volume status acceptable  - anemia: in chronic disease + iron def + GIB + malignancy, hold LUIS   - Hypomagnesemia replaced     RECOMMEND:  - a/w D5 1/2 NS at 50 cc/hr  - cardiology input re: afib  - Continue torsemide 20 mg PO TTSun for now   - sildenafil per pulm  - dose meds for a CrCl ~30-35 mL/min      Tuan Camargo MD   [facility]  [phone]

## 2019-04-26 NOTE — PROGRESS NOTE ADULT - SUBJECTIVE AND OBJECTIVE BOX
GENERAL SURGERY DAILY PROGRESS NOTE:     Subjective:  Pt seen and examined. Overnight no acute events. This am pt w/ elevated HR from 110s-140s in a fib w/ systolic BP in 80s. Pt reports pain, denies n/v. Denies gas/bms.     Objective:    MEDICATIONS  (STANDING):  allopurinol 300 milliGRAM(s) Oral daily  buDESOnide  80 MICROgram(s)/formoterol 4.5 MICROgram(s) Inhaler 2 Puff(s) Inhalation two times a day  chlorhexidine 4% Liquid 1 Application(s) Topical <User Schedule>  dextrose 5% + sodium chloride 0.45%. 1000 milliLiter(s) (50 mL/Hr) IV Continuous <Continuous>  dextrose 50% Injectable 25 Gram(s) IV Push once  dextrose 50% Injectable 25 Gram(s) IV Push once  heparin  Injectable 5000 Unit(s) SubCutaneous every 8 hours  insulin lispro (HumaLOG) corrective regimen sliding scale   SubCutaneous three times a day before meals  insulin lispro (HumaLOG) corrective regimen sliding scale   SubCutaneous at bedtime  metoprolol succinate ER 25 milliGRAM(s) Oral two times a day  multivitamin 1 Tablet(s) Oral daily  pantoprazole  Injectable 40 milliGRAM(s) IV Push every 12 hours  sildenafil (REVATIO) 10 milliGRAM(s) Oral three times a day  sodium chloride 0.9% lock flush 3 milliLiter(s) IV Push every 8 hours  torsemide 20 milliGRAM(s) Oral <User Schedule>    MEDICATIONS  (PRN):  ALBUTerol/ipratropium for Nebulization 3 milliLiter(s) Nebulizer every 6 hours PRN Shortness of Breath and/or Wheezing  dextrose 40% Gel 15 Gram(s) Oral once PRN Blood Glucose LESS THAN 70 milliGRAM(s)/deciliter  glucagon  Injectable 1 milliGRAM(s) IntraMuscular once PRN Glucose LESS THAN 70 milligrams/deciliter  guaiFENesin  milliGRAM(s) Oral every 12 hours PRN Cough  HYDROmorphone  Injectable 0.25 milliGRAM(s) IV Push every 4 hours PRN Moderate Pain (4 - 6)  HYDROmorphone  Injectable 0.5 milliGRAM(s) IV Push every 4 hours PRN Severe Pain (7 - 10)      Vital Signs Last 24 Hrs  T(C): 37.1 (26 Apr 2019 08:00), Max: 37.8 (25 Apr 2019 16:00)  T(F): 98.8 (26 Apr 2019 08:00), Max: 100.1 (25 Apr 2019 16:00)  HR: 102 (26 Apr 2019 10:00) (87 - 158)  BP: 112/59 (26 Apr 2019 10:00) (88/51 - 129/77)  BP(mean): 71 (26 Apr 2019 10:00) (58 - 95)  RR: 16 (26 Apr 2019 10:00) (16 - 24)  SpO2: 97% (26 Apr 2019 10:00) (91% - 100%)    I&O's Detail    25 Apr 2019 07:01  -  26 Apr 2019 07:00  --------------------------------------------------------  IN:    dextrose 5% + sodium chloride 0.45%.: 650 mL    IV PiggyBack: 350 mL    lactated ringers.: 825 mL  Total IN: 1825 mL    OUT:    Indwelling Catheter - Urethral: 1130 mL  Total OUT: 1130 mL    Total NET: 695 mL      26 Apr 2019 07:01  -  26 Apr 2019 10:08  --------------------------------------------------------  IN:    dextrose 5% + sodium chloride 0.45%.: 150 mL  Total IN: 150 mL    OUT:    Indwelling Catheter - Urethral: 60 mL  Total OUT: 60 mL    Total NET: 90 mL    PHYSICAL EXAM  General: NAD, resting comfortably in bed  Pulmonary: Nonlabored breathing, no respiratory distress  Abdominal: soft, ND, appropriately tender to palpation, no RT, no guarding/rebound, incisions/dressing clean, dry, intact      Daily     Daily     LABS:                        9.0    7.24  )-----------( 184      ( 26 Apr 2019 03:00 )             29.6     04-26    142  |  112<H>  |  29<H>  ----------------------------<  111<H>  4.4   |  18<L>  |  1.53<H>    Ca    9.3      26 Apr 2019 03:00  Phos  2.8     04-26  Mg     2.2     04-26            RADIOLOGY & ADDITIONAL STUDIES:

## 2019-04-26 NOTE — PROGRESS NOTE ADULT - SUBJECTIVE AND OBJECTIVE BOX
SICU Daily Progress Note  =====================================================  Interval/Overnight Events:  No acute events overnight     POD #   2       	SICU Day #    3    HPI:  62 yo woman with multiple medical comorbidities including afib, Right sided heart failure with preserved EF (EF 66% on TTE in 10/2017), severe pulmonary HTN, COPD on nocturnal O2, LINH (arranged for new home bilevel support), HTN, HLD, DM2, lymphoma s/p RT and gout presenting with generalized weakness and anemia. Patient notes that she had positive Cologuard test (3/19/2019). Colonoscopy performed 4/12 indicated a fungating, non-obstructing mass at the proximal sigmoid colon s/p biopsy (4/12). Her last colonoscopy was approximately 10 years ago s/p polypectomy. Colorectal surgery consulted given colonoscopy findings. Patient denies melena, hematochezia, abdominal pain, nausea or vomiting. She has had intentional weight loss over the past year. Patient with a personal history of lymphoma s/p chemo/radiation (1997) and family history significant for two of her Aunt's having had breast cancer and cervical cancer, respectively. Patient can walk 1-2 blocks with her cane before being limited by shortness of breath. She can climb stairs. Pt had RHC on 4/5/19 and was started on Sildenafil. Pt with EGD/colonoscopy on 4/12/19 showing Polypoid mass in the proximal sigmoid colon    4/24/19 s/p colonoscopy diagnostic lap converted to exlap, partial left hemicolectomy, side to side stapled anastomosis. SICU consulted for hemodynamic monitoring post operatively.     Allergies: No Known Allergies      MEDICATIONS:   --------------------------------------------------------------------------------------  Neurologic Medications  HYDROmorphone  Injectable 0.5 milliGRAM(s) IV Push every 4 hours PRN Moderate Pain (4 - 6)  HYDROmorphone  Injectable 1 milliGRAM(s) IV Push every 4 hours PRN Severe Pain (7 - 10)    Respiratory Medications  ALBUTerol/ipratropium for Nebulization 3 milliLiter(s) Nebulizer every 6 hours PRN Shortness of Breath and/or Wheezing  buDESOnide  80 MICROgram(s)/formoterol 4.5 MICROgram(s) Inhaler 2 Puff(s) Inhalation two times a day  guaiFENesin  milliGRAM(s) Oral every 12 hours PRN Cough    Cardiovascular Medications  metoprolol succinate ER 25 milliGRAM(s) Oral two times a day  sildenafil (REVATIO) 10 milliGRAM(s) Oral three times a day  torsemide 20 milliGRAM(s) Oral <User Schedule>    Gastrointestinal Medications  dextrose 5% + sodium chloride 0.45%. 1000 milliLiter(s) IV Continuous <Continuous>  multivitamin 1 Tablet(s) Oral daily  pantoprazole  Injectable 40 milliGRAM(s) IV Push every 12 hours  sodium chloride 0.9% lock flush 3 milliLiter(s) IV Push every 8 hours    Genitourinary Medications    Hematologic/Oncologic Medications  heparin  Injectable 5000 Unit(s) SubCutaneous every 8 hours    Antimicrobial/Immunologic Medications    Endocrine/Metabolic Medications  allopurinol 300 milliGRAM(s) Oral daily  dextrose 40% Gel 15 Gram(s) Oral once PRN Blood Glucose LESS THAN 70 milliGRAM(s)/deciliter  dextrose 50% Injectable 25 Gram(s) IV Push once  dextrose 50% Injectable 25 Gram(s) IV Push once  glucagon  Injectable 1 milliGRAM(s) IntraMuscular once PRN Glucose LESS THAN 70 milligrams/deciliter  insulin lispro (HumaLOG) corrective regimen sliding scale   SubCutaneous three times a day before meals  insulin lispro (HumaLOG) corrective regimen sliding scale   SubCutaneous at bedtime    Topical/Other Medications  chlorhexidine 4% Liquid 1 Application(s) Topical <User Schedule>    --------------------------------------------------------------------------------------    VITAL SIGNS, INS/OUTS (last 24 hours):  --------------------------------------------------------------------------------------  ((Insert SICU Vitals/Is+Os here))***  --------------------------------------------------------------------------------------    EXAM  NEUROLOGY  RASS:   	GCS:    Exam: Normal, NAD, alert, oriented x3, no focal deficits.     HEENT  Exam: Normocephalic, atraumatic, EOMI.      RESPIRATORY  Exam: Lungs clear to auscultation, Normal expansion/effort.   Mechanical Ventilation:     CARDIOVASCULAR  Exam: S1, S2.  Regular rate and rhythm. trace peripheral edema    GI/NUTRITION  Exam: Abdomen soft, Non-distended. tender around incisions  Wound:  dressing c/d/i  Current Diet:  NPO    VASCULAR  Exam: Extremities warm, pink, well-perfused.    MUSCULOSKELETAL  Exam: All extremities moving spontaneously without limitations.     SKIN  Exam: Good skin turgor, no skin breakdown.     METABOLIC/FLUIDS/ELECTROLYTES  dextrose 5% + sodium chloride 0.45%. 1000 milliLiter(s) IV Continuous <Continuous>  multivitamin 1 Tablet(s) Oral daily  sodium chloride 0.9% lock flush 3 milliLiter(s) IV Push every 8 hours      HEMATOLOGIC  [x] VTE Prophylaxis: heparin  Injectable 5000 Unit(s) SubCutaneous every 8 hours    Transfusions:	[] PRBC	[] Platelets		[] FFP	[] Cryoprecipitate    INFECTIOUS DISEASE  Antimicrobials/Immunologic Medications:    Tubes/Lines/Drains   [x] Peripheral IV  [] Central Venous Line     	[] R	[] L	[] IJ	[] Fem	[] SC	Date Placed:   [] Arterial Line		[] R	[] L	[] Fem	[] Rad	[] Ax	Date Placed:   [] PICC		[] Midline		[] Mediport  [] Urinary Catheter		Date Placed:   [x] Necessity of urinary, arterial, and venous catheters discussed    LABS  --------------------------------------------------------------------------------------  ((Insert SICU Labs here))***  --------------------------------------------------------------------------------------    OTHER LABORATORY:     IMAGING STUDIES:   CXR:

## 2019-04-26 NOTE — PROGRESS NOTE ADULT - ATTENDING COMMENTS
64 yo woman with multiple comorbidities, including history of lymphoma, R-sided heart failure with preserved EF (EF 66% on TTE in 10/2017), severe pulm HTN, mod-severe TR, reported COPD but denies smoking? (on 2L NC at night), LINH (being arranged for bilevel support), A-Fib (on Eliquis), HTN, HLD, DM2, and gout presents with generalized weakness for the past 1 week in the setting of recent occult positive stool, OSMAR and newly diagnosed influenza/entero virus infection s/p treatment with Tamiflu, found to have non-obstructing mass at the proximal sigmoid colon s/p  s/p colonoscopy diagnostic lap converted to exlap, partial left hemicolectomy, side to side stapled anastomosis (4/24/19)      PLAN:    Neurologic:   -pain control PRN, Tylenol, Dilaudid IV    Respiratory:   -Saturating well on face tent. keep O2>92%, 2L NC while asleep, CPAP if needed  -IS  -continue sildenafil for PHTN  -duoneb PRN; Symbicort for COPD    Cardiovascular:   -keep MAP>65  -Afib- c/w metoprolol  -hold torsemide (next dose due 4/25/19 @9:00)    Gastrointestinal/Nutrition:   -NPO  -Continue protonix    Renal/Genitourinary:   -strict I/Os  -Continue dawson  -D51/2NS@ 50ml/hr    Hematologic:   -H/H stable  -dvt ppx HSQ, SCDs    Infectious Disease:   -afebrile, off Abx    Tubes/Lines/Drains: d/c samir lynn, MELLY    Endocrine: monitor glucose, ISS    Disposition: floor    CCDx; Afib, pulmonary HTN, fluid overload.  The patient is a critical care patient with life threatening hemodynamic and metabolic instability in SICU.  I have personally interviewed when possible and examined the patient, reviewed data and laboratory tests/x-rays and all pertinent electronic images.  I was physically present for the key portions of the evaluation and management (E/M) service provided.   The SICU team has a constant risk benefit analyzes discussion with the primary team, all consultants, House Staff and PA's on all decisions.  These diagnoses are unrelated to the surgical procedure noted above.  I meet with family if needed to get further history, discuss the case and make care decisions for this patient who might not be able to participate.  Time involved in performance of separately billable procedures was not counted toward my critical care time. There is no overlap.  I spent 55-75 minutes of critical care time for the diagnoses, assessment, plan and interventions.

## 2019-04-26 NOTE — PROGRESS NOTE ADULT - ASSESSMENT
64 yo woman with multiple comorbidities, including history of lymphoma, R-sided heart failure with preserved EF (EF 66% on TTE in 10/2017), severe pulm HTN, mod-severe TR, reported COPD but denies smoking? (on 2L NC at night), LINH (being arranged for bilevel support), A-Fib (on Eliquis), HTN, HLD, DM2, and gout presents with generalized weakness for the past 1 week in the setting of recent occult positive stool, OSMAR and newly diagnosed influenza/entero virus infection s/p treatment with Tamiflu, found to have non-obstructing mass at the proximal sigmoid colon s/p  s/p colonoscopy diagnostic lap converted to exlap, partial left hemicolectomy, side to side stapled anastomosis (4/24/19)      PLAN:    Neurologic:   -pain control PRN, Tylenol, Dilaudid IV    Respiratory:   -Saturating well on face tent. keep O2>92%, 2L NC while asleep, CPAP if needed  -IS  -continue sildenafil for PHTN  -duoneb PRN; Symbicort for COPD    Cardiovascular:   -keep MAP>65  -Afib- c/w metoprolol  -hold torsemide (next dose due 4/25/19 @9:00)    Gastrointestinal/Nutrition:   -NPO  -Continue protonix    Renal/Genitourinary:   -strict I/Os  -Continue dawson  -D51/2NS@ 50ml/hr    Hematologic:   -H/H stable  -dvt ppx HSQ, SCDs    Infectious Disease:   -afebrile, off Abx    Tubes/Lines/Drains: d/c samir lynn, MELLY    Endocrine: monitor glucose, ISS    Disposition: floor

## 2019-04-27 LAB
ANION GAP SERPL CALC-SCNC: 10 MMO/L — SIGNIFICANT CHANGE UP (ref 7–14)
APTT BLD: 102.1 SEC — HIGH (ref 27.5–36.3)
APTT BLD: 124.7 SEC — CRITICAL HIGH (ref 27.5–36.3)
APTT BLD: 67 SEC — HIGH (ref 27.5–36.3)
BUN SERPL-MCNC: 26 MG/DL — HIGH (ref 7–23)
CALCIUM SERPL-MCNC: 9.3 MG/DL — SIGNIFICANT CHANGE UP (ref 8.4–10.5)
CHLORIDE SERPL-SCNC: 109 MMOL/L — HIGH (ref 98–107)
CO2 SERPL-SCNC: 21 MMOL/L — LOW (ref 22–31)
CREAT SERPL-MCNC: 1.6 MG/DL — HIGH (ref 0.5–1.3)
GLUCOSE BLDC GLUCOMTR-MCNC: 117 MG/DL — HIGH (ref 70–99)
GLUCOSE BLDC GLUCOMTR-MCNC: 126 MG/DL — HIGH (ref 70–99)
GLUCOSE BLDC GLUCOMTR-MCNC: 135 MG/DL — HIGH (ref 70–99)
GLUCOSE BLDC GLUCOMTR-MCNC: 162 MG/DL — HIGH (ref 70–99)
GLUCOSE SERPL-MCNC: 138 MG/DL — HIGH (ref 70–99)
HCT VFR BLD CALC: 27.8 % — LOW (ref 34.5–45)
HCT VFR BLD CALC: 29.4 % — LOW (ref 34.5–45)
HGB BLD-MCNC: 8.3 G/DL — LOW (ref 11.5–15.5)
HGB BLD-MCNC: 9 G/DL — LOW (ref 11.5–15.5)
MAGNESIUM SERPL-MCNC: 2.1 MG/DL — SIGNIFICANT CHANGE UP (ref 1.6–2.6)
MCHC RBC-ENTMCNC: 21.2 PG — LOW (ref 27–34)
MCHC RBC-ENTMCNC: 21.7 PG — LOW (ref 27–34)
MCHC RBC-ENTMCNC: 29.9 % — LOW (ref 32–36)
MCHC RBC-ENTMCNC: 30.6 % — LOW (ref 32–36)
MCV RBC AUTO: 71 FL — LOW (ref 80–100)
MCV RBC AUTO: 71.1 FL — LOW (ref 80–100)
NRBC # FLD: 0.08 K/UL — SIGNIFICANT CHANGE UP (ref 0–0)
NRBC # FLD: 0.1 K/UL — SIGNIFICANT CHANGE UP (ref 0–0)
NRBC FLD-RTO: 1.1 — SIGNIFICANT CHANGE UP
PHOSPHATE SERPL-MCNC: 2.4 MG/DL — LOW (ref 2.5–4.5)
PLATELET # BLD AUTO: 168 K/UL — SIGNIFICANT CHANGE UP (ref 150–400)
PLATELET # BLD AUTO: 206 K/UL — SIGNIFICANT CHANGE UP (ref 150–400)
PMV BLD: SIGNIFICANT CHANGE UP FL (ref 7–13)
PMV BLD: SIGNIFICANT CHANGE UP FL (ref 7–13)
POTASSIUM SERPL-MCNC: 4.4 MMOL/L — SIGNIFICANT CHANGE UP (ref 3.5–5.3)
POTASSIUM SERPL-SCNC: 4.4 MMOL/L — SIGNIFICANT CHANGE UP (ref 3.5–5.3)
RBC # BLD: 3.91 M/UL — SIGNIFICANT CHANGE UP (ref 3.8–5.2)
RBC # BLD: 4.14 M/UL — SIGNIFICANT CHANGE UP (ref 3.8–5.2)
RBC # FLD: 22.7 % — HIGH (ref 10.3–14.5)
RBC # FLD: 23.3 % — HIGH (ref 10.3–14.5)
SODIUM SERPL-SCNC: 140 MMOL/L — SIGNIFICANT CHANGE UP (ref 135–145)
WBC # BLD: 8.51 K/UL — SIGNIFICANT CHANGE UP (ref 3.8–10.5)
WBC # BLD: 8.71 K/UL — SIGNIFICANT CHANGE UP (ref 3.8–10.5)
WBC # FLD AUTO: 8.51 K/UL — SIGNIFICANT CHANGE UP (ref 3.8–10.5)
WBC # FLD AUTO: 8.71 K/UL — SIGNIFICANT CHANGE UP (ref 3.8–10.5)

## 2019-04-27 PROCEDURE — 99233 SBSQ HOSP IP/OBS HIGH 50: CPT

## 2019-04-27 PROCEDURE — 76604 US EXAM CHEST: CPT | Mod: 26

## 2019-04-27 RX ORDER — ONDANSETRON 8 MG/1
4 TABLET, FILM COATED ORAL ONCE
Qty: 0 | Refills: 0 | Status: COMPLETED | OUTPATIENT
Start: 2019-04-27 | End: 2019-04-27

## 2019-04-27 RX ORDER — OXYCODONE HYDROCHLORIDE 5 MG/1
5 TABLET ORAL EVERY 6 HOURS
Qty: 0 | Refills: 0 | Status: DISCONTINUED | OUTPATIENT
Start: 2019-04-27 | End: 2019-04-28

## 2019-04-27 RX ORDER — DIGOXIN 250 MCG
0.12 TABLET ORAL DAILY
Qty: 0 | Refills: 0 | Status: DISCONTINUED | OUTPATIENT
Start: 2019-04-27 | End: 2019-04-30

## 2019-04-27 RX ORDER — ACETAMINOPHEN 500 MG
1000 TABLET ORAL ONCE
Qty: 0 | Refills: 0 | Status: DISCONTINUED | OUTPATIENT
Start: 2019-04-27 | End: 2019-04-27

## 2019-04-27 RX ORDER — OXYCODONE AND ACETAMINOPHEN 5; 325 MG/1; MG/1
1 TABLET ORAL EVERY 4 HOURS
Qty: 0 | Refills: 0 | Status: DISCONTINUED | OUTPATIENT
Start: 2019-04-27 | End: 2019-04-27

## 2019-04-27 RX ORDER — ACETAMINOPHEN 500 MG
1000 TABLET ORAL ONCE
Qty: 0 | Refills: 0 | Status: COMPLETED | OUTPATIENT
Start: 2019-04-27 | End: 2019-04-27

## 2019-04-27 RX ADMIN — CHLORHEXIDINE GLUCONATE 1 APPLICATION(S): 213 SOLUTION TOPICAL at 10:38

## 2019-04-27 RX ADMIN — Medication 1: at 00:04

## 2019-04-27 RX ADMIN — Medication 50 MILLIGRAM(S): at 18:21

## 2019-04-27 RX ADMIN — HEPARIN SODIUM 11 UNIT(S)/HR: 5000 INJECTION INTRAVENOUS; SUBCUTANEOUS at 20:00

## 2019-04-27 RX ADMIN — ONDANSETRON 4 MILLIGRAM(S): 8 TABLET, FILM COATED ORAL at 18:30

## 2019-04-27 RX ADMIN — HEPARIN SODIUM 13 UNIT(S)/HR: 5000 INJECTION INTRAVENOUS; SUBCUTANEOUS at 04:22

## 2019-04-27 RX ADMIN — Medication 1: at 23:35

## 2019-04-27 RX ADMIN — Medication 10 MILLIGRAM(S): at 22:52

## 2019-04-27 RX ADMIN — PANTOPRAZOLE SODIUM 40 MILLIGRAM(S): 20 TABLET, DELAYED RELEASE ORAL at 06:00

## 2019-04-27 RX ADMIN — HYDROMORPHONE HYDROCHLORIDE 0.25 MILLIGRAM(S): 2 INJECTION INTRAMUSCULAR; INTRAVENOUS; SUBCUTANEOUS at 00:04

## 2019-04-27 RX ADMIN — Medication 0.12 MILLIGRAM(S): at 12:33

## 2019-04-27 RX ADMIN — HYDROMORPHONE HYDROCHLORIDE 0.25 MILLIGRAM(S): 2 INJECTION INTRAMUSCULAR; INTRAVENOUS; SUBCUTANEOUS at 10:07

## 2019-04-27 RX ADMIN — BUDESONIDE AND FORMOTEROL FUMARATE DIHYDRATE 2 PUFF(S): 160; 4.5 AEROSOL RESPIRATORY (INHALATION) at 06:32

## 2019-04-27 RX ADMIN — Medication 400 MILLIGRAM(S): at 16:14

## 2019-04-27 RX ADMIN — Medication 10 MILLIGRAM(S): at 14:59

## 2019-04-27 RX ADMIN — SODIUM CHLORIDE 3 MILLILITER(S): 9 INJECTION INTRAMUSCULAR; INTRAVENOUS; SUBCUTANEOUS at 14:59

## 2019-04-27 RX ADMIN — Medication 1000 MILLIGRAM(S): at 16:40

## 2019-04-27 RX ADMIN — Medication 1 TABLET(S): at 12:33

## 2019-04-27 RX ADMIN — Medication 63.75 MILLIMOLE(S): at 07:11

## 2019-04-27 RX ADMIN — Medication 50 MILLIGRAM(S): at 06:01

## 2019-04-27 RX ADMIN — PANTOPRAZOLE SODIUM 40 MILLIGRAM(S): 20 TABLET, DELAYED RELEASE ORAL at 18:21

## 2019-04-27 RX ADMIN — Medication 300 MILLIGRAM(S): at 12:33

## 2019-04-27 RX ADMIN — BUDESONIDE AND FORMOTEROL FUMARATE DIHYDRATE 2 PUFF(S): 160; 4.5 AEROSOL RESPIRATORY (INHALATION) at 20:00

## 2019-04-27 RX ADMIN — HYDROMORPHONE HYDROCHLORIDE 0.25 MILLIGRAM(S): 2 INJECTION INTRAMUSCULAR; INTRAVENOUS; SUBCUTANEOUS at 10:38

## 2019-04-27 RX ADMIN — Medication 10 MILLIGRAM(S): at 06:01

## 2019-04-27 NOTE — PROGRESS NOTE ADULT - SUBJECTIVE AND OBJECTIVE BOX
GENERAL SURGERY DAILY PROGRESS NOTE:     Subjective:  Pt seen and examined. No acute events overnight. Pt remains intermittently tachycardic. Pressures improved. Tolerating some clears, still no gas or bms.     Objective:    MEDICATIONS  (STANDING):  allopurinol 300 milliGRAM(s) Oral daily  buDESOnide  80 MICROgram(s)/formoterol 4.5 MICROgram(s) Inhaler 2 Puff(s) Inhalation two times a day  chlorhexidine 4% Liquid 1 Application(s) Topical <User Schedule>  dextrose 5% + sodium chloride 0.45%. 1000 milliLiter(s) (50 mL/Hr) IV Continuous <Continuous>  dextrose 50% Injectable 25 Gram(s) IV Push once  dextrose 50% Injectable 25 Gram(s) IV Push once  heparin  Infusion 1500 Unit(s)/Hr (15 mL/Hr) IV Continuous <Continuous>  insulin lispro (HumaLOG) corrective regimen sliding scale   SubCutaneous every 6 hours  metoprolol tartrate 50 milliGRAM(s) Oral two times a day  multivitamin 1 Tablet(s) Oral daily  pantoprazole  Injectable 40 milliGRAM(s) IV Push every 12 hours  sildenafil (REVATIO) 10 milliGRAM(s) Oral three times a day  sodium chloride 0.9% lock flush 3 milliLiter(s) IV Push every 8 hours  torsemide 20 milliGRAM(s) Oral <User Schedule>    MEDICATIONS  (PRN):  ALBUTerol/ipratropium for Nebulization 3 milliLiter(s) Nebulizer every 6 hours PRN Shortness of Breath and/or Wheezing  dextrose 40% Gel 15 Gram(s) Oral once PRN Blood Glucose LESS THAN 70 milliGRAM(s)/deciliter  glucagon  Injectable 1 milliGRAM(s) IntraMuscular once PRN Glucose LESS THAN 70 milligrams/deciliter  guaiFENesin  milliGRAM(s) Oral every 12 hours PRN Cough  HYDROmorphone  Injectable 0.25 milliGRAM(s) IV Push every 4 hours PRN Moderate Pain (4 - 6)  HYDROmorphone  Injectable 0.5 milliGRAM(s) IV Push every 4 hours PRN Severe Pain (7 - 10)      Vital Signs Last 24 Hrs  T(C): 37.7 (27 Apr 2019 00:00), Max: 37.9 (26 Apr 2019 20:00)  T(F): 99.9 (27 Apr 2019 00:00), Max: 100.2 (26 Apr 2019 20:00)  HR: 97 (27 Apr 2019 00:00) (97 - 158)  BP: 113/71 (27 Apr 2019 00:00) (88/51 - 134/84)  BP(mean): 80 (27 Apr 2019 00:00) (58 - 96)  RR: 22 (27 Apr 2019 00:00) (16 - 27)  SpO2: 97% (27 Apr 2019 00:00) (90% - 100%)    I&O's Detail    25 Apr 2019 07:01  -  26 Apr 2019 07:00  --------------------------------------------------------  IN:    dextrose 5% + sodium chloride 0.45%.: 650 mL    IV PiggyBack: 350 mL    lactated ringers.: 825 mL  Total IN: 1825 mL    OUT:    Indwelling Catheter - Urethral: 1130 mL  Total OUT: 1130 mL    Total NET: 695 mL      26 Apr 2019 07:01  -  27 Apr 2019 01:37  --------------------------------------------------------  IN:    dextrose 5% + sodium chloride 0.45%.: 900 mL    heparin Infusion: 180 mL  Total IN: 1080 mL    OUT:    Indwelling Catheter - Urethral: 135 mL  Total OUT: 135 mL    Total NET: 945 mL          Daily     Daily     LABS:                        8.8    8.85  )-----------( 248      ( 26 Apr 2019 20:04 )             29.2     04-26    142  |  112<H>  |  29<H>  ----------------------------<  111<H>  4.4   |  18<L>  |  1.53<H>    Ca    9.3      26 Apr 2019 03:00  Phos  2.8     04-26  Mg     2.1     04-26      PTT - ( 26 Apr 2019 20:04 )  PTT:68.5 SEC      RADIOLOGY & ADDITIONAL STUDIES:    PHYSICAL EXAM  General: NAD, resting comfortably in bed  Pulmonary: Nonlabored breathing, no respiratory distress  Abdominal: soft, ND, appropriately tender to palpation, no RT, no guarding/rebound, incisions/dressing clean, dry, intact

## 2019-04-27 NOTE — CHART NOTE - NSCHARTNOTEFT_GEN_A_CORE
:    INDICATION: Right side heart failure, severe pulm htn    PROCEDURE:    [X ] LIMITED CHEST    FINDINGS: predominately a-line pattern, occasional b-lines present :    INDICATION: Right side heart failure, severe pulm htn    PROCEDURE:    [X ] LIMITED CHEST    FINDINGS: predominately a-line pattern, occasional b-lines present.  Agree/RB

## 2019-04-27 NOTE — PROGRESS NOTE ADULT - SUBJECTIVE AND OBJECTIVE BOX
SICU Daily Progress Note  =====================================================  Interval/Overnight Events: No acute events overnight.      HPI:  Patient is a 63 year old female with a PMHx of right sided heart failure with preserved EF (EF 66% on TTE in 10/2017), severe pulmonary HTN, moderate-severe tricuspid regurgitation, COPD (on 2L NC at night), LINH (being arranged for bilevel support), AFib (on Eliquis), HTN, HLD, DM2 and gout who presented with generalized weakness x1 week.  She states that she suddenly felt weak leading to a fall while walking outdoors.  Patient states that she was "drained" after walking several feet and tried to hold on to a fence for support, but her legs gave out and she dropped to her knees.  Denies any head strike, loss of consciousness or residual pain.  Patient thinks that her weakness is mainly due to her poor appetite and PO intake.  She states that after being started on Trulicity ~1 year ago, she began having weekly episodes of nausea and vomiting with increasing loss of appetite, resulting in a ~50-lb weight loss.  Patient's PCP discontinued Trulicity in late February, however, patient continued to have nausea and vomiting.  Patient's appetite remained poor, worsened by a "metallic taste" in her mouth.  Due to the weakness and recent fall, patient saw her PCP and was instructed to stop her Metolazone and decrease her Torsemide dose from 60 to 40 mg daily.  Patient endorses that outpatient lab work from 3/23/19 revealed a BUN of 167 and Cr of 3.7.  Patient was re-started on Metolazone and Torsemide on 2/9/19 for fluid retention after she was discontinued on Spironolactone in early January.       PAST MEDICAL & SURGICAL HISTORY:  Gout  Chronic systolic right heart failure  LINH (obstructive sleep apnea)  CLL (chronic lymphocytic leukemia)  COPD (chronic obstructive pulmonary disease)  Diabetes  HTN (hypertension)  Atrial fibrillation  No significant past surgical history      ALLERGIES:  No known allergies      MEDICATIONS:   --------------------------------------------------------------------------------------  Neurologic Medications  HYDROmorphone  Injectable 0.25 milliGRAM(s) IV Push every 4 hours PRN Moderate Pain (4 - 6)  HYDROmorphone  Injectable 0.5 milliGRAM(s) IV Push every 4 hours PRN Severe Pain (7 - 10)    Respiratory Medications  ALBUTerol/ipratropium for Nebulization 3 milliLiter(s) Nebulizer every 6 hours PRN Shortness of Breath and/or Wheezing  buDESOnide  80 MICROgram(s)/formoterol 4.5 MICROgram(s) Inhaler 2 Puff(s) Inhalation two times a day  guaiFENesin  milliGRAM(s) Oral every 12 hours PRN Cough    Cardiovascular Medications  metoprolol tartrate 50 milliGRAM(s) Oral two times a day  sildenafil (REVATIO) 10 milliGRAM(s) Oral three times a day  torsemide 20 milliGRAM(s) Oral <User Schedule>    Gastrointestinal Medications  dextrose 5% + sodium chloride 0.45%. 1000 milliLiter(s) IV Continuous <Continuous>  multivitamin 1 Tablet(s) Oral daily  pantoprazole  Injectable 40 milliGRAM(s) IV Push every 12 hours  sodium chloride 0.9% lock flush 3 milliLiter(s) IV Push every 8 hours    Hematologic/Oncologic Medications  heparin  Infusion 1500 Unit(s)/Hr IV Continuous <Continuous>    Endocrine/Metabolic Medications  allopurinol 300 milliGRAM(s) Oral daily  dextrose 40% Gel 15 Gram(s) Oral once PRN Blood Glucose LESS THAN 70 milliGRAM(s)/deciliter  dextrose 50% Injectable 25 Gram(s) IV Push once  dextrose 50% Injectable 25 Gram(s) IV Push once  glucagon  Injectable 1 milliGRAM(s) IntraMuscular once PRN Glucose LESS THAN 70 milligrams/deciliter  insulin lispro (HumaLOG) corrective regimen sliding scale   SubCutaneous every 6 hours    Topical/Other Medications  chlorhexidine 4% Liquid 1 Application(s) Topical <User Schedule>    --------------------------------------------------------------------------------------    VITAL SIGNS  T(C): 37.7 (27 Apr 2019 00:00), Max: 37.9 (26 Apr 2019 20:00)  T(F): 99.9 (27 Apr 2019 00:00), Max: 100.2 (26 Apr 2019 20:00)  HR: 97 (27 Apr 2019 00:00) (97 - 158)  BP: 113/71 (27 Apr 2019 00:00) (88/51 - 134/84)  BP(mean): 80 (27 Apr 2019 00:00) (58 - 96)  RR: 22 (27 Apr 2019 00:00) (16 - 27)  SpO2: 97% (27 Apr 2019 00:00) (90% - 100%)  --------------------------------------------------------------------------------------    INS AND OUTS    25 Apr 2019 07:01  -  26 Apr 2019 07:00  --------------------------------------------------------  IN:    dextrose 5% + sodium chloride 0.45%.: 650 mL    IV PiggyBack: 350 mL    lactated ringers.: 825 mL  Total IN: 1825 mL    OUT:    Indwelling Catheter - Urethral: 1130 mL  Total OUT: 1130 mL    Total NET: 695 mL      26 Apr 2019 07:01  -  27 Apr 2019 00:53  --------------------------------------------------------  IN:    dextrose 5% + sodium chloride 0.45%.: 900 mL    heparin Infusion: 180 mL  Total IN: 1080 mL    OUT:    Indwelling Catheter - Urethral: 135 mL  Total OUT: 135 mL    Total NET: 945 mL    --------------------------------------------------------------------------------------    EXAM  NEUROLOGY  Exam: Normal, no acute distress. Alert and oriented x3.  No focal deficits.    HEENT  Exam: Normocephalic, atraumatic.    RESPIRATORY  Exam: Lungs clear to auscultation, Normal expansion/effort.    CARDIOVASCULAR  Exam: S1, S2.  Irregular rate and rhythm.    GI/NUTRITION  Exam: Abdomen soft, Non-tender, Non-distended.    Current Diet:  Clear liquid diet    VASCULAR  Exam: Extremities warm, pink, well-perfused.     MUSCULOSKELETAL  Exam: All extremities moving spontaneously without limitations.     SKIN  Exam: Good skin turgor, no skin breakdown.    METABOLIC/FLUIDS/ELECTROLYTES  dextrose 5% + sodium chloride 0.45%. 1000 milliLiter(s) IV Continuous <Continuous>  multivitamin 1 Tablet(s) Oral daily  sodium chloride 0.9% lock flush 3 milliLiter(s) IV Push every 8 hours    HEMATOLOGIC  [x] VTE Prophylaxis: heparin  Infusion 1500 Unit(s)/Hr IV Continuous <Continuous>      TUBES / LINES / DRAINS  [x] Peripheral IV  [] Central Venous Line     	[] R	[] L	[] IJ	[] Fem	[] SC	Date Placed:   [] Arterial Line		[] R	[] L	[] Fem	[] Rad	[] Ax	Date Placed:   [] PICC		[] Midline		[] Mediport  [] Urinary Catheter		Date Placed:   [x] Necessity of urinary, arterial, and venous catheters discussed      LABS  --------------------------------------------------------------------------------------  CBC:                      8.8    8.85  )-----------( 248      ( 26 Apr 2019 20:04 )             29.2     CHEM:  142  |  112<H>  |  29<H>  ----------------------------<  111<H>  4.4   |  18<L>  |  1.53<H>    Ca    9.3      26 Apr 2019 03:00  Phos  2.8     04-26  Mg     2.1     04-26    -------------------------------------------------------------------------------------- SICU Daily Progress Note  =====================================================  Interval/Overnight Events: Patient DIgoxin loaded yesterday, metoprolol uptitrated. Started on heparin gtt. passed TOV      HPI:  Patient is a 63 year old female with a PMHx of right sided heart failure with preserved EF (EF 66% on TTE in 10/2017), severe pulmonary HTN, moderate-severe tricuspid regurgitation, COPD (on 2L NC at night), LINH (being arranged for bilevel support), AFib (on Eliquis), HTN, HLD, DM2 and gout who presented with generalized weakness x1 week.  She states that she suddenly felt weak leading to a fall while walking outdoors.  Patient states that she was "drained" after walking several feet and tried to hold on to a fence for support, but her legs gave out and she dropped to her knees.  Denies any head strike, loss of consciousness or residual pain.  Patient thinks that her weakness is mainly due to her poor appetite and PO intake.  She states that after being started on Trulicity ~1 year ago, she began having weekly episodes of nausea and vomiting with increasing loss of appetite, resulting in a ~50-lb weight loss.  Patient's PCP discontinued Trulicity in late February, however, patient continued to have nausea and vomiting.  Patient's appetite remained poor, worsened by a "metallic taste" in her mouth.  Due to the weakness and recent fall, patient saw her PCP and was instructed to stop her Metolazone and decrease her Torsemide dose from 60 to 40 mg daily.  Patient endorses that outpatient lab work from 3/23/19 revealed a BUN of 167 and Cr of 3.7.  Patient was re-started on Metolazone and Torsemide on 2/9/19 for fluid retention after she was discontinued on Spironolactone in early January.       PAST MEDICAL & SURGICAL HISTORY:  Gout  Chronic systolic right heart failure  LINH (obstructive sleep apnea)  CLL (chronic lymphocytic leukemia)  COPD (chronic obstructive pulmonary disease)  Diabetes  HTN (hypertension)  Atrial fibrillation  No significant past surgical history      ALLERGIES:  No known allergies      MEDICATIONS:   --------------------------------------------------------------------------------------  Neurologic Medications  HYDROmorphone  Injectable 0.25 milliGRAM(s) IV Push every 4 hours PRN Moderate Pain (4 - 6)  HYDROmorphone  Injectable 0.5 milliGRAM(s) IV Push every 4 hours PRN Severe Pain (7 - 10)    Respiratory Medications  ALBUTerol/ipratropium for Nebulization 3 milliLiter(s) Nebulizer every 6 hours PRN Shortness of Breath and/or Wheezing  buDESOnide  80 MICROgram(s)/formoterol 4.5 MICROgram(s) Inhaler 2 Puff(s) Inhalation two times a day  guaiFENesin  milliGRAM(s) Oral every 12 hours PRN Cough    Cardiovascular Medications  metoprolol tartrate 50 milliGRAM(s) Oral two times a day  sildenafil (REVATIO) 10 milliGRAM(s) Oral three times a day  torsemide 20 milliGRAM(s) Oral <User Schedule>    Gastrointestinal Medications  dextrose 5% + sodium chloride 0.45%. 1000 milliLiter(s) IV Continuous <Continuous>  multivitamin 1 Tablet(s) Oral daily  pantoprazole  Injectable 40 milliGRAM(s) IV Push every 12 hours  sodium chloride 0.9% lock flush 3 milliLiter(s) IV Push every 8 hours    Hematologic/Oncologic Medications  heparin  Infusion 1500 Unit(s)/Hr IV Continuous <Continuous>    Endocrine/Metabolic Medications  allopurinol 300 milliGRAM(s) Oral daily  dextrose 40% Gel 15 Gram(s) Oral once PRN Blood Glucose LESS THAN 70 milliGRAM(s)/deciliter  dextrose 50% Injectable 25 Gram(s) IV Push once  dextrose 50% Injectable 25 Gram(s) IV Push once  glucagon  Injectable 1 milliGRAM(s) IntraMuscular once PRN Glucose LESS THAN 70 milligrams/deciliter  insulin lispro (HumaLOG) corrective regimen sliding scale   SubCutaneous every 6 hours    Topical/Other Medications  chlorhexidine 4% Liquid 1 Application(s) Topical <User Schedule>    --------------------------------------------------------------------------------------    VITAL SIGNS  T(C): 37.7 (27 Apr 2019 00:00), Max: 37.9 (26 Apr 2019 20:00)  T(F): 99.9 (27 Apr 2019 00:00), Max: 100.2 (26 Apr 2019 20:00)  HR: 97 (27 Apr 2019 00:00) (97 - 158)  BP: 113/71 (27 Apr 2019 00:00) (88/51 - 134/84)  BP(mean): 80 (27 Apr 2019 00:00) (58 - 96)  RR: 22 (27 Apr 2019 00:00) (16 - 27)  SpO2: 97% (27 Apr 2019 00:00) (90% - 100%)  --------------------------------------------------------------------------------------    INS AND OUTS    25 Apr 2019 07:01  -  26 Apr 2019 07:00  --------------------------------------------------------  IN:    dextrose 5% + sodium chloride 0.45%.: 650 mL    IV PiggyBack: 350 mL    lactated ringers.: 825 mL  Total IN: 1825 mL    OUT:    Indwelling Catheter - Urethral: 1130 mL  Total OUT: 1130 mL    Total NET: 695 mL      26 Apr 2019 07:01  -  27 Apr 2019 00:53  --------------------------------------------------------  IN:    dextrose 5% + sodium chloride 0.45%.: 900 mL    heparin Infusion: 180 mL  Total IN: 1080 mL    OUT:    Indwelling Catheter - Urethral: 135 mL  Total OUT: 135 mL    Total NET: 945 mL    --------------------------------------------------------------------------------------    EXAM  NEUROLOGY  Exam: Normal, no acute distress. Alert and oriented x3.  No focal deficits.    HEENT  Exam: Normocephalic, atraumatic.    RESPIRATORY  Exam: Lungs clear to auscultation, Normal expansion/effort.    CARDIOVASCULAR  Exam: S1, S2.  Irregular rate and rhythm.    GI/NUTRITION  Exam: Abdomen soft, Non-tender, Non-distended.    Current Diet:  Clear liquid diet    VASCULAR  Exam: Extremities warm, pink, well-perfused.     MUSCULOSKELETAL  Exam: All extremities moving spontaneously without limitations.     SKIN  Exam: Good skin turgor, no skin breakdown.    METABOLIC/FLUIDS/ELECTROLYTES  dextrose 5% + sodium chloride 0.45%. 1000 milliLiter(s) IV Continuous <Continuous>  multivitamin 1 Tablet(s) Oral daily  sodium chloride 0.9% lock flush 3 milliLiter(s) IV Push every 8 hours    HEMATOLOGIC  [x] VTE Prophylaxis: heparin  Infusion 1500 Unit(s)/Hr IV Continuous <Continuous>      TUBES / LINES / DRAINS  [x] Peripheral IV  [] Central Venous Line     	[] R	[] L	[] IJ	[] Fem	[] SC	Date Placed:   [] Arterial Line		[] R	[] L	[] Fem	[] Rad	[] Ax	Date Placed:   [] PICC		[] Midline		[] Mediport  [] Urinary Catheter		Date Placed:   [x] Necessity of urinary, arterial, and venous catheters discussed      LABS  --------------------------------------------------------------------------------------  CBC:                      8.8    8.85  )-----------( 248      ( 26 Apr 2019 20:04 )             29.2     CHEM:  142  |  112<H>  |  29<H>  ----------------------------<  111<H>  4.4   |  18<L>  |  1.53<H>    Ca    9.3      26 Apr 2019 03:00  Phos  2.8     04-26  Mg     2.1     04-26    --------------------------------------------------------------------------------------

## 2019-04-27 NOTE — PROGRESS NOTE ADULT - ASSESSMENT
ASSESSMENT:  Patient is a 63 year old female with a PMHx of right sided heart failure with preserved EF (EF 66% on TTE in 10/2017), severe pulmonary HTN, moderate-severe tricuspid regurgitation, COPD (on 2L NC at night), LINH (being arranged for bilevel support), AFib (on Eliquis), HTN, HLD, DM2 and gout who presented with generalized weakness x1 week in the setting of recent occult positive stool, OSMAR and newly diagnosed influenza / entero virus infection S/P treatment with Tamiflu.  Found to have non-obstructing mass at the proximal sigmoid colon on colonoscopy.  She is now S/P diagnostic lap converted to ex-lap, partial left hemicolectomy and side to side stapled anastomosis (4/24/19).      PLAN:    Neurologic:   - Continue pain control regimen with IV Dilaudid PRN    Respiratory:   -Saturating well on 5L nasal cannula  - Maintain O2 saturation >92%  - Continue with Sildenafil for pulmonary HTN  - Continue with Duoneb PRN and Symbicort for COPD    Cardiovascular:   - Keep MAP>65  - Continue with Lopressor and heparin gtt for AFib  - Continue with Torsemide for CHF (user schedule)     Gastrointestinal/Nutrition:   - Currently on a clear liquid diet  - Continue with Protonix    Renal/Genitourinary:   - Corey catheter removed on 4/26/19 and patient passed trial of void  - Continue to monitor strict ins and outs q1 hour  - Continue D5 1/2 NS @50mL/hr    Hematologic:   - On full dose anticoagulation with heparin gtt    Infectious Disease:   - Currently afebrile with no leukocytosis  - Continue to monitor off antibiotics    Endocrine:   - Continue to monitor fingersticks  - Continue with insulin sliding scale    Tubes/Lines/Drains:   - Peripheral IV      Disposition: SICU ASSESSMENT:  Patient is a 63 year old female with a PMHx of right sided heart failure with preserved EF (EF 66% on TTE in 10/2017), severe pulmonary HTN, moderate-severe tricuspid regurgitation, COPD (on 2L NC at night), LINH (being arranged for bilevel support), AFib (on Eliquis), HTN, HLD, DM2 and gout who presented with generalized weakness x1 week in the setting of recent occult positive stool, OSMAR and newly diagnosed influenza / entero virus infection S/P treatment with Tamiflu.  Found to have non-obstructing mass at the proximal sigmoid colon on colonoscopy.  She is now S/P diagnostic lap converted to ex-lap, partial left hemicolectomy and side to side stapled anastomosis (4/24/19).      PLAN:    Neurologic:   - Continue pain control regimen with IV Dilaudid PRN. Tolerating PO intake- will change to Tylenol and PO Oxycodone.     Respiratory:   -Saturating well on 5L nasal cannula. wean off as only on 2L at night.   - Maintain O2 saturation >92%  - Continue with Sildenafil for pulmonary HTN  - Continue with Duoneb PRN and Symbicort for COPD  -bedside u/s for B-line    Cardiovascular:   - Keep MAP>65  - Continue with Lopressor and heparin gtt for AFib  - Continue with Torsemide for CHF (user schedule)     Gastrointestinal/Nutrition:   - Currently on a clear liquid diet  - Continue with Protonix    Renal/Genitourinary:   - Corey catheter removed on 4/26/19 and patient passed trial of void  - Continue to monitor strict ins and outs q1 hour  -IVL    Hematologic:   - On full dose anticoagulation with heparin gtt  -check PTT.  -will consider restarting eloquis tomorrow    Infectious Disease:   - Currently afebrile with no leukocytosis  - Continue to monitor off antibiotics    Endocrine:   - Continue to monitor fingersticks  - Continue with insulin sliding scale    Tubes/Lines/Drains:   - Peripheral IV      Disposition: SICU

## 2019-04-27 NOTE — PROGRESS NOTE ADULT - ASSESSMENT
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 and now s/p exlap, partial left hemicolectomy and side to side stapled anastomosis    - CKD: stage 3 with baseline Cr ~ 1.6  - OSMAR: likely due to pre-renal azotemia likely 2ry to transient hypotension - UOP is fluctuating  - Hypotension- patient in Afib with RVR   - HFpEF: right sided heart failure / cor pulm; no SOB; volume status acceptable  - anemia: in chronic disease + iron def + GIB + malignancy, hold LUIS   - Hypomagnesemia; improved    RECOMMEND:  - BMP, Mg, Phos labs  - cardiology input re: afib  - Continue torsemide 20 mg PO TTSun for now   - sildenafil per pulm  - dose meds for a CrCl ~30-35 mL/min    Stephanie Moody NP  [facility]  [phone]

## 2019-04-27 NOTE — PROGRESS NOTE ADULT - ASSESSMENT
62 yo woman with multiple medical comorbidities including afib, right-sided CHF with severe pulmonary HTN, COPD on nocturnal O2, LINH newly initiated on bilevel support found to have a non-obstructing sigmoid mass s/p left hemicolectomy (4/24/19), pt in SICU due to persistently uncontrolled a fib.     - Diet: CLD  - Monitor bowel function/abdominal exam  - Home meds  - DVT ppx: HepSubQ  - care per SICU    [facility]  [id number]

## 2019-04-27 NOTE — PROGRESS NOTE ADULT - SUBJECTIVE AND OBJECTIVE BOX
NEPHROLOGY - NSN    Patient seen and examined.    MEDICATIONS  (STANDING):  allopurinol 300 milliGRAM(s) Oral daily  buDESOnide  80 MICROgram(s)/formoterol 4.5 MICROgram(s) Inhaler 2 Puff(s) Inhalation two times a day  chlorhexidine 4% Liquid 1 Application(s) Topical <User Schedule>  dextrose 50% Injectable 25 Gram(s) IV Push once  dextrose 50% Injectable 25 Gram(s) IV Push once  digoxin     Tablet 0.125 milliGRAM(s) Oral daily  heparin  Infusion 1500 Unit(s)/Hr (11 mL/Hr) IV Continuous <Continuous>  insulin lispro (HumaLOG) corrective regimen sliding scale   SubCutaneous every 6 hours  metoprolol tartrate 50 milliGRAM(s) Oral two times a day  multivitamin 1 Tablet(s) Oral daily  pantoprazole  Injectable 40 milliGRAM(s) IV Push every 12 hours  sildenafil (REVATIO) 10 milliGRAM(s) Oral three times a day  sodium chloride 0.9% lock flush 3 milliLiter(s) IV Push every 8 hours  torsemide 20 milliGRAM(s) Oral <User Schedule>    VITALS:  T(C): , Max: 37.9 (04-26-19 @ 20:00)  T(F): , Max: 100.2 (04-26-19 @ 20:00)  HR: 93 (04-27-19 @ 16:00)  BP: 149/92 (04-27-19 @ 16:00)  BP(mean): 104 (04-27-19 @ 16:00)  RR: 19 (04-27-19 @ 16:00)  SpO2: 97% (04-27-19 @ 16:00)  Wt(kg): --  I and O's:    04-26 @ 07:01  -  04-27 @ 07:00  --------------------------------------------------------  IN: 1582 mL / OUT: 135 mL / NET: 1447 mL    04-27 @ 07:01  -  04-27 @ 18:16  --------------------------------------------------------  IN: 211 mL / OUT: 450 mL / NET: -239 mL          REVIEW OF SYSTEMS:  Full ROS done and were negative unless otherwise indicated in HPI/assessment.     PHYSICAL EXAM:  Constitutional: NAD  HEENT: PERRLA, EOMI,  MMM  Neck: No LAD, No JVD  Respiratory: CTAB  Cardiovascular: S1 and S2  Gastrointestinal: BS+, soft, NT/ND  Extremities: No peripheral edema  Neurological: A/O x 3, no focal deficits  Psychiatric: Normal mood, normal affect  : no dawson        LABS:                        9.0    8.71  )-----------( 206      ( 27 Apr 2019 10:46 )             29.4     04-27    140  |  109<H>  |  26<H>  ----------------------------<  138<H>  4.4   |  21<L>  |  1.60<H>    Ca    9.3      27 Apr 2019 02:45  Phos  2.4     04-27  Mg     2.1     04-27 NEPHROLOGY - NSN    Patient seen and examined. Pt seen laying in chair, reports feeling nauseous, pt in no acute distress.    MEDICATIONS  (STANDING):  allopurinol 300 milliGRAM(s) Oral daily  buDESOnide  80 MICROgram(s)/formoterol 4.5 MICROgram(s) Inhaler 2 Puff(s) Inhalation two times a day  chlorhexidine 4% Liquid 1 Application(s) Topical <User Schedule>  dextrose 50% Injectable 25 Gram(s) IV Push once  dextrose 50% Injectable 25 Gram(s) IV Push once  digoxin     Tablet 0.125 milliGRAM(s) Oral daily  heparin  Infusion 1500 Unit(s)/Hr (11 mL/Hr) IV Continuous <Continuous>  insulin lispro (HumaLOG) corrective regimen sliding scale   SubCutaneous every 6 hours  metoprolol tartrate 50 milliGRAM(s) Oral two times a day  multivitamin 1 Tablet(s) Oral daily  pantoprazole  Injectable 40 milliGRAM(s) IV Push every 12 hours  sildenafil (REVATIO) 10 milliGRAM(s) Oral three times a day  sodium chloride 0.9% lock flush 3 milliLiter(s) IV Push every 8 hours  torsemide 20 milliGRAM(s) Oral <User Schedule>    VITALS:  T(C): , Max: 37.9 (04-26-19 @ 20:00)  T(F): , Max: 100.2 (04-26-19 @ 20:00)  HR: 93 (04-27-19 @ 16:00)  BP: 149/92 (04-27-19 @ 16:00)  BP(mean): 104 (04-27-19 @ 16:00)  RR: 19 (04-27-19 @ 16:00)  SpO2: 97% (04-27-19 @ 16:00)    I and O's:    04-26 @ 07:01  -  04-27 @ 07:00  --------------------------------------------------------  IN: 1582 mL / OUT: 135 mL / NET: 1447 mL    04-27 @ 07:01  -  04-27 @ 18:16  --------------------------------------------------------  IN: 211 mL / OUT: 450 mL / NET: -239 mL      REVIEW OF SYSTEMS:  Full ROS done and were negative unless otherwise indicated in HPI/assessment.     PHYSICAL EXAM:  Constitutional: NAD  HEENT: PERRLA, EOMI,  MMM  Neck: No LAD, No JVD  Respiratory: CTAB  Cardiovascular: S1 and S2  Gastrointestinal: BS+, soft, NT/ND  Extremities: No peripheral edema  Neurological: A/O x 3, no focal deficits  Psychiatric: Normal mood, normal affect  : no dawson    LABS:                        9.0    8.71  )-----------( 206      ( 27 Apr 2019 10:46 )             29.4     04-27    140  |  109<H>  |  26<H>  ----------------------------<  138<H>  4.4   |  21<L>  |  1.60<H>    Ca    9.3      27 Apr 2019 02:45  Phos  2.4     04-27  Mg     2.1     04-27

## 2019-04-27 NOTE — PROGRESS NOTE ADULT - ATTENDING COMMENTS
63 year old female with a PMHx of right sided heart failure with preserved EF (EF 66% on TTE in 10/2017), severe pulmonary HTN, moderate-severe tricuspid regurgitation, COPD (on 2L NC at night), LINH (being arranged for bilevel support), AFib (on Eliquis), HTN, HLD, DM2 and gout who presented with generalized weakness x1 week in the setting of recent occult positive stool, OSMAR and newly diagnosed influenza / entero virus infection S/P treatment with Tamiflu.  Found to have non-obstructing mass at the proximal sigmoid colon on colonoscopy.  She is now S/P diagnostic lap converted to ex-lap, partial left hemicolectomy and side to side stapled anastomosis (4/24/19).      PLAN:    Neurologic:   - Continue pain control regimen with IV Dilaudid PRN. Tolerating PO intake- will change to Tylenol and PO Oxycodone.     Respiratory:   -Saturating well on 5L nasal cannula. wean off as only on 2L at night.   - Maintain O2 saturation >92%  - Continue with Sildenafil for pulmonary HTN  - Continue with Duoneb PRN and Symbicort for COPD  -bedside u/s for B-line    Cardiovascular:   - Keep MAP>65  - Continue with Lopressor and heparin gtt for AFib  - Continue with Torsemide for CHF (user schedule)     Gastrointestinal/Nutrition:   - Currently on a clear liquid diet  - Continue with Protonix    Renal/Genitourinary:   - Corey catheter removed on 4/26/19 and patient passed trial of void  - Continue to monitor strict ins and outs q1 hour  -IVL    Hematologic:   - On full dose anticoagulation with heparin gtt  -check PTT.  -will consider restarting eloquis tomorrow    Infectious Disease:   - Currently afebrile with no leukocytosis  - Continue to monitor off antibiotics    Endocrine:   - Continue to monitor fingersticks  - Continue with insulin sliding scale    Tubes/Lines/Drains:   - Peripheral IV      Disposition: SICU    CCDx. AFib, pulmonary HTN, CAD.  The patient is a critical care patient with life threatening hemodynamic and metabolic instability in SICU.  I have personally interviewed when possible and examined the patient, reviewed data and laboratory tests/x-rays and all pertinent electronic images.  I was physically present for the key portions of the evaluation and management (E/M) service provided.   The SICU team has a constant risk benefit analyzes discussion with the primary team, all consultants, House Staff and PA's on all decisions.  These diagnoses are unrelated to the surgical procedure noted above.  I meet with family if needed to get further history, discuss the case and make care decisions for this patient who might not be able to participate.  Time involved in performance of separately billable procedures was not counted toward my critical care time. There is no overlap.  I spent 55-75 minutes of critical care time for the diagnoses, assessment, plan and interventions.

## 2019-04-28 LAB
ANION GAP SERPL CALC-SCNC: 12 MMO/L — SIGNIFICANT CHANGE UP (ref 7–14)
APTT BLD: 69.3 SEC — HIGH (ref 27.5–36.3)
BUN SERPL-MCNC: 12 MG/DL — SIGNIFICANT CHANGE UP (ref 7–23)
CALCIUM SERPL-MCNC: 10.1 MG/DL — SIGNIFICANT CHANGE UP (ref 8.4–10.5)
CHLORIDE SERPL-SCNC: 107 MMOL/L — SIGNIFICANT CHANGE UP (ref 98–107)
CO2 SERPL-SCNC: 20 MMOL/L — LOW (ref 22–31)
CREAT SERPL-MCNC: 1.44 MG/DL — HIGH (ref 0.5–1.3)
GLUCOSE BLDC GLUCOMTR-MCNC: 114 MG/DL — HIGH (ref 70–99)
GLUCOSE BLDC GLUCOMTR-MCNC: 117 MG/DL — HIGH (ref 70–99)
GLUCOSE BLDC GLUCOMTR-MCNC: 128 MG/DL — HIGH (ref 70–99)
GLUCOSE BLDC GLUCOMTR-MCNC: 142 MG/DL — HIGH (ref 70–99)
GLUCOSE SERPL-MCNC: 125 MG/DL — HIGH (ref 70–99)
HCT VFR BLD CALC: 32.9 % — LOW (ref 34.5–45)
HGB BLD-MCNC: 10.1 G/DL — LOW (ref 11.5–15.5)
MAGNESIUM SERPL-MCNC: 2.4 MG/DL — SIGNIFICANT CHANGE UP (ref 1.6–2.6)
MCHC RBC-ENTMCNC: 21.4 PG — LOW (ref 27–34)
MCHC RBC-ENTMCNC: 30.7 % — LOW (ref 32–36)
MCV RBC AUTO: 69.6 FL — LOW (ref 80–100)
NRBC # FLD: 0.1 K/UL — SIGNIFICANT CHANGE UP (ref 0–0)
NRBC FLD-RTO: 1 — SIGNIFICANT CHANGE UP
PHOSPHATE SERPL-MCNC: 3.3 MG/DL — SIGNIFICANT CHANGE UP (ref 2.5–4.5)
PLATELET # BLD AUTO: 227 K/UL — SIGNIFICANT CHANGE UP (ref 150–400)
PMV BLD: SIGNIFICANT CHANGE UP FL (ref 7–13)
POTASSIUM SERPL-MCNC: 4.2 MMOL/L — SIGNIFICANT CHANGE UP (ref 3.5–5.3)
POTASSIUM SERPL-SCNC: 4.2 MMOL/L — SIGNIFICANT CHANGE UP (ref 3.5–5.3)
RBC # BLD: 4.73 M/UL — SIGNIFICANT CHANGE UP (ref 3.8–5.2)
RBC # FLD: 22.7 % — HIGH (ref 10.3–14.5)
SODIUM SERPL-SCNC: 139 MMOL/L — SIGNIFICANT CHANGE UP (ref 135–145)
WBC # BLD: 9.79 K/UL — SIGNIFICANT CHANGE UP (ref 3.8–10.5)
WBC # FLD AUTO: 9.79 K/UL — SIGNIFICANT CHANGE UP (ref 3.8–10.5)

## 2019-04-28 PROCEDURE — 99233 SBSQ HOSP IP/OBS HIGH 50: CPT

## 2019-04-28 RX ORDER — INSULIN LISPRO 100/ML
VIAL (ML) SUBCUTANEOUS
Qty: 0 | Refills: 0 | Status: DISCONTINUED | OUTPATIENT
Start: 2019-04-28 | End: 2019-05-03

## 2019-04-28 RX ORDER — ACETAMINOPHEN 500 MG
1000 TABLET ORAL ONCE
Qty: 0 | Refills: 0 | Status: COMPLETED | OUTPATIENT
Start: 2019-04-28 | End: 2019-04-28

## 2019-04-28 RX ORDER — OXYCODONE HYDROCHLORIDE 5 MG/1
5 TABLET ORAL EVERY 6 HOURS
Qty: 0 | Refills: 0 | Status: DISCONTINUED | OUTPATIENT
Start: 2019-04-28 | End: 2019-05-03

## 2019-04-28 RX ADMIN — CHLORHEXIDINE GLUCONATE 1 APPLICATION(S): 213 SOLUTION TOPICAL at 10:51

## 2019-04-28 RX ADMIN — Medication 300 MILLIGRAM(S): at 11:45

## 2019-04-28 RX ADMIN — BUDESONIDE AND FORMOTEROL FUMARATE DIHYDRATE 2 PUFF(S): 160; 4.5 AEROSOL RESPIRATORY (INHALATION) at 21:50

## 2019-04-28 RX ADMIN — Medication 0.12 MILLIGRAM(S): at 05:14

## 2019-04-28 RX ADMIN — Medication 20 MILLIGRAM(S): at 11:45

## 2019-04-28 RX ADMIN — Medication 10 MILLIGRAM(S): at 13:15

## 2019-04-28 RX ADMIN — SODIUM CHLORIDE 3 MILLILITER(S): 9 INJECTION INTRAMUSCULAR; INTRAVENOUS; SUBCUTANEOUS at 13:08

## 2019-04-28 RX ADMIN — Medication 10 MILLIGRAM(S): at 21:50

## 2019-04-28 RX ADMIN — BUDESONIDE AND FORMOTEROL FUMARATE DIHYDRATE 2 PUFF(S): 160; 4.5 AEROSOL RESPIRATORY (INHALATION) at 09:39

## 2019-04-28 RX ADMIN — PANTOPRAZOLE SODIUM 40 MILLIGRAM(S): 20 TABLET, DELAYED RELEASE ORAL at 05:15

## 2019-04-28 RX ADMIN — HEPARIN SODIUM 11 UNIT(S)/HR: 5000 INJECTION INTRAVENOUS; SUBCUTANEOUS at 17:47

## 2019-04-28 RX ADMIN — SODIUM CHLORIDE 3 MILLILITER(S): 9 INJECTION INTRAMUSCULAR; INTRAVENOUS; SUBCUTANEOUS at 21:19

## 2019-04-28 RX ADMIN — Medication 400 MILLIGRAM(S): at 02:58

## 2019-04-28 RX ADMIN — Medication 1000 MILLIGRAM(S): at 03:28

## 2019-04-28 RX ADMIN — Medication 50 MILLIGRAM(S): at 17:44

## 2019-04-28 RX ADMIN — Medication 50 MILLIGRAM(S): at 05:14

## 2019-04-28 RX ADMIN — PANTOPRAZOLE SODIUM 40 MILLIGRAM(S): 20 TABLET, DELAYED RELEASE ORAL at 17:43

## 2019-04-28 RX ADMIN — Medication 1 TABLET(S): at 11:45

## 2019-04-28 RX ADMIN — Medication 10 MILLIGRAM(S): at 05:14

## 2019-04-28 NOTE — PROGRESS NOTE ADULT - SUBJECTIVE AND OBJECTIVE BOX
SICU Daily Progress Note  =====================================================  Interval/Overnight Events:     ***    POD #          	SICU Day #    ***    HPI:  ((insert from previous note)) ***    PMH:   ***    Allergies: No Known Allergies      MEDICATIONS:   --------------------------------------------------------------------------------------  Neurologic Medications  oxyCODONE    IR 5 milliGRAM(s) Oral every 6 hours PRN Moderate Pain (4 - 6)    Respiratory Medications  ALBUTerol/ipratropium for Nebulization 3 milliLiter(s) Nebulizer every 6 hours PRN Shortness of Breath and/or Wheezing  buDESOnide  80 MICROgram(s)/formoterol 4.5 MICROgram(s) Inhaler 2 Puff(s) Inhalation two times a day  guaiFENesin  milliGRAM(s) Oral every 12 hours PRN Cough    Cardiovascular Medications  digoxin     Tablet 0.125 milliGRAM(s) Oral daily  metoprolol tartrate 50 milliGRAM(s) Oral two times a day  sildenafil (REVATIO) 10 milliGRAM(s) Oral three times a day  torsemide 20 milliGRAM(s) Oral <User Schedule>    Gastrointestinal Medications  multivitamin 1 Tablet(s) Oral daily  pantoprazole  Injectable 40 milliGRAM(s) IV Push every 12 hours  sodium chloride 0.9% lock flush 3 milliLiter(s) IV Push every 8 hours    Genitourinary Medications    Hematologic/Oncologic Medications  heparin  Infusion 1500 Unit(s)/Hr IV Continuous <Continuous>    Antimicrobial/Immunologic Medications    Endocrine/Metabolic Medications  allopurinol 300 milliGRAM(s) Oral daily  dextrose 40% Gel 15 Gram(s) Oral once PRN Blood Glucose LESS THAN 70 milliGRAM(s)/deciliter  dextrose 50% Injectable 25 Gram(s) IV Push once  dextrose 50% Injectable 25 Gram(s) IV Push once  glucagon  Injectable 1 milliGRAM(s) IntraMuscular once PRN Glucose LESS THAN 70 milligrams/deciliter  insulin lispro (HumaLOG) corrective regimen sliding scale   SubCutaneous every 6 hours    Topical/Other Medications  chlorhexidine 4% Liquid 1 Application(s) Topical <User Schedule>    --------------------------------------------------------------------------------------    VITAL SIGNS, INS/OUTS (last 24 hours):  --------------------------------------------------------------------------------------  ((Insert SICU Vitals/Is+Os here))***  --------------------------------------------------------------------------------------    EXAM  NEUROLOGY  RASS:   	GCS:    Exam: Normal, NAD, alert, oriented x3, no focal deficits. ***    HEENT  Exam: Normocephalic, atraumatic, EOMI.  ***    RESPIRATORY  Exam: Lungs clear to auscultation, Normal expansion/effort. ***  Mechanical Ventilation:     CARDIOVASCULAR  Exam: S1, S2.  Regular rate and rhythm.   ***    GI/NUTRITION  Exam: Abdomen soft, Non-tender, Non-distended.  ***  Wound:  ***  Current Diet:  NPO***    VASCULAR  Exam: Extremities warm, pink, well-perfused. ***    MUSCULOSKELETAL  Exam: All extremities moving spontaneously without limitations. ***    SKIN  Exam: Good skin turgor, no skin breakdown. ***    METABOLIC/FLUIDS/ELECTROLYTES  multivitamin 1 Tablet(s) Oral daily  sodium chloride 0.9% lock flush 3 milliLiter(s) IV Push every 8 hours      HEMATOLOGIC  [x] VTE Prophylaxis: heparin  Infusion 1500 Unit(s)/Hr IV Continuous <Continuous>    Transfusions:	[] PRBC	[] Platelets		[] FFP	[] Cryoprecipitate    INFECTIOUS DISEASE  Antimicrobials/Immunologic Medications:    Day #      of     ***    Tubes/Lines/Drains  ***  [x] Peripheral IV  [] Central Venous Line     	[] R	[] L	[] IJ	[] Fem	[] SC	Date Placed:   [] Arterial Line		[] R	[] L	[] Fem	[] Rad	[] Ax	Date Placed:   [] PICC		[] Midline		[] Mediport  [] Urinary Catheter		Date Placed:   [x] Necessity of urinary, arterial, and venous catheters discussed    LABS  --------------------------------------------------------------------------------------  ((Insert SICU Labs here))***  --------------------------------------------------------------------------------------    OTHER LABORATORY:     IMAGING STUDIES:   CXR:     ASSESSMENT:  63y Female    ((insert from previous))***    PLAN:   ***  Neurologic:   Respiratory:   Cardiovascular:   Gastrointestinal/Nutrition:   Renal/Genitourinary:   Hematologic:   Infectious Disease:   Tubes/Lines/Drains:   Endocrine:   Disposition:     --------------------------------------------------------------------------------------    Critical Care Diagnoses: SICU Daily Progress Note  =====================================================  Interval/Overnight Events: Therapeutic on hep gtt. Nausea from this morning has slightly improved    POD 4         SICU 5  HPI: 62 yo woman with multiple medical comorbidities including afib, Right sided heart failure with preserved EF (EF 66% on TTE in 10/2017), severe pulmonary HTN, COPD on nocturnal O2, LINH (arranged for new home bilevel support), HTN, HLD, DM2, lymphoma s/p RT and gout presenting with generalized weakness and anemia. Patient notes that she had positive Cologuard test (3/19/2019). Colonoscopy performed 4/12 indicated a fungating, non-obstructing mass at the proximal sigmoid colon s/p biopsy (4/12). Her last colonoscopy was approximately 10 years ago s/p polypectomy. Colorectal surgery consulted given colonoscopy findings. Patient denies melena, hematochezia, abdominal pain, nausea or vomiting. She has had intentional weight loss over the past year. Patient with a personal history of lymphoma s/p chemo/radiation (1997) and family history significant for two of her Aunt's having had breast cancer and cervical cancer, respectively. Patient can walk 1-2 blocks with her cane before being limited by shortness of breath. She can climb stairs. Pt had RHC on 4/5/19 and was started on Sildenafil. Pt with EGD/colonoscopy on 4/12/19 showing Polypoid mass in the proximal sigmoid colon    4/24/19 s/p colonoscopy diagnostic lap converted to exlap, partial left hemicolectomy, side to side stapled anastomosis. SICU consulted for hemodynamic monitoring post operatively.     Allergies: No Known Allergies    MEDICATIONS:   --------------------------------------------------------------------------------------  Neurologic Medications  oxyCODONE    IR 5 milliGRAM(s) Oral every 6 hours PRN Moderate Pain (4 - 6)    Respiratory Medications  ALBUTerol/ipratropium for Nebulization 3 milliLiter(s) Nebulizer every 6 hours PRN Shortness of Breath and/or Wheezing  buDESOnide  80 MICROgram(s)/formoterol 4.5 MICROgram(s) Inhaler 2 Puff(s) Inhalation two times a day  guaiFENesin  milliGRAM(s) Oral every 12 hours PRN Cough    Cardiovascular Medications  digoxin     Tablet 0.125 milliGRAM(s) Oral daily  metoprolol tartrate 50 milliGRAM(s) Oral two times a day  sildenafil (REVATIO) 10 milliGRAM(s) Oral three times a day  torsemide 20 milliGRAM(s) Oral <User Schedule>    Gastrointestinal Medications  multivitamin 1 Tablet(s) Oral daily  pantoprazole  Injectable 40 milliGRAM(s) IV Push every 12 hours  sodium chloride 0.9% lock flush 3 milliLiter(s) IV Push every 8 hours    Genitourinary Medications    Hematologic/Oncologic Medications  heparin  Infusion 1500 Unit(s)/Hr IV Continuous <Continuous>    Antimicrobial/Immunologic Medications    Endocrine/Metabolic Medications  allopurinol 300 milliGRAM(s) Oral daily  dextrose 40% Gel 15 Gram(s) Oral once PRN Blood Glucose LESS THAN 70 milliGRAM(s)/deciliter  dextrose 50% Injectable 25 Gram(s) IV Push once  dextrose 50% Injectable 25 Gram(s) IV Push once  glucagon  Injectable 1 milliGRAM(s) IntraMuscular once PRN Glucose LESS THAN 70 milligrams/deciliter  insulin lispro (HumaLOG) corrective regimen sliding scale   SubCutaneous every 6 hours    Topical/Other Medications  chlorhexidine 4% Liquid 1 Application(s) Topical <User Schedule>    --------------------------------------------------------------------------------------  ICU Vital Signs Last 24 Hrs  T(C): 36.4 (28 Apr 2019 00:00), Max: 37.3 (27 Apr 2019 04:00)  T(F): 97.5 (28 Apr 2019 00:00), Max: 99.2 (27 Apr 2019 04:00)  HR: 97 (28 Apr 2019 00:00) (78 - 98)  BP: 156/106 (28 Apr 2019 00:00) (109/68 - 156/106)  BP(mean): 115 (28 Apr 2019 00:00) (78 - 115)  ABP: --  ABP(mean): --  RR: 22 (28 Apr 2019 00:00) (16 - 25)  SpO2: 99% (28 Apr 2019 00:00) (93% - 100%)    --------------------------------------------------------------------------------------  I&O's Detail    26 Apr 2019 07:01  -  27 Apr 2019 07:00  --------------------------------------------------------  IN:    dextrose 5% + sodium chloride 0.45%.: 1200 mL    heparin Infusion: 262 mL    Oral Fluid: 120 mL  Total IN: 1582 mL    OUT:    Indwelling Catheter - Urethral: 135 mL  Total OUT: 135 mL    Total NET: 1447 mL      27 Apr 2019 07:01  -  28 Apr 2019 02:08  --------------------------------------------------------  IN:    dextrose 5% + sodium chloride 0.45%.: 100 mL    heparin Infusion: 199 mL  Total IN: 299 mL    OUT:    Voided: 650 mL  Total OUT: 650 mL    Total NET: -351 mL    --------------------------------------------------------------------------------------    EXAM  NEUROLOGY  Exam: Normal, NAD, alert, oriented x3, no focal deficits.     RESPIRATORY  Exam: Lungs clear to auscultation, Normal expansion/effort.     CARDIOVASCULAR  Exam: S1, S2.  Regular rate and rhythm.       GI/NUTRITION  Exam: Abdomen soft, mild tenderness, mild distension, staples at midline    Current Diet:  clears    METABOLIC/FLUIDS/ELECTROLYTES  multivitamin 1 Tablet(s) Oral daily  sodium chloride 0.9% lock flush 3 milliLiter(s) IV Push every 8 hours      HEMATOLOGIC  [x] VTE Prophylaxis: heparin  Infusion 1500 Unit(s)/Hr IV Continuous <Continuous>      INFECTIOUS DISEASE  Antimicrobials/Immunologic Medications:    Tubes/Lines/Drains    [x] Peripheral IV  [x] Necessity of urinary, arterial, and venous catheters discussed    LABS  --------------------------------------------------------------------------------------  CBC (04-27 @ 23:30)                              10.1<L>                         9.79    )----------------(  227        --    % Neutrophils, --    % Lymphocytes, ANC: --                                  32.9<L>  CBC (04-27 @ 10:46)                              9.0<L>                         8.71    )----------------(  206        --    % Neutrophils, --    % Lymphocytes, ANC: --                                  29.4<L>    BMP (04-27 @ 02:45)             140     |  109<H>  |  26<H> 		Ca++ --      Ca 9.3                ---------------------------------( 138<H>		Mg 2.1                4.4     |  21<L>   |  1.60<H>			Ph 2.4<L>      Coags (04-27 @ 23:30)  aPTT 69.3<H> / INR -- / PT --  Coags (04-27 @ 18:15)  aPTT 67.0<H> / INR -- / PT --      --------------------------------------------------------------------------------------  Assessment	  62 yo woman with multiple comorbidities, including history of lymphoma, R-sided heart failure with preserved EF (EF 66% on TTE in 10/2017), severe pulm HTN, mod-severe TR, reported COPD but denies smoking? (on 2L NC at night), LINH (being arranged for bilevel support), A-Fib (on Eliquis), HTN, HLD, DM2, and gout presents with generalized weakness for the past 1 week in the setting of recent occult positive stool, OSMAR and newly diagnosed influenza/entero virus infection s/p treatment with Tamiflu, found to have non-obstructing mass at the proximal sigmoid colon s/p  s/p colonoscopy diagnostic lap converted to exlap, partial left hemicolectomy, side to side stapled anastomosis (4/24/19)      PLAN:    Neurologic:   - pain control PRN with oxycodone IR    Respiratory:   - Saturating well on room air. Keep O2>92%, 2L NC while asleep, CPAP if needed  - IS  - continue sildenafil for PHTN  - duoneb PRN; Symbicort for COPD    Cardiovascular:   - keep MAP>65  - Afib- c/w metroprolol  - torsemide (next dose due 4/29/19 @9:00)    Gastrointestinal/Nutrition:   - on clears  - protonix, zofran    Renal/Genitourinary:   - strict I/Os  - voiding  - IV lock    Hematologic:   -H/H stable  -dvt ppx HSQ, SCDs    Infectious Disease:   -afebrile, off Abx    Tubes/Lines/Drains: PIV    Endocrine: monitor glucose, ISS    Disposition: SICU  --------------------------------------------------------------------------------------    \

## 2019-04-28 NOTE — PROGRESS NOTE ADULT - SUBJECTIVE AND OBJECTIVE BOX
GENERAL SURGERY DAILY PROGRESS NOTE:     Subjective:  Pt seen and examined. No acute events overnight. Patient had episode of nausea and vomiting with CLD yesterday. Denies flatus/BM.       Objective:  Vital Signs Last 24 Hrs  T(C): 36.4 (28 Apr 2019 00:00), Max: 37.3 (27 Apr 2019 04:00)  T(F): 97.5 (28 Apr 2019 00:00), Max: 99.2 (27 Apr 2019 04:00)  HR: 97 (28 Apr 2019 00:00) (78 - 98)  BP: 156/106 (28 Apr 2019 00:00) (105/70 - 156/106)  BP(mean): 115 (28 Apr 2019 00:00) (77 - 115)  RR: 22 (28 Apr 2019 00:00) (16 - 25)  SpO2: 99% (28 Apr 2019 00:00) (93% - 100%)    I&O's Detail    26 Apr 2019 07:01  -  27 Apr 2019 07:00  --------------------------------------------------------  IN:    dextrose 5% + sodium chloride 0.45%.: 1200 mL    heparin Infusion: 262 mL    Oral Fluid: 120 mL  Total IN: 1582 mL    OUT:    Indwelling Catheter - Urethral: 135 mL  Total OUT: 135 mL    Total NET: 1447 mL      27 Apr 2019 07:01  -  28 Apr 2019 00:59  --------------------------------------------------------  IN:    dextrose 5% + sodium chloride 0.45%.: 100 mL    heparin Infusion: 199 mL  Total IN: 299 mL    OUT:    Voided: 650 mL  Total OUT: 650 mL    Total NET: -351 mL          MEDICATIONS  (STANDING):  allopurinol 300 milliGRAM(s) Oral daily  buDESOnide  80 MICROgram(s)/formoterol 4.5 MICROgram(s) Inhaler 2 Puff(s) Inhalation two times a day  chlorhexidine 4% Liquid 1 Application(s) Topical <User Schedule>  dextrose 50% Injectable 25 Gram(s) IV Push once  dextrose 50% Injectable 25 Gram(s) IV Push once  digoxin     Tablet 0.125 milliGRAM(s) Oral daily  heparin  Infusion 1500 Unit(s)/Hr (11 mL/Hr) IV Continuous <Continuous>  insulin lispro (HumaLOG) corrective regimen sliding scale   SubCutaneous every 6 hours  metoprolol tartrate 50 milliGRAM(s) Oral two times a day  multivitamin 1 Tablet(s) Oral daily  pantoprazole  Injectable 40 milliGRAM(s) IV Push every 12 hours  sildenafil (REVATIO) 10 milliGRAM(s) Oral three times a day  sodium chloride 0.9% lock flush 3 milliLiter(s) IV Push every 8 hours  torsemide 20 milliGRAM(s) Oral <User Schedule>    MEDICATIONS  (PRN):  ALBUTerol/ipratropium for Nebulization 3 milliLiter(s) Nebulizer every 6 hours PRN Shortness of Breath and/or Wheezing  dextrose 40% Gel 15 Gram(s) Oral once PRN Blood Glucose LESS THAN 70 milliGRAM(s)/deciliter  glucagon  Injectable 1 milliGRAM(s) IntraMuscular once PRN Glucose LESS THAN 70 milligrams/deciliter  guaiFENesin  milliGRAM(s) Oral every 12 hours PRN Cough  oxyCODONE    IR 5 milliGRAM(s) Oral every 6 hours PRN Moderate Pain (4 - 6)                            10.1   9.79  )-----------( 227      ( 27 Apr 2019 23:30 )             32.9       04-27    140  |  109<H>  |  26<H>  ----------------------------<  138<H>  4.4   |  21<L>  |  1.60<H>    Ca    9.3      27 Apr 2019 02:45  Phos  2.4     04-27  Mg     2.1     04-27        PHYSICAL EXAM  General: NAD, resting comfortably in bed  Pulmonary: Nonlabored breathing, no respiratory distress  Abdominal: soft, ND, appropriately tender to palpation, no RT, no guarding/rebound, incisions/dressing clean, dry, intact

## 2019-04-28 NOTE — PROGRESS NOTE ADULT - ASSESSMENT
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 and now s/p exlap, partial left hemicolectomy and side to side stapled anastomosis    - CKD: stage 3 with baseline Cr ~ 1.6; trending down  - OSMAR: likely due to pre-renal azotemia likely 2ry to transient hypotension - UOP is fluctuating   - Hypotension- patient in Afib with RVR; BP stable   - HFpEF: right sided heart failure / cor pulm; no SOB; volume status acceptable  - anemia: in chronic disease + iron def + GIB + malignancy, hold LUIS   - Hypomagnesemia; improved    RECOMMEND:  - BMP, Mg, Phos labs  - Continue torsemide 20 mg PO TTSun for now   - cardiology input re: afib  - sildenafil per pulm  - dose meds for a CrCl ~30-35 mL/min    Stephanie Moody NP  [facility]  [phone]

## 2019-04-28 NOTE — PROGRESS NOTE ADULT - ATTENDING COMMENTS
62 yo woman with multiple comorbidities, including history of lymphoma, R-sided heart failure with preserved EF (EF 66% on TTE in 10/2017), severe pulm HTN, mod-severe TR, reported COPD but denies smoking? (on 2L NC at night), LINH (being arranged for bilevel support), A-Fib (on Eliquis), HTN, HLD, DM2, and gout presents with generalized weakness for the past 1 week in the setting of recent occult positive stool, OSMAR and newly diagnosed influenza/entero virus infection s/p treatment with Tamiflu, found to have non-obstructing mass at the proximal sigmoid colon s/p  s/p colonoscopy diagnostic lap converted to exlap, partial left hemicolectomy, side to side stapled anastomosis (4/24/19)      PLAN:    Neurologic:   - pain control PRN with oxycodone IR    Respiratory:   - Saturating well on room air. Keep O2>92%, 2L NC while asleep, CPAP if needed  - IS  - continue sildenafil for PHTN  - duoneb PRN; Symbicort for COPD    Cardiovascular:   - keep MAP>65  - Afib- c/w metroprolol  - torsemide (next dose due 4/29/19 @9:00)    Gastrointestinal/Nutrition:   - on clears  - protonix, zofran    Renal/Genitourinary:   - strict I/Os  - voiding  - IV lock    Hematologic:   -H/H stable  -dvt ppx HSQ, SCDs    Infectious Disease:   -afebrile, off Abx    Tubes/Lines/Drains: PIV    Endocrine: monitor glucose, ISS    Disposition: D/C floor.  CCDX. severe calorie protiein malnutrition.  Severe protein calorie malnutrition in acute illness/ injury; secondary to poor appetite, weight loss >5% in 1 month and/or >7.5% in 3 months, caloric Intake <50% of nutrition needs >= 5 days, temporal wasting, severe muscle and tenar hypotrophy and loss of body fat stores and loss of muscle mass.  The patient is not a critical care patient with no life threatening hemodynamic and metabolic instability in SICU.  I have personally interviewed when possible and examined the patient, reviewed data and laboratory tests/x-rays and all pertinent electronic images.  I was physically present for the key portions of the evaluation and management (E/M) service provided.   The SICU team has a constant risk benefit analyzes discussion with the primary team, all consultants, House Staff and PA's on all decisions.  These diagnoses are unrelated to the surgical procedure noted above.  I meet with family if needed to get further history, discuss the case and make care decisions for this patient who might not be able to participate.  Time involved in performance of separately billable procedures was not counted toward my critical care time. There is no overlap.  I spent 55-75 minutes of critical care time for the diagnoses, assessment, plan and interventions.

## 2019-04-28 NOTE — CHART NOTE - NSCHARTNOTEFT_GEN_A_CORE
GENERAL SURGERY FLOOR TRANSFER NOTE    63y Female transferred to floor from SICU    SUBJECTIVE:  Transferred to floor from SICU in stable condition. No acute events since transfer. Pt reports some pain and nausea. Reports she is having diarrhea. Tolerating some clears, however still feeling some mild nausea.     OBJECTIVE:    T(C): 36.3 (04-28-19 @ 15:21), Max: 37.1 (04-28-19 @ 04:00)  HR: 94 (04-28-19 @ 15:21) (87 - 100)  BP: 124/92 (04-28-19 @ 15:21) (123/89 - 163/110)  RR: 20 (04-28-19 @ 15:21) (18 - 25)  SpO2: 98% (04-28-19 @ 15:21) (93% - 100%)  Wt(kg): --      I&O's Summary    27 Apr 2019 07:01  -  28 Apr 2019 07:00  --------------------------------------------------------  IN: 476 mL / OUT: 1100 mL / NET: -624 mL    28 Apr 2019 07:01  -  28 Apr 2019 15:26  --------------------------------------------------------  IN: 486 mL / OUT: 0 mL / NET: 486 mL                              10.1   9.79  )-----------( 227      ( 27 Apr 2019 23:30 )             32.9       04-28    139  |  107  |  12  ----------------------------<  125<H>  4.2   |  20<L>  |  1.44<H>    Ca    10.1      28 Apr 2019 02:48  Phos  3.3     04-28  Mg     2.4     04-28        MEDICATIONS  (STANDING):  allopurinol 300 milliGRAM(s) Oral daily  buDESOnide  80 MICROgram(s)/formoterol 4.5 MICROgram(s) Inhaler 2 Puff(s) Inhalation two times a day  chlorhexidine 4% Liquid 1 Application(s) Topical <User Schedule>  dextrose 50% Injectable 25 Gram(s) IV Push once  dextrose 50% Injectable 25 Gram(s) IV Push once  digoxin     Tablet 0.125 milliGRAM(s) Oral daily  heparin  Infusion 1500 Unit(s)/Hr (11 mL/Hr) IV Continuous <Continuous>  insulin lispro (HumaLOG) corrective regimen sliding scale   SubCutaneous every 6 hours  metoprolol tartrate 50 milliGRAM(s) Oral two times a day  multivitamin 1 Tablet(s) Oral daily  pantoprazole  Injectable 40 milliGRAM(s) IV Push every 12 hours  sildenafil (REVATIO) 10 milliGRAM(s) Oral three times a day  sodium chloride 0.9% lock flush 3 milliLiter(s) IV Push every 8 hours  torsemide 20 milliGRAM(s) Oral <User Schedule>    MEDICATIONS  (PRN):  ALBUTerol/ipratropium for Nebulization 3 milliLiter(s) Nebulizer every 6 hours PRN Shortness of Breath and/or Wheezing  dextrose 40% Gel 15 Gram(s) Oral once PRN Blood Glucose LESS THAN 70 milliGRAM(s)/deciliter  glucagon  Injectable 1 milliGRAM(s) IntraMuscular once PRN Glucose LESS THAN 70 milligrams/deciliter  guaiFENesin  milliGRAM(s) Oral every 12 hours PRN Cough  oxyCODONE    IR 5 milliGRAM(s) Oral every 6 hours PRN Moderate Pain (4 - 6)        PHYSICAL EXAM:  General: NAD, resting comfortably in bed  Pulmonary: Nonlabored breathing, no respiratory distress  Abdominal: soft, ND, appropriately tender to palpation, no RT, no guarding/rebound, incisions/dressing clean, dry, intact      64 yo woman with multiple medical comorbidities including afib, right-sided CHF with severe pulmonary HTN, COPD on nocturnal O2, LINH newly initiated on bilevel support found to have a non-obstructing sigmoid mass s/p left hemicolectomy (4/24/19), pt in SICU due to persistently uncontrolled a fib, rate and pressures improved, now transferred to floor.      - Diet: CLD - If continues to be nausea/vomiting, back down diet as needed, can advance if tolerating  - Monitor bowel function/abdominal exam  - EKG/Digoxin level on Thursday am  - DVT ppx: HepSubQ    A Team Surgery  [id number] GENERAL SURGERY FLOOR TRANSFER NOTE    63y Female transferred to floor from SICU    SUBJECTIVE:  Transferred to floor from SICU in stable condition. No acute events since transfer. Pt reports some pain and nausea. Reports she is having diarrhea. Tolerating some clears, however still feeling some mild nausea.     OBJECTIVE:    T(C): 36.3 (04-28-19 @ 15:21), Max: 37.1 (04-28-19 @ 04:00)  HR: 94 (04-28-19 @ 15:21) (87 - 100)  BP: 124/92 (04-28-19 @ 15:21) (123/89 - 163/110)  RR: 20 (04-28-19 @ 15:21) (18 - 25)  SpO2: 98% (04-28-19 @ 15:21) (93% - 100%)  Wt(kg): --      I&O's Summary    27 Apr 2019 07:01  -  28 Apr 2019 07:00  --------------------------------------------------------  IN: 476 mL / OUT: 1100 mL / NET: -624 mL    28 Apr 2019 07:01  -  28 Apr 2019 15:26  --------------------------------------------------------  IN: 486 mL / OUT: 0 mL / NET: 486 mL                              10.1   9.79  )-----------( 227      ( 27 Apr 2019 23:30 )             32.9       04-28    139  |  107  |  12  ----------------------------<  125<H>  4.2   |  20<L>  |  1.44<H>    Ca    10.1      28 Apr 2019 02:48  Phos  3.3     04-28  Mg     2.4     04-28        MEDICATIONS  (STANDING):  allopurinol 300 milliGRAM(s) Oral daily  buDESOnide  80 MICROgram(s)/formoterol 4.5 MICROgram(s) Inhaler 2 Puff(s) Inhalation two times a day  chlorhexidine 4% Liquid 1 Application(s) Topical <User Schedule>  dextrose 50% Injectable 25 Gram(s) IV Push once  dextrose 50% Injectable 25 Gram(s) IV Push once  digoxin     Tablet 0.125 milliGRAM(s) Oral daily  heparin  Infusion 1500 Unit(s)/Hr (11 mL/Hr) IV Continuous <Continuous>  insulin lispro (HumaLOG) corrective regimen sliding scale   SubCutaneous every 6 hours  metoprolol tartrate 50 milliGRAM(s) Oral two times a day  multivitamin 1 Tablet(s) Oral daily  pantoprazole  Injectable 40 milliGRAM(s) IV Push every 12 hours  sildenafil (REVATIO) 10 milliGRAM(s) Oral three times a day  sodium chloride 0.9% lock flush 3 milliLiter(s) IV Push every 8 hours  torsemide 20 milliGRAM(s) Oral <User Schedule>    MEDICATIONS  (PRN):  ALBUTerol/ipratropium for Nebulization 3 milliLiter(s) Nebulizer every 6 hours PRN Shortness of Breath and/or Wheezing  dextrose 40% Gel 15 Gram(s) Oral once PRN Blood Glucose LESS THAN 70 milliGRAM(s)/deciliter  glucagon  Injectable 1 milliGRAM(s) IntraMuscular once PRN Glucose LESS THAN 70 milligrams/deciliter  guaiFENesin  milliGRAM(s) Oral every 12 hours PRN Cough  oxyCODONE    IR 5 milliGRAM(s) Oral every 6 hours PRN Moderate Pain (4 - 6)        PHYSICAL EXAM:  General: NAD, resting comfortably in bed  Pulmonary: Nonlabored breathing, no respiratory distress  Abdominal: soft, ND, appropriately tender to palpation, no RT, no guarding/rebound, incisions/dressing clean, dry, intact      64 yo woman with multiple medical comorbidities including afib, right-sided CHF with severe pulmonary HTN, COPD on nocturnal O2, LINH newly initiated on bilevel support found to have a non-obstructing sigmoid mass s/p left hemicolectomy (4/24/19), pt in SICU due to persistently uncontrolled a fib, rate and pressures improved, now transferred to floor.      - Diet: CLD - If continues to be nausea/vomiting, back down diet as needed, can advance if tolerating  - Monitor bowel function/abdominal exam  - EKG/Digoxin level on Thursday am  - hep gtt  - DVT ppx: HepSubQ    A Team Surgery  [id number]

## 2019-04-28 NOTE — PROGRESS NOTE ADULT - SUBJECTIVE AND OBJECTIVE BOX
NEPHROLOGY - NSN    Patient seen and examined. Pt transferred from SICU to floor, reports feeling better, still some complaints of nausea, in no acute distress.    MEDICATIONS  (STANDING):  allopurinol 300 milliGRAM(s) Oral daily  buDESOnide  80 MICROgram(s)/formoterol 4.5 MICROgram(s) Inhaler 2 Puff(s) Inhalation two times a day  dextrose 50% Injectable 25 Gram(s) IV Push once  dextrose 50% Injectable 25 Gram(s) IV Push once  digoxin     Tablet 0.125 milliGRAM(s) Oral daily  heparin  Infusion 1500 Unit(s)/Hr (11 mL/Hr) IV Continuous <Continuous>  insulin lispro (HumaLOG) corrective regimen sliding scale   SubCutaneous every 6 hours  metoprolol tartrate 50 milliGRAM(s) Oral two times a day  multivitamin 1 Tablet(s) Oral daily  pantoprazole  Injectable 40 milliGRAM(s) IV Push every 12 hours  sildenafil (REVATIO) 10 milliGRAM(s) Oral three times a day  sodium chloride 0.9% lock flush 3 milliLiter(s) IV Push every 8 hours  torsemide 20 milliGRAM(s) Oral <User Schedule>    VITALS:  T(C): , Max: 37.1 (04-28-19 @ 04:00)  T(F): , Max: 98.7 (04-28-19 @ 04:00)  HR: 94 (04-28-19 @ 15:21)  BP: 124/92 (04-28-19 @ 15:21)  BP(mean): 105 (04-28-19 @ 12:00)  RR: 20 (04-28-19 @ 15:21)  SpO2: 98% (04-28-19 @ 15:21)    I and O's:    04-27 @ 07:01  -  04-28 @ 07:00  --------------------------------------------------------  IN: 476 mL / OUT: 1100 mL / NET: -624 mL    04-28 @ 07:01  -  04-28 @ 16:10  --------------------------------------------------------  IN: 486 mL / OUT: 0 mL / NET: 486 mL    REVIEW OF SYSTEMS:  Full ROS done and were negative unless otherwise indicated in HPI/assessment.     PHYSICAL EXAM:  PHYSICAL EXAM:  Constitutional: NAD  HEENT: PERRLA, EOMI,  MMM  Neck: No LAD, No JVD  Respiratory: CTAB  Cardiovascular: S1 and S2  Gastrointestinal: BS+, soft, NT/ND  Extremities: No peripheral edema  Neurological: A/O x 3, no focal deficits  Psychiatric: Normal mood, normal affect  : no dawson    LABS:                        10.1   9.79  )-----------( 227      ( 27 Apr 2019 23:30 )             32.9     04-28    139  |  107  |  12  ----------------------------<  125<H>  4.2   |  20<L>  |  1.44<H>    Ca    10.1      28 Apr 2019 02:48  Phos  3.3     04-28  Mg     2.4     04-28        Urine Studies:        RADIOLOGY & ADDITIONAL STUDIES:      ASSESSMENT/RECOMMEND    Christina Lyn NP  [facility],   [phone]

## 2019-04-28 NOTE — PROGRESS NOTE ADULT - ASSESSMENT
64 yo woman with multiple medical comorbidities including afib, right-sided CHF with severe pulmonary HTN, COPD on nocturnal O2, LINH newly initiated on bilevel support found to have a non-obstructing sigmoid mass s/p left hemicolectomy (4/24/19), pt in SICU due to persistently uncontrolled a fib.     - Diet: CLD - If continues to be nausea/vomiting, back down diet as needed  - Monitor bowel function/abdominal exam  - DVT ppx: HepSubQ  - Continue excellent care per SICU    A Team Surgery  [id number]

## 2019-04-28 NOTE — PROGRESS NOTE ADULT - SUBJECTIVE AND OBJECTIVE BOX
Patient is a 63y old  Female who presents with a chief complaint of Generalized weakness (28 Apr 2019 01:50)      SUBJECTIVE / OVERNIGHT EVENTS:  Seen in SICU  S/p surgery a few days ago. Still has nausea and diarrhea. Pain on movement +  Review of Systems:   CONSTITUTIONAL: No fever, weight loss, or fatigue  EYES: No eye pain, visual disturbances, or discharge  ENMT:  No difficulty hearing, tinnitus, vertigo; No sinus or throat pain  NECK: No pain or stiffness  BREASTS: No pain, masses, or nipple discharge  RESPIRATORY: No cough, wheezing, chills or hemoptysis; No shortness of breath  CARDIOVASCULAR: No chest pain, palpitations, dizziness, or leg swelling  GASTROINTESTINAL: No abdominal or epigastric pain. No vomiting, or hematemesis; No melena or hematochezia.  GENITOURINARY: No dysuria, frequency, hematuria, or incontinence  NEUROLOGICAL: No headaches, memory loss, loss of strength, numbness, or tremors  SKIN: No itching, burning, rashes, or lesions   LYMPH NODES: No enlarged glands  ENDOCRINE: No heat or cold intolerance; No hair loss  MUSCULOSKELETAL: No joint pain or swelling; No muscle, back, or extremity pain  PSYCHIATRIC: No depression, anxiety, mood swings, or difficulty sleeping  HEME/LYMPH: No easy bruising, or bleeding gums  ALLERY AND IMMUNOLOGIC: No hives or eczema    MEDICATIONS  (STANDING):  allopurinol 300 milliGRAM(s) Oral daily  buDESOnide  80 MICROgram(s)/formoterol 4.5 MICROgram(s) Inhaler 2 Puff(s) Inhalation two times a day  chlorhexidine 4% Liquid 1 Application(s) Topical <User Schedule>  dextrose 50% Injectable 25 Gram(s) IV Push once  dextrose 50% Injectable 25 Gram(s) IV Push once  digoxin     Tablet 0.125 milliGRAM(s) Oral daily  heparin  Infusion 1500 Unit(s)/Hr (11 mL/Hr) IV Continuous <Continuous>  insulin lispro (HumaLOG) corrective regimen sliding scale   SubCutaneous every 6 hours  metoprolol tartrate 50 milliGRAM(s) Oral two times a day  multivitamin 1 Tablet(s) Oral daily  pantoprazole  Injectable 40 milliGRAM(s) IV Push every 12 hours  sildenafil (REVATIO) 10 milliGRAM(s) Oral three times a day  sodium chloride 0.9% lock flush 3 milliLiter(s) IV Push every 8 hours  torsemide 20 milliGRAM(s) Oral <User Schedule>    MEDICATIONS  (PRN):  ALBUTerol/ipratropium for Nebulization 3 milliLiter(s) Nebulizer every 6 hours PRN Shortness of Breath and/or Wheezing  dextrose 40% Gel 15 Gram(s) Oral once PRN Blood Glucose LESS THAN 70 milliGRAM(s)/deciliter  glucagon  Injectable 1 milliGRAM(s) IntraMuscular once PRN Glucose LESS THAN 70 milligrams/deciliter  guaiFENesin  milliGRAM(s) Oral every 12 hours PRN Cough  oxyCODONE    IR 5 milliGRAM(s) Oral every 6 hours PRN Moderate Pain (4 - 6)      PHYSICAL EXAM:  Vital Signs Last 24 Hrs  T(C): 36.7 (28 Apr 2019 12:00), Max: 37.1 (28 Apr 2019 04:00)  T(F): 98 (28 Apr 2019 12:00), Max: 98.7 (28 Apr 2019 04:00)  HR: 92 (28 Apr 2019 12:00) (87 - 100)  BP: 139/97 (28 Apr 2019 12:00) (123/89 - 163/110)  BP(mean): 105 (28 Apr 2019 12:00) (91 - 122)  RR: 23 (28 Apr 2019 12:00) (18 - 25)  SpO2: 100% (28 Apr 2019 12:00) (93% - 100%)  I&O's Summary    27 Apr 2019 07:01  -  28 Apr 2019 07:00  --------------------------------------------------------  IN: 476 mL / OUT: 1100 mL / NET: -624 mL    28 Apr 2019 07:01  -  28 Apr 2019 14:35  --------------------------------------------------------  IN: 486 mL / OUT: 0 mL / NET: 486 mL      GENERAL: NAD, well-developed  HEAD:  Atraumatic, Normocephalic  EYES: EOMI, PERRLA, conjunctiva and sclera clear  NECK: Supple, No JVD  CHEST/LUNG: Clear to auscultation bilaterally; No wheeze  HEART: Regular rate and rhythm; No murmurs, rubs, or gallops  ABDOMEN: Soft, Nontender, Nondistended; Bowel sounds present  EXTREMITIES:  2+ Peripheral Pulses, No clubbing, cyanosis, or edema  PSYCH: AAOx3  NEUROLOGY: non-focal  SKIN: No rashes or lesions    LABS:  CAPILLARY BLOOD GLUCOSE      POCT Blood Glucose.: 114 mg/dL (28 Apr 2019 11:41)  POCT Blood Glucose.: 117 mg/dL (28 Apr 2019 05:50)  POCT Blood Glucose.: 162 mg/dL (27 Apr 2019 23:32)  POCT Blood Glucose.: 135 mg/dL (27 Apr 2019 18:20)                          10.1   9.79  )-----------( 227      ( 27 Apr 2019 23:30 )             32.9     04-28    139  |  107  |  12  ----------------------------<  125<H>  4.2   |  20<L>  |  1.44<H>    Ca    10.1      28 Apr 2019 02:48  Phos  3.3     04-28  Mg     2.4     04-28      PTT - ( 27 Apr 2019 23:30 )  PTT:69.3 SEC          RADIOLOGY & ADDITIONAL TESTS:    Imaging Personally Reviewed:    Consultant(s) Notes Reviewed:      Care Discussed with Consultants/Other Providers:

## 2019-04-29 DIAGNOSIS — I95.9 HYPOTENSION, UNSPECIFIED: ICD-10-CM

## 2019-04-29 LAB
ANION GAP SERPL CALC-SCNC: 13 MMO/L — SIGNIFICANT CHANGE UP (ref 7–14)
APTT BLD: 52.3 SEC — HIGH (ref 27.5–36.3)
APTT BLD: 64.8 SEC — HIGH (ref 27.5–36.3)
BUN SERPL-MCNC: 28 MG/DL — HIGH (ref 7–23)
CALCIUM SERPL-MCNC: 9.7 MG/DL — SIGNIFICANT CHANGE UP (ref 8.4–10.5)
CHLORIDE SERPL-SCNC: 106 MMOL/L — SIGNIFICANT CHANGE UP (ref 98–107)
CO2 SERPL-SCNC: 19 MMOL/L — LOW (ref 22–31)
CREAT SERPL-MCNC: 1.83 MG/DL — HIGH (ref 0.5–1.3)
GLUCOSE BLDC GLUCOMTR-MCNC: 111 MG/DL — HIGH (ref 70–99)
GLUCOSE BLDC GLUCOMTR-MCNC: 128 MG/DL — HIGH (ref 70–99)
GLUCOSE BLDC GLUCOMTR-MCNC: 75 MG/DL — SIGNIFICANT CHANGE UP (ref 70–99)
GLUCOSE BLDC GLUCOMTR-MCNC: 78 MG/DL — SIGNIFICANT CHANGE UP (ref 70–99)
GLUCOSE BLDC GLUCOMTR-MCNC: 94 MG/DL — SIGNIFICANT CHANGE UP (ref 70–99)
GLUCOSE SERPL-MCNC: 111 MG/DL — HIGH (ref 70–99)
HCT VFR BLD CALC: 35 % — SIGNIFICANT CHANGE UP (ref 34.5–45)
HGB BLD-MCNC: 10.6 G/DL — LOW (ref 11.5–15.5)
INR BLD: 1.15 — SIGNIFICANT CHANGE UP (ref 0.88–1.17)
MAGNESIUM SERPL-MCNC: 1.9 MG/DL — SIGNIFICANT CHANGE UP (ref 1.6–2.6)
MCHC RBC-ENTMCNC: 21.4 PG — LOW (ref 27–34)
MCHC RBC-ENTMCNC: 30.3 % — LOW (ref 32–36)
MCV RBC AUTO: 70.7 FL — LOW (ref 80–100)
NRBC # FLD: 0.16 K/UL — SIGNIFICANT CHANGE UP (ref 0–0)
NRBC FLD-RTO: 1.8 — SIGNIFICANT CHANGE UP
PHOSPHATE SERPL-MCNC: 3.6 MG/DL — SIGNIFICANT CHANGE UP (ref 2.5–4.5)
PLATELET # BLD AUTO: 257 K/UL — SIGNIFICANT CHANGE UP (ref 150–400)
PMV BLD: SIGNIFICANT CHANGE UP FL (ref 7–13)
POTASSIUM SERPL-MCNC: 4 MMOL/L — SIGNIFICANT CHANGE UP (ref 3.5–5.3)
POTASSIUM SERPL-SCNC: 4 MMOL/L — SIGNIFICANT CHANGE UP (ref 3.5–5.3)
PROTHROM AB SERPL-ACNC: 13.2 SEC — HIGH (ref 9.8–13.1)
RBC # BLD: 4.95 M/UL — SIGNIFICANT CHANGE UP (ref 3.8–5.2)
RBC # FLD: 23.1 % — HIGH (ref 10.3–14.5)
SODIUM SERPL-SCNC: 138 MMOL/L — SIGNIFICANT CHANGE UP (ref 135–145)
SURGICAL PATHOLOGY STUDY: SIGNIFICANT CHANGE UP
WBC # BLD: 8.89 K/UL — SIGNIFICANT CHANGE UP (ref 3.8–10.5)
WBC # FLD AUTO: 8.89 K/UL — SIGNIFICANT CHANGE UP (ref 3.8–10.5)

## 2019-04-29 PROCEDURE — 99233 SBSQ HOSP IP/OBS HIGH 50: CPT

## 2019-04-29 RX ORDER — WARFARIN SODIUM 2.5 MG/1
5 TABLET ORAL ONCE
Qty: 0 | Refills: 0 | Status: COMPLETED | OUTPATIENT
Start: 2019-04-29 | End: 2019-04-29

## 2019-04-29 RX ORDER — METOPROLOL TARTRATE 50 MG
50 TABLET ORAL
Qty: 0 | Refills: 0 | Status: DISCONTINUED | OUTPATIENT
Start: 2019-04-29 | End: 2019-05-03

## 2019-04-29 RX ORDER — ACETAMINOPHEN 500 MG
650 TABLET ORAL EVERY 6 HOURS
Qty: 0 | Refills: 0 | Status: DISCONTINUED | OUTPATIENT
Start: 2019-04-29 | End: 2019-05-03

## 2019-04-29 RX ADMIN — Medication 0.12 MILLIGRAM(S): at 06:36

## 2019-04-29 RX ADMIN — Medication 650 MILLIGRAM(S): at 17:32

## 2019-04-29 RX ADMIN — HEPARIN SODIUM 11 UNIT(S)/HR: 5000 INJECTION INTRAVENOUS; SUBCUTANEOUS at 08:50

## 2019-04-29 RX ADMIN — BUDESONIDE AND FORMOTEROL FUMARATE DIHYDRATE 2 PUFF(S): 160; 4.5 AEROSOL RESPIRATORY (INHALATION) at 21:44

## 2019-04-29 RX ADMIN — Medication 650 MILLIGRAM(S): at 00:28

## 2019-04-29 RX ADMIN — PANTOPRAZOLE SODIUM 40 MILLIGRAM(S): 20 TABLET, DELAYED RELEASE ORAL at 17:31

## 2019-04-29 RX ADMIN — SODIUM CHLORIDE 3 MILLILITER(S): 9 INJECTION INTRAMUSCULAR; INTRAVENOUS; SUBCUTANEOUS at 21:46

## 2019-04-29 RX ADMIN — BUDESONIDE AND FORMOTEROL FUMARATE DIHYDRATE 2 PUFF(S): 160; 4.5 AEROSOL RESPIRATORY (INHALATION) at 08:48

## 2019-04-29 RX ADMIN — Medication 50 MILLIGRAM(S): at 17:32

## 2019-04-29 RX ADMIN — Medication 650 MILLIGRAM(S): at 11:33

## 2019-04-29 RX ADMIN — Medication 10 MILLIGRAM(S): at 22:37

## 2019-04-29 RX ADMIN — Medication 10 MILLIGRAM(S): at 13:08

## 2019-04-29 RX ADMIN — Medication 1 TABLET(S): at 11:33

## 2019-04-29 RX ADMIN — SODIUM CHLORIDE 3 MILLILITER(S): 9 INJECTION INTRAMUSCULAR; INTRAVENOUS; SUBCUTANEOUS at 06:35

## 2019-04-29 RX ADMIN — Medication 650 MILLIGRAM(S): at 12:15

## 2019-04-29 RX ADMIN — SODIUM CHLORIDE 3 MILLILITER(S): 9 INJECTION INTRAMUSCULAR; INTRAVENOUS; SUBCUTANEOUS at 13:09

## 2019-04-29 RX ADMIN — Medication 50 MILLIGRAM(S): at 06:36

## 2019-04-29 RX ADMIN — HEPARIN SODIUM 11 UNIT(S)/HR: 5000 INJECTION INTRAVENOUS; SUBCUTANEOUS at 19:38

## 2019-04-29 RX ADMIN — Medication 10 MILLIGRAM(S): at 06:36

## 2019-04-29 RX ADMIN — WARFARIN SODIUM 5 MILLIGRAM(S): 2.5 TABLET ORAL at 19:36

## 2019-04-29 RX ADMIN — PANTOPRAZOLE SODIUM 40 MILLIGRAM(S): 20 TABLET, DELAYED RELEASE ORAL at 06:36

## 2019-04-29 RX ADMIN — Medication 650 MILLIGRAM(S): at 18:15

## 2019-04-29 RX ADMIN — Medication 300 MILLIGRAM(S): at 11:33

## 2019-04-29 NOTE — PROGRESS NOTE ADULT - PROBLEM SELECTOR PLAN 2
Has elevated JVP today.   Increased torsemide frequenct to 20 mg daily for 3 days, then resume QOD.   Her dry weight is 186 lbs standing. Please get standing weight today. D/w nurse.   Tx for pulm HTN as above.  Hold HF meds for SBP <90.     Continue torsemide 20 mg every Sun, Tues, and Thursday.  hypomagnesemic. Increased mag ox 400 mg po TID. keep mag >2.0.   Keep K 4.0-4.5 Has elevated JVP today.   Increased torsemide frequency to 20 mg daily for 3 days, then resume QOD.   Her dry weight is 186 lbs standing. Please get standing weight today. D/w nurse.   Tx for pulm HTN as above.  Hold HF meds for SBP <90.

## 2019-04-29 NOTE — PROGRESS NOTE ADULT - SUBJECTIVE AND OBJECTIVE BOX
GENERAL SURGERY DAILY PROGRESS NOTE:     Subjective:  Pt seen and examined. No acute events overnight. Patient had episode of nausea but no vomiting with CLD yesterday. Having flatus/BM.      Objective:  Vital Signs Last 24 Hrs  T(C): 36.6 (29 Apr 2019 06:33), Max: 36.8 (28 Apr 2019 22:25)  T(F): 97.8 (29 Apr 2019 06:33), Max: 98.3 (28 Apr 2019 22:25)  HR: 84 (29 Apr 2019 06:33) (69 - 94)  BP: 113/78 (29 Apr 2019 06:33) (113/71 - 139/97)  BP(mean): 105 (28 Apr 2019 12:00) (105 - 105)  RR: 18 (29 Apr 2019 06:33) (17 - 23)  SpO2: 98% (29 Apr 2019 06:33) (93% - 100%)    I&O's Detail    28 Apr 2019 07:01  -  29 Apr 2019 07:00  --------------------------------------------------------  IN:    heparin Infusion: 210 mL    Oral Fluid: 440 mL  Total IN: 650 mL    OUT:    Voided: 200 mL  Total OUT: 200 mL    Total NET: 450 mL          MEDICATIONS  (STANDING):  acetaminophen   Tablet .. 650 milliGRAM(s) Oral every 6 hours  allopurinol 300 milliGRAM(s) Oral daily  buDESOnide  80 MICROgram(s)/formoterol 4.5 MICROgram(s) Inhaler 2 Puff(s) Inhalation two times a day  dextrose 50% Injectable 25 Gram(s) IV Push once  dextrose 50% Injectable 25 Gram(s) IV Push once  digoxin     Tablet 0.125 milliGRAM(s) Oral daily  heparin  Infusion 1500 Unit(s)/Hr (11 mL/Hr) IV Continuous <Continuous>  insulin lispro (HumaLOG) corrective regimen sliding scale   SubCutaneous Before meals and at bedtime  metoprolol tartrate 50 milliGRAM(s) Oral two times a day  multivitamin 1 Tablet(s) Oral daily  pantoprazole  Injectable 40 milliGRAM(s) IV Push every 12 hours  sildenafil (REVATIO) 10 milliGRAM(s) Oral three times a day  sodium chloride 0.9% lock flush 3 milliLiter(s) IV Push every 8 hours  torsemide 20 milliGRAM(s) Oral <User Schedule>    MEDICATIONS  (PRN):  ALBUTerol/ipratropium for Nebulization 3 milliLiter(s) Nebulizer every 6 hours PRN Shortness of Breath and/or Wheezing  dextrose 40% Gel 15 Gram(s) Oral once PRN Blood Glucose LESS THAN 70 milliGRAM(s)/deciliter  glucagon  Injectable 1 milliGRAM(s) IntraMuscular once PRN Glucose LESS THAN 70 milligrams/deciliter  guaiFENesin  milliGRAM(s) Oral every 12 hours PRN Cough  oxyCODONE    IR 5 milliGRAM(s) Oral every 6 hours PRN Pain (1-10)                            10.6   8.89  )-----------( 257      ( 29 Apr 2019 07:15 )             35.0       04-29    138  |  106  |  28<H>  ----------------------------<  111<H>  4.0   |  19<L>  |  1.83<H>    Ca    9.7      29 Apr 2019 07:15  Phos  3.6     04-29  Mg     1.9     04-29              PHYSICAL EXAM  General: NAD, resting comfortably in bed  Pulmonary: Nonlabored breathing, no respiratory distress  Abdominal: soft, ND, appropriately tender to palpation, no RT, no guarding/rebound, incisions/dressing clean, dry, intact

## 2019-04-29 NOTE — PROVIDER CONTACT NOTE (OTHER) - RECOMMENDATIONS
keep heparin drip at 11cc/hr, give warfarin 5mg po as ordered
No further action required at this time
Notify provider
Recheck blood pressure in an hour and hold torsemide.

## 2019-04-29 NOTE — PROGRESS NOTE ADULT - SUBJECTIVE AND OBJECTIVE BOX
Patient seen and examined in bed. Complains of incisional pain upon movement, denies SOB, +orthopnea. Hypotension noted this AM. She states that she has been having diarrhea since the day after surgery. She states she is feeling better today and she ate half of her lunch this afternoon.       MEDICATIONS  (STANDING):  acetaminophen   Tablet .. 650 milliGRAM(s) Oral every 6 hours  allopurinol 300 milliGRAM(s) Oral daily  buDESOnide  80 MICROgram(s)/formoterol 4.5 MICROgram(s) Inhaler 2 Puff(s) Inhalation two times a day  dextrose 50% Injectable 25 Gram(s) IV Push once  dextrose 50% Injectable 25 Gram(s) IV Push once  digoxin     Tablet 0.125 milliGRAM(s) Oral daily  heparin  Infusion 1500 Unit(s)/Hr (11 mL/Hr) IV Continuous <Continuous>  insulin lispro (HumaLOG) corrective regimen sliding scale   SubCutaneous Before meals and at bedtime  metoprolol succinate ER 50 milliGRAM(s) Oral two times a day  multivitamin 1 Tablet(s) Oral daily  pantoprazole  Injectable 40 milliGRAM(s) IV Push every 12 hours  sildenafil (REVATIO) 10 milliGRAM(s) Oral three times a day  sodium chloride 0.9% lock flush 3 milliLiter(s) IV Push every 8 hours  torsemide 20 milliGRAM(s) Oral daily      VITAL:  T(C): , Max: 36.8 (04-28-19 @ 22:25)  T(F): , Max: 98.3 (04-28-19 @ 22:25)  HR: 82 (04-29-19 @ 13:27)  BP: 107/72 (04-29-19 @ 13:27)  RR: 18 (04-29-19 @ 13:27)  SpO2: 96% (04-29-19 @ 13:27)    I and O's:    04-28 @ 07:01  -  04-29 @ 07:00  --------------------------------------------------------  IN: 650 mL / OUT: 200 mL / NET: 450 mL    04-29 @ 07:01  -  04-29 @ 14:58  --------------------------------------------------------  IN: 22 mL / OUT: 0 mL / NET: 22 mL        Weight (kg): 89.7 (04-29 @ 06:33)    PHYSICAL EXAM:    Constitutional: NAD  Neck:  + JVD  Respiratory: diminished BS at bases  Cardiovascular: S1 and S2  Gastrointestinal: BS+, soft, NT/ND  Extremities: trace peripheral edema  Neurological: A/O x 3, no focal deficits  Psychiatric: Normal mood, normal affect  : No Corey  Skin: midline abdominal incision, staples intact, MARISELA    LABS:                        10.6   8.89  )-----------( 257      ( 29 Apr 2019 07:15 )             35.0     04-29    138  |  106  |  28<H>  ----------------------------<  111<H>  4.0   |  19<L>  |  1.83<H>    Ca    9.7      29 Apr 2019 07:15  Phos  3.6     04-29  Mg     1.9     04-29      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 and now s/p exlap, partial left hemicolectomy and side to side stapled anastomosis  - CKD: stage 3 with baseline Cr ~1.6  - OSMAR: likely due to pre-renal azotemia likely 2ry to transient hypotension  - HFpEF: right sided heart failure / cor pulm; no SOB; volume status acceptable  - anemia: in chronic disease + iron def + GIB + malignancy, hold LUIS     RECOMMEND:  - BMP, Mg, Phos daily  - Diuretics per HF team  - sildenafil per pulm  - dose meds for a CrCl ~30-35 mL/min      Suzan Warren, NP-C  [facility],   [phone] Patient seen and examined in bed. Complains of incisional pain upon movement, denies SOB, +orthopnea. Hypotension noted this AM. She states that she has been having diarrhea since the day after surgery. She states she is feeling better today and she ate half of her lunch this afternoon.       MEDICATIONS  (STANDING):  acetaminophen   Tablet .. 650 milliGRAM(s) Oral every 6 hours  allopurinol 300 milliGRAM(s) Oral daily  buDESOnide  80 MICROgram(s)/formoterol 4.5 MICROgram(s) Inhaler 2 Puff(s) Inhalation two times a day  dextrose 50% Injectable 25 Gram(s) IV Push once  dextrose 50% Injectable 25 Gram(s) IV Push once  digoxin     Tablet 0.125 milliGRAM(s) Oral daily  heparin  Infusion 1500 Unit(s)/Hr (11 mL/Hr) IV Continuous <Continuous>  insulin lispro (HumaLOG) corrective regimen sliding scale   SubCutaneous Before meals and at bedtime  metoprolol succinate ER 50 milliGRAM(s) Oral two times a day  multivitamin 1 Tablet(s) Oral daily  pantoprazole  Injectable 40 milliGRAM(s) IV Push every 12 hours  sildenafil (REVATIO) 10 milliGRAM(s) Oral three times a day  sodium chloride 0.9% lock flush 3 milliLiter(s) IV Push every 8 hours  torsemide 20 milliGRAM(s) Oral daily      VITAL:  T(C): , Max: 36.8 (04-28-19 @ 22:25)  T(F): , Max: 98.3 (04-28-19 @ 22:25)  HR: 82 (04-29-19 @ 13:27)  BP: 107/72 (04-29-19 @ 13:27)  RR: 18 (04-29-19 @ 13:27)  SpO2: 96% (04-29-19 @ 13:27)    I and O's:    04-28 @ 07:01  -  04-29 @ 07:00  --------------------------------------------------------  IN: 650 mL / OUT: 200 mL / NET: 450 mL    04-29 @ 07:01  -  04-29 @ 14:58  --------------------------------------------------------  IN: 22 mL / OUT: 0 mL / NET: 22 mL        Weight (kg): 89.7 (04-29 @ 06:33)    PHYSICAL EXAM:    Constitutional: NAD  Neck:  + JVD  Respiratory: diminished BS at bases  Cardiovascular: S1 and S2  Gastrointestinal: BS+, soft, NT/ND  Extremities: trace peripheral edema  Neurological: A/O x 3, no focal deficits  Psychiatric: Normal mood, normal affect  : No Corey  Skin: midline abdominal incision, staples intact, MARISELA    LABS:                        10.6   8.89  )-----------( 257      ( 29 Apr 2019 07:15 )             35.0     04-29    138  |  106  |  28<H>  ----------------------------<  111<H>  4.0   |  19<L>  |  1.83<H>    Ca    9.7      29 Apr 2019 07:15  Phos  3.6     04-29  Mg     1.9     04-29      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 and now s/p exlap, partial left hemicolectomy and side to side stapled anastomosis  - CKD: stage 3 with baseline Cr ~1.6  - OSMAR: likely due to pre-renal azotemia likely 2ry to transient hypotension  - HFpEF: right sided heart failure / cor pulm; appears mildly volume overloaded  - anemia: in chronic disease + iron def + GIB + malignancy, hold LUIS     RECOMMEND:  - BMP, Mg, Phos daily  - Diuretics per HF team  - sildenafil per pulm  - dose meds for a CrCl ~30-35 mL/min      Suzan Warren, NP-C  [facility]  [phone]

## 2019-04-29 NOTE — PROGRESS NOTE ADULT - ATTENDING COMMENTS
Briefly, 62 y/o F w/ h/o lymphoma s/p radiation therapy, severe pulmonary HTN, HFpEF, ?obstructive lung disease (never smoker), anemia (? thalassemia trait) who presented on 3/31 with weight loss over past year (? 2/2 Trulicity vs. occult malignancy) as well as 1 month of decreased appetite and abdominal complaints, found to be in acute on chronic kidney injury in setting of influenza/enterovirus infection and possible excessive metolazone use. Renal function improved with cessation of diuretics but given notable pulmonary HTN, underwent RHC on 3/6/19 (w/o vasodilator study) which revealed RA 10, RV 78/14, PA 78/29/45, PCWP 15, sat 47.4%, CO 4.39, CI 2.3>SVR 1659, PVR 6.8 (TPG 30). Was started on sildenafil 10 mg three times/day and underwent V/Q study which was low probability and workup of PH is ongoing. Was also noted to have iron def anemia with guaiac positive stool. 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 Post-op had some afib and was given IVF. Reported some diarrhea. Denies complaints. Exam JVD approx 12 cm with V waves, irreg irreg rhythm, prominent P2, grade III/VI systolic murmur in LLSB, CTAB no pedal edema. Labs reviewed - BUN/Cr 12/1.44 (improved from admission). Overall has undetermined etiology of pulmonary HTN (possibly contributed to by LINH, small airway disease).   - diuresis as above  - would ideally need vasodilator study to evaluate PH further and determine appropriate medications if determined to be also WHO group I; component of group III  - continue sildenafil 10 mg q8h  - afib; switch to toprol XL 50 mg bid (hold for SBP <85)

## 2019-04-29 NOTE — PROGRESS NOTE ADULT - ASSESSMENT
64 yo woman with multiple medical comorbidities including afib, right-sided CHF with severe pulmonary HTN, COPD on nocturnal O2, LINH newly initiated on bilevel support found to have a non-obstructing sigmoid mass s/p left hemicolectomy (4/24/19), pt in SICU due to persistently uncontrolled a fib.     - Diet: Regular, IV lock  - Monitor bowel function/abdominal exam  - F/u PT evaluation  - OOB as tolerated  - DVT ppx: HepSubQ      A Team Surgery  [id number]

## 2019-04-29 NOTE — PROGRESS NOTE ADULT - ATTENDING COMMENTS
Renal attd    -SHe states that she is having watery diarrhea and the creatinine is up.  Diuretics may need to be held if the creatinine goes up in am

## 2019-04-29 NOTE — PROGRESS NOTE ADULT - PROBLEM SELECTOR PLAN 4
Likely due to sigmoid mass s/p left hemicolectomy (4/24/19)  Now stable, management per surgical team.

## 2019-04-29 NOTE — PROGRESS NOTE ADULT - SUBJECTIVE AND OBJECTIVE BOX
Patient seen and examined. Currently denies SOB, but admits to some orthopnea.   Hypotension 88/54 noted, but pt asymptomatic, no CP, palpitations, dizziness.   She tells me that she has been having diarrhea ever since the GI surgery. She is now eating solids and drinking normally.     Medications:  acetaminophen   Tablet .. 650 milliGRAM(s) Oral every 6 hours  ALBUTerol/ipratropium for Nebulization 3 milliLiter(s) Nebulizer every 6 hours PRN  allopurinol 300 milliGRAM(s) Oral daily  buDESOnide  80 MICROgram(s)/formoterol 4.5 MICROgram(s) Inhaler 2 Puff(s) Inhalation two times a day  dextrose 40% Gel 15 Gram(s) Oral once PRN  dextrose 50% Injectable 25 Gram(s) IV Push once  dextrose 50% Injectable 25 Gram(s) IV Push once  digoxin     Tablet 0.125 milliGRAM(s) Oral daily  glucagon  Injectable 1 milliGRAM(s) IntraMuscular once PRN  guaiFENesin  milliGRAM(s) Oral every 12 hours PRN  heparin  Infusion 1500 Unit(s)/Hr IV Continuous <Continuous>  insulin lispro (HumaLOG) corrective regimen sliding scale   SubCutaneous Before meals and at bedtime  metoprolol succinate ER 50 milliGRAM(s) Oral two times a day  multivitamin 1 Tablet(s) Oral daily  oxyCODONE    IR 5 milliGRAM(s) Oral every 6 hours PRN  pantoprazole  Injectable 40 milliGRAM(s) IV Push every 12 hours  sildenafil (REVATIO) 10 milliGRAM(s) Oral three times a day  sodium chloride 0.9% lock flush 3 milliLiter(s) IV Push every 8 hours  torsemide 20 milliGRAM(s) Oral daily      Vitals:  Vital Signs Last 24 Hrs  T(C): 36.4 (29 Apr 2019 11:30), Max: 36.8 (28 Apr 2019 22:25)  T(F): 97.5 (29 Apr 2019 11:30), Max: 98.3 (28 Apr 2019 22:25)  HR: 63 (29 Apr 2019 11:30) (63 - 94)  BP: 88/59 (29 Apr 2019 11:30) (88/59 - 124/92)  BP(mean): --  RR: 18 (29 Apr 2019 11:30) (17 - 20)  SpO2: 97% (29 Apr 2019 11:30) (93% - 100%)    Daily     Daily     I&O's Detail    28 Apr 2019 07:01  -  29 Apr 2019 07:00  --------------------------------------------------------  IN:    heparin Infusion: 210 mL    Oral Fluid: 440 mL  Total IN: 650 mL    OUT:    Voided: 200 mL  Total OUT: 200 mL    Total NET: 450 mL      29 Apr 2019 07:01  -  29 Apr 2019 13:02  --------------------------------------------------------  IN:    heparin Infusion: 22 mL  Total IN: 22 mL    OUT:  Total OUT: 0 mL    Total NET: 22 mL          Physical Exam:     General: No distress. Comfortable.  HEENT: EOM intact.  Neck: Neck supple. JVP elevated. No masses  Chest: Clear to auscultation bilaterally  CV: S1 and S2 irregularly irregular. No murmurs, rub, or gallops. Radial pulses normal. Trace b/l LE edema.   Abdomen: Soft, non-distended, non-tender, staples in place   Skin: No rashes or skin breakdown  Neurology: Alert and oriented times three. Sensation intact  Psych: Affect normal    Labs:                        10.6   8.89  )-----------( 257      ( 29 Apr 2019 07:15 )             35.0     04-29    138  |  106  |  28<H>  ----------------------------<  111<H>  4.0   |  19<L>  |  1.83<H>    Ca    9.7      29 Apr 2019 07:15  Phos  3.6     04-29  Mg     1.9     04-29      PTT - ( 29 Apr 2019 05:35 )  PTT:64.8 SEC    < from: Transthoracic Echocardiogram (04.01.19 @ 18:26) >  DIMENSIONS:  Dimensions:     Normal Values:  LA:     3.7 cm    2.0 - 4.0 cm  Ao:     2.9 cm    2.0 -3.8 cm  SEPTUM: 0.9 cm    0.6 - 1.2 cm  PWT:    0.9 cm    0.6 - 1.1 cm  LVIDd:  4.5 cm    3.0 - 5.6 cm  LVIDs:  2.7 cm    1.8 - 4.0 cm  Derived Variables:  LVMI: 71 g/m2  RWT: 0.40  Fractional short: 40 %  Ejection Fraction (Visual Estimate): >70 %  Peak Velocity (m/sec): AoV=1.4  ------------------------------------------------------------------------  OBSERVATIONS:  Mitral Valve: Mitral annular calcification and calcified  mitral leaflets with normal diastolic opening. Mild mitral  regurgitation.  Aortic Root: Aortic Root: 2.9 cm.  Aortic Valve: Calcified trileaflet aortic valve with normal  opening. Peak transaortic valve gradient equals 7 mm Hg.  Peak left ventricular outflow tract gradient equals 4.8 mm  Hg.  Left Atrium: Mildly dilated left atrium.  LA volume index =  37 cc/m2.  Left Ventricle: Hyperdynamic left ventricle. Flattening of  the interventricular septum in both systole and diastole is   consistent with right ventricular pressure overload.  Normal left ventricular internal dimensions and wall  thicknesses. (DT:150 ms).  Right Heart: Severe right atrial enlargement. Right  ventricular enlargement with normal right ventricular  systolic function. Normal tricuspid valve. Moderate-severe  tricuspid regurgitation. Pulmonicvalve not well  visualized. Mild pulmonic regurgitation.  Pericardium/PleuraNormal pericardium with no pericardial  effusion.  Hemodynamic: Estimated right ventricular systolic pressure  equals 84 mm Hg, assuming right atrial pressure equals 10  mm Hg, consistent with severe pulmonary hypertension.    < end of copied text >

## 2019-04-29 NOTE — PROGRESS NOTE ADULT - SUBJECTIVE AND OBJECTIVE BOX
Patient is a 63y old  Female who presents with a chief complaint of Generalized weakness (28 Apr 2019 01:50)      SUBJECTIVE / OVERNIGHT EVENTS:    S/p surgery a few days ago. Still has diarrhea on eating anything. Pain on movement +  Review of Systems:   CONSTITUTIONAL: No fever, weight loss, or fatigue  EYES: No eye pain, visual disturbances, or discharge  ENMT:  No difficulty hearing, tinnitus, vertigo; No sinus or throat pain  NECK: No pain or stiffness  BREASTS: No pain, masses, or nipple discharge  RESPIRATORY: No cough, wheezing, chills or hemoptysis; No shortness of breath  CARDIOVASCULAR: No chest pain, palpitations, dizziness, or leg swelling  GASTROINTESTINAL: No abdominal or epigastric pain. No vomiting, or hematemesis; No melena or hematochezia.  GENITOURINARY: No dysuria, frequency, hematuria, or incontinence  NEUROLOGICAL: No headaches, memory loss, loss of strength, numbness, or tremors  SKIN: No itching, burning, rashes, or lesions   LYMPH NODES: No enlarged glands  ENDOCRINE: No heat or cold intolerance; No hair loss  MUSCULOSKELETAL: No joint pain or swelling; No muscle, back, or extremity pain  PSYCHIATRIC: No depression, anxiety, mood swings, or difficulty sleeping  HEME/LYMPH: No easy bruising, or bleeding gums  ALLERY AND IMMUNOLOGIC: No hives or eczema    MEDICATIONS  (STANDING):  allopurinol 300 milliGRAM(s) Oral daily  buDESOnide  80 MICROgram(s)/formoterol 4.5 MICROgram(s) Inhaler 2 Puff(s) Inhalation two times a day  chlorhexidine 4% Liquid 1 Application(s) Topical <User Schedule>  dextrose 50% Injectable 25 Gram(s) IV Push once  dextrose 50% Injectable 25 Gram(s) IV Push once  digoxin     Tablet 0.125 milliGRAM(s) Oral daily  heparin  Infusion 1500 Unit(s)/Hr (11 mL/Hr) IV Continuous <Continuous>  insulin lispro (HumaLOG) corrective regimen sliding scale   SubCutaneous every 6 hours  metoprolol tartrate 50 milliGRAM(s) Oral two times a day  multivitamin 1 Tablet(s) Oral daily  pantoprazole  Injectable 40 milliGRAM(s) IV Push every 12 hours  sildenafil (REVATIO) 10 milliGRAM(s) Oral three times a day  sodium chloride 0.9% lock flush 3 milliLiter(s) IV Push every 8 hours  torsemide 20 milliGRAM(s) Oral <User Schedule>    MEDICATIONS  (PRN):  ALBUTerol/ipratropium for Nebulization 3 milliLiter(s) Nebulizer every 6 hours PRN Shortness of Breath and/or Wheezing  dextrose 40% Gel 15 Gram(s) Oral once PRN Blood Glucose LESS THAN 70 milliGRAM(s)/deciliter  glucagon  Injectable 1 milliGRAM(s) IntraMuscular once PRN Glucose LESS THAN 70 milligrams/deciliter  guaiFENesin  milliGRAM(s) Oral every 12 hours PRN Cough  oxyCODONE    IR 5 milliGRAM(s) Oral every 6 hours PRN Moderate Pain (4 - 6)      PHYSICAL EXAM:  Vital Signs Last 24 Hrs  T(C): 36.7 (28 Apr 2019 12:00), Max: 37.1 (28 Apr 2019 04:00)  T(F): 98 (28 Apr 2019 12:00), Max: 98.7 (28 Apr 2019 04:00)  HR: 92 (28 Apr 2019 12:00) (87 - 100)  BP: 139/97 (28 Apr 2019 12:00) (123/89 - 163/110)  BP(mean): 105 (28 Apr 2019 12:00) (91 - 122)  RR: 23 (28 Apr 2019 12:00) (18 - 25)  SpO2: 100% (28 Apr 2019 12:00) (93% - 100%)  I&O's Summary    27 Apr 2019 07:01  -  28 Apr 2019 07:00  --------------------------------------------------------  IN: 476 mL / OUT: 1100 mL / NET: -624 mL    28 Apr 2019 07:01  -  28 Apr 2019 14:35  --------------------------------------------------------  IN: 486 mL / OUT: 0 mL / NET: 486 mL      GENERAL: NAD, well-developed  HEAD:  Atraumatic, Normocephalic  EYES: EOMI, PERRLA, conjunctiva and sclera clear  NECK: Supple, No JVD  CHEST/LUNG: Clear to auscultation bilaterally; No wheeze  HEART: Regular rate and rhythm; No murmurs, rubs, or gallops  ABDOMEN: Soft, Nontender, Nondistended; Bowel sounds present  EXTREMITIES:  2+ Peripheral Pulses, No clubbing, cyanosis, or edema  PSYCH: AAOx3  NEUROLOGY: non-focal  SKIN: No rashes or lesions    LABS:  CAPILLARY BLOOD GLUCOSE      POCT Blood Glucose.: 114 mg/dL (28 Apr 2019 11:41)  POCT Blood Glucose.: 117 mg/dL (28 Apr 2019 05:50)  POCT Blood Glucose.: 162 mg/dL (27 Apr 2019 23:32)  POCT Blood Glucose.: 135 mg/dL (27 Apr 2019 18:20)                          10.1   9.79  )-----------( 227      ( 27 Apr 2019 23:30 )             32.9     04-28    139  |  107  |  12  ----------------------------<  125<H>  4.2   |  20<L>  |  1.44<H>    Ca    10.1      28 Apr 2019 02:48  Phos  3.3     04-28  Mg     2.4     04-28      PTT - ( 27 Apr 2019 23:30 )  PTT:69.3 SEC          RADIOLOGY & ADDITIONAL TESTS:    Imaging Personally Reviewed:    Consultant(s) Notes Reviewed:      Care Discussed with Consultants/Other Providers:

## 2019-04-29 NOTE — PROGRESS NOTE ADULT - ASSESSMENT
64 yo woman with multiple comorbidities, including history of lymphoma, R-sided heart failure with preserved EF (EF 66% on TTE in 10/2017), severe pulm HTN, mod-severe TR, reported COPD but denies smoking? (on 2L NC at night), LINH (being arranged for bilevel support), A-Fib (on Eliquis), HTN, HLD, DM2, and gout presents with generalized weakness for the past 1 week in the setting of recent occult positive stool, OSMAR and newly diagnosed influenza/entero virus infection. RHC on 3/6/19-RA 10, RV 78/2/14, PCWP 15, PA 78/29/45, sat 47.4%, CO 4.39, CI 2.3>SVR 1659, PVR 6.6. Patient was maintained on torsemide 20 mg po QOD, and started sildenafil 10 mg po TID. Patient was anemic, had GI workup which revealed sigmoid mass, now s/p left hemicolectomy (4/24/19).

## 2019-04-30 LAB
ANION GAP SERPL CALC-SCNC: 12 MMO/L — SIGNIFICANT CHANGE UP (ref 7–14)
APTT BLD: 68.1 SEC — HIGH (ref 27.5–36.3)
BUN SERPL-MCNC: 33 MG/DL — HIGH (ref 7–23)
CALCIUM SERPL-MCNC: 9.5 MG/DL — SIGNIFICANT CHANGE UP (ref 8.4–10.5)
CHLORIDE SERPL-SCNC: 105 MMOL/L — SIGNIFICANT CHANGE UP (ref 98–107)
CO2 SERPL-SCNC: 19 MMOL/L — LOW (ref 22–31)
CREAT SERPL-MCNC: 2.14 MG/DL — HIGH (ref 0.5–1.3)
GLUCOSE BLDC GLUCOMTR-MCNC: 91 MG/DL — SIGNIFICANT CHANGE UP (ref 70–99)
GLUCOSE BLDC GLUCOMTR-MCNC: 92 MG/DL — SIGNIFICANT CHANGE UP (ref 70–99)
GLUCOSE BLDC GLUCOMTR-MCNC: 97 MG/DL — SIGNIFICANT CHANGE UP (ref 70–99)
GLUCOSE BLDC GLUCOMTR-MCNC: 99 MG/DL — SIGNIFICANT CHANGE UP (ref 70–99)
GLUCOSE SERPL-MCNC: 104 MG/DL — HIGH (ref 70–99)
HCT VFR BLD CALC: 33 % — LOW (ref 34.5–45)
HGB BLD-MCNC: 10 G/DL — LOW (ref 11.5–15.5)
INR BLD: 1.21 — HIGH (ref 0.88–1.17)
MAGNESIUM SERPL-MCNC: 1.8 MG/DL — SIGNIFICANT CHANGE UP (ref 1.6–2.6)
MCHC RBC-ENTMCNC: 21.5 PG — LOW (ref 27–34)
MCHC RBC-ENTMCNC: 30.3 % — LOW (ref 32–36)
MCV RBC AUTO: 70.8 FL — LOW (ref 80–100)
NRBC # FLD: 0.07 K/UL — SIGNIFICANT CHANGE UP (ref 0–0)
PHOSPHATE SERPL-MCNC: 3.7 MG/DL — SIGNIFICANT CHANGE UP (ref 2.5–4.5)
PLATELET # BLD AUTO: 222 K/UL — SIGNIFICANT CHANGE UP (ref 150–400)
PMV BLD: SIGNIFICANT CHANGE UP FL (ref 7–13)
POTASSIUM SERPL-MCNC: 4.2 MMOL/L — SIGNIFICANT CHANGE UP (ref 3.5–5.3)
POTASSIUM SERPL-SCNC: 4.2 MMOL/L — SIGNIFICANT CHANGE UP (ref 3.5–5.3)
PROTHROM AB SERPL-ACNC: 13.5 SEC — HIGH (ref 9.8–13.1)
RBC # BLD: 4.66 M/UL — SIGNIFICANT CHANGE UP (ref 3.8–5.2)
RBC # FLD: 22.8 % — HIGH (ref 10.3–14.5)
SODIUM SERPL-SCNC: 136 MMOL/L — SIGNIFICANT CHANGE UP (ref 135–145)
WBC # BLD: 8.91 K/UL — SIGNIFICANT CHANGE UP (ref 3.8–10.5)
WBC # FLD AUTO: 8.91 K/UL — SIGNIFICANT CHANGE UP (ref 3.8–10.5)

## 2019-04-30 PROCEDURE — 99233 SBSQ HOSP IP/OBS HIGH 50: CPT

## 2019-04-30 RX ORDER — MAGNESIUM SULFATE 500 MG/ML
2 VIAL (ML) INJECTION ONCE
Qty: 0 | Refills: 0 | Status: COMPLETED | OUTPATIENT
Start: 2019-04-30 | End: 2019-04-30

## 2019-04-30 RX ORDER — DIGOXIN 250 MCG
0.12 TABLET ORAL EVERY OTHER DAY
Qty: 0 | Refills: 0 | Status: DISCONTINUED | OUTPATIENT
Start: 2019-05-02 | End: 2019-05-03

## 2019-04-30 RX ORDER — WARFARIN SODIUM 2.5 MG/1
5 TABLET ORAL ONCE
Qty: 0 | Refills: 0 | Status: COMPLETED | OUTPATIENT
Start: 2019-04-30 | End: 2019-04-30

## 2019-04-30 RX ORDER — MAGNESIUM SULFATE 500 MG/ML
2 VIAL (ML) INJECTION ONCE
Qty: 0 | Refills: 0 | Status: DISCONTINUED | OUTPATIENT
Start: 2019-04-30 | End: 2019-04-30

## 2019-04-30 RX ADMIN — Medication 650 MILLIGRAM(S): at 07:42

## 2019-04-30 RX ADMIN — Medication 650 MILLIGRAM(S): at 14:30

## 2019-04-30 RX ADMIN — PANTOPRAZOLE SODIUM 40 MILLIGRAM(S): 20 TABLET, DELAYED RELEASE ORAL at 19:43

## 2019-04-30 RX ADMIN — Medication 50 GRAM(S): at 15:28

## 2019-04-30 RX ADMIN — Medication 650 MILLIGRAM(S): at 07:44

## 2019-04-30 RX ADMIN — BUDESONIDE AND FORMOTEROL FUMARATE DIHYDRATE 2 PUFF(S): 160; 4.5 AEROSOL RESPIRATORY (INHALATION) at 23:00

## 2019-04-30 RX ADMIN — SODIUM CHLORIDE 3 MILLILITER(S): 9 INJECTION INTRAMUSCULAR; INTRAVENOUS; SUBCUTANEOUS at 07:44

## 2019-04-30 RX ADMIN — BUDESONIDE AND FORMOTEROL FUMARATE DIHYDRATE 2 PUFF(S): 160; 4.5 AEROSOL RESPIRATORY (INHALATION) at 09:39

## 2019-04-30 RX ADMIN — Medication 50 MILLIGRAM(S): at 08:00

## 2019-04-30 RX ADMIN — Medication 1 TABLET(S): at 15:28

## 2019-04-30 RX ADMIN — PANTOPRAZOLE SODIUM 40 MILLIGRAM(S): 20 TABLET, DELAYED RELEASE ORAL at 07:43

## 2019-04-30 RX ADMIN — SODIUM CHLORIDE 3 MILLILITER(S): 9 INJECTION INTRAMUSCULAR; INTRAVENOUS; SUBCUTANEOUS at 14:32

## 2019-04-30 RX ADMIN — Medication 10 MILLIGRAM(S): at 15:28

## 2019-04-30 RX ADMIN — WARFARIN SODIUM 5 MILLIGRAM(S): 2.5 TABLET ORAL at 19:43

## 2019-04-30 RX ADMIN — Medication 10 MILLIGRAM(S): at 22:56

## 2019-04-30 RX ADMIN — Medication 650 MILLIGRAM(S): at 23:00

## 2019-04-30 RX ADMIN — Medication 20 MILLIGRAM(S): at 07:42

## 2019-04-30 RX ADMIN — Medication 10 MILLIGRAM(S): at 07:41

## 2019-04-30 RX ADMIN — Medication 0.12 MILLIGRAM(S): at 07:42

## 2019-04-30 RX ADMIN — Medication 50 MILLIGRAM(S): at 19:43

## 2019-04-30 RX ADMIN — SODIUM CHLORIDE 3 MILLILITER(S): 9 INJECTION INTRAMUSCULAR; INTRAVENOUS; SUBCUTANEOUS at 22:53

## 2019-04-30 RX ADMIN — Medication 300 MILLIGRAM(S): at 14:31

## 2019-04-30 RX ADMIN — Medication 650 MILLIGRAM(S): at 19:43

## 2019-04-30 NOTE — PHYSICAL THERAPY INITIAL EVALUATION ADULT - MANUAL MUSCLE TESTING RESULTS, REHAB EVAL
grossly 3+/5 t/o ex b/l hips: 3-/5/grossly assessed due to
Bilateral UE muscle strength grossly 3/5 Throughout; Bilateral LE muscle strength grossly 3/5 Throughout.

## 2019-04-30 NOTE — PHYSICAL THERAPY INITIAL EVALUATION ADULT - IMPAIRMENTS FOUND, PT EVAL
aerobic capacity/endurance/gait, locomotion, and balance/muscle strength
aerobic capacity/endurance/muscle strength/gait, locomotion, and balance

## 2019-04-30 NOTE — PHYSICAL THERAPY INITIAL EVALUATION ADULT - DISCHARGE DISPOSITION, PT EVAL
home w/ home PT/anticipated discharge to home with home PT services to address current functional limitations to optimize safety within the home environment with the use of a rolling walker.
outpatient PT

## 2019-04-30 NOTE — PHYSICAL THERAPY INITIAL EVALUATION ADULT - DIAGNOSIS, PT EVAL
Deconditioning, decreased strength, decreased balance, decreased endurance, decreased postural control all limiting pts. ability to perform functional mobility
s/p Fall, ARF, Influenza

## 2019-04-30 NOTE — PROGRESS NOTE ADULT - SUBJECTIVE AND OBJECTIVE BOX
Patient seen and examined in bed. No pain, no SOB. +diarrhea      MEDICATIONS  (STANDING):  acetaminophen   Tablet .. 650 milliGRAM(s) Oral every 6 hours  allopurinol 300 milliGRAM(s) Oral daily  buDESOnide  80 MICROgram(s)/formoterol 4.5 MICROgram(s) Inhaler 2 Puff(s) Inhalation two times a day  dextrose 50% Injectable 25 Gram(s) IV Push once  dextrose 50% Injectable 25 Gram(s) IV Push once  digoxin     Tablet 0.125 milliGRAM(s) Oral daily  heparin  Infusion 1500 Unit(s)/Hr (11 mL/Hr) IV Continuous <Continuous>  insulin lispro (HumaLOG) corrective regimen sliding scale   SubCutaneous Before meals and at bedtime  magnesium sulfate  IVPB 2 Gram(s) IV Intermittent once  metoprolol succinate ER 50 milliGRAM(s) Oral two times a day  multivitamin 1 Tablet(s) Oral daily  pantoprazole  Injectable 40 milliGRAM(s) IV Push every 12 hours  sildenafil (REVATIO) 10 milliGRAM(s) Oral three times a day  sodium chloride 0.9% lock flush 3 milliLiter(s) IV Push every 8 hours  torsemide 20 milliGRAM(s) Oral daily  warfarin 5 milliGRAM(s) Oral once      VITAL:  T(C): , Max: 36.9 (04-30-19 @ 14:32)  T(F): , Max: 98.4 (04-30-19 @ 14:32)  HR: 66 (04-30-19 @ 10:19)  BP: 104/74 (04-30-19 @ 14:32)  RR: 17 (04-30-19 @ 14:32)  SpO2: 98% (04-30-19 @ 14:32)    I and O's:    04-29 @ 07:01  -  04-30 @ 07:00  --------------------------------------------------------  IN: 110 mL / OUT: 0 mL / NET: 110 mL          PHYSICAL EXAM:    Constitutional: NAD  Neck:  + JVD  Respiratory: diminished BS at bases  Cardiovascular: S1 and S2  Gastrointestinal: BS+, soft, NT/ND  Extremities: trace peripheral edema  Neurological: A/O x 3, no focal deficits  Psychiatric: Normal mood, normal affect  : No Corey  Skin: midline abdominal incision, staples intact, MARISELA    LABS:                        10.0   8.91  )-----------( 222      ( 30 Apr 2019 07:10 )             33.0     04-30    136  |  105  |  33<H>  ----------------------------<  104<H>  4.2   |  19<L>  |  2.14<H>    Ca    9.5      30 Apr 2019 07:10  Phos  3.7     04-30  Mg     1.8     04-30      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 and now s/p exlap, partial left hemicolectomy and side to side stapled anastomosis  - CKD: stage 3 with baseline Cr ~1.6  - OSMAR: likely due to pre-renal azotemia likely 2ry to diarrhea  - HFpEF: right sided heart failure / cor pulm; appears mildly volume overloaded  - anemia: in chronic disease + iron def + GIB + malignancy, hold LUIS     RECOMMEND:  - BMP, Mg, Phos daily  - hold diuretics?  - sildenafil per pulm  - dose meds for a CrCl ~25-35 mL/min      Suzan Warren, NP-C  [facility],   [phone]

## 2019-04-30 NOTE — PHYSICAL THERAPY INITIAL EVALUATION ADULT - PERTINENT HX OF CURRENT PROBLEM, REHAB EVAL
Pt. is a 63 year old female admitted to [facility] due to acute renal failure. s/p surgical removal of left hemicolon. PMH: COPD, HTN, atrial fibrillation.
admitted after fall at home / community

## 2019-04-30 NOTE — PROVIDER CONTACT NOTE (OTHER) - ACTION/TREATMENT ORDERED:
No action required at this time. Continue to monitor.
No interventions ordered at this time. Will continue to monitor.
will continue to monitor  next pTT draw at 5/1 AM collection  coumadin dosing 4/30 pm
No further action is required at this time
Provider notified. No new orders
Recheck blood pressure in an hour and hold torsemide.

## 2019-04-30 NOTE — PROGRESS NOTE ADULT - SUBJECTIVE AND OBJECTIVE BOX
Interval History:  has diarrhea; Cr uptrending (not from diuretic as she last received dose on  and )  denies CP/SOB/abdom pain   was on liquid diet    Medications:  acetaminophen   Tablet .. 650 milliGRAM(s) Oral every 6 hours  ALBUTerol/ipratropium for Nebulization 3 milliLiter(s) Nebulizer every 6 hours PRN  allopurinol 300 milliGRAM(s) Oral daily  buDESOnide  80 MICROgram(s)/formoterol 4.5 MICROgram(s) Inhaler 2 Puff(s) Inhalation two times a day  dextrose 40% Gel 15 Gram(s) Oral once PRN  dextrose 50% Injectable 25 Gram(s) IV Push once  dextrose 50% Injectable 25 Gram(s) IV Push once  digoxin     Tablet 0.125 milliGRAM(s) Oral daily  glucagon  Injectable 1 milliGRAM(s) IntraMuscular once PRN  guaiFENesin  milliGRAM(s) Oral every 12 hours PRN  heparin  Infusion 1500 Unit(s)/Hr IV Continuous <Continuous>  insulin lispro (HumaLOG) corrective regimen sliding scale   SubCutaneous Before meals and at bedtime  metoprolol succinate ER 50 milliGRAM(s) Oral two times a day  multivitamin 1 Tablet(s) Oral daily  oxyCODONE    IR 5 milliGRAM(s) Oral every 6 hours PRN  pantoprazole  Injectable 40 milliGRAM(s) IV Push every 12 hours  sildenafil (REVATIO) 10 milliGRAM(s) Oral three times a day  sodium chloride 0.9% lock flush 3 milliLiter(s) IV Push every 8 hours  torsemide 20 milliGRAM(s) Oral daily  warfarin 5 milliGRAM(s) Oral once      Vitals:  T(C): 36.9 (19 @ 14:32), Max: 36.9 (19 @ 14:32)  HR: 66 (19 @ 10:19) (66 - 84)  BP: 104/74 (19 @ 14:32) (101/74 - 110/71)  BP(mean): --  RR: 17 (19 @ 14:32) (16 - 18)  SpO2: 98% (19 @ 14:32) (94% - 100%)    Daily     Daily Weight in k.4 (2019 18:00)    Weight (kg): 89.7 ( @ 06:33)    I&O's Summary    2019 07:01  -  2019 07:00  --------------------------------------------------------  IN: 110 mL / OUT: 0 mL / NET: 110 mL        Physical Exam:  Appearance: No Acute Distress  HEENT: PERRL  Neck: JVP approx 10 cm w/ mild HJR  Cardiovascular: Normal S1 S2, No murmurs/rubs/gallops  Respiratory: Clear to auscultation bilaterally  Gastrointestinal: Soft, Non-tender	  Skin: No cyanosis	  Neurologic: Non-focal  Extremities: No LE edema  Psychiatry: A & O x 3, Mood & affect appropriate    Labs:                        10.0   8.91  )-----------( 222      ( 2019 07:10 )             33.0         136  |  105  |  33<H>  ----------------------------<  104<H>  4.2   |  19<L>  |  2.14<H>    Ca    9.5      2019 07:10  Phos  3.7       Mg     1.8           PT/INR - ( 2019 07:10 )   PT: 13.5 SEC;   INR: 1.21          PTT - ( 2019 07:10 )  PTT:68.1 SEC

## 2019-04-30 NOTE — PHYSICAL THERAPY INITIAL EVALUATION ADULT - CRITERIA FOR SKILLED THERAPEUTIC INTERVENTIONS
impairments found/rehab potential
functional limitations in following categories/impairments found/rehab potential

## 2019-04-30 NOTE — PROGRESS NOTE ADULT - ASSESSMENT
64 yo woman with multiple medical comorbidities including afib, right-sided CHF with severe pulmonary HTN, COPD on nocturnal O2, LINH newly initiated on bilevel support found to have a non-obstructing sigmoid mass s/p left hemicolectomy (4/24/19), pt in SICU due to persistently uncontrolled a fib.     - Diet: Regular  - F/u PT evaluation  - F/u INR  - Coumadin 5 mg tonight   - OOB as tolerated  - DVT ppx: HepSubQ      A Team Surgery  [id number]

## 2019-04-30 NOTE — PHYSICAL THERAPY INITIAL EVALUATION ADULT - IMPAIRMENTS CONTRIBUTING TO GAIT DEVIATIONS, PT EVAL
decreased strength/impaired postural control/narrow base of support/impaired balance
decreased strength

## 2019-04-30 NOTE — PHYSICAL THERAPY INITIAL EVALUATION ADULT - FOLLOWS COMMANDS/ANSWERS QUESTIONS, REHAB EVAL
100% of the time
able to follow single-step instructions/able to follow multistep instructions/100% of the time

## 2019-04-30 NOTE — PROGRESS NOTE ADULT - SUBJECTIVE AND OBJECTIVE BOX
Patient is a 63y old  Female who presents with a chief complaint of Generalized weakness (28 Apr 2019 01:50)      SUBJECTIVE / OVERNIGHT EVENTS:    S/p surgery a few days ago. Still has diarrhea on eating anything. Pain on movement +  Review of Systems:   CONSTITUTIONAL: No fever, weight loss, or fatigue  EYES: No eye pain, visual disturbances, or discharge  ENMT:  No difficulty hearing, tinnitus, vertigo; No sinus or throat pain  NECK: No pain or stiffness  BREASTS: No pain, masses, or nipple discharge  RESPIRATORY: No cough, wheezing, chills or hemoptysis; Mild shortness of breath  CARDIOVASCULAR: No chest pain, palpitations, dizziness, or leg swelling  GASTROINTESTINAL: No abdominal or epigastric pain. No vomiting, or hematemesis; No melena or hematochezia.  GENITOURINARY: No dysuria, frequency, hematuria, or incontinence  NEUROLOGICAL: No headaches, memory loss, loss of strength, numbness, or tremors  SKIN: No itching, burning, rashes, or lesions   LYMPH NODES: No enlarged glands  ENDOCRINE: No heat or cold intolerance; No hair loss  MUSCULOSKELETAL: No joint pain or swelling; No muscle, back, or extremity pain  PSYCHIATRIC: No depression, anxiety, mood swings, or difficulty sleeping  HEME/LYMPH: No easy bruising, or bleeding gums  ALLERY AND IMMUNOLOGIC: No hives or eczema    MEDICATIONS  (STANDING):  allopurinol 300 milliGRAM(s) Oral daily  buDESOnide  80 MICROgram(s)/formoterol 4.5 MICROgram(s) Inhaler 2 Puff(s) Inhalation two times a day  chlorhexidine 4% Liquid 1 Application(s) Topical <User Schedule>  dextrose 50% Injectable 25 Gram(s) IV Push once  dextrose 50% Injectable 25 Gram(s) IV Push once  digoxin     Tablet 0.125 milliGRAM(s) Oral daily  heparin  Infusion 1500 Unit(s)/Hr (11 mL/Hr) IV Continuous <Continuous>  insulin lispro (HumaLOG) corrective regimen sliding scale   SubCutaneous every 6 hours  metoprolol tartrate 50 milliGRAM(s) Oral two times a day  multivitamin 1 Tablet(s) Oral daily  pantoprazole  Injectable 40 milliGRAM(s) IV Push every 12 hours  sildenafil (REVATIO) 10 milliGRAM(s) Oral three times a day  sodium chloride 0.9% lock flush 3 milliLiter(s) IV Push every 8 hours  torsemide 20 milliGRAM(s) Oral <User Schedule>    MEDICATIONS  (PRN):  ALBUTerol/ipratropium for Nebulization 3 milliLiter(s) Nebulizer every 6 hours PRN Shortness of Breath and/or Wheezing  dextrose 40% Gel 15 Gram(s) Oral once PRN Blood Glucose LESS THAN 70 milliGRAM(s)/deciliter  glucagon  Injectable 1 milliGRAM(s) IntraMuscular once PRN Glucose LESS THAN 70 milligrams/deciliter  guaiFENesin  milliGRAM(s) Oral every 12 hours PRN Cough  oxyCODONE    IR 5 milliGRAM(s) Oral every 6 hours PRN Moderate Pain (4 - 6)      PHYSICAL EXAM:  Vital Signs Last 24 Hrs  T(C): 36.7 (28 Apr 2019 12:00), Max: 37.1 (28 Apr 2019 04:00)  T(F): 98 (28 Apr 2019 12:00), Max: 98.7 (28 Apr 2019 04:00)  HR: 92 (28 Apr 2019 12:00) (87 - 100)  BP: 139/97 (28 Apr 2019 12:00) (123/89 - 163/110)  BP(mean): 105 (28 Apr 2019 12:00) (91 - 122)  RR: 23 (28 Apr 2019 12:00) (18 - 25)  SpO2: 100% (28 Apr 2019 12:00) (93% - 100%)  I&O's Summary    27 Apr 2019 07:01  -  28 Apr 2019 07:00  --------------------------------------------------------  IN: 476 mL / OUT: 1100 mL / NET: -624 mL    28 Apr 2019 07:01  -  28 Apr 2019 14:35  --------------------------------------------------------  IN: 486 mL / OUT: 0 mL / NET: 486 mL      GENERAL: NAD, well-developed  HEAD:  Atraumatic, Normocephalic  EYES: EOMI, PERRLA, conjunctiva and sclera clear  NECK: Supple, No JVD  CHEST/LUNG: Clear to auscultation bilaterally; No wheeze  HEART: Regular rate and rhythm; No murmurs, rubs, or gallops  ABDOMEN: Soft, Nontender, Nondistended; Bowel sounds present  EXTREMITIES:  2+ Peripheral Pulses, No clubbing, cyanosis, or edema  PSYCH: AAOx3  NEUROLOGY: non-focal  SKIN: No rashes or lesions    LABS:  CAPILLARY BLOOD GLUCOSE      POCT Blood Glucose.: 114 mg/dL (28 Apr 2019 11:41)  POCT Blood Glucose.: 117 mg/dL (28 Apr 2019 05:50)  POCT Blood Glucose.: 162 mg/dL (27 Apr 2019 23:32)  POCT Blood Glucose.: 135 mg/dL (27 Apr 2019 18:20)                          10.1   9.79  )-----------( 227      ( 27 Apr 2019 23:30 )             32.9     04-28    139  |  107  |  12  ----------------------------<  125<H>  4.2   |  20<L>  |  1.44<H>    Ca    10.1      28 Apr 2019 02:48  Phos  3.3     04-28  Mg     2.4     04-28      PTT - ( 27 Apr 2019 23:30 )  PTT:69.3 SEC          RADIOLOGY & ADDITIONAL TESTS:    Imaging Personally Reviewed:    Consultant(s) Notes Reviewed:      Care Discussed with Consultants/Other Providers:

## 2019-04-30 NOTE — PHYSICAL THERAPY INITIAL EVALUATION ADULT - PLANNED THERAPY INTERVENTIONS, PT EVAL
transfer training/balance training/gait training/strengthening/bed mobility training
stair training/strengthening/transfer training/bed mobility training/gait training/balance training

## 2019-04-30 NOTE — PROGRESS NOTE ADULT - ASSESSMENT
Briefly, 62 y/o F w/ h/o lymphoma s/p radiation therapy, severe pulmonary HTN, HFpEF, ?obstructive lung disease (never smoker), anemia (? thalassemia trait) who presented on 3/31 with weight loss over past year (? 2/2 Trulicity vs. occult malignancy) as well as 1 month of decreased appetite and abdominal complaints, found to be in acute on chronic kidney injury in setting of influenza/enterovirus infection and possible excessive metolazone use. Renal function improved with cessation of diuretics but given notable pulmonary HTN, underwent RHC on 3/6/19 (w/o vasodilator study) which revealed RA 10, RV 78/14, PA 78/29/45, PCWP 15, sat 47.4%, CO 4.39, CI 2.3>SVR 1659, PVR 6.8 (TPG 30). Was started on sildenafil 10 mg three times/day and underwent V/Q study which was low probability and workup of PH is ongoing. Was also noted to have iron def anemia with guaiac positive stool. 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 Post-op had some afib and was given IVF. Reported some diarrhea. Denies complaints. Exam JVD approx 10 cm with V waves, irreg irreg rhythm, prominent P2, grade III/VI systolic murmur in LLSB, CTAB no pedal edema. Labs reviewed - BUN/Cr 33/2.1 (uptrending; recently 12/1.44 which was improved from admission). Overall has undetermined etiology of pulmonary HTN (possibly contributed to by [facility], small airway disease).   - has elevated JVP and has last received diuretics on 4/25 and 4/28; would continue torsemide 20 mg daily x 2 additional days and see if renal fn improves as could be cardiorenal; if continues to worsen will hold but don't believe she dehydrated despite the diarrhea  - would ideally need vasodilator study to evaluate PH further and determine appropriate medications if determined to be also WHO group I; component of group III  - continue sildenafil 10 mg q8h; ideally would increase to 20 mg q8h  - afib; on toprol XL 50 mg bid (hold for SBP <85)   - reduce dig to every other day

## 2019-04-30 NOTE — PHYSICAL THERAPY INITIAL EVALUATION ADULT - PATIENT PROFILE REVIEW, REHAB EVAL
Pt. profile reviewed, consulted with RN Kerry SOUTH prior to initial PT evaluation and tx, as per RN, Pt. is OK to participate in skilled therapy session, current activity orders; out of bed with assist/yes
yes

## 2019-04-30 NOTE — PROGRESS NOTE ADULT - SUBJECTIVE AND OBJECTIVE BOX
GENERAL SURGERY DAILY PROGRESS NOTE:     Subjective:  Pt seen and examined. No acute events overnight. Having flatus/BM (3 episodes of diarrhea), Coumadin was started yesterday per cardiology recs.      Objective:  Vital Signs Last 24 Hrs  T(C): 36.5 (30 Apr 2019 07:00), Max: 36.7 (30 Apr 2019 02:10)  T(F): 97.7 (30 Apr 2019 07:00), Max: 98.1 (30 Apr 2019 02:10)  HR: 83 (30 Apr 2019 07:00) (63 - 88)  BP: 101/74 (30 Apr 2019 07:00) (88/59 - 116/95)  BP(mean): --  RR: 17 (30 Apr 2019 07:00) (17 - 18)  SpO2: 100% (30 Apr 2019 07:00) (94% - 100%)    I&O's Detail    29 Apr 2019 07:01  -  30 Apr 2019 07:00  --------------------------------------------------------  IN:    heparin Infusion: 110 mL  Total IN: 110 mL    OUT:  Total OUT: 0 mL    Total NET: 110 mL          MEDICATIONS  (STANDING):  acetaminophen   Tablet .. 650 milliGRAM(s) Oral every 6 hours  allopurinol 300 milliGRAM(s) Oral daily  buDESOnide  80 MICROgram(s)/formoterol 4.5 MICROgram(s) Inhaler 2 Puff(s) Inhalation two times a day  dextrose 50% Injectable 25 Gram(s) IV Push once  dextrose 50% Injectable 25 Gram(s) IV Push once  digoxin     Tablet 0.125 milliGRAM(s) Oral daily  heparin  Infusion 1500 Unit(s)/Hr (11 mL/Hr) IV Continuous <Continuous>  insulin lispro (HumaLOG) corrective regimen sliding scale   SubCutaneous Before meals and at bedtime  metoprolol succinate ER 50 milliGRAM(s) Oral two times a day  multivitamin 1 Tablet(s) Oral daily  pantoprazole  Injectable 40 milliGRAM(s) IV Push every 12 hours  sildenafil (REVATIO) 10 milliGRAM(s) Oral three times a day  sodium chloride 0.9% lock flush 3 milliLiter(s) IV Push every 8 hours  torsemide 20 milliGRAM(s) Oral daily    MEDICATIONS  (PRN):  ALBUTerol/ipratropium for Nebulization 3 milliLiter(s) Nebulizer every 6 hours PRN Shortness of Breath and/or Wheezing  dextrose 40% Gel 15 Gram(s) Oral once PRN Blood Glucose LESS THAN 70 milliGRAM(s)/deciliter  glucagon  Injectable 1 milliGRAM(s) IntraMuscular once PRN Glucose LESS THAN 70 milligrams/deciliter  guaiFENesin  milliGRAM(s) Oral every 12 hours PRN Cough  oxyCODONE    IR 5 milliGRAM(s) Oral every 6 hours PRN Pain (1-10)                            10.0   8.91  )-----------( 222      ( 30 Apr 2019 07:10 )             33.0       04-30    136  |  105  |  33<H>  ----------------------------<  104<H>  4.2   |  19<L>  |  2.14<H>    Ca    9.5      30 Apr 2019 07:10  Phos  3.7     04-30  Mg     1.8     04-30            PHYSICAL EXAM  General: NAD, resting comfortably in bed  Pulmonary: Nonlabored breathing, no respiratory distress  Abdominal: soft, ND, appropriately tender to palpation, no RT, no guarding/rebound, incisions clean, dry, intact

## 2019-04-30 NOTE — PROGRESS NOTE ADULT - ATTENDING COMMENTS
Renal attd    -Still with diarrhea at present and the creatinine is up.  Consider dc the demedex and observe  -Heparin gtt at present   -Awaiting path

## 2019-04-30 NOTE — PHYSICAL THERAPY INITIAL EVALUATION ADULT - ADDITIONAL COMMENTS
Pt. reports she ambulates independently house hold distances without an assistive device and community distances with a cane. Pt. has steps at home with unilateral railing to reach bedroom/bathroom. Pt. returned seated at edge of bed with all tubes/lines intact, call bell in reach and in NAD.

## 2019-05-01 ENCOUNTER — TRANSCRIPTION ENCOUNTER (OUTPATIENT)
Age: 64
End: 2019-05-01

## 2019-05-01 LAB
ANION GAP SERPL CALC-SCNC: 14 MMO/L — SIGNIFICANT CHANGE UP (ref 7–14)
APTT BLD: 38.1 SEC — HIGH (ref 27.5–36.3)
BUN SERPL-MCNC: 39 MG/DL — HIGH (ref 7–23)
CALCIUM SERPL-MCNC: 9.2 MG/DL — SIGNIFICANT CHANGE UP (ref 8.4–10.5)
CHLORIDE SERPL-SCNC: 106 MMOL/L — SIGNIFICANT CHANGE UP (ref 98–107)
CHLORIDE UR-SCNC: 63 MMOL/L — SIGNIFICANT CHANGE UP
CO2 SERPL-SCNC: 16 MMOL/L — LOW (ref 22–31)
CREAT ?TM UR-MCNC: 44.7 MG/DL — SIGNIFICANT CHANGE UP
CREAT ?TM UR-MCNC: 55.4 MG/DL — SIGNIFICANT CHANGE UP
CREAT SERPL-MCNC: 1.77 MG/DL — HIGH (ref 0.5–1.3)
GLUCOSE BLDC GLUCOMTR-MCNC: 106 MG/DL — HIGH (ref 70–99)
GLUCOSE BLDC GLUCOMTR-MCNC: 111 MG/DL — HIGH (ref 70–99)
GLUCOSE BLDC GLUCOMTR-MCNC: 119 MG/DL — HIGH (ref 70–99)
GLUCOSE BLDC GLUCOMTR-MCNC: 122 MG/DL — HIGH (ref 70–99)
GLUCOSE SERPL-MCNC: 130 MG/DL — HIGH (ref 70–99)
HCT VFR BLD CALC: 30.8 % — LOW (ref 34.5–45)
HGB BLD-MCNC: 9.4 G/DL — LOW (ref 11.5–15.5)
INR BLD: 2.44 — HIGH (ref 0.88–1.17)
MAGNESIUM SERPL-MCNC: 1.8 MG/DL — SIGNIFICANT CHANGE UP (ref 1.6–2.6)
MCHC RBC-ENTMCNC: 21.5 PG — LOW (ref 27–34)
MCHC RBC-ENTMCNC: 30.5 % — LOW (ref 32–36)
MCV RBC AUTO: 70.3 FL — LOW (ref 80–100)
NRBC # FLD: 0.06 K/UL — SIGNIFICANT CHANGE UP (ref 0–0)
PHOSPHATE SERPL-MCNC: 3.1 MG/DL — SIGNIFICANT CHANGE UP (ref 2.5–4.5)
PLATELET # BLD AUTO: 264 K/UL — SIGNIFICANT CHANGE UP (ref 150–400)
PMV BLD: SIGNIFICANT CHANGE UP FL (ref 7–13)
POTASSIUM SERPL-MCNC: 4 MMOL/L — SIGNIFICANT CHANGE UP (ref 3.5–5.3)
POTASSIUM SERPL-SCNC: 4 MMOL/L — SIGNIFICANT CHANGE UP (ref 3.5–5.3)
POTASSIUM UR-SCNC: 16.9 MMOL/L — SIGNIFICANT CHANGE UP
PROTHROM AB SERPL-ACNC: 27.9 SEC — HIGH (ref 9.8–13.1)
RBC # BLD: 4.38 M/UL — SIGNIFICANT CHANGE UP (ref 3.8–5.2)
RBC # FLD: 22.2 % — HIGH (ref 10.3–14.5)
SODIUM SERPL-SCNC: 136 MMOL/L — SIGNIFICANT CHANGE UP (ref 135–145)
SODIUM UR-SCNC: 50 MMOL/L — SIGNIFICANT CHANGE UP
SODIUM UR-SCNC: 54 MMOL/L — SIGNIFICANT CHANGE UP
WBC # BLD: 9.71 K/UL — SIGNIFICANT CHANGE UP (ref 3.8–10.5)
WBC # FLD AUTO: 9.71 K/UL — SIGNIFICANT CHANGE UP (ref 3.8–10.5)

## 2019-05-01 PROCEDURE — 99233 SBSQ HOSP IP/OBS HIGH 50: CPT

## 2019-05-01 RX ORDER — APIXABAN 2.5 MG/1
5 TABLET, FILM COATED ORAL EVERY 12 HOURS
Qty: 0 | Refills: 0 | Status: DISCONTINUED | OUTPATIENT
Start: 2019-05-01 | End: 2019-05-01

## 2019-05-01 RX ORDER — PANTOPRAZOLE SODIUM 20 MG/1
40 TABLET, DELAYED RELEASE ORAL
Qty: 0 | Refills: 0 | Status: DISCONTINUED | OUTPATIENT
Start: 2019-05-01 | End: 2019-05-03

## 2019-05-01 RX ADMIN — Medication 50 MILLIGRAM(S): at 18:58

## 2019-05-01 RX ADMIN — Medication 20 MILLIGRAM(S): at 23:24

## 2019-05-01 RX ADMIN — Medication 650 MILLIGRAM(S): at 06:59

## 2019-05-01 RX ADMIN — OXYCODONE HYDROCHLORIDE 5 MILLIGRAM(S): 5 TABLET ORAL at 07:30

## 2019-05-01 RX ADMIN — SODIUM CHLORIDE 3 MILLILITER(S): 9 INJECTION INTRAMUSCULAR; INTRAVENOUS; SUBCUTANEOUS at 13:01

## 2019-05-01 RX ADMIN — Medication 300 MILLIGRAM(S): at 13:00

## 2019-05-01 RX ADMIN — Medication 1 TABLET(S): at 13:01

## 2019-05-01 RX ADMIN — BUDESONIDE AND FORMOTEROL FUMARATE DIHYDRATE 2 PUFF(S): 160; 4.5 AEROSOL RESPIRATORY (INHALATION) at 23:24

## 2019-05-01 RX ADMIN — Medication 10 MILLIGRAM(S): at 06:59

## 2019-05-01 RX ADMIN — BUDESONIDE AND FORMOTEROL FUMARATE DIHYDRATE 2 PUFF(S): 160; 4.5 AEROSOL RESPIRATORY (INHALATION) at 11:34

## 2019-05-01 RX ADMIN — Medication 650 MILLIGRAM(S): at 18:58

## 2019-05-01 RX ADMIN — SODIUM CHLORIDE 3 MILLILITER(S): 9 INJECTION INTRAMUSCULAR; INTRAVENOUS; SUBCUTANEOUS at 22:52

## 2019-05-01 RX ADMIN — Medication 20 MILLIGRAM(S): at 15:02

## 2019-05-01 RX ADMIN — OXYCODONE HYDROCHLORIDE 5 MILLIGRAM(S): 5 TABLET ORAL at 06:59

## 2019-05-01 RX ADMIN — Medication 650 MILLIGRAM(S): at 07:30

## 2019-05-01 RX ADMIN — Medication 20 MILLIGRAM(S): at 06:59

## 2019-05-01 RX ADMIN — PANTOPRAZOLE SODIUM 40 MILLIGRAM(S): 20 TABLET, DELAYED RELEASE ORAL at 07:03

## 2019-05-01 RX ADMIN — OXYCODONE HYDROCHLORIDE 5 MILLIGRAM(S): 5 TABLET ORAL at 23:23

## 2019-05-01 RX ADMIN — Medication 650 MILLIGRAM(S): at 23:23

## 2019-05-01 RX ADMIN — Medication 50 MILLIGRAM(S): at 06:59

## 2019-05-01 RX ADMIN — Medication 650 MILLIGRAM(S): at 12:57

## 2019-05-01 RX ADMIN — SODIUM CHLORIDE 3 MILLILITER(S): 9 INJECTION INTRAMUSCULAR; INTRAVENOUS; SUBCUTANEOUS at 05:40

## 2019-05-01 RX ADMIN — Medication 650 MILLIGRAM(S): at 00:01

## 2019-05-01 NOTE — PROGRESS NOTE ADULT - ASSESSMENT
Briefly, 64 y/o F w/ h/o lymphoma s/p radiation therapy, severe pulmonary HTN, HFpEF, ?obstructive lung disease (never smoker), anemia (? thalassemia trait) who presented on 3/31 with weight loss over past year (? 2/2 Trulicity vs. occult malignancy) as well as 1 month of decreased appetite and abdominal complaints, found to be in acute on chronic kidney injury in setting of influenza/enterovirus infection and possible excessive metolazone use. Renal function improved with cessation of diuretics but given notable pulmonary HTN, underwent RHC on 3/6/19 (w/o vasodilator study) which revealed RA 10, RV 78/14, PA 78/29/45, PCWP 15, sat 47.4%, CO 4.39, CI 2.3>SVR 1659, PVR 6.8 (TPG 30). Was started on sildenafil 10 mg three times/day and underwent V/Q study which was low probability and workup of PH is ongoing. Was also noted to have iron def anemia with guaiac positive stool. 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 Post-op had some afib and was given IVF. Reported some diarrhea. Denies complaints. Exam JVD approx 10 cm with V waves, irreg irreg rhythm, prominent P2, grade III/VI systolic murmur in LLSB, CTAB no pedal edema. Labs reviewed - pending today; BUN/Cr 33/2.1 (uptrending; recently 12/1.44 which was improved from admission). Overall has undetermined etiology of pulmonary HTN (possibly contributed to by [facility], small airway disease).   - has elevated JVP; continue torsemide 20 mg daily for additional day and see if renal fn improves as could be cardiorenal then reduce to every other day; if continues to worsen will hold but don't believe she dehydrated despite the diarrhea  - would ideally need vasodilator study to evaluate PH further and determine appropriate medications if determined to be also WHO group I; component of group III  - given BP improved, increase sildenafil to 20 mg q8h (hold for SBP <90)  - afib; on toprol XL 50 mg bid (hold for SBP <85)   - continue dig every other day; check dig level prior to dose on Friday

## 2019-05-01 NOTE — DISCHARGE NOTE NURSING/CASE MANAGEMENT/SOCIAL WORK - NSDCDPATPORTLINK_GEN_ALL_CORE
You can access the [facility] Patient Portal, offered by [facility], by registering with the following website: [email or web address]

## 2019-05-01 NOTE — PROGRESS NOTE ADULT - SUBJECTIVE AND OBJECTIVE BOX
Interval History:  feels well  diarrhea improved  denies CP/SOB    Medications:  acetaminophen   Tablet .. 650 milliGRAM(s) Oral every 6 hours  ALBUTerol/ipratropium for Nebulization 3 milliLiter(s) Nebulizer every 6 hours PRN  allopurinol 300 milliGRAM(s) Oral daily  buDESOnide  80 MICROgram(s)/formoterol 4.5 MICROgram(s) Inhaler 2 Puff(s) Inhalation two times a day  dextrose 40% Gel 15 Gram(s) Oral once PRN  dextrose 50% Injectable 25 Gram(s) IV Push once  dextrose 50% Injectable 25 Gram(s) IV Push once  glucagon  Injectable 1 milliGRAM(s) IntraMuscular once PRN  guaiFENesin  milliGRAM(s) Oral every 12 hours PRN  heparin  Infusion 1500 Unit(s)/Hr IV Continuous <Continuous>  insulin lispro (HumaLOG) corrective regimen sliding scale   SubCutaneous Before meals and at bedtime  metoprolol succinate ER 50 milliGRAM(s) Oral two times a day  multivitamin 1 Tablet(s) Oral daily  oxyCODONE    IR 5 milliGRAM(s) Oral every 6 hours PRN  pantoprazole    Tablet 40 milliGRAM(s) Oral before breakfast  sildenafil (REVATIO) 20 milliGRAM(s) Oral three times a day  sodium chloride 0.9% lock flush 3 milliLiter(s) IV Push every 8 hours  torsemide 20 milliGRAM(s) Oral daily      Vitals:  T(C): 36.9 (05-01-19 @ 09:46), Max: 36.9 (04-30-19 @ 14:32)  HR: 70 (05-01-19 @ 10:00) (51 - 89)  BP: 117/79 (05-01-19 @ 09:46) (104/74 - 128/78)  BP(mean): --  RR: 18 (05-01-19 @ 09:46) (17 - 18)  SpO2: 99% (05-01-19 @ 09:46) (98% - 99%)    Daily     Daily         I&O's Summary    30 Apr 2019 07:01  -  01 May 2019 07:00  --------------------------------------------------------  IN: 132 mL / OUT: 200 mL / NET: -68 mL    Physical Exam:  Appearance: No Acute Distress  HEENT: PERRL  Neck: JVP approx 8-10 with HJR  Cardiovascular: irreg irreg rhythm; prominent P2  Respiratory: Clear to auscultation bilaterally  Gastrointestinal: Soft, Non-tender	  Skin: No cyanosis	  Neurologic: Non-focal  Extremities: No LE edema  Psychiatry: A & O x 3, Mood & affect appropriate    Labs:                        9.4    9.71  )-----------( 264      ( 01 May 2019 10:45 )             30.8     04-30    136  |  105  |  33<H>  ----------------------------<  104<H>  4.2   |  19<L>  |  2.14<H>    Ca    9.5      30 Apr 2019 07:10  Phos  3.7     04-30  Mg     1.8     04-30      PT/INR - ( 30 Apr 2019 07:10 )   PT: 13.5 SEC;   INR: 1.21          PTT - ( 30 Apr 2019 07:10 )  PTT:68.1 SEC

## 2019-05-01 NOTE — DISCHARGE NOTE PROVIDER - NSDCFUADDINST_GEN_ALL_CORE_FT
Per Heart Failure Team - Torsemide 20mg every other day for discharge, Sildenafil 20mg q 8 hours, Digoxin 0.125mg every other day. Per Heart Failure Team - Torsemide 20mg every other day for discharge, Sildenafil, Digoxin 0.125mg every other day.

## 2019-05-01 NOTE — PROGRESS NOTE ADULT - ASSESSMENT
64 yo woman with multiple medical comorbidities including afib, right-sided CHF with severe pulmonary HTN, COPD on nocturnal O2, LIN[facility] newly initiated on bilevel support found to have a non-obstructing sigmoid mass s/p left hemicolectomy (4/24/19), pt in SICU due to persistently uncontrolled a fib, now on floor.     - Diet: Regular  - F/u PT evaluation  - F/u INR, dose coumadin appropriately  - f/u heart failure, renal  - EKG and digoxin level thursday  - OOB as tolerated  - Hep gtt - bridging to lovenox    A Team Surgery  [id number]

## 2019-05-01 NOTE — DISCHARGE NOTE PROVIDER - NSDCCPCAREPLAN_GEN_ALL_CORE_FT
PRINCIPAL DISCHARGE DIAGNOSIS  Diagnosis: Colon cancer  Assessment and Plan of Treatment:       SECONDARY DISCHARGE DIAGNOSES  Diagnosis: Atrial fibrillation  Assessment and Plan of Treatment:     Diagnosis: Chronic CHF  Assessment and Plan of Treatment:     Diagnosis: OSMAR (acute kidney injury)  Assessment and Plan of Treatment:

## 2019-05-01 NOTE — DISCHARGE NOTE PROVIDER - HOSPITAL COURSE
63Fwith multiple comorbidities, including history of R-sided heart failure with preserved EF (EF 66% on TTE in 10/2017), a fib on eliquis, severe pulm HTN, mod-severe TR, COPD (on 2L NC at night), LINH (being arranged for bilevel support), HTN, HLD, DM2, and gout p/w generalized weakness for 1 week.        s/p colonoscopy diagnostic lap converted to exlap, partial left hemicolectomy, side to side stapled anastomosis (4/24/19), post op with rapid afib         Patient had a colonoscopy on 4/12/2019 that showed:    Polypoid mass in the proximal sigmoid colon, Biopsy result was positive for adenocarcinoma.        Patient was followed by internal medicine/ nephrology/ cardiology/ pulmonology/ colorectal surgery        Due to positive biopsy result, patient was medically optimized for surgery.        Patient went to the O.R. on 4/24/2019 and had the following procedure:    colonoscopy and identification of splenic flexure mass, diagnostic laparoscopy, exlap, partial left hemicolectomy         Post-op patient was treated in the surgical ICU due to rapid atrial fibrillation.  Patient was stabalized and rate-controled with digoxin.        Patient was transferred out of the ICU to the floor and her diet was advanced gradually as tolerated.        Prior to discharge, patient began to complain of sore throat.  Patient stated that her right neck fullness was also causing discomfort.  Patient had a CT of her neck performed which showed a known right thyroid lesion that was also demonstrated on ultrasound on 4/8/2019        Findings of thyroid ultrasound 4/8/2019:-------------------------------------------------    FINDINGS:        Right Lobe: 4 x 2.8 x 3.7 cm. Mainly solid nodule with small central     cystic component 3.3 x 3.3 x 2.6 cm        Left Lobe: 4.8 x 1.8 x 1.7 cm. Mid posterior heterogeneous nodule 1.6 x 1     x 0.8 cm, indeterminant.        Isthmus: 4 mm.            Cervical Lymph Nodes: No enlarged or abnormal morphology cervical nodes.        IMPRESSION:         Bilateral indeterminant thyroid nodules.    --------------------------------------------------------------------------------------------------------------------------        CT scan of neck on 5/3/2019 was performed to evaluate right neck to rule out abscess.    This study demonstrated same right lesion noted on ultrasound, no signs of abscess        Patient is aware of her neck imaging study results and agrees to having out-patient follow up        Patient is tolerating a regular diet, ambulating will with walker (she agrees to out-patient physical therapy as recommended per PT service), patient also agrees to follow up with her medical doctors out-patient. 63Fwith multiple comorbidities, including history of R-sided heart failure with preserved EF (EF 66% on TTE in 10/2017), a fib on eliquis, severe pulm HTN, mod-severe TR, COPD (on 2L NC at night), LINH (being arranged for bilevel support), HTN, HLD, DM2, and gout p/w generalized weakness for 1 week.        s/p colonoscopy diagnostic lap converted to exlap, partial left hemicolectomy, side to side stapled anastomosis (4/24/19), post op with rapid afib         Patient had a colonoscopy on 4/12/2019 that showed:    Polypoid mass in the proximal sigmoid colon, Biopsy result was positive for adenocarcinoma.        Patient was followed by internal medicine/ nephrology/ cardiology/ pulmonology/ colorectal surgery        Due to positive biopsy result, patient was medically optimized for surgery.        Patient went to the O.R. on 4/24/2019 and had the following procedure:    colonoscopy and identification of splenic flexure mass, diagnostic laparoscopy, exlap, partial left hemicolectomy         Post-op patient was treated in the surgical ICU due to rapid atrial fibrillation.  Patient was stabalized and rate-controled with digoxin.        Patient was transferred out of the ICU to the floor and her diet was advanced gradually as tolerated.        Prior to discharge, patient began to complain of sore throat.  Patient stated that her right neck fullness was also causing discomfort.  Patient had a CT of her neck performed which showed a known right thyroid lesion that was also demonstrated on ultrasound on 4/8/2019        Findings of thyroid ultrasound 4/8/2019:-------------------------------------------------    FINDINGS:        Right Lobe: 4 x 2.8 x 3.7 cm. Mainly solid nodule with small central     cystic component 3.3 x 3.3 x 2.6 cm        Left Lobe: 4.8 x 1.8 x 1.7 cm. Mid posterior heterogeneous nodule 1.6 x 1     x 0.8 cm, indeterminant.        Isthmus: 4 mm.            Cervical Lymph Nodes: No enlarged or abnormal morphology cervical nodes.        IMPRESSION:         Bilateral indeterminant thyroid nodules.    --------------------------------------------------------------------------------------------------------------------------        CT scan of neck on 5/3/2019 was performed to evaluate right neck to rule out abscess.    This study demonstrated same right lesion noted on ultrasound, no signs of abscess        Patient is aware of her neck imaging study results and agrees to having out-patient follow up        Patient is tolerating a regular diet, ambulating will with walker (she agrees to out-patient physical therapy as recommended per PT service), patient also agrees to follow up with her medical doctors out-patient.  She is stable to be discharged on her home oxygen (she was taking home oxygen before admission)

## 2019-05-01 NOTE — DISCHARGE NOTE PROVIDER - CARE PROVIDER_API CALL
Jay Degroot)  [facility]  [address] [address] [place] [address]  Phone: [phone]  Fax: [phone]  Follow Up Time:     Francesco Francisco)  Internal Medicine  [address] [facility], [address]  [place] [address]  Phone: [phone]  Fax: [phone]  Follow Up Time:     Yaritza Heart)  Internal Medicine; Nephrology  [address] [place] [address], [address]  [place] [address]  Phone: [phone]  Fax: [phone]  Follow Up Time:     Matias Hunt)  ColonRectal Surgery; Surgery  [facility], [address] [place] [address]  Phone: [phone]  Fax: [phone]  Follow Up Time: Jay Degroot)  [facility]  [address] [place] [address]  Phone: [phone]  Fax: [phone]  Follow Up Time:     Francesco Francisco)  Internal Medicine  [address] [facility], [address]  [place] [address]  Phone: [phone]  Fax: [phone]  Follow Up Time:     Yaritza Heart)  Internal Medicine; Nephrology  [address] [place] [address], [address]  [place] [address]  Phone: [phone]  Fax: [phone]  Follow Up Time:     Matias Hunt)  ColonRectal Surgery; Surgery  [facility], [address] [place] [address]  Phone: [phone]  Fax: [phone]  Follow Up Time:     [facility],   Phone: [phone]  Fax: (   )    -  Follow Up Time:

## 2019-05-01 NOTE — PROGRESS NOTE ADULT - SUBJECTIVE AND OBJECTIVE BOX
GENERAL SURGERY DAILY PROGRESS NOTE:     Subjective:  Pt seen and examined. No acute events overnight. Tolerating diet. Pain well controlled. Continues to have loose bms and gas. Cr bump yesterday to 2 (has been uptrending), per cards will continue diuretic for now. Digoxin now every other day, will need level and ekg on Thursday.     Objective:    MEDICATIONS  (STANDING):  acetaminophen   Tablet .. 650 milliGRAM(s) Oral every 6 hours  allopurinol 300 milliGRAM(s) Oral daily  buDESOnide  80 MICROgram(s)/formoterol 4.5 MICROgram(s) Inhaler 2 Puff(s) Inhalation two times a day  dextrose 50% Injectable 25 Gram(s) IV Push once  dextrose 50% Injectable 25 Gram(s) IV Push once  heparin  Infusion 1500 Unit(s)/Hr (11 mL/Hr) IV Continuous <Continuous>  insulin lispro (HumaLOG) corrective regimen sliding scale   SubCutaneous Before meals and at bedtime  metoprolol succinate ER 50 milliGRAM(s) Oral two times a day  multivitamin 1 Tablet(s) Oral daily  pantoprazole  Injectable 40 milliGRAM(s) IV Push every 12 hours  sildenafil (REVATIO) 10 milliGRAM(s) Oral three times a day  sodium chloride 0.9% lock flush 3 milliLiter(s) IV Push every 8 hours  torsemide 20 milliGRAM(s) Oral daily    MEDICATIONS  (PRN):  ALBUTerol/ipratropium for Nebulization 3 milliLiter(s) Nebulizer every 6 hours PRN Shortness of Breath and/or Wheezing  dextrose 40% Gel 15 Gram(s) Oral once PRN Blood Glucose LESS THAN 70 milliGRAM(s)/deciliter  glucagon  Injectable 1 milliGRAM(s) IntraMuscular once PRN Glucose LESS THAN 70 milligrams/deciliter  guaiFENesin  milliGRAM(s) Oral every 12 hours PRN Cough  oxyCODONE    IR 5 milliGRAM(s) Oral every 6 hours PRN Pain (1-10)      Vital Signs Last 24 Hrs  T(C): 36.8 (30 Apr 2019 21:23), Max: 36.9 (30 Apr 2019 14:32)  T(F): 98.2 (30 Apr 2019 21:23), Max: 98.4 (30 Apr 2019 14:32)  HR: 89 (30 Apr 2019 21:23) (66 - 89)  BP: 128/78 (30 Apr 2019 21:23) (101/74 - 128/78)  BP(mean): --  RR: 17 (30 Apr 2019 21:23) (16 - 18)  SpO2: 99% (30 Apr 2019 21:23) (97% - 100%)    I&O's Detail    29 Apr 2019 07:01  -  30 Apr 2019 07:00  --------------------------------------------------------  IN:    heparin Infusion: 110 mL  Total IN: 110 mL    OUT:  Total OUT: 0 mL    Total NET: 110 mL      30 Apr 2019 07:01  -  01 May 2019 01:17  --------------------------------------------------------  IN:    heparin Infusion: 44 mL  Total IN: 44 mL    OUT:    Voided: 200 mL  Total OUT: 200 mL    Total NET: -156 mL          Daily     Daily     LABS:                        10.0   8.91  )-----------( 222      ( 30 Apr 2019 07:10 )             33.0     04-30    136  |  105  |  33<H>  ----------------------------<  104<H>  4.2   |  19<L>  |  2.14<H>    Ca    9.5      30 Apr 2019 07:10  Phos  3.7     04-30  Mg     1.8     04-30      PT/INR - ( 30 Apr 2019 07:10 )   PT: 13.5 SEC;   INR: 1.21          PTT - ( 30 Apr 2019 07:10 )  PTT:68.1 SEC      RADIOLOGY & ADDITIONAL STUDIES:    PHYSICAL EXAM  General: NAD, resting comfortably in bed  Pulmonary: Nonlabored breathing, no respiratory distress  Abdominal: soft, ND, appropriately tender to palpation, no RT, no guarding/rebound, incisions clean, dry, intact

## 2019-05-01 NOTE — CHART NOTE - NSCHARTNOTEFT_GEN_A_CORE
Source: Patient [X ]    Family [ ]     other [X ] electronic chart, RN     Diet : Consistent Carbohydrate (no snack), DASH/TLC (cholesterol and sodium restricted), Ensure Enlive 240mls 3x daily (1050kcal, 60g protein).     Patient seen for nutrition follow-up. Patient reports tolerating diet well, consuming good po intake at this time. Patient denies any nausea/vomiting/diarrhea/constipation or difficulty chewing and swallowing at this time. States has been having loose stools past few days, but no episode today. Patient         Current Weight: Weight (kg): 89.7 (04-29 @ 06:33)      Pertinent Medications: acetaminophen   Tablet ..  allopurinol  buDESOnide  80 MICROgram(s)/formoterol 4.5 MICROgram(s) Inhaler  dextrose 50% Injectable  dextrose 50% Injectable  insulin lispro (HumaLOG) corrective regimen sliding scale  metoprolol succinate ER  multivitamin  pantoprazole    Tablet  sildenafil (REVATIO)  sodium chloride 0.9% lock flush  torsemide    Pertinent Labs:  05-01 Na136 mmol/L Glu 130 mg/dL<H> K+ 4.0 mmol/L Cr  1.77 mg/dL<H> BUN 39 mg/dL<H> 05-01 Phos 3.1 mg/dL 04-02 [id number] 5.7 %<H>      Skin:     Estimated Needs:   [ ] no change since previous assessment  [ ] recalculated:       Previous Nutrition Diagnosis:     [ ] Inadequate Energy Intake [ ]Inadequate Oral Intake [ ] Excessive Energy Intake     [ ] Underweight [ ] Increased Nutrient Needs [ ] Overweight/Obesity     [ ] Altered GI Function [ ] Unintended Weight Loss [ ] Food & Nutrition Related Knowledge Deficit [ ] Malnutrition      Nutrition Diagnosis is [ ] ongoing  [ ] resolved [ ] not applicable          Additional Recommendations: Source: Patient [X ]    Family [ ]     other [X ] electronic chart, RN     Diet : Consistent Carbohydrate (no snack), DASH/TLC (cholesterol and sodium restricted), Ensure Enlive 240mls 3x daily (1050kcal, 60g protein).     Patient seen for nutrition follow-up. Patient reports tolerating diet well, consuming good po intake at this time. Patient denies any nausea/vomiting/diarrhea/constipation or difficulty chewing and swallowing at this time. States has been having loose stools past few days, but no further episodes today reported. Encouraged small frequent po intakes of nutrient and protein dense foods and to have po supplements between meals.     Weight (kg): 89.7 (04-29 @ 06:33) (4/10) 71.3 kg ?accuracy of documented weight likely related to combination of bedscale discrepancy and fluid shift.       Pertinent Medications: acetaminophen   Tablet ..  allopurinol  buDESOnide  80 MICROgram(s)/formoterol 4.5 MICROgram(s) Inhaler  dextrose 50% Injectable  dextrose 50% Injectable  insulin lispro (HumaLOG) corrective regimen sliding scale  metoprolol succinate ER  multivitamin  pantoprazole    Tablet  sildenafil (REVATIO)  sodium chloride 0.9% lock flush  torsemide    Pertinent Labs:  05-01 Na136 mmol/L Glu 130 mg/dL<H> K+ 4.0 mmol/L Cr  1.77 mg/dL<H> BUN 39 mg/dL<H> 05-01 Phos 3.1 mg/dL 04-02 [id number] 5.7 %<H>      Skin: surgical incision noted.     Estimated Needs:   [ X] no change since previous assessment  [ ] recalculated:       Previous Nutrition Diagnosis:     [ ] Inadequate Energy Intake [ ]Inadequate Oral Intake [ ] Excessive Energy Intake     [ ] Underweight [ ] Increased Nutrient Needs [ ] Overweight/Obesity     [ ] Altered GI Function [ ] Unintended Weight Loss [ ] Food & Nutrition Related Knowledge Deficit [X ] Malnutrition, Severe     Nutrition Diagnosis is [X ] ongoing  [ ] resolved [ ] not applicable      Additional Recommendations:  1. Recommend change diet to Low sodium, Low fiber diet and d/c Consistent Carbohydrate diet restriction given HbA1c 5.7 (4/2/19). Continue Ensure Enlive 240mls 2x daily (700kcal, 40g protein) for optimal nutrition.   2. Monitor weights, labs, BM's, skin integrity, p.o. intake.  3. Please Encourage po intake, assist with meals and menu selections, provide alternatives PRN.

## 2019-05-01 NOTE — DISCHARGE NOTE PROVIDER - PROVIDER TOKENS
PROVIDER:[TOKEN:[[id number]]],PROVIDER:[TOKEN:[[id number]]],PROVIDER:[TOKEN:[[id number]]],PROVIDER:[TOKEN:[[id number]]] PROVIDER:[TOKEN:[[id number]]],PROVIDER:[TOKEN:[[id number]]],PROVIDER:[TOKEN:[[id number]]],PROVIDER:[TOKEN:[[id number]]],FREE:[LAST:[[facility]],PHONE:[phone]],FAX:[(   )    -]]

## 2019-05-01 NOTE — DISCHARGE NOTE PROVIDER - CARE PROVIDERS DIRECT ADDRESSES
,[email or web address],[email or web address],DirectAddress_Unknown,[email or web address],DirectAddress_Unknown,DirectAddress_Unknown,[email or web address],DirectAddress_Unknown

## 2019-05-02 LAB
ANION GAP SERPL CALC-SCNC: 13 MMO/L — SIGNIFICANT CHANGE UP (ref 7–14)
APTT BLD: 43 SEC — HIGH (ref 27.5–36.3)
BUN SERPL-MCNC: 42 MG/DL — HIGH (ref 7–23)
CALCIUM SERPL-MCNC: 9.3 MG/DL — SIGNIFICANT CHANGE UP (ref 8.4–10.5)
CHLORIDE SERPL-SCNC: 105 MMOL/L — SIGNIFICANT CHANGE UP (ref 98–107)
CO2 SERPL-SCNC: 18 MMOL/L — LOW (ref 22–31)
CREAT SERPL-MCNC: 1.69 MG/DL — HIGH (ref 0.5–1.3)
DIGOXIN SERPL-MCNC: 0.8 NG/ML — SIGNIFICANT CHANGE UP (ref 0.8–2)
GLUCOSE BLDC GLUCOMTR-MCNC: 101 MG/DL — HIGH (ref 70–99)
GLUCOSE BLDC GLUCOMTR-MCNC: 117 MG/DL — HIGH (ref 70–99)
GLUCOSE BLDC GLUCOMTR-MCNC: 122 MG/DL — HIGH (ref 70–99)
GLUCOSE BLDC GLUCOMTR-MCNC: 129 MG/DL — HIGH (ref 70–99)
GLUCOSE SERPL-MCNC: 104 MG/DL — HIGH (ref 70–99)
HCT VFR BLD CALC: 31.2 % — LOW (ref 34.5–45)
HGB BLD-MCNC: 9.6 G/DL — LOW (ref 11.5–15.5)
INR BLD: 2.24 — HIGH (ref 0.88–1.17)
MAGNESIUM SERPL-MCNC: 1.7 MG/DL — SIGNIFICANT CHANGE UP (ref 1.6–2.6)
MCHC RBC-ENTMCNC: 21.5 PG — LOW (ref 27–34)
MCHC RBC-ENTMCNC: 30.8 % — LOW (ref 32–36)
MCV RBC AUTO: 69.8 FL — LOW (ref 80–100)
NRBC # FLD: 0.07 K/UL — SIGNIFICANT CHANGE UP (ref 0–0)
PHOSPHATE SERPL-MCNC: 3.2 MG/DL — SIGNIFICANT CHANGE UP (ref 2.5–4.5)
PLATELET # BLD AUTO: 246 K/UL — SIGNIFICANT CHANGE UP (ref 150–400)
PMV BLD: SIGNIFICANT CHANGE UP FL (ref 7–13)
POTASSIUM SERPL-MCNC: 4.1 MMOL/L — SIGNIFICANT CHANGE UP (ref 3.5–5.3)
POTASSIUM SERPL-SCNC: 4.1 MMOL/L — SIGNIFICANT CHANGE UP (ref 3.5–5.3)
PROTHROM AB SERPL-ACNC: 25.5 SEC — HIGH (ref 9.8–13.1)
RBC # BLD: 4.47 M/UL — SIGNIFICANT CHANGE UP (ref 3.8–5.2)
RBC # FLD: 22.8 % — HIGH (ref 10.3–14.5)
SODIUM SERPL-SCNC: 136 MMOL/L — SIGNIFICANT CHANGE UP (ref 135–145)
WBC # BLD: 9.4 K/UL — SIGNIFICANT CHANGE UP (ref 3.8–10.5)
WBC # FLD AUTO: 9.4 K/UL — SIGNIFICANT CHANGE UP (ref 3.8–10.5)

## 2019-05-02 PROCEDURE — 99233 SBSQ HOSP IP/OBS HIGH 50: CPT

## 2019-05-02 PROCEDURE — 74018 RADEX ABDOMEN 1 VIEW: CPT | Mod: 26

## 2019-05-02 RX ORDER — MAGNESIUM SULFATE 500 MG/ML
1 VIAL (ML) INJECTION ONCE
Qty: 0 | Refills: 0 | Status: COMPLETED | OUTPATIENT
Start: 2019-05-02 | End: 2019-05-02

## 2019-05-02 RX ADMIN — Medication 650 MILLIGRAM(S): at 06:31

## 2019-05-02 RX ADMIN — Medication 1 TABLET(S): at 13:09

## 2019-05-02 RX ADMIN — BUDESONIDE AND FORMOTEROL FUMARATE DIHYDRATE 2 PUFF(S): 160; 4.5 AEROSOL RESPIRATORY (INHALATION) at 10:05

## 2019-05-02 RX ADMIN — Medication 650 MILLIGRAM(S): at 13:06

## 2019-05-02 RX ADMIN — Medication 650 MILLIGRAM(S): at 18:31

## 2019-05-02 RX ADMIN — Medication 20 MILLIGRAM(S): at 21:16

## 2019-05-02 RX ADMIN — Medication 20 MILLIGRAM(S): at 13:05

## 2019-05-02 RX ADMIN — SODIUM CHLORIDE 3 MILLILITER(S): 9 INJECTION INTRAMUSCULAR; INTRAVENOUS; SUBCUTANEOUS at 06:30

## 2019-05-02 RX ADMIN — Medication 300 MILLIGRAM(S): at 13:06

## 2019-05-02 RX ADMIN — SODIUM CHLORIDE 3 MILLILITER(S): 9 INJECTION INTRAMUSCULAR; INTRAVENOUS; SUBCUTANEOUS at 13:09

## 2019-05-02 RX ADMIN — Medication 650 MILLIGRAM(S): at 00:23

## 2019-05-02 RX ADMIN — Medication 50 MILLIGRAM(S): at 18:31

## 2019-05-02 RX ADMIN — Medication 650 MILLIGRAM(S): at 13:36

## 2019-05-02 RX ADMIN — Medication 650 MILLIGRAM(S): at 07:31

## 2019-05-02 RX ADMIN — Medication 50 MILLIGRAM(S): at 06:31

## 2019-05-02 RX ADMIN — OXYCODONE HYDROCHLORIDE 5 MILLIGRAM(S): 5 TABLET ORAL at 00:23

## 2019-05-02 RX ADMIN — Medication 100 GRAM(S): at 11:49

## 2019-05-02 RX ADMIN — Medication 20 MILLIGRAM(S): at 06:31

## 2019-05-02 RX ADMIN — Medication 20 MILLIGRAM(S): at 06:33

## 2019-05-02 RX ADMIN — Medication 0.12 MILLIGRAM(S): at 13:06

## 2019-05-02 RX ADMIN — SODIUM CHLORIDE 3 MILLILITER(S): 9 INJECTION INTRAMUSCULAR; INTRAVENOUS; SUBCUTANEOUS at 21:15

## 2019-05-02 RX ADMIN — PANTOPRAZOLE SODIUM 40 MILLIGRAM(S): 20 TABLET, DELAYED RELEASE ORAL at 06:31

## 2019-05-02 NOTE — PROGRESS NOTE ADULT - SUBJECTIVE AND OBJECTIVE BOX
GENERAL SURGERY DAILY PROGRESS NOTE:     Subjective:  Pt seen and examined. No acute events overnight. Tolerating diet. Pain well controlled.       Objective:  Vital Signs Last 24 Hrs  T(C): 36.4 (02 May 2019 05:30), Max: 37.5 (01 May 2019 22:05)  T(F): 97.5 (02 May 2019 05:30), Max: 99.5 (01 May 2019 22:05)  HR: 70 (02 May 2019 05:30) (70 - 98)  BP: 125/71 (02 May 2019 05:30) (104/72 - 137/86)  BP(mean): --  RR: 18 (02 May 2019 05:30) (18 - 20)  SpO2: 95% (02 May 2019 05:30) (76% - 98%)    I&O's Detail    01 May 2019 07:01  -  02 May 2019 07:00  --------------------------------------------------------  IN:  Total IN: 0 mL    OUT:    Voided: 250 mL  Total OUT: 250 mL    Total NET: -250 mL          MEDICATIONS  (STANDING):  acetaminophen   Tablet .. 650 milliGRAM(s) Oral every 6 hours  allopurinol 300 milliGRAM(s) Oral daily  buDESOnide  80 MICROgram(s)/formoterol 4.5 MICROgram(s) Inhaler 2 Puff(s) Inhalation two times a day  dextrose 50% Injectable 25 Gram(s) IV Push once  dextrose 50% Injectable 25 Gram(s) IV Push once  digoxin     Tablet 0.125 milliGRAM(s) Oral every other day  insulin lispro (HumaLOG) corrective regimen sliding scale   SubCutaneous Before meals and at bedtime  metoprolol succinate ER 50 milliGRAM(s) Oral two times a day  multivitamin 1 Tablet(s) Oral daily  pantoprazole    Tablet 40 milliGRAM(s) Oral before breakfast  sildenafil (REVATIO) 20 milliGRAM(s) Oral three times a day  sodium chloride 0.9% lock flush 3 milliLiter(s) IV Push every 8 hours    MEDICATIONS  (PRN):  ALBUTerol/ipratropium for Nebulization 3 milliLiter(s) Nebulizer every 6 hours PRN Shortness of Breath and/or Wheezing  dextrose 40% Gel 15 Gram(s) Oral once PRN Blood Glucose LESS THAN 70 milliGRAM(s)/deciliter  glucagon  Injectable 1 milliGRAM(s) IntraMuscular once PRN Glucose LESS THAN 70 milligrams/deciliter  guaiFENesin  milliGRAM(s) Oral every 12 hours PRN Cough  oxyCODONE    IR 5 milliGRAM(s) Oral every 6 hours PRN Pain (1-10)                            9.6    9.40  )-----------( 246      ( 02 May 2019 07:10 )             31.2       05-02    136  |  105  |  42<H>  ----------------------------<  104<H>  4.1   |  18<L>  |  1.69<H>    Ca    9.3      02 May 2019 07:10  Phos  3.2     05-02  Mg     1.7     05-02          PHYSICAL EXAM  General: NAD, resting comfortably in bed  Pulmonary: Nonlabored breathing, no respiratory distress  Abdominal: soft, ND, appropriately tender to palpation, no RT, no guarding/rebound, incisions clean, dry, intact

## 2019-05-02 NOTE — PROGRESS NOTE ADULT - PROBLEM SELECTOR PLAN 1
Continue sildenafil 10 mg po TID, will defer uptitration to pulmonary team.   Appreciate pulm HTN workup-normal TSH, normal antiscleroderma ab, no HIV.   No alpha 1 antitrypsin deficiency.
Continue sildenafil 10 mg po TID.  Appreciate pulm HTN workup-normal TSH, normal antiscleroderma ab, no HIV.   No alpha 1 antitrypsin deficiency.  Appears volume overloaded, diuresis as below.
Continue sildenafil 20 mg po TID.  Appreciate pulm HTN workup-normal TSH, normal antiscleroderma ab, no HIV.   No alpha 1 antitrypsin deficiency.  Appears volume overloaded, diuresis as below.
Improving. Renal FU noted.
Improving. may need to be retarted on diuretics, will FU with HF team.
On tamiflu
RHC on 3/6/19-RA 10, RV 78/2/14, PCWP 15, PA 78/29/45, sat 47.4%, CO 4.39, CI 2.3>SVR 1659  Continue sildenafil 10 mg po TID, will defer uptitration to pulmonary team.   Appreciate pulm HTN workup-normal TSH, normal antiscleroderma ab, no HIV.   Ordered AMMON and alpha 1 antitrypsin for tomorrow AM.  CT per pulmonary team.
Recent bout of OSMAR is prob related to CT with contrast, CHARLIE  On torsemide
Recent bout of OSMAR is prob related to CT with contrast, CHARLIE, improving now.  On torsemide
Renal function is more or less plateauing. Will follow up creat
Renal function is more or less plateauing. Will follow up creat
Renal function is more or less plateauing. Will follow up with Renal.
Renal function is stable
Renal function is stable
Renal function is stable. On torsemide
Renal function is stable. On torsemide
Renal function is stable. torsemide has been restarted
Renal function is stable. torsemide has been restarted.
Renal function worsened today   renal f/u   avoid nephrotoxic agents
S/p hemicolectomy. Diarrhea post op. Advance diet as tolerated
S/p hemicolectomy. Nausea and diarrhea post op. Use Zofran prn. Advance diet as tolerated
for surgery later today  cleared by pulmonary   medically optimized for necessary procedure
for surgery tomorrow  cleared by pulmonary   medically optimized for necessary procedure
resolved  creatinine 1.9  will continue to monitor   renal help appreciated
resolving   creatinine 1.6  renal help appreciated
resolving  renal help appreciated
resolving creatinine stable  renal help appreciated
resolving creatinine stable at 1.9  renal help appreciated
Continue sildenafil 10 mg po TID, will defer uptitration to pulmonary team.   Appreciate pulm HTN workup-normal TSH, normal antiscleroderma ab, no HIV.   No alpha 1 antitrypsin deficiency.
Continue sildenafil 10 mg po TID, will defer uptitration to pulmonary team.   Appreciate pulm HTN workup-normal TSH, normal antiscleroderma ab, no HIV.   No alpha 1 antitrypsin deficiency.
S/p hemicolectomy. Diarrhea post op. Advance diet as tolerated.
S/p surgery. Surgical FU noted

## 2019-05-02 NOTE — PROGRESS NOTE ADULT - PROBLEM SELECTOR PLAN 5
HR controlled   continue metoprolol, on IV heparin for a/C. this can be switched to Eliquis rather than coumadin. Patient does not have valvular A Fib.
HR controlled   continue metoprolol, on IV heparin for a/C. this can be switched to Eliquis rather than coumadin. Patient does not have valvular A Fib
Bipap at night
Bipap at night.  Needs pulm clearance for CRS
Bipap at night.  Needs pulm clearance for CRS.
HR controlled   continue metoprolol, on IV heparin for a/C. this can be switched to Eliquis
HR controlled   will continue lowered dose metoprolol
HR controlled   will continue lowered dose metoprolol
HR controlled   will continue metoprolol
If patient continues to have diarrhea and is consistently with SBP <90, may hold torsemide.
Now improved. Meds as above.
Patient has chronic a fib. NOAC held due to GI bleeding.
Pulm to FU, may need BiPAP at night
Pulm to FU, may need BiPAP at night.

## 2019-05-02 NOTE — PROGRESS NOTE ADULT - SUBJECTIVE AND OBJECTIVE BOX
Patient is a 63y old  Female who presents with a chief complaint of Generalized weakness (28 Apr 2019 01:50)      SUBJECTIVE / OVERNIGHT EVENTS:    S/p surgery a few days ago.  C/o pain in neck anteriorly  Review of Systems:   CONSTITUTIONAL: No fever, weight loss, or fatigue  EYES: No eye pain, visual disturbances, or discharge  ENMT:  No difficulty hearing, tinnitus, vertigo; No sinus or throat pain  NECK: No pain or stiffness  BREASTS: No pain, masses, or nipple discharge  RESPIRATORY: No cough, wheezing, chills or hemoptysis; Mild shortness of breath  CARDIOVASCULAR: No chest pain, palpitations, dizziness, or leg swelling  GASTROINTESTINAL: No abdominal or epigastric pain. No vomiting, or hematemesis; No melena or hematochezia.  GENITOURINARY: No dysuria, frequency, hematuria, or incontinence  NEUROLOGICAL: No headaches, memory loss, loss of strength, numbness, or tremors  SKIN: No itching, burning, rashes, or lesions   LYMPH NODES: No enlarged glands  ENDOCRINE: No heat or cold intolerance; No hair loss  MUSCULOSKELETAL: No joint pain or swelling; No muscle, back, or extremity pain  PSYCHIATRIC: No depression, anxiety, mood swings, or difficulty sleeping  HEME/LYMPH: No easy bruising, or bleeding gums  ALLERY AND IMMUNOLOGIC: No hives or eczema    MEDICATIONS  (STANDING):  allopurinol 300 milliGRAM(s) Oral daily  buDESOnide  80 MICROgram(s)/formoterol 4.5 MICROgram(s) Inhaler 2 Puff(s) Inhalation two times a day  chlorhexidine 4% Liquid 1 Application(s) Topical <User Schedule>  dextrose 50% Injectable 25 Gram(s) IV Push once  dextrose 50% Injectable 25 Gram(s) IV Push once  digoxin     Tablet 0.125 milliGRAM(s) Oral daily  heparin  Infusion 1500 Unit(s)/Hr (11 mL/Hr) IV Continuous <Continuous>  insulin lispro (HumaLOG) corrective regimen sliding scale   SubCutaneous every 6 hours  metoprolol tartrate 50 milliGRAM(s) Oral two times a day  multivitamin 1 Tablet(s) Oral daily  pantoprazole  Injectable 40 milliGRAM(s) IV Push every 12 hours  sildenafil (REVATIO) 10 milliGRAM(s) Oral three times a day  sodium chloride 0.9% lock flush 3 milliLiter(s) IV Push every 8 hours  torsemide 20 milliGRAM(s) Oral <User Schedule>    MEDICATIONS  (PRN):  ALBUTerol/ipratropium for Nebulization 3 milliLiter(s) Nebulizer every 6 hours PRN Shortness of Breath and/or Wheezing  dextrose 40% Gel 15 Gram(s) Oral once PRN Blood Glucose LESS THAN 70 milliGRAM(s)/deciliter  glucagon  Injectable 1 milliGRAM(s) IntraMuscular once PRN Glucose LESS THAN 70 milligrams/deciliter  guaiFENesin  milliGRAM(s) Oral every 12 hours PRN Cough  oxyCODONE    IR 5 milliGRAM(s) Oral every 6 hours PRN Moderate Pain (4 - 6)      PHYSICAL EXAM:  Vital Signs Last 24 Hrs  T(C): 36.7 (28 Apr 2019 12:00), Max: 37.1 (28 Apr 2019 04:00)  T(F): 98 (28 Apr 2019 12:00), Max: 98.7 (28 Apr 2019 04:00)  HR: 92 (28 Apr 2019 12:00) (87 - 100)  BP: 139/97 (28 Apr 2019 12:00) (123/89 - 163/110)  BP(mean): 105 (28 Apr 2019 12:00) (91 - 122)  RR: 23 (28 Apr 2019 12:00) (18 - 25)  SpO2: 100% (28 Apr 2019 12:00) (93% - 100%)  I&O's Summary    27 Apr 2019 07:01  -  28 Apr 2019 07:00  --------------------------------------------------------  IN: 476 mL / OUT: 1100 mL / NET: -624 mL    28 Apr 2019 07:01  -  28 Apr 2019 14:35  --------------------------------------------------------  IN: 486 mL / OUT: 0 mL / NET: 486 mL      GENERAL: NAD, well-developed  HEAD:  Atraumatic, Normocephalic  EYES: EOMI, PERRLA, conjunctiva and sclera clear  NECK: Supple, No JVD. Tenderness on right   CHEST/LUNG: Clear to auscultation bilaterally; No wheeze  HEART: Regular rate and rhythm; No murmurs, rubs, or gallops  ABDOMEN: Soft, Nontender, Nondistended; Bowel sounds present  EXTREMITIES:  2+ Peripheral Pulses, No clubbing, cyanosis, or edema  PSYCH: AAOx3  NEUROLOGY: non-focal  SKIN: No rashes or lesions    LABS:  CAPILLARY BLOOD GLUCOSE      POCT Blood Glucose.: 114 mg/dL (28 Apr 2019 11:41)  POCT Blood Glucose.: 117 mg/dL (28 Apr 2019 05:50)  POCT Blood Glucose.: 162 mg/dL (27 Apr 2019 23:32)  POCT Blood Glucose.: 135 mg/dL (27 Apr 2019 18:20)                          10.1   9.79  )-----------( 227      ( 27 Apr 2019 23:30 )             32.9     04-28    139  |  107  |  12  ----------------------------<  125<H>  4.2   |  20<L>  |  1.44<H>    Ca    10.1      28 Apr 2019 02:48  Phos  3.3     04-28  Mg     2.4     04-28      PTT - ( 27 Apr 2019 23:30 )  PTT:69.3 SEC          RADIOLOGY & ADDITIONAL TESTS:    Imaging Personally Reviewed:    Consultant(s) Notes Reviewed:      Care Discussed with Consultants/Other Providers:

## 2019-05-02 NOTE — PROGRESS NOTE ADULT - ASSESSMENT
62 yo woman with multiple comorbidities, including history of lymphoma, R-sided heart failure with preserved EF (EF 66% on TTE in 10/2017), severe pulm HTN, mod-severe TR, reported COPD but denies smoking? (on 2L NC at night), LINH (being arranged for bilevel support), A-Fib (on Eliquis), HTN, HLD, DM2, and gout presents with generalized weakness for the past 1 week in the setting of recent occult positive stool, OSAMR and newly diagnosed influenza/entero virus infection. RHC on 3/6/19-RA 10, RV 78/2/14, PCWP 15, PA 78/29/45, sat 47.4%, CO 4.39, CI 2.3>SVR 1659, PVR 6.6. Patient was maintained on torsemide 20 mg po QOD, and started sildenafil 10 mg po TID. Patient was anemic, had GI workup which revealed sigmoid mass, now s/p left hemicolectomy (4/24/19).

## 2019-05-02 NOTE — PROGRESS NOTE ADULT - SUBJECTIVE AND OBJECTIVE BOX
Patient is a 63y old  Female who presents with a chief complaint of Generalized weakness (02 May 2019 10:00)      Any change in ROS: Doingmuch better: covering for Dr Francisco: asked for follow up prior to dc:   Shehas no SOB and has no t been wheezing: She is requiring more of oxygen in the daytime too , and normally she uses at night time only hence pulmonary called:  She is asymptoamtic:       MEDICATIONS  (STANDING):  acetaminophen   Tablet .. 650 milliGRAM(s) Oral every 6 hours  allopurinol 300 milliGRAM(s) Oral daily  buDESOnide  80 MICROgram(s)/formoterol 4.5 MICROgram(s) Inhaler 2 Puff(s) Inhalation two times a day  dextrose 50% Injectable 25 Gram(s) IV Push once  dextrose 50% Injectable 25 Gram(s) IV Push once  digoxin     Tablet 0.125 milliGRAM(s) Oral every other day  insulin lispro (HumaLOG) corrective regimen sliding scale   SubCutaneous Before meals and at bedtime  magnesium sulfate  IVPB 1 Gram(s) IV Intermittent once  metoprolol succinate ER 50 milliGRAM(s) Oral two times a day  multivitamin 1 Tablet(s) Oral daily  pantoprazole    Tablet 40 milliGRAM(s) Oral before breakfast  sildenafil (REVATIO) 20 milliGRAM(s) Oral three times a day  sodium chloride 0.9% lock flush 3 milliLiter(s) IV Push every 8 hours    MEDICATIONS  (PRN):  ALBUTerol/ipratropium for Nebulization 3 milliLiter(s) Nebulizer every 6 hours PRN Shortness of Breath and/or Wheezing  dextrose 40% Gel 15 Gram(s) Oral once PRN Blood Glucose LESS THAN 70 milliGRAM(s)/deciliter  glucagon  Injectable 1 milliGRAM(s) IntraMuscular once PRN Glucose LESS THAN 70 milligrams/deciliter  guaiFENesin  milliGRAM(s) Oral every 12 hours PRN Cough  oxyCODONE    IR 5 milliGRAM(s) Oral every 6 hours PRN Pain (1-10)    Vital Signs Last 24 Hrs  T(C): 36.8 (02 May 2019 10:47), Max: 37.5 (01 May 2019 22:05)  T(F): 98.2 (02 May 2019 10:47), Max: 99.5 (01 May 2019 22:05)  HR: 86 (02 May 2019 10:47) (70 - 98)  BP: 95/64 (02 May 2019 10:47) (95/64 - 137/86)  BP(mean): --  RR: 18 (02 May 2019 10:47) (18 - 20)  SpO2: 97% (02 May 2019 10:47) (76% - 98%)    I&O's Summary    01 May 2019 07:01  -  02 May 2019 07:00  --------------------------------------------------------  IN: 0 mL / OUT: 250 mL / NET: -250 mL          Physical Exam:   GENERAL: NAD, well-groomed, well-developed  HEENT: BRITT/   Atraumatic, Normocephalic  ENMT: No tonsillar erythema, exudates, or enlargement; Moist mucous membranes, Good dentition, No lesions  NECK: Supple, No JVD, Normal thyroid  CHEST/LUNG: Clear to auscultaion  CVS: Regular rate and rhythm; No murmurs, rubs, or gallops  GI: : Soft, Nontender, Nondistended; Bowel sounds present  NERVOUS SYSTEM:  Alert & Oriented X3  EXTREMITIES:  Mild 1+ edema  LYMPH: No lymphadenopathy noted  SKIN: No rashes or lesions  ENDOCRINOLOGY: No Thyromegaly  PSYCH: Appropriate    Labs:                              9.6    9.40  )-----------( 246      ( 02 May 2019 07:10 )             31.2                         9.4    9.71  )-----------( 264      ( 01 May 2019 10:45 )             30.8                         10.0   8.91  )-----------( 222      ( 30 Apr 2019 07:10 )             33.0                         10.6   8.89  )-----------( 257      ( 29 Apr 2019 07:15 )             35.0     05-02    136  |  105  |  42<H>  ----------------------------<  104<H>  4.1   |  18<L>  |  1.69<H>  05-01    136  |  106  |  39<H>  ----------------------------<  130<H>  4.0   |  16<L>  |  1.77<H>  04-30    136  |  105  |  33<H>  ----------------------------<  104<H>  4.2   |  19<L>  |  2.14<H>  04-29    138  |  106  |  28<H>  ----------------------------<  111<H>  4.0   |  19<L>  |  1.83<H>    Ca    9.3      02 May 2019 07:10  Ca    9.2      01 May 2019 10:45  Phos  3.2     05-02  Phos  3.1     05-01  Mg     1.7     05-02  Mg     1.8     05-01      CAPILLARY BLOOD GLUCOSE      POCT Blood Glucose.: 101 mg/dL (02 May 2019 08:20)  POCT Blood Glucose.: 122 mg/dL (01 May 2019 22:05)  POCT Blood Glucose.: 119 mg/dL (01 May 2019 17:27)  POCT Blood Glucose.: 111 mg/dL (01 May 2019 12:42)        PT/INR - ( 02 May 2019 07:10 )   PT: 25.5 SEC;   INR: 2.24          PTT - ( 02 May 2019 07:10 )  PTT:43.0 SEC          RECENT CULTURES:        RESPIRATORY CULTURES:    < from: Xray Chest 1 View- PORTABLE-Routine (04.25.19 @ 13:09) >    EXAM:  XR CHEST PORTABLE ROUTINE 1V        PROCEDURE DATE:  Apr 25 2019         INTERPRETATION:  TIME OF EXAM: April 25, 2019 at 1:01 PM.    CLINICAL INFORMATION: Status post hemicolectomy. Wheezing.    COMPARISON:  April 23, 2019.    TECHNIQUE:   AP Portable chest x-ray. The chin obscures the superior   mediastinum and medial apices.    INTERPRETATION:     Heart size and the mediastinum cannot be accurately evaluated on this   projection. The thoracic aorta is calcified.  No focal lung consolidation, pleural effusion, or pneumothorax is seen.            IMPRESSION:  Clear lungs.                  JAI MIRANDA M.D., ATTENDING RADIOLOGIST  This document has been electronically signed. Apr 25 2019  2:11PM        < end of copied text >        Studies  Chest X-RAY  CT SCAN Chest   Venous Dopplers: LE:   CT Abdomen  Others

## 2019-05-02 NOTE — PROGRESS NOTE ADULT - PROBLEM SELECTOR PROBLEM 1
Acute kidney injury superimposed on CKD
Colon cancer
Influenza A
Pulmonary hypertension
Colon cancer
Colon cancer

## 2019-05-02 NOTE — PROGRESS NOTE ADULT - PROBLEM SELECTOR PROBLEM 3
Afib
COPD (chronic obstructive pulmonary disease)
Chronic systolic right heart failure
Afib
Afib
Chronic systolic right heart failure
Chronic systolic right heart failure

## 2019-05-02 NOTE — PROGRESS NOTE ADULT - SUBJECTIVE AND OBJECTIVE BOX
Patient seen and examined. She feels well, walked with PT today, walked up stairs as well.   No acute SOB, orthopnea, PND, CP, dizziness.         Medications:  acetaminophen   Tablet .. 650 milliGRAM(s) Oral every 6 hours  ALBUTerol/ipratropium for Nebulization 3 milliLiter(s) Nebulizer every 6 hours PRN  allopurinol 300 milliGRAM(s) Oral daily  buDESOnide  80 MICROgram(s)/formoterol 4.5 MICROgram(s) Inhaler 2 Puff(s) Inhalation two times a day  dextrose 40% Gel 15 Gram(s) Oral once PRN  dextrose 50% Injectable 25 Gram(s) IV Push once  dextrose 50% Injectable 25 Gram(s) IV Push once  digoxin     Tablet 0.125 milliGRAM(s) Oral every other day  glucagon  Injectable 1 milliGRAM(s) IntraMuscular once PRN  guaiFENesin  milliGRAM(s) Oral every 12 hours PRN  insulin lispro (HumaLOG) corrective regimen sliding scale   SubCutaneous Before meals and at bedtime  metoprolol succinate ER 50 milliGRAM(s) Oral two times a day  multivitamin 1 Tablet(s) Oral daily  oxyCODONE    IR 5 milliGRAM(s) Oral every 6 hours PRN  pantoprazole    Tablet 40 milliGRAM(s) Oral before breakfast  sildenafil (REVATIO) 20 milliGRAM(s) Oral three times a day  sodium chloride 0.9% lock flush 3 milliLiter(s) IV Push every 8 hours      Vitals:  Vital Signs Last 24 Hrs  T(C): 36.8 (02 May 2019 10:47), Max: 37.5 (01 May 2019 22:05)  T(F): 98.2 (02 May 2019 10:47), Max: 99.5 (01 May 2019 22:05)  HR: 86 (02 May 2019 10:47) (70 - 98)  BP: 95/64 (02 May 2019 10:47) (95/64 - 137/86)  BP(mean): --  RR: 18 (02 May 2019 10:47) (18 - 20)  SpO2: 97% (02 May 2019 10:47) (76% - 98%)    Daily     Daily Weight in k.1 (02 May 2019 05:30)    I&O's Detail    01 May 2019 07:01  -  02 May 2019 07:00  --------------------------------------------------------  IN:  Total IN: 0 mL    OUT:    Voided: 250 mL  Total OUT: 250 mL    Total NET: -250 mL      02 May 2019 07:  -  02 May 2019 16:34  --------------------------------------------------------  IN:    Oral Fluid: 150 mL  Total IN: 150 mL    OUT:  Total OUT: 0 mL    Total NET: 150 mL          Physical Exam:     General: No distress. Comfortable.  HEENT: EOM intact.  Neck: Neck supple. JVP mildly  elevated. No masses  Chest: Clear to auscultation bilaterally  CV: Normal S1 and S2. No murmurs, rub, or gallops. Radial pulses normal. No LE edema b/l.   Abdomen: Soft, non-distended, non-tender  Skin: No rashes or skin breakdown  Neurology: Alert and oriented times three. Sensation intact  Psych: Affect normal    Labs:                        9.6    9.40  )-----------( 246      ( 02 May 2019 07:10 )             31.2     05-02    136  |  105  |  42<H>  ----------------------------<  104<H>  4.1   |  18<L>  |  1.69<H>    Ca    9.3      02 May 2019 07:10  Phos  3.2     05-02  Mg     1.7     05-02      PT/INR - ( 02 May 2019 07:10 )   PT: 25.5 SEC;   INR: 2.24          PTT - ( 02 May 2019 07:10 )  PTT:43.0 SEC

## 2019-05-02 NOTE — PROGRESS NOTE ADULT - PROBLEM SELECTOR PROBLEM 5
Atrial fibrillation
Hypotension
Hypotension
LINH (obstructive sleep apnea)
LNIH (obstructive sleep apnea)

## 2019-05-02 NOTE — PROGRESS NOTE ADULT - PROBLEM SELECTOR PROBLEM 6
Diabetes mellitus
Gout

## 2019-05-02 NOTE — PROGRESS NOTE ADULT - PROBLEM SELECTOR PLAN 7
Stable post op.
Stable post op.
CBC is stable so far
CBC is stable so far.
CBC is stable so far.
CBC is stable. 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 Patient made aware. Onc nathanael called. CEA WNL
CBC is stable. 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 Patient made aware. Onc nathanael called. CEA ordered
EGD planned for AM. medically optimized for this procedure
EGD planned for AM. medically optimized for this procedure.  Anethesia eval for intubation
Hb is dropping. may need EGD/ colonoscopy on this hospitalization if cardiology clears her
Hb is dropping. may need EGD/ colonoscopy on this hospitalization if cardiology clears her
Hb is dropping. may need EGD/ colonoscopy on this hospitalization if cardiology clears her.
Hb is dropping. may need EGD/ colonoscopy on this hospitalization if cardiology clears her.
Not on CPAP/BiPAP.  - Monitor nocturnal O2 sats
Stable post op
Stable post op.
hb around 8, gi f/u
secondary likely to colonic mass  s/p  2 units to optimize BP and preop status
secondary likely to colonic mass  s/p  2 units to optimize BP and preop status
secondary likely to colonic mass  trend Hb
secondary likely to colonic mass  will transfuse 2 units to optimize BP and preop status

## 2019-05-02 NOTE — PROGRESS NOTE ADULT - PROBLEM SELECTOR PLAN 6
ISS with coverage.
ISS with coverage.
ISS with coverage
ISS with coverage.
No S/S of flare.  - C/w allopurinol

## 2019-05-02 NOTE — PROGRESS NOTE ADULT - PROBLEM SELECTOR PLAN 2
Continue Torsemide 20 mg po QOD starting saturday.   Her dry weight is 186 lbs standing.   Tx for pulm HTN as above.  Hold HF meds for SBP <90.

## 2019-05-02 NOTE — PROGRESS NOTE ADULT - ASSESSMENT
s/p surgery 4/24 for colon ca  --to monitor VS and temps  --incentive spirometry    COPD/asthma  --on Symbicort and PRn neb treatments  5/2: She is not SOB : currently she is off oxygen but she desats on ambulation: She should cont to use oxygen on sleeping as well as walking and whenever her sao2 are below 88%: BONIFACIO CHANG surgery: Her last chest x-ray is clear: and she is alert and awake and asymptomatic    Mild hypoxemia - on nasal O2  --monitor O2 sats  5/2: As above: she does ahcve mild edema ? trail of lasix for a short duration  Hx sleep apnea (report on paper chart)  --monitor for apneas and or hypoxemia  5/2: outpt follow u p [facility] Dr Francisco!    Atrial fibrillatrion - rate presently controlled.   --required addl metoprolol overnight  5/2: PER PRIMARY TEAM     case discussed with SICU staff and Dr. Robles    (NB: I will be away through 5/6; if pulm needed please contact my ofc for covering pulm physician)    5/2: BONIFACIO Hein: Surgical PA on floor!

## 2019-05-02 NOTE — PROGRESS NOTE ADULT - PROBLEM SELECTOR PLAN 3
continue Revatio  continue metoprolol 25 BID,diuresis as per HF team
continue Revatio  continue metoprolol 25 BID  JVD +
Cont nebs
Cont nebs
Cont nebs  Pulm FU
Cont nebs  Pulm FU
Cont nebs  Pulm FU noted
Cont nebs  Pulm FU noted
Cont nebs  Pulm FU noted  Resp status is stable
Cont nebs  Pulm FU noted  Resp status is stable,
Cont nebs  Pulm FU noted  Resp status is stable.
Cont nebs  Pulm FU noted, cont revatio   Resp status is stable,
Cont nebs.
Continue Toprol 50 mg po BID, hold  parameters changed for SBP <90 and HR ,60.  Recommend Eliquis 5 mg po BID.
Continue Toprol, hold  parameters changed for SBP <90, HR <50.  Off AC due to anemia/colonic mass, *as soon as able from GI perspective please restart for cva ppx
Continue Toprol, hold  parameters changed for SBP <90.  Off AC due to anemia, would like to restart once cleared by GI
Continue Toprol, hold  parameters changed for SBP <90.  Off AC due to anemia, would like to restart once cleared by GI
Discontinued lopressor.   Started Toprol 50 mg po BID, hold  parameters changed for SBP <90 and HR ,60.  Off AC due to anemia, would like to restart once cleared by GI. Discussed with primary surgery team.
Echo shows severe pulm HTN rather than LV failure. CHF team is following.
Rate controlled 80s  Continue Toprol 100mg/125 mg AM/PM as ordered.   Off AC due to anemia, would like to restart once cleared by GI
continue Revatio  continue metoprolol 25 BID  euvolemic at this time.
continue Revatio  will continue metoprolol 25 BID  appears euvolemic at this time
continue Revatio  will continue metoprolol 25 BID  euvolemic at this time
continue Revatio  will continue metoprolol 25 BID  euvolemic at this time.
Continue Toprol, hold  parameters changed for SBP <90.  Off AC due to anemia, would like to restart once cleared by GI
Continue Toprol, hold  parameters changed for SBP <90.  Off AC due to anemia, would like to restart once cleared by GI

## 2019-05-02 NOTE — PROGRESS NOTE ADULT - ASSESSMENT
64 yo woman with multiple medical comorbidities including afib, right-sided CHF with severe pulmonary HTN, COPD on nocturnal O2, LINH newly initiated on bilevel support found to have a non-obstructing sigmoid mass s/p left hemicolectomy (4/24/19), pt in SICU due to persistently uncontrolled a fib, now on floor.     - Diet: Regular  - Appreciate PT evaluation  - F/u INR, dose eliquis when INR<2  - f/u Pulmonology regarding home O2 recs and need for nebulizer on discharge  - Digoxin level today  - OOB as tolerated  - Dispo planning    [facility]  [id number]

## 2019-05-02 NOTE — PROGRESS NOTE ADULT - PROBLEM SELECTOR PROBLEM 7
Acute blood loss anemia
LINH (obstructive sleep apnea)

## 2019-05-02 NOTE — PROGRESS NOTE ADULT - PROBLEM SELECTOR PROBLEM 4
COPD (chronic obstructive pulmonary disease)
COPD (chronic obstructive pulmonary disease)
Anemia
Atrial fibrillation
COPD (chronic obstructive pulmonary disease)
Anemia
Anemia

## 2019-05-02 NOTE — PROGRESS NOTE ADULT - SUBJECTIVE AND OBJECTIVE BOX
NEPHROLOGY    Patient seen and examined.    MEDICATIONS  (STANDING):  acetaminophen   Tablet .. 650 milliGRAM(s) Oral every 6 hours  allopurinol 300 milliGRAM(s) Oral daily  buDESOnide  80 MICROgram(s)/formoterol 4.5 MICROgram(s) Inhaler 2 Puff(s) Inhalation two times a day  digoxin     Tablet 0.125 milliGRAM(s) Oral every other day  metoprolol succinate ER 50 milliGRAM(s) Oral two times a day  multivitamin 1 Tablet(s) Oral daily  pantoprazole    Tablet 40 milliGRAM(s) Oral before breakfast  sildenafil (REVATIO) 20 milliGRAM(s) Oral three times a day  sodium chloride 0.9% lock flush 3 milliLiter(s) IV Push every 8 hours    VITALS:  T(C): , Max: 37.5 (05-01-19 @ 22:05)  T(F): , Max: 99.5 (05-01-19 @ 22:05)  HR: 70 (05-02-19 @ 05:30)  BP: 125/71 (05-02-19 @ 05:30)  RR: 18 (05-02-19 @ 05:30)  SpO2: 95% (05-02-19 @ 05:30)    05-01 @ 07:01  -  05-02 @ 07:00  --------------------------------------------------------  IN: 0 mL / OUT: 250 mL / NET: -250 mL    REVIEW OF SYSTEMS:  Full ROS done and were negative unless otherwise indicated in HPI/assessment.     PHYSICAL EXAM:  Constitutional: NAD  Respiratory: CTA B/L  Cardiovascular: S1 and S2, RRR  Gastrointestinal: + BS, soft, NT, ND  Extremities: no peripheral edema  Neurological: AAO x 3  : no dawson    LABS:                        9.6    9.40  )-----------( 246      ( 02 May 2019 07:10 )             31.2     05-02    136  |  105  |  42<H>  ----------------------------<  104<H>  4.1   |  18<L>  |  1.69<H>    Ca    9.3      02 May 2019 07:10  Phos  3.2     05-02  Mg     1.7     05-02    Urine Studies:    Sodium, Random Urine: 54 mmol/L (05-01 @ 11:30)  Potassium, Random Urine: 16.9 mmol/L (05-01 @ 11:30)  Chloride, Random Urine: 63 mmol/L (05-01 @ 11:30)  Creatinine, Random Urine: 55.40 mg/dL (05-01 @ 11:30)    ASSESSMENT    Christina Lyn, KARMEN  [facility]  [phone] NEPHROLOGY    Patient seen and examined.  C/O throat discomfort that started 2 days ago  Soft stools; but no diarrhea   Revatio increased    MEDICATIONS  (STANDING):  acetaminophen   Tablet .. 650 milliGRAM(s) Oral every 6 hours  allopurinol 300 milliGRAM(s) Oral daily  buDESOnide  80 MICROgram(s)/formoterol 4.5 MICROgram(s) Inhaler 2 Puff(s) Inhalation two times a day  digoxin     Tablet 0.125 milliGRAM(s) Oral every other day  metoprolol succinate ER 50 milliGRAM(s) Oral two times a day  multivitamin 1 Tablet(s) Oral daily  pantoprazole    Tablet 40 milliGRAM(s) Oral before breakfast  sildenafil (REVATIO) 20 milliGRAM(s) Oral three times a day    VITALS:  T(C): , Max: 37.5 (05-01-19 @ 22:05)  T(F): , Max: 99.5 (05-01-19 @ 22:05)  HR: 70 (05-02-19 @ 05:30)  BP: 125/71 (05-02-19 @ 05:30)  RR: 18 (05-02-19 @ 05:30)  SpO2: 95% (05-02-19 @ 05:30)    05-01 @ 07:01  -  05-02 @ 07:00  --------------------------------------------------------  IN: 0 mL / OUT: 250 mL / NET: -250 mL    REVIEW OF SYSTEMS:  Full ROS done and were negative unless otherwise indicated in HPI/assessment.     PHYSICAL EXAM:  Constitutional: NAD  Respiratory: CTA B/L  Cardiovascular: S1 and S2, RRR  Gastrointestinal: abdominal surgical wound  Extremities: no peripheral edema  Neurological: AAO x 3  : no dawson    LABS:                        9.6    9.40  )-----------( 246      ( 02 May 2019 07:10 )             31.2     05-02    136  |  105  |  42<H>  ----------------------------<  104<H>  4.1   |  18<L>  |  1.69<H>    Ca    9.3      02 May 2019 07:10  Phos  3.2     05-02  Mg     1.7     05-02    Urine Studies:  Sodium, Random Urine: 54 mmol/L (05-01 @ 11:30)  Potassium, Random Urine: 16.9 mmol/L (05-01 @ 11:30)  Chloride, Random Urine: 63 mmol/L (05-01 @ 11:30)  Creatinine, Random Urine: 55.40 mg/dL (05-01 @ 11:30)    ASSESSMENT  63F with COPD (on O2), LINH, AF on A/C, HLD, gout, HFpEF, PHTN , DM and CKD3/4 p/w generalized weakness and poor appetite 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 and now s/p exlap, partial left hemicolectomy and side to side stapled anastomosis  - CKD: stage 3 with baseline Cr ~ 1.6  - OSMAR: likely pre-renal - resolved  - HFpEF: right sided heart failure / cor pulm. volume status acceptable  - anemia: in chronic disease + iron def + GIB + malignancy, no LUIS. Hgb is stable  - hypomagnesemia: due to diuresis + enteral losses (diarrhea)    RECOMMEND:  - diuretics per heart failure (torsemide 20 po daily)  - digoxin is every other day   - sildenafil has been increased (20 po tid) by pulmonary   - throat discomfort management per primary team   - give mag sulfate 1 gm IV x 1 dose   - dose meds for a GFR ~ 25    Christina Lyn, KARMEN  [facility]  [phone]

## 2019-05-03 VITALS
RESPIRATION RATE: 18 BRPM | HEART RATE: 91 BPM | SYSTOLIC BLOOD PRESSURE: 140 MMHG | DIASTOLIC BLOOD PRESSURE: 85 MMHG | OXYGEN SATURATION: 97 % | TEMPERATURE: 98 F

## 2019-05-03 DIAGNOSIS — M54.2 CERVICALGIA: ICD-10-CM

## 2019-05-03 LAB
ANION GAP SERPL CALC-SCNC: 10 MMO/L — SIGNIFICANT CHANGE UP (ref 7–14)
APTT BLD: 35.5 SEC — SIGNIFICANT CHANGE UP (ref 27.5–36.3)
BUN SERPL-MCNC: 44 MG/DL — HIGH (ref 7–23)
CALCIUM SERPL-MCNC: 9.6 MG/DL — SIGNIFICANT CHANGE UP (ref 8.4–10.5)
CHLORIDE SERPL-SCNC: 108 MMOL/L — HIGH (ref 98–107)
CO2 SERPL-SCNC: 19 MMOL/L — LOW (ref 22–31)
CREAT SERPL-MCNC: 1.51 MG/DL — HIGH (ref 0.5–1.3)
GLUCOSE BLDC GLUCOMTR-MCNC: 124 MG/DL — HIGH (ref 70–99)
GLUCOSE BLDC GLUCOMTR-MCNC: 179 MG/DL — HIGH (ref 70–99)
GLUCOSE SERPL-MCNC: 123 MG/DL — HIGH (ref 70–99)
HCT VFR BLD CALC: 27.5 % — LOW (ref 34.5–45)
HGB BLD-MCNC: 8.7 G/DL — LOW (ref 11.5–15.5)
INR BLD: 1.55 — HIGH (ref 0.88–1.17)
MAGNESIUM SERPL-MCNC: 1.8 MG/DL — SIGNIFICANT CHANGE UP (ref 1.6–2.6)
MCHC RBC-ENTMCNC: 21.7 PG — LOW (ref 27–34)
MCHC RBC-ENTMCNC: 31.6 % — LOW (ref 32–36)
MCV RBC AUTO: 68.6 FL — LOW (ref 80–100)
NRBC # FLD: 0.06 K/UL — SIGNIFICANT CHANGE UP (ref 0–0)
PHOSPHATE SERPL-MCNC: 3 MG/DL — SIGNIFICANT CHANGE UP (ref 2.5–4.5)
PLATELET # BLD AUTO: 257 K/UL — SIGNIFICANT CHANGE UP (ref 150–400)
PMV BLD: SIGNIFICANT CHANGE UP FL (ref 7–13)
POTASSIUM SERPL-MCNC: 4.1 MMOL/L — SIGNIFICANT CHANGE UP (ref 3.5–5.3)
POTASSIUM SERPL-SCNC: 4.1 MMOL/L — SIGNIFICANT CHANGE UP (ref 3.5–5.3)
PROTHROM AB SERPL-ACNC: 17.9 SEC — HIGH (ref 9.8–13.1)
RBC # BLD: 4.01 M/UL — SIGNIFICANT CHANGE UP (ref 3.8–5.2)
RBC # FLD: 22.6 % — HIGH (ref 10.3–14.5)
RHEUMATOID FACT SERPL-ACNC: 12 IU/ML — SIGNIFICANT CHANGE UP
SODIUM SERPL-SCNC: 137 MMOL/L — SIGNIFICANT CHANGE UP (ref 135–145)
WBC # BLD: 9.5 K/UL — SIGNIFICANT CHANGE UP (ref 3.8–10.5)
WBC # FLD AUTO: 9.5 K/UL — SIGNIFICANT CHANGE UP (ref 3.8–10.5)

## 2019-05-03 PROCEDURE — 70491 CT SOFT TISSUE NECK W/DYE: CPT | Mod: 26

## 2019-05-03 RX ORDER — DIGOXIN 250 MCG
1 TABLET ORAL
Qty: 0 | Refills: 0 | COMMUNITY

## 2019-05-03 RX ORDER — METOPROLOL TARTRATE 50 MG
1 TABLET ORAL
Qty: 60 | Refills: 0
Start: 2019-05-03

## 2019-05-03 RX ORDER — APIXABAN 2.5 MG/1
5 TABLET, FILM COATED ORAL EVERY 12 HOURS
Qty: 0 | Refills: 0 | Status: DISCONTINUED | OUTPATIENT
Start: 2019-05-03 | End: 2019-05-03

## 2019-05-03 RX ORDER — SODIUM CHLORIDE 9 MG/ML
1000 INJECTION INTRAMUSCULAR; INTRAVENOUS; SUBCUTANEOUS
Qty: 0 | Refills: 0 | Status: DISCONTINUED | OUTPATIENT
Start: 2019-05-03 | End: 2019-05-03

## 2019-05-03 RX ORDER — METOPROLOL TARTRATE 50 MG
1 TABLET ORAL
Qty: 0 | Refills: 0 | COMMUNITY

## 2019-05-03 RX ORDER — DIGOXIN 250 MCG
1 TABLET ORAL
Qty: 30 | Refills: 0
Start: 2019-05-03

## 2019-05-03 RX ORDER — MAGNESIUM SULFATE 500 MG/ML
2 VIAL (ML) INJECTION ONCE
Qty: 0 | Refills: 0 | Status: COMPLETED | OUTPATIENT
Start: 2019-05-03 | End: 2019-05-03

## 2019-05-03 RX ORDER — ACETAMINOPHEN 500 MG
2 TABLET ORAL
Qty: 0 | Refills: 0 | DISCHARGE
Start: 2019-05-03

## 2019-05-03 RX ADMIN — Medication 50 GRAM(S): at 11:24

## 2019-05-03 RX ADMIN — Medication 1 TABLET(S): at 11:29

## 2019-05-03 RX ADMIN — SODIUM CHLORIDE 3 MILLILITER(S): 9 INJECTION INTRAMUSCULAR; INTRAVENOUS; SUBCUTANEOUS at 15:03

## 2019-05-03 RX ADMIN — Medication 300 MILLIGRAM(S): at 11:31

## 2019-05-03 RX ADMIN — Medication 20 MILLIGRAM(S): at 05:36

## 2019-05-03 RX ADMIN — PANTOPRAZOLE SODIUM 40 MILLIGRAM(S): 20 TABLET, DELAYED RELEASE ORAL at 05:36

## 2019-05-03 RX ADMIN — Medication 650 MILLIGRAM(S): at 11:30

## 2019-05-03 RX ADMIN — Medication 20 MILLIGRAM(S): at 15:04

## 2019-05-03 RX ADMIN — Medication 50 MILLIGRAM(S): at 18:55

## 2019-05-03 RX ADMIN — BUDESONIDE AND FORMOTEROL FUMARATE DIHYDRATE 2 PUFF(S): 160; 4.5 AEROSOL RESPIRATORY (INHALATION) at 11:19

## 2019-05-03 RX ADMIN — Medication 50 MILLIGRAM(S): at 05:36

## 2019-05-03 RX ADMIN — SODIUM CHLORIDE 3 MILLILITER(S): 9 INJECTION INTRAMUSCULAR; INTRAVENOUS; SUBCUTANEOUS at 05:35

## 2019-05-03 RX ADMIN — Medication 650 MILLIGRAM(S): at 05:36

## 2019-05-03 RX ADMIN — APIXABAN 5 MILLIGRAM(S): 2.5 TABLET, FILM COATED ORAL at 18:55

## 2019-05-03 RX ADMIN — Medication 650 MILLIGRAM(S): at 06:06

## 2019-05-03 NOTE — PROGRESS NOTE ADULT - SUBJECTIVE AND OBJECTIVE BOX
NEPHROLOGY    Patient seen and examined.  NAD, ENT eval noted    MEDICATIONS  (STANDING):  acetaminophen   Tablet .. 650 milliGRAM(s) Oral every 6 hours  allopurinol 300 milliGRAM(s) Oral daily  apixaban 5 milliGRAM(s) Oral every 12 hours  buDESOnide  80 MICROgram(s)/formoterol 4.5 MICROgram(s) Inhaler 2 Puff(s) Inhalation two times a day  digoxin     Tablet 0.125 milliGRAM(s) Oral every other day  insulin lispro (HumaLOG) corrective regimen sliding scale   SubCutaneous Before meals and at bedtime  metoprolol succinate ER 50 milliGRAM(s) Oral two times a day  multivitamin 1 Tablet(s) Oral daily  pantoprazole    Tablet 40 milliGRAM(s) Oral before breakfast  sildenafil (REVATIO) 20 milliGRAM(s) Oral three times a day  torsemide 20 milliGRAM(s) Oral <User Schedule>    VITALS:  T(C): , Max: 37.4 (05-02-19 @ 22:00)  T(F): , Max: 99.3 (05-02-19 @ 22:00)  HR: 76 (05-03-19 @ 09:49)  BP: 109/71 (05-03-19 @ 09:49)  RR: 19 (05-03-19 @ 09:49)  SpO2: 100% (05-03-19 @ 09:49)    05-02 @ 07:01  -  05-03 @ 07:00  --------------------------------------------------------  IN: 630 mL / OUT: 0 mL / NET: 630 mL    REVIEW OF SYSTEMS:  Full ROS done and were negative unless otherwise indicated in HPI/assessment.     PHYSICAL EXAM:  Constitutional: NAD  Respiratory: CTA B/L  Cardiovascular: S1 and S2, RRR  Gastrointestinal: + abd sx wound  Extremities: trace edema  Neurological: AAO x 3  : no dawson      LABS:                        8.7    9.50  )-----------( 257      ( 03 May 2019 07:50 )             27.5     05-03    137  |  108<H>  |  44<H>  ----------------------------<  123<H>  4.1   |  19<L>  |  1.51<H>    Ca    9.6      03 May 2019 07:50  Phos  3.0     05-03  Mg     1.8     05-03    RADIOLOGY & ADDITIONAL STUDIES:  < from: Xray Abdomen 1 View PORTABLE -Urgent (05.02.19 @ 10:46) >    IMPRESSION: Nonobstructive bowel gas pattern.    < end of copied text >    ASSESSMENT / RECOMMEND  63F with COPD (on O2), LINH, AF on A/C, HLD, gout, HFpEF, PHTN , DM and CKD3/4 p/w generalized weakness and poor appetite 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 and now s/p exlap, partial left hemicolectomy and side to side stapled anastomosis  - CKD: stage 3 with renal fxn at baseline  - HFpEF: right sided heart failure / cor pulm. volume status acceptable  - anemia: in chronic disease + iron def + GIB + malignancy, no LUIS. Hgb is stable  - hypomagnesemia: due to diuresis + enteral losses (diarrhea) - supplemented    RECOMMEND:  - diuretics per heart failure: torsemide 20 po daily  - digoxin is every other day   - sildenafil 20 po tid per pulmonary   - give mag ox 400 mg po daily x 3 days   - f/u further ENT input  - dose meds for a GFR ~ 25    Christina Lyn NP  [facility]  [phone] NEPHROLOGY    Patient seen and examined.  NAD, ENT eval noted    MEDICATIONS  (STANDING):  acetaminophen   Tablet .. 650 milliGRAM(s) Oral every 6 hours  allopurinol 300 milliGRAM(s) Oral daily  apixaban 5 milliGRAM(s) Oral every 12 hours  buDESOnide  80 MICROgram(s)/formoterol 4.5 MICROgram(s) Inhaler 2 Puff(s) Inhalation two times a day  digoxin     Tablet 0.125 milliGRAM(s) Oral every other day  insulin lispro (HumaLOG) corrective regimen sliding scale   SubCutaneous Before meals and at bedtime  metoprolol succinate ER 50 milliGRAM(s) Oral two times a day  multivitamin 1 Tablet(s) Oral daily  pantoprazole    Tablet 40 milliGRAM(s) Oral before breakfast  sildenafil (REVATIO) 20 milliGRAM(s) Oral three times a day  torsemide 20 milliGRAM(s) Oral <User Schedule>    VITALS:  T(C): , Max: 37.4 (05-02-19 @ 22:00)  T(F): , Max: 99.3 (05-02-19 @ 22:00)  HR: 76 (05-03-19 @ 09:49)  BP: 109/71 (05-03-19 @ 09:49)  RR: 19 (05-03-19 @ 09:49)  SpO2: 100% (05-03-19 @ 09:49)    05-02 @ 07:01  -  05-03 @ 07:00  --------------------------------------------------------  IN: 630 mL / OUT: 0 mL / NET: 630 mL    REVIEW OF SYSTEMS:  Full ROS done and were negative unless otherwise indicated in HPI/assessment.     PHYSICAL EXAM:  Constitutional: NAD  Respiratory: CTA B/L  Cardiovascular: S1 and S2, RRR  Gastrointestinal: + abd sx wound  Extremities: trace edema  Neurological: AAO x 3  : no dawson      LABS:                        8.7    9.50  )-----------( 257      ( 03 May 2019 07:50 )             27.5     05-03    137  |  108<H>  |  44<H>  ----------------------------<  123<H>  4.1   |  19<L>  |  1.51<H>    Ca    9.6      03 May 2019 07:50  Phos  3.0     05-03  Mg     1.8     05-03    RADIOLOGY & ADDITIONAL STUDIES:  < from: Xray Abdomen 1 View PORTABLE -Urgent (05.02.19 @ 10:46) >    IMPRESSION: Nonobstructive bowel gas pattern.    < end of copied text >    ASSESSMENT / RECOMMEND  63F with COPD (on O2), LINH, AF on A/C, HLD, gout, HFpEF, PHTN , DM and CKD3/4 p/w generalized weakness and poor appetite 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 and now s/p exlap, partial left hemicolectomy and side to side stapled anastomosis  - CKD: stage 3 with renal fxn at baseline  - HFpEF: right sided heart failure / cor pulm. volume status acceptable  - anemia: in chronic disease + iron def + GIB + malignancy, no LUIS. Hgb is stable  - hypomagnesemia: due to diuresis + enteral losses (diarrhea) - supplemented    RECOMMEND:  - diuretics per heart failure: torsemide 20 po daily  - digoxin is every other day   - sildenafil 20 po tid per pulmonary   - give mag ox 400 mg po daily x 3 days   - no objection to CT neck w/ IVC  - give post-procedural IVF NS @ 70/hr x 7 hours  - f/u further ENT input  - dose meds for a GFR ~ 25    Christina Lyn NP  [facility]  [phone]

## 2019-05-03 NOTE — CONSULT NOTE ADULT - SUBJECTIVE AND OBJECTIVE BOX
63 year old female S/P hemicolectomy that has had right neck swelling since the beginning of April.   Patient has known thyroid nodules that haven't had FNA. States she is now having right sided neck pain and dysphagia for the   past 3 days. Has a history of COPD on home 02 but isn't c/o increased SOB above her baseline. Denies any  hoarseness or dysphonia. Denies Fevers, chills, rigors or sweats.    AVSS, p02 91% 2LNC  HEENT:  Mouth: no erythema, No injection, No exudates.   tongue midline, No Floor of mouth edema.   Neck:  + tenderness to palpation right with induration.   No fluctuance appreciated. ? right sided LN in area of induration.   Trachea midline.     Scope:  No base of tongue edema, No masses.  Epiglottis sharp  Vocal cords mobile and patent.  Slight asymmetry of airway on right.

## 2019-05-03 NOTE — PROGRESS NOTE ADULT - ATTENDING COMMENTS
Renal attd    CT scan of the neck with contrast and gentle IVF given   trend creatinine in am.  Assess for CHARLIE  ENT for fu CT scan    35 minutes spent on total encounter and more then 50% of the visit was spent counseling and/or coordinating care by the attending physician

## 2019-05-03 NOTE — CONSULT NOTE ADULT - PROBLEM SELECTOR RECOMMENDATION 9
1. Would consider a CT neck with contrast prior to DC. If CT shows drainable collection or infection discuss plan with ENT prior to DC. If neg or simple cellulitis then plan as per primary team with possible DC on oral antibiotics.   2. Will D/W ENT resident. CT will be reviewed.

## 2019-05-03 NOTE — PROGRESS NOTE ADULT - SUBJECTIVE AND OBJECTIVE BOX
GENERAL SURGERY DAILY PROGRESS NOTE:     Subjective:  Pt seen and examined. No acute events overnight. Tolerating diet. Pain well controlled.       Objective:  Vital Signs Last 24 Hrs  T(C): 37.4 (02 May 2019 22:00), Max: 37.4 (02 May 2019 22:00)  T(F): 99.3 (02 May 2019 22:00), Max: 99.3 (02 May 2019 22:00)  HR: 77 (02 May 2019 22:00) (70 - 86)  BP: 116/69 (02 May 2019 22:00) (95/64 - 125/71)  BP(mean): --  RR: 19 (02 May 2019 22:00) (17 - 19)  SpO2: 95% (02 May 2019 22:00) (95% - 98%)    I&O's Detail    01 May 2019 07:01  -  02 May 2019 07:00  --------------------------------------------------------  IN:  Total IN: 0 mL    OUT:    Voided: 250 mL  Total OUT: 250 mL    Total NET: -250 mL      02 May 2019 07:01  -  03 May 2019 00:06  --------------------------------------------------------  IN:    Oral Fluid: 390 mL  Total IN: 390 mL    OUT:  Total OUT: 0 mL    Total NET: 390 mL          MEDICATIONS  (STANDING):  acetaminophen   Tablet .. 650 milliGRAM(s) Oral every 6 hours  allopurinol 300 milliGRAM(s) Oral daily  buDESOnide  80 MICROgram(s)/formoterol 4.5 MICROgram(s) Inhaler 2 Puff(s) Inhalation two times a day  dextrose 50% Injectable 25 Gram(s) IV Push once  dextrose 50% Injectable 25 Gram(s) IV Push once  digoxin     Tablet 0.125 milliGRAM(s) Oral every other day  insulin lispro (HumaLOG) corrective regimen sliding scale   SubCutaneous Before meals and at bedtime  metoprolol succinate ER 50 milliGRAM(s) Oral two times a day  multivitamin 1 Tablet(s) Oral daily  pantoprazole    Tablet 40 milliGRAM(s) Oral before breakfast  sildenafil (REVATIO) 20 milliGRAM(s) Oral three times a day  sodium chloride 0.9% lock flush 3 milliLiter(s) IV Push every 8 hours  torsemide 20 milliGRAM(s) Oral <User Schedule>    MEDICATIONS  (PRN):  ALBUTerol/ipratropium for Nebulization 3 milliLiter(s) Nebulizer every 6 hours PRN Shortness of Breath and/or Wheezing  dextrose 40% Gel 15 Gram(s) Oral once PRN Blood Glucose LESS THAN 70 milliGRAM(s)/deciliter  glucagon  Injectable 1 milliGRAM(s) IntraMuscular once PRN Glucose LESS THAN 70 milligrams/deciliter  guaiFENesin  milliGRAM(s) Oral every 12 hours PRN Cough  oxyCODONE    IR 5 milliGRAM(s) Oral every 6 hours PRN Pain (1-10)                            9.6    9.40  )-----------( 246      ( 02 May 2019 07:10 )             31.2       05-02    136  |  105  |  42<H>  ----------------------------<  104<H>  4.1   |  18<L>  |  1.69<H>    Ca    9.3      02 May 2019 07:10  Phos  3.2     05-02  Mg     1.7     05-02        PHYSICAL EXAM  General: NAD, resting comfortably in bed  Pulmonary: Nonlabored breathing, no respiratory distress  Abdominal: soft, ND, appropriately tender to palpation, no RT, no guarding/rebound, incisions clean, dry, intact

## 2019-05-03 NOTE — PROGRESS NOTE ADULT - PROVIDER SPECIALTY LIST ADULT
Colorectal Surgery
Colorectal Surgery
Gastroenterology
Heart Failure
Internal Medicine
Nephrology
Pulmonology
SICU
Surgery
Nephrology
Nephrology
Heart Failure
Hospitalist
Pulmonology
SICU
Gastroenterology
Surgery
Surgery
Internal Medicine

## 2019-05-03 NOTE — CONSULT NOTE ADULT - REASON FOR ADMISSION
Generalized weakness

## 2019-05-03 NOTE — PROGRESS NOTE ADULT - REASON FOR ADMISSION
Generalized weakness
Generalized weakness  Influenza/enterovirus  Sleep apnea
Generalized weakness influenza
Generalized weakness/ influenza
Generalized weakness
Generalized weakness  influenza
Generalized weakness

## 2019-05-03 NOTE — PROGRESS NOTE ADULT - NSHPATTENDINGPLANDISCUSS_GEN_ALL_CORE
Dr. Palafox
Resident
Pt
Resident
SICU staff and Dr. Mendoza
Sister
SICU team
resident
Resident
SICU team
SICU team
resident, fellow
SICU team
Patient

## 2019-05-03 NOTE — PROGRESS NOTE ADULT - SUBJECTIVE AND OBJECTIVE BOX
Patient is a 63y old  Female who presents with a chief complaint of Generalized weakness (03 May 2019 00:05)      Any change in ROS: she isc/o pain inh er throat for last three days : no SOB : no wheezing    MEDICATIONS  (STANDING):  acetaminophen   Tablet .. 650 milliGRAM(s) Oral every 6 hours  allopurinol 300 milliGRAM(s) Oral daily  apixaban 5 milliGRAM(s) Oral every 12 hours  buDESOnide  80 MICROgram(s)/formoterol 4.5 MICROgram(s) Inhaler 2 Puff(s) Inhalation two times a day  dextrose 50% Injectable 25 Gram(s) IV Push once  dextrose 50% Injectable 25 Gram(s) IV Push once  digoxin     Tablet 0.125 milliGRAM(s) Oral every other day  insulin lispro (HumaLOG) corrective regimen sliding scale   SubCutaneous Before meals and at bedtime  metoprolol succinate ER 50 milliGRAM(s) Oral two times a day  multivitamin 1 Tablet(s) Oral daily  pantoprazole    Tablet 40 milliGRAM(s) Oral before breakfast  sildenafil (REVATIO) 20 milliGRAM(s) Oral three times a day  sodium chloride 0.9% lock flush 3 milliLiter(s) IV Push every 8 hours  torsemide 20 milliGRAM(s) Oral <User Schedule>    MEDICATIONS  (PRN):  ALBUTerol/ipratropium for Nebulization 3 milliLiter(s) Nebulizer every 6 hours PRN Shortness of Breath and/or Wheezing  dextrose 40% Gel 15 Gram(s) Oral once PRN Blood Glucose LESS THAN 70 milliGRAM(s)/deciliter  glucagon  Injectable 1 milliGRAM(s) IntraMuscular once PRN Glucose LESS THAN 70 milligrams/deciliter  guaiFENesin  milliGRAM(s) Oral every 12 hours PRN Cough  oxyCODONE    IR 5 milliGRAM(s) Oral every 6 hours PRN Pain (1-10)    Vital Signs Last 24 Hrs  T(C): 36.9 (03 May 2019 09:49), Max: 37.4 (02 May 2019 22:00)  T(F): 98.4 (03 May 2019 09:49), Max: 99.3 (02 May 2019 22:00)  HR: 76 (03 May 2019 09:49) (76 - 85)  BP: 109/71 (03 May 2019 09:49) (106/73 - 133/71)  BP(mean): --  RR: 19 (03 May 2019 09:49) (17 - 22)  SpO2: 100% (03 May 2019 09:49) (95% - 100%)    I&O's Summary    02 May 2019 07:01  -  03 May 2019 07:00  --------------------------------------------------------  IN: 630 mL / OUT: 0 mL / NET: 630 mL          Physical Exam:   GENERAL: NAD, well-groomed, well-developed  HEENT: BRITT/   Atraumatic, Normocephalic  ENMT: No tonsillar erythema, exudates, or enlargement; Moist mucous membranes, Good dentition, No lesions  NECK: Supple, No JVD, Normal thyroid  CHEST/LUNG: Clear to auscultaion  CVS: Regular rate and rhythm; No murmurs, rubs, or gallops  GI: : Soft, Nontender, Nondistended; Bowel sounds present  NERVOUS SYSTEM:  Alert & Oriented X3  EXTREMITIES:  Mild  edema  LYMPH: No lymphadenopathy noted  SKIN: No rashes or lesions  ENDOCRINOLOGY: No Thyromegaly  PSYCH: Appropriate    Labs:                              8.7    9.50  )-----------( 257      ( 03 May 2019 07:50 )             27.5                         9.6    9.40  )-----------( 246      ( 02 May 2019 07:10 )             31.2                         9.4    9.71  )-----------( 264      ( 01 May 2019 10:45 )             30.8                         10.0   8.91  )-----------( 222      ( 30 Apr 2019 07:10 )             33.0     05-03    137  |  108<H>  |  44<H>  ----------------------------<  123<H>  4.1   |  19<L>  |  1.51<H>  05-02    136  |  105  |  42<H>  ----------------------------<  104<H>  4.1   |  18<L>  |  1.69<H>  05-01    136  |  106  |  39<H>  ----------------------------<  130<H>  4.0   |  16<L>  |  1.77<H>  04-30    136  |  105  |  33<H>  ----------------------------<  104<H>  4.2   |  19<L>  |  2.14<H>    Ca    9.6      03 May 2019 07:50  Ca    9.3      02 May 2019 07:10  Phos  3.0     05-03  Phos  3.2     05-02  Mg     1.8     05-03  Mg     1.7     05-02      CAPILLARY BLOOD GLUCOSE      POCT Blood Glucose.: 179 mg/dL (03 May 2019 09:27)  POCT Blood Glucose.: 129 mg/dL (02 May 20< from: Xray Chest 1 View- PORTABLE-Routine (04.25.19 @ 13:09) >    EXAM:  XR CHEST PORTABLE ROUTINE 1V        PROCEDURE DATE:  Apr 25 2019         INTERPRETATION:  TIME OF EXAM: April 25, 2019 at 1:01 PM.    CLINICAL INFORMATION: Status post hemicolectomy. Wheezing.    COMPARISON:  April 23, 2019.    TECHNIQUE:   AP Portable chest x-ray. The chin obscures the superior   mediastinum and medial apices.    INTERPRETATION:     Heart size and the mediastinum cannot be accurately evaluated on this   projection. The thoracic aorta is calcified.  No focal lung consolidation, pleural effusion, or pneumothorax is seen.            IMPRESSION:  Clear lungs.                  JAI MIRANDA M.D., ATTENDING RADIOLOGIST  This document has been electronically signed. Apr 25 2019  2:11PM        < end of copied text >  19 21:59)  POCT Blood Glucose.: 122 mg/dL (02 May 2019 17:28)  POCT Blood Glucose.: 117 mg/dL (02 May 2019 12:36)        PT/INR - ( 03 May 2019 07:50 )   PT: 17.9 SEC;   INR: 1.55          PTT - ( 03 May 2019 07:50 )  PTT:35.5 SEC          RECENT CULTURES:        RESPIRATORY CULTURES:          Studies  Chest X-RAY  CT SCAN Chest   Venous Dopplers: LE:   CT Abdomen  Others

## 2019-05-03 NOTE — PROGRESS NOTE ADULT - ASSESSMENT
62 yo woman with multiple medical comorbidities including afib, right-sided CHF with severe pulmonary HTN, COPD on nocturnal O2, LINH newly initiated on bilevel support found to have a non-obstructing sigmoid mass s/p left hemicolectomy (4/24/19), pt in SICU due to persistently uncontrolled a fib, now on floor.     - Diet: Regular  - Appreciate PT evaluation - F/u work with stairs  - F/u INR, dose eliquis when INR<2  - Appreciate Pulmonology recs  - OOB as tolerated  - Dispo planning    [facility]  [id number] 62 yo woman with multiple medical comorbidities including afib, right-sided CHF with severe pulmonary HTN, COPD on nocturnal O2, LINH newly initiated on bilevel support found to have a non-obstructing sigmoid mass s/p left hemicolectomy (4/24/19), pt in SICU due to persistently uncontrolled a fib, now on floor.     - Diet: Regular  - Appreciate PT evaluation - F/u work with stairs  - F/u INR, dose eliquis when INR<2  - Appreciate Pulmonology recs  - OOB as tolerated  - Dispo planning    [facility]  [id number]    _______________________________________________________________________________________________________________________________________________________________________________________  ADDENDUM 5/3/2019 10:20am:  Patient complaining of persistent right neck swelling over past month and sore throat worsening over past 3 days, no associated shortness of breath per patient.    Vital Signs Last 24 Hrs  T(C): 36.9 (03 May 2019 09:49), Max: 37.4 (02 May 2019 22:00)  T(F): 98.4 (03 May 2019 09:49), Max: 99.3 (02 May 2019 22:00)  HR: 76 (03 May 2019 09:49) (76 - 86)  BP: 109/71 (03 May 2019 09:49) (95/64 - 133/71)  RR: 19 (03 May 2019 09:49) (17 - 22)  SpO2: 100% (03 May 2019 09:49) (95% - 100%)    Neck: right neck appears more full than left, no tenderness on palpation  Resp: no stridor, patient breathing comfortably    Thyroid ultrasound 4/8/2019:------------------------------------  FINDINGS:  Right Lobe: 4 x 2.8 x 3.7 cm. Mainly solid nodule with small central   cystic component 3.3 x 3.3 x 2.6 cm    Left Lobe: 4.8 x 1.8 x 1.7 cm. Mid posterior heterogeneous nodule 1.6 x 1   x 0.8 cm, indeterminant.    Isthmus: 4 mm.        Cervical Lymph Nodes: No enlarged or abnormal morphology cervical nodes.    IMPRESSION:   Bilateral indeterminant thyroid nodules.  ------------------------------------------------------------------------    ENT consult has been called, pending evaluation  CHARLENE Atkinson [id number]  A team surgery [id number]  _______________________________________________________________________________________________________________________________________________________________________________________ 64 yo woman with multiple medical comorbidities including afib, right-sided CHF with severe pulmonary HTN, COPD on nocturnal O2, LINH newly initiated on bilevel support found to have a non-obstructing sigmoid mass s/p left hemicolectomy (4/24/19), pt in SICU due to persistently uncontrolled a fib, now on floor.     - Diet: Regular  - Appreciate PT evaluation - F/u work with stairs  - F/u INR, dose eliquis when INR<2  - Appreciate Pulmonology recs  - OOB as tolerated  - Dispo planning    [facility]  [id number]    _______________________________________________________________________________________________________________________________________________________________________________________  ADDENDUM 5/3/2019 10:20am:  Patient complaining of persistent right neck swelling over past month and sore throat worsening over past 3 days, no associated shortness of breath per patient.    Vital Signs Last 24 Hrs  T(C): 36.9 (03 May 2019 09:49), Max: 37.4 (02 May 2019 22:00)  T(F): 98.4 (03 May 2019 09:49), Max: 99.3 (02 May 2019 22:00)  HR: 76 (03 May 2019 09:49) (76 - 86)  BP: 109/71 (03 May 2019 09:49) (95/64 - 133/71)  RR: 19 (03 May 2019 09:49) (17 - 22)  SpO2: 100% (03 May 2019 09:49) (95% - 100%)    Neck: right neck appears more full than left, mildly tender on palpation  Resp: no stridor, patient breathing comfortably    Thyroid ultrasound 4/8/2019:------------------------------------  FINDINGS:  Right Lobe: 4 x 2.8 x 3.7 cm. Mainly solid nodule with small central   cystic component 3.3 x 3.3 x 2.6 cm    Left Lobe: 4.8 x 1.8 x 1.7 cm. Mid posterior heterogeneous nodule 1.6 x 1   x 0.8 cm, indeterminant.    Isthmus: 4 mm.        Cervical Lymph Nodes: No enlarged or abnormal morphology cervical nodes.    IMPRESSION:   Bilateral indeterminant thyroid nodules.  ------------------------------------------------------------------------    ENT consult has been called, pending evaluation  CHARLENE Atkinson [id number]  [facility] team surgery [id number]  _______________________________________________________________________________________________________________________________________________________________________________________

## 2019-05-03 NOTE — PROGRESS NOTE ADULT - ASSESSMENT
s/p surgery 4/24 for colon ca  --to monitor VS and temps  --incentive spirometry    COPD/asthma  --on Symbicort and PRn neb treatments  5/2: She is not SOB : currently she is off oxygen but she desats on ambulation: She should cont to use oxygen on sleeping as well as walking and whenever her sao2 are below 88%: BONIFACIO CHANG surgery: Her last chest x-ray is clear: and she is alert and awake and asymptomatic  5/3: She seesm to eb doing ok : no SOB : no cough : sore throat +    Mild hypoxemia - on nasal O2  --monitor O2 sats  5/2: As above: she does ahcve mild edema ? trail of lasix for a short duration  Hx sleep apnea (report on paper chart)  --monitor for apneas and or hypoxemia  5/2: outpt follow denys Francisco!  5/3: sheis doingok : no SOB :     Atrial fibrillatrion - rate presently controlled.   --required addl metoprolol overnight  5/2: PER PRIMARY TEAM   5/3: ON torsemide: qod: starting tomorrow     case discussed with SICU staff and Dr. Robles    (NB: I will be away through 5/6; if pulm needed please contact my ofc for covering pulm physician)    5/2: BONIFACIO Hein: Surgical PA on floor!  5/3: Pt seesm to eb stable from pulmonary point of view cont incentive spirometry as wellas oxygen!

## 2019-05-04 ENCOUNTER — INBOUND DOCUMENT (OUTPATIENT)
Age: 64
End: 2019-05-04

## 2019-05-10 ENCOUNTER — INPATIENT (INPATIENT)
Facility: HOSPITAL | Age: 64
LOS: 7 days | Discharge: HOME HEALTH | End: 2019-05-18
Attending: INTERNAL MEDICINE | Admitting: INTERNAL MEDICINE
Payer: COMMERCIAL

## 2019-05-10 VITALS
SYSTOLIC BLOOD PRESSURE: 151 MMHG | HEART RATE: 90 BPM | TEMPERATURE: 98 F | RESPIRATION RATE: 20 BRPM | DIASTOLIC BLOOD PRESSURE: 98 MMHG | OXYGEN SATURATION: 96 %

## 2019-05-10 DIAGNOSIS — K62.5 HEMORRHAGE OF ANUS AND RECTUM: ICD-10-CM

## 2019-05-10 LAB
ALBUMIN SERPL ELPH-MCNC: 3.83 G/DL — SIGNIFICANT CHANGE UP (ref 3.3–5)
ALP SERPL-CCNC: 139 U/L — HIGH (ref 40–120)
ALT FLD-CCNC: 20 U/L — SIGNIFICANT CHANGE UP (ref 4–33)
ANION GAP SERPL CALC-SCNC: 13 MMO/L — SIGNIFICANT CHANGE UP (ref 7–14)
ANISOCYTOSIS BLD QL: SIGNIFICANT CHANGE UP
APTT BLD: 38.8 SEC — HIGH (ref 27.5–36.3)
AST SERPL-CCNC: 24 U/L — SIGNIFICANT CHANGE UP (ref 4–32)
BASE EXCESS BLDV CALC-SCNC: -5.3 MMOL/L — SIGNIFICANT CHANGE UP
BASOPHILS # BLD AUTO: 0.07 K/UL — SIGNIFICANT CHANGE UP (ref 0–0.2)
BASOPHILS NFR BLD AUTO: 0.8 % — SIGNIFICANT CHANGE UP (ref 0–2)
BILIRUB SERPL-MCNC: 0.6 MG/DL — SIGNIFICANT CHANGE UP (ref 0.2–1.2)
BLD GP AB SCN SERPL QL: NEGATIVE — SIGNIFICANT CHANGE UP
BLOOD GAS VENOUS - CREATININE: 3.73 MG/DL — HIGH (ref 0.5–1.3)
BLOOD GAS VENOUS - FIO2: 21 — SIGNIFICANT CHANGE UP
BUN SERPL-MCNC: 73 MG/DL — HIGH (ref 7–23)
CALCIUM SERPL-MCNC: 9.7 MG/DL — SIGNIFICANT CHANGE UP (ref 8.4–10.5)
CHLORIDE BLDV-SCNC: 111 MMOL/L — HIGH (ref 96–108)
CHLORIDE SERPL-SCNC: 106 MMOL/L — SIGNIFICANT CHANGE UP (ref 98–107)
CO2 SERPL-SCNC: 18 MMOL/L — LOW (ref 22–31)
CREAT SERPL-MCNC: 3.88 MG/DL — HIGH (ref 0.5–1.3)
DACRYOCYTES BLD QL SMEAR: SLIGHT — SIGNIFICANT CHANGE UP
DIGOXIN SERPL-MCNC: 1.1 NG/ML — SIGNIFICANT CHANGE UP (ref 0.8–2)
ELLIPTOCYTES BLD QL SMEAR: SLIGHT — SIGNIFICANT CHANGE UP
EOSINOPHIL # BLD AUTO: 0.17 K/UL — SIGNIFICANT CHANGE UP (ref 0–0.5)
EOSINOPHIL NFR BLD AUTO: 1.9 % — SIGNIFICANT CHANGE UP (ref 0–6)
GAS PNL BLDV: 135 MMOL/L — LOW (ref 136–146)
GLUCOSE BLDV-MCNC: 119 MG/DL — HIGH (ref 70–99)
GLUCOSE SERPL-MCNC: 124 MG/DL — HIGH (ref 70–99)
HCO3 BLDV-SCNC: 19 MMOL/L — LOW (ref 20–27)
HCT VFR BLD CALC: 27 % — LOW (ref 34.5–45)
HCT VFR BLDV CALC: 25.5 % — LOW (ref 34.5–45)
HGB BLD-MCNC: 8.2 G/DL — LOW (ref 11.5–15.5)
HGB BLDV-MCNC: 8.2 G/DL — LOW (ref 11.5–15.5)
HYPOCHROMIA BLD QL: SLIGHT — SIGNIFICANT CHANGE UP
IMM GRANULOCYTES NFR BLD AUTO: 0.7 % — SIGNIFICANT CHANGE UP (ref 0–1.5)
INR BLD: 2.04 — HIGH (ref 0.88–1.17)
LACTATE BLDV-MCNC: 1.6 MMOL/L — SIGNIFICANT CHANGE UP (ref 0.5–2)
LYMPHOCYTES # BLD AUTO: 1.25 K/UL — SIGNIFICANT CHANGE UP (ref 1–3.3)
LYMPHOCYTES # BLD AUTO: 13.7 % — SIGNIFICANT CHANGE UP (ref 13–44)
MANUAL SMEAR VERIFICATION: SIGNIFICANT CHANGE UP
MCHC RBC-ENTMCNC: 20.9 PG — LOW (ref 27–34)
MCHC RBC-ENTMCNC: 30.4 % — LOW (ref 32–36)
MCV RBC AUTO: 68.7 FL — LOW (ref 80–100)
MICROCYTES BLD QL: SIGNIFICANT CHANGE UP
MONOCYTES # BLD AUTO: 0.79 K/UL — SIGNIFICANT CHANGE UP (ref 0–0.9)
MONOCYTES NFR BLD AUTO: 8.6 % — SIGNIFICANT CHANGE UP (ref 2–14)
NEUTROPHILS # BLD AUTO: 6.8 K/UL — SIGNIFICANT CHANGE UP (ref 1.8–7.4)
NEUTROPHILS NFR BLD AUTO: 74.3 % — SIGNIFICANT CHANGE UP (ref 43–77)
NRBC # FLD: 0.07 K/UL — SIGNIFICANT CHANGE UP (ref 0–0)
PCO2 BLDV: 40 MMHG — LOW (ref 41–51)
PH BLDV: 7.32 PH — SIGNIFICANT CHANGE UP (ref 7.32–7.43)
PLATELET # BLD AUTO: 392 K/UL — SIGNIFICANT CHANGE UP (ref 150–400)
PLATELET COUNT - ESTIMATE: NORMAL — SIGNIFICANT CHANGE UP
PMV BLD: 10.6 FL — SIGNIFICANT CHANGE UP (ref 7–13)
PO2 BLDV: 25 MMHG — LOW (ref 35–40)
POIKILOCYTOSIS BLD QL AUTO: SIGNIFICANT CHANGE UP
POLYCHROMASIA BLD QL SMEAR: SLIGHT — SIGNIFICANT CHANGE UP
POTASSIUM BLDV-SCNC: 5 MMOL/L — HIGH (ref 3.4–4.5)
POTASSIUM SERPL-MCNC: 5.2 MMOL/L — SIGNIFICANT CHANGE UP (ref 3.5–5.3)
POTASSIUM SERPL-SCNC: 5.2 MMOL/L — SIGNIFICANT CHANGE UP (ref 3.5–5.3)
PROT SERPL-MCNC: 7.2 G/DL — SIGNIFICANT CHANGE UP (ref 6–8.3)
PROTHROM AB SERPL-ACNC: 23.8 SEC — HIGH (ref 9.8–13.1)
RBC # BLD: 3.93 M/UL — SIGNIFICANT CHANGE UP (ref 3.8–5.2)
RBC # FLD: 22.1 % — HIGH (ref 10.3–14.5)
RH IG SCN BLD-IMP: POSITIVE — SIGNIFICANT CHANGE UP
SAO2 % BLDV: 33 % — LOW (ref 60–85)
SCHISTOCYTES BLD QL AUTO: SLIGHT — SIGNIFICANT CHANGE UP
SODIUM SERPL-SCNC: 137 MMOL/L — SIGNIFICANT CHANGE UP (ref 135–145)
WBC # BLD: 9.14 K/UL — SIGNIFICANT CHANGE UP (ref 3.8–10.5)
WBC # FLD AUTO: 9.14 K/UL — SIGNIFICANT CHANGE UP (ref 3.8–10.5)

## 2019-05-10 NOTE — ED PROVIDER NOTE - PHYSICAL EXAMINATION
GEN APPEARANCE: WDWN, alert and cooperative, non-toxic appearing and in NAD  HEAD: Atraumatic, normocephalic   EYES: PERRLa, EOMI, vision grossly intact.   EARS: Gross hearing intact.   NOSE: No nasal discharge, no external evidence of epistaxis.   NECK: Supple  CV: RRR, S1S2, no c/r/m/g. No cyanosis or pallor. Extremities warm, well perfused. Cap refill <2 seconds. No bruits.   LUNGS: CTAB. No wheezing. No rales. No rhonchi. No diminished breath sounds.   ABDOMEN: Soft, NTND. No guarding or rebound. No masses. surgical incision stapes appear c/d/i, non tender, non erythematous   MSK: Spine appears normal, no spine point tenderness. No CVA ttp. No joint erythema or tenderness. Normal muscular development. Pelvis stable.  EXTREMITIES: No peripheral edema. No obvious joint or bony deformity.  NEURO: Alert, follows commands. Weight bearing normal. Speech normal. Sensation and motor normal x4 extremities.   SKIN: Normal color for race, warm, dry and intact. No evidence of rash.  PSYCH: Normal mood and affect.

## 2019-05-10 NOTE — ED ADULT NURSE NOTE - INTERVENTIONS DEFINITIONS
Non-slip footwear when patient is off stretcher/Room bathroom lighting operational/Stretcher in lowest position, wheels locked, appropriate side rails in place/[place] to call system/Call bell, personal items and telephone within reach/Instruct patient to call for assistance/Provide visual cue, wrist band, yellow gown, etc./Physically safe environment: no spills, clutter or unnecessary equipment

## 2019-05-10 NOTE — ED PROVIDER NOTE - PMH
Atrial fibrillation    Chronic systolic right heart failure    CLL (chronic lymphocytic leukemia)    COPD (chronic obstructive pulmonary disease)    Diabetes    Gout    HTN (hypertension)    LINH (obstructive sleep apnea)

## 2019-05-10 NOTE — ED ADULT TRIAGE NOTE - CHIEF COMPLAINT QUOTE
pt coming from home with BRBPR x 4 today Hx. Colon Ca. with Partial Surgical removal of left hemicolon was at Hosp. May 3, pt on Eliquis for A. Fib.  pt AOX 3

## 2019-05-10 NOTE — ED PROVIDER NOTE - CARE PLAN
Principal Discharge DX:	BRBPR (bright red blood per rectum)  Secondary Diagnosis:	OSMAR (acute kidney injury)

## 2019-05-10 NOTE — ED PROVIDER NOTE - ATTENDING CONTRIBUTION TO CARE
I performed a face to face history and physical exam of the patient and discussed their management with the resident. I reviewed the resident's note and agree with the documented findings and plan of care. 62 y/o female with DM, HTN, HLD, COPD, LINH, R sided heart failure, s/p L hemicolectomy 4/24, had rapid a-fib, on Eloquis for A-fib post op, now here with 4 episodes BRBPR, no abdominal pain, +lightheadedness, no CP, no SOB, no fever, no n/v no rectal pain on exam pt comfortable thought mildly tachypneic off of her Oxygen, oxygen placed, abdominal exam incisions c/d/i, nontender abdomen, rectal exam to be done, 1)CBC, CMP, VBG, dig,  Type and Screen, Pt/PTT 2)Surgery consult 3)IVF 4)Reevaluate

## 2019-05-10 NOTE — ED PROVIDER NOTE - OBJECTIVE STATEMENT
63F hx of sigmoid adeno ca s/p L hemicolectomy s/p SICU c/f post op afib on eliquis, digoxin, COPD HTN HLD presents with a cc of x4 episodes BRBPR x1 day, no bleeding since recent dc, denies abdominal pain only c/o mild weakness, no bleeding elsewhere, seen by PMD today. Came to ED bc c/f bleeding. Last BM approx 5pm tonight. Surg 4/24 per Steve ROONEY. Denies n/v/f/c/cp/sob. Denies headache, syncope, lightheadedness, dizziness. Denies chest palpitations, abdominal pain. Denies dysuria, hematuria, tarry stools, diarrhea, constipation.

## 2019-05-10 NOTE — ED PROVIDER NOTE - CLINICAL SUMMARY MEDICAL DECISION MAKING FREE TEXT BOX
Albertina PGY2: 3F hx of sigmoid adeno ca s/p L hemicolectomy s/p SICU c/f post op afib on eliquis, digoxin, COPD HTN HLD presents with a cc of x4 episodes BRBPR x1 day, no bleeding since recent dc, denies abdominal pain only c/o mild weakness, no bleeding elsewhere, seen by PMD today.  exam vss non toxic c/f GI bleed will check labs discuss with surg, reassess likely admit

## 2019-05-10 NOTE — ED ADULT NURSE NOTE - OBJECTIVE STATEMENT
rec'd pt in room 12 c/o bright red blood and clots x4-5 with stool today.  pt s/p colon resection... has staples... surgical site dry and intact... no tenderness, redness or exudate. pt aa&ox4. denies nausea, pain, vomiting.  b/l lower ext swelling since discharge approx 1 week ago. pt has hx of copd, asthma, pulmonary htn and is on cpap at night. 20 gauge inserted left ac. blood and urine sent. for re-eval.

## 2019-05-11 DIAGNOSIS — I50.22 CHRONIC SYSTOLIC (CONGESTIVE) HEART FAILURE: ICD-10-CM

## 2019-05-11 DIAGNOSIS — I48.2 CHRONIC ATRIAL FIBRILLATION: ICD-10-CM

## 2019-05-11 DIAGNOSIS — I27.20 PULMONARY HYPERTENSION, UNSPECIFIED: ICD-10-CM

## 2019-05-11 DIAGNOSIS — K62.5 HEMORRHAGE OF ANUS AND RECTUM: ICD-10-CM

## 2019-05-11 DIAGNOSIS — N17.9 ACUTE KIDNEY FAILURE, UNSPECIFIED: ICD-10-CM

## 2019-05-11 LAB
ANION GAP SERPL CALC-SCNC: 15 MMO/L — HIGH (ref 7–14)
APPEARANCE UR: CLEAR — SIGNIFICANT CHANGE UP
APTT BLD: 36.5 SEC — HIGH (ref 27.5–36.3)
BACTERIA # UR AUTO: NEGATIVE — SIGNIFICANT CHANGE UP
BASOPHILS # BLD AUTO: 0.05 K/UL — SIGNIFICANT CHANGE UP (ref 0–0.2)
BASOPHILS NFR BLD AUTO: 0.5 % — SIGNIFICANT CHANGE UP (ref 0–2)
BILIRUB UR-MCNC: NEGATIVE — SIGNIFICANT CHANGE UP
BLOOD UR QL VISUAL: NEGATIVE — SIGNIFICANT CHANGE UP
BUN SERPL-MCNC: 71 MG/DL — HIGH (ref 7–23)
CALCIUM SERPL-MCNC: 9.5 MG/DL — SIGNIFICANT CHANGE UP (ref 8.4–10.5)
CHLORIDE SERPL-SCNC: 110 MMOL/L — HIGH (ref 98–107)
CO2 SERPL-SCNC: 16 MMOL/L — LOW (ref 22–31)
COLOR SPEC: SIGNIFICANT CHANGE UP
CREAT SERPL-MCNC: 3.59 MG/DL — HIGH (ref 0.5–1.3)
EOSINOPHIL # BLD AUTO: 0.18 K/UL — SIGNIFICANT CHANGE UP (ref 0–0.5)
EOSINOPHIL NFR BLD AUTO: 1.9 % — SIGNIFICANT CHANGE UP (ref 0–6)
FERRITIN SERPL-MCNC: 695.7 NG/ML — HIGH (ref 15–150)
GLUCOSE SERPL-MCNC: 98 MG/DL — SIGNIFICANT CHANGE UP (ref 70–99)
GLUCOSE UR-MCNC: NEGATIVE — SIGNIFICANT CHANGE UP
HCT VFR BLD CALC: 24.6 % — LOW (ref 34.5–45)
HCT VFR BLD CALC: 25.2 % — LOW (ref 34.5–45)
HCT VFR BLD CALC: 25.4 % — LOW (ref 34.5–45)
HCT VFR BLD CALC: 25.5 % — LOW (ref 34.5–45)
HGB BLD-MCNC: 7.6 G/DL — LOW (ref 11.5–15.5)
HGB BLD-MCNC: 7.9 G/DL — LOW (ref 11.5–15.5)
HGB BLD-MCNC: 7.9 G/DL — LOW (ref 11.5–15.5)
HGB BLD-MCNC: 8 G/DL — LOW (ref 11.5–15.5)
HYALINE CASTS # UR AUTO: NEGATIVE — SIGNIFICANT CHANGE UP
IMM GRANULOCYTES NFR BLD AUTO: 0.6 % — SIGNIFICANT CHANGE UP (ref 0–1.5)
INR BLD: 1.9 — HIGH (ref 0.88–1.17)
IRON SATN MFR SERPL: 235 UG/DL — SIGNIFICANT CHANGE UP (ref 140–530)
IRON SATN MFR SERPL: 73 UG/DL — SIGNIFICANT CHANGE UP (ref 30–160)
KETONES UR-MCNC: NEGATIVE — SIGNIFICANT CHANGE UP
LEUKOCYTE ESTERASE UR-ACNC: NEGATIVE — SIGNIFICANT CHANGE UP
LYMPHOCYTES # BLD AUTO: 1.46 K/UL — SIGNIFICANT CHANGE UP (ref 1–3.3)
LYMPHOCYTES # BLD AUTO: 15.4 % — SIGNIFICANT CHANGE UP (ref 13–44)
MCHC RBC-ENTMCNC: 20.8 PG — LOW (ref 27–34)
MCHC RBC-ENTMCNC: 21.1 PG — LOW (ref 27–34)
MCHC RBC-ENTMCNC: 21.3 PG — LOW (ref 27–34)
MCHC RBC-ENTMCNC: 21.6 PG — LOW (ref 27–34)
MCHC RBC-ENTMCNC: 30.9 % — LOW (ref 32–36)
MCHC RBC-ENTMCNC: 31 % — LOW (ref 32–36)
MCHC RBC-ENTMCNC: 31.1 % — LOW (ref 32–36)
MCHC RBC-ENTMCNC: 31.7 % — LOW (ref 32–36)
MCV RBC AUTO: 67.1 FL — LOW (ref 80–100)
MCV RBC AUTO: 67.9 FL — LOW (ref 80–100)
MCV RBC AUTO: 68.3 FL — LOW (ref 80–100)
MCV RBC AUTO: 68.5 FL — LOW (ref 80–100)
MONOCYTES # BLD AUTO: 0.8 K/UL — SIGNIFICANT CHANGE UP (ref 0–0.9)
MONOCYTES NFR BLD AUTO: 8.4 % — SIGNIFICANT CHANGE UP (ref 2–14)
NEUTROPHILS # BLD AUTO: 6.95 K/UL — SIGNIFICANT CHANGE UP (ref 1.8–7.4)
NEUTROPHILS NFR BLD AUTO: 73.2 % — SIGNIFICANT CHANGE UP (ref 43–77)
NITRITE UR-MCNC: NEGATIVE — SIGNIFICANT CHANGE UP
NRBC # FLD: 0.04 K/UL — SIGNIFICANT CHANGE UP (ref 0–0)
NRBC # FLD: 0.05 K/UL — SIGNIFICANT CHANGE UP (ref 0–0)
NRBC # FLD: 0.05 K/UL — SIGNIFICANT CHANGE UP (ref 0–0)
NRBC # FLD: 0.07 K/UL — SIGNIFICANT CHANGE UP (ref 0–0)
NT-PROBNP SERPL-SCNC: SIGNIFICANT CHANGE UP PG/ML
PH UR: 5.5 — SIGNIFICANT CHANGE UP (ref 5–8)
PHOSPHATE SERPL-MCNC: 3.2 MG/DL — SIGNIFICANT CHANGE UP (ref 2.5–4.5)
PLATELET # BLD AUTO: 308 K/UL — SIGNIFICANT CHANGE UP (ref 150–400)
PLATELET # BLD AUTO: 335 K/UL — SIGNIFICANT CHANGE UP (ref 150–400)
PLATELET # BLD AUTO: 342 K/UL — SIGNIFICANT CHANGE UP (ref 150–400)
PLATELET # BLD AUTO: 349 K/UL — SIGNIFICANT CHANGE UP (ref 150–400)
PMV BLD: 10.3 FL — SIGNIFICANT CHANGE UP (ref 7–13)
PMV BLD: 10.4 FL — SIGNIFICANT CHANGE UP (ref 7–13)
PMV BLD: 10.4 FL — SIGNIFICANT CHANGE UP (ref 7–13)
PMV BLD: 10.6 FL — SIGNIFICANT CHANGE UP (ref 7–13)
POTASSIUM SERPL-MCNC: 5.5 MMOL/L — HIGH (ref 3.5–5.3)
POTASSIUM SERPL-SCNC: 5.5 MMOL/L — HIGH (ref 3.5–5.3)
PROT UR-MCNC: 20 — SIGNIFICANT CHANGE UP
PROTHROM AB SERPL-ACNC: 21.5 SEC — HIGH (ref 9.8–13.1)
RBC # BLD: 3.6 M/UL — LOW (ref 3.8–5.2)
RBC # BLD: 3.71 M/UL — LOW (ref 3.8–5.2)
RBC # BLD: 3.71 M/UL — LOW (ref 3.8–5.2)
RBC # BLD: 3.8 M/UL — SIGNIFICANT CHANGE UP (ref 3.8–5.2)
RBC # FLD: 22.2 % — HIGH (ref 10.3–14.5)
RBC # FLD: 22.3 % — HIGH (ref 10.3–14.5)
RBC # FLD: 22.5 % — HIGH (ref 10.3–14.5)
RBC # FLD: 22.9 % — HIGH (ref 10.3–14.5)
RBC CASTS # UR COMP ASSIST: SIGNIFICANT CHANGE UP (ref 0–?)
SODIUM SERPL-SCNC: 141 MMOL/L — SIGNIFICANT CHANGE UP (ref 135–145)
SP GR SPEC: 1.01 — SIGNIFICANT CHANGE UP (ref 1–1.04)
SQUAMOUS # UR AUTO: SIGNIFICANT CHANGE UP
UIBC SERPL-MCNC: 161.8 UG/DL — SIGNIFICANT CHANGE UP (ref 110–370)
UROBILINOGEN FLD QL: NORMAL — SIGNIFICANT CHANGE UP
WBC # BLD: 7.81 K/UL — SIGNIFICANT CHANGE UP (ref 3.8–10.5)
WBC # BLD: 8.28 K/UL — SIGNIFICANT CHANGE UP (ref 3.8–10.5)
WBC # BLD: 8.29 K/UL — SIGNIFICANT CHANGE UP (ref 3.8–10.5)
WBC # BLD: 9.5 K/UL — SIGNIFICANT CHANGE UP (ref 3.8–10.5)
WBC # FLD AUTO: 7.81 K/UL — SIGNIFICANT CHANGE UP (ref 3.8–10.5)
WBC # FLD AUTO: 8.28 K/UL — SIGNIFICANT CHANGE UP (ref 3.8–10.5)
WBC # FLD AUTO: 8.29 K/UL — SIGNIFICANT CHANGE UP (ref 3.8–10.5)
WBC # FLD AUTO: 9.5 K/UL — SIGNIFICANT CHANGE UP (ref 3.8–10.5)
WBC UR QL: SIGNIFICANT CHANGE UP (ref 0–?)

## 2019-05-11 PROCEDURE — 71045 X-RAY EXAM CHEST 1 VIEW: CPT | Mod: 26,77

## 2019-05-11 PROCEDURE — 71045 X-RAY EXAM CHEST 1 VIEW: CPT | Mod: 26

## 2019-05-11 RX ORDER — INSULIN LISPRO 100/ML
VIAL (ML) SUBCUTANEOUS AT BEDTIME
Refills: 0 | Status: DISCONTINUED | OUTPATIENT
Start: 2019-05-11 | End: 2019-05-18

## 2019-05-11 RX ORDER — DEXTROSE 50 % IN WATER 50 %
25 SYRINGE (ML) INTRAVENOUS ONCE
Refills: 0 | Status: DISCONTINUED | OUTPATIENT
Start: 2019-05-11 | End: 2019-05-18

## 2019-05-11 RX ORDER — ALBUTEROL 90 UG/1
10 AEROSOL, METERED ORAL ONCE
Refills: 0 | Status: COMPLETED | OUTPATIENT
Start: 2019-05-11 | End: 2019-05-11

## 2019-05-11 RX ORDER — DIGOXIN 250 MCG
0.12 TABLET ORAL DAILY
Refills: 0 | Status: DISCONTINUED | OUTPATIENT
Start: 2019-05-11 | End: 2019-05-14

## 2019-05-11 RX ORDER — METOPROLOL TARTRATE 50 MG
50 TABLET ORAL
Refills: 0 | Status: DISCONTINUED | OUTPATIENT
Start: 2019-05-11 | End: 2019-05-14

## 2019-05-11 RX ORDER — BUDESONIDE AND FORMOTEROL FUMARATE DIHYDRATE 160; 4.5 UG/1; UG/1
2 AEROSOL RESPIRATORY (INHALATION)
Refills: 0 | Status: DISCONTINUED | OUTPATIENT
Start: 2019-05-11 | End: 2019-05-18

## 2019-05-11 RX ORDER — DEXTROSE 50 % IN WATER 50 %
12.5 SYRINGE (ML) INTRAVENOUS ONCE
Refills: 0 | Status: DISCONTINUED | OUTPATIENT
Start: 2019-05-11 | End: 2019-05-18

## 2019-05-11 RX ORDER — DEXTROSE 50 % IN WATER 50 %
15 SYRINGE (ML) INTRAVENOUS ONCE
Refills: 0 | Status: DISCONTINUED | OUTPATIENT
Start: 2019-05-11 | End: 2019-05-18

## 2019-05-11 RX ORDER — ALLOPURINOL 300 MG
300 TABLET ORAL DAILY
Refills: 0 | Status: DISCONTINUED | OUTPATIENT
Start: 2019-05-11 | End: 2019-05-18

## 2019-05-11 RX ORDER — FUROSEMIDE 40 MG
40 TABLET ORAL ONCE
Refills: 0 | Status: COMPLETED | OUTPATIENT
Start: 2019-05-11 | End: 2019-05-11

## 2019-05-11 RX ORDER — CALCIUM CARBONATE 500(1250)
1 TABLET ORAL
Refills: 0 | Status: DISCONTINUED | OUTPATIENT
Start: 2019-05-11 | End: 2019-05-18

## 2019-05-11 RX ORDER — ALBUTEROL 90 UG/1
2 AEROSOL, METERED ORAL EVERY 6 HOURS
Refills: 0 | Status: DISCONTINUED | OUTPATIENT
Start: 2019-05-11 | End: 2019-05-18

## 2019-05-11 RX ORDER — GLUCAGON INJECTION, SOLUTION 0.5 MG/.1ML
1 INJECTION, SOLUTION SUBCUTANEOUS ONCE
Refills: 0 | Status: DISCONTINUED | OUTPATIENT
Start: 2019-05-11 | End: 2019-05-18

## 2019-05-11 RX ORDER — FUROSEMIDE 40 MG
40 TABLET ORAL DAILY
Refills: 0 | Status: DISCONTINUED | OUTPATIENT
Start: 2019-05-11 | End: 2019-05-16

## 2019-05-11 RX ORDER — SODIUM CHLORIDE 9 MG/ML
1000 INJECTION, SOLUTION INTRAVENOUS
Refills: 0 | Status: DISCONTINUED | OUTPATIENT
Start: 2019-05-11 | End: 2019-05-18

## 2019-05-11 RX ORDER — ACETAMINOPHEN 500 MG
650 TABLET ORAL EVERY 6 HOURS
Refills: 0 | Status: DISCONTINUED | OUTPATIENT
Start: 2019-05-11 | End: 2019-05-18

## 2019-05-11 RX ORDER — INSULIN LISPRO 100/ML
VIAL (ML) SUBCUTANEOUS
Refills: 0 | Status: DISCONTINUED | OUTPATIENT
Start: 2019-05-11 | End: 2019-05-18

## 2019-05-11 RX ADMIN — Medication 300 MILLIGRAM(S): at 13:47

## 2019-05-11 RX ADMIN — BUDESONIDE AND FORMOTEROL FUMARATE DIHYDRATE 2 PUFF(S): 160; 4.5 AEROSOL RESPIRATORY (INHALATION) at 23:12

## 2019-05-11 RX ADMIN — Medication 40 MILLIGRAM(S): at 18:17

## 2019-05-11 RX ADMIN — Medication 40 MILLIGRAM(S): at 13:47

## 2019-05-11 RX ADMIN — Medication 1 TABLET(S): at 17:10

## 2019-05-11 RX ADMIN — ALBUTEROL 10 MILLIGRAM(S): 90 AEROSOL, METERED ORAL at 10:00

## 2019-05-11 RX ADMIN — Medication 20 MILLIGRAM(S): at 17:11

## 2019-05-11 RX ADMIN — Medication 0.12 MILLIGRAM(S): at 13:47

## 2019-05-11 RX ADMIN — Medication 1 TABLET(S): at 13:47

## 2019-05-11 RX ADMIN — Medication 20 MILLIGRAM(S): at 23:11

## 2019-05-11 RX ADMIN — Medication 50 MILLIGRAM(S): at 17:10

## 2019-05-11 NOTE — CONSULT NOTE ADULT - SUBJECTIVE AND OBJECTIVE BOX
General Surgery Consult  Consulting surgical team: A team  Consulting attending: Gilbert    HPI: 63F hx of sigmoid adeno ca s/p L hemicolectomy s/p SICU c/f post op afib on eliquis, digoxin, COPD HTN HLD presents with a cc of x4 episodes BRBPR x1 day, no bleeding since recent dc or since 5 PM. She has not had any bloody bowel movements in the ED. denies abdominal pain only c/o mild weakness, no bleeding elsewhere, seen by PMD today. Came to ED bc concern for bleeding. Surg 4/24 per Gilbert ROONEY. Denies n/v/f/c/cp/sob. Denies headache, syncope, lightheadedness, dizziness. Denies chest palpitations, abdominal pain. Denies dysuria, hematuria, tarry stools, diarrhea, constipation.      PAST MEDICAL HISTORY:  Gout  Chronic systolic right heart failure  LINH (obstructive sleep apnea)  CLL (chronic lymphocytic leukemia)  Chronic diastolic congestive heart failure  COPD (chronic obstructive pulmonary disease)  Diabetes  HTN (hypertension)  Atrial fibrillation      PAST SURGICAL HISTORY:  No significant past surgical history      MEDICATIONS:      ALLERGIES:  No Known Allergies      VITALS & I/Os:  Vital Signs Last 24 Hrs  T(C): 37 (11 May 2019 05:40), Max: 37 (11 May 2019 05:40)  T(F): 98.6 (11 May 2019 05:40), Max: 98.6 (11 May 2019 05:40)  HR: 87 (11 May 2019 05:40) (77 - 90)  BP: 134/84 (11 May 2019 05:40) (132/89 - 151/98)  BP(mean): --  RR: 20 (11 May 2019 05:40) (13 - 20)  SpO2: 95% (11 May 2019 05:40) (95% - 100%)    I&O's Summary      PHYSICAL EXAM:  General: No acute distress, appears nontoxic  Respiratory: Breathing unlabored  Cardiovascular: RRR  Abdominal: Soft, nondistended, nontender. No rebound. wounds healing well  Rectal: no masses palpated, scant blood on finger  Extremities: Warm, well perfused    LABS:                        7.9    9.50  )-----------( 349      ( 11 May 2019 00:03 )             25.5     05-10    137  |  106  |  73<H>  ----------------------------<  124<H>  5.2   |  18<L>  |  3.88<H>    Ca    9.7      10 May 2019 21:10    TPro  7.2  /  Alb  3.83  /  TBili  0.6  /  DBili  x   /  AST  24  /  ALT  20  /  AlkPhos  139<H>  05-10    Lactate:  05-10 @ 21:10  1.6    PT/INR - ( 10 May 2019 22:00 )   PT: 23.8 SEC;   INR: 2.04          PTT - ( 10 May 2019 22:00 )  PTT:38.8 SEC              IMAGING:

## 2019-05-11 NOTE — PROGRESS NOTE ADULT - SUBJECTIVE AND OBJECTIVE BOX
[facility] BLUE SURGERY DAILY PROGRESS NOTE    SUBJECTIVE:  -  pt still with bloody bm this AM  -  Hb down from 8 yest to 7.6 today    OBJECTIVE:    Vital Signs Last 24 Hrs  T(C): 36.8 (11 May 2019 07:19), Max: 37 (11 May 2019 05:40)  T(F): 98.2 (11 May 2019 07:19), Max: 98.6 (11 May 2019 05:40)  HR: 91 (11 May 2019 07:19) (77 - 91)  BP: 137/93 (11 May 2019 07:19) (132/89 - 151/98)  BP(mean): --  RR: 19 (11 May 2019 07:19) (13 - 20)  SpO2: 100% (11 May 2019 07:19) (95% - 100%)    EXAM:  Gen:       alert, in NAD  Lungs:       unlabored breathing  CV:       regular rate, rhythm  Abd:       nondistended, nontender                midline laparotomy with staples in place, c/d/i

## 2019-05-11 NOTE — H&P ADULT - HISTORY OF PRESENT ILLNESS
64 yo female with hx of HFpEF, sigmoid adeno CA s/p left hemicolectomy comes in with BRBPR, was seen in my office yesterday.  Labs also show OSMAR

## 2019-05-11 NOTE — CHART NOTE - NSCHARTNOTEFT_GEN_A_CORE
Pt seen, evaluated and examined at bedside. Medication reconciliation completed. Labs reviewed and orders placed. Pt stable at this time. Full H&P to follow by attending.     In brief, pt is a 64 y/o female with a PMHx of severe pulmonary hypertension, Pt seen, evaluated and examined at bedside. Medication reconciliation completed. Labs reviewed and orders placed. Pt stable at this time. Full H&P to follow by attending.     In brief, pt is a 64 y/o female with a PMHx of severe pulmonary hypertension, atrial fibrillation on Eliquis, COPD, HTN, HLD, DM, LINH and sigmoid adenocarcinoma S/P recent hemicolectomy presents to ED with BRBPR and volume overload complicated by OSMAR. Eliquis on hold. IV Lasix initiated. CBC stable. Surgery consult appreciated. Renal consult called. GI consult called. H&P to be done by Dr. Palafox.

## 2019-05-11 NOTE — CONSULT NOTE ADULT - SUBJECTIVE AND OBJECTIVE BOX
Dictated Job number [id number]    64 yo female with hx of HFpEF, sigmoid adeno CA s/p left hemicolectomy comes in with BRBPR    OSMAR on CKD III likely cardiorenal vs obstructive uropathy    Recommendations  - a/w lasix 40 mg IV daily  - Will give lasix 40 mg IV x 1 extra dose  - Bladder scan  - Strict I/O  - OSMAR work up, defer Renal US  - Low K diet  - Iron studies  - Phos level     Thank you for the courtesy of the referral    Tuan Camargo MD  [facility]  [phone]

## 2019-05-11 NOTE — PROGRESS NOTE ADULT - ASSESSMENT
62yo F s/p recent ex-lap, L hemicolectomy for splenic flexure adenoCA presents with BRBPR    PLAN:  - observe for bleeding and changes in hemodynamics  - serial CBCs  - If stable Hct and no more major bleeding can follow-up as outpt with Dr. Hunt for postsurgical visit  - once stable will remove staples while inpatient  - will continue to follow, continue excellent care per primary team    A SURGERY  [id number]

## 2019-05-11 NOTE — CONSULT NOTE ADULT - SUBJECTIVE AND OBJECTIVE BOX
Chief Complaint:  Patient is a 63y old  Female who presents with a chief complaint of GIB (11 May 2019 13:53)      HPI:    Allergies:  No Known Allergies      Home Medications:    Hospital Medications:  acetaminophen   Tablet .. 650 milliGRAM(s) Oral every 6 hours PRN  ALBUTerol    90 MICROgram(s) HFA Inhaler 2 Puff(s) Inhalation every 6 hours PRN  allopurinol 300 milliGRAM(s) Oral daily  buDESOnide  80 MICROgram(s)/formoterol 4.5 MICROgram(s) Inhaler 2 Puff(s) Inhalation two times a day  calcium carbonate    500 mG (Tums) Chewable 1 Tablet(s) Chew two times a day  dextrose 40% Gel 15 Gram(s) Oral once PRN  dextrose 5%. 1000 milliLiter(s) IV Continuous <Continuous>  dextrose 50% Injectable 12.5 Gram(s) IV Push once  dextrose 50% Injectable 25 Gram(s) IV Push once  dextrose 50% Injectable 25 Gram(s) IV Push once  digoxin     Tablet 0.125 milliGRAM(s) Oral daily  furosemide   Injectable 40 milliGRAM(s) IV Push daily  furosemide   Injectable 40 milliGRAM(s) IV Push once  glucagon  Injectable 1 milliGRAM(s) IntraMuscular once PRN  insulin lispro (HumaLOG) corrective regimen sliding scale   SubCutaneous three times a day before meals  insulin lispro (HumaLOG) corrective regimen sliding scale   SubCutaneous at bedtime  metoprolol succinate ER 50 milliGRAM(s) Oral two times a day  multivitamin 1 Tablet(s) Oral daily  sildenafil (REVATIO) 20 milliGRAM(s) Oral three times a day      PMHX/PSHX:  Gout  Chronic systolic right heart failure  LINH (obstructive sleep apnea)  CLL (chronic lymphocytic leukemia)  Chronic diastolic congestive heart failure  COPD (chronic obstructive pulmonary disease)  Diabetes  HTN (hypertension)  Atrial fibrillation  No significant past surgical history      Family history:  Family history of hyperlipidemia  Family history of hypertension (Sibling)  Family history of type 2 diabetes mellitus  Family history of cancer (Aunt)      Social History:     ROS:     General:  No wt loss, fevers, chills, night sweats, fatigue,   Eyes:  Good vision, no reported pain  ENT:  No sore throat, pain, runny nose, dysphagia  CV:  No pain, palpitations, hypo/hypertension  Resp:  No dyspnea, cough, tachypnea, wheezing  GI:  See HPI  :  No pain, bleeding, incontinence, nocturia  Muscle:  No pain, weakness  Neuro:  No weakness, tingling, memory problems  Psych:  No fatigue, insomnia, mood problems, depression  Endocrine:  No polyuria, polydipsia, cold/heat intolerance  Heme:  No petechiae, ecchymosis, easy bruisability  Skin:  No rash, edema      PHYSICAL EXAM:     GENERAL:  NAD  CHEST:  Full & symmetric excursion  HEART:  Regular rhythm, no abdominal bruit, no edema  ABDOMEN:  Soft, non-tender, non-distended, normoactive bowel sounds,  no masses , no hepatosplenomegaly  EXTREMITIES:  no cyanosis,clubbing or edema  SKIN:  No rash/erythema/ecchymoses/petechiae/wounds/abscess/warm/dry  NEURO:  Alert, oriented    Vital Signs:  Vital Signs Last 24 Hrs  T(C): 36.4 (11 May 2019 12:06), Max: 37 (11 May 2019 05:40)  T(F): 97.6 (11 May 2019 12:06), Max: 98.6 (11 May 2019 05:40)  HR: 76 (11 May 2019 13:46) (76 - 91)  BP: 127/87 (11 May 2019 13:46) (127/87 - 151/98)  BP(mean): --  RR: 18 (11 May 2019 12:06) (13 - 20)  SpO2: 100% (11 May 2019 12:06) (95% - 100%)  Daily Height in cm: 162.56 (10 May 2019 21:17)    Daily     LABS:                        7.9    8.29  )-----------( 308      ( 11 May 2019 14:00 )             25.4     05-11    141  |  110<H>  |  71<H>  ----------------------------<  98  5.5<H>   |  16<L>  |  3.59<H>    Ca    9.5      11 May 2019 05:15    TPro  7.2  /  Alb  3.83  /  TBili  0.6  /  DBili  x   /  AST  24  /  ALT  20  /  AlkPhos  139<H>  05-10    LIVER FUNCTIONS - ( 10 May 2019 21:10 )  Alb: 3.83 g/dL / Pro: 7.2 g/dL / ALK PHOS: 139 u/L / ALT: 20 u/L / AST: 24 u/L / GGT: x           PT/INR - ( 11 May 2019 05:15 )   PT: 21.5 SEC;   INR: 1.90          PTT - ( 11 May 2019 05:15 )  PTT:36.5 SEC  Urinalysis Basic - ( 11 May 2019 14:28 )    Color: LIGHT YELLOW / Appearance: CLEAR / S.015 / pH: 5.5  Gluc: NEGATIVE / Ketone: NEGATIVE  / Bili: NEGATIVE / Urobili: NORMAL   Blood: NEGATIVE / Protein: 20 / Nitrite: NEGATIVE   Leuk Esterase: NEGATIVE / RBC: 0-2 / WBC 3-5   Sq Epi: FEW / Non Sq Epi: x / Bacteria: NEGATIVE          Imaging: Chief Complaint:  Patient is a 63y old  Female who presents with a chief complaint of GIB (11 May 2019 13:53)      HPI:  The patient is a 62 y/o female with a PMHx of severe pulmonary hypertension, atrial fibrillation on Eliquis, COPD, HTN, HLD, DM, LINH and 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  Of note the patient was recently discharged on may 3rd.      Since being admitted the patient has been noted to have significant volume overload complicated by OSMAR. IV Lasix initiated. Of note CBC stable x 4 readings since admission. Patients last colonoscopy was done prior to resection on  showing Polypoid mass in the proximal sigmoid colon Non-bleeding internal hemorrhoids.    Allergies:  No Known Allergies      Home Medications:    Hospital Medications:  acetaminophen   Tablet .. 650 milliGRAM(s) Oral every 6 hours PRN  ALBUTerol    90 MICROgram(s) HFA Inhaler 2 Puff(s) Inhalation every 6 hours PRN  allopurinol 300 milliGRAM(s) Oral daily  buDESOnide  80 MICROgram(s)/formoterol 4.5 MICROgram(s) Inhaler 2 Puff(s) Inhalation two times a day  calcium carbonate    500 mG (Tums) Chewable 1 Tablet(s) Chew two times a day  dextrose 40% Gel 15 Gram(s) Oral once PRN  dextrose 5%. 1000 milliLiter(s) IV Continuous <Continuous>  dextrose 50% Injectable 12.5 Gram(s) IV Push once  dextrose 50% Injectable 25 Gram(s) IV Push once  dextrose 50% Injectable 25 Gram(s) IV Push once  digoxin     Tablet 0.125 milliGRAM(s) Oral daily  furosemide   Injectable 40 milliGRAM(s) IV Push daily  furosemide   Injectable 40 milliGRAM(s) IV Push once  glucagon  Injectable 1 milliGRAM(s) IntraMuscular once PRN  insulin lispro (HumaLOG) corrective regimen sliding scale   SubCutaneous three times a day before meals  insulin lispro (HumaLOG) corrective regimen sliding scale   SubCutaneous at bedtime  metoprolol succinate ER 50 milliGRAM(s) Oral two times a day  multivitamin 1 Tablet(s) Oral daily  sildenafil (REVATIO) 20 milliGRAM(s) Oral three times a day      PMHX/PSHX:  Gout  Chronic systolic right heart failure  LINH (obstructive sleep apnea)  CLL (chronic lymphocytic leukemia)  Chronic diastolic congestive heart failure  COPD (chronic obstructive pulmonary disease)  Diabetes  HTN (hypertension)  Atrial fibrillation  No significant past surgical history      Family history:  Family history of hyperlipidemia  Family history of hypertension (Sibling)  Family history of type 2 diabetes mellitus  Family history of cancer (Aunt)      Social History:     ROS:     General:  No wt loss, fevers, chills, night sweats, fatigue,   Eyes:  Good vision, no reported pain  ENT:  No sore throat, pain, runny nose, dysphagia  CV:  No pain, palpitations, hypo/hypertension  Resp:  No dyspnea, cough, tachypnea, wheezing  GI:  See HPI  :  No pain, bleeding, incontinence, nocturia  Muscle:  No pain, weakness  Neuro:  No weakness, tingling, memory problems  Psych:  No fatigue, insomnia, mood problems, depression  Endocrine:  No polyuria, polydipsia, cold/heat intolerance  Heme:  No petechiae, ecchymosis, easy bruisability  Skin:  No rash, edema      PHYSICAL EXAM:     GENERAL:  NAD  CHEST:  Full & symmetric excursion  HEART:  Regular rhythm, no abdominal bruit, no edema  ABDOMEN:  Soft, non-tender, non-distended, normoactive bowel sounds,  no masses , no hepatosplenomegaly  EXTREMITIES:  no cyanosis,clubbing or edema  SKIN:  No rash/erythema/ecchymoses/petechiae/wounds/abscess/warm/dry  NEURO:  Alert, oriented    Vital Signs:  Vital Signs Last 24 Hrs  T(C): 36.4 (11 May 2019 12:06), Max: 37 (11 May 2019 05:40)  T(F): 97.6 (11 May 2019 12:06), Max: 98.6 (11 May 2019 05:40)  HR: 76 (11 May 2019 13:46) (76 - 91)  BP: 127/87 (11 May 2019 13:46) (127/87 - 151/98)  BP(mean): --  RR: 18 (11 May 2019 12:06) (13 - 20)  SpO2: 100% (11 May 2019 12:06) (95% - 100%)  Daily Height in cm: 162.56 (10 May 2019 21:17)    Daily     LABS:                        7.9    8.29  )-----------( 308      ( 11 May 2019 14:00 )             25.4     05-11    141  |  110<H>  |  71<H>  ----------------------------<  98  5.5<H>   |  16<L>  |  3.59<H>    Ca    9.5      11 May 2019 05:15    TPro  7.2  /  Alb  3.83  /  TBili  0.6  /  DBili  x   /  AST  24  /  ALT  20  /  AlkPhos  139<H>  05-10    LIVER FUNCTIONS - ( 10 May 2019 21:10 )  Alb: 3.83 g/dL / Pro: 7.2 g/dL / ALK PHOS: 139 u/L / ALT: 20 u/L / AST: 24 u/L / GGT: x           PT/INR - ( 11 May 2019 05:15 )   PT: 21.5 SEC;   INR: 1.90          PTT - ( 11 May 2019 05:15 )  PTT:36.5 SEC  Urinalysis Basic - ( 11 May 2019 14:28 )    Color: LIGHT YELLOW / Appearance: CLEAR / S.015 / pH: 5.5  Gluc: NEGATIVE / Ketone: NEGATIVE  / Bili: NEGATIVE / Urobili: NORMAL   Blood: NEGATIVE / Protein: 20 / Nitrite: NEGATIVE   Leuk Esterase: NEGATIVE / RBC: 0-2 / WBC 3-5   Sq Epi: FEW / Non Sq Epi: x / Bacteria: NEGATIVE          Imaging: Chief Complaint:  Patient is a 63y old  Female who presents with a chief complaint of GIB (11 May 2019 13:53)      HPI:  The patient is a 62 y/o female with a PMHx of severe pulmonary hypertension, atrial fibrillation on Eliquis, COPD, HTN, HLD, DM, LINH and 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  Of note the patient was recently discharged on may 3rd.  The patient stated she was at home yesterday and thought she was going to have a normal BM but when she  noted passage of BRB and red blood on the TP.  This happened four more times last was at 6 am.  Now when she urinates and wipes she notes she still sees a little blood.    Since being admitted the patient has been noted to have significant volume overload complicated by OMSAR. IV Lasix initiated. Of note CBC stable x 4 readings since admission. Patients last colonoscopy was done prior to resection on  showing Polypoid mass in the proximal sigmoid colon Non-bleeding internal hemorrhoids.    Allergies:  No Known Allergies      Home Medications:    Hospital Medications:  acetaminophen   Tablet .. 650 milliGRAM(s) Oral every 6 hours PRN  ALBUTerol    90 MICROgram(s) HFA Inhaler 2 Puff(s) Inhalation every 6 hours PRN  allopurinol 300 milliGRAM(s) Oral daily  buDESOnide  80 MICROgram(s)/formoterol 4.5 MICROgram(s) Inhaler 2 Puff(s) Inhalation two times a day  calcium carbonate    500 mG (Tums) Chewable 1 Tablet(s) Chew two times a day  dextrose 40% Gel 15 Gram(s) Oral once PRN  dextrose 5%. 1000 milliLiter(s) IV Continuous <Continuous>  dextrose 50% Injectable 12.5 Gram(s) IV Push once  dextrose 50% Injectable 25 Gram(s) IV Push once  dextrose 50% Injectable 25 Gram(s) IV Push once  digoxin     Tablet 0.125 milliGRAM(s) Oral daily  furosemide   Injectable 40 milliGRAM(s) IV Push daily  furosemide   Injectable 40 milliGRAM(s) IV Push once  glucagon  Injectable 1 milliGRAM(s) IntraMuscular once PRN  insulin lispro (HumaLOG) corrective regimen sliding scale   SubCutaneous three times a day before meals  insulin lispro (HumaLOG) corrective regimen sliding scale   SubCutaneous at bedtime  metoprolol succinate ER 50 milliGRAM(s) Oral two times a day  multivitamin 1 Tablet(s) Oral daily  sildenafil (REVATIO) 20 milliGRAM(s) Oral three times a day      PMHX/PSHX:  Gout  Chronic systolic right heart failure  LINH (obstructive sleep apnea)  CLL (chronic lymphocytic leukemia)  Chronic diastolic congestive heart failure  COPD (chronic obstructive pulmonary disease)  Diabetes  HTN (hypertension)  Atrial fibrillation  No significant past surgical history      Family history:  Family history of hyperlipidemia  Family history of hypertension (Sibling)  Family history of type 2 diabetes mellitus  Family history of cancer (Aunt)      Social History:     ROS:     General:  No wt loss, fevers, chills, night sweats, fatigue,   Eyes:  Good vision, no reported pain  ENT:  No sore throat, pain, runny nose, dysphagia  CV:  No pain, palpitations, hypo/hypertension  Resp:  No dyspnea, cough, tachypnea, wheezing  GI:  See HPI  :  No pain, bleeding, incontinence, nocturia  Muscle:  No pain, weakness  Neuro:  No weakness, tingling, memory problems  Psych:  No fatigue, insomnia, mood problems, depression  Endocrine:  No polyuria, polydipsia, cold/heat intolerance  Heme:  No petechiae, ecchymosis, easy bruisability  Skin:  No rash, edema      PHYSICAL EXAM:     GENERAL:  NAD  CHEST:  Full & symmetric excursion  HEART:  Regular rhythm, no abdominal bruit, no edema  ABDOMEN:  Soft, non-tender, non-distended, normoactive bowel sounds,  no masses , no hepatosplenomegaly  EXTREMITIES:  no cyanosis,clubbing or edema  SKIN:  No rash/erythema/ecchymoses/petechiae/wounds/abscess/warm/dry  NEURO:  Alert, oriented  Rectal: scant BRB    Vital Signs:  Vital Signs Last 24 Hrs  T(C): 36.4 (11 May 2019 12:06), Max: 37 (11 May 2019 05:40)  T(F): 97.6 (11 May 2019 12:06), Max: 98.6 (11 May 2019 05:40)  HR: 76 (11 May 2019 13:46) (76 - 91)  BP: 127/87 (11 May 2019 13:46) (127/87 - 151/98)  BP(mean): --  RR: 18 (11 May 2019 12:06) (13 - 20)  SpO2: 100% (11 May 2019 12:06) (95% - 100%)  Daily Height in cm: 162.56 (10 May 2019 21:17)    Daily     LABS:                        7.9    8.29  )-----------( 308      ( 11 May 2019 14:00 )             25.4     05-11    141  |  110<H>  |  71<H>  ----------------------------<  98  5.5<H>   |  16<L>  |  3.59<H>    Ca    9.5      11 May 2019 05:15    TPro  7.2  /  Alb  3.83  /  TBili  0.6  /  DBili  x   /  AST  24  /  ALT  20  /  AlkPhos  139<H>  05-10    LIVER FUNCTIONS - ( 10 May 2019 21:10 )  Alb: 3.83 g/dL / Pro: 7.2 g/dL / ALK PHOS: 139 u/L / ALT: 20 u/L / AST: 24 u/L / GGT: x           PT/INR - ( 11 May 2019 05:15 )   PT: 21.5 SEC;   INR: 1.90          PTT - ( 11 May 2019 05:15 )  PTT:36.5 SEC  Urinalysis Basic - ( 11 May 2019 14:28 )    Color: LIGHT YELLOW / Appearance: CLEAR / S.015 / pH: 5.5  Gluc: NEGATIVE / Ketone: NEGATIVE  / Bili: NEGATIVE / Urobili: NORMAL   Blood: NEGATIVE / Protein: 20 / Nitrite: NEGATIVE   Leuk Esterase: NEGATIVE / RBC: 0-2 / WBC 3-5   Sq Epi: FEW / Non Sq Epi: x / Bacteria: NEGATIVE          Imaging:

## 2019-05-12 LAB
ALBUMIN SERPL ELPH-MCNC: 3.38 G/DL — SIGNIFICANT CHANGE UP (ref 3.3–5)
ALP SERPL-CCNC: 107 U/L — SIGNIFICANT CHANGE UP (ref 40–120)
ALT FLD-CCNC: 15 U/L — SIGNIFICANT CHANGE UP (ref 4–33)
ANION GAP SERPL CALC-SCNC: 14 MMO/L — SIGNIFICANT CHANGE UP (ref 7–14)
ANION GAP SERPL CALC-SCNC: 14 MMO/L — SIGNIFICANT CHANGE UP (ref 7–14)
AST SERPL-CCNC: 17 U/L — SIGNIFICANT CHANGE UP (ref 4–32)
BILIRUB SERPL-MCNC: 0.5 MG/DL — SIGNIFICANT CHANGE UP (ref 0.2–1.2)
BLD GP AB SCN SERPL QL: NEGATIVE — SIGNIFICANT CHANGE UP
BUN SERPL-MCNC: 67 MG/DL — HIGH (ref 7–23)
BUN SERPL-MCNC: 67 MG/DL — HIGH (ref 7–23)
CALCIUM SERPL-MCNC: 9.4 MG/DL — SIGNIFICANT CHANGE UP (ref 8.4–10.5)
CALCIUM SERPL-MCNC: 9.4 MG/DL — SIGNIFICANT CHANGE UP (ref 8.4–10.5)
CHLORIDE SERPL-SCNC: 109 MMOL/L — HIGH (ref 98–107)
CHLORIDE SERPL-SCNC: 109 MMOL/L — HIGH (ref 98–107)
CHOLEST SERPL-MCNC: 96 MG/DL — LOW (ref 120–199)
CO2 SERPL-SCNC: 19 MMOL/L — LOW (ref 22–31)
CO2 SERPL-SCNC: 19 MMOL/L — LOW (ref 22–31)
CREAT SERPL-MCNC: 3.22 MG/DL — HIGH (ref 0.5–1.3)
CREAT SERPL-MCNC: 3.22 MG/DL — HIGH (ref 0.5–1.3)
GLUCOSE SERPL-MCNC: 89 MG/DL — SIGNIFICANT CHANGE UP (ref 70–99)
GLUCOSE SERPL-MCNC: 89 MG/DL — SIGNIFICANT CHANGE UP (ref 70–99)
HBA1C BLD-MCNC: 5.6 % — SIGNIFICANT CHANGE UP (ref 4–5.6)
HCT VFR BLD CALC: 22.8 % — LOW (ref 34.5–45)
HCT VFR BLD CALC: 24.4 % — LOW (ref 34.5–45)
HCV AB S/CO SERPL IA: 0.14 S/CO — SIGNIFICANT CHANGE UP (ref 0–0.99)
HCV AB SERPL-IMP: SIGNIFICANT CHANGE UP
HDLC SERPL-MCNC: 38 MG/DL — LOW (ref 45–65)
HGB BLD-MCNC: 7.1 G/DL — LOW (ref 11.5–15.5)
HGB BLD-MCNC: 7.4 G/DL — LOW (ref 11.5–15.5)
LIPID PNL WITH DIRECT LDL SERPL: 53 MG/DL — SIGNIFICANT CHANGE UP
MAGNESIUM SERPL-MCNC: 1.6 MG/DL — SIGNIFICANT CHANGE UP (ref 1.6–2.6)
MCHC RBC-ENTMCNC: 20.5 PG — LOW (ref 27–34)
MCHC RBC-ENTMCNC: 21.3 PG — LOW (ref 27–34)
MCHC RBC-ENTMCNC: 30.3 % — LOW (ref 32–36)
MCHC RBC-ENTMCNC: 31.1 % — LOW (ref 32–36)
MCV RBC AUTO: 67.6 FL — LOW (ref 80–100)
MCV RBC AUTO: 68.5 FL — LOW (ref 80–100)
NRBC # FLD: 0.06 K/UL — SIGNIFICANT CHANGE UP (ref 0–0)
NRBC # FLD: 0.09 K/UL — SIGNIFICANT CHANGE UP (ref 0–0)
NRBC FLD-RTO: 1 — SIGNIFICANT CHANGE UP
PHOSPHATE SERPL-MCNC: 3.2 MG/DL — SIGNIFICANT CHANGE UP (ref 2.5–4.5)
PLATELET # BLD AUTO: 298 K/UL — SIGNIFICANT CHANGE UP (ref 150–400)
PLATELET # BLD AUTO: 307 K/UL — SIGNIFICANT CHANGE UP (ref 150–400)
PMV BLD: 11 FL — SIGNIFICANT CHANGE UP (ref 7–13)
PMV BLD: 9.2 FL — SIGNIFICANT CHANGE UP (ref 7–13)
POTASSIUM SERPL-MCNC: 5.1 MMOL/L — SIGNIFICANT CHANGE UP (ref 3.5–5.3)
POTASSIUM SERPL-MCNC: 5.1 MMOL/L — SIGNIFICANT CHANGE UP (ref 3.5–5.3)
POTASSIUM SERPL-SCNC: 5.1 MMOL/L — SIGNIFICANT CHANGE UP (ref 3.5–5.3)
POTASSIUM SERPL-SCNC: 5.1 MMOL/L — SIGNIFICANT CHANGE UP (ref 3.5–5.3)
PROT SERPL-MCNC: 6.5 G/DL — SIGNIFICANT CHANGE UP (ref 6–8.3)
RBC # BLD: 3.33 M/UL — LOW (ref 3.8–5.2)
RBC # BLD: 3.61 M/UL — LOW (ref 3.8–5.2)
RBC # FLD: 22.6 % — HIGH (ref 10.3–14.5)
RBC # FLD: 22.7 % — HIGH (ref 10.3–14.5)
RH IG SCN BLD-IMP: POSITIVE — SIGNIFICANT CHANGE UP
SODIUM SERPL-SCNC: 142 MMOL/L — SIGNIFICANT CHANGE UP (ref 135–145)
SODIUM SERPL-SCNC: 142 MMOL/L — SIGNIFICANT CHANGE UP (ref 135–145)
T3 SERPL-MCNC: 71.6 NG/DL — LOW (ref 80–200)
T4 FREE SERPL-MCNC: 1.33 NG/DL — SIGNIFICANT CHANGE UP (ref 0.9–1.8)
TRIGL SERPL-MCNC: 77 MG/DL — SIGNIFICANT CHANGE UP (ref 10–149)
TSH SERPL-MCNC: 8.31 UIU/ML — HIGH (ref 0.27–4.2)
WBC # BLD: 8.25 K/UL — SIGNIFICANT CHANGE UP (ref 3.8–10.5)
WBC # BLD: 8.83 K/UL — SIGNIFICANT CHANGE UP (ref 3.8–10.5)
WBC # FLD AUTO: 8.25 K/UL — SIGNIFICANT CHANGE UP (ref 3.8–10.5)
WBC # FLD AUTO: 8.83 K/UL — SIGNIFICANT CHANGE UP (ref 3.8–10.5)

## 2019-05-12 PROCEDURE — 99222 1ST HOSP IP/OBS MODERATE 55: CPT | Mod: GC

## 2019-05-12 PROCEDURE — 99232 SBSQ HOSP IP/OBS MODERATE 35: CPT | Mod: GC

## 2019-05-12 RX ORDER — MAGNESIUM OXIDE 400 MG ORAL TABLET 241.3 MG
400 TABLET ORAL
Refills: 0 | Status: COMPLETED | OUTPATIENT
Start: 2019-05-12 | End: 2019-05-14

## 2019-05-12 RX ADMIN — Medication 0.12 MILLIGRAM(S): at 05:29

## 2019-05-12 RX ADMIN — Medication 1 TABLET(S): at 05:29

## 2019-05-12 RX ADMIN — Medication 20 MILLIGRAM(S): at 05:29

## 2019-05-12 RX ADMIN — Medication 1 TABLET(S): at 12:06

## 2019-05-12 RX ADMIN — BUDESONIDE AND FORMOTEROL FUMARATE DIHYDRATE 2 PUFF(S): 160; 4.5 AEROSOL RESPIRATORY (INHALATION) at 08:59

## 2019-05-12 RX ADMIN — Medication 40 MILLIGRAM(S): at 05:29

## 2019-05-12 RX ADMIN — Medication 300 MILLIGRAM(S): at 12:06

## 2019-05-12 RX ADMIN — BUDESONIDE AND FORMOTEROL FUMARATE DIHYDRATE 2 PUFF(S): 160; 4.5 AEROSOL RESPIRATORY (INHALATION) at 21:56

## 2019-05-12 RX ADMIN — MAGNESIUM OXIDE 400 MG ORAL TABLET 400 MILLIGRAM(S): 241.3 TABLET ORAL at 17:48

## 2019-05-12 RX ADMIN — Medication 20 MILLIGRAM(S): at 13:42

## 2019-05-12 RX ADMIN — Medication 50 MILLIGRAM(S): at 17:48

## 2019-05-12 RX ADMIN — Medication 20 MILLIGRAM(S): at 21:56

## 2019-05-12 RX ADMIN — Medication 1 TABLET(S): at 17:48

## 2019-05-12 RX ADMIN — Medication 50 MILLIGRAM(S): at 05:29

## 2019-05-12 NOTE — PROGRESS NOTE ADULT - SUBJECTIVE AND OBJECTIVE BOX
24H hour events:   pt resting, felling well, no complaints, denes cp sob palpitations  tele: afib at 80s currently, hr to 150 on ambulation to restroom, asymptomatic  hgb 7.1    MEDICATIONS:  digoxin     Tablet 0.125 milliGRAM(s) Oral daily  furosemide   Injectable 40 milliGRAM(s) IV Push daily  metoprolol succinate ER 50 milliGRAM(s) Oral two times a day  sildenafil (REVATIO) 20 milliGRAM(s) Oral three times a day      ALBUTerol    90 MICROgram(s) HFA Inhaler 2 Puff(s) Inhalation every 6 hours PRN  buDESOnide  80 MICROgram(s)/formoterol 4.5 MICROgram(s) Inhaler 2 Puff(s) Inhalation two times a day    acetaminophen   Tablet .. 650 milliGRAM(s) Oral every 6 hours PRN    calcium carbonate    500 mG (Tums) Chewable 1 Tablet(s) Chew two times a day    allopurinol 300 milliGRAM(s) Oral daily  dextrose 40% Gel 15 Gram(s) Oral once PRN  dextrose 50% Injectable 12.5 Gram(s) IV Push once  dextrose 50% Injectable 25 Gram(s) IV Push once  dextrose 50% Injectable 25 Gram(s) IV Push once  glucagon  Injectable 1 milliGRAM(s) IntraMuscular once PRN  insulin lispro (HumaLOG) corrective regimen sliding scale   SubCutaneous three times a day before meals  insulin lispro (HumaLOG) corrective regimen sliding scale   SubCutaneous at bedtime    dextrose 5%. 1000 milliLiter(s) IV Continuous <Continuous>  multivitamin 1 Tablet(s) Oral daily          PHYSICAL EXAM:  T(C): 36.8 (05-12-19 @ 05:28), Max: 36.8 (05-12-19 @ 05:28)  HR: 79 (05-12-19 @ 05:28) (76 - 93)  BP: 135/69 (05-12-19 @ 05:28) (117/84 - 138/97)  RR: 18 (05-12-19 @ 05:28) (18 - 18)  SpO2: 100% (05-12-19 @ 05:28) (100% - 100%)  Wt(kg): --  I&O's Summary    11 May 2019 07:01  -  12 May 2019 07:00  --------------------------------------------------------  IN: 700 mL / OUT: 2600 mL / NET: -1900 mL    12 May 2019 07:01  -  12 May 2019 10:28  --------------------------------------------------------  IN: 0 mL / OUT: 500 mL / NET: -500 mL        Appearance: Normal	  HEENT:   Normal oral mucosa, PERRL, EOMI	  Lymphatic: No lymphadenopathy  Cardiovascular: irregular Normal S1 S2, No JVD, No murmurs, No edema  Respiratory: Lungs clear to auscultation	  Psychiatry: A & O x 3, Mood & affect appropriate  Gastrointestinal:  Soft, Non-tender, + BS	  Skin: No rashes, No ecchymoses, No cyanosis	  Neurologic: Non-focal  Extremities: Normal range of motion, No clubbing, cyanosis       LABS:	 	    CBC Full  -  ( 12 May 2019 07:20 )  WBC Count : 8.25 K/uL  Hemoglobin : 7.1 g/dL  Hematocrit : 22.8 %  Platelet Count - Automated : 298 K/uL  Mean Cell Volume : 68.5 fL  Mean Cell Hemoglobin : 21.3 pg  Mean Cell Hemoglobin Concentration : 31.1 %  Auto Neutrophil # : x  Auto Lymphocyte # : x  Auto Monocyte # : x  Auto Eosinophil # : x  Auto Basophil # : x  Auto Neutrophil % : x  Auto Lymphocyte % : x  Auto Monocyte % : x  Auto Eosinophil % : x  Auto Basophil % : x    05-12    142  |  109<H>  |  67<H>  ----------------------------<  89  5.1   |  19<L>  |  3.22<H>  05-11    141  |  110<H>  |  71<H>  ----------------------------<  98  5.5<H>   |  16<L>  |  3.59<H>    Ca    9.4      12 May 2019 07:12  Ca    9.5      11 May 2019 05:15  Phos  3.2     05-12  Phos  3.2     05-11  Mg     1.6     05-12    TPro  6.5  /  Alb  3.38  /  TBili  0.5  /  DBili  x   /  AST  17  /  ALT  15  /  AlkPhos  107  05-12  TPro  7.2  /  Alb  3.83  /  TBili  0.6  /  DBili  x   /  AST  24  /  ALT  20  /  AlkPhos  139<H>  05-10      proBNP: Serum Pro-Brain Natriuretic Peptide: 76456 pg/mL (05-10-19 @ 21:10)    Lipid Profile:   HgA1c: Hemoglobin A1C, Whole Blood: 5.6 % (05-12-19 @ 07:12)    TSH: Thyroid Stimulating Hormone, Serum: 8.31 uIU/mL (05-12-19 @ 07:12)        CARDIAC MARKERS:            TELEMETRY: 	    ECG:  	  RADIOLOGY:  OTHER: 	    PREVIOUS DIAGNOSTIC TESTING:    [ ] Echocardiogram:  [ ]  Catheterization:  [ ] Stress Test:  	  	  ASSESSMENT/PLAN: 24H hour events:   pt resting, felling well, no complaints, denes cp sob palpitations  tele: afib at 80s currently, hr to 150 on ambulation to restroom, asymptomatic  hgb 7.1    MEDICATIONS:  digoxin     Tablet 0.125 milliGRAM(s) Oral daily  furosemide   Injectable 40 milliGRAM(s) IV Push daily  metoprolol succinate ER 50 milliGRAM(s) Oral two times a day  sildenafil (REVATIO) 20 milliGRAM(s) Oral three times a day      ALBUTerol    90 MICROgram(s) HFA Inhaler 2 Puff(s) Inhalation every 6 hours PRN  buDESOnide  80 MICROgram(s)/formoterol 4.5 MICROgram(s) Inhaler 2 Puff(s) Inhalation two times a day    acetaminophen   Tablet .. 650 milliGRAM(s) Oral every 6 hours PRN    calcium carbonate    500 mG (Tums) Chewable 1 Tablet(s) Chew two times a day    allopurinol 300 milliGRAM(s) Oral daily  dextrose 40% Gel 15 Gram(s) Oral once PRN  dextrose 50% Injectable 12.5 Gram(s) IV Push once  dextrose 50% Injectable 25 Gram(s) IV Push once  dextrose 50% Injectable 25 Gram(s) IV Push once  glucagon  Injectable 1 milliGRAM(s) IntraMuscular once PRN  insulin lispro (HumaLOG) corrective regimen sliding scale   SubCutaneous three times a day before meals  insulin lispro (HumaLOG) corrective regimen sliding scale   SubCutaneous at bedtime    dextrose 5%. 1000 milliLiter(s) IV Continuous <Continuous>  multivitamin 1 Tablet(s) Oral daily          PHYSICAL EXAM:  T(C): 36.8 (05-12-19 @ 05:28), Max: 36.8 (05-12-19 @ 05:28)  HR: 79 (05-12-19 @ 05:28) (76 - 93)  BP: 135/69 (05-12-19 @ 05:28) (117/84 - 138/97)  RR: 18 (05-12-19 @ 05:28) (18 - 18)  SpO2: 100% (05-12-19 @ 05:28) (100% - 100%)  Wt(kg): --  I&O's Summary    11 May 2019 07:01  -  12 May 2019 07:00  --------------------------------------------------------  IN: 700 mL / OUT: 2600 mL / NET: -1900 mL    12 May 2019 07:01  -  12 May 2019 10:28  --------------------------------------------------------  IN: 0 mL / OUT: 500 mL / NET: -500 mL        Appearance: Normal	  HEENT:   Normal oral mucosa, PERRL, EOMI	  Lymphatic: No lymphadenopathy  Cardiovascular: irregular Normal S1 S2, No JVD, No murmurs, No edema  Respiratory: Lungs clear to auscultation	  Psychiatry: A & O x 3, Mood & affect appropriate  Gastrointestinal:  Soft, Non-tender, + BS	  Skin: No rashes, No ecchymoses, No cyanosis	  Neurologic: Non-focal  Extremities: Normal range of motion, No clubbing, cyanosis       LABS:	 	    CBC Full  -  ( 12 May 2019 07:20 )  WBC Count : 8.25 K/uL  Hemoglobin : 7.1 g/dL  Hematocrit : 22.8 %  Platelet Count - Automated : 298 K/uL  Mean Cell Volume : 68.5 fL  Mean Cell Hemoglobin : 21.3 pg  Mean Cell Hemoglobin Concentration : 31.1 %  Auto Neutrophil # : x  Auto Lymphocyte # : x  Auto Monocyte # : x  Auto Eosinophil # : x  Auto Basophil # : x  Auto Neutrophil % : x  Auto Lymphocyte % : x  Auto Monocyte % : x  Auto Eosinophil % : x  Auto Basophil % : x    05-12    142  |  109<H>  |  67<H>  ----------------------------<  89  5.1   |  19<L>  |  3.22<H>  05-11    141  |  110<H>  |  71<H>  ----------------------------<  98  5.5<H>   |  16<L>  |  3.59<H>    Ca    9.4      12 May 2019 07:12  Ca    9.5      11 May 2019 05:15  Phos  3.2     05-12  Phos  3.2     05-11  Mg     1.6     05-12    TPro  6.5  /  Alb  3.38  /  TBili  0.5  /  DBili  x   /  AST  17  /  ALT  15  /  AlkPhos  107  05-12  TPro  7.2  /  Alb  3.83  /  TBili  0.6  /  DBili  x   /  AST  24  /  ALT  20  /  AlkPhos  139<H>  05-10      proBNP: Serum Pro-Brain Natriuretic Peptide: 31821 pg/mL (05-10-19 @ 21:10)    Lipid Profile:   HgA1c: Hemoglobin A1C, Whole Blood: 5.6 % (05-12-19 @ 07:12)    TSH: Thyroid Stimulating Hormone, Serum: 8.31 uIU/mL (05-12-19 @ 07:12)        CARDIAC MARKERS:            TELEMETRY: 	AF periodically up to 150, but mostly < 100 bpm    ECG:  	March 2019  AF with moderate ventricular response  RADIOLOGY:  OTHER: 	    PREVIOUS DIAGNOSTIC TESTING:    [ ] Echocardiogram:  [ ]  Catheterization:  [ ] Stress Test:  	  	  ASSESSMENT/PLAN:

## 2019-05-12 NOTE — PROGRESS NOTE ADULT - SUBJECTIVE AND OBJECTIVE BOX
NEPHROLOGY    Patient seen and examined.  NAD    MEDICATIONS  (STANDING):  allopurinol 300 milliGRAM(s) Oral daily  buDESOnide  80 MICROgram(s)/formoterol 4.5 MICROgram(s) Inhaler 2 Puff(s) Inhalation two times a day  calcium carbonate    500 mG (Tums) Chewable 1 Tablet(s) Chew two times a day  digoxin     Tablet 0.125 milliGRAM(s) Oral daily  furosemide   Injectable 40 milliGRAM(s) IV Push daily  metoprolol succinate ER 50 milliGRAM(s) Oral two times a day  multivitamin 1 Tablet(s) Oral daily  sildenafil (REVATIO) 20 milliGRAM(s) Oral three times a day    VITALS:  T(C): , Max: 36.8 (19 @ 05:28)  T(F): , Max: 98.2 (19 @ 05:28)  HR: 81 (19 @ 13:41)  BP: 112/66 (19 @ 13:41)  RR: 18 (19 @ 13:41)  SpO2: 100% (19 @ 13:41)    05-11 @ 07:01  -   @ 07:00  --------------------------------------------------------  IN: 700 mL / OUT: 2600 mL / NET: -1900 mL     @ 07:01  -   @ 15:06  --------------------------------------------------------  IN: 0 mL / OUT: 500 mL / NET: -500 mL    PHYSICAL EXAM:  Constitutional: NAD  Respiratory: diminished at bases B/L  Cardiovascular: S1 and S2  Gastrointestinal: + BS, soft, NT, ND  Extremities: trace edema  Neurological: AAO x 3  : no dawson    LABS:                        7.4    8.83  )-----------( 307      ( 12 May 2019 10:35 )             24.4     05-12    142  |  109<H>  |  67<H>  ----------------------------<  89  5.1   |  19<L>  |  3.22<H>    Ca    9.4      12 May 2019 07:12  Phos  3.2     05-12  Mg     1.6     -12    TPro  6.5  /  Alb  3.38  /  TBili  0.5  /  DBili  x   /  AST  17  /  ALT  15  /  AlkPhos  107  05-12    Urine Studies:  Urinalysis Basic - ( 11 May 2019 14:28 )  Color: LIGHT YELLOW / Appearance: CLEAR / S.015 / pH: 5.5  Gluc: NEGATIVE / Ketone: NEGATIVE  / Bili: NEGATIVE / Urobili: NORMAL   Blood: NEGATIVE / Protein: 20 / Nitrite: NEGATIVE   Leuk Esterase: NEGATIVE / RBC: 0-2 / WBC 3-5   Sq Epi: FEW / Non Sq Epi: x / Bacteria: NEGATIVE    ASSESSMENT  62 yo female with hx of HFpEF, sigmoid adeno CA s/p left hemicolectomy comes in with BRBPR  ·	OSMAR on CKD III likely cardiorenal vs obstructive uropathy  ·	Anemia  ·	hypomagnesemia - due to diuresis    PLAN  - c/w furosemide 40 mg IV daily  - give mag ox 400 mg po bid x 2 days  - GI input noted  - transfuse PRBC per primary team   - avoid NSAIDs/nephrotoxins  - dose meds for a GFR ~ 15-20    Christina Lyn NP  [facility] [place]  [phone]

## 2019-05-12 NOTE — PROGRESS NOTE ADULT - SUBJECTIVE AND OBJECTIVE BOX
Patient is a 63y old  Female who presents with a chief complaint of rectal bleeding; elevated renal function (12 May 2019 16:03)      SUBJECTIVE / OVERNIGHT EVENTS:  No new symptoms. CBC is stable. HR variable  Review of Systems:   CONSTITUTIONAL: No fever, weight loss, or fatigue  EYES: No eye pain, visual disturbances, or discharge  ENMT:  No difficulty hearing, tinnitus, vertigo; No sinus or throat pain  NECK: No pain or stiffness  BREASTS: No pain, masses, or nipple discharge  RESPIRATORY: No cough, wheezing, chills or hemoptysis; No shortness of breath  CARDIOVASCULAR: No chest pain, palpitations, dizziness, or leg swelling  GASTROINTESTINAL: No abdominal or epigastric pain. No nausea, vomiting, or hematemesis; No diarrhea or constipation. No melena or hematochezia.  GENITOURINARY: No dysuria, frequency, hematuria, or incontinence  NEUROLOGICAL: No headaches, memory loss, loss of strength, numbness, or tremors  SKIN: No itching, burning, rashes, or lesions   LYMPH NODES: No enlarged glands  ENDOCRINE: No heat or cold intolerance; No hair loss  MUSCULOSKELETAL: No joint pain or swelling; No muscle, back, or extremity pain  PSYCHIATRIC: No depression, anxiety, mood swings, or difficulty sleeping  HEME/LYMPH: No easy bruising, or bleeding gums  ALLERY AND IMMUNOLOGIC: No hives or eczema    MEDICATIONS  (STANDING):  allopurinol 300 milliGRAM(s) Oral daily  buDESOnide  80 MICROgram(s)/formoterol 4.5 MICROgram(s) Inhaler 2 Puff(s) Inhalation two times a day  calcium carbonate    500 mG (Tums) Chewable 1 Tablet(s) Chew two times a day  dextrose 5%. 1000 milliLiter(s) (50 mL/Hr) IV Continuous <Continuous>  dextrose 50% Injectable 12.5 Gram(s) IV Push once  dextrose 50% Injectable 25 Gram(s) IV Push once  dextrose 50% Injectable 25 Gram(s) IV Push once  digoxin     Tablet 0.125 milliGRAM(s) Oral daily  furosemide   Injectable 40 milliGRAM(s) IV Push daily  insulin lispro (HumaLOG) corrective regimen sliding scale   SubCutaneous three times a day before meals  insulin lispro (HumaLOG) corrective regimen sliding scale   SubCutaneous at bedtime  magnesium oxide 400 milliGRAM(s) Oral two times a day with meals  metoprolol succinate ER 50 milliGRAM(s) Oral two times a day  multivitamin 1 Tablet(s) Oral daily  sildenafil (REVATIO) 20 milliGRAM(s) Oral three times a day    MEDICATIONS  (PRN):  acetaminophen   Tablet .. 650 milliGRAM(s) Oral every 6 hours PRN Mild Pain (1 - 3), Moderate Pain (4 - 6)  ALBUTerol    90 MICROgram(s) HFA Inhaler 2 Puff(s) Inhalation every 6 hours PRN Shortness of Breath and/or Wheezing  dextrose 40% Gel 15 Gram(s) Oral once PRN Blood Glucose LESS THAN 70 milliGRAM(s)/deciliter  glucagon  Injectable 1 milliGRAM(s) IntraMuscular once PRN Glucose LESS THAN 70 milligrams/deciliter      PHYSICAL EXAM:  Vital Signs Last 24 Hrs  T(C): 36.4 (12 May 2019 21:55), Max: 36.8 (12 May 2019 05:28)  T(F): 97.6 (12 May 2019 21:55), Max: 98.2 (12 May 2019 05:28)  HR: 74 (12 May 2019 21:55) (74 - 87)  BP: 122/87 (12 May 2019 21:55) (112/66 - 135/69)  BP(mean): --  RR: 18 (12 May 2019 21:55) (18 - 18)  SpO2: 100% (12 May 2019 21:55) (100% - 100%)  I&O's Summary    11 May 2019 07:  -  12 May 2019 07:00  --------------------------------------------------------  IN: 700 mL / OUT: 2600 mL / NET: -1900 mL    12 May 2019 07:01  -  12 May 2019 22:03  --------------------------------------------------------  IN: 437 mL / OUT: 900 mL / NET: -463 mL      GENERAL: NAD, well-developed  HEAD:  Atraumatic, Normocephalic  EYES: EOMI, PERRLA, conjunctiva and sclera clear  NECK: Supple, No JVD  CHEST/LUNG: Clear to auscultation bilaterally; No wheeze  HEART: Regular rate and rhythm; No murmurs, rubs, or gallops  ABDOMEN: Soft, Nontender, Nondistended; Bowel sounds present  EXTREMITIES:  2+ Peripheral Pulses, No clubbing, cyanosis, or edema  PSYCH: AAOx3  NEUROLOGY: non-focal  SKIN: No rashes or lesions    LABS:  CAPILLARY BLOOD GLUCOSE      POCT Blood Glucose.: 74 mg/dL (12 May 2019 21:42)  POCT Blood Glucose.: 98 mg/dL (12 May 2019 17:43)  POCT Blood Glucose.: 97 mg/dL (12 May 2019 13:10)  POCT Blood Glucose.: 101 mg/dL (12 May 2019 08:58)                          7.4    8.83  )-----------( 307      ( 12 May 2019 10:35 )             24.4     05-12    142  |  109<H>  |  67<H>  ----------------------------<  89  5.1   |  19<L>  |  3.22<H>    Ca    9.4      12 May 2019 07:12  Phos  3.2       Mg     1.6     12    TPro  6.5  /  Alb  3.38  /  TBili  0.5  /  DBili  x   /  AST  17  /  ALT  15  /  AlkPhos  107  05-12    PT/INR - ( 11 May 2019 05:15 )   PT: 21.5 SEC;   INR: 1.90          PTT - ( 11 May 2019 05:15 )  PTT:36.5 SEC      Urinalysis Basic - ( 11 May 2019 14:28 )    Color: LIGHT YELLOW / Appearance: CLEAR / S.015 / pH: 5.5  Gluc: NEGATIVE / Ketone: NEGATIVE  / Bili: NEGATIVE / Urobili: NORMAL   Blood: NEGATIVE / Protein: 20 / Nitrite: NEGATIVE   Leuk Esterase: NEGATIVE / RBC: 0-2 / WBC 3-5   Sq Epi: FEW / Non Sq Epi: x / Bacteria: NEGATIVE        RADIOLOGY & ADDITIONAL TESTS:    Imaging Personally Reviewed:    Consultant(s) Notes Reviewed:      Care Discussed with Consultants/Other Providers:

## 2019-05-12 NOTE — CONSULT NOTE ADULT - SUBJECTIVE AND OBJECTIVE BOX
PULM/MED CONSULT NOTE:    In bed, on nasal O2    Since home over past week noted increasing dyspnea at times; using home O2 during the day more   Has tried using BIPAP, but not used to it;  Using a maximum up to four hours  (declines use in hosp)    No cough or chest  pains  (At home is on on Advair 100/50 BID + PRN albuterol (Proair HFA)          hx from chart:      HPI:  62 yo female with hx of HFpEF, sigmoid adeno CA s/p left hemicolectomy comes in with BRBPR, was seen in my office yesterday.  Labs also show OSMAR (11 May 2019 15:18)      MEDICATIONS  (STANDING):  allopurinol 300 milliGRAM(s) Oral daily  buDESOnide  80 MICROgram(s)/formoterol 4.5 MICROgram(s) Inhaler 2 Puff(s) Inhalation two times a day  calcium carbonate    500 mG (Tums) Chewable 1 Tablet(s) Chew two times a day  dextrose 5%. 1000 milliLiter(s) (50 mL/Hr) IV Continuous <Continuous>  dextrose 50% Injectable 12.5 Gram(s) IV Push once  dextrose 50% Injectable 25 Gram(s) IV Push once  dextrose 50% Injectable 25 Gram(s) IV Push once  digoxin     Tablet 0.125 milliGRAM(s) Oral daily  furosemide   Injectable 40 milliGRAM(s) IV Push daily  insulin lispro (HumaLOG) corrective regimen sliding scale   SubCutaneous three times a day before meals  insulin lispro (HumaLOG) corrective regimen sliding scale   SubCutaneous at bedtime  magnesium oxide 400 milliGRAM(s) Oral two times a day with meals  metoprolol succinate ER 50 milliGRAM(s) Oral two times a day  multivitamin 1 Tablet(s) Oral daily  sildenafil (REVATIO) 20 milliGRAM(s) Oral three times a day    MEDICATIONS  (PRN):  acetaminophen   Tablet .. 650 milliGRAM(s) Oral every 6 hours PRN Mild Pain (1 - 3), Moderate Pain (4 - 6)  ALBUTerol    90 MICROgram(s) HFA Inhaler 2 Puff(s) Inhalation every 6 hours PRN Shortness of Breath and/or Wheezing  dextrose 40% Gel 15 Gram(s) Oral once PRN Blood Glucose LESS THAN 70 milliGRAM(s)/deciliter  glucagon  Injectable 1 milliGRAM(s) IntraMuscular once PRN Glucose LESS THAN 70 milligrams/deciliter        PAST MEDICAL & SURGICAL HISTORY:  Gout  Chronic systolic right heart failure  LINH (obstructive sleep apnea)  CLL (chronic lymphocytic leukemia) - received chest RT  COPD (chronic obstructive pulmonary disease)  Diabetes  HTN (hypertension)  Atrial fibrillation  No significant past surgical history      FAMILY HISTORY:  Family history of hyperlipidemia  Family history of hypertension (Sibling)  Family history of type 2 diabetes mellitus  Family history of cancer (Aunt): Breast  .    PHYSICAL EXAM:  Vital Signs Last 24 Hrs  T(C): 36.7 (12 May 2019 13:41), Max: 36.8 (12 May 2019 05:28)  T(F): 98.1 (12 May 2019 13:41), Max: 98.2 (12 May 2019 05:28)  HR: 81 (12 May 2019 13:41) (79 - 93)  BP: 112/66 (12 May 2019 13:41) (112/66 - 138/97)  BP(mean): --  RR: 18 (12 May 2019 13:41) (18 - 18)  SpO2: 100% (12 May 2019 13:41) (100% - 100%)  GENERAL: NAD; wearing nasal O2  CHEST/LUNG: bilater; breath sounds, no current wheezing  HEART: irreg irreg; CM a fib - mostly rate controlled  ABDOMEN: Soft, Nontender,  Bowel sounds present; surgical staples in place  EXTREMITIES: no sig edema        LABS:                        7.4    8.83  )-----------( 307      ( 12 May 2019 10:35 )             24.4     05-12    142  |  109<H>  |  67<H>  ----------------------------<  89  5.1   |  19<L>  |  3.22<H>    Ca    9.4      12 May 2019 07:12  Phos  3.2     05-12  Mg     1.6     05-12    TPro  6.5  /  Alb  3.38  /  TBili  0.5  /  DBili  x   /  AST  17  /  ALT  15  /  AlkPhos  107  05-12    PT/INR - ( 11 May 2019 05:15 )   PT: 21.5 SEC;   INR: 1.90          PTT - ( 11 May 2019 05:15 )  PTT:36.5 SEC  Urinalysis Basic - ( 11 May 2019 14:28 )    Color: LIGHT YELLOW / Appearance: CLEAR / S.015 / pH: 5.5  Gluc: NEGATIVE / Ketone: NEGATIVE  / Bili: NEGATIVE / Urobili: NORMAL   Blood: NEGATIVE / Protein: 20 / Nitrite: NEGATIVE   Leuk Esterase: NEGATIVE / RBC: 0-2 / WBC 3-5   Sq Epi: FEW / Non Sq Epi: x / Bacteria: NEGATIVE              Serum Pro-Brain Natriuretic Peptide: 51744 pg/mL (05-10-19 @ 21:10)        RADIOLOGY & ADDITIONAL STUDIES:    Xray:   EXAM:  XR CHEST PORTABLE IMMED 1V        PROCEDURE DATE:  May 11 2019         INTERPRETATION:  CLINICAL INDICATION: dyspnea; evaluate for fluid overload    EXAM:  Portable frontal chest from 2019 at 1342. Compared to study from   earlier the same day.    IMPRESSION:  Persistent right hemidiaphragm elevation.    Redemonstrated generalized increased bilateral lung markings and hazy   shadows compatible with congestion and edema.    Questionable small right pleural effusion. No left pleural effusion. No   pneumothorax.     Stable prominent appearing cardiac and mediastinal silhouettes.    Trachea midline.    Spinal degenerative changes again noted.      ADRIEN FARMER M.D., ATTENDING RADIOLOGIST  This document has been electronically signed. May 12 2019  3:10PM  -------------------------------------------------------------------------------------------------------------------      EXAM:  XR CHEST PORTABLE URGENT 1V        PROCEDURE DATE:  May 11 2019         INTERPRETATION:  TIME OF EXAM: May 11, 2019 at 3:43 AM    CLINICAL INFORMATION: Fluid overload    TECHNIQUE:   Portable chest    INTERPRETATION:     Rotation into an RAOprojection. Interstitial edema with trace layering   effusions suggested. Heart is difficult to evaluate on this rotated AP   image.      COMPARISON:        IMPRESSION:  Mild interstitial edema.

## 2019-05-12 NOTE — PROGRESS NOTE ADULT - ASSESSMENT
63F with a PMHx of severe pulmonary hypertension, atrial fibrillation on Eliquis, COPD, HTN, HLD, DM, LINH and sigmoid adenocarcinoma S/P recent L hemicolectomy (4/24/19) presents to ED with BRBPR for one day with a total of four episodes.  Of note the patient was recently discharged on may 3rd.  anemia c/b afib with RVR  -remains in afib  -currently on toprol 50 and dig, cont meds at current doses  -hgb down to 7.1, suspect tachcardia multifactorial but due at least in part to hgb at 7.1  -would consider PRBCs, per primary team  -cont tele, monitor lytes  -will cont to follow 63F with a PMHx of severe pulmonary hypertension, atrial fibrillation on Eliquis, COPD, HTN, HLD, DM, LINH and sigmoid adenocarcinoma S/P recent L hemicolectomy (4/24/19) presents to ED with BRBPR for one day with a total of four episodes.  Of note the patient was recently discharged on may 3rd.  anemia c/b afib with RVR. [id number] is 4.   -remains in afib  -currently on Toprol 50 and dig, cont meds at current doses  -hgb down to 7.1, suspect tachycardia multifactorial but due at least in part to hgb at 7.1  -would consider PRBCs, per primary team  -cont tele, monitor lytes  -will cont to follow  -hold AC due to severe anemia and recent GI bleed.

## 2019-05-13 ENCOUNTER — APPOINTMENT (OUTPATIENT)
Dept: CARDIOLOGY | Facility: CLINIC | Age: 64
End: 2019-05-13

## 2019-05-13 LAB
ALBUMIN SERPL ELPH-MCNC: 3.7 G/DL — SIGNIFICANT CHANGE UP (ref 3.3–5)
ALP SERPL-CCNC: 110 U/L — SIGNIFICANT CHANGE UP (ref 40–120)
ALT FLD-CCNC: 15 U/L — SIGNIFICANT CHANGE UP (ref 4–33)
ANION GAP SERPL CALC-SCNC: 13 MMO/L — SIGNIFICANT CHANGE UP (ref 7–14)
ANION GAP SERPL CALC-SCNC: 13 MMO/L — SIGNIFICANT CHANGE UP (ref 7–14)
AST SERPL-CCNC: 16 U/L — SIGNIFICANT CHANGE UP (ref 4–32)
BILIRUB SERPL-MCNC: 0.5 MG/DL — SIGNIFICANT CHANGE UP (ref 0.2–1.2)
BLD GP AB SCN SERPL QL: NEGATIVE — SIGNIFICANT CHANGE UP
BUN SERPL-MCNC: 55 MG/DL — HIGH (ref 7–23)
BUN SERPL-MCNC: 55 MG/DL — HIGH (ref 7–23)
CALCIUM SERPL-MCNC: 9.6 MG/DL — SIGNIFICANT CHANGE UP (ref 8.4–10.5)
CALCIUM SERPL-MCNC: 9.6 MG/DL — SIGNIFICANT CHANGE UP (ref 8.4–10.5)
CHLORIDE SERPL-SCNC: 106 MMOL/L — SIGNIFICANT CHANGE UP (ref 98–107)
CHLORIDE SERPL-SCNC: 106 MMOL/L — SIGNIFICANT CHANGE UP (ref 98–107)
CO2 SERPL-SCNC: 19 MMOL/L — LOW (ref 22–31)
CO2 SERPL-SCNC: 19 MMOL/L — LOW (ref 22–31)
CREAT SERPL-MCNC: 2.7 MG/DL — HIGH (ref 0.5–1.3)
CREAT SERPL-MCNC: 2.7 MG/DL — HIGH (ref 0.5–1.3)
GLUCOSE SERPL-MCNC: 85 MG/DL — SIGNIFICANT CHANGE UP (ref 70–99)
GLUCOSE SERPL-MCNC: 85 MG/DL — SIGNIFICANT CHANGE UP (ref 70–99)
HCT VFR BLD CALC: 26 % — LOW (ref 34.5–45)
HGB BLD-MCNC: 7.9 G/DL — LOW (ref 11.5–15.5)
MAGNESIUM SERPL-MCNC: 1.7 MG/DL — SIGNIFICANT CHANGE UP (ref 1.6–2.6)
MAGNESIUM SERPL-MCNC: 1.7 MG/DL — SIGNIFICANT CHANGE UP (ref 1.6–2.6)
MCHC RBC-ENTMCNC: 20.8 PG — LOW (ref 27–34)
MCHC RBC-ENTMCNC: 30.4 % — LOW (ref 32–36)
MCV RBC AUTO: 68.4 FL — LOW (ref 80–100)
NRBC # FLD: 0.17 K/UL — SIGNIFICANT CHANGE UP (ref 0–0)
NRBC FLD-RTO: 1.9 — SIGNIFICANT CHANGE UP
NT-PROBNP SERPL-SCNC: SIGNIFICANT CHANGE UP PG/ML
PHOSPHATE SERPL-MCNC: 2.9 MG/DL — SIGNIFICANT CHANGE UP (ref 2.5–4.5)
PHOSPHATE SERPL-MCNC: 2.9 MG/DL — SIGNIFICANT CHANGE UP (ref 2.5–4.5)
PLATELET # BLD AUTO: 333 K/UL — SIGNIFICANT CHANGE UP (ref 150–400)
PMV BLD: 9.6 FL — SIGNIFICANT CHANGE UP (ref 7–13)
POTASSIUM SERPL-MCNC: 4.9 MMOL/L — SIGNIFICANT CHANGE UP (ref 3.5–5.3)
POTASSIUM SERPL-MCNC: 4.9 MMOL/L — SIGNIFICANT CHANGE UP (ref 3.5–5.3)
POTASSIUM SERPL-SCNC: 4.9 MMOL/L — SIGNIFICANT CHANGE UP (ref 3.5–5.3)
POTASSIUM SERPL-SCNC: 4.9 MMOL/L — SIGNIFICANT CHANGE UP (ref 3.5–5.3)
PROT SERPL-MCNC: 6.9 G/DL — SIGNIFICANT CHANGE UP (ref 6–8.3)
RBC # BLD: 3.8 M/UL — SIGNIFICANT CHANGE UP (ref 3.8–5.2)
RBC # FLD: 22.9 % — HIGH (ref 10.3–14.5)
RH IG SCN BLD-IMP: POSITIVE — SIGNIFICANT CHANGE UP
SODIUM SERPL-SCNC: 138 MMOL/L — SIGNIFICANT CHANGE UP (ref 135–145)
SODIUM SERPL-SCNC: 138 MMOL/L — SIGNIFICANT CHANGE UP (ref 135–145)
WBC # BLD: 8.83 K/UL — SIGNIFICANT CHANGE UP (ref 3.8–10.5)
WBC # FLD AUTO: 8.83 K/UL — SIGNIFICANT CHANGE UP (ref 3.8–10.5)

## 2019-05-13 PROCEDURE — 99232 SBSQ HOSP IP/OBS MODERATE 35: CPT

## 2019-05-13 PROCEDURE — 99231 SBSQ HOSP IP/OBS SF/LOW 25: CPT | Mod: GC

## 2019-05-13 RX ADMIN — MAGNESIUM OXIDE 400 MG ORAL TABLET 400 MILLIGRAM(S): 241.3 TABLET ORAL at 08:52

## 2019-05-13 RX ADMIN — Medication 50 MILLIGRAM(S): at 16:56

## 2019-05-13 RX ADMIN — Medication 1 TABLET(S): at 13:03

## 2019-05-13 RX ADMIN — Medication 40 MILLIGRAM(S): at 05:43

## 2019-05-13 RX ADMIN — Medication 1 TABLET(S): at 05:43

## 2019-05-13 RX ADMIN — BUDESONIDE AND FORMOTEROL FUMARATE DIHYDRATE 2 PUFF(S): 160; 4.5 AEROSOL RESPIRATORY (INHALATION) at 21:02

## 2019-05-13 RX ADMIN — Medication 20 MILLIGRAM(S): at 13:03

## 2019-05-13 RX ADMIN — Medication 20 MILLIGRAM(S): at 21:02

## 2019-05-13 RX ADMIN — BUDESONIDE AND FORMOTEROL FUMARATE DIHYDRATE 2 PUFF(S): 160; 4.5 AEROSOL RESPIRATORY (INHALATION) at 08:52

## 2019-05-13 RX ADMIN — Medication 20 MILLIGRAM(S): at 05:43

## 2019-05-13 RX ADMIN — MAGNESIUM OXIDE 400 MG ORAL TABLET 400 MILLIGRAM(S): 241.3 TABLET ORAL at 16:56

## 2019-05-13 RX ADMIN — Medication 1 TABLET(S): at 16:56

## 2019-05-13 RX ADMIN — Medication 0.12 MILLIGRAM(S): at 05:42

## 2019-05-13 RX ADMIN — Medication 300 MILLIGRAM(S): at 13:03

## 2019-05-13 RX ADMIN — Medication 50 MILLIGRAM(S): at 05:43

## 2019-05-13 NOTE — PROGRESS NOTE ADULT - SUBJECTIVE AND OBJECTIVE BOX
GENERAL SURGERY PROGRESS NOTE      Subjective:  No acute events overnight. Patient states that she had a BM this AM which was normal appearing and nonbloody.         Objective:    PE:  Gen: Laying in bed, in NAD  Resp: airway patent, respirations unlabored, no increased WoB  Abd: soft, obese. NT/ND. Incisions with staples in place, c/d/i  Ext: no edema, WWP    Vital Signs Last 24 Hrs  T(C): 36.7 (13 May 2019 05:41), Max: 36.7 (12 May 2019 13:41)  T(F): 98.1 (13 May 2019 05:41), Max: 98.1 (12 May 2019 13:41)  HR: 85 (13 May 2019 05:41) (74 - 85)  BP: 115/74 (13 May 2019 05:41) (112/66 - 123/73)  BP(mean): --  RR: 18 (13 May 2019 05:41) (18 - 18)  SpO2: 100% (13 May 2019 05:41) (100% - 100%)    I&O's Detail    12 May 2019 07:01  -  13 May 2019 07:00  --------------------------------------------------------  IN:    Oral Fluid: 737 mL  Total IN: 737 mL    OUT:    Voided: 1700 mL  Total OUT: 1700 mL    Total NET: -963 mL          Daily     Daily Weight in k.1 (13 May 2019 07:38)    MEDICATIONS  (STANDING):  allopurinol 300 milliGRAM(s) Oral daily  buDESOnide  80 MICROgram(s)/formoterol 4.5 MICROgram(s) Inhaler 2 Puff(s) Inhalation two times a day  calcium carbonate    500 mG (Tums) Chewable 1 Tablet(s) Chew two times a day  dextrose 5%. 1000 milliLiter(s) (50 mL/Hr) IV Continuous <Continuous>  dextrose 50% Injectable 12.5 Gram(s) IV Push once  dextrose 50% Injectable 25 Gram(s) IV Push once  dextrose 50% Injectable 25 Gram(s) IV Push once  digoxin     Tablet 0.125 milliGRAM(s) Oral daily  furosemide   Injectable 40 milliGRAM(s) IV Push daily  insulin lispro (HumaLOG) corrective regimen sliding scale   SubCutaneous three times a day before meals  insulin lispro (HumaLOG) corrective regimen sliding scale   SubCutaneous at bedtime  magnesium oxide 400 milliGRAM(s) Oral two times a day with meals  metoprolol succinate ER 50 milliGRAM(s) Oral two times a day  multivitamin 1 Tablet(s) Oral daily  sildenafil (REVATIO) 20 milliGRAM(s) Oral three times a day    MEDICATIONS  (PRN):  acetaminophen   Tablet .. 650 milliGRAM(s) Oral every 6 hours PRN Mild Pain (1 - 3), Moderate Pain (4 - 6)  ALBUTerol    90 MICROgram(s) HFA Inhaler 2 Puff(s) Inhalation every 6 hours PRN Shortness of Breath and/or Wheezing  dextrose 40% Gel 15 Gram(s) Oral once PRN Blood Glucose LESS THAN 70 milliGRAM(s)/deciliter  glucagon  Injectable 1 milliGRAM(s) IntraMuscular once PRN Glucose LESS THAN 70 milligrams/deciliter      LABS:                        7.9    8.83  )-----------( 333      ( 13 May 2019 06:25 )             26.0     05-13    138  |  106  |  55<H>  ----------------------------<  85  4.9   |  19<L>  |  2.70<H>    Ca    9.6      13 May 2019 06:25  Phos  2.9     05-13  Mg     1.7     05-    TPro  6.9  /  Alb  3.7  /  TBili  0.5  /  DBili  x   /  AST  16  /  ALT  15  /  AlkPhos  110  05-13      Urinalysis Basic - ( 11 May 2019 14:28 )    Color: LIGHT YELLOW / Appearance: CLEAR / S.015 / pH: 5.5  Gluc: NEGATIVE / Ketone: NEGATIVE  / Bili: NEGATIVE / Urobili: NORMAL   Blood: NEGATIVE / Protein: 20 / Nitrite: NEGATIVE   Leuk Esterase: NEGATIVE / RBC: 0-2 / WBC 3-5   Sq Epi: FEW / Non Sq Epi: x / Bacteria: NEGATIVE        RADIOLOGY & ADDITIONAL STUDIES:

## 2019-05-13 NOTE — PROGRESS NOTE ADULT - ASSESSMENT
63 F s/p recent exlap, L hemicolectomy for splenic flexure adenoCA presents with BRBPR, now resolved    - BRBPR currently resolved, H/H stable  - Monitor for any continued bleeding or downtrending hct  - No indication for urgent surgical intervention at this time  - Will DC staples today  - Care per primary team    C Jeremy PGY2  A team surgery  [id number]

## 2019-05-13 NOTE — PROGRESS NOTE ADULT - ASSESSMENT
Impression:  1) BRBPR - Most likely due to internal hemorrhoids given minimal effect on hemoglobin, also could be bleed from anastomotic area.  DDx also includes but is less likely to be diverticulosis, angiectasias ischemic colitis    Recommendations:   - per surgical note okay to d/c if bleeding stops   - patient currently in volume overload so would not proceed with endoscopic therapy until this is resolved   - if endoscopy where to be performed would need to be a discussion between surgical and gastroenterology team directly as patient has recent anastomsis   - monitor hemoglobin   - transfuse as needed   - two active IVs at all times   - active type and screen

## 2019-05-13 NOTE — DIETITIAN INITIAL EVALUATION ADULT. - NS AS NUTRI INTERV MEALS SNACK
Other (specify)/Diets modified for specific foods and ingredients/1. Suggest: Once/when clinically indicated advance PO diet to Full Liquids, Consistent Carbohydrate; If Pt tolerates well advance PO diet to Soft, Fiber Restricted, Consistent Carbohydrate (no snacks), DASH/TLC (cholesterol and sodium restricted); Fluid Restriction per MD discretion; PO supplement: Glucerna Therapeutic Nutrition 8oz. 2x daily (will provide additional ~440 Kcals, ~20 gm Protein);              2. Encourage & assist Pt with meals; Monitor PO diet tolerance; Honor food preferences;         3. Suggest: Lactobacillus Acidophilus daily per MD;          4. Monitor labs, weekly weights, hydration status;

## 2019-05-13 NOTE — PROGRESS NOTE ADULT - SUBJECTIVE AND OBJECTIVE BOX
Chief Complaint:  Patient is a 63y old  Female who presents with a chief complaint of rectal bleeding; elevated renal function (12 May 2019 16:03)      Interval Events:   Patient with no further bleeding.  Stable hemoglobin yesterday.    Patient     Allergies:  No Known Allergies      Hospital Medications:  acetaminophen   Tablet .. 650 milliGRAM(s) Oral every 6 hours PRN  ALBUTerol    90 MICROgram(s) HFA Inhaler 2 Puff(s) Inhalation every 6 hours PRN  allopurinol 300 milliGRAM(s) Oral daily  buDESOnide  80 MICROgram(s)/formoterol 4.5 MICROgram(s) Inhaler 2 Puff(s) Inhalation two times a day  calcium carbonate    500 mG (Tums) Chewable 1 Tablet(s) Chew two times a day  dextrose 40% Gel 15 Gram(s) Oral once PRN  dextrose 5%. 1000 milliLiter(s) IV Continuous <Continuous>  dextrose 50% Injectable 12.5 Gram(s) IV Push once  dextrose 50% Injectable 25 Gram(s) IV Push once  dextrose 50% Injectable 25 Gram(s) IV Push once  digoxin     Tablet 0.125 milliGRAM(s) Oral daily  furosemide   Injectable 40 milliGRAM(s) IV Push daily  glucagon  Injectable 1 milliGRAM(s) IntraMuscular once PRN  insulin lispro (HumaLOG) corrective regimen sliding scale   SubCutaneous three times a day before meals  insulin lispro (HumaLOG) corrective regimen sliding scale   SubCutaneous at bedtime  magnesium oxide 400 milliGRAM(s) Oral two times a day with meals  metoprolol succinate ER 50 milliGRAM(s) Oral two times a day  multivitamin 1 Tablet(s) Oral daily  sildenafil (REVATIO) 20 milliGRAM(s) Oral three times a day      PMHX/PSHX:  Gout  Chronic systolic right heart failure  LINH (obstructive sleep apnea)  CLL (chronic lymphocytic leukemia)  Chronic diastolic congestive heart failure  COPD (chronic obstructive pulmonary disease)  Diabetes  HTN (hypertension)  Atrial fibrillation  No significant past surgical history      Family history:  Family history of hyperlipidemia  Family history of hypertension (Sibling)  Family history of type 2 diabetes mellitus  Family history of cancer (Aunt)          PHYSICAL EXAM:     GENERAL:  NAD  HEENT:  NC/AT,  conjunctivae clear, sclera -anicteric  CHEST:  Full & symmetric excursion, no increased  HEART:  Regular rhythm  ABDOMEN:  Soft, non-tender, non-distended, normoactive bowel sounds,  no masses ,no hepato-splenomegaly,   EXTREMITIES:  no cyanosis,clubbing or edema  SKIN:  No rash/erythema/ecchymoses/petechiae/wounds/abscess/warm/dry  NEURO:  Alert, oriented    Vital Signs:  Vital Signs Last 24 Hrs  T(C): 36.7 (13 May 2019 05:41), Max: 36.7 (12 May 2019 13:41)  T(F): 98.1 (13 May 2019 05:41), Max: 98.1 (12 May 2019 13:41)  HR: 85 (13 May 2019 05:41) (74 - 85)  BP: 115/74 (13 May 2019 05:41) (112/66 - 123/73)  BP(mean): --  RR: 18 (13 May 2019 05:41) (18 - 18)  SpO2: 100% (13 May 2019 05:41) (100% - 100%)  Daily     Daily Weight in k.1 (13 May 2019 07:38)    LABS:                        7.4    8.83  )-----------( 307      ( 12 May 2019 10:35 )             24.4     05-12    142  |  109<H>  |  67<H>  ----------------------------<  89  5.1   |  19<L>  |  3.22<H>    Ca    9.4      12 May 2019 07:12  Phos  3.2     05-12  Mg     1.6     12    TPro  6.5  /  Alb  3.38  /  TBili  0.5  /  DBili  x   /  AST  17  /  ALT  15  /  AlkPhos  107  05-12    LIVER FUNCTIONS - ( 12 May 2019 07:12 )  Alb: 3.38 g/dL / Pro: 6.5 g/dL / ALK PHOS: 107 u/L / ALT: 15 u/L / AST: 17 u/L / GGT: x             Urinalysis Basic - ( 11 May 2019 14:28 )    Color: LIGHT YELLOW / Appearance: CLEAR / S.015 / pH: 5.5  Gluc: NEGATIVE / Ketone: NEGATIVE  / Bili: NEGATIVE / Urobili: NORMAL   Blood: NEGATIVE / Protein: 20 / Nitrite: NEGATIVE   Leuk Esterase: NEGATIVE / RBC: 0-2 / WBC 3-5   Sq Epi: FEW / Non Sq Epi: x / Bacteria: NEGATIVE          Imaging:

## 2019-05-13 NOTE — PROGRESS NOTE ADULT - SUBJECTIVE AND OBJECTIVE BOX
No pain, no shortness of breath      VITAL:  T(C): , Max: 36.7 (19 @ 13:41)  T(F): , Max: 98.1 (19 @ 13:41)  HR: 85 (19 @ 05:41)  BP: 115/74 (19 @ 05:41)  BP(mean): --  RR: 18 (19 @ 05:41)  SpO2: 100% (19 @ 05:41)  Wt(kg): --      PHYSICAL EXAM:    Constitutional: NAD; Alert  HEENT:  NCAT; DMM  Neck: No JVD; supple  Respiratory: CTA-b/l  Cardiac: RRR s1s2  Gastrointestinal: BS+, soft, NT/ND  Urologic: No dawson  Extremities: No peripheral edema  Back: No CVAT b/l    LABS:                        7.9    8.83  )-----------( 333      ( 13 May 2019 06:25 )             26.0     Na(138)/K(4.9)/Cl(106)/HCO3(19)/BUN(55)/Cr(2.70)Glu(85)/Ca(9.6)/Mg(1.7)/PO4(2.9)     @ 06:25  Na(142)/K(5.1)/Cl(109)/HCO3(19)/BUN(67)/Cr(3.22)Glu(89)/Ca(9.4)/Mg(1.6)/PO4(3.2)     @ 07:12  Na(--)/K(--)/Cl(--)/HCO3(--)/BUN(--)/Cr(--)Glu(--)/Ca(--)/Mg(--)/PO4(3.2)     @ 22:44  Na(141)/K(5.5)/Cl(110)/HCO3(16)/BUN(71)/Cr(3.59)Glu(98)/Ca(9.5)/Mg(--)/PO4(--)     @ 05:15  Na(137)/K(5.2)/Cl(106)/HCO3(18)/BUN(73)/Cr(3.88)Glu(124)/Ca(9.7)/Mg(--)/PO4(--)    05-10 @ 21:10    Urinalysis Basic - ( 11 May 2019 14:28 )  Color: LIGHT YELLOW / Appearance: CLEAR / S.015 / pH: 5.5  Gluc: NEGATIVE / Ketone: NEGATIVE  / Bili: NEGATIVE / Urobili: NORMAL   Blood: NEGATIVE / Protein: 20 / Nitrite: NEGATIVE   Leuk Esterase: NEGATIVE / RBC: 0-2 / WBC 3-5   Sq Epi: FEW / Non Sq Epi: x / Bacteria: NEGATIVE      ASSESSMENT: 63F w/ CKD3, HFpEF, and colon CA s/p left hemicolectomy, 5/10/19 a/w BRBPR  (1)Renal - CKD3; superimposed hemodynamically mediated OSMAR - resolving.  (2)Hyperkalemia - improving with diuretics/with low-K diet, and with resolution of OSMAR  (3)Metabolic acidosis - mild/slowly improving with resolution of OSMAR  (4)Anemia - due to rectal bleeding - in setting of recent colonic resection for cancer. Surgery on board.    PLAN  (1)Lasix as ordered  (2)Would remove dietary K+ restriction tomorrow if K+ is below 4.9meq/L  (3)Dose new meds for GFR 20-30ml/min  (4)Surgery f/u        Orville Lima MD  [facility]  [phone] No pain, no shortness of breath  Shares that she underwent a CT with IV contrast last week, and that her urine output was reduced immediately afterwards. Urine output back to normal at this point.    VITAL:  T(C): , Max: 36.7 (19 @ 13:41)  T(F): , Max: 98.1 (19 @ 13:41)  HR: 85 (19 @ 05:41)  BP: 115/74 (19 @ 05:41)  RR: 18 (19 @ 05:41)  SpO2: 100% (19 @ 05:41)      PHYSICAL EXAM:  Constitutional: NAD; Alert  HEENT:  NCAT; DMM  Neck: No JVD; supple  Respiratory: CTA-b/l  Cardiac: RRR s1s2  Gastrointestinal: BS+, soft, NT/ND  Urologic: No dawson  Extremities: No peripheral edema  Back: No CVAT b/l    LABS:                        7.9    8.83  )-----------( 333      ( 13 May 2019 06:25 )             26.0     Na(138)/K(4.9)/Cl(106)/HCO3(19)/BUN(55)/Cr(2.70)Glu(85)/Ca(9.6)/Mg(1.7)/PO4(2.9)     @ 06:25  Na(142)/K(5.1)/Cl(109)/HCO3(19)/BUN(67)/Cr(3.22)Glu(89)/Ca(9.4)/Mg(1.6)/PO4(3.2)     @ 07:12  Na(--)/K(--)/Cl(--)/HCO3(--)/BUN(--)/Cr(--)Glu(--)/Ca(--)/Mg(--)/PO4(3.2)     @ 22:44  Na(141)/K(5.5)/Cl(110)/HCO3(16)/BUN(71)/Cr(3.59)Glu(98)/Ca(9.5)/Mg(--)/PO4(--)    05-11 @ 05:15  Na(137)/K(5.2)/Cl(106)/HCO3(18)/BUN(73)/Cr(3.88)Glu(124)/Ca(9.7)/Mg(--)/PO4(--)    05-10 @ 21:10    Urinalysis Basic - ( 11 May 2019 14:28 )  Color: LIGHT YELLOW / Appearance: CLEAR / S.015 / pH: 5.5  Gluc: NEGATIVE / Ketone: NEGATIVE  / Bili: NEGATIVE / Urobili: NORMAL   Blood: NEGATIVE / Protein: 20 / Nitrite: NEGATIVE   Leuk Esterase: NEGATIVE / RBC: 0-2 / WBC 3-5   Sq Epi: FEW / Non Sq Epi: x / Bacteria: NEGATIVE      ASSESSMENT: 63F w/ CKD3, HFpEF, and colon CA s/p left hemicolectomy, 5/10/19 a/w BRBPR    (1)Renal - CKD3; Superimposed OSMAR from ATN from contrast nephropathy s/p CT I+ last week. Resolving.  (2)Hyperkalemia - improving with diuretics/with low-K diet, and with resolution of OSMAR  (3)Metabolic acidosis - mild/slowly improving with resolution of OSMAR  (4)Anemia - due to rectal bleeding - in setting of recent colonic resection for cancer. Surgery on board.    PLAN  (1)Lasix as ordered  (2)Would remove dietary K+ restriction tomorrow if K+ is below 4.9meq/L  (3)Dose new meds for GFR 20-30ml/min  (4)Surgery f/u        Orville Lima MD  [facility]  [phone]

## 2019-05-13 NOTE — DIETITIAN INITIAL EVALUATION ADULT. - DIET TYPE
1500ml/caffeine free/consistent carbohydrate (no snacks)/DASH/TLC (sodium and cholesterol restricted diet)/low sodium/no concentrated potassium/clear fluid

## 2019-05-13 NOTE — PROGRESS NOTE ADULT - ASSESSMENT
63F with a PMHx of severe pulmonary hypertension, atrial fibrillation on Eliquis, COPD, HTN, HLD, DM, LINH and sigmoid adenocarcinoma S/P recent L hemicolectomy (4/24/19) presents to ED with BRBPR for one day with a total of four episodes.  Of note the patient was recently discharged on may 3rd.  Anemia/ GIB  c/b afib with RVR. Off AC since the admission.  Patient was on AC for many years for AFib for [id number] is 4.   -remains in afib-rate controlled, asymptomatic  with 2 seconds pauses/dakotah during sleep   -currently on Toprol 50 and dig, cont meds at current doses  --AC on hold currently due to severe anemia and recent GI bleed, follow up surgical team/GI team

## 2019-05-13 NOTE — PROGRESS NOTE ADULT - SUBJECTIVE AND OBJECTIVE BOX
Patient is a 63y old  Female who presents with a chief complaint of rectal bleeding; elevated renal function (12 May 2019 16:03)      SUBJECTIVE / OVERNIGHT EVENTS:  No new rectal bleeding reported. SOB on walking +. CBC is stable. HR variable  Review of Systems:   CONSTITUTIONAL: No fever, weight loss, or fatigue  EYES: No eye pain, visual disturbances, or discharge  ENMT:  No difficulty hearing, tinnitus, vertigo; No sinus or throat pain  NECK: No pain or stiffness  BREASTS: No pain, masses, or nipple discharge  RESPIRATORY: No cough, wheezing, chills or hemoptysis; exertional shortness of breath  CARDIOVASCULAR: No chest pain, palpitations, dizziness, or leg swelling  GASTROINTESTINAL: No abdominal or epigastric pain. No nausea, vomiting, or hematemesis; No diarrhea or constipation. No melena or hematochezia.  GENITOURINARY: No dysuria, frequency, hematuria, or incontinence  NEUROLOGICAL: No headaches, memory loss, loss of strength, numbness, or tremors  SKIN: No itching, burning, rashes, or lesions   LYMPH NODES: No enlarged glands  ENDOCRINE: No heat or cold intolerance; No hair loss  MUSCULOSKELETAL: No joint pain or swelling; No muscle, back, or extremity pain  PSYCHIATRIC: No depression, anxiety, mood swings, or difficulty sleeping  HEME/LYMPH: No easy bruising, or bleeding gums  ALLERY AND IMMUNOLOGIC: No hives or eczema    MEDICATIONS  (STANDING):  allopurinol 300 milliGRAM(s) Oral daily  buDESOnide  80 MICROgram(s)/formoterol 4.5 MICROgram(s) Inhaler 2 Puff(s) Inhalation two times a day  calcium carbonate    500 mG (Tums) Chewable 1 Tablet(s) Chew two times a day  dextrose 5%. 1000 milliLiter(s) (50 mL/Hr) IV Continuous <Continuous>  dextrose 50% Injectable 12.5 Gram(s) IV Push once  dextrose 50% Injectable 25 Gram(s) IV Push once  dextrose 50% Injectable 25 Gram(s) IV Push once  digoxin     Tablet 0.125 milliGRAM(s) Oral daily  furosemide   Injectable 40 milliGRAM(s) IV Push daily  insulin lispro (HumaLOG) corrective regimen sliding scale   SubCutaneous three times a day before meals  insulin lispro (HumaLOG) corrective regimen sliding scale   SubCutaneous at bedtime  magnesium oxide 400 milliGRAM(s) Oral two times a day with meals  metoprolol succinate ER 50 milliGRAM(s) Oral two times a day  multivitamin 1 Tablet(s) Oral daily  sildenafil (REVATIO) 20 milliGRAM(s) Oral three times a day    MEDICATIONS  (PRN):  acetaminophen   Tablet .. 650 milliGRAM(s) Oral every 6 hours PRN Mild Pain (1 - 3), Moderate Pain (4 - 6)  ALBUTerol    90 MICROgram(s) HFA Inhaler 2 Puff(s) Inhalation every 6 hours PRN Shortness of Breath and/or Wheezing  dextrose 40% Gel 15 Gram(s) Oral once PRN Blood Glucose LESS THAN 70 milliGRAM(s)/deciliter  glucagon  Injectable 1 milliGRAM(s) IntraMuscular once PRN Glucose LESS THAN 70 milligrams/deciliter      PHYSICAL EXAM:  Vital Signs Last 24 Hrs  T(C): 36.4 (12 May 2019 21:55), Max: 36.8 (12 May 2019 05:28)  T(F): 97.6 (12 May 2019 21:55), Max: 98.2 (12 May 2019 05:28)  HR: 74 (12 May 2019 21:55) (74 - 87)  BP: 122/87 (12 May 2019 21:55) (112/66 - 135/69)  BP(mean): --  RR: 18 (12 May 2019 21:55) (18 - 18)  SpO2: 100% (12 May 2019 21:55) (100% - 100%)  I&O's Summary    11 May 2019 07:  -  12 May 2019 07:00  --------------------------------------------------------  IN: 700 mL / OUT: 2600 mL / NET: -1900 mL    12 May 2019 07:01  -  12 May 2019 22:03  --------------------------------------------------------  IN: 437 mL / OUT: 900 mL / NET: -463 mL      GENERAL: NAD, well-developed  HEAD:  Atraumatic, Normocephalic  EYES: EOMI, PERRLA, conjunctiva and sclera clear  NECK: Supple, No JVD  CHEST/LUNG: Clear to auscultation bilaterally; No wheeze  HEART: Regular rate and rhythm; No murmurs, rubs, or gallops  ABDOMEN: Soft, Nontender, Nondistended; Bowel sounds present  EXTREMITIES:  2+ Peripheral Pulses, No clubbing, cyanosis, or edema  PSYCH: AAOx3  NEUROLOGY: non-focal  SKIN: No rashes or lesions    LABS:  CAPILLARY BLOOD GLUCOSE      POCT Blood Glucose.: 74 mg/dL (12 May 2019 21:42)  POCT Blood Glucose.: 98 mg/dL (12 May 2019 17:43)  POCT Blood Glucose.: 97 mg/dL (12 May 2019 13:10)  POCT Blood Glucose.: 101 mg/dL (12 May 2019 08:58)                          7.4    8.83  )-----------( 307      ( 12 May 2019 10:35 )             24.4     05-12    142  |  109<H>  |  67<H>  ----------------------------<  89  5.1   |  19<L>  |  3.22<H>    Ca    9.4      12 May 2019 07:12  Phos  3.2     12  Mg     1.6     12    TPro  6.5  /  Alb  3.38  /  TBili  0.5  /  DBili  x   /  AST  17  /  ALT  15  /  AlkPhos  107  05-12    PT/INR - ( 11 May 2019 05:15 )   PT: 21.5 SEC;   INR: 1.90          PTT - ( 11 May 2019 05:15 )  PTT:36.5 SEC      Urinalysis Basic - ( 11 May 2019 14:28 )    Color: LIGHT YELLOW / Appearance: CLEAR / S.015 / pH: 5.5  Gluc: NEGATIVE / Ketone: NEGATIVE  / Bili: NEGATIVE / Urobili: NORMAL   Blood: NEGATIVE / Protein: 20 / Nitrite: NEGATIVE   Leuk Esterase: NEGATIVE / RBC: 0-2 / WBC 3-5   Sq Epi: FEW / Non Sq Epi: x / Bacteria: NEGATIVE        RADIOLOGY & ADDITIONAL TESTS:    Imaging Personally Reviewed:    Consultant(s) Notes Reviewed:      Care Discussed with Consultants/Other Providers:

## 2019-05-13 NOTE — DIETITIAN INITIAL EVALUATION ADULT. - OTHER INFO
Nutrition Consult X Registered Dietitian. Pt 62 yo female with s/p recent exlap, L hemicolectomy for splenic flexure adeno CA presented with BRBPR. At time of visit Pt appears alert, oriented. Per Pt her appetite varies, sometimes well, sometimes not. Of note Pt on Clear Liquids; but Pt wants to eat Regular/solid foods -> Tele PA notified. No report of chewing/swallowing difficulty; no report of nausea, vomiting at present. Pt having diarrhea x 2 today, but no blood - Pt stated. Per Pt her UBW: ~232#, in November 2017; Pt's current body weight: 195#. At home Pt avoids Regular sugar & salt reported. RDN answered questions/concerns related to diet. Case discussed with Tele PA. RDN remains available Pt made aware.

## 2019-05-14 ENCOUNTER — APPOINTMENT (OUTPATIENT)
Dept: COLORECTAL SURGERY | Facility: CLINIC | Age: 64
End: 2019-05-14

## 2019-05-14 DIAGNOSIS — I50.33 ACUTE ON CHRONIC DIASTOLIC (CONGESTIVE) HEART FAILURE: ICD-10-CM

## 2019-05-14 DIAGNOSIS — N17.9 ACUTE KIDNEY FAILURE, UNSPECIFIED: ICD-10-CM

## 2019-05-14 DIAGNOSIS — I27.20 PULMONARY HYPERTENSION, UNSPECIFIED: ICD-10-CM

## 2019-05-14 DIAGNOSIS — I48.91 UNSPECIFIED ATRIAL FIBRILLATION: ICD-10-CM

## 2019-05-14 LAB
ANION GAP SERPL CALC-SCNC: 14 MMO/L — SIGNIFICANT CHANGE UP (ref 7–14)
BUN SERPL-MCNC: 50 MG/DL — HIGH (ref 7–23)
CALCIUM SERPL-MCNC: 9.3 MG/DL — SIGNIFICANT CHANGE UP (ref 8.4–10.5)
CHLORIDE SERPL-SCNC: 106 MMOL/L — SIGNIFICANT CHANGE UP (ref 98–107)
CO2 SERPL-SCNC: 21 MMOL/L — LOW (ref 22–31)
CREAT SERPL-MCNC: 2.53 MG/DL — HIGH (ref 0.5–1.3)
GLUCOSE SERPL-MCNC: 89 MG/DL — SIGNIFICANT CHANGE UP (ref 70–99)
HCT VFR BLD CALC: 24.8 % — LOW (ref 34.5–45)
HGB BLD-MCNC: 7.6 G/DL — LOW (ref 11.5–15.5)
MAGNESIUM SERPL-MCNC: 1.6 MG/DL — SIGNIFICANT CHANGE UP (ref 1.6–2.6)
MCHC RBC-ENTMCNC: 21.1 PG — LOW (ref 27–34)
MCHC RBC-ENTMCNC: 30.6 % — LOW (ref 32–36)
MCV RBC AUTO: 68.9 FL — LOW (ref 80–100)
NRBC # FLD: 0.15 K/UL — SIGNIFICANT CHANGE UP (ref 0–0)
NRBC FLD-RTO: 1.9 — SIGNIFICANT CHANGE UP
PHOSPHATE SERPL-MCNC: 3 MG/DL — SIGNIFICANT CHANGE UP (ref 2.5–4.5)
PLATELET # BLD AUTO: 309 K/UL — SIGNIFICANT CHANGE UP (ref 150–400)
PMV BLD: 10.1 FL — SIGNIFICANT CHANGE UP (ref 7–13)
POTASSIUM SERPL-MCNC: 4.4 MMOL/L — SIGNIFICANT CHANGE UP (ref 3.5–5.3)
POTASSIUM SERPL-SCNC: 4.4 MMOL/L — SIGNIFICANT CHANGE UP (ref 3.5–5.3)
RBC # BLD: 3.6 M/UL — LOW (ref 3.8–5.2)
RBC # FLD: 23 % — HIGH (ref 10.3–14.5)
SODIUM SERPL-SCNC: 141 MMOL/L — SIGNIFICANT CHANGE UP (ref 135–145)
WBC # BLD: 7.79 K/UL — SIGNIFICANT CHANGE UP (ref 3.8–10.5)
WBC # FLD AUTO: 7.79 K/UL — SIGNIFICANT CHANGE UP (ref 3.8–10.5)

## 2019-05-14 PROCEDURE — 99223 1ST HOSP IP/OBS HIGH 75: CPT

## 2019-05-14 PROCEDURE — 99232 SBSQ HOSP IP/OBS MODERATE 35: CPT

## 2019-05-14 PROCEDURE — 99233 SBSQ HOSP IP/OBS HIGH 50: CPT

## 2019-05-14 RX ORDER — METOPROLOL TARTRATE 50 MG
75 TABLET ORAL
Refills: 0 | Status: DISCONTINUED | OUTPATIENT
Start: 2019-05-14 | End: 2019-05-14

## 2019-05-14 RX ORDER — METOPROLOL TARTRATE 50 MG
75 TABLET ORAL
Refills: 0 | Status: DISCONTINUED | OUTPATIENT
Start: 2019-05-14 | End: 2019-05-18

## 2019-05-14 RX ADMIN — Medication 75 MILLIGRAM(S): at 17:43

## 2019-05-14 RX ADMIN — MAGNESIUM OXIDE 400 MG ORAL TABLET 400 MILLIGRAM(S): 241.3 TABLET ORAL at 09:20

## 2019-05-14 RX ADMIN — Medication 1 TABLET(S): at 05:14

## 2019-05-14 RX ADMIN — BUDESONIDE AND FORMOTEROL FUMARATE DIHYDRATE 2 PUFF(S): 160; 4.5 AEROSOL RESPIRATORY (INHALATION) at 21:37

## 2019-05-14 RX ADMIN — Medication 1 TABLET(S): at 19:19

## 2019-05-14 RX ADMIN — Medication 50 MILLIGRAM(S): at 05:14

## 2019-05-14 RX ADMIN — Medication 20 MILLIGRAM(S): at 15:52

## 2019-05-14 RX ADMIN — BUDESONIDE AND FORMOTEROL FUMARATE DIHYDRATE 2 PUFF(S): 160; 4.5 AEROSOL RESPIRATORY (INHALATION) at 09:20

## 2019-05-14 RX ADMIN — Medication 40 MILLIGRAM(S): at 05:14

## 2019-05-14 RX ADMIN — Medication 0.12 MILLIGRAM(S): at 05:14

## 2019-05-14 RX ADMIN — Medication 1 TABLET(S): at 12:33

## 2019-05-14 RX ADMIN — Medication 20 MILLIGRAM(S): at 05:14

## 2019-05-14 RX ADMIN — Medication 300 MILLIGRAM(S): at 12:33

## 2019-05-14 NOTE — PROGRESS NOTE ADULT - ASSESSMENT
64 yo F with a PMH of severe PAH, AFib diagnosed 5 yrs ago on Eliquis, COPD, HTN, HLD, T2DM, LINH and  s/p left hemicolectomy for sigmoid adenocarcinoma on 04/24/2019 who presented to the ED on 05/10/2019 with a cc of BRBPR and Afib with RVR in the setting of anemia; Eliquis held, CHA2 DS2 Vasc, 4:    - trend Hb  - continue to hold AC until clearance from GI  - elevated Scr; check Digoxin level  - continue beta blocker therapy for now; uptitrating may be difficult given borderline BPs  - avoid adrenergic agonists; consider converting albuterol to levalbuterol if clinically acceptable  - continue telemetry  - appreciate HF team, GI and surgery recommendations  - possible AFib ablation vs. DAYANNA closure device, i.e., Watchman when stable and when cleared to resume AC  - d/w EP attending, Dr. De Jesus

## 2019-05-14 NOTE — PROGRESS NOTE ADULT - SUBJECTIVE AND OBJECTIVE BOX
GENERAL SURGERY PROGRESS NOTE      Subjective:  No acute events overnight. Patient states she is having nonbloody but watery bowel movements. Denies abdominal pain.         Objective:    PE:  Gen: Laying in bed, in NAD  Resp: airway patent, respirations unlabored, no increased WoB  Abd: soft, obese. NT/ND. Incisions healing well  Ext: no edema, WWP    Vital Signs Last 24 Hrs  T(C): 36.7 (14 May 2019 05:10), Max: 36.8 (13 May 2019 12:08)  T(F): 98 (14 May 2019 05:10), Max: 98.2 (13 May 2019 12:08)  HR: 82 (14 May 2019 05:10) (76 - 82)  BP: 110/76 (14 May 2019 05:10) (110/76 - 123/71)  BP(mean): --  RR: 18 (14 May 2019 05:10) (18 - 18)  SpO2: 96% (14 May 2019 05:10) (94% - 99%)    I&O's Detail    13 May 2019 07:01  -  14 May 2019 07:00  --------------------------------------------------------  IN:    Oral Fluid: 840 mL  Total IN: 840 mL    OUT:    Voided: 1700 mL  Total OUT: 1700 mL    Total NET: -860 mL          Daily     Daily Weight in k (14 May 2019 05:10)    MEDICATIONS  (STANDING):  allopurinol 300 milliGRAM(s) Oral daily  buDESOnide  80 MICROgram(s)/formoterol 4.5 MICROgram(s) Inhaler 2 Puff(s) Inhalation two times a day  calcium carbonate    500 mG (Tums) Chewable 1 Tablet(s) Chew two times a day  dextrose 5%. 1000 milliLiter(s) (50 mL/Hr) IV Continuous <Continuous>  dextrose 50% Injectable 12.5 Gram(s) IV Push once  dextrose 50% Injectable 25 Gram(s) IV Push once  dextrose 50% Injectable 25 Gram(s) IV Push once  digoxin     Tablet 0.125 milliGRAM(s) Oral daily  furosemide   Injectable 40 milliGRAM(s) IV Push daily  insulin lispro (HumaLOG) corrective regimen sliding scale   SubCutaneous three times a day before meals  insulin lispro (HumaLOG) corrective regimen sliding scale   SubCutaneous at bedtime  metoprolol succinate ER 50 milliGRAM(s) Oral two times a day  multivitamin 1 Tablet(s) Oral daily  sildenafil (REVATIO) 20 milliGRAM(s) Oral three times a day    MEDICATIONS  (PRN):  acetaminophen   Tablet .. 650 milliGRAM(s) Oral every 6 hours PRN Mild Pain (1 - 3), Moderate Pain (4 - 6)  ALBUTerol    90 MICROgram(s) HFA Inhaler 2 Puff(s) Inhalation every 6 hours PRN Shortness of Breath and/or Wheezing  dextrose 40% Gel 15 Gram(s) Oral once PRN Blood Glucose LESS THAN 70 milliGRAM(s)/deciliter  glucagon  Injectable 1 milliGRAM(s) IntraMuscular once PRN Glucose LESS THAN 70 milligrams/deciliter      LABS:                        7.6    7.79  )-----------( 309      ( 14 May 2019 05:30 )             24.8         141  |  106  |  50<H>  ----------------------------<  89  4.4   |  21<L>  |  2.53<H>    Ca    9.3      14 May 2019 05:30  Phos  3.0       Mg     1.6         TPro  6.9  /  Alb  3.7  /  TBili  0.5  /  DBili  x   /  AST  16  /  ALT  15  /  AlkPhos  110            RADIOLOGY & ADDITIONAL STUDIES:

## 2019-05-14 NOTE — CONSULT NOTE ADULT - PROBLEM SELECTOR RECOMMENDATION 3
Etiology unclear. Related to contrast on 5/3/19 CT? then with volume overload?  OSMAR improving now s/p IV diuretics, would continue.

## 2019-05-14 NOTE — PROGRESS NOTE ADULT - SUBJECTIVE AND OBJECTIVE BOX
[facility] [phone]        Patient seen and examined in bed.  She was in good spirit        MEDICATIONS  (STANDING):  allopurinol 300 milliGRAM(s) Oral daily  buDESOnide  80 MICROgram(s)/formoterol 4.5 MICROgram(s) Inhaler 2 Puff(s) Inhalation two times a day  calcium carbonate    500 mG (Tums) Chewable 1 Tablet(s) Chew two times a day  dextrose 5%. 1000 milliLiter(s) (50 mL/Hr) IV Continuous <Continuous>  dextrose 50% Injectable 12.5 Gram(s) IV Push once  dextrose 50% Injectable 25 Gram(s) IV Push once  dextrose 50% Injectable 25 Gram(s) IV Push once  digoxin     Tablet 0.125 milliGRAM(s) Oral daily  furosemide   Injectable 40 milliGRAM(s) IV Push daily  insulin lispro (HumaLOG) corrective regimen sliding scale   SubCutaneous three times a day before meals  insulin lispro (HumaLOG) corrective regimen sliding scale   SubCutaneous at bedtime  metoprolol succinate ER 75 milliGRAM(s) Oral two times a day  multivitamin 1 Tablet(s) Oral daily  sildenafil (REVATIO) 20 milliGRAM(s) Oral three times a day      VITAL:  T(C): , Max: 36.7 (05-13-19 @ 21:00)  T(F): , Max: 98.1 (05-13-19 @ 21:00)  HR: 94 (05-14-19 @ 11:54)  BP: 101/59 (05-14-19 @ 11:54)  BP(mean): --  RR: 18 (05-14-19 @ 11:54)  SpO2: 94% (05-14-19 @ 11:54)  Wt(kg): --    I and O's:    05-13 @ 07:01  -  05-14 @ 07:00  --------------------------------------------------------  IN: 840 mL / OUT: 1700 mL / NET: -860 mL    05-14 @ 07:01  -  05-14 @ 15:19  --------------------------------------------------------  IN: 0 mL / OUT: 400 mL / NET: -400 mL          PHYSICAL EXAM:    Constitutional: NAD  Neck:  No JVD  Respiratory: CTAB/L  Cardiovascular: S1 and S2  Gastrointestinal: BS+, soft, NT/ND  Extremities: No peripheral edema  Neurological: A/O x 3, no focal deficits  Psychiatric: Normal mood, normal affect  : No Corey  Skin: No rashes  Access: Not applicable    LABS:                        7.6    7.79  )-----------( 309      ( 14 May 2019 05:30 )             24.8     05-14    141  |  106  |  50<H>  ----------------------------<  89  4.4   |  21<L>  |  2.53<H>    Ca    9.3      14 May 2019 05:30  Phos  3.0     05-14  Mg     1.6     05-14    TPro  6.9  /  Alb  3.7  /  TBili  0.5  /  DBili  x   /  AST  16  /  ALT  15  /  AlkPhos  110  05-13          Urine Studies:          RADIOLOGY & ADDITIONAL STUDIES:

## 2019-05-14 NOTE — CHART NOTE - NSCHARTNOTEFT_GEN_A_CORE
Spoke to GI regarding role of endoscopy. As per GI, the surgery team finds no role for endoscopy. As such, GI will not scope patient.

## 2019-05-14 NOTE — PROGRESS NOTE ADULT - ASSESSMENT
63F PMH splenic flexure adenocarcinoma s/p Left hemicolectomy (4/24/2019), intially presented 5/10/19 with BRBPR, now resolved    - BRBPR currently resolved, H/H stable  - Monitor for any continued bleeding or downtrending hct  - No indication for urgent surgical intervention at this time  - Care and AC per primary team/GI    C Jeremy PGY2  A team surgery  [id number]

## 2019-05-14 NOTE — CONSULT NOTE ADULT - ATTENDING COMMENTS
Continue IV Lasix.  GI eval for rectal bleed.  Check Dig level. Continue IV Lasix.  GI eval for rectal bleed.  Check Dig level.  Had some episodes of AF with RVR.   Increase Toprol to 75 bid.

## 2019-05-14 NOTE — CONSULT NOTE ADULT - SUBJECTIVE AND OBJECTIVE BOX
Date of Admission:    CHIEF COMPLAINT:    HISTORY OF PRESENT ILLNESS:      Allergies    No Known Allergies    Intolerances    	    MEDICATIONS:  digoxin     Tablet 0.125 milliGRAM(s) Oral daily  furosemide   Injectable 40 milliGRAM(s) IV Push daily  metoprolol succinate ER 50 milliGRAM(s) Oral two times a day  sildenafil (REVATIO) 20 milliGRAM(s) Oral three times a day      ALBUTerol    90 MICROgram(s) HFA Inhaler 2 Puff(s) Inhalation every 6 hours PRN  buDESOnide  80 MICROgram(s)/formoterol 4.5 MICROgram(s) Inhaler 2 Puff(s) Inhalation two times a day    acetaminophen   Tablet .. 650 milliGRAM(s) Oral every 6 hours PRN    calcium carbonate    500 mG (Tums) Chewable 1 Tablet(s) Chew two times a day    allopurinol 300 milliGRAM(s) Oral daily  dextrose 40% Gel 15 Gram(s) Oral once PRN  dextrose 50% Injectable 12.5 Gram(s) IV Push once  dextrose 50% Injectable 25 Gram(s) IV Push once  dextrose 50% Injectable 25 Gram(s) IV Push once  glucagon  Injectable 1 milliGRAM(s) IntraMuscular once PRN  insulin lispro (HumaLOG) corrective regimen sliding scale   SubCutaneous three times a day before meals  insulin lispro (HumaLOG) corrective regimen sliding scale   SubCutaneous at bedtime    dextrose 5%. 1000 milliLiter(s) IV Continuous <Continuous>  multivitamin 1 Tablet(s) Oral daily      PAST MEDICAL & SURGICAL HISTORY:  Gout  Chronic systolic right heart failure  LINH (obstructive sleep apnea)  CLL (chronic lymphocytic leukemia)  COPD (chronic obstructive pulmonary disease)  Diabetes  HTN (hypertension)  Atrial fibrillation  No significant past surgical history      FAMILY HISTORY:  Family history of hyperlipidemia  Family history of hypertension (Sibling)  Family history of type 2 diabetes mellitus  Family history of cancer (Aunt): Breast      SOCIAL HISTORY:    [ ] Non-smoker  [ ] Smoker  [ ] Alcohol      REVIEW OF SYSTEMS:  CONSTITUTIONAL: No fever, weight loss, or fatigue  EYES: No eye pain, visual disturbances, or discharge  ENMT:  No difficulty hearing, tinnitus, vertigo; No sinus or throat pain  NECK: No pain or stiffness  RESPIRATORY: No cough, wheezing, chills or hemoptysis; No Shortness of Breath  CARDIOVASCULAR: No chest pain, palpitations, passing out, dizziness, or leg swelling  GASTROINTESTINAL: No abdominal or epigastric pain. No nausea, vomiting, or hematemesis; No diarrhea or constipation. No melena or hematochezia.  GENITOURINARY: No dysuria, frequency, hematuria, or incontinence  NEUROLOGICAL: No headaches, memory loss, loss of strength, numbness, or tremors  SKIN: No itching, burning, rashes, or lesions   LYMPH Nodes: No enlarged glands  ENDOCRINE: No heat or cold intolerance; No hair loss  MUSCULOSKELETAL: No joint pain or swelling; No muscle, back, or extremity pain  PSYCHIATRIC: No depression, anxiety, mood swings, or difficulty sleeping  HEME/LYMPH: No easy bruising, or bleeding gums  ALLERY AND IMMUNOLOGIC: No hives or eczema	    [ ] All others negative	  [ ] Unable to obtain    PHYSICAL EXAM:  T(C): 36.6 (05-14-19 @ 11:54), Max: 36.7 (05-13-19 @ 21:00)  HR: 94 (05-14-19 @ 11:54) (76 - 94)  BP: 101/59 (05-14-19 @ 11:54) (101/59 - 121/80)  RR: 18 (05-14-19 @ 11:54) (18 - 18)  SpO2: 94% (05-14-19 @ 11:54) (94% - 96%)  Wt(kg): --  I&O's Summary    13 May 2019 07:01  -  14 May 2019 07:00  --------------------------------------------------------  IN: 840 mL / OUT: 1700 mL / NET: -860 mL    14 May 2019 07:01  -  14 May 2019 13:27  --------------------------------------------------------  IN: 0 mL / OUT: 400 mL / NET: -400 mL        Appearance: Normal	  HEENT:   Normal oral mucosa, PERRL, EOMI	  Lymphatic: No lymphadenopathy  Cardiovascular: Normal S1 S2, No JVD, No murmurs, No edema  Respiratory: Lungs clear to auscultation	  Psychiatry: A & O x 3, Mood & affect appropriate  Gastrointestinal:  Soft, Non-tender, + BS	  Skin: No rashes, No ecchymoses, No cyanosis	  Neurologic: Non-focal  Extremities: Normal range of motion, No clubbing, cyanosis or edema  Vascular: Peripheral pulses palpable 2+ bilaterally        LABS:	 	    CBC Full  -  ( 14 May 2019 05:30 )  WBC Count : 7.79 K/uL  Hemoglobin : 7.6 g/dL  Hematocrit : 24.8 %  Platelet Count - Automated : 309 K/uL  Mean Cell Volume : 68.9 fL  Mean Cell Hemoglobin : 21.1 pg  Mean Cell Hemoglobin Concentration : 30.6 %  Auto Neutrophil # : x  Auto Lymphocyte # : x  Auto Monocyte # : x  Auto Eosinophil # : x  Auto Basophil # : x  Auto Neutrophil % : x  Auto Lymphocyte % : x  Auto Monocyte % : x  Auto Eosinophil % : x  Auto Basophil % : x    05-14    141  |  106  |  50<H>  ----------------------------<  89  4.4   |  21<L>  |  2.53<H>  05-13    138  |  106  |  55<H>  ----------------------------<  85  4.9   |  19<L>  |  2.70<H>    Ca    9.3      14 May 2019 05:30  Ca    9.6      13 May 2019 06:25  Phos  3.0     05-14  Phos  2.9     05-13  Mg     1.6     05-14  Mg     1.7     05-13    TPro  6.9  /  Alb  3.7  /  TBili  0.5  /  DBili  x   /  AST  16  /  ALT  15  /  AlkPhos  110  05-13      proBNP:   Lipid Profile:   HgA1c:   TSH:       CARDIAC MARKERS:            TELEMETRY: 	    ECG:  	  RADIOLOGY:  OTHER: 	    PREVIOUS DIAGNOSTIC TESTING:    [ ] Echocardiogram:  [ ]  Catheterization:  [ ] Stress Test:  	  	  ASSESSMENT/PLAN: Date of Admission: 5/10/19    CHIEF COMPLAINT: BRBPR    HISTORY OF PRESENT ILLNESS:    62 y/o female w/ PMHX  of Afib on Eliquis, DM II,  HTN, COPD on home O2 2L, lymphoma/CLL s/p radiation and chemo (), recent diagnosis of colon CA s/p resection (2019), HFpEF LVEF 66%, severe pulmonary HTN (now on sildenafil), RV systolic dysfunction, mod-severe TR and  comes in with complaints of BRBPR x 1.  In ED patient found to be anemic hgb/hct 8.2/27, and was also found to be in OSMAR with BUN/Cr 73/3.88 (last cr 1.5). GI was consulted, patient was considered for sigmoidoscopy, but b/c patient with OSMAR and likely volume overload GI procedures were held per primary team. Patient was started on Lasix 40 mg IV qd (home diuretics were torsemide 20 mg daily) and she has been diuresing well. She admits to having worsening LE edema, and poor urine output s/p discharge to home on 5/3/19. She also admits to some CHAVEZ. No CP, palpitations, dizziness.           Allergies    No Known Allergies    Intolerances    	    MEDICATIONS:  digoxin     Tablet 0.125 milliGRAM(s) Oral daily  furosemide   Injectable 40 milliGRAM(s) IV Push daily  metoprolol succinate ER 50 milliGRAM(s) Oral two times a day  sildenafil (REVATIO) 20 milliGRAM(s) Oral three times a day      ALBUTerol    90 MICROgram(s) HFA Inhaler 2 Puff(s) Inhalation every 6 hours PRN  buDESOnide  80 MICROgram(s)/formoterol 4.5 MICROgram(s) Inhaler 2 Puff(s) Inhalation two times a day    acetaminophen   Tablet .. 650 milliGRAM(s) Oral every 6 hours PRN    calcium carbonate    500 mG (Tums) Chewable 1 Tablet(s) Chew two times a day    allopurinol 300 milliGRAM(s) Oral daily  dextrose 40% Gel 15 Gram(s) Oral once PRN  dextrose 50% Injectable 12.5 Gram(s) IV Push once  dextrose 50% Injectable 25 Gram(s) IV Push once  dextrose 50% Injectable 25 Gram(s) IV Push once  glucagon  Injectable 1 milliGRAM(s) IntraMuscular once PRN  insulin lispro (HumaLOG) corrective regimen sliding scale   SubCutaneous three times a day before meals  insulin lispro (HumaLOG) corrective regimen sliding scale   SubCutaneous at bedtime    dextrose 5%. 1000 milliLiter(s) IV Continuous <Continuous>  multivitamin 1 Tablet(s) Oral daily      PAST MEDICAL & SURGICAL HISTORY:  Gout  Chronic systolic right heart failure  LINH (obstructive sleep apnea)  CLL (chronic lymphocytic leukemia)  COPD (chronic obstructive pulmonary disease)  Diabetes  HTN (hypertension)  Atrial fibrillation  No significant past surgical history      FAMILY HISTORY:  Family history of hyperlipidemia  Family history of hypertension (Sibling)  Family history of type 2 diabetes mellitus  Family history of cancer (Aunt): Breast      SOCIAL HISTORY:    She is a non-smoker, no ETOH or drug abuse.       REVIEW OF SYSTEMS:  CONSTITUTIONAL: No fever, weight loss, or fatigue  EYES: No eye pain, visual disturbances, or discharge  ENMT:  No difficulty hearing, tinnitus, vertigo; No sinus or throat pain  NECK: No pain or stiffness  RESPIRATORY: No cough, wheezing, chills or hemoptysis; admits to Shortness of Breath  CARDIOVASCULAR: No chest pain, palpitations, passing out, dizziness, admits to leg swelling  GASTROINTESTINAL: No abdominal or epigastric pain. No nausea, vomiting, or hematemesis; No diarrhea or constipation. No melena or hematochezia.  GENITOURINARY: No dysuria, frequency, hematuria, or incontinence  NEUROLOGICAL: No headaches, memory loss, loss of strength, numbness, or tremors  SKIN: No itching, burning, rashes, or lesions   LYMPH Nodes: No enlarged glands  ENDOCRINE: No heat or cold intolerance; No hair loss  MUSCULOSKELETAL: No joint pain or swelling; No muscle, back, or extremity pain  PSYCHIATRIC: No depression, anxiety, mood swings, or difficulty sleeping  HEME/LYMPH: No easy bruising, or bleeding gums  ALLERY AND IMMUNOLOGIC: No hives or eczema	    PHYSICAL EXAM:  T(C): 36.6 (19 @ 11:54), Max: 36.7 (19 @ 21:00)  HR: 94 (19 @ 11:54) (76 - 94)  BP: 101/59 (19 @ 11:54) (101/59 - 121/80)  RR: 18 (19 @ 11:54) (18 - 18)  SpO2: 94% (19 @ 11:54) (94% - 96%)  Wt(kg): --  I&O's Summary    13 May 2019 07:  -  14 May 2019 07:00  --------------------------------------------------------  IN: 840 mL / OUT: 1700 mL / NET: -860 mL    14 May 2019 07:  -  14 May 2019 13:27  --------------------------------------------------------  IN: 0 mL / OUT: 400 mL / NET: -400 mL        Appearance: Normal	  HEENT:   Normal oral mucosa, PERRL, EOMI	  Lymphatic: No lymphadenopathy  Cardiovascular: Normal S1 S2, moderately elevated JVP, No murmurs, tace b/l LE edema.   Respiratory: Lungs clear to auscultation	  Psychiatry: A & O x 3, Mood & affect appropriate  Gastrointestinal:  Soft, Non-tender, + BS	  Skin: No rashes, No ecchymoses, No cyanosis	  Neurologic: Non-focal  Extremities: Normal range of motion, No clubbing, cyanosis or edema  Vascular: Peripheral pulses palpable 2+ bilaterally        LABS:	 	    CBC Full  -  ( 14 May 2019 05:30 )  WBC Count : 7.79 K/uL  Hemoglobin : 7.6 g/dL  Hematocrit : 24.8 %  Platelet Count - Automated : 309 K/uL  Mean Cell Volume : 68.9 fL  Mean Cell Hemoglobin : 21.1 pg  Mean Cell Hemoglobin Concentration : 30.6 %  Auto Neutrophil # : x  Auto Lymphocyte # : x  Auto Monocyte # : x  Auto Eosinophil # : x  Auto Basophil # : x  Auto Neutrophil % : x  Auto Lymphocyte % : x  Auto Monocyte % : x  Auto Eosinophil % : x  Auto Basophil % : x        141  |  106  |  50<H>  ----------------------------<  89  4.4   |  21<L>  |  2.53<H>  05    138  |  106  |  55<H>  ----------------------------<  85  4.9   |  19<L>  |  2.70<H>    < from: Transthoracic Echocardiogram (19 @ 18:26) >  DIMENSIONS:  Dimensions:     Normal Values:  LA:     3.7 cm    2.0 - 4.0 cm  Ao:     2.9 cm    2.0 -3.8 cm  SEPTUM: 0.9 cm    0.6 - 1.2 cm  PWT:    0.9 cm    0.6 - 1.1 cm  LVIDd:  4.5 cm    3.0 - 5.6 cm  LVIDs:  2.7 cm    1.8 - 4.0 cm  Derived Variables:  LVMI: 71 g/m2  RWT: 0.40  Fractional short: 40 %  Ejection Fraction (Visual Estimate): >70 %  Peak Velocity (m/sec): AoV=1.4  ------------------------------------------------------------------------  OBSERVATIONS:  Mitral Valve: Mitral annular calcification and calcified  mitral leaflets with normal diastolic opening. Mild mitral  regurgitation.  Aortic Root: Aortic Root: 2.9 cm.  Aortic Valve: Calcified trileaflet aortic valve with normal  opening. Peak transaortic valve gradient equals 7 mm Hg.  Peak left ventricular outflow tract gradient equals 4.8 mm  Hg.  Left Atrium: Mildly dilated left atrium.  LA volume index =  37 cc/m2.  Left Ventricle: Hyperdynamic left ventricle. Flattening of  the interventricular septum in both systole and diastole is   consistent with right ventricular pressure overload.  Normal left ventricular internal dimensions and wall  thicknesses. (DT:150 ms).  Right Heart: Severe right atrial enlargement. Right  ventricular enlargement with normal right ventricular  systolic function. Normal tricuspid valve. Moderate-severe  tricuspid regurgitation. Pulmonicvalve not well  visualized. Mild pulmonic regurgitation.  Pericardium/PleuraNormal pericardium with no pericardial  effusion.  Hemodynamic: Estimated right ventricular systolic pressure  equals 84 mm Hg, assuming right atrial pressure equals 10  mm Hg, consistent with severe pulmonary hypertension.  ------------------------------------------------------------------------    < end of copied text >  Ca    9.3      14 May 2019 05:30  Ca    9.6      13 May 2019 06:25  Phos  3.0     -14  Phos  2.9     -  Mg     1.6     -14  Mg     1.7     05-13    TPro  6.9  /  Alb  3.7  /  TBili  0.5  /  DBili  x   /  AST  16  /  ALT  15  /  AlkPhos  110  05-      < from: Cardiac Cath Lab - Adult (19 @ 16:57) >  Pressures:  Baseline  Pressures:  - HR: 52  Pressures:  - Rhythm:  Pressures:  -- Aortic Pressure (S/D/M): 133/74/101  Pressures:  -- Pulmonary Artery (S/D/M): 78/29/45  Pressures:  -- Pulmonary Capillary Wedge: 20/21/18  Pressures:  -- Right Atrium (a/v/M): 12/12/10  Pressures:  -- Right Ventricle (s/edp): 78/14/--  O2 Sats:  Baseline  O2 Sats:  - HR: 52  O2 Sats:  - Rhythm:  O2 Sats:  -- AO: 8.1/96/10.58  O2 Sats:  -- PA: 8.1/47.4/5.22  Outputs:  Baseline  Outputs:  -- CALCULATIONS[age]  Outputs:  -- CALCULATIONS: Body Surface Area: 1.88  Outputs:  -- CALCULATIONS: Height in cm: 163.00  Outputs:  -- CALCULATIONS: Sex: Female  Outputs:  -- CALCULATIONS: Weight in k.10  Outputs:  -- OUTPUTS: CO by Juan Carlos: 4.39  Outputs:  -- OUTPUTS: Juan Carlos cardiac index: 2.33  Outputs:  -- OUTPUTS: O2 consumption: 234.81  Outputs:  -- OUTPUTS: Vo2 Indexed: 125.00  Outputs:  -- RESISTANCES: Left ventricular stroke work: 80.15  Outputs:  -- RESISTANCES: Left Ventricular Stroke Work index: 42.67  Outputs:  -- RESISTANCES: Pulmonary vascular index (dsc): 1027.67  Outputs:  -- RESISTANCES: Pulmonary vascular index (Wood Units): 12.85  Outputs:  -- RESISTANCES: Pulmonary vascular resistance (dsc): 547.08  Outputs:  -- RESISTANCES: Pulmonary vascular resistance (Wood Units): 6.84  Outputs:  -- RESISTANCES: PVR_SVR Ratio: 0.33  Outputs:  -- RESISTANCES: Right ventricular stroke work: 37.96  Outputs:  -- RESISTANCES: Right ventricular stroke work index:20.21  Outputs:  -- RESISTANCES: Systemic vascular index (dsc): 3117.26  Outputs:  -- RESISTANCES: Systemic vascular index (Wood Units): 38.98  Outputs:  -- RESISTANCES: Systemic vascular resistance (dsc): 1659.49  Outputs:  -- RESISTANCES: Systemic vascular resistance (Wood Units): 20.75  Outputs:  -- RESISTANCES: Total pulmonary index (dsc): 1541.50  Outputs:  -- RESISTANCES: Total pulmonary index (Wood Units): 19.27  Outputs:  -- RESISTANCES: Total pulmonary resistance (dsc): 820.63  Outputs:-- RESISTANCES: Total pulmonary resistance (Wood Units): 10.26  Outputs:  -- RESISTANCES: Total vascular index (Wood Units): 43.26  Outputs:  -- RESISTANCES: Total vascular resistance (dsc): 1841.85  Outputs:  -- RESISTANCES: Total vascular resistance (Wood Units): 23.03  Outputs:  -- RESISTANCES: Total vascular resistance index (dsc): 3459.82  Outputs:  -- RESISTANCES: TPR_TVR Ratio: 0.45  Outputs:  -- SHUNTS: Pulmonary flow: 4.39  Outputs:  -- SHUNTS: Qp Indexed: 2.33  Outputs:  -- SHUNTS: Qs Indexed: 2.33  Outputs:  -- SHUNTS: Systemic flow: 4.39    < end of copied text >

## 2019-05-14 NOTE — PROGRESS NOTE ADULT - ASSESSMENT
63F w/ CKD3, HFpEF, and colon CA s/p left hemicolectomy, 5/10/19 a/w BRBPR    (1)Renal - CKD3; Superimposed OSMAR from ATN from contrast nephropathy s/p CT I+ last week.  Creatinine is improving   (2) Metabolic acidosis - mild   (3)Anemia - due to rectal bleeding - in setting of recent colonic resection for cancer. Surgery on board.    PLAN  (1)Lasix as ordered.  I suspect can change to PO   (2)Serial CBC  (3)Dose new meds for GFR 20-30ml/min  (4)Surgery f/u 63F w/ CKD3, HFpEF, and colon CA s/p left hemicolectomy, 5/10/19 a/w BRBPR    (1)Renal - CKD3; Superimposed OSMAR from ATN from contrast nephropathy s/p CT I+ last week.  Creatinine is improving   (2) Metabolic acidosis - mild   (3)Anemia - due to rectal bleeding - in setting of recent colonic resection for cancer. Surgery on board.    PLAN  (1)Lasix as ordered.  I suspect can change to PO.  Seems euvolemic  (2)Serial CBC.  I would consider another unit of blood xfusion   (3)Dose new meds for GFR 20-30ml/min  (4)Surgery f/u noted and awaiting GI as well.

## 2019-05-14 NOTE — CONSULT NOTE ADULT - PROBLEM SELECTOR RECOMMENDATION 4
Rapid a.fib 140s episodes.   Increased Toprol to 75 mg po BID.   Held Digoxin as pt in OSMAR.   Will check a dig level in AM.  Eliquis on hold? per primary team for anemia.

## 2019-05-14 NOTE — CONSULT NOTE ADULT - PROBLEM SELECTOR RECOMMENDATION 9
Mild-moderately hypervolemic.   Agree to continue Lasix 40 mg IV qd, renal function improving.   As d/w Dr. Degroot, increased Toprol to 75 mg po BID (rapid a.fib)

## 2019-05-14 NOTE — PROGRESS NOTE ADULT - SUBJECTIVE AND OBJECTIVE BOX
Patient seen and evaluated today.  No significant events overnight.  Reports CHAVEZ, however ROS is negative for dizziness, palpitations, or syncope.  Telemetry review demonstrates Afib, with V rates: 94 (05-14-19 @ 11:54) (76 - 94); V rates 140-160s on exertion overnight and earlier today; patient denies palpitations that would correlate with rapid rates.  Reportedly, no further bloody stools.    The patient reports a diagnosis of Afib x 5 years, yet no attempt at rhythm control with antiarrhythmics or a cardioversion.  She has had good rate control with Metoprolol 100 BID + 25mg QHS and Digoxin until recently in Mar 2019 when she developed the flu, reportedly.  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  She states that she has not been referred to oncology and/or for chemotherapeutic agents at this time. She also reports that this is her first major bleeding event.    MEDICATIONS:  acetaminophen   Tablet .. 650 milliGRAM(s) Oral every 6 hours PRN  ALBUTerol    90 MICROgram(s) HFA Inhaler 2 Puff(s) Inhalation every 6 hours PRN  allopurinol 300 milliGRAM(s) Oral daily  buDESOnide  80 MICROgram(s)/formoterol 4.5 MICROgram(s) Inhaler 2 Puff(s) Inhalation two times a day  calcium carbonate    500 mG (Tums) Chewable 1 Tablet(s) Chew two times a day  digoxin     Tablet 0.125 milliGRAM(s) Oral daily  furosemide   Injectable 40 milliGRAM(s) IV Push daily  glucagon  Injectable 1 milliGRAM(s) IntraMuscular once PRN  insulin lispro (HumaLOG) corrective regimen sliding scale   SubCutaneous three times a day before meals  insulin lispro (HumaLOG) corrective regimen sliding scale   SubCutaneous at bedtime  metoprolol succinate ER 50 milliGRAM(s) Oral two times a day  multivitamin 1 Tablet(s) Oral daily  sildenafil (REVATIO) 20 milliGRAM(s) Oral three times a day    REVIEW OF SYSTEMS:  CONSTITUTIONAL: No fever, weight loss, or fatigue  NECK: No pain or stiffness  RESPIRATORY: No cough, wheezing, chills or hemoptysis; No Shortness of Breath; +CHAVEZ  CARDIOVASCULAR: No chest pain, palpitations, passing out, dizziness, or leg swelling  GASTROINTESTINAL: No abdominal or epigastric pain. No nausea, vomiting, or hematemesis; No diarrhea or constipation. No melena or hematochezia.  NEUROLOGICAL: No headaches, memory loss, loss of strength, numbness, or tremors  SKIN: No itching, burning, rashes, or lesions   PSYCHIATRIC: No depression, anxiety, mood swings, or difficulty sleeping  HEME/LYMPH: No easy bruising, or bleeding gums  ALLERGY AND IMMUNOLOGIC: No hives or eczema	  All others negative  	  PHYSICAL EXAM:  T(C): 36.6 (05-14-19 @ 11:54), Max: 36.7 (05-13-19 @ 21:00)  HR: 94 (05-14-19 @ 11:54) (76 - 94)  BP: 101/59 (05-14-19 @ 11:54) (101/59 - 121/80)  RR: 18 (05-14-19 @ 11:54) (18 - 18)  SpO2: 94% (05-14-19 @ 11:54) (94% - 96%)  Wt(kg): --  I&O's Summary    13 May 2019 07:01  -  14 May 2019 07:00  --------------------------------------------------------  IN: 840 mL / OUT: 1700 mL / NET: -860 mL    14 May 2019 07:01  -  14 May 2019 13:48  --------------------------------------------------------  IN: 0 mL / OUT: 400 mL / NET: -400 mL       Appearance: Normal	  HEENT:   Normal oral mucosa, PERRL, EOMI	  Lymphatic: No lymphadenopathy  Cardiovascular: Normal S1 S2, No JVD, No murmurs, No edema  Respiratory: Lungs clear to auscultation	  Psychiatry: A & O x 3, Mood & affect appropriate  Gastrointestinal:  Soft, Non-tender, + BS	; obese  Skin: No rashes, No ecchymoses, No cyanosis	  Neurologic: Non-focal  Extremities: Normal range of motion, No clubbing, cyanosis or edema  Vascular: Peripheral pulses palpable 2+ bilaterally    DIAGNOSTICS:  ECHO:  < from: Transthoracic Echocardiogram (04.01.19 @ 18:26) >  Patient name: KARINA SCHNEIDER  YOB: 1955   Age: 63 (F)   MR#: [id number]  Study Date: 4/1/2019  Location: [facility] DropSonographer: Lauren Finkelstein,  [facility]  Study quality: Technically good  Referring Physician: Manuel Palafox MD  Blood Pressure: 115/70 mmHg  Height: 162 cm  Weight: 82 kg  BSA: 1.9 m2  Heart Rate: 74 mmHg  ------------------------------------------------------------------------  PROCEDURE: Transthoracic echocardiogram with 2-D, M-Mode  and complete spectral and color flow Doppler.  INDICATION: Heart failure, unspecified (I50.9)  ------------------------------------------------------------------------  DIMENSIONS:  Dimensions:     Normal Values:  LA:     3.7 cm    2.0 - 4.0 cm  Ao:     2.9 cm    2.0 -3.8 cm  SEPTUM: 0.9 cm    0.6 - 1.2 cm  PWT:    0.9 cm    0.6 - 1.1 cm  LVIDd:  4.5 cm    3.0 - 5.6 cm  LVIDs:  2.7 cm    1.8 - 4.0 cm  Derived Variables:  LVMI: 71 g/m2  RWT: 0.40  Fractional short: 40 %  Ejection Fraction (Visual Estimate): >70 %  Peak Velocity (m/sec): AoV=1.4    < end of copied text >  < from: Transthoracic Echocardiogram (04.01.19 @ 18:26) >  CONCLUSIONS:  1. Mitral annular calcification and calcified mitral  leaflets with normal diastolic opening. Mild mitral  regurgitation.  2. Calcified trileaflet aortic valve with normal opening.  3. Normal left ventricular internal dimensions and wall  thicknesses.  4. Hyperdynamic left ventricle. Flattening of the  interventricular septum in both systole and diastole is  consistent with right ventricular pressure overload.  5. Severe right atrial enlargement.  6. Right ventricular enlargement with normal right  ventricular systolic function.  7. Normal tricuspid valve. Moderate-severe tricuspid  regurgitation.  8. Estimated pulmonary artery systolic pressure equals 79  mm Hg, assuming right atrial pressure equals 5  mm Hg,  consistent with severe pulmonary hypertension.  *** Compared with echocardiogram of 10/31/2017, no  significant changes noted.  ------------------------------------------------------------------------  Confirmed on  4/2/2019 - 08:27:07 by Shavon Sands M.D.    < end of copied text >    TELEMETRY: 	  as above  LABS:	 	                          7.6    7.79  )-----------( 309      ( 14 May 2019 05:30 )             24.8     05-14    141  |  106  |  50<H>  ----------------------------<  89  4.4   |  21<L>  |  2.53<H>    Ca    9.3      14 May 2019 05:30  Phos  3.0     05-14  Mg     1.6     05-14    TPro  6.9  /  Alb  3.7  /  TBili  0.5  /  DBili  x   /  AST  16  /  ALT  15  /  AlkPhos  110  05-13    proBNP: Serum Pro-Brain Natriuretic Peptide: 54204 pg/mL (05-13 @ 06:25)  Serum Pro-Brain Natriuretic Peptide: 83890 pg/mL (05-10 @ 21:10)  Serum Pro-Brain Natriuretic Peptide: 6490 pg/mL (03-31 @ 21:50)

## 2019-05-14 NOTE — PROGRESS NOTE ADULT - SUBJECTIVE AND OBJECTIVE BOX
Patient is a 63y old  Female who presents with a chief complaint of GIB (13 May 2019 11:29)      SUBJECTIVE / OVERNIGHT EVENTS:  No SOB/ orthopnea  Review of Systems:   CONSTITUTIONAL: No fever, weight loss, +fatigue  EYES: No eye pain, visual disturbances, or discharge  ENMT:  No difficulty hearing, tinnitus, vertigo; No sinus or throat pain  NECK: No pain or stiffness  BREASTS: No pain, masses, or nipple discharge  RESPIRATORY: No cough, wheezing, chills or hemoptysis; No shortness of breath  CARDIOVASCULAR: No chest pain, palpitations, dizziness, or leg swelling  GASTROINTESTINAL: No abdominal or epigastric pain. No nausea, vomiting, or hematemesis; No diarrhea or constipation. No melena or hematochezia.  GENITOURINARY: No dysuria, frequency, hematuria, or incontinence  NEUROLOGICAL: No headaches, memory loss, loss of strength, numbness, or tremors  SKIN: No itching, burning, rashes, or lesions   LYMPH NODES: No enlarged glands  ENDOCRINE: No heat or cold intolerance; No hair loss  MUSCULOSKELETAL: No joint pain or swelling; No muscle, back, or extremity pain  PSYCHIATRIC: No depression, anxiety, mood swings, or difficulty sleeping  HEME/LYMPH: No easy bruising, or bleeding gums  ALLERY AND IMMUNOLOGIC: No hives or eczema    MEDICATIONS  (STANDING):  allopurinol 300 milliGRAM(s) Oral daily  buDESOnide  80 MICROgram(s)/formoterol 4.5 MICROgram(s) Inhaler 2 Puff(s) Inhalation two times a day  calcium carbonate    500 mG (Tums) Chewable 1 Tablet(s) Chew two times a day  dextrose 5%. 1000 milliLiter(s) (50 mL/Hr) IV Continuous <Continuous>  dextrose 50% Injectable 12.5 Gram(s) IV Push once  dextrose 50% Injectable 25 Gram(s) IV Push once  dextrose 50% Injectable 25 Gram(s) IV Push once  furosemide   Injectable 40 milliGRAM(s) IV Push daily  insulin lispro (HumaLOG) corrective regimen sliding scale   SubCutaneous three times a day before meals  insulin lispro (HumaLOG) corrective regimen sliding scale   SubCutaneous at bedtime  metoprolol succinate ER 75 milliGRAM(s) Oral two times a day  multivitamin 1 Tablet(s) Oral daily  sildenafil (REVATIO) 20 milliGRAM(s) Oral three times a day    MEDICATIONS  (PRN):  acetaminophen   Tablet .. 650 milliGRAM(s) Oral every 6 hours PRN Mild Pain (1 - 3), Moderate Pain (4 - 6)  ALBUTerol    90 MICROgram(s) HFA Inhaler 2 Puff(s) Inhalation every 6 hours PRN Shortness of Breath and/or Wheezing  dextrose 40% Gel 15 Gram(s) Oral once PRN Blood Glucose LESS THAN 70 milliGRAM(s)/deciliter  glucagon  Injectable 1 milliGRAM(s) IntraMuscular once PRN Glucose LESS THAN 70 milligrams/deciliter      PHYSICAL EXAM:  Vital Signs Last 24 Hrs  T(C): 36.6 (14 May 2019 11:54), Max: 36.7 (13 May 2019 21:00)  T(F): 97.8 (14 May 2019 11:54), Max: 98.1 (13 May 2019 21:00)  HR: 73 (14 May 2019 17:45) (73 - 94)  BP: 120/76 (14 May 2019 17:45) (101/59 - 121/80)  BP(mean): --  RR: 18 (14 May 2019 15:42) (18 - 18)  SpO2: 94% (14 May 2019 11:54) (94% - 96%)  I&O's Summary    13 May 2019 07:01  -  14 May 2019 07:00  --------------------------------------------------------  IN: 840 mL / OUT: 1700 mL / NET: -860 mL    14 May 2019 07:01  -  14 May 2019 19:20  --------------------------------------------------------  IN: 0 mL / OUT: 400 mL / NET: -400 mL      GENERAL: NAD, well-developed  HEAD:  Atraumatic, Normocephalic  EYES: EOMI, PERRLA, conjunctiva and sclera clear  NECK: Supple, No JVD  CHEST/LUNG: Clear to auscultation bilaterally; No wheeze  HEART: Regular rate and rhythm; No murmurs, rubs, or gallops  ABDOMEN: Soft, Nontender, Nondistended; Bowel sounds present  EXTREMITIES:  2+ Peripheral Pulses, No clubbing, cyanosis, or edema  PSYCH: AAOx3  NEUROLOGY: non-focal  SKIN: No rashes or lesions    LABS:  CAPILLARY BLOOD GLUCOSE      POCT Blood Glucose.: 144 mg/dL (14 May 2019 17:34)  POCT Blood Glucose.: 140 mg/dL (14 May 2019 12:51)  POCT Blood Glucose.: 119 mg/dL (14 May 2019 08:36)  POCT Blood Glucose.: 96 mg/dL (13 May 2019 21:41)                          7.6    7.79  )-----------( 309      ( 14 May 2019 05:30 )             24.8     05-14    141  |  106  |  50<H>  ----------------------------<  89  4.4   |  21<L>  |  2.53<H>    Ca    9.3      14 May 2019 05:30  Phos  3.0     05-14  Mg     1.6     05-14    TPro  6.9  /  Alb  3.7  /  TBili  0.5  /  DBili  x   /  AST  16  /  ALT  15  /  AlkPhos  110  05-13              RADIOLOGY & ADDITIONAL TESTS:    Imaging Personally Reviewed:    Consultant(s) Notes Reviewed:      Care Discussed with Consultants/Other Providers:

## 2019-05-15 LAB
ANION GAP SERPL CALC-SCNC: 12 MMO/L — SIGNIFICANT CHANGE UP (ref 7–14)
APTT BLD: 29.5 SEC — SIGNIFICANT CHANGE UP (ref 27.5–36.3)
BASOPHILS # BLD AUTO: 0.03 K/UL — SIGNIFICANT CHANGE UP (ref 0–0.2)
BASOPHILS NFR BLD AUTO: 0.4 % — SIGNIFICANT CHANGE UP (ref 0–2)
BLD GP AB SCN SERPL QL: NEGATIVE — SIGNIFICANT CHANGE UP
BUN SERPL-MCNC: 41 MG/DL — HIGH (ref 7–23)
CALCIUM SERPL-MCNC: 9.4 MG/DL — SIGNIFICANT CHANGE UP (ref 8.4–10.5)
CHLORIDE SERPL-SCNC: 112 MMOL/L — HIGH (ref 98–107)
CO2 SERPL-SCNC: 23 MMOL/L — SIGNIFICANT CHANGE UP (ref 22–31)
CREAT SERPL-MCNC: 2.27 MG/DL — HIGH (ref 0.5–1.3)
DIGOXIN SERPL-MCNC: 1 NG/ML — SIGNIFICANT CHANGE UP (ref 0.8–2)
EOSINOPHIL # BLD AUTO: 0.37 K/UL — SIGNIFICANT CHANGE UP (ref 0–0.5)
EOSINOPHIL NFR BLD AUTO: 4.4 % — SIGNIFICANT CHANGE UP (ref 0–6)
GLUCOSE SERPL-MCNC: 103 MG/DL — HIGH (ref 70–99)
HCT VFR BLD CALC: 24.2 % — LOW (ref 34.5–45)
HCT VFR BLD CALC: 26.1 % — LOW (ref 34.5–45)
HGB BLD-MCNC: 7.4 G/DL — LOW (ref 11.5–15.5)
HGB BLD-MCNC: 8 G/DL — LOW (ref 11.5–15.5)
IMM GRANULOCYTES NFR BLD AUTO: 0.5 % — SIGNIFICANT CHANGE UP (ref 0–1.5)
INR BLD: 1.29 — HIGH (ref 0.88–1.17)
LYMPHOCYTES # BLD AUTO: 1.38 K/UL — SIGNIFICANT CHANGE UP (ref 1–3.3)
LYMPHOCYTES # BLD AUTO: 16.3 % — SIGNIFICANT CHANGE UP (ref 13–44)
MAGNESIUM SERPL-MCNC: 1.6 MG/DL — SIGNIFICANT CHANGE UP (ref 1.6–2.6)
MCHC RBC-ENTMCNC: 21.1 PG — LOW (ref 27–34)
MCHC RBC-ENTMCNC: 21.7 PG — LOW (ref 27–34)
MCHC RBC-ENTMCNC: 30.6 % — LOW (ref 32–36)
MCHC RBC-ENTMCNC: 30.7 % — LOW (ref 32–36)
MCV RBC AUTO: 69.1 FL — LOW (ref 80–100)
MCV RBC AUTO: 70.7 FL — LOW (ref 80–100)
MONOCYTES # BLD AUTO: 0.87 K/UL — SIGNIFICANT CHANGE UP (ref 0–0.9)
MONOCYTES NFR BLD AUTO: 10.2 % — SIGNIFICANT CHANGE UP (ref 2–14)
NEUTROPHILS # BLD AUTO: 5.8 K/UL — SIGNIFICANT CHANGE UP (ref 1.8–7.4)
NEUTROPHILS NFR BLD AUTO: 68.2 % — SIGNIFICANT CHANGE UP (ref 43–77)
NRBC # FLD: 0.17 K/UL — SIGNIFICANT CHANGE UP (ref 0–0)
NRBC # FLD: 0.2 K/UL — SIGNIFICANT CHANGE UP (ref 0–0)
NRBC FLD-RTO: 2 — SIGNIFICANT CHANGE UP
NRBC FLD-RTO: 2.2 — SIGNIFICANT CHANGE UP
PLATELET # BLD AUTO: 301 K/UL — SIGNIFICANT CHANGE UP (ref 150–400)
PLATELET # BLD AUTO: 322 K/UL — SIGNIFICANT CHANGE UP (ref 150–400)
PMV BLD: 10.4 FL — SIGNIFICANT CHANGE UP (ref 7–13)
PMV BLD: 11.5 FL — SIGNIFICANT CHANGE UP (ref 7–13)
POTASSIUM SERPL-MCNC: 4.5 MMOL/L — SIGNIFICANT CHANGE UP (ref 3.5–5.3)
POTASSIUM SERPL-SCNC: 4.5 MMOL/L — SIGNIFICANT CHANGE UP (ref 3.5–5.3)
PROTHROM AB SERPL-ACNC: 14.8 SEC — HIGH (ref 9.8–13.1)
RBC # BLD: 3.5 M/UL — LOW (ref 3.8–5.2)
RBC # BLD: 3.69 M/UL — LOW (ref 3.8–5.2)
RBC # FLD: 22.8 % — HIGH (ref 10.3–14.5)
RBC # FLD: 24.1 % — HIGH (ref 10.3–14.5)
RH IG SCN BLD-IMP: POSITIVE — SIGNIFICANT CHANGE UP
SODIUM SERPL-SCNC: 147 MMOL/L — HIGH (ref 135–145)
WBC # BLD: 8.49 K/UL — SIGNIFICANT CHANGE UP (ref 3.8–10.5)
WBC # BLD: 9.29 K/UL — SIGNIFICANT CHANGE UP (ref 3.8–10.5)
WBC # FLD AUTO: 8.49 K/UL — SIGNIFICANT CHANGE UP (ref 3.8–10.5)
WBC # FLD AUTO: 9.29 K/UL — SIGNIFICANT CHANGE UP (ref 3.8–10.5)

## 2019-05-15 PROCEDURE — 99232 SBSQ HOSP IP/OBS MODERATE 35: CPT

## 2019-05-15 RX ORDER — DIPHENHYDRAMINE HCL 50 MG
25 CAPSULE ORAL EVERY 4 HOURS
Refills: 0 | Status: DISCONTINUED | OUTPATIENT
Start: 2019-05-15 | End: 2019-05-15

## 2019-05-15 RX ORDER — DIGOXIN 250 MCG
0.12 TABLET ORAL DAILY
Refills: 0 | Status: DISCONTINUED | OUTPATIENT
Start: 2019-05-15 | End: 2019-05-18

## 2019-05-15 RX ORDER — ACETAMINOPHEN 500 MG
650 TABLET ORAL ONCE
Refills: 0 | Status: COMPLETED | OUTPATIENT
Start: 2019-05-15 | End: 2019-05-15

## 2019-05-15 RX ORDER — DIPHENHYDRAMINE HCL 50 MG
25 CAPSULE ORAL ONCE
Refills: 0 | Status: COMPLETED | OUTPATIENT
Start: 2019-05-15 | End: 2019-05-15

## 2019-05-15 RX ADMIN — Medication 1 TABLET(S): at 12:40

## 2019-05-15 RX ADMIN — Medication 1 TABLET(S): at 05:56

## 2019-05-15 RX ADMIN — Medication 650 MILLIGRAM(S): at 08:17

## 2019-05-15 RX ADMIN — Medication 25 MILLIGRAM(S): at 08:17

## 2019-05-15 RX ADMIN — Medication 75 MILLIGRAM(S): at 18:11

## 2019-05-15 RX ADMIN — Medication 20 MILLIGRAM(S): at 08:33

## 2019-05-15 RX ADMIN — Medication 20 MILLIGRAM(S): at 00:28

## 2019-05-15 RX ADMIN — BUDESONIDE AND FORMOTEROL FUMARATE DIHYDRATE 2 PUFF(S): 160; 4.5 AEROSOL RESPIRATORY (INHALATION) at 21:05

## 2019-05-15 RX ADMIN — Medication 75 MILLIGRAM(S): at 05:59

## 2019-05-15 RX ADMIN — Medication 0.12 MILLIGRAM(S): at 18:11

## 2019-05-15 RX ADMIN — Medication 300 MILLIGRAM(S): at 12:40

## 2019-05-15 RX ADMIN — Medication 20 MILLIGRAM(S): at 17:05

## 2019-05-15 RX ADMIN — Medication 40 MILLIGRAM(S): at 05:56

## 2019-05-15 RX ADMIN — BUDESONIDE AND FORMOTEROL FUMARATE DIHYDRATE 2 PUFF(S): 160; 4.5 AEROSOL RESPIRATORY (INHALATION) at 08:17

## 2019-05-15 RX ADMIN — Medication 1 TABLET(S): at 18:11

## 2019-05-15 NOTE — PROGRESS NOTE ADULT - SUBJECTIVE AND OBJECTIVE BOX
Patient seen and examined in bed. +weakness. No pain, no SOB.      MEDICATIONS  (STANDING):  allopurinol 300 milliGRAM(s) Oral daily  buDESOnide  80 MICROgram(s)/formoterol 4.5 MICROgram(s) Inhaler 2 Puff(s) Inhalation two times a day  calcium carbonate    500 mG (Tums) Chewable 1 Tablet(s) Chew two times a day  dextrose 5%. 1000 milliLiter(s) (50 mL/Hr) IV Continuous <Continuous>  dextrose 50% Injectable 12.5 Gram(s) IV Push once  dextrose 50% Injectable 25 Gram(s) IV Push once  dextrose 50% Injectable 25 Gram(s) IV Push once  digoxin     Tablet 0.125 milliGRAM(s) Oral daily  furosemide   Injectable 40 milliGRAM(s) IV Push daily  insulin lispro (HumaLOG) corrective regimen sliding scale   SubCutaneous three times a day before meals  insulin lispro (HumaLOG) corrective regimen sliding scale   SubCutaneous at bedtime  metoprolol succinate ER 75 milliGRAM(s) Oral two times a day  multivitamin 1 Tablet(s) Oral daily  sildenafil (REVATIO) 20 milliGRAM(s) Oral three times a day      VITAL:  T(C): , Max: 37.1 (05-14-19 @ 20:35)  T(F): , Max: 98.7 (05-14-19 @ 20:35)  HR: 82 (05-15-19 @ 12:29)  BP: 103/72 (05-15-19 @ 12:29)  RR: 17 (05-15-19 @ 12:29)  SpO2: 100% (05-15-19 @ 12:29)    I and O's:    05-14 @ 07:01  -  05-15 @ 07:00  --------------------------------------------------------  IN: 100 mL / OUT: 1200 mL / NET: -1100 mL          PHYSICAL EXAM:    Constitutional: NAD  Neck:  No JVD  Respiratory: CTAB/L  Cardiovascular: S1 and S2  Gastrointestinal: BS+, soft, NT/ND  Extremities: No peripheral edema  Neurological: A/O x 3, no focal deficits  Psychiatric: Normal mood, normal affect  : No Corey  Skin: No rashes    LABS:                        7.4    8.49  )-----------( 322      ( 15 May 2019 03:00 )             24.2     05-15    147<H>  |  112<H>  |  41<H>  ----------------------------<  103<H>  4.5   |  23  |  2.27<H>    Ca    9.4      15 May 2019 03:00  Phos  3.0     05-14  Mg     1.6     05-15      ASSESSMENT:  63F w/ CKD3, HFpEF, and colon CA s/p left hemicolectomy, 5/10/19 a/w BRBPR  (1)Renal - CKD3; Superimposed OSMAR from ATN from contrast nephropathy s/p CT I+ last week. Creatinine is improving.  (2)Metabolic acidosis - mild - improved  (3)Anemia - due to rectal bleeding - in setting of recent colonic resection for cancer. Surgery on board - s/p one unit PRBC today  (4)Hypernatremia - due to poor intake of free water - mild    PLAN:  (1)Lasix as ordered.  Suspect can change to PO tomorrow?  (2)A/W one unit PRBC today; Serial CBC  (3)Dose new meds for GFR 25-30ml/min  (4)BMP daily      Suzan Warren NP-C  [facility]  [phone] Patient seen and examined in bed. +weakness. No pain, no SOB.      MEDICATIONS  (STANDING):  allopurinol 300 milliGRAM(s) Oral daily  buDESOnide  80 MICROgram(s)/formoterol 4.5 MICROgram(s) Inhaler 2 Puff(s) Inhalation two times a day  calcium carbonate    500 mG (Tums) Chewable 1 Tablet(s) Chew two times a day  dextrose 5%. 1000 milliLiter(s) (50 mL/Hr) IV Continuous <Continuous>  dextrose 50% Injectable 12.5 Gram(s) IV Push once  dextrose 50% Injectable 25 Gram(s) IV Push once  dextrose 50% Injectable 25 Gram(s) IV Push once  digoxin     Tablet 0.125 milliGRAM(s) Oral daily  furosemide   Injectable 40 milliGRAM(s) IV Push daily  insulin lispro (HumaLOG) corrective regimen sliding scale   SubCutaneous three times a day before meals  insulin lispro (HumaLOG) corrective regimen sliding scale   SubCutaneous at bedtime  metoprolol succinate ER 75 milliGRAM(s) Oral two times a day  multivitamin 1 Tablet(s) Oral daily  sildenafil (REVATIO) 20 milliGRAM(s) Oral three times a day      VITAL:  T(C): , Max: 37.1 (05-14-19 @ 20:35)  T(F): , Max: 98.7 (05-14-19 @ 20:35)  HR: 82 (05-15-19 @ 12:29)  BP: 103/72 (05-15-19 @ 12:29)  RR: 17 (05-15-19 @ 12:29)  SpO2: 100% (05-15-19 @ 12:29)    I and O's:    05-14 @ 07:01  -  05-15 @ 07:00  --------------------------------------------------------  IN: 100 mL / OUT: 1200 mL / NET: -1100 mL          PHYSICAL EXAM:    Constitutional: NAD  Neck:  No JVD  Respiratory: CTAB/L  Cardiovascular: S1 and S2  Gastrointestinal: BS+, soft, NT/ND  Extremities: No peripheral edema  Neurological: A/O x 3, no focal deficits  Psychiatric: Normal mood, normal affect  : No Corey  Skin: No rashes    LABS:                        7.4    8.49  )-----------( 322      ( 15 May 2019 03:00 )             24.2     05-15    147<H>  |  112<H>  |  41<H>  ----------------------------<  103<H>  4.5   |  23  |  2.27<H>    Ca    9.4      15 May 2019 03:00  Phos  3.0     05-14  Mg     1.6     05-15      ASSESSMENT:  63F w/ CKD3, HFpEF, and colon CA s/p left hemicolectomy, 5/10/19 a/w BRBPR  (1)Renal - CKD3; Superimposed OSMAR from ATN from contrast nephropathy s/p CT I+ last week. Creatinine is improving.  (2)Metabolic acidosis - mild - improved  (3)Anemia - due to rectal bleeding - in setting of recent colonic resection for cancer. Surgery on board - s/p one unit PRBC today  (4)Hypernatremia - due to poor intake of free water - mild    PLAN:  (1)Lasix as ordered.  Suspect can change to PO tomorrow?  (2)A/W one unit PRBC today; Serial CBC  (3)Dose new meds for GFR 25-30ml/min  (4)BMP daily      AMOR Corbett  [facility]  [phone]      *******************RENAL ATTENDING**********************  Seen/examined with NP. I agree with NP assessment as above.    VS as above; NAD; CTA-B/L; RRR; no edema b/l      ASSESSMENT:  63F w/ CKD3, HFpEF, and colon CA s/p left hemicolectomy, 5/10/19 a/w BRBPR  (1)Renal - CKD3; Superimposed OSMAR from ATN from contrast nephropathy s/p CT I+ last week. Creatinine is improving.  (2)Metabolic acidosis - mild - improved  (3)Anemia - due to rectal bleeding - in setting of recent colonic resection for cancer. Surgery on board - s/p one unit PRBC today  (4)Hypernatremia - due to poor intake of free water - mild    PLAN:  (1)Lasix as ordered  (2)PRBCs as ordered  (3)Dose new meds for GFR 25-30ml/min  (4)BMP daily  (5)Encourage increased free water intake            Orville Lima MD  [facility]  [phone]

## 2019-05-15 NOTE — PROGRESS NOTE ADULT - SUBJECTIVE AND OBJECTIVE BOX
Pt to be seen:  - discussed case with surgical team, left hemicolectomy was curative  - reviewed case with Dr. De Jesus  - therefore, we will ask GI for clearance to (1) resume systemic AC in order and if a (2) NICOLAS (eval for DAYANNA clot & assess DAYANNA size prior to Watchman) is safe in order to perform an AFib ablation with probable Watchman device in the future  - full note to follow Patient seen and evaluated today.  Receiving a blood transfusion.  Otherwise, feels generally well, although appears fatigued.  CHAVEZ persists; denies palpitations -- getting "tired of being in the hospital".  Telemetry review demonstrates AFib V rates better controlled on Toprol XL 75 BID this morning; HR: 83 (05-15-19 @ 09:25) (73 - 94); some rapid rates in Afib overnight 140-150s.    Discussed case with surgical resident this morning; reportedly, left hemicolectomy was curative.    MEDICATIONS:  acetaminophen   Tablet .. 650 milliGRAM(s) Oral every 6 hours PRN  ALBUTerol    90 MICROgram(s) HFA Inhaler 2 Puff(s) Inhalation every 6 hours PRN  allopurinol 300 milliGRAM(s) Oral daily  buDESOnide  80 MICROgram(s)/formoterol 4.5 MICROgram(s) Inhaler 2 Puff(s) Inhalation two times a day  calcium carbonate    500 mG (Tums) Chewable 1 Tablet(s) Chew two times a day  dextrose  furosemide   Injectable 40 milliGRAM(s) IV Push daily  glucagon  Injectable 1 milliGRAM(s) IntraMuscular once PRN  insulin lispro (HumaLOG) corrective regimen sliding scale   SubCutaneous three times a day before meals  insulin lispro (HumaLOG) corrective regimen sliding scale   SubCutaneous at bedtime  metoprolol succinate ER 75 milliGRAM(s) Oral two times a day  multivitamin 1 Tablet(s) Oral daily  sildenafil (REVATIO) 20 milliGRAM(s) Oral three times a day    REVIEW OF SYSTEMS:  CONSTITUTIONAL: No fever, weight loss, or fatigue  NECK: No pain or stiffness  RESPIRATORY: No cough, wheezing, chills or hemoptysis; +CHAVEZ  CARDIOVASCULAR: No chest pain, palpitations, passing out, dizziness, or leg swelling  GASTROINTESTINAL: No abdominal or epigastric pain. No nausea, vomiting, or hematemesis; No diarrhea or constipation. No melena or hematochezia.  NEUROLOGICAL: No headaches, memory loss, loss of strength, numbness, or tremors  SKIN: No itching, burning, rashes, or lesions   PSYCHIATRIC: No depression, anxiety, mood swings, or difficulty sleeping  HEME/LYMPH: No easy bruising, or bleeding gums  ALLERGY AND IMMUNOLOGIC: No hives or eczema	  All others negative  	  PHYSICAL EXAM:  T(C): 36.7 (05-15-19 @ 09:25), Max: 37.1 (05-14-19 @ 20:35)  HR: 83 (05-15-19 @ 09:25) (73 - 94)  BP: 107/70 (05-15-19 @ 09:25) (101/59 - 135/89)  RR: 17 (05-15-19 @ 09:25) (15 - 18)  SpO2: 100% (05-15-19 @ 09:25) (94% - 100%)  Wt(kg): --  I&O's Summary    14 May 2019 07:01  -  15 May 2019 07:00  --------------------------------------------------------  IN: 100 mL / OUT: 1200 mL / NET: -1100 mL    Appearance: Normal	  HEENT:   Normal oral mucosa, PERRL, EOMI	  Lymphatic: No lymphadenopathy  Cardiovascular: Normal S1 S2, No JVD, No murmurs, No edema; Irregularly irregular  Respiratory: Lungs clear to auscultation	  Psychiatry: A & O x 3, Mood & affect appropriate  Gastrointestinal:  Soft, Non-tender, + BS	  Skin: No rashes, No ecchymoses, No cyanosis	  Neurologic: Non-focal  Extremities: Normal range of motion, No clubbing, cyanosis or edema  Vascular: Peripheral pulses palpable 2+ bilaterally    DIAGNOSTICS:  TELEMETRY: 	  as above  LABS:	 	                          7.4    8.49  )-----------( 322      ( 15 May 2019 03:00 )             24.2     05-15    147<H>  |  112<H>  |  41<H>  ----------------------------<  103<H>  4.5   |  23  |  2.27<H>    Ca    9.4      15 May 2019 03:00  Phos  3.0     05-14  Mg     1.6     05-15      proBNP: Serum Pro-Brain Natriuretic Peptide: 42366 pg/mL (05-13 @ 06:25)  Serum Pro-Brain Natriuretic Peptide: 52093 pg/mL (05-10 @ 21:10)  Serum Pro-Brain Natriuretic Peptide: 6490 pg/mL (03-31 @ 21:50)

## 2019-05-15 NOTE — PROGRESS NOTE ADULT - ASSESSMENT
64 yo F with a PMH of severe PAH, AFib diagnosed 5 yrs ago on Eliquis, COPD, HTN, HLD, T2DM, LINH and  s/p left hemicolectomy for sigmoid adenocarcinoma on 04/24/2019 who presented to the ED on 05/10/2019 with a cc of BRBPR and Afib with RVR in the setting of anemia now with improved rate control on Toprol XL 75 BID; Dig d/c'd given elevated Scr; Eliquis remains on hold; CHA2 DS2 Vasc, 4:    - mild improvement in Scr; Digoxin level was apparently therapeutic  - continue beta blocker therapy and hold AC until clearance from GI  - when cleared from GI for AC & NICOLAS, will likely start Amiodarone and/or perform a NICOLAS/Afib ablation with an eye towards a Watchman device in the near future; discussed plan with the patient  - appreciate HF team, GI and surgery input  - discussed with EP attending, Dr. De Jesus 62 yo F with a PMH of severe PAH, AFib diagnosed 5 yrs ago on Eliquis, COPD, HTN, HLD, T2DM, LINH, Lymphoma/CLL s/p radiation/chemo (1997), and  s/p left hemicolectomy for sigmoid adenocarcinoma on 04/24/2019 who presented to the ED on 05/10/2019 with a cc of BRBPR and Afib with RVR in the setting of anemia now with improved rate control on Toprol XL 75 BID; Dig d/c'd given elevated Scr; Eliquis remains on hold; CHA2 DS2 Vasc, 4:    - mild improvement in Scr; Digoxin level was apparently therapeutic  - continue beta blocker therapy and hold AC until clearance from GI  - when cleared from GI for AC & NICOLAS, will likely start Amiodarone and/or perform a NICOLAS/Afib ablation with an eye towards a Watchman device in the near future; discussed plan with the patient  - appreciate HF team, GI and surgery input  - discussed with EP attending, Dr. De Jesus

## 2019-05-15 NOTE — PROGRESS NOTE ADULT - ASSESSMENT
pt s/p transfusion today  Followup H/H  Pulmonary status rel stable - continue O2, sildenafil as ordered  continue PRN albuterol

## 2019-05-15 NOTE — PROGRESS NOTE ADULT - SUBJECTIVE AND OBJECTIVE BOX
PULM/MED PROGRESS NOTE:      awake, alert comfortable.  No SOB, cough or wheezing currently      MEDICATIONS  (STANDING):  allopurinol 300 milliGRAM(s) Oral daily  buDESOnide  80 MICROgram(s)/formoterol 4.5 MICROgram(s) Inhaler 2 Puff(s) Inhalation two times a day  calcium carbonate    500 mG (Tums) Chewable 1 Tablet(s) Chew two times a day  dextrose 5%. 1000 milliLiter(s) (50 mL/Hr) IV Continuous <Continuous>  dextrose 50% Injectable 12.5 Gram(s) IV Push once  dextrose 50% Injectable 25 Gram(s) IV Push once  dextrose 50% Injectable 25 Gram(s) IV Push once  digoxin     Tablet 0.125 milliGRAM(s) Oral daily  furosemide   Injectable 40 milliGRAM(s) IV Push daily  insulin lispro (HumaLOG) corrective regimen sliding scale   SubCutaneous three times a day before meals  insulin lispro (HumaLOG) corrective regimen sliding scale   SubCutaneous at bedtime  metoprolol succinate ER 75 milliGRAM(s) Oral two times a day  multivitamin 1 Tablet(s) Oral daily  sildenafil (REVATIO) 20 milliGRAM(s) Oral three times a day      MEDICATIONS  (PRN):  acetaminophen   Tablet .. 650 milliGRAM(s) Oral every 6 hours PRN Mild Pain (1 - 3), Moderate Pain (4 - 6)  ALBUTerol    90 MICROgram(s) HFA Inhaler 2 Puff(s) Inhalation every 6 hours PRN Shortness of Breath and/or Wheezing  dextrose 40% Gel 15 Gram(s) Oral once PRN Blood Glucose LESS THAN 70 milliGRAM(s)/deciliter  glucagon  Injectable 1 milliGRAM(s) IntraMuscular once PRN Glucose LESS THAN 70 milligrams/deciliter          Vital Signs Last 24 Hrs  T(C): 36.6 (15 May 2019 12:29), Max: 37.1 (14 May 2019 20:35)  T(F): 97.8 (15 May 2019 12:29), Max: 98.7 (14 May 2019 20:35)  HR: 80 (15 May 2019 18:07) (75 - 88)  BP: 140/93 (15 May 2019 18:07) (100/62 - 140/93)  BP(mean): --  RR: 17 (15 May 2019 18:07) (15 - 18)  SpO2: 100% (15 May 2019 18:07) (100% - 100%)    PHYSICAL EXAMINATION:    GENERAL: The patient is awake and alert in no apparent distress.       LUNGS: Clear to auscultation without wheezing, rales or rhonchi; respirations unlabored    HEART: irreg irreg    ABDOMEN: Soft, nontender, BS+    EXTREMITIES: Without  edema.        LABS:                        8.0    9.29  )-----------( 301      ( 15 May 2019 16:30 )             26.1     05-15    147<H>  |  112<H>  |  41<H>  ----------------------------<  103<H>  4.5   |  23  |  2.27<H>    Ca    9.4      15 May 2019 03:00  Phos  3.0     05-14  Mg     1.6     05-15      PT/INR - ( 15 May 2019 03:00 )   PT: 14.8 SEC;   INR: 1.29          PTT - ( 15 May 2019 03:00 )  PTT:29.5 SEC            Serum Pro-Brain Natriuretic Peptide: 48129 pg/mL (05-13-19 @ 06:25)

## 2019-05-15 NOTE — PROGRESS NOTE ADULT - SUBJECTIVE AND OBJECTIVE BOX
Patient seen and examined. She was very tired today, states she did not get sleep last night due to activity in her room overnight.   Getting PRBCs today.   Not SOB. No CP.     Medications:  acetaminophen   Tablet .. 650 milliGRAM(s) Oral every 6 hours PRN  ALBUTerol    90 MICROgram(s) HFA Inhaler 2 Puff(s) Inhalation every 6 hours PRN  allopurinol 300 milliGRAM(s) Oral daily  buDESOnide  80 MICROgram(s)/formoterol 4.5 MICROgram(s) Inhaler 2 Puff(s) Inhalation two times a day  calcium carbonate    500 mG (Tums) Chewable 1 Tablet(s) Chew two times a day  dextrose 40% Gel 15 Gram(s) Oral once PRN  dextrose 5%. 1000 milliLiter(s) IV Continuous <Continuous>  dextrose 50% Injectable 12.5 Gram(s) IV Push once  dextrose 50% Injectable 25 Gram(s) IV Push once  dextrose 50% Injectable 25 Gram(s) IV Push once  furosemide   Injectable 40 milliGRAM(s) IV Push daily  glucagon  Injectable 1 milliGRAM(s) IntraMuscular once PRN  insulin lispro (HumaLOG) corrective regimen sliding scale   SubCutaneous three times a day before meals  insulin lispro (HumaLOG) corrective regimen sliding scale   SubCutaneous at bedtime  metoprolol succinate ER 75 milliGRAM(s) Oral two times a day  multivitamin 1 Tablet(s) Oral daily  sildenafil (REVATIO) 20 milliGRAM(s) Oral three times a day      Vitals:  Vital Signs Last 24 Hrs  T(C): 36.4 (15 May 2019 10:25), Max: 37.1 (14 May 2019 20:35)  T(F): 97.5 (15 May 2019 10:25), Max: 98.7 (14 May 2019 20:35)  HR: 87 (15 May 2019 10:25) (73 - 94)  BP: 100/62 (15 May 2019 10:25) (100/62 - 135/89)  BP(mean): --  RR: 17 (15 May 2019 10:25) (15 - 18)  SpO2: 100% (15 May 2019 10:25) (94% - 100%)    Daily     Daily Weight in k (15 May 2019 05:55)    I&O's Detail    14 May 2019 07:01  -  15 May 2019 07:00  --------------------------------------------------------  IN:    Oral Fluid: 100 mL  Total IN: 100 mL    OUT:    Voided: 1200 mL  Total OUT: 1200 mL    Total NET: -1100 mL          Physical Exam:     General: No distress. Comfortable.  HEENT: EOM intact.  Neck: Neck supple. JVP mild elevated. No masses  Chest: Clear to auscultation bilaterally  CV: S1 and S2 Irreguarly irregular. No murmurs, rub, or gallops. Radial pulses normal. Trace b/l LE edema b/l.   Abdomen: Soft, non-distended, non-tender  Skin: No rashes or skin breakdown  Neurology: Alert and oriented times three. Sensation intact  Psych: Affect normal    Labs:                        7.4    8.49  )-----------( 322      ( 15 May 2019 03:00 )             24.2     05-15    147<H>  |  112<H>  |  41<H>  ----------------------------<  103<H>  4.5   |  23  |  2.27<H>    Ca    9.4      15 May 2019 03:00  Phos  3.0     05-14  Mg     1.6     05-15      PT/INR - ( 15 May 2019 03:00 )   PT: 14.8 SEC;   INR: 1.29          PTT - ( 15 May 2019 03:00 )  PTT:29.5 SEC    < from: Transthoracic Echocardiogram (19 @ 18:26) >  DIMENSIONS:  Dimensions:     Normal Values:  LA:     3.7 cm    2.0 - 4.0 cm  Ao:     2.9 cm    2.0 -3.8 cm  SEPTUM: 0.9 cm    0.6 - 1.2 cm  PWT:    0.9 cm    0.6 - 1.1 cm  LVIDd:  4.5 cm    3.0 - 5.6 cm  LVIDs:  2.7 cm    1.8 - 4.0 cm  Derived Variables:  LVMI: 71 g/m2  RWT: 0.40  Fractional short: 40 %  Ejection Fraction (Visual Estimate): >70 %  Peak Velocity (m/sec): AoV=1.4  ------------------------------------------------------------------------  OBSERVATIONS:  Mitral Valve: Mitral annular calcification and calcified  mitral leaflets with normal diastolic opening. Mild mitral  regurgitation.  Aortic Root: Aortic Root: 2.9 cm.  Aortic Valve: Calcified trileaflet aortic valve with normal  opening. Peak transaortic valve gradient equals 7 mm Hg.  Peak left ventricular outflow tract gradient equals 4.8 mm  Hg.  Left Atrium: Mildly dilated left atrium.  LA volume index =  37 cc/m2.  Left Ventricle: Hyperdynamic left ventricle. Flattening of  the interventricular septum in both systole and diastole is   consistent with right ventricular pressure overload.  Normal left ventricular internal dimensions and wall  thicknesses. (DT:150 ms).  Right Heart: Severe right atrial enlargement. Right  ventricular enlargement with normal right ventricular  systolic function. Normal tricuspid valve. Moderate-severe  tricuspid regurgitation. Pulmonicvalve not well  visualized. Mild pulmonic regurgitation.  Pericardium/PleuraNormal pericardium with no pericardial  effusion.  Hemodynamic: Estimated right ventricular systolic pressure  equals 84 mm Hg, assuming right atrial pressure equals 10  mm Hg, consistent with severe pulmonary hypertension.  ------------------------------------------------------------------------    < end of copied text >

## 2019-05-15 NOTE — PROGRESS NOTE ADULT - SUBJECTIVE AND OBJECTIVE BOX
Patient is a 63y old  Female who presents with a chief complaint of GIB (13 May 2019 11:29)      SUBJECTIVE / OVERNIGHT EVENTS:  No SOB/ orthopnea. S/p 1 U PRBC  Review of Systems:   CONSTITUTIONAL: No fever, weight loss, +fatigue  EYES: No eye pain, visual disturbances, or discharge  ENMT:  No difficulty hearing, tinnitus, vertigo; No sinus or throat pain  NECK: No pain or stiffness  BREASTS: No pain, masses, or nipple discharge  RESPIRATORY: No cough, wheezing, chills or hemoptysis; No shortness of breath  CARDIOVASCULAR: No chest pain, palpitations, dizziness, or leg swelling  GASTROINTESTINAL: No abdominal or epigastric pain. No nausea, vomiting, or hematemesis; No diarrhea or constipation. No melena or hematochezia.  GENITOURINARY: No dysuria, frequency, hematuria, or incontinence  NEUROLOGICAL: No headaches, memory loss, loss of strength, numbness, or tremors  SKIN: No itching, burning, rashes, or lesions   LYMPH NODES: No enlarged glands  ENDOCRINE: No heat or cold intolerance; No hair loss  MUSCULOSKELETAL: No joint pain or swelling; No muscle, back, or extremity pain  PSYCHIATRIC: No depression, anxiety, mood swings, or difficulty sleeping  HEME/LYMPH: No easy bruising, or bleeding gums  ALLERY AND IMMUNOLOGIC: No hives or eczema    MEDICATIONS  (STANDING):  allopurinol 300 milliGRAM(s) Oral daily  buDESOnide  80 MICROgram(s)/formoterol 4.5 MICROgram(s) Inhaler 2 Puff(s) Inhalation two times a day  calcium carbonate    500 mG (Tums) Chewable 1 Tablet(s) Chew two times a day  dextrose 5%. 1000 milliLiter(s) (50 mL/Hr) IV Continuous <Continuous>  dextrose 50% Injectable 12.5 Gram(s) IV Push once  dextrose 50% Injectable 25 Gram(s) IV Push once  dextrose 50% Injectable 25 Gram(s) IV Push once  furosemide   Injectable 40 milliGRAM(s) IV Push daily  insulin lispro (HumaLOG) corrective regimen sliding scale   SubCutaneous three times a day before meals  insulin lispro (HumaLOG) corrective regimen sliding scale   SubCutaneous at bedtime  metoprolol succinate ER 75 milliGRAM(s) Oral two times a day  multivitamin 1 Tablet(s) Oral daily  sildenafil (REVATIO) 20 milliGRAM(s) Oral three times a day    MEDICATIONS  (PRN):  acetaminophen   Tablet .. 650 milliGRAM(s) Oral every 6 hours PRN Mild Pain (1 - 3), Moderate Pain (4 - 6)  ALBUTerol    90 MICROgram(s) HFA Inhaler 2 Puff(s) Inhalation every 6 hours PRN Shortness of Breath and/or Wheezing  dextrose 40% Gel 15 Gram(s) Oral once PRN Blood Glucose LESS THAN 70 milliGRAM(s)/deciliter  glucagon  Injectable 1 milliGRAM(s) IntraMuscular once PRN Glucose LESS THAN 70 milligrams/deciliter      PHYSICAL EXAM:  Vital Signs Last 24 Hrs  T(C): 36.6 (14 May 2019 11:54), Max: 36.7 (13 May 2019 21:00)  T(F): 97.8 (14 May 2019 11:54), Max: 98.1 (13 May 2019 21:00)  HR: 73 (14 May 2019 17:45) (73 - 94)  BP: 120/76 (14 May 2019 17:45) (101/59 - 121/80)  BP(mean): --  RR: 18 (14 May 2019 15:42) (18 - 18)  SpO2: 94% (14 May 2019 11:54) (94% - 96%)  I&O's Summary    13 May 2019 07:01  -  14 May 2019 07:00  --------------------------------------------------------  IN: 840 mL / OUT: 1700 mL / NET: -860 mL    14 May 2019 07:01  -  14 May 2019 19:20  --------------------------------------------------------  IN: 0 mL / OUT: 400 mL / NET: -400 mL      GENERAL: NAD, well-developed  HEAD:  Atraumatic, Normocephalic  EYES: EOMI, PERRLA, conjunctiva and sclera clear  NECK: Supple, No JVD  CHEST/LUNG: Clear to auscultation bilaterally; No wheeze  HEART: Regular rate and rhythm; No murmurs, rubs, or gallops  ABDOMEN: Soft, Nontender, Nondistended; Bowel sounds present  EXTREMITIES:  2+ Peripheral Pulses, No clubbing, cyanosis, or edema  PSYCH: AAOx3  NEUROLOGY: non-focal  SKIN: No rashes or lesions    LABS:  CAPILLARY BLOOD GLUCOSE      POCT Blood Glucose.: 144 mg/dL (14 May 2019 17:34)  POCT Blood Glucose.: 140 mg/dL (14 May 2019 12:51)  POCT Blood Glucose.: 119 mg/dL (14 May 2019 08:36)  POCT Blood Glucose.: 96 mg/dL (13 May 2019 21:41)                          7.6    7.79  )-----------( 309      ( 14 May 2019 05:30 )             24.8     05-14    141  |  106  |  50<H>  ----------------------------<  89  4.4   |  21<L>  |  2.53<H>    Ca    9.3      14 May 2019 05:30  Phos  3.0     05-14  Mg     1.6     05-14    TPro  6.9  /  Alb  3.7  /  TBili  0.5  /  DBili  x   /  AST  16  /  ALT  15  /  AlkPhos  110  05-13              RADIOLOGY & ADDITIONAL TESTS:    Imaging Personally Reviewed:    Consultant(s) Notes Reviewed:      Care Discussed with Consultants/Other Providers:

## 2019-05-15 NOTE — PROGRESS NOTE ADULT - ASSESSMENT
62 y/o female w/ PMHX  of Afib on Eliquis, DM II,  HTN, COPD on home O2 2L, lymphoma/CLL s/p radiation and chemo (1997), recent diagnosis of colon CA s/p resection (April 23, 2019), HFpEF LVEF 66%, severe pulmonary HTN (now on sildenafil), RV systolic dysfunction, mod-severe TR and  comes in with complaints of BRBPR x 1.  In ED patient found to be anemic hgb/hct 8.2/27, and was also found to be in OSMAR with BUN/Cr 73/3.88 (last cr 1.5). GI was consulted, patient was considered for sigmoidoscopy, but b/c patient with OSMAR and likely volume overload GI procedures were held per primary team. Patient was started on Lasix 40 mg IV qd (home diuretics were torsemide 20 mg daily) and she has been diuresing well. She admits to having worsening LE edema, and poor urine output s/p discharge to home on 5/3/19. She also admits to some CHAVEZ. No CP, palpitations, dizziness.

## 2019-05-16 ENCOUNTER — TRANSCRIPTION ENCOUNTER (OUTPATIENT)
Age: 64
End: 2019-05-16

## 2019-05-16 LAB
ANION GAP SERPL CALC-SCNC: 13 MMO/L — SIGNIFICANT CHANGE UP (ref 7–14)
APTT BLD: 30.7 SEC — SIGNIFICANT CHANGE UP (ref 27.5–36.3)
BUN SERPL-MCNC: 39 MG/DL — HIGH (ref 7–23)
CALCIUM SERPL-MCNC: 9.3 MG/DL — SIGNIFICANT CHANGE UP (ref 8.4–10.5)
CHLORIDE SERPL-SCNC: 108 MMOL/L — HIGH (ref 98–107)
CO2 SERPL-SCNC: 21 MMOL/L — LOW (ref 22–31)
CREAT SERPL-MCNC: 2.1 MG/DL — HIGH (ref 0.5–1.3)
DIGOXIN SERPL-MCNC: 1 NG/ML — SIGNIFICANT CHANGE UP (ref 0.8–2)
GLUCOSE SERPL-MCNC: 97 MG/DL — SIGNIFICANT CHANGE UP (ref 70–99)
HCT VFR BLD CALC: 25.9 % — LOW (ref 34.5–45)
HGB BLD-MCNC: 8.1 G/DL — LOW (ref 11.5–15.5)
INR BLD: 1.3 — HIGH (ref 0.88–1.17)
MAGNESIUM SERPL-MCNC: 1.5 MG/DL — LOW (ref 1.6–2.6)
MCHC RBC-ENTMCNC: 22 PG — LOW (ref 27–34)
MCHC RBC-ENTMCNC: 31.3 % — LOW (ref 32–36)
MCV RBC AUTO: 70.2 FL — LOW (ref 80–100)
NRBC # FLD: 0.16 K/UL — SIGNIFICANT CHANGE UP (ref 0–0)
NRBC FLD-RTO: 1.8 — SIGNIFICANT CHANGE UP
PHOSPHATE SERPL-MCNC: 2.6 MG/DL — SIGNIFICANT CHANGE UP (ref 2.5–4.5)
PLATELET # BLD AUTO: 267 K/UL — SIGNIFICANT CHANGE UP (ref 150–400)
PMV BLD: 10.3 FL — SIGNIFICANT CHANGE UP (ref 7–13)
POTASSIUM SERPL-MCNC: 4.2 MMOL/L — SIGNIFICANT CHANGE UP (ref 3.5–5.3)
POTASSIUM SERPL-SCNC: 4.2 MMOL/L — SIGNIFICANT CHANGE UP (ref 3.5–5.3)
PROTHROM AB SERPL-ACNC: 14.5 SEC — HIGH (ref 9.8–13.1)
RBC # BLD: 3.69 M/UL — LOW (ref 3.8–5.2)
RBC # FLD: 24.1 % — HIGH (ref 10.3–14.5)
SODIUM SERPL-SCNC: 142 MMOL/L — SIGNIFICANT CHANGE UP (ref 135–145)
WBC # BLD: 9.03 K/UL — SIGNIFICANT CHANGE UP (ref 3.8–10.5)
WBC # FLD AUTO: 9.03 K/UL — SIGNIFICANT CHANGE UP (ref 3.8–10.5)

## 2019-05-16 PROCEDURE — 99232 SBSQ HOSP IP/OBS MODERATE 35: CPT

## 2019-05-16 RX ORDER — APIXABAN 2.5 MG/1
5 TABLET, FILM COATED ORAL EVERY 12 HOURS
Refills: 0 | Status: DISCONTINUED | OUTPATIENT
Start: 2019-05-16 | End: 2019-05-18

## 2019-05-16 RX ORDER — MAGNESIUM OXIDE 400 MG ORAL TABLET 241.3 MG
400 TABLET ORAL
Refills: 0 | Status: COMPLETED | OUTPATIENT
Start: 2019-05-16 | End: 2019-05-17

## 2019-05-16 RX ADMIN — APIXABAN 5 MILLIGRAM(S): 2.5 TABLET, FILM COATED ORAL at 17:36

## 2019-05-16 RX ADMIN — Medication 1 TABLET(S): at 12:03

## 2019-05-16 RX ADMIN — Medication 1 TABLET(S): at 06:24

## 2019-05-16 RX ADMIN — Medication 0.12 MILLIGRAM(S): at 17:36

## 2019-05-16 RX ADMIN — Medication 20 MILLIGRAM(S): at 16:34

## 2019-05-16 RX ADMIN — MAGNESIUM OXIDE 400 MG ORAL TABLET 400 MILLIGRAM(S): 241.3 TABLET ORAL at 12:03

## 2019-05-16 RX ADMIN — Medication 20 MILLIGRAM(S): at 09:11

## 2019-05-16 RX ADMIN — Medication 1 TABLET(S): at 17:36

## 2019-05-16 RX ADMIN — Medication 300 MILLIGRAM(S): at 12:03

## 2019-05-16 RX ADMIN — Medication 75 MILLIGRAM(S): at 06:24

## 2019-05-16 RX ADMIN — Medication 40 MILLIGRAM(S): at 06:24

## 2019-05-16 RX ADMIN — BUDESONIDE AND FORMOTEROL FUMARATE DIHYDRATE 2 PUFF(S): 160; 4.5 AEROSOL RESPIRATORY (INHALATION) at 21:10

## 2019-05-16 RX ADMIN — MAGNESIUM OXIDE 400 MG ORAL TABLET 400 MILLIGRAM(S): 241.3 TABLET ORAL at 17:36

## 2019-05-16 RX ADMIN — Medication 20 MILLIGRAM(S): at 00:28

## 2019-05-16 RX ADMIN — BUDESONIDE AND FORMOTEROL FUMARATE DIHYDRATE 2 PUFF(S): 160; 4.5 AEROSOL RESPIRATORY (INHALATION) at 09:11

## 2019-05-16 RX ADMIN — Medication 75 MILLIGRAM(S): at 17:36

## 2019-05-16 NOTE — PROGRESS NOTE ADULT - SUBJECTIVE AND OBJECTIVE BOX
Patient is a 63y old  Female who presents with a chief complaint of rectal bleeding (15 May 2019 19:37)  Patient denies CP, SOB or palpitations.  No rectal bleeding.  s/p 1 unit PRBC yesterday.  Eliquis 5 mg BID ordered.  No EGD per surgery.     PAST MEDICAL & SURGICAL HISTORY:  Gout  Chronic systolic right heart failure  LINH (obstructive sleep apnea)  CLL (chronic lymphocytic leukemia)  Chronic diastolic congestive heart failure  COPD (chronic obstructive pulmonary disease)  Diabetes  HTN (hypertension)  Atrial fibrillation  No significant past surgical history      MEDICATIONS  (STANDING):  allopurinol 300 milliGRAM(s) Oral daily  apixaban 5 milliGRAM(s) Oral every 12 hours  buDESOnide  80 MICROgram(s)/formoterol 4.5 MICROgram(s) Inhaler 2 Puff(s) Inhalation two times a day  calcium carbonate    500 mG (Tums) Chewable 1 Tablet(s) Chew two times a day  dextrose 5%. 1000 milliLiter(s) (50 mL/Hr) IV Continuous <Continuous>  dextrose 50% Injectable 12.5 Gram(s) IV Push once  dextrose 50% Injectable 25 Gram(s) IV Push once  dextrose 50% Injectable 25 Gram(s) IV Push once  digoxin     Tablet 0.125 milliGRAM(s) Oral daily  furosemide   Injectable 40 milliGRAM(s) IV Push daily  insulin lispro (HumaLOG) corrective regimen sliding scale   SubCutaneous three times a day before meals  insulin lispro (HumaLOG) corrective regimen sliding scale   SubCutaneous at bedtime  magnesium oxide 400 milliGRAM(s) Oral three times a day with meals  metoprolol succinate ER 75 milliGRAM(s) Oral two times a day  multivitamin 1 Tablet(s) Oral daily  sildenafil (REVATIO) 20 milliGRAM(s) Oral three times a day    MEDICATIONS  (PRN):  acetaminophen   Tablet .. 650 milliGRAM(s) Oral every 6 hours PRN Mild Pain (1 - 3), Moderate Pain (4 - 6)  ALBUTerol    90 MICROgram(s) HFA Inhaler 2 Puff(s) Inhalation every 6 hours PRN Shortness of Breath and/or Wheezing  dextrose 40% Gel 15 Gram(s) Oral once PRN Blood Glucose LESS THAN 70 milliGRAM(s)/deciliter  glucagon  Injectable 1 milliGRAM(s) IntraMuscular once PRN Glucose LESS THAN 70 milligrams/deciliter            Vital Signs Last 24 Hrs  T(C): 36.7 (16 May 2019 06:22), Max: 37 (15 May 2019 21:04)  T(F): 98 (16 May 2019 06:22), Max: 98.6 (15 May 2019 21:04)  HR: 72 (16 May 2019 07:07) (72 - 85)  BP: 115/68 (16 May 2019 07:07) (115/68 - 140/93)  BP(mean): --  RR: 17 (16 May 2019 06:22) (16 - 17)  SpO2: 98% (16 May 2019 06:22) (98% - 100%)            INTERPRETATION OF TELEMETRY: AFib 70-80's with occasional 2 seconds pauses    ECG:        LABS:                        8.1    9.03  )-----------( 267      ( 16 May 2019 05:55 )             25.9     05-16    142  |  108<H>  |  39<H>  ----------------------------<  97  4.2   |  21<L>  |  2.10<H>    Ca    9.3      16 May 2019 05:55  Phos  2.6     05-16  Mg     1.5     05-16          PT/INR - ( 16 May 2019 05:55 )   PT: 14.5 SEC;   INR: 1.30          PTT - ( 16 May 2019 05:55 )  PTT:30.7 SEC      BNP  RADIOLOGY & ADDITIONAL STUDIES:      PHYSICAL EXAM:    GENERAL: In no apparent distress, well nourished, and hydrated.  NECK: Supple and normal thyroid.  No JVD or carotid bruit.  Carotid pulse is 2+ bilaterally.  HEART: S1S2 irregularly irregular ; No murmurs, rubs, or gallops.  PULMONARY: Clear to auscultation and perfusion.  No rales, wheezing, or rhonchi bilaterally.  ABDOMEN: Soft, Nontender, Nondistended; Bowel sounds present  EXTREMITIES:  2+ Peripheral Pulses, No clubbing, cyanosis, or edema  NEUROLOGICAL: Grossly nonfocal

## 2019-05-16 NOTE — DISCHARGE NOTE PROVIDER - HOSPITAL COURSE
64 y/o female with PMHx of sigmoid adeno ca s/p L hemicolectomy s/p SICU complicated by post op afib on eliquis and digoxin, COPD, HTN, HLD who presented with complaints of 4 episodes of  BRBPR x1 day. She is status post surgery on 4/24 with Dr. Hunt.         1. BRBPR: Patient is s/p recent hemicolectomy. Discussed with surgery and no acute surgical intervention. Anticoagulation was intially held secondary to bleed but was later restarted on 5/16.  Patient received one unit of PRBC during admission with appropriate response to HgB rise.     2. OSMAR: Seen by renal team during admission for superimposed OSMAR from ATN from contrast nephropathy s/p CT last week. Creatinine improved.     3. CHF: Seen by heart failure team who recommended IV Lasix course.     4. Chronic afib: Started on Eliquis and monitored for bleeding. Deemed to be poor candidate for NICOLAS/ablation or Watchman due to pulmonary issues.     5. Pulm HTN: On Sildenafil 20mg TID, which is not covered by insurance for this indication. 62 y/o female with PMHx of sigmoid adeno ca s/p L hemicolectomy s/p SICU complicated by post op afib on eliquis and digoxin, COPD, HTN, HLD who presented with complaints of 4 episodes of  BRBPR x1 day. She is status post surgery on 4/24 with Dr. Hunt.         1. BRBPR: Patient is s/p recent hemicolectomy. Discussed with surgery and no acute surgical intervention. Anticoagulation was intially held secondary to bleed but was later restarted on 5/16.  Patient received one unit of PRBC during admission with appropriate response to HgB rise.     2. OSMAR: Seen by renal team during admission for superimposed OSMAR from ATN from contrast nephropathy s/p CT last week. Creatinine improved.     3. CHF: Seen by heart failure team who recommended IV Lasix course.     4. Chronic afib: Started on Eliquis and monitored for bleeding. Deemed to be poor candidate for NICOLAS/ablation or Watchman due to pulmonary issues. May follow up as outpatient with EP team.     5. Pulm HTN: On Sildenafil 20mg TID, which is not covered by insurance for this indication.     6. Large Mass in Thyroid: Found to have large well marginated homogenous appearing mass within R thyroid lobe measuring 4.1 x 3.4 x 4.0 cm in size. 64 y/o female with PMHx of sigmoid adeno ca s/p L hemicolectomy s/p SICU complicated by post op afib on eliquis and digoxin, COPD, HTN, HLD who presented with complaints of 4 episodes of  BRBPR x1 day. She is status post surgery on 4/24 with Dr. Hunt.         1. BRBPR: Patient is s/p recent hemicolectomy. Discussed with surgery and no acute surgical intervention. Anticoagulation was intially held secondary to bleed but was later restarted on 5/16.  Patient received one unit of PRBC during admission with appropriate response to HgB rise.     2. OSMAR: Seen by renal team during admission for superimposed OSMAR from ATN from contrast nephropathy s/p CT last week. Creatinine improved.     3. CHF: Seen by heart failure team who recommended IV Lasix course. Transitioned to Torsemide PO daily.     4. Chronic afib: Started on Eliquis and monitored for bleeding. Deemed to be poor candidate for NICOLAS/ablation or Watchman due to pulmonary issues. May follow up as outpatient with EP team.     5. Pulm HTN: On Sildenafil 20mg TID, which is not covered by insurance for this indication.     6. Large Mass in Thyroid: Found to have large well marginated homogenous appearing mass within R thyroid lobe measuring 4.1 x 3.4 x 4.0 cm in size. Deemed to be stable enough to follow up as an outpatient.     7. T2 N0 colon cancer, s/p surgery: Seen by hematology/oncology team - no adjuvant chemo. Will monitor and observe from colon cancer standpoint. Will need head and neck surgery evaluation for the thyroid as an outpatient. Patient should also get genetic counseling as outpatient, as well as familial counseling for the possibility of Winters syndrome and appropriate screening.         Patient seen and evaluated, no acute somatic complaints. Medically cleared/stable for discharge as per Dr. Palafox with close outpatient follow up within 1-2 weeks of discharge. Patient understands and agrees with plan of care.

## 2019-05-16 NOTE — PROGRESS NOTE ADULT - SUBJECTIVE AND OBJECTIVE BOX
Patient seen and examined in bed. No pain, no SOB.       MEDICATIONS  (STANDING):  allopurinol 300 milliGRAM(s) Oral daily  apixaban 5 milliGRAM(s) Oral every 12 hours  buDESOnide  80 MICROgram(s)/formoterol 4.5 MICROgram(s) Inhaler 2 Puff(s) Inhalation two times a day  calcium carbonate    500 mG (Tums) Chewable 1 Tablet(s) Chew two times a day  dextrose 5%. 1000 milliLiter(s) (50 mL/Hr) IV Continuous <Continuous>  dextrose 50% Injectable 12.5 Gram(s) IV Push once  dextrose 50% Injectable 25 Gram(s) IV Push once  dextrose 50% Injectable 25 Gram(s) IV Push once  digoxin     Tablet 0.125 milliGRAM(s) Oral daily  furosemide   Injectable 40 milliGRAM(s) IV Push daily  insulin lispro (HumaLOG) corrective regimen sliding scale   SubCutaneous three times a day before meals  insulin lispro (HumaLOG) corrective regimen sliding scale   SubCutaneous at bedtime  magnesium oxide 400 milliGRAM(s) Oral three times a day with meals  metoprolol succinate ER 75 milliGRAM(s) Oral two times a day  multivitamin 1 Tablet(s) Oral daily  sildenafil (REVATIO) 20 milliGRAM(s) Oral three times a day      VITAL:  T(C): , Max: 37 (05-15-19 @ 21:04)  T(F): , Max: 98.6 (05-15-19 @ 21:04)  HR: 72 (05-16-19 @ 07:07)  BP: 115/68 (05-16-19 @ 07:07)  RR: 17 (05-16-19 @ 06:22)  SpO2: 98% (05-16-19 @ 06:22)    I and O's:    05-15 @ 07:01  -  05-16 @ 07:00  --------------------------------------------------------  IN: 400 mL / OUT: 550 mL / NET: -150 mL    05-16 @ 07:01  -  05-16 @ 15:44  --------------------------------------------------------  IN: 0 mL / OUT: 800 mL / NET: -800 mL          PHYSICAL EXAM:    Constitutional: NAD  Neck:  No JVD  Respiratory: CTAB/L  Cardiovascular: S1 and S2  Gastrointestinal: BS+, soft, NT/ND  Extremities: No peripheral edema  Neurological: A/O x 3, no focal deficits  Psychiatric: Normal mood, normal affect  : No Corey  Skin: No rashes    LABS:                        8.1    9.03  )-----------( 267      ( 16 May 2019 05:55 )             25.9     05-16    142  |  108<H>  |  39<H>  ----------------------------<  97  4.2   |  21<L>  |  2.10<H>    Ca    9.3      16 May 2019 05:55  Phos  2.6     05-16  Mg     1.5     05-16      ASSESSMENT:  63F w/ CKD3, HFpEF, and colon CA s/p left hemicolectomy, 5/10/19 a/w BRBPR  (1)Renal - CKD3; Superimposed OSMAR from ATN from contrast nephropathy s/p CT I+ last week. Creatinine improving.  (2)Metabolic acidosis - mild  (3)Anemia - due to rectal bleeding - in setting of recent colonic resection for cancer. Surgery on board - s/p one unit PRBC 5/15  (4)Hypernatremia - due to poor intake of free water - improved  (5)Hypomagnesemia - mild - indicated for repletion    PLAN:  (1)Lasix as ordered  (2)Mag oxide 400mg po x 3 doses as ordered  (3)Dose new meds for GFR 25-30ml/min  (4)BMP daily      CARMELO CorbettC  [facility]  [phone] Patient seen and examined in bed. No pain, no SOB.       MEDICATIONS  (STANDING):  allopurinol 300 milliGRAM(s) Oral daily  apixaban 5 milliGRAM(s) Oral every 12 hours  buDESOnide  80 MICROgram(s)/formoterol 4.5 MICROgram(s) Inhaler 2 Puff(s) Inhalation two times a day  calcium carbonate    500 mG (Tums) Chewable 1 Tablet(s) Chew two times a day  dextrose 5%. 1000 milliLiter(s) (50 mL/Hr) IV Continuous <Continuous>  dextrose 50% Injectable 12.5 Gram(s) IV Push once  dextrose 50% Injectable 25 Gram(s) IV Push once  dextrose 50% Injectable 25 Gram(s) IV Push once  digoxin     Tablet 0.125 milliGRAM(s) Oral daily  furosemide   Injectable 40 milliGRAM(s) IV Push daily  insulin lispro (HumaLOG) corrective regimen sliding scale   SubCutaneous three times a day before meals  insulin lispro (HumaLOG) corrective regimen sliding scale   SubCutaneous at bedtime  magnesium oxide 400 milliGRAM(s) Oral three times a day with meals  metoprolol succinate ER 75 milliGRAM(s) Oral two times a day  multivitamin 1 Tablet(s) Oral daily  sildenafil (REVATIO) 20 milliGRAM(s) Oral three times a day      VITAL:  T(C): , Max: 37 (05-15-19 @ 21:04)  T(F): , Max: 98.6 (05-15-19 @ 21:04)  HR: 72 (05-16-19 @ 07:07)  BP: 115/68 (05-16-19 @ 07:07)  RR: 17 (05-16-19 @ 06:22)  SpO2: 98% (05-16-19 @ 06:22)    I and O's:    05-15 @ 07:01  -  05-16 @ 07:00  --------------------------------------------------------  IN: 400 mL / OUT: 550 mL / NET: -150 mL    05-16 @ 07:01  -  05-16 @ 15:44  --------------------------------------------------------  IN: 0 mL / OUT: 800 mL / NET: -800 mL          PHYSICAL EXAM:    Constitutional: NAD  Neck:  No JVD  Respiratory: CTAB/L  Cardiovascular: S1 and S2  Gastrointestinal: BS+, soft, NT/ND  Extremities: No peripheral edema  Neurological: A/O x 3, no focal deficits  Psychiatric: Normal mood, normal affect  : No Corey  Skin: No rashes    LABS:                        8.1    9.03  )-----------( 267      ( 16 May 2019 05:55 )             25.9     05-16    142  |  108<H>  |  39<H>  ----------------------------<  97  4.2   |  21<L>  |  2.10<H>    Ca    9.3      16 May 2019 05:55  Phos  2.6     05-16  Mg     1.5     05-16      ASSESSMENT:  63F w/ CKD3, HFpEF, and colon CA s/p left hemicolectomy, 5/10/19 a/w BRBPR  (1)Renal - CKD3; Superimposed OSMAR from ATN from contrast nephropathy s/p CT I+ last week. Creatinine improving.  (2)Metabolic acidosis - mild  (3)Anemia - due to rectal bleeding - in setting of recent colonic resection for cancer. Surgery on board - s/p one unit PRBC 5/15  (4)Hypernatremia - due to poor intake of free water - improved  (5)Hypomagnesemia - mild - indicated for repletion    PLAN:  (1)Lasix as ordered.  I suspect Lasix can be changed to PO route  (2)Mag oxide 400mg po x 3 doses as ordered  (3)Dose new meds for GFR 25-30ml/min  (4)BMP daily      CARMELO CorbettC  [facility]  [phone]

## 2019-05-16 NOTE — PROGRESS NOTE ADULT - ASSESSMENT
62 yo F with a PMH of severe PAH, AFib diagnosed 5 yrs ago on Eliquis, COPD, HTN, HLD, T2DM, LINH, Lymphoma/CLL s/p radiation/chemo (1997), and  s/p left hemicolectomy for sigmoid adenocarcinoma on 04/24/2019 who presented to the ED on 05/10/2019 with a cc of BRBPR and Afib with RVR in the setting of anemia now with improved rate control on Toprol XL 75 BID; Dig d/c'd given elevated Scr; Eliquis remains on hold; CHA2 DS2 Vasc, 4:    - mild improvement in Scr; Digoxin level was apparently therapeutic  - continue beta blocker therapy   -Closely monitor for bleeding with resumed AC; no EGD/ or scopy from GI or surgery  - when cleared from GI for NICOLAS, will likely start Amiodarone and/or perform a NICOLAS/Afib ablation with an eye towards a Watchman device in the near future; discussed plan with the patient  - discussed with EP attending, Dr. De Jesus

## 2019-05-16 NOTE — CHART NOTE - NSCHARTNOTEFT_GEN_A_CORE
Spoke to clinical pharmacist, who ran medication Sildenafil for prior auth.   Medication is not covered for this indication even with prior auth. Meidcation is only covered for prostate cancer.   Will discuss with Dr Palafox

## 2019-05-16 NOTE — DISCHARGE NOTE PROVIDER - CARE PROVIDERS DIRECT ADDRESSES
,DirectAddress_Unknown,DirectAddress_Unknown,DirectAddress_Unknown,[email or web address],[email or web address]

## 2019-05-16 NOTE — PROGRESS NOTE ADULT - SUBJECTIVE AND OBJECTIVE BOX
Patient is a 63y old  Female who presents with a chief complaint of GIB (13 May 2019 11:29)      SUBJECTIVE / OVERNIGHT EVENTS:  No SOB/ orthopnea. S/p 1 U PRBC.  No new bleeding  Review of Systems:   CONSTITUTIONAL: No fever, weight loss, +fatigue  EYES: No eye pain, visual disturbances, or discharge  ENMT:  No difficulty hearing, tinnitus, vertigo; No sinus or throat pain  NECK: No pain or stiffness  BREASTS: No pain, masses, or nipple discharge  RESPIRATORY: No cough, wheezing, chills or hemoptysis; No shortness of breath  CARDIOVASCULAR: No chest pain, palpitations, dizziness, or leg swelling  GASTROINTESTINAL: No abdominal or epigastric pain. No nausea, vomiting, or hematemesis; No diarrhea or constipation. No melena or hematochezia.  GENITOURINARY: No dysuria, frequency, hematuria, or incontinence  NEUROLOGICAL: No headaches, memory loss, loss of strength, numbness, or tremors  SKIN: No itching, burning, rashes, or lesions   LYMPH NODES: No enlarged glands  ENDOCRINE: No heat or cold intolerance; No hair loss  MUSCULOSKELETAL: No joint pain or swelling; No muscle, back, or extremity pain  PSYCHIATRIC: No depression, anxiety, mood swings, or difficulty sleeping  HEME/LYMPH: No easy bruising, or bleeding gums  ALLERY AND IMMUNOLOGIC: No hives or eczema    MEDICATIONS  (STANDING):  allopurinol 300 milliGRAM(s) Oral daily  buDESOnide  80 MICROgram(s)/formoterol 4.5 MICROgram(s) Inhaler 2 Puff(s) Inhalation two times a day  calcium carbonate    500 mG (Tums) Chewable 1 Tablet(s) Chew two times a day  dextrose 5%. 1000 milliLiter(s) (50 mL/Hr) IV Continuous <Continuous>  dextrose 50% Injectable 12.5 Gram(s) IV Push once  dextrose 50% Injectable 25 Gram(s) IV Push once  dextrose 50% Injectable 25 Gram(s) IV Push once  furosemide   Injectable 40 milliGRAM(s) IV Push daily  insulin lispro (HumaLOG) corrective regimen sliding scale   SubCutaneous three times a day before meals  insulin lispro (HumaLOG) corrective regimen sliding scale   SubCutaneous at bedtime  metoprolol succinate ER 75 milliGRAM(s) Oral two times a day  multivitamin 1 Tablet(s) Oral daily  sildenafil (REVATIO) 20 milliGRAM(s) Oral three times a day    MEDICATIONS  (PRN):  acetaminophen   Tablet .. 650 milliGRAM(s) Oral every 6 hours PRN Mild Pain (1 - 3), Moderate Pain (4 - 6)  ALBUTerol    90 MICROgram(s) HFA Inhaler 2 Puff(s) Inhalation every 6 hours PRN Shortness of Breath and/or Wheezing  dextrose 40% Gel 15 Gram(s) Oral once PRN Blood Glucose LESS THAN 70 milliGRAM(s)/deciliter  glucagon  Injectable 1 milliGRAM(s) IntraMuscular once PRN Glucose LESS THAN 70 milligrams/deciliter      PHYSICAL EXAM:  Vital Signs Last 24 Hrs  T(C): 36.6 (14 May 2019 11:54), Max: 36.7 (13 May 2019 21:00)  T(F): 97.8 (14 May 2019 11:54), Max: 98.1 (13 May 2019 21:00)  HR: 73 (14 May 2019 17:45) (73 - 94)  BP: 120/76 (14 May 2019 17:45) (101/59 - 121/80)  BP(mean): --  RR: 18 (14 May 2019 15:42) (18 - 18)  SpO2: 94% (14 May 2019 11:54) (94% - 96%)  I&O's Summary    13 May 2019 07:01  -  14 May 2019 07:00  --------------------------------------------------------  IN: 840 mL / OUT: 1700 mL / NET: -860 mL    14 May 2019 07:01  -  14 May 2019 19:20  --------------------------------------------------------  IN: 0 mL / OUT: 400 mL / NET: -400 mL      GENERAL: NAD, well-developed  HEAD:  Atraumatic, Normocephalic  EYES: EOMI, PERRLA, conjunctiva and sclera clear  NECK: Supple, No JVD  CHEST/LUNG: Clear to auscultation bilaterally; No wheeze  HEART: Regular rate and rhythm; No murmurs, rubs, or gallops  ABDOMEN: Soft, Nontender, Nondistended; Bowel sounds present  EXTREMITIES:  2+ Peripheral Pulses, No clubbing, cyanosis, or edema  PSYCH: AAOx3  NEUROLOGY: non-focal  SKIN: No rashes or lesions    LABS:  CAPILLARY BLOOD GLUCOSE      POCT Blood Glucose.: 144 mg/dL (14 May 2019 17:34)  POCT Blood Glucose.: 140 mg/dL (14 May 2019 12:51)  POCT Blood Glucose.: 119 mg/dL (14 May 2019 08:36)  POCT Blood Glucose.: 96 mg/dL (13 May 2019 21:41)                          7.6    7.79  )-----------( 309      ( 14 May 2019 05:30 )             24.8     05-14    141  |  106  |  50<H>  ----------------------------<  89  4.4   |  21<L>  |  2.53<H>    Ca    9.3      14 May 2019 05:30  Phos  3.0     05-14  Mg     1.6     05-14    TPro  6.9  /  Alb  3.7  /  TBili  0.5  /  DBili  x   /  AST  16  /  ALT  15  /  AlkPhos  110  05-13              RADIOLOGY & ADDITIONAL TESTS:    Imaging Personally Reviewed:    Consultant(s) Notes Reviewed:      Care Discussed with Consultants/Other Providers:

## 2019-05-16 NOTE — PROGRESS NOTE ADULT - ATTENDING COMMENTS
Renal attd    Creatinine improving  Can the lasix be changed to PO   Serial CBC    35 minutes spent on total encounter and more then 50% of the visit was spent counseling and/or coordinating care by the attending physician

## 2019-05-16 NOTE — DISCHARGE NOTE PROVIDER - CARE PROVIDER_API CALL
Manuel Palafox)  Internal Medicine  [address] [place] [address]  Phone: [phone]  Fax: [phone]  Follow Up Time:     Francesco Francisco)  Internal Medicine  [address] [facility], [address]  [place] [address]  Phone: [phone]  Fax: [phone]  Follow Up Time:     Orville Lima)  Internal Medicine; Nephrology  [address] [place] [address]  Phone: [phone]  Fax: [phone]  Follow Up Time:     Isela Alaniz)  Cardiac Electrophysiology; Cardiology; Internal Medicine  [address] [place] [address]  Phone: [phone]  Fax: [phone]  Follow Up Time:     Jay Degroot)  Adv Heart Fail Trnsplnt Cardio  [address] [place] [address]  Phone: [phone]  Fax: [phone]  Follow Up Time:     Matias Hunt)  ColonRectal Surgery; Surgery  [facility], [address] [place] [id number]  Phone: [phone]  Fax: [phone]  Follow Up Time:

## 2019-05-16 NOTE — DISCHARGE NOTE PROVIDER - NSDCFUADDAPPT_GEN_ALL_CORE_FT
Follow up with PCP within 1-2 weeks of discharge.   Follow up with nephrology within 1-2 weeks of discharge.   Follow up with pulmonology within 1-2 weeks of discharge.   Follow up with electrophysiology team within 1-2 weeks of discharge.   Follow up with CHF team within 1-2 weeks of discharge.   Follow up with colorectal surgery within 1-2 weeks of discharge.   Follow up with hematology/oncology within 1-2 weeks of discharge - Dr. Armenta -   [address] [place] [address]  Phone: [phone]  Fax: [phone]

## 2019-05-17 ENCOUNTER — TRANSCRIPTION ENCOUNTER (OUTPATIENT)
Age: 64
End: 2019-05-17

## 2019-05-17 LAB
ANION GAP SERPL CALC-SCNC: 12 MMO/L — SIGNIFICANT CHANGE UP (ref 7–14)
BASOPHILS # BLD AUTO: 0.03 K/UL — SIGNIFICANT CHANGE UP (ref 0–0.2)
BASOPHILS NFR BLD AUTO: 0.3 % — SIGNIFICANT CHANGE UP (ref 0–2)
BUN SERPL-MCNC: 40 MG/DL — HIGH (ref 7–23)
CALCIUM SERPL-MCNC: 9 MG/DL — SIGNIFICANT CHANGE UP (ref 8.4–10.5)
CHLORIDE SERPL-SCNC: 108 MMOL/L — HIGH (ref 98–107)
CO2 SERPL-SCNC: 21 MMOL/L — LOW (ref 22–31)
CREAT SERPL-MCNC: 1.93 MG/DL — HIGH (ref 0.5–1.3)
EOSINOPHIL # BLD AUTO: 0.45 K/UL — SIGNIFICANT CHANGE UP (ref 0–0.5)
EOSINOPHIL NFR BLD AUTO: 5.1 % — SIGNIFICANT CHANGE UP (ref 0–6)
GLUCOSE SERPL-MCNC: 84 MG/DL — SIGNIFICANT CHANGE UP (ref 70–99)
HCT VFR BLD CALC: 26.5 % — LOW (ref 34.5–45)
HGB BLD-MCNC: 8.1 G/DL — LOW (ref 11.5–15.5)
IMM GRANULOCYTES NFR BLD AUTO: 0.7 % — SIGNIFICANT CHANGE UP (ref 0–1.5)
LYMPHOCYTES # BLD AUTO: 1.69 K/UL — SIGNIFICANT CHANGE UP (ref 1–3.3)
LYMPHOCYTES # BLD AUTO: 19.3 % — SIGNIFICANT CHANGE UP (ref 13–44)
MAGNESIUM SERPL-MCNC: 1.5 MG/DL — LOW (ref 1.6–2.6)
MCHC RBC-ENTMCNC: 21.7 PG — LOW (ref 27–34)
MCHC RBC-ENTMCNC: 30.6 % — LOW (ref 32–36)
MCV RBC AUTO: 70.9 FL — LOW (ref 80–100)
MONOCYTES # BLD AUTO: 0.92 K/UL — HIGH (ref 0–0.9)
MONOCYTES NFR BLD AUTO: 10.5 % — SIGNIFICANT CHANGE UP (ref 2–14)
NEUTROPHILS # BLD AUTO: 5.59 K/UL — SIGNIFICANT CHANGE UP (ref 1.8–7.4)
NEUTROPHILS NFR BLD AUTO: 64.1 % — SIGNIFICANT CHANGE UP (ref 43–77)
NRBC # FLD: 0.15 K/UL — SIGNIFICANT CHANGE UP (ref 0–0)
NRBC FLD-RTO: 1.7 — SIGNIFICANT CHANGE UP
PLATELET # BLD AUTO: 248 K/UL — SIGNIFICANT CHANGE UP (ref 150–400)
PMV BLD: 10.3 FL — SIGNIFICANT CHANGE UP (ref 7–13)
POTASSIUM SERPL-MCNC: 4 MMOL/L — SIGNIFICANT CHANGE UP (ref 3.5–5.3)
POTASSIUM SERPL-SCNC: 4 MMOL/L — SIGNIFICANT CHANGE UP (ref 3.5–5.3)
RBC # BLD: 3.74 M/UL — LOW (ref 3.8–5.2)
RBC # FLD: 24.6 % — HIGH (ref 10.3–14.5)
SODIUM SERPL-SCNC: 141 MMOL/L — SIGNIFICANT CHANGE UP (ref 135–145)
WBC # BLD: 8.74 K/UL — SIGNIFICANT CHANGE UP (ref 3.8–10.5)
WBC # FLD AUTO: 8.74 K/UL — SIGNIFICANT CHANGE UP (ref 3.8–10.5)

## 2019-05-17 PROCEDURE — 99232 SBSQ HOSP IP/OBS MODERATE 35: CPT

## 2019-05-17 RX ORDER — MAGNESIUM OXIDE 400 MG ORAL TABLET 241.3 MG
400 TABLET ORAL
Refills: 0 | Status: COMPLETED | OUTPATIENT
Start: 2019-05-17 | End: 2019-05-17

## 2019-05-17 RX ADMIN — Medication 20 MILLIGRAM(S): at 09:49

## 2019-05-17 RX ADMIN — MAGNESIUM OXIDE 400 MG ORAL TABLET 400 MILLIGRAM(S): 241.3 TABLET ORAL at 13:00

## 2019-05-17 RX ADMIN — Medication 20 MILLIGRAM(S): at 17:11

## 2019-05-17 RX ADMIN — APIXABAN 5 MILLIGRAM(S): 2.5 TABLET, FILM COATED ORAL at 17:11

## 2019-05-17 RX ADMIN — Medication 75 MILLIGRAM(S): at 17:11

## 2019-05-17 RX ADMIN — Medication 1 TABLET(S): at 17:11

## 2019-05-17 RX ADMIN — Medication 75 MILLIGRAM(S): at 05:18

## 2019-05-17 RX ADMIN — Medication 0.12 MILLIGRAM(S): at 17:11

## 2019-05-17 RX ADMIN — MAGNESIUM OXIDE 400 MG ORAL TABLET 400 MILLIGRAM(S): 241.3 TABLET ORAL at 17:11

## 2019-05-17 RX ADMIN — BUDESONIDE AND FORMOTEROL FUMARATE DIHYDRATE 2 PUFF(S): 160; 4.5 AEROSOL RESPIRATORY (INHALATION) at 09:50

## 2019-05-17 RX ADMIN — BUDESONIDE AND FORMOTEROL FUMARATE DIHYDRATE 2 PUFF(S): 160; 4.5 AEROSOL RESPIRATORY (INHALATION) at 21:37

## 2019-05-17 RX ADMIN — Medication 1 TABLET(S): at 05:14

## 2019-05-17 RX ADMIN — Medication 20 MILLIGRAM(S): at 05:14

## 2019-05-17 RX ADMIN — MAGNESIUM OXIDE 400 MG ORAL TABLET 400 MILLIGRAM(S): 241.3 TABLET ORAL at 09:48

## 2019-05-17 RX ADMIN — Medication 300 MILLIGRAM(S): at 12:59

## 2019-05-17 RX ADMIN — APIXABAN 5 MILLIGRAM(S): 2.5 TABLET, FILM COATED ORAL at 05:14

## 2019-05-17 RX ADMIN — Medication 20 MILLIGRAM(S): at 01:00

## 2019-05-17 RX ADMIN — Medication 1 TABLET(S): at 13:00

## 2019-05-17 NOTE — CONSULT NOTE ADULT - CONSULT REQUESTED DATE/TIME
14-May-2019 13:27
11-May-2019 05:42
12-May-2019 04:00
16-May-2019 15:57
11-May-2019 16:22
11-May-2019 13:53

## 2019-05-17 NOTE — PROGRESS NOTE ADULT - SUBJECTIVE AND OBJECTIVE BOX
Patient seen and examined in bed. No pain, no SOB.      MEDICATIONS  (STANDING):  allopurinol 300 milliGRAM(s) Oral daily  apixaban 5 milliGRAM(s) Oral every 12 hours  buDESOnide  80 MICROgram(s)/formoterol 4.5 MICROgram(s) Inhaler 2 Puff(s) Inhalation two times a day  calcium carbonate    500 mG (Tums) Chewable 1 Tablet(s) Chew two times a day  dextrose 5%. 1000 milliLiter(s) (50 mL/Hr) IV Continuous <Continuous>  dextrose 50% Injectable 12.5 Gram(s) IV Push once  dextrose 50% Injectable 25 Gram(s) IV Push once  dextrose 50% Injectable 25 Gram(s) IV Push once  digoxin     Tablet 0.125 milliGRAM(s) Oral daily  insulin lispro (HumaLOG) corrective regimen sliding scale   SubCutaneous three times a day before meals  insulin lispro (HumaLOG) corrective regimen sliding scale   SubCutaneous at bedtime  magnesium oxide 400 milliGRAM(s) Oral three times a day with meals  metoprolol succinate ER 75 milliGRAM(s) Oral two times a day  multivitamin 1 Tablet(s) Oral daily  sildenafil (REVATIO) 20 milliGRAM(s) Oral three times a day  torsemide 20 milliGRAM(s) Oral daily      VITAL:  T(C): , Max: 36.8 (05-16-19 @ 17:28)  T(F): , Max: 98.3 (05-16-19 @ 21:09)  HR: 66 (05-17-19 @ 09:44)  BP: 130/86 (05-17-19 @ 09:44)  RR: 16 (05-17-19 @ 09:44)  SpO2: 97% (05-17-19 @ 09:44)    I and O's:    05-16 @ 07:01  -  05-17 @ 07:00  --------------------------------------------------------  IN: 600 mL / OUT: 800 mL / NET: -200 mL    05-17 @ 07:01  -  05-17 @ 14:42  --------------------------------------------------------  IN: 0 mL / OUT: 1800 mL / NET: -1800 mL          PHYSICAL EXAM:    Constitutional: NAD  Neck:  No JVD  Respiratory: CTAB/L  Cardiovascular: S1 and S2  Gastrointestinal: BS+, soft, NT/ND  Extremities: No peripheral edema  Neurological: A/O x 3, no focal deficits  Psychiatric: Normal mood, normal affect  : No Corey  Skin: No rashes    LABS:                        8.1    8.74  )-----------( 248      ( 17 May 2019 06:30 )             26.5     05-17    141  |  108<H>  |  40<H>  ----------------------------<  84  4.0   |  21<L>  |  1.93<H>    Ca    9.0      17 May 2019 06:30  Phos  2.6     05-16  Mg     1.5     05-17      ASSESSMENT:  63F w/ CKD3, HFpEF, and colon CA s/p left hemicolectomy, 5/10/19 a/w BRBPR  (1)Renal - CKD3; Superimposed OSMAR from ATN from contrast nephropathy s/p CT I+ last week. Creatinine improving.  (2)Metabolic acidosis - mild  (3)Anemia - due to rectal bleeding - in setting of recent colonic resection for cancer. Surgery on board - s/p one unit PRBC 5/15  (4)Hypomagnesemia - mild - indicated for repletion    PLAN:  (1)Torsemide 20mg po daily as ordered  (2)A/W Mag oxide 400mg po x 3 doses as ordered  (3)Dose new meds for GFR 30-35ml/min  (4)BMP daily while admitted      AMOR Corbett  [facility]  [phone]

## 2019-05-17 NOTE — CONSULT NOTE ADULT - CONSULT REASON
BRBPR
Pt followed for:  -asthma  -pulm htn  -sleep disordered breathing
colon cancer, h/o lymphoma, anemia
BRBPR stable H/H
Pulm HTN
OSMAR

## 2019-05-17 NOTE — PROGRESS NOTE ADULT - ATTENDING COMMENTS
Renal attd    Back on A/C  Back on PO toresmide   dc in a if labs are stable Renal attd    Back on A/C  Back on PO toresmide   dc in am if labs are stable

## 2019-05-17 NOTE — PROGRESS NOTE ADULT - SUBJECTIVE AND OBJECTIVE BOX
GENERAL SURGERY PROGRESS NOTE      Subjective:  No acute events overnight. Denies any further BRBPR. Restarted on Eliquis yesterday,        Objective:    PE:  Gen: Laying in bed, in NAD  Resp: airway patent, respirations unlabored, no increased WoB  Abd: soft, obese. NT/ND. Incisions healing well  Ext: no edema, WWP    Vital Signs Last 24 Hrs  T(C): 36.8 (17 May 2019 05:11), Max: 36.8 (16 May 2019 17:28)  T(F): 98.2 (17 May 2019 05:11), Max: 98.3 (16 May 2019 21:09)  HR: 74 (17 May 2019 05:11) (70 - 83)  BP: 133/86 (17 May 2019 05:11) (115/68 - 133/86)  BP(mean): --  RR: 16 (17 May 2019 05:11) (16 - 17)  SpO2: 98% (17 May 2019 05:11) (96% - 99%)    I&O's Detail    15 May 2019 07:01  -  16 May 2019 07:00  --------------------------------------------------------  IN:    Oral Fluid: 400 mL  Total IN: 400 mL    OUT:    Voided: 550 mL  Total OUT: 550 mL    Total NET: -150 mL      16 May 2019 07:01  -  17 May 2019 06:46  --------------------------------------------------------  IN:    Oral Fluid: 600 mL  Total IN: 600 mL    OUT:    Voided: 800 mL  Total OUT: 800 mL    Total NET: -200 mL          Daily     Daily Weight in k.5 (16 May 2019 06:54)    MEDICATIONS  (STANDING):  allopurinol 300 milliGRAM(s) Oral daily  apixaban 5 milliGRAM(s) Oral every 12 hours  buDESOnide  80 MICROgram(s)/formoterol 4.5 MICROgram(s) Inhaler 2 Puff(s) Inhalation two times a day  calcium carbonate    500 mG (Tums) Chewable 1 Tablet(s) Chew two times a day  dextrose 5%. 1000 milliLiter(s) (50 mL/Hr) IV Continuous <Continuous>  dextrose 50% Injectable 12.5 Gram(s) IV Push once  dextrose 50% Injectable 25 Gram(s) IV Push once  dextrose 50% Injectable 25 Gram(s) IV Push once  digoxin     Tablet 0.125 milliGRAM(s) Oral daily  insulin lispro (HumaLOG) corrective regimen sliding scale   SubCutaneous three times a day before meals  insulin lispro (HumaLOG) corrective regimen sliding scale   SubCutaneous at bedtime  magnesium oxide 400 milliGRAM(s) Oral three times a day with meals  metoprolol succinate ER 75 milliGRAM(s) Oral two times a day  multivitamin 1 Tablet(s) Oral daily  sildenafil (REVATIO) 20 milliGRAM(s) Oral three times a day  torsemide 20 milliGRAM(s) Oral daily    MEDICATIONS  (PRN):  acetaminophen   Tablet .. 650 milliGRAM(s) Oral every 6 hours PRN Mild Pain (1 - 3), Moderate Pain (4 - 6)  ALBUTerol    90 MICROgram(s) HFA Inhaler 2 Puff(s) Inhalation every 6 hours PRN Shortness of Breath and/or Wheezing  dextrose 40% Gel 15 Gram(s) Oral once PRN Blood Glucose LESS THAN 70 milliGRAM(s)/deciliter  glucagon  Injectable 1 milliGRAM(s) IntraMuscular once PRN Glucose LESS THAN 70 milligrams/deciliter      LABS:                        8.1    9.03  )-----------( 267      ( 16 May 2019 05:55 )             25.9     05-16    142  |  108<H>  |  39<H>  ----------------------------<  97  4.2   |  21<L>  |  2.10<H>    Ca    9.3      16 May 2019 05:55  Phos  2.6     05-16  Mg     1.5     05-16      PT/INR - ( 16 May 2019 05:55 )   PT: 14.5 SEC;   INR: 1.30          PTT - ( 16 May 2019 05:55 )  PTT:30.7 SEC      RADIOLOGY & ADDITIONAL STUDIES:

## 2019-05-17 NOTE — CHART NOTE - NSCHARTNOTEFT_GEN_A_CORE
Telemetry recordings reviewed: AFib rate controlled.  No AFib ablation or WATCHMAN plan per Dr. De Jesus as patient has significant pulmonary HTN.  Continue monitor for bleeding on AC therapy.

## 2019-05-17 NOTE — PROGRESS NOTE ADULT - ASSESSMENT
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

## 2019-05-17 NOTE — DISCHARGE NOTE NURSING/CASE MANAGEMENT/SOCIAL WORK - NSDCFUADDAPPT_GEN_ALL_CORE_FT
Follow up with PCP within 1-2 weeks of discharge.   Follow up with nephrology within 1-2 weeks of discharge.   Follow up with pulmonology within 1-2 weeks of discharge.   Follow up with electrophysiology team within 1-2 weeks of discharge.   Follow up with CHF team within 1-2 weeks of discharge.   Follow up with colorectal surgery within 1-2 weeks of discharge.   Follow up with hematology/oncology within 1-2 weeks of discharge - Dr. Armenta -   [address] [place] [address]  Phone: [phone]  Fax: [phone]

## 2019-05-17 NOTE — CONSULT NOTE ADULT - ASSESSMENT
63 F s/p recent exlap, L hemicolectomy for splenic flexure adenoCA presents with BRBPR  - observe for bleeding and changes in HDs  - serial CBCs  - If stable Hct and no more major bleeding can followup as outpt with Dr. Hunt for postsurgical visit    RUPA Ordoñez  PGY 3 A team Surgery [id number]
Pt admitted for rectal bleeding/renal insufficiency.     Pt has asthma/COPD, pulm htn, sleep apnea     Pt on Symbicoert + PRN albuterol.    Sildenafil 20 TID (issue obtaining as an outpt do to insurance)    (declined BIPAP in hospital - may need outpt followup study)    Supplemental O2    Treatment as per GI/surgery and card.
Impression:  1) BRBPR - Most likely due to internal hemorrhoids given minimal effect on hemoglobin, also could be bleed from anastomotic area.  DDx also includes but is less likely to be diverticulosis, angiectasias ischemic colitis    Recommendations:   - per surgical note okay to d/c if bleeding stops   - patient currently in volume overload so would not proceed with endoscopic therapy until this is resolved   - if endoscopy where to be performed would need to be a discussion between surgical and gastroenterology team directly as patient has recent anastomsis   - monitor hemoglobin   - transfuse as needed   - two active IVs at all times   - active type and screen
64 y/o female w/ PMHX  of Afib on Eliquis, DM II,  HTN, COPD on home O2 2L, lymphoma/CLL s/p radiation and chemo (1997), recent diagnosis of colon CA s/p resection (April 23, 2019), HFpEF LVEF 66%, severe pulmonary HTN (now on sildenafil), RV systolic dysfunction, mod-severe TR and  comes in with complaints of BRBPR x 1.  In ED patient found to be anemic hgb/hct 8.2/27, and was also found to be in OSMAR with BUN/Cr 73/3.88 (last cr 1.5). GI was consulted, patient was considered for sigmoidoscopy, but b/c patient with OSMAR and likely volume overload GI procedures were held per primary team. Patient was started on Lasix 40 mg IV qd (home diuretics were torsemide 20 mg daily) and she has been diuresing well. She admits to having worsening LE edema, and poor urine output s/p discharge to home on 5/3/19. She also admits to some CHAVEZ. No CP, palpitations, dizziness.
63F with above history and clinical features, has T2 N0 colon cancer, s/p surgery, interestingly has BRAF mutation and evidence of mismatch repair gene mutations (not relevant in the adjuvant Rx decision at this time) and anemia. Even if the pt had more advanced disease, adjuvant chemo would have carried high risk. Discussed the results, relevance, the diagnosis, stage of colon cancer, the thyroid and prominent mediastinal node related issues and issues related to the genetic abnormalities in detail with her and all her questions answered. Will recommend:  - no adjuvant chemo  - monitor and observe from colon cancer stand point  - Head and Neck surgery evaluation for the thyroid - can be done as out pt  - genetic counseling as out pt  - familial counseling for the possibility of Winters syndrome and appropriate screening  - anemia if from renal insufficiency and chronic disease, not def of iron, B12, folate or hemolysis  - transfuse as needed  - the role od ESAs debatable because of cancer history and suspicious thyroid mass  - to f/u as outpt after discharge  - continue Rx as per medicine, pulm, cardiology  - discussed in detail with the PA

## 2019-05-17 NOTE — CONSULT NOTE ADULT - SUBJECTIVE AND OBJECTIVE BOX
Patient is a 63y old  Female who presents with a chief complaint of LGIB (16 May 2019 14:33), w/u revealed colon cancer, s/p hemicolectomy and feeling OK. She has multiple and significant cardiac and pulmonary issues, on oxygen at home most of the time. Lost 30 pounds because of medications and low appetite in last 6 months (was started on trulicity and lasix). A detailed ROS is otherwise unremarkable.    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     Last colonoscopy was 2010 (prior to this time).      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  COPD (chronic obstructive pulmonary disease)  Diabetes  HTN (hypertension)  Atrial fibrillation  No significant past surgical history      Meds:  acetaminophen   Tablet .. 650 milliGRAM(s) Oral every 6 hours PRN  ALBUTerol    90 MICROgram(s) HFA Inhaler 2 Puff(s) Inhalation every 6 hours PRN  allopurinol 300 milliGRAM(s) Oral daily  apixaban 5 milliGRAM(s) Oral every 12 hours  buDESOnide  80 MICROgram(s)/formoterol 4.5 MICROgram(s) Inhaler 2 Puff(s) Inhalation two times a day  calcium carbonate    500 mG (Tums) Chewable 1 Tablet(s) Chew two times a day  dextrose 40% Gel 15 Gram(s) Oral once PRN  dextrose 5%. 1000 milliLiter(s) IV Continuous <Continuous>  dextrose 50% Injectable 12.5 Gram(s) IV Push once  dextrose 50% Injectable 25 Gram(s) IV Push once  dextrose 50% Injectable 25 Gram(s) IV Push once  digoxin     Tablet 0.125 milliGRAM(s) Oral daily  glucagon  Injectable 1 milliGRAM(s) IntraMuscular once PRN  insulin lispro (HumaLOG) corrective regimen sliding scale   SubCutaneous three times a day before meals  insulin lispro (HumaLOG) corrective regimen sliding scale   SubCutaneous at bedtime  magnesium oxide 400 milliGRAM(s) Oral three times a day with meals  metoprolol succinate ER 75 milliGRAM(s) Oral two times a day  multivitamin 1 Tablet(s) Oral daily  sildenafil (REVATIO) 20 milliGRAM(s) Oral three times a day  torsemide 20 milliGRAM(s) Oral daily      No Known Allergies    SHx: Never smoked and never used alcohol.      FAMILY HISTORY:  Family history of hyperlipidemia  Family history of hypertension (Sibling)  Family history of type 2 diabetes mellitus  Family history of cancer (Aunt): Breast  pt's brothers from her mother side have had colonoscopies.    Vital Signs Last 24 Hrs  T(C): 36.8 (17 May 2019 05:11), Max: 36.8 (16 May 2019 17:28)  T(F): 98.2 (17 May 2019 05:11), Max: 98.3 (16 May 2019 21:09)  HR: 74 (17 May 2019 05:11) (70 - 83)  BP: 133/86 (17 May 2019 05:11) (118/65 - 133/86)  BP(mean): --  RR: 16 (17 May 2019 05:11) (16 - 17)  SpO2: 98% (17 May 2019 05:11) (96% - 99%)                          8.1    8.74  )-----------( 248      ( 17 May 2019 06:30 )             26.5       05-17    141  |  108<H>  |  40<H>  ----------------------------<  84  4.0   |  21<L>  |  1.93<H>    Ca    9.0      17 May 2019 06:30  Phos  2.6     05-16  Mg     1.5     05-17        PT/INR - ( 16 May 2019 05:55 )   PT: 14.5 SEC;   INR: 1.30          PTT - ( 16 May 2019 05:55 )  PTT:30.7 SEC      CT neck: IMPRESSION:    Right-sided thyroid nodule which appears to have enlarged when compared   with a recent sonogram. Repeat sonogram is advised to confirm whether   this is a rapidly enlarging mass which may require histologic   characterization.    There is mild leftward tracheal displacement and tracheal narrowing.    Moderate right pleural effusion.    Findings were discussed with physician assistant Shantelle on 5/3/2019 2:59   PM with read back.    JAI BALL M.D., ATTENDING RADIOLOGIST  This document has been electronically signed. May  3 2019  3:07PM        CT c/a/p: FINDINGS:      IMPRESSION:     Unchanged prominent mediastinal nodes, nonspecific. Otherwise, no   evidenceof metastatic disease in the chest, abdomen, or pelvis.    High-density material layering within the gastric antrum may be secondary   to ingested material.    Questionable skin defect of the left gluteal soft tissues not seen on   prior CT, likely skinfold however clinical correlation is recommended.    Findings discussed by Dr. Forrester with Dr. Arriaza on 4/14/2019 at 2:00 PM   with read back.        GURINDER FORRESTER M.D., RADIOLOGY RESIDENT  This document has been electronically signed.  MARYELLEN SALCEDO, ATTENDING RADIOLOGIST  This document has been electronically signed. Apr 14 2019  7:54PM      Path: Surgical Pathology Report:   ACCESSION No:  [id number]    SCHNEIDER KARINAMAGGIE Samuels        Addendum Report          Addendum    Specimen ID: [id number]  Date of Report: 05/08/2019 05:26 PM EDT  Date Collected: 04/24/2019  Date Received: 05/02/2019  Specimen Source: Solid Tumor  Specimen Tumor %: >50%    [employer] N[facility] BRAF Sequencing Report    RESULT SUMMARY:  ABNORMAL    DETECTED GENOMIC ALTERATIONS:  BRAF [id number]    TUMOR TYPE: Adenocarcinoma  CLINICAL INFORMATION:  Left hemicolectomy showed moderatelydifferentiated  adenocarcinoma (#[id number]  [id number]). History of colon cancer.    INTERPRETATION SUMMARY  A BRAF p.[id number] (V600E) mutation was identified in this  patient's sample.    BRAF V600E mutations appear to be associated with a poor  prognosis and decreased sensitivity to anti-EGFR antibody  therapy, such as panitumumab and cetuximab, in patients with  metastatic colorectal cancer. However, anti-EGFR therapy in BRAF  V600E positive metastatic colorectal cancer has been shown to be  effective if given in combination with a BRAF inhibitor  (vamurafenib) (70188182, [id number], [id number];NCCN Guidelines,  Colon Cancer, Version 4.2018; NCCN Guidelines, Rectal Cancer,  Version 3.2018). Though BRAF V600E melanomas are sensitive to  BRAF inhibitors (vemurafenib, dabrafenib, encorafenib) and MEK  inhibitors (trametinib, cobimetinib, binimetinib), the same  response was not observed in colon cancer and therefore  combination therapies are under investigation ([id number]). Some  studies have seen response to combined BRAF-MEK and BRAF-EGFR  inhibition in patients with BRAF V600E mutated colorectal tumors,  however the response has not been as robust as that seen in  melanoma ([id number]; [id number]; [id number]; [id number]). Combination  BRAF and PI3K/mTOR inhibitor treatment is also being investigated  in colorectal cancer (46515425). Studies are preliminary and the  effectiveness of these BRAF-directed therapies in the setting of  metastatic BRAF-mutant colorectal cancer is limited and    KARINA SCHNEIDER                       6        Addendum Report          therapeutic efficacy is still under investigation ([id number];  [id number]).    For the full interpretation summary of test(s), please refer to  the [facility] Sequencing Molecular Report which has been faxed to  the ordering physician by [facility].    This test was performed by an outside laboratory. For all patient  care and clinical decision making purposes refer solely to  original laboratory report.  The information transcribed here is  not the entire report.  This shortened version has been placed  here for the purpose of the electronic record.    Outside report electronicallysigned by: Otilia Larsen MD PhD    This test was performed and [facility] [address] [place] [id number].  [phone].    Verified by: Steve Cruz DO  (Electronic Signature)  Reported on: 05/09/19 08:56 EDT, [address] [place] [address]  [id number]  _________________________________________________________________          Addendum  MMR (by IHC)- reporting Template (CAP October 1st, 2013)    Immunohistochemistry Testing (IHC) for Mismatch Repair Proteins  performed on tissue block 1C shows the following result:    MLH1 : Loss of nuclear expression  MSH2 :  Intact nuclear expression  MSH6:  Intact nuclear expression  PMS2 : Loss of nuclear expression    Interpretation: There is a loss of nuclear expression in MLH1 and  PMS2, while MSH2 and MSH6 show intact nuclear expression. Loss of  nuclear expression of MLH1 and PMS2 is supportive of DNA mismatch  repair deficiency and high frequency of microsatellite  instability (MSI-H). Microsatellite unstable tumor with loss of  MLH1 protein may appear in sporadic colorectal carcinoma and  Winters syndrome / HNPCC (hereditary non polyposis colorectal  cancer).              KARINA SCHNEIDER                       6        Addendum Report          Testing for methylation of the MLH1 promoter and /or mutation of  BRAF is indicated (the presence of a BRAF V600E mutation and/or  MLH1 methylation suggests that the tumor is sporadic and germline  evaluation is probably not indicated; absence of both MLH1  methylation and of BRAF V600E mutation suggests the possibility  of Winters syndrome and sequencing and/or large  deletion/duplication testing of germline MLH1 may be indicated.  BRAF mutation analysis is in progress, results to follow.    Clinical correlation with genetic counseling is recommended to  assess the need for germline testing.    Comment: Background nonneoplastic tissue/internal control with  intact nuclear expression are present.    These immunohistochemical tests have been developed and their  performance characteristics determined by [facility], Department of Pathology, Division of  Immunopathology.  It has not been cleared or approved by the U.[place].  Food and Drug Administration.  The FDA has determined that such  clearance or approval is not necessary.  This testis used for  clinical purposes.  The laboratory certified under the CLIA-88 as  qualified to perform high complexity clinical testing    Verified by: Steve Cruz DO  (Electronic Signature)  Reported on: 04/30/19 14:17 EDT, [address] [place]  [address]  _________________________________________________________________      Surgical Final Report          Final Diagnosis  Colon, left, hemicolectomy  - Invasive moderately differentiated adenocarcinoma with  mucinous features (3.3  cm) involving sigmoid colon  - Carcinoma invades the muscularis propria  - All margins of resection are negative for carcinoma  - Twenty-seven lymph nodes negative for carcinoma (0/27)              KARINA SCHNEIDER                       6        Surgical Final Report          - Pathologic stage (AJCC 8th Ed.) pT2N0    Verified by: Steve Cruz DO  (Electronic Signature)  Reported on: 04/29/19 15:50 EDT, [address] [place]  [address]  _________________________________________________________________    Synoptic Summary    COLON AND RECTUM: Resection, Including Transanal Disk Excision of  Rectal Neoplasms    AJCC, 8th  edition, updated December 2017    Procedure:  Left hemicolectomy  Tumor Site:   Sigmoid colon  Tumor Location (applicable only to rectal primaries):  Not  applicable  Tumor Size:  3.3 x 3.2 x 0.8 cm  Macroscopic Tumor Perforation:   Not identified  Macroscopic Intactness of Mesorectum:  Not applicable  Histologic Type:  Adenocarcinoma  Histologic Grade:  Moderately differentiated, G2  Microscopic Tumor Extension:   Tumor invades submucosa  Margins:   If all margins uninvolved by invasive carcinoma, high  grade dysplasia, intramucosal adenocarcinoma or adenoma:  Margins examined:  Proximal, distal, posterior retroperitoneal  Distance of invasive carcinoma from closest margin:  2.5 cm  Specify margin: Proximal and posterior retroperitoneal  Treatment Effect:  No prior treatment  Lymph-Vascular Invasion:  Not identified  Perineural Invasion:  Not identified  TumorBudding:  Low score (0-4)  Type of Polyp in Which Invasive Carcinoma Arose:  Tubulovillous  adenoma  Tumor Deposits:  Not identified  Lymph Node Examination:  Number of Lymph Nodes Examined:  27  Number of Lymph Nodes Involved:  0  Pathologic Staging(pTNM) T2 N0  TNM Descriptors:  Not applicable	  Distant Metastasis (pM) (required only if confirmed  pathologically in this case):  Not applicable            KARINA SCHNEIDER                       6        Surgical Final Report          Additional Pathologic Findings:  None identified  Ancillary Studies:  MMR studies are in progress on block 1C and  the findings will  be reported in an addendum  Comment(s):  None    Clinical History  Colon cancer    Specimen(s) Submitted  1-Left hemicolectomy.    Gross Description  The specimen is received in formalin and the specimen container  is labeled: Left hemicolectomy. It consists of an oriented, 12.0  cm in length left colonic segment with attached corresponding  triangular, irregular shaped mesocolon measuring 8 cm in maximum  width.  The margins are closed and each measures 6.5 cm distal  (inked blue) and 6.5 cm proximal, suture attached (inked black).  The bowel wall measures from 0.6 to 0.9 cm in thickness. A 3.3  (L) x 3.2 (C) x 0.8 cm (above mucosa) polypoid, centrally shallow  ulcerative surrounded by raised borders is seen at the mesenteric  side and focally covered by serosa at antimesenteric side of the  colon wall. The lesion shows 50% of circumference involvement. It  is situated  2.5 cm from the proximal; 5.5 cm from the distal  resection margin, 7 cm from mesenteric margin, and appears  invasive- 1.4 cm with gross impression of the depth of invasion.  Extension into pericolic fat is focal. The serosa deep to the  lesion is smooth, inked green. The adjacent mucosa is  unremarkable.  The pericolic fat contains 32 possible lymph  nodes. Representative sections submitted as follows:  1A= distal resection margin, x2 perpendicular sections  1B= proximal resection margin, x2 perpendicular sections  1C-1D= tumor with deepest invasion into underlying fat and one  lymph node  1E= tumor and transitional to adjacent uninvolved mucosa, to  proximal resection margin  1F= tumor and transitional to adjacent uninvolved mucosa, to  distal resection margin  1G= tumor and intact serosa  1H= mesenteric margin (inked orange), shave section, 2.5 cm from  the line of resection  1I= four lymph nodes  1J= four lymph nodes  1K= four lymph nodes  1L= four lymph nodes  1M= four possible lymph nodes  1N= one bisected lymph node (0.8 x 0.6 x 0.4 cm)  1O= four lymph nodes            KARINA SCHNEIDER             6        Surgical Final Report          1P= one bisected lymph node  1Q= five lymph nodes  Total cassettes-17    In addition to other data that may appear on the specimen  container, the label has been inspected to confirm the presence  of the patient's name and date of birth.  TK 04/27/2019 16:41 (04.24.19 @ 17:34)    ferritin high, normal B12, folate

## 2019-05-17 NOTE — DISCHARGE NOTE NURSING/CASE MANAGEMENT/SOCIAL WORK - NSDCDPATPORTLINK_GEN_ALL_CORE
You can access the [facility] Patient Portal, offered by [facility], by registering with the following website: [email or web address]

## 2019-05-18 VITALS — WEIGHT: 194.01 LBS

## 2019-05-18 DIAGNOSIS — I10 ESSENTIAL (PRIMARY) HYPERTENSION: ICD-10-CM

## 2019-05-18 LAB
ANION GAP SERPL CALC-SCNC: 12 MMO/L — SIGNIFICANT CHANGE UP (ref 7–14)
BASOPHILS # BLD AUTO: 0.03 K/UL — SIGNIFICANT CHANGE UP (ref 0–0.2)
BASOPHILS NFR BLD AUTO: 0.3 % — SIGNIFICANT CHANGE UP (ref 0–2)
BLD GP AB SCN SERPL QL: NEGATIVE — SIGNIFICANT CHANGE UP
BUN SERPL-MCNC: 36 MG/DL — HIGH (ref 7–23)
CALCIUM SERPL-MCNC: 9.1 MG/DL — SIGNIFICANT CHANGE UP (ref 8.4–10.5)
CHLORIDE SERPL-SCNC: 106 MMOL/L — SIGNIFICANT CHANGE UP (ref 98–107)
CO2 SERPL-SCNC: 22 MMOL/L — SIGNIFICANT CHANGE UP (ref 22–31)
CREAT SERPL-MCNC: 1.76 MG/DL — HIGH (ref 0.5–1.3)
EOSINOPHIL # BLD AUTO: 0.48 K/UL — SIGNIFICANT CHANGE UP (ref 0–0.5)
EOSINOPHIL NFR BLD AUTO: 5.3 % — SIGNIFICANT CHANGE UP (ref 0–6)
GLUCOSE SERPL-MCNC: 93 MG/DL — SIGNIFICANT CHANGE UP (ref 70–99)
HCT VFR BLD CALC: 28 % — LOW (ref 34.5–45)
HGB BLD-MCNC: 8.7 G/DL — LOW (ref 11.5–15.5)
IMM GRANULOCYTES NFR BLD AUTO: 0.4 % — SIGNIFICANT CHANGE UP (ref 0–1.5)
LYMPHOCYTES # BLD AUTO: 1.48 K/UL — SIGNIFICANT CHANGE UP (ref 1–3.3)
LYMPHOCYTES # BLD AUTO: 16.3 % — SIGNIFICANT CHANGE UP (ref 13–44)
MAGNESIUM SERPL-MCNC: 1.4 MG/DL — LOW (ref 1.6–2.6)
MCHC RBC-ENTMCNC: 22.3 PG — LOW (ref 27–34)
MCHC RBC-ENTMCNC: 31.1 % — LOW (ref 32–36)
MCV RBC AUTO: 71.8 FL — LOW (ref 80–100)
MONOCYTES # BLD AUTO: 0.86 K/UL — SIGNIFICANT CHANGE UP (ref 0–0.9)
MONOCYTES NFR BLD AUTO: 9.5 % — SIGNIFICANT CHANGE UP (ref 2–14)
NEUTROPHILS # BLD AUTO: 6.19 K/UL — SIGNIFICANT CHANGE UP (ref 1.8–7.4)
NEUTROPHILS NFR BLD AUTO: 68.2 % — SIGNIFICANT CHANGE UP (ref 43–77)
NRBC # FLD: 0.13 K/UL — SIGNIFICANT CHANGE UP (ref 0–0)
NRBC FLD-RTO: 1.4 — SIGNIFICANT CHANGE UP
PLATELET # BLD AUTO: 258 K/UL — SIGNIFICANT CHANGE UP (ref 150–400)
PMV BLD: 10.8 FL — SIGNIFICANT CHANGE UP (ref 7–13)
POTASSIUM SERPL-MCNC: 3.9 MMOL/L — SIGNIFICANT CHANGE UP (ref 3.5–5.3)
POTASSIUM SERPL-SCNC: 3.9 MMOL/L — SIGNIFICANT CHANGE UP (ref 3.5–5.3)
RBC # BLD: 3.9 M/UL — SIGNIFICANT CHANGE UP (ref 3.8–5.2)
RBC # FLD: 24.4 % — HIGH (ref 10.3–14.5)
RH IG SCN BLD-IMP: POSITIVE — SIGNIFICANT CHANGE UP
SODIUM SERPL-SCNC: 140 MMOL/L — SIGNIFICANT CHANGE UP (ref 135–145)
WBC # BLD: 9.08 K/UL — SIGNIFICANT CHANGE UP (ref 3.8–10.5)
WBC # FLD AUTO: 9.08 K/UL — SIGNIFICANT CHANGE UP (ref 3.8–10.5)

## 2019-05-18 RX ORDER — DIGOXIN 250 MCG
1 TABLET ORAL
Qty: 30 | Refills: 0
Start: 2019-05-18 | End: 2019-06-16

## 2019-05-18 RX ORDER — METOPROLOL TARTRATE 50 MG
3 TABLET ORAL
Qty: 180 | Refills: 0
Start: 2019-05-18 | End: 2019-06-16

## 2019-05-18 RX ORDER — MAGNESIUM SULFATE 500 MG/ML
1 VIAL (ML) INJECTION ONCE
Refills: 0 | Status: COMPLETED | OUTPATIENT
Start: 2019-05-18 | End: 2019-05-18

## 2019-05-18 RX ADMIN — APIXABAN 5 MILLIGRAM(S): 2.5 TABLET, FILM COATED ORAL at 05:27

## 2019-05-18 RX ADMIN — Medication 20 MILLIGRAM(S): at 08:54

## 2019-05-18 RX ADMIN — Medication 1 TABLET(S): at 10:52

## 2019-05-18 RX ADMIN — Medication 1 TABLET(S): at 05:27

## 2019-05-18 RX ADMIN — Medication 20 MILLIGRAM(S): at 05:27

## 2019-05-18 RX ADMIN — Medication 75 MILLIGRAM(S): at 05:27

## 2019-05-18 RX ADMIN — Medication 300 MILLIGRAM(S): at 10:52

## 2019-05-18 RX ADMIN — Medication 20 MILLIGRAM(S): at 00:51

## 2019-05-18 RX ADMIN — Medication 100 GRAM(S): at 10:51

## 2019-05-18 NOTE — PROGRESS NOTE ADULT - PROVIDER SPECIALTY LIST ADULT
Electrophysiology
Gastroenterology
Heart Failure
Internal Medicine
Nephrology
Pulmonology
Surgery
Colorectal Surgery
Nephrology
Electrophysiology
Internal Medicine
Internal Medicine

## 2019-05-18 NOTE — PROGRESS NOTE ADULT - PROBLEM SELECTOR PROBLEM 1
BRBPR (bright red blood per rectum)
Acute on chronic diastolic heart failure
BRBPR (bright red blood per rectum)
Acute on chronic diastolic heart failure

## 2019-05-18 NOTE — PROGRESS NOTE ADULT - SUBJECTIVE AND OBJECTIVE BOX
Patient is a 63y old  Female who presents with a chief complaint of BRBPR (16 May 2019 17:39)      SUBJECTIVE / OVERNIGHT EVENTS:  SOB stable. No new GI bleeding  Review of Systems:   CONSTITUTIONAL: No fever, weight loss, or fatigue  EYES: No eye pain, visual disturbances, or discharge  ENMT:  No difficulty hearing, tinnitus, vertigo; No sinus or throat pain  NECK: No pain or stiffness  BREASTS: No pain, masses, or nipple discharge  RESPIRATORY: No cough, wheezing, chills or hemoptysis; No shortness of breath  CARDIOVASCULAR: No chest pain, palpitations, dizziness, or leg swelling  GASTROINTESTINAL: No abdominal or epigastric pain. No nausea, vomiting, or hematemesis; No diarrhea or constipation. No melena or hematochezia.  GENITOURINARY: No dysuria, frequency, hematuria, or incontinence  NEUROLOGICAL: No headaches, memory loss, loss of strength, numbness, or tremors  SKIN: No itching, burning, rashes, or lesions   LYMPH NODES: No enlarged glands  ENDOCRINE: No heat or cold intolerance; No hair loss  MUSCULOSKELETAL: No joint pain or swelling; No muscle, back, or extremity pain  PSYCHIATRIC: No depression, anxiety, mood swings, or difficulty sleeping  HEME/LYMPH: No easy bruising, or bleeding gums  ALLERY AND IMMUNOLOGIC: No hives or eczema    MEDICATIONS  (STANDING):  allopurinol 300 milliGRAM(s) Oral daily  apixaban 5 milliGRAM(s) Oral every 12 hours  buDESOnide  80 MICROgram(s)/formoterol 4.5 MICROgram(s) Inhaler 2 Puff(s) Inhalation two times a day  calcium carbonate    500 mG (Tums) Chewable 1 Tablet(s) Chew two times a day  dextrose 5%. 1000 milliLiter(s) (50 mL/Hr) IV Continuous <Continuous>  dextrose 50% Injectable 12.5 Gram(s) IV Push once  dextrose 50% Injectable 25 Gram(s) IV Push once  dextrose 50% Injectable 25 Gram(s) IV Push once  digoxin     Tablet 0.125 milliGRAM(s) Oral daily  insulin lispro (HumaLOG) corrective regimen sliding scale   SubCutaneous three times a day before meals  insulin lispro (HumaLOG) corrective regimen sliding scale   SubCutaneous at bedtime  metoprolol succinate ER 75 milliGRAM(s) Oral two times a day  multivitamin 1 Tablet(s) Oral daily  sildenafil (REVATIO) 20 milliGRAM(s) Oral three times a day  torsemide 20 milliGRAM(s) Oral daily    MEDICATIONS  (PRN):  acetaminophen   Tablet .. 650 milliGRAM(s) Oral every 6 hours PRN Mild Pain (1 - 3), Moderate Pain (4 - 6)  ALBUTerol    90 MICROgram(s) HFA Inhaler 2 Puff(s) Inhalation every 6 hours PRN Shortness of Breath and/or Wheezing  dextrose 40% Gel 15 Gram(s) Oral once PRN Blood Glucose LESS THAN 70 milliGRAM(s)/deciliter  glucagon  Injectable 1 milliGRAM(s) IntraMuscular once PRN Glucose LESS THAN 70 milligrams/deciliter      PHYSICAL EXAM:  Vital Signs Last 24 Hrs  T(C): 36.7 (18 May 2019 05:25), Max: 37.1 (17 May 2019 21:13)  T(F): 98 (18 May 2019 05:25), Max: 98.7 (17 May 2019 21:13)  HR: 83 (18 May 2019 05:25) (72 - 83)  BP: 134/87 (18 May 2019 05:25) (92/60 - 134/87)  BP(mean): --  RR: 18 (18 May 2019 05:25) (17 - 18)  SpO2: 98% (18 May 2019 05:25) (97% - 100%)  I&O's Summary    17 May 2019 07:01  -  18 May 2019 07:00  --------------------------------------------------------  IN: 600 mL / OUT: 1800 mL / NET: -1200 mL      GENERAL: NAD, well-developed  HEAD:  Atraumatic, Normocephalic  EYES: EOMI, PERRLA, conjunctiva and sclera clear  NECK: Supple, No JVD  CHEST/LUNG: Clear to auscultation bilaterally; No wheeze  HEART: Regular rate and rhythm; No murmurs, rubs, or gallops  ABDOMEN: Soft, Nontender, Nondistended; Bowel sounds present  EXTREMITIES:  2+ Peripheral Pulses, No clubbing, cyanosis, or edema  PSYCH: AAOx3  NEUROLOGY: non-focal  SKIN: No rashes or lesions    LABS:  CAPILLARY BLOOD GLUCOSE      POCT Blood Glucose.: 107 mg/dL (18 May 2019 08:24)  POCT Blood Glucose.: 130 mg/dL (17 May 2019 21:10)  POCT Blood Glucose.: 111 mg/dL (17 May 2019 17:49)  POCT Blood Glucose.: 148 mg/dL (17 May 2019 12:41)                          8.7    9.08  )-----------( 258      ( 18 May 2019 07:36 )             28.0     05-18    140  |  106  |  36<H>  ----------------------------<  93  3.9   |  22  |  1.76<H>    Ca    9.1      18 May 2019 07:36  Mg     1.4     05-18                RADIOLOGY & ADDITIONAL TESTS:    Imaging Personally Reviewed:    Consultant(s) Notes Reviewed:      Care Discussed with Consultants/Other Providers:

## 2019-05-18 NOTE — PROGRESS NOTE ADULT - PROBLEM SELECTOR PLAN 5
Controlled
On Revatio 20 mg TID. Pulm eval noted.  Needs prior auth prior to discharge
On Revatio 20 mg TID. Pulm akinal noted
On Revatio 20 mg TID. Pulm akinal noted.  Needs prior auth prior to discharge.
On Revatio 20 mg TID. Pulm akinal noted.  Needs prior auth prior to discharge.
On Revatio 20 mg TID. Pulm eval noted.

## 2019-05-18 NOTE — PROGRESS NOTE ADULT - PROBLEM SELECTOR PROBLEM 5
Benign essential hypertension
Pulmonary hypertension

## 2019-05-18 NOTE — PROGRESS NOTE ADULT - PROBLEM SELECTOR PROBLEM 3
Chronic systolic right heart failure
Acute on chronic renal failure
Chronic systolic right heart failure
Chronic atrial fibrillation

## 2019-05-18 NOTE — PROGRESS NOTE ADULT - PROBLEM SELECTOR PROBLEM 2
OSMAR (acute kidney injury)
Pulmonary HTN
OSMAR (acute kidney injury)
Pulmonary HTN

## 2019-05-18 NOTE — PROGRESS NOTE ADULT - PROBLEM SELECTOR PLAN 3
Management as per HF team.
Management as per HF team. Awaiting consult.
Management as per HF team. FU appreciated.
Management as per HF team. consult appreciated.
Etiology unclear. Related to contrast on 5/3/19 CT? then with volume overload?  OSMAR improving now s/p IV diuretics, would continue.
Management as per HF team. consult appreciated
HE stable. Tolerating eliquis No new BRBPR

## 2019-05-18 NOTE — PROGRESS NOTE ADULT - PROBLEM SELECTOR PLAN 2
Improving daily  Possibly volume overload.   Renal eval noted  CHF team to see patient as well
Improving daily, creat 2.10 today    Renal FU noted  Lasix as per Renal and HF team
Improving daily, creat 2.27 today  Possibly volume overload.   Renal eval noted  Lasix as per Renal and HF team
Possibly volume overload.   Renal eval noted  CHF team to see patient as well
Continue with sildenafil 20 mg po TID.  Diuretics as above.
Improving daily, creat 2.53 today  Possibly volume overload.   Renal eval noted  Cont Lasix IV till after the transfusion
On Revatio 20 mg TID. Has a temporary supply at home. Will need an alternative since her insurance does not cover Revatio at this dose for her diagnosis

## 2019-05-18 NOTE — PROGRESS NOTE ADULT - PROBLEM SELECTOR PLAN 4
Resp status stable
Was on Eliquis. Hold off due to GI Bleed. Tachycardic, EP FU noted
On Eliquis. Hold off due to GI Bleed. Tachycardic, EP FU noted
On Eliquis. Hold off due to GI Bleed. Tachycardic, EP nathanael noted
Was on Eliquis. Hold off due to GI Bleed. Tachycardic, EP FU noted. Not a good candidate for NICOLAS/ Ablation or watchman due to pulmonary issues
Was on Eliquis. Restart and monitor CBC.  EP FU noted. Not a good candidate for NICOLAS/ Ablation or watchman due to pulmonary issues
Now rate controlled 80s.  Continue Toprol to 75 mg po BID.   Dig level 1.0. Will add back digoxin 0.125 mg daily.   Will check a dig level in AM.  Eliquis on hold? per primary team for anemia.

## 2019-05-18 NOTE — PROGRESS NOTE ADULT - PROBLEM SELECTOR PROBLEM 4
Chronic atrial fibrillation
Atrial fibrillation
Chronic atrial fibrillation
Chronic systolic right heart failure

## 2019-05-18 NOTE — PROGRESS NOTE ADULT - PROBLEM SELECTOR PLAN 1
Patient has colon cancer, s/p recent hemicolectomy  CBC stable  GI Consult noted. Etiology of GI bleed could be from anastomotic site or new lesions.   Surgery has evaluated her  Will advance her diet
Patient has colon cancer, s/p recent hemicolectomy  FU CBC   DW surgical oncology, trial of eliquis
Patient has colon cancer, s/p recent hemicolectomy  FU CBC  GI Consult noted. Etiology of GI bleed could be from anastamotic site or new lesions.   Surgery has evaluated her
Patient has colon cancer, s/p recent hemicolectomy  FU CBC in AM
Mild-moderately hypervolemic.   Agree to continue Lasix 40 mg IV qd, renal function improving.   Continue Toprol to 75 mg po BID (rapid a.fib).
Patient has colon cancer, s/p recent hemicolectomy  CBC stable  transfuse 1 U prbc in AM
Resp status stable

## 2019-05-28 ENCOUNTER — APPOINTMENT (OUTPATIENT)
Dept: COLORECTAL SURGERY | Facility: CLINIC | Age: 64
End: 2019-05-28
Payer: COMMERCIAL

## 2019-05-28 VITALS
SYSTOLIC BLOOD PRESSURE: 116 MMHG | DIASTOLIC BLOOD PRESSURE: 74 MMHG | RESPIRATION RATE: 18 BRPM | OXYGEN SATURATION: 92 % | HEART RATE: 83 BPM | TEMPERATURE: 98.3 F

## 2019-05-28 PROCEDURE — 99024 POSTOP FOLLOW-UP VISIT: CPT

## 2019-05-28 PROCEDURE — 46050 I&D PERIANAL ABSCESS SUPFC: CPT | Mod: 58

## 2019-05-28 RX ORDER — FLUTICASONE PROPIONATE AND SALMETEROL 50; 500 UG/1; UG/1
POWDER RESPIRATORY (INHALATION)
Refills: 0 | Status: ACTIVE | COMMUNITY

## 2019-05-28 RX ORDER — ALBUTEROL SULFATE 90 UG/1
108 (90 BASE) AEROSOL, METERED RESPIRATORY (INHALATION)
Refills: 0 | Status: ACTIVE | COMMUNITY

## 2019-05-28 NOTE — ASSESSMENT
[FreeTextEntry1] : Colon cancer with \par -Patient progressing well from colon cancer. Pathology was discussed in hospital T2 N0 Mx\par -Small perirectal abscess, area was injected with 5 cc of 1% lidocaine with epinephrine and incised and drained in standard fashion. Approximately 5 cc of purulent discharge release\par -Remove packing in 24 hours\par -Followup in one week for wound check\par -Follow up with oncology

## 2019-05-28 NOTE — HISTORY OF PRESENT ILLNESS
[FreeTextEntry1] : Status post transverse colectomy for colon cancer. Patient progressing well. Tolerating diet. Denies pain. The patient does report swelling by the anus which is tender to palpation. No fevers or chills

## 2019-06-04 ENCOUNTER — APPOINTMENT (OUTPATIENT)
Dept: COLORECTAL SURGERY | Facility: CLINIC | Age: 64
End: 2019-06-04
Payer: COMMERCIAL

## 2019-06-04 PROCEDURE — 99024 POSTOP FOLLOW-UP VISIT: CPT

## 2019-06-04 NOTE — HISTORY OF PRESENT ILLNESS
[FreeTextEntry1] : 63-year-old female status post left partial colectomy for stage I colon cancer. Patient progressing well. Tolerating diet. Some bloating but normal bowel movements are noted. No fevers or chills. Patient had incision and drainage of perirectal abscess last week which is improving.

## 2019-06-04 NOTE — ASSESSMENT
[FreeTextEntry1] : Colon cancer with perirectal abscess\par -Patient progressing well\par -Diet as tolerated\par -Followup in 6 weeks for reevaluation

## 2019-06-05 ENCOUNTER — APPOINTMENT (OUTPATIENT)
Dept: CARDIOLOGY | Facility: CLINIC | Age: 64
End: 2019-06-05
Payer: COMMERCIAL

## 2019-06-05 VITALS
TEMPERATURE: 98 F | HEIGHT: 64 IN | DIASTOLIC BLOOD PRESSURE: 67 MMHG | SYSTOLIC BLOOD PRESSURE: 102 MMHG | BODY MASS INDEX: 32.61 KG/M2 | RESPIRATION RATE: 18 BRPM | OXYGEN SATURATION: 94 % | WEIGHT: 191 LBS | HEART RATE: 89 BPM

## 2019-06-05 PROCEDURE — 93000 ELECTROCARDIOGRAM COMPLETE: CPT

## 2019-06-05 PROCEDURE — 99214 OFFICE O/P EST MOD 30 MIN: CPT

## 2019-06-05 RX ORDER — SILDENAFIL 20 MG/1
20 TABLET ORAL 3 TIMES DAILY
Qty: 270 | Refills: 3 | Status: ACTIVE | COMMUNITY

## 2019-06-05 NOTE — REASON FOR VISIT
[Heart Failure] : congestive heart failure [Follow-Up - From Hospitalization] : follow-up of a recent hospitalization for [FreeTextEntry2] : s/p hospitalization 5/10-5/18/19 secondary to BRBPR, S/P partial left colectomy secondary to Stage 1 colon cancer, ADHF

## 2019-06-05 NOTE — DISCUSSION/SUMMARY
[Patient] : the patient [___ Week(s)] : [unfilled] week(s) [FreeTextEntry1] : 1. S/P  Acute on chronic diastolic heart failure. Plan: \par - Mild-moderately hypervolemic.\par - increase torsemide to 20 mg 4 days a week on T/Th/S/S and 40 mg on M-W-F\par -Continue Toprol to 75 mg po BID ( a.fib) from 100 mg bid prior to hospitalization\par \par  2. Severe Pulmonary HTN.\par -  Continue with sildenafil 20 mg po TID.\par - Diuretics as above.\par - Follow up with Dr. Yang\par - Continue CPAP for LINH\par \par  3. S/P Acute on chronic renal failure, creat 5/18 1.76\par -  Etiology unclear. Related to contrast on 5/3/19 CT? then with volume overload? OSMAR improving now s/p IV diuretics, would continue torsemide with 40 mg M-W-F and 20 mg other days\par - Follow up with renal Dr. Heart next week\par - will obtain recent labs from Dr. Palafox\par \par 4. Atrial fibrillation- rate controlled 80s.\par -Continue Toprol to 75 mg po BID.\par - Dig level 1.0. Continue digoxin 0.125 mg daily.\par - Continue Eliquis for oral a/c and stroke prevention.\par \par Follow up in office in 4 weeks

## 2019-06-05 NOTE — HISTORY OF PRESENT ILLNESS
[FreeTextEntry1] : 62 y/o female w/ PMHX of Afib on Eliquis, DM II, HTN, COPD on home O2 2L, LINH on CPAP, lymphoma/CLL s/p radiation and chemo (1997), recent diagnosis of Stage 1 colon CA s/p partial left colon resection (April 23, 2019), HFpEF LVEF 66%, severe pulmonary HTN (now on sildenafil), RV systolic dysfunction, mod-severe TR. Admitted 5/10-5/18/19.\par \par Course in hospital; presented with complaints of BRBPR x 1. In ED patient found to be anemic hgb/hct 8.2/27, andwas also found to be in OSMAR with BUN/Cr 73/3.88 (last cr 1.5). GI was consulted, patient was considered for sigmoidoscopy, but b/c patient with OSMAR and likely volume overload GI procedures were held per primary team. Patient was started on Lasix 40 mg IV qd (home diuretics were torsemide 20 mg daily) and she has been diuresing well. She admits to having worsening LE edema, and poor urine output s/p discharge with d/c weight of 185.1 lbs.\par \par Pt comes for a post hospital follow up today after admission 5/10-5/18/19 with BRBPR S/P partial left colectomy secondary to Stage 1 colon cancer, as per patient with 27 neg nodes and does not need chemo. Her d/c weight was 185 lbs and she is 187 at home and 191 in office. She states she has swelling in her feet and ankles but increases in her legs too during the day. Meds changed during adm; dig changed to daily, metoprolol changed to 75 mg bid from 100 mg bid and torsemide to 20 mg from 40 mg daily. Pt states she had labs with her primary Dr. Palafox a few days ago and she has a follow up scheduled with renal Dr. Heart next week. She saw Dr. Hunt, the colorectal surgeon 6/4 after a small rectal abscess was drained. She is able to walk 2 blocks with PT with her cane, she can climb 12 steps slowly and then has to take a rest. She sleeps with 2 pillows and denies orthopnea. She has had a recent non prod cough during the day and has stopped using the incentive spirometer. She states she is using the CPAP at night but it is not comfortable, she sees pulm Dr. Francisco and will be following up with Dr. Yang for severe pulm HTN. She denies chest pain, palpitations, dizziness/LH and syncope. \par \par \par TTE 4/1/19:\par OBSERVATIONS:\par Mitral Valve: Mitral annular calcification and calcified\par mitral leaflets with normal diastolic opening. Mild mitral\par regurgitation.\par Aortic Root: Aortic Root: 2.9 cm.\par Aortic Valve: Calcified trileaflet aortic valve with normal\par opening. Peak transaortic valve gradient equals 7 mm Hg.\par Peak left ventricular outflow tract gradient equals 4.8 mm\par Hg.\par Left Atrium: Mildly dilated left atrium. LA volume index =\par 37 cc/m2.\par Left Ventricle: Hyperdynamic left ventricle. Flattening of\par the interventricular septum in both systole and diastole is\par  consistent with right ventricular pressure overload.\par Normal left ventricular internal dimensions and wall\par thicknesses. (DT:150 ms).\par Right Heart: Severe right atrial enlargement. Right\par ventricular enlargement with normal right ventricular\par systolic function. Normal tricuspid valve. Moderate-severe\par tricuspid regurgitation. Pulmonic valve not well\par visualized. Mild pulmonic regurgitation.\par Pericardium/Pleura Normal pericardium with no pericardial\par effusion.\par Hemodynamic: Estimated right ventricular systolic pressure\par equals 84 mm Hg, assuming right atrial pressure equals 10\par mm Hg, consistent with severe pulmonary hypertension.\par

## 2019-06-05 NOTE — REVIEW OF SYSTEMS
[see HPI] : see HPI [Negative] : Heme/Lymph [Recent Weight Gain (___ Lbs)] : recent [unfilled] ~Ulb weight gain [Lower Ext Edema] : lower extremity edema [Cough] : cough [Joint Pain] : no joint pain [FreeTextEntry1] : healing mid abdominal incision

## 2019-06-05 NOTE — ASSESSMENT
[FreeTextEntry1] : 63 y/o moderately obese female with controlled diabetes, HTN, COPD, lymphoma/CLL (1997), A.fib on Eliquis, HFpEF, LVEF 63% to 70% on TTE 4/1/19 with hyperdynamic LV, severe TYRONE, normal RV systolic function and severe pHTN\par ACC/AHA Stage C, NYHA Class III\par Appears volume overloaded and normotensive

## 2019-06-05 NOTE — PHYSICAL EXAM
[General Appearance - Well Developed] : well developed [Normal Appearance] : normal appearance [Well Groomed] : well groomed [General Appearance - Well Nourished] : well nourished [No Deformities] : no deformities [General Appearance - In No Acute Distress] : no acute distress [Normal Conjunctiva] : the conjunctiva exhibited no abnormalities [Eyelids - No Xanthelasma] : the eyelids demonstrated no xanthelasmas [Normal Oral Mucosa] : normal oral mucosa [Respiration, Rhythm And Depth] : normal respiratory rhythm and effort [Auscultation Breath Sounds / Voice Sounds] : lungs were clear to auscultation bilaterally [Exaggerated Use Of Accessory Muscles For Inspiration] : no accessory muscle use [Heart Sounds] : normal S1 and S2 [Edema] : no peripheral edema present [Bowel Sounds] : normal bowel sounds [Abdomen Soft] : soft [Abdomen Tenderness] : non-tender [Nail Clubbing] : no clubbing of the fingernails [Cyanosis, Localized] : no localized cyanosis [Skin Color & Pigmentation] : normal skin color and pigmentation [No Venous Stasis] : no venous stasis [] : no rash [No Skin Ulcers] : no skin ulcer [Skin Lesions] : no skin lesions [No Xanthoma] : no  xanthoma was observed [Oriented To Time, Place, And Person] : oriented to person, place, and time [Affect] : the affect was normal [FreeTextEntry1] : 1-2+ lower extremity and ankle edema

## 2019-06-17 ENCOUNTER — LABORATORY RESULT (OUTPATIENT)
Age: 64
End: 2019-06-17

## 2019-06-17 ENCOUNTER — APPOINTMENT (OUTPATIENT)
Dept: PULMONOLOGY | Facility: CLINIC | Age: 64
End: 2019-06-17
Payer: COMMERCIAL

## 2019-06-17 VITALS
WEIGHT: 186.38 LBS | RESPIRATION RATE: 17 BRPM | DIASTOLIC BLOOD PRESSURE: 71 MMHG | SYSTOLIC BLOOD PRESSURE: 130 MMHG | HEART RATE: 83 BPM | BODY MASS INDEX: 34.3 KG/M2 | OXYGEN SATURATION: 90 % | HEIGHT: 62 IN | TEMPERATURE: 97.2 F

## 2019-06-17 VITALS — BODY MASS INDEX: 53.92 KG/M2 | HEIGHT: 62 IN | WEIGHT: 293 LBS

## 2019-06-17 PROCEDURE — 94729 DIFFUSING CAPACITY: CPT

## 2019-06-17 PROCEDURE — ZZZZZ: CPT

## 2019-06-17 PROCEDURE — 94060 EVALUATION OF WHEEZING: CPT

## 2019-06-17 PROCEDURE — 99215 OFFICE O/P EST HI 40 MIN: CPT | Mod: 25

## 2019-06-17 PROCEDURE — 94726 PLETHYSMOGRAPHY LUNG VOLUMES: CPT

## 2019-06-17 PROCEDURE — 36415 COLL VENOUS BLD VENIPUNCTURE: CPT

## 2019-06-17 RX ORDER — SITAGLIPTIN 50 MG/1
50 TABLET, FILM COATED ORAL DAILY
Refills: 0 | Status: DISCONTINUED | COMMUNITY
Start: 2018-01-11 | End: 2019-06-17

## 2019-06-17 RX ORDER — METOPROLOL SUCCINATE 25 MG/1
25 TABLET, EXTENDED RELEASE ORAL
Qty: 90 | Refills: 3 | Status: DISCONTINUED | COMMUNITY
Start: 2018-04-02 | End: 2019-06-17

## 2019-06-17 RX ORDER — DULAGLUTIDE 0.75 MG/.5ML
0.75 INJECTION, SOLUTION SUBCUTANEOUS WEEKLY
Refills: 0 | Status: DISCONTINUED | COMMUNITY
Start: 2018-04-02 | End: 2019-06-17

## 2019-06-17 RX ORDER — DULAGLUTIDE 1.5 MG/.5ML
1.5 INJECTION, SOLUTION SUBCUTANEOUS
Qty: 4 | Refills: 0 | Status: DISCONTINUED | COMMUNITY
Start: 2018-05-19 | End: 2019-06-17

## 2019-06-17 NOTE — DISCUSSION/SUMMARY
[FreeTextEntry1] : ---Assessment plan----------The patient has been referred here for further opinion regarding pulmonary problem,chronic diastolic heart failure secondary  pulm HTN, on sildenafil. PMH colon cancer, afib- on eliquis and  LINH on bipap and nocturnal oxygen, OSMAR, CHF, DM, thyroid nodule, history of colon cancer status post colon surgery history of lymphoma status post chemotherapy [1997], ??right diaphragm elevation\par \par 1- chronic diastolic heart failure secondary  pulm HTN, [ WHO GROUP II = GROUP III ]  on sildenafil., keep her euvolemic-- USES CANE TO WALK\par 2 CKD-- CAUSE NOT CLEAR, follow up with nephrology\par 3- LINH-  on BiPAP\par 4 abnormal gait----neurology opinion [dr fischer] \par 4-dm- followup endocrinology\par 5-anemia--  iron studies follow up with hematology\par 6  ??right diaphragm elevation - ultrasound right diaphragm\par \par Thanks for allowing  me to participate  in the care of this patient.  Patient at this time  will follow  the above mentioned recommendations and return back for follow up visit. If you have any questions  I can be reached  at #[phone] (beeper)  or  [phone] (office).\par \par Anirudh Yang MD, FCCP \par Director, Pulmonary Hypertension Program \par [facility] \par Division of Pulmonary, Critical Care and Sleep Medicine \par  Professor of Medicine \par [employer]\par

## 2019-06-17 NOTE — PHYSICAL EXAM
[General Appearance - Well Developed] : well developed [Well Groomed] : well groomed [Normal Appearance] : normal appearance [No Deformities] : no deformities [General Appearance - Well Nourished] : well nourished [Normal Conjunctiva] : the conjunctiva exhibited no abnormalities [General Appearance - In No Acute Distress] : no acute distress [Eyelids - No Xanthelasma] : the eyelids demonstrated no xanthelasmas [III] : III [Neck Appearance] : the appearance of the neck was normal [Neck Cervical Mass (___cm)] : no neck mass was observed [Thyroid Diffuse Enlargement] : the thyroid was not enlarged [Jugular Venous Distention Increased] : there was no jugular-venous distention [Thyroid Nodule] : there were no palpable thyroid nodules [Heart Sounds] : normal S1 and S2 [Respiration, Rhythm And Depth] : normal respiratory rhythm and effort [Auscultation Breath Sounds / Voice Sounds] : lungs were clear to auscultation bilaterally [Exaggerated Use Of Accessory Muscles For Inspiration] : no accessory muscle use [Tenderness: ____] : tenderness [unfilled] [Abdomen Soft] : soft [Abdomen Tenderness] : non-tender [Abdomen Mass (___ Cm)] : no abdominal mass palpated [Abnormal Walk] : normal gait [Gait - Sufficient For Exercise Testing] : the gait was sufficient for exercise testing [2+ Pitting] : 2+  pitting [Skin Color & Pigmentation] : normal skin color and pigmentation [Skin Turgor] : normal skin turgor [] : no rash [Deep Tendon Reflexes (DTR)] : deep tendon reflexes were 2+ and symmetric [Sensation] : the sensory exam was normal to light touch and pinprick [No Focal Deficits] : no focal deficits [Oriented To Time, Place, And Person] : oriented to person, place, and time [Impaired Insight] : insight and judgment were intact [Affect] : the affect was normal

## 2019-06-17 NOTE — REVIEW OF SYSTEMS
[Cough] : cough [Dyspnea] : dyspnea [Dysrhythmia] : dysrhythmia [Hypertension] : ~T hypertension [Edema] : ~T edema was present [Diabetes] : diabetes mellitus [Heartburn] : heartburn [Thyroid Problem] : thyroid problem [As Noted in HPI] : as noted in HPI [Negative] : Psychiatric

## 2019-06-17 NOTE — HISTORY OF PRESENT ILLNESS
[FreeTextEntry1] : This letter  is regarding your patient  who  attended pulmonary out patient office today.  I have reviewed  patient's  past history, social history, family history and medication list. I also  reviewed nurse practitioners/ and fellows  notes and assessment and agree with it.  \par The patient was referred by  for pulm HTN, on sildenafil. PMH colon cancer, afib- on eliquis and  LINH on bipap and nocturnal oxygen, OSMAR, CHF, DM, thyroid nodule -on home oxygen-  status post resection of stage I colon cancer    ----------\par \par ------No history of , fever, chills , rigors, chest pain, or hemoptysis. Questionable history of Raynaud's phenomenon. No h/o significant weight loss in last few months. No history of liver dysfunction , collagen vascular disorder or chronic thromboembolic disease. I would classify the patient's dyspnea as WHO  FUNCTIONAL CLASS II--------\par \par ----Pft date--2109- -------RESTRICTIEV VENTD EFECT \par ----Ct scan date--4/2019 IMPRESSION: \par \par Unchanged prominent mediastinal nodes, nonspecific. Otherwise, no \par evidence of metastatic disease in the chest, abdomen, or pelvis.\par \par High-density material layering within the gastric antrum may be secondary \par to ingested material.\par \par Questionable skin defect of the left gluteal soft tissues not seen on \par prior CT, likely skinfold however clinical correlation is recommended.\par \par Findings discussed by Dr. Avila with Dr. Arriaza on 4/14/2019 at 2:00 PM \par with read back.  \par -----\par vq scan 4/2019 low prob of PE\par \par echo 4/2019 ------------------------------------------------------------------------\par CONCLUSIONS:\par 1. Mitral annular calcification and calcified mitral\par leaflets with normal diastolic opening. Mild mitral\par regurgitation.\par 2. Calcified trileaflet aortic valve with normal opening.\par 3. Normal left ventricular internal dimensions and wall\par thicknesses.\par 4. Hyperdynamic left ventricle. Flattening of the\par interventricular septum in both systole and diastole is\par consistent with right ventricular pressure overload.\par 5. Severe right atrial enlargement.\par 6. Right ventricular enlargement with normal right\par ventricular systolic function.\par 7. Normal tricuspid valve. Moderate-severe tricuspid\par regurgitation.\par 8. Estimated pulmonary artery systolic pressure equals 79\par mm Hg, assuming right atrial pressure equals 5  mm Hg,\par consistent with severe pulmonary hypertension.\par *** Compared with echocardiogram of 10/31/2017, no\par significant changes noted.\par \par cardiac cath 4/2019 \par  Pulmonary Artery (S/D/M): 78/29/45\par Pressures:  -- Pulmonary Capillary Wedge: 20/21/18\par Pulmonary vascular resistance (Wood Units): 6.84,  CO by Juan Carlos: 4.39

## 2019-06-18 ENCOUNTER — MEDICATION RENEWAL (OUTPATIENT)
Age: 64
End: 2019-06-18

## 2019-06-18 DIAGNOSIS — Z86.39 PERSONAL HISTORY OF OTHER ENDOCRINE, NUTRITIONAL AND METABOLIC DISEASE: ICD-10-CM

## 2019-06-18 LAB
25(OH)D3 SERPL-MCNC: 70.3 NG/ML
A1AT SERPL-MCNC: 165 MG/DL
ALBUMIN SERPL ELPH-MCNC: 4.4 G/DL
ALP BLD-CCNC: 113 U/L
ALT SERPL-CCNC: 10 U/L
ANION GAP SERPL CALC-SCNC: 14 MMOL/L
APTT BLD: 41 SEC
AST SERPL-CCNC: 20 U/L
BASOPHILS # BLD AUTO: 0.03 K/UL
BASOPHILS NFR BLD AUTO: 0.4 %
BILIRUB SERPL-MCNC: 1.6 MG/DL
BUN SERPL-MCNC: 37 MG/DL
CALCIUM SERPL-MCNC: 9.9 MG/DL
CALCIUM SERPL-MCNC: 9.9 MG/DL
CHLORIDE SERPL-SCNC: 104 MMOL/L
CO2 SERPL-SCNC: 26 MMOL/L
CREAT SERPL-MCNC: 1.62 MG/DL
CRP SERPL-MCNC: 0.74 MG/DL
DEPRECATED KAPPA LC FREE/LAMBDA SER: 1.87 RATIO
EOSINOPHIL # BLD AUTO: 0.23 K/UL
EOSINOPHIL NFR BLD AUTO: 3.1 %
ERYTHROCYTE [SEDIMENTATION RATE] IN BLOOD BY WESTERGREN METHOD: 30 MM/HR
FERRITIN SERPL-MCNC: 378 NG/ML
FOLATE RBC-MCNC: 2204 NG/ML
GLUCOSE SERPL-MCNC: 98 MG/DL
HCT VFR BLD CALC: 32.4 %
HCT VFR BLD CALC: 32.4 %
HCYS SERPL-MCNC: 20.6 UMOL/L
HGB BLD-MCNC: 9.5 G/DL
IGA SER QL IEP: 69 MG/DL
IGG SER QL IEP: 1552 MG/DL
IGM SER QL IEP: 51 MG/DL
IMM GRANULOCYTES NFR BLD AUTO: 0.4 %
INR PPP: 1.99 RATIO
IRON SATN MFR SERPL: 29 %
IRON SERPL-MCNC: 76 UG/DL
KAPPA LC CSF-MCNC: 4.66 MG/DL
KAPPA LC SERPL-MCNC: 8.71 MG/DL
LYMPHOCYTES # BLD AUTO: 1.46 K/UL
LYMPHOCYTES NFR BLD AUTO: 19.4 %
MAN DIFF?: NORMAL
MCHC RBC-ENTMCNC: 20.9 PG
MCHC RBC-ENTMCNC: 29.3 GM/DL
MCV RBC AUTO: 71.4 FL
MONOCYTES # BLD AUTO: 0.56 K/UL
MONOCYTES NFR BLD AUTO: 7.4 %
NEUTROPHILS # BLD AUTO: 5.22 K/UL
NEUTROPHILS NFR BLD AUTO: 69.3 %
NT-PROBNP SERPL-MCNC: 7676 PG/ML
PARATHYROID HORMONE INTACT: 169 PG/ML
PHOSPHATE SERPL-MCNC: 3.3 MG/DL
PLATELET # BLD AUTO: 274 K/UL
POTASSIUM SERPL-SCNC: 4.7 MMOL/L
PROT SERPL-MCNC: 7.1 G/DL
PT BLD: 23.2 SEC
RBC # BLD: 4.54 M/UL
RBC # FLD: 22.8 %
RHEUMATOID FACT SER QL: 11 IU/ML
SODIUM SERPL-SCNC: 144 MMOL/L
T3 SERPL-MCNC: 100 NG/DL
T3FREE SERPL-MCNC: 3.37 PG/ML
T3RU NFR SERPL: 0.9 TBI
T4 FREE SERPL-MCNC: 1.6 NG/DL
TIBC SERPL-MCNC: 260 UG/DL
TSH SERPL-ACNC: 6.84 UIU/ML
UIBC SERPL-MCNC: 184 UG/DL
URATE SERPL-MCNC: 4.3 MG/DL
VIT B12 SERPL-MCNC: 539 PG/ML
WBC # FLD AUTO: 7.53 K/UL

## 2019-06-19 LAB
ALBUMIN MFR SERPL ELPH: 56.9 %
ALBUMIN SERPL-MCNC: 4 G/DL
ALBUMIN/GLOB SERPL: 1.3 RATIO
ALPHA1 GLOB MFR SERPL ELPH: 4.7 %
ALPHA1 GLOB SERPL ELPH-MCNC: 0.3 G/DL
ALPHA2 GLOB MFR SERPL ELPH: 8.9 %
ALPHA2 GLOB SERPL ELPH-MCNC: 0.6 G/DL
B-GLOBULIN MFR SERPL ELPH: 9.2 %
B-GLOBULIN SERPL ELPH-MCNC: 0.7 G/DL
CARDIOLIPIN AB SER IA-ACNC: NEGATIVE
ENA SCL70 IGG SER IA-ACNC: <0.2 AL
ENA SS-A AB SER IA-ACNC: <0.2 AL
ENA SS-B AB SER IA-ACNC: <0.2 AL
GAMMA GLOB FLD ELPH-MCNC: 1.4 G/DL
GAMMA GLOB MFR SERPL ELPH: 20.3 %
INTERPRETATION SERPL IEP-IMP: NORMAL
M TB IFN-G BLD-IMP: NEGATIVE
PROT SERPL-MCNC: 7.1 G/DL
QUANTIFERON TB PLUS MITOGEN MINUS NIL: 1.2 IU/ML
QUANTIFERON TB PLUS NIL: 0.02 IU/ML
QUANTIFERON TB PLUS TB1 MINUS NIL: 0.01 IU/ML
QUANTIFERON TB PLUS TB2 MINUS NIL: 0.01 IU/ML
TOTAL IGE SMQN RAST: 10 KU/L

## 2019-06-20 LAB
CCP AB SER IA-ACNC: 185 UNITS
RF+CCP IGG SER-IMP: ABNORMAL

## 2019-06-23 LAB — T4 FREE SERPL DIALY-MCNC: 1.5 NG/DL

## 2019-06-26 LAB
ANA PAT FLD IF-IMP: ABNORMAL
ANACR T: ABNORMAL

## 2019-07-06 ENCOUNTER — OUTPATIENT (OUTPATIENT)
Dept: OUTPATIENT SERVICES | Facility: HOSPITAL | Age: 64
LOS: 1 days | End: 2019-07-06
Payer: COMMERCIAL

## 2019-07-06 ENCOUNTER — APPOINTMENT (OUTPATIENT)
Dept: ULTRASOUND IMAGING | Facility: IMAGING CENTER | Age: 64
End: 2019-07-06
Payer: COMMERCIAL

## 2019-07-06 DIAGNOSIS — E34.9 ENDOCRINE DISORDER, UNSPECIFIED: ICD-10-CM

## 2019-07-06 PROCEDURE — 76536 US EXAM OF HEAD AND NECK: CPT | Mod: 26

## 2019-07-06 PROCEDURE — 76536 US EXAM OF HEAD AND NECK: CPT

## 2019-07-11 ENCOUNTER — APPOINTMENT (OUTPATIENT)
Dept: OPHTHALMOLOGY | Facility: CLINIC | Age: 64
End: 2019-07-11
Payer: COMMERCIAL

## 2019-07-11 ENCOUNTER — NON-APPOINTMENT (OUTPATIENT)
Age: 64
End: 2019-07-11

## 2019-07-11 PROCEDURE — 92014 COMPRE OPH EXAM EST PT 1/>: CPT

## 2019-07-16 ENCOUNTER — APPOINTMENT (OUTPATIENT)
Dept: COLORECTAL SURGERY | Facility: CLINIC | Age: 64
End: 2019-07-16
Payer: COMMERCIAL

## 2019-07-16 PROCEDURE — 99024 POSTOP FOLLOW-UP VISIT: CPT

## 2019-07-16 NOTE — ASSESSMENT
[FreeTextEntry1] : Stage I colon cancer\par -Patient progressing well\par -High fiber diet\par -Follow up with cardiology for fluid retention\par -Follow up in office in 3 months for reevaluation

## 2019-07-16 NOTE — HISTORY OF PRESENT ILLNESS
[FreeTextEntry1] : 63-year-old female status post resection of stage I colon cancer. Patient progressing well. Tolerating diet. Denies fevers or chills. Denies pain. Appetite almost back to presurgical level. No blood per rectum.

## 2019-07-18 ENCOUNTER — APPOINTMENT (OUTPATIENT)
Dept: CARDIOLOGY | Facility: CLINIC | Age: 64
End: 2019-07-18
Payer: COMMERCIAL

## 2019-07-18 VITALS
HEART RATE: 77 BPM | WEIGHT: 186.4 LBS | RESPIRATION RATE: 16 BRPM | DIASTOLIC BLOOD PRESSURE: 82 MMHG | SYSTOLIC BLOOD PRESSURE: 126 MMHG | TEMPERATURE: 98 F | BODY MASS INDEX: 31.82 KG/M2 | HEIGHT: 64 IN | OXYGEN SATURATION: 96 %

## 2019-07-18 PROCEDURE — 99214 OFFICE O/P EST MOD 30 MIN: CPT

## 2019-07-18 PROCEDURE — 93000 ELECTROCARDIOGRAM COMPLETE: CPT

## 2019-07-18 NOTE — HISTORY OF PRESENT ILLNESS
[FreeTextEntry1] : 62 y/o AA female w/ PMHX of Afib on Eliquis, DM II, HTN, COPD on home O2 2L, LINH on CPAP, lymphoma/CLL s/p radiation and chemo (1997), recent diagnosis of Stage 1 colon CA s/p partial left colon resection (April 23, 2019), HFpEF LVEF 66%, severe pulmonary HTN (now on sildenafil), RV systolic dysfunction, mod-severe TR. Admitted 5/10-5/18/19. S/P partial left colectomy secondary to Stage 1 colon cancer, as per patient with 27 neg nodes and does not need chemo. \par \par Pt comes for a follow up today. Last hospital admission 5/10-5/18/19 with BRBPR and is S/P partial left colectomy secondary to Stage 1 colon cancer, as per patient with 27 neg nodes and does not need chemo. Her d/c weight was 185 lbs and she is 186 in the office and 186 lbs at home . She states she has increased swelling in her legs and ankles that increases during the day and she is wearing mild compression stockings. She saw nephrologist Dr. Heart for CKD and pulm Dr. Yang since the last visit for Group 2 pulmonary HTN. She saw Dr. Hunt, the colorectal surgeon 7/16 and previously had a small rectal abscess drained. She is able to walk 1 blocks with PT with her cane, she can climb 12 steps slowly and then has to take a rest. She sleeps with 2 pillows and denies orthopnea. She states she is using the BiPAP at night with oxygen and uses continuous oxygen during the day up to 18 hours. 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 She denies chest pain, palpitations, dizziness/LH and syncope. \par \par \par TTE 4/1/19:\par OBSERVATIONS:\par Mitral Valve: Mitral annular calcification and calcified\par mitral leaflets with normal diastolic opening. Mild mitral\par regurgitation.\par Aortic Root: Aortic Root: 2.9 cm.\par Aortic Valve: Calcified trileaflet aortic valve with normal\par opening. Peak transaortic valve gradient equals 7 mm Hg.\par Peak left ventricular outflow tract gradient equals 4.8 mm\par Hg.\par Left Atrium: Mildly dilated left atrium. LA volume index =\par 37 cc/m2.\par Left Ventricle: Hyperdynamic left ventricle. Flattening of\par the interventricular septum in both systole and diastole is\par  consistent with right ventricular pressure overload.\par Normal left ventricular internal dimensions and wall\par thicknesses. (DT:150 ms).\par Right Heart: Severe right atrial enlargement. Right\par ventricular enlargement with normal right ventricular\par systolic function. Normal tricuspid valve. Moderate-severe\par tricuspid regurgitation. Pulmonic valve not well\par visualized. Mild pulmonic regurgitation.\par Pericardium/Pleura Normal pericardium with no pericardial\par effusion.\par Hemodynamic: Estimated right ventricular systolic pressure\par equals 84 mm Hg, assuming right atrial pressure equals 10\par mm Hg, consistent with severe pulmonary hypertension.\par

## 2019-07-18 NOTE — REASON FOR VISIT
[Heart Failure] : congestive heart failure [Follow-Up - Clinic] : a clinic follow-up of [FreeTextEntry2] : s/p hospitalization 5/10-5/18/19 secondary to BRBPR, S/P partial left colectomy secondary to Stage 1 colon cancer, ADHF

## 2019-07-18 NOTE — PHYSICAL EXAM
[General Appearance - Well Developed] : well developed [Normal Appearance] : normal appearance [Well Groomed] : well groomed [General Appearance - Well Nourished] : well nourished [No Deformities] : no deformities [General Appearance - In No Acute Distress] : no acute distress [Normal Conjunctiva] : the conjunctiva exhibited no abnormalities [Eyelids - No Xanthelasma] : the eyelids demonstrated no xanthelasmas [Normal Oral Mucosa] : normal oral mucosa [Respiration, Rhythm And Depth] : normal respiratory rhythm and effort [Exaggerated Use Of Accessory Muscles For Inspiration] : no accessory muscle use [Auscultation Breath Sounds / Voice Sounds] : lungs were clear to auscultation bilaterally [Heart Sounds] : normal S1 and S2 [Edema] : no peripheral edema present [Bowel Sounds] : normal bowel sounds [Abdomen Tenderness] : non-tender [Nail Clubbing] : no clubbing of the fingernails [Cyanosis, Localized] : no localized cyanosis [Skin Color & Pigmentation] : normal skin color and pigmentation [] : no rash [No Venous Stasis] : no venous stasis [Skin Lesions] : no skin lesions [No Skin Ulcers] : no skin ulcer [No Xanthoma] : no  xanthoma was observed [Oriented To Time, Place, And Person] : oriented to person, place, and time [Affect] : the affect was normal [FreeTextEntry1] : 2+ lower extremity and ankle edema with compression stockings

## 2019-07-18 NOTE — ASSESSMENT
[FreeTextEntry1] : 64 y/o moderately obese female with controlled diabetes, HTN, COPD, lymphoma/CLL (1997), A.fib on Eliquis, HFpEF, LVEF 63% to 70% on TTE 4/1/19 with hyperdynamic LV, severe TYRONE, normal RV systolic function and severe pHTN\par ACC/AHA Stage C, NYHA Class III\par Appears volume overloaded and normotensive

## 2019-07-18 NOTE — REVIEW OF SYSTEMS
[see HPI] : see HPI [Lower Ext Edema] : lower extremity edema [Negative] : Heme/Lymph [Joint Pain] : no joint pain [FreeTextEntry1] : healed mid abdominal incision

## 2019-07-18 NOTE — DISCUSSION/SUMMARY
[Patient] : the patient [___ Week(s)] : [unfilled] week(s) [FreeTextEntry1] : 1. Chronic diastolic heart failure.  \par - Mild-moderately hypervolemic.\par - increase torsemide to 40 mg 4 days a week and 20 mg three days a week\par -Continue Toprol to 75 mg po BID ( a.fib) from 100 mg bid prior to hospitalization\par \par  2. Severe Pulmonary HTN.\par -  Continue with sildenafil 20 mg po TID.\par - Diuretics as above.\par - Follow up with Dr. Yang\par - Continue BiPAP with oxygen for LINH\par \par  3. Chronic renal failure, creat 6/17/19 37/1.62\par - Follow up with renal Dr. Una mena \par - Follow up with primary Dr. Palafox\par \par 4. Atrial fibrillation- rate controlled 70s.\par -Continue Toprol to 75 mg po BID.\par - Dig level 1.0. Continue digoxin 0.125 mg daily.\par - Continue Eliquis for oral a/c and stroke prevention.\par \par Follow up in office in 8 weeks

## 2019-08-02 NOTE — REVIEW OF SYSTEMS
[Cough] : cough [Dyspnea] : dyspnea [Hypertension] : ~T hypertension [Dysrhythmia] : dysrhythmia [Edema] : ~T edema was present [Heartburn] : heartburn [Thyroid Problem] : thyroid problem [Diabetes] : diabetes mellitus [As Noted in HPI] : as noted in HPI [Negative] : Psychiatric

## 2019-08-05 ENCOUNTER — APPOINTMENT (OUTPATIENT)
Dept: PULMONOLOGY | Facility: CLINIC | Age: 64
End: 2019-08-05
Payer: MEDICARE

## 2019-08-05 ENCOUNTER — APPOINTMENT (OUTPATIENT)
Dept: PULMONOLOGY | Facility: CLINIC | Age: 64
End: 2019-08-05
Payer: COMMERCIAL

## 2019-08-05 VITALS
BODY MASS INDEX: 32.81 KG/M2 | HEART RATE: 66 BPM | WEIGHT: 191.13 LBS | DIASTOLIC BLOOD PRESSURE: 70 MMHG | SYSTOLIC BLOOD PRESSURE: 114 MMHG | OXYGEN SATURATION: 90 % | RESPIRATION RATE: 17 BRPM | TEMPERATURE: 97.3 F

## 2019-08-05 LAB
ALBUMIN SERPL ELPH-MCNC: 4.3 G/DL
ALP BLD-CCNC: 119 U/L
ALT SERPL-CCNC: 13 U/L
ANION GAP SERPL CALC-SCNC: 13 MMOL/L
AST SERPL-CCNC: 21 U/L
BILIRUB SERPL-MCNC: 1.4 MG/DL
BUN SERPL-MCNC: 39 MG/DL
CALCIUM SERPL-MCNC: 9.8 MG/DL
CHLORIDE SERPL-SCNC: 105 MMOL/L
CO2 SERPL-SCNC: 25 MMOL/L
CREAT SERPL-MCNC: 1.73 MG/DL
ESTIMATED AVERAGE GLUCOSE: 108 MG/DL
GLUCOSE SERPL-MCNC: 110 MG/DL
HBA1C MFR BLD HPLC: 5.4 %
NT-PROBNP SERPL-MCNC: 6507 PG/ML
POTASSIUM SERPL-SCNC: 4.3 MMOL/L
PROT SERPL-MCNC: 6.8 G/DL
SODIUM SERPL-SCNC: 143 MMOL/L
T4 FREE SERPL-MCNC: 1.4 NG/DL
TSH SERPL-ACNC: 6.57 UIU/ML

## 2019-08-05 PROCEDURE — 94618 PULMONARY STRESS TESTING: CPT

## 2019-08-05 PROCEDURE — 36415 COLL VENOUS BLD VENIPUNCTURE: CPT

## 2019-08-05 PROCEDURE — 99215 OFFICE O/P EST HI 40 MIN: CPT | Mod: 25

## 2019-08-05 PROCEDURE — ZZZZZ: CPT

## 2019-08-05 NOTE — PHYSICAL EXAM
[General Appearance - Well Developed] : well developed [Normal Appearance] : normal appearance [Well Groomed] : well groomed [General Appearance - Well Nourished] : well nourished [General Appearance - In No Acute Distress] : no acute distress [No Deformities] : no deformities [Normal Conjunctiva] : the conjunctiva exhibited no abnormalities [Eyelids - No Xanthelasma] : the eyelids demonstrated no xanthelasmas [III] : III [Neck Appearance] : the appearance of the neck was normal [Jugular Venous Distention Increased] : there was no jugular-venous distention [Neck Cervical Mass (___cm)] : no neck mass was observed [Thyroid Diffuse Enlargement] : the thyroid was not enlarged [Heart Sounds] : normal S1 and S2 [Thyroid Nodule] : there were no palpable thyroid nodules [Respiration, Rhythm And Depth] : normal respiratory rhythm and effort [Exaggerated Use Of Accessory Muscles For Inspiration] : no accessory muscle use [Auscultation Breath Sounds / Voice Sounds] : lungs were clear to auscultation bilaterally [Tenderness: ____] : tenderness [unfilled] [Abdomen Soft] : soft [Abdomen Tenderness] : non-tender [Abdomen Mass (___ Cm)] : no abdominal mass palpated [Abnormal Walk] : normal gait [Gait - Sufficient For Exercise Testing] : the gait was sufficient for exercise testing [2+ Pitting] : 2+  pitting [Deep Tendon Reflexes (DTR)] : deep tendon reflexes were 2+ and symmetric [Sensation] : the sensory exam was normal to light touch and pinprick [Oriented To Time, Place, And Person] : oriented to person, place, and time [No Focal Deficits] : no focal deficits [Impaired Insight] : insight and judgment were intact [Affect] : the affect was normal [Skin Color & Pigmentation] : normal skin color and pigmentation [Skin Turgor] : normal skin turgor [] : no rash

## 2019-08-05 NOTE — HISTORY OF PRESENT ILLNESS
[FreeTextEntry1] : This letter  is regarding your patient  who  attended pulmonary out patient office today.  I have reviewed  patient's  past history, social history, family history and medication list. I also  reviewed nurse practitioners/ and fellows  notes and assessment and agree with it.  \par The patient was referred by  for pulm HTN, on sildenafil. PMH colon cancer, afib- on eliquis and  LINH on bipap and nocturnal oxygen, OSMAR, CHF, DM, thyroid nodule -on home oxygen-  status post resection of stage I colon cancer    ----------\par \par ------No history of , fever, chills , rigors, chest pain, or hemoptysis. Questionable history of Raynaud's phenomenon. No h/o significant weight loss in last few months. No history of liver dysfunction , collagen vascular disorder or chronic thromboembolic disease. I would classify the patient's dyspnea as WHO  FUNCTIONAL CLASS II--------\par \par ----Pft date--2109- -------RESTRICTIVE VENT DEFECT \par ---6MWT 2019-    132 METERS  USED A CANE TO WALK \par ----Ct scan date--4/2019 IMPRESSION: \par \par Unchanged prominent mediastinal nodes, nonspecific. Otherwise, no \par evidence of metastatic disease in the chest, abdomen, or pelvis.\par \par High-density material layering within the gastric antrum may be secondary \par to ingested material.\par \par Questionable skin defect of the left gluteal soft tissues not seen on \par prior CT, likely skinfold however clinical correlation is recommended.\par \par Findings discussed by Dr. Avila with Dr. Arriaza on 4/14/2019 at 2:00 PM \par with read back.  \par -----\par vq scan 4/2019 low prob of PE\par \par echo 4/2019 ------------------------------------------------------------------------\par CONCLUSIONS:\par 1. Mitral annular calcification and calcified mitral\par leaflets with normal diastolic opening. Mild mitral\par regurgitation.\par 2. Calcified trileaflet aortic valve with normal opening.\par 3. Normal left ventricular internal dimensions and wall\par thicknesses.\par 4. Hyperdynamic left ventricle. Flattening of the\par interventricular septum in both systole and diastole is\par consistent with right ventricular pressure overload.\par 5. Severe right atrial enlargement.\par 6. Right ventricular enlargement with normal right\par ventricular systolic function.\par 7. Normal tricuspid valve. Moderate-severe tricuspid\par regurgitation.\par 8. Estimated pulmonary artery systolic pressure equals 79\par mm Hg, assuming right atrial pressure equals 5  mm Hg,\par consistent with severe pulmonary hypertension.\par *** Compared with echocardiogram of 10/31/2017, no\par significant changes noted.\par \par cardiac cath 4/2019 \par  Pulmonary Artery (S/D/M): 78/29/45\par Pressures:  -- Pulmonary Capillary Wedge: 20/21/18\par Pulmonary vascular resistance (Wood Units): 6.84,  CO by Juan Carlos: 4.39\par \par US thyroid 7/2019 IMPRESSION: \par Bilateral indeterminant thyroid nodules slightly decreased on the right. \par No parathyroid adenoma identified. \par \par AUGUT 2109 WILL  START  REVATIO FOR PAH

## 2019-08-05 NOTE — DISCUSSION/SUMMARY
[FreeTextEntry1] : ---Assessment plan----------The patient has been referred here for further opinion regarding pulmonary problem,chronic diastolic heart failure secondary  pulm HTN, on sildenafil. PMH colon cancer, afib- on eliquis and  LINH on bipap and nocturnal oxygen, OSMAR, CHF, DM, thyroid nodule, history of colon cancer status post colon surgery history of lymphoma status post chemotherapy [1997], ??right diaphragm elevation\par \par 1- chronic diastolic heart failure secondary  pulm HTN, [ WHO GROUP II = GROUP III ]  on sildenafil., keep her euvolemic-- USES CANE TO WALK ---  6MIN WALK TEST\par 2 CKD-- CAUSE NOT CLEAR, follow up with nephrology -DIURETICS BEING ADJUSTED  BY DR ECHEVARRIA\par 3- LINH-  on BiPAP\par 4 abnormal gait----neurology opinion [Brunilda romero] \par 4-dm- followup endocrinology\par 5-anemia--  iron studies follow up with hematology\par 6  ??right diaphragm elevation - ultrasound right diaphragm\par \par Thanks for allowing  me to participate  in the care of this patient.  Patient at this time  will follow  the above mentioned recommendations and return back for follow up visit. If you have any questions  I can be reached  at #[phone] (beeper)  or  [phone] (office).\par \par Anirudh Yang MD, [facility] \par Director, Pulmonary Hypertension Program \par [facility] \par Division of Pulmonary, Critical Care and Sleep Medicine \par  Professor of Medicine \par [employer]\par

## 2019-08-06 LAB
BASOPHILS # BLD AUTO: 0.05 K/UL
BASOPHILS NFR BLD AUTO: 0.7 %
EOSINOPHIL # BLD AUTO: 0.39 K/UL
EOSINOPHIL NFR BLD AUTO: 5.2 %
HCT VFR BLD CALC: 32.9 %
HGB BLD-MCNC: 9.7 G/DL
IMM GRANULOCYTES NFR BLD AUTO: 0.4 %
LYMPHOCYTES # BLD AUTO: 0.99 K/UL
LYMPHOCYTES NFR BLD AUTO: 13.2 %
MAN DIFF?: NORMAL
MCHC RBC-ENTMCNC: 20.8 PG
MCHC RBC-ENTMCNC: 29.5 GM/DL
MCV RBC AUTO: 70.6 FL
MONOCYTES # BLD AUTO: 0.65 K/UL
MONOCYTES NFR BLD AUTO: 8.7 %
NEUTROPHILS # BLD AUTO: 5.39 K/UL
NEUTROPHILS NFR BLD AUTO: 71.8 %
PLATELET # BLD AUTO: 167 K/UL
RBC # BLD: 4.66 M/UL
RBC # FLD: 18.3 %
WBC # FLD AUTO: 7.5 K/UL

## 2019-08-08 ENCOUNTER — APPOINTMENT (OUTPATIENT)
Dept: ULTRASOUND IMAGING | Facility: IMAGING CENTER | Age: 64
End: 2019-08-08
Payer: MEDICARE

## 2019-08-08 ENCOUNTER — RESULT REVIEW (OUTPATIENT)
Age: 64
End: 2019-08-08

## 2019-08-08 ENCOUNTER — OUTPATIENT (OUTPATIENT)
Dept: OUTPATIENT SERVICES | Facility: HOSPITAL | Age: 64
LOS: 1 days | End: 2019-08-08
Payer: MEDICARE

## 2019-08-08 DIAGNOSIS — Z00.8 ENCOUNTER FOR OTHER GENERAL EXAMINATION: ICD-10-CM

## 2019-08-08 PROCEDURE — 88173 CYTOPATH EVAL FNA REPORT: CPT

## 2019-08-08 PROCEDURE — 10006 FNA BX W/US GDN EA ADDL: CPT

## 2019-08-08 PROCEDURE — 10005 FNA BX W/US GDN 1ST LES: CPT

## 2019-08-08 PROCEDURE — 88173 CYTOPATH EVAL FNA REPORT: CPT | Mod: 26

## 2019-08-08 PROCEDURE — 88172 CYTP DX EVAL FNA 1ST EA SITE: CPT

## 2019-09-08 NOTE — H&P ADULT - PMH
08-Sep-2019 02:42 Atrial fibrillation    Chronic diastolic congestive heart failure    CLL (chronic lymphocytic leukemia)    COPD (chronic obstructive pulmonary disease)    Diabetes    HTN (hypertension)

## 2019-09-19 ENCOUNTER — APPOINTMENT (OUTPATIENT)
Dept: CARDIOLOGY | Facility: CLINIC | Age: 64
End: 2019-09-19
Payer: MEDICARE

## 2019-09-19 VITALS
SYSTOLIC BLOOD PRESSURE: 151 MMHG | DIASTOLIC BLOOD PRESSURE: 95 MMHG | WEIGHT: 190 LBS | OXYGEN SATURATION: 90 % | HEIGHT: 64 IN | BODY MASS INDEX: 32.44 KG/M2 | RESPIRATION RATE: 18 BRPM | HEART RATE: 71 BPM

## 2019-09-19 PROCEDURE — 99214 OFFICE O/P EST MOD 30 MIN: CPT

## 2019-09-19 PROCEDURE — 93000 ELECTROCARDIOGRAM COMPLETE: CPT

## 2019-09-19 RX ORDER — TORSEMIDE 20 MG/1
20 TABLET ORAL
Refills: 0 | Status: DISCONTINUED | COMMUNITY
Start: 2017-11-20 | End: 2019-09-19

## 2019-09-19 NOTE — DISCUSSION/SUMMARY
[Patient] : the patient [___ Week(s)] : [unfilled] week(s) [FreeTextEntry1] : 1. Chronic diastolic heart failure.  \par - Mild-moderately hypervolemic.\par - take torsemide 40 mg bid today and then increase torsemide to 40 mg daily from 40 mg 4 days a week and 20 mg three days a week\par -Continue Toprol to 75 mg po BID ( a.fib) from 100 mg bid prior to hospitalization\par \par  2. Severe Pulmonary HTN.\par -  Continue with sildenafil 20 mg po TID.\par - Diuretics as above.\par - Follow up with Dr. Yang\par - Continue BiPAP with oxygen for LINH\par \par  3. Chronic renal failure, creat 6/17/19 37/1.62\par - Follow up with renal Dr. Una mena \par - Follow up with primary Dr. Palafox\par \par 4. Atrial fibrillation- rate controlled \par -Continue Toprol to 75 mg po BID.\par - Continue digoxin 0.125 mg daily.\par - Continue Eliquis for oral a/c and stroke prevention.\par \par Follow up in office in 8 weeks, will call next week to report weight and B/P readings

## 2019-09-19 NOTE — HISTORY OF PRESENT ILLNESS
[FreeTextEntry1] : 65 y/o AA female w/ PMHX of Afib on Eliquis, DM II, HTN, COPD on home O2 2L, LINH on CPAP, lymphoma/CLL s/p radiation and chemo (1997), recent diagnosis of Stage 1 colon CA s/p partial left colon resection (April 23, 2019), HFpEF LVEF 66%, severe pulmonary HTN (now on sildenafil), RV systolic dysfunction, mod-severe TR. Admitted 5/10-5/18/19. S/P partial left colectomy secondary to Stage 1 colon cancer, as per patient with 27 neg nodes and does not need chemo. Nephrologist Dr. Heart for CKD and pulm Dr. Yang for Group 2 pulmonary HTN and  Dr. Hunt is her colorectal surgeon.\par \par Pt comes for a follow up today. Her d/c weight was 185 lbs and she is 193 in the office with clothes and 190 lbs at home . She states she has increased swelling in her legs and ankles that increases during the day and she is wearing mild compression stockings. She states she has had increasing shortness of breath with walking 1/2 block and has to stop to rest if she is climbing one flight of stairs. She sleeps with 2 pillows with no orthopnea. Previously had a small rectal abscess drained.  She states she is using the BiPAP at night with oxygen and uses continuous oxygen during the day up to 18 hours. 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 She denies chest pain, palpitations, dizziness/LH and syncope. \par \par \par TTE 4/1/19:\par OBSERVATIONS:\par Mitral Valve: Mitral annular calcification and calcified\par mitral leaflets with normal diastolic opening. Mild mitral\par regurgitation.\par Aortic Root: Aortic Root: 2.9 cm.\par Aortic Valve: Calcified trileaflet aortic valve with normal\par opening. Peak transaortic valve gradient equals 7 mm Hg.\par Peak left ventricular outflow tract gradient equals 4.8 mm\par Hg.\par Left Atrium: Mildly dilated left atrium. LA volume index =\par 37 cc/m2.\par Left Ventricle: Hyperdynamic left ventricle. Flattening of\par the interventricular septum in both systole and diastole is\par  consistent with right ventricular pressure overload.\par Normal left ventricular internal dimensions and wall\par thicknesses. (DT:150 ms).\par Right Heart: Severe right atrial enlargement. Right\par ventricular enlargement with normal right ventricular\par systolic function. Normal tricuspid valve. Moderate-severe\par tricuspid regurgitation. Pulmonic valve not well\par visualized. Mild pulmonic regurgitation.\par Pericardium/Pleura Normal pericardium with no pericardial\par effusion.\par Hemodynamic: Estimated right ventricular systolic pressure\par equals 84 mm Hg, assuming right atrial pressure equals 10\par mm Hg, consistent with severe pulmonary hypertension.\par

## 2019-09-19 NOTE — ASSESSMENT
[FreeTextEntry1] : 64 y/o moderately obese female with controlled diabetes, HTN, COPD, lymphoma/CLL (1997), A.fib on Eliquis, HFpEF, LVEF 63% to 70% on TTE 4/1/19 with hyperdynamic LV, severe TYRONE, normal RV systolic function and severe pHTN\par ACC/AHA Stage C, NYHA Class III\par Appears mildly volume overloaded and hypertensive 151/95

## 2019-09-19 NOTE — REVIEW OF SYSTEMS
[see HPI] : see HPI [Lower Ext Edema] : lower extremity edema [Negative] : Heme/Lymph [Recent Weight Gain (___ Lbs)] : recent [unfilled] ~Ulb weight gain [Dyspnea on exertion] : dyspnea during exertion [Joint Pain] : no joint pain [FreeTextEntry1] : healed mid abdominal incision

## 2019-09-19 NOTE — PHYSICAL EXAM
[General Appearance - Well Developed] : well developed [Normal Appearance] : normal appearance [Well Groomed] : well groomed [General Appearance - Well Nourished] : well nourished [No Deformities] : no deformities [General Appearance - In No Acute Distress] : no acute distress [Normal Conjunctiva] : the conjunctiva exhibited no abnormalities [Eyelids - No Xanthelasma] : the eyelids demonstrated no xanthelasmas [Normal Oral Mucosa] : normal oral mucosa [Respiration, Rhythm And Depth] : normal respiratory rhythm and effort [Exaggerated Use Of Accessory Muscles For Inspiration] : no accessory muscle use [Heart Sounds] : normal S1 and S2 [Auscultation Breath Sounds / Voice Sounds] : lungs were clear to auscultation bilaterally [Edema] : no peripheral edema present [Abdomen Tenderness] : non-tender [Bowel Sounds] : normal bowel sounds [Nail Clubbing] : no clubbing of the fingernails [Cyanosis, Localized] : no localized cyanosis [Skin Color & Pigmentation] : normal skin color and pigmentation [No Venous Stasis] : no venous stasis [] : no rash [Skin Lesions] : no skin lesions [No Skin Ulcers] : no skin ulcer [Oriented To Time, Place, And Person] : oriented to person, place, and time [No Xanthoma] : no  xanthoma was observed [Affect] : the affect was normal [FreeTextEntry1] : 1+ lower extremity and ankle edema

## 2019-10-06 ENCOUNTER — FORM ENCOUNTER (OUTPATIENT)
Age: 64
End: 2019-10-06

## 2019-10-07 ENCOUNTER — APPOINTMENT (OUTPATIENT)
Dept: RHEUMATOLOGY | Facility: CLINIC | Age: 64
End: 2019-10-07
Payer: MEDICARE

## 2019-10-07 VITALS
DIASTOLIC BLOOD PRESSURE: 82 MMHG | OXYGEN SATURATION: 81 % | WEIGHT: 184 LBS | HEART RATE: 77 BPM | BODY MASS INDEX: 31.58 KG/M2 | TEMPERATURE: 98 F | SYSTOLIC BLOOD PRESSURE: 133 MMHG

## 2019-10-07 DIAGNOSIS — R76.8 OTHER SPECIFIED ABNORMAL IMMUNOLOGICAL FINDINGS IN SERUM: ICD-10-CM

## 2019-10-07 PROCEDURE — 99205 OFFICE O/P NEW HI 60 MIN: CPT

## 2019-10-07 PROCEDURE — 73130 X-RAY EXAM OF HAND: CPT | Mod: LT

## 2019-10-07 PROCEDURE — 73630 X-RAY EXAM OF FOOT: CPT | Mod: LT

## 2019-10-15 ENCOUNTER — APPOINTMENT (OUTPATIENT)
Dept: COLORECTAL SURGERY | Facility: CLINIC | Age: 64
End: 2019-10-15
Payer: MEDICARE

## 2019-10-15 PROCEDURE — 99213 OFFICE O/P EST LOW 20 MIN: CPT

## 2019-10-18 LAB
24R-OH-CALCIDIOL SERPL-MCNC: 44.1 PG/ML
25(OH)D3 SERPL-MCNC: 101 NG/ML
ACE BLD-CCNC: 45 U/L
ANA PAT FLD IF-IMP: ABNORMAL
ANA SER IF-ACNC: ABNORMAL
B2 GLYCOPROT1 AB SER QL: NEGATIVE
CCP AB SER IA-ACNC: >250 UNITS
CRP SERPL-MCNC: 0.28 MG/DL
ERYTHROCYTE [SEDIMENTATION RATE] IN BLOOD BY WESTERGREN METHOD: 11 MM/HR
RF+CCP IGG SER-IMP: ABNORMAL
RHEUMATOID FACT SER QL: 15 IU/ML
THYROGLOB AB SERPL-ACNC: <20 IU/ML
THYROPEROXIDASE AB SERPL IA-ACNC: <10 IU/ML

## 2019-10-21 ENCOUNTER — APPOINTMENT (OUTPATIENT)
Dept: PULMONOLOGY | Facility: CLINIC | Age: 64
End: 2019-10-21
Payer: MEDICARE

## 2019-10-21 ENCOUNTER — MED ADMIN CHARGE (OUTPATIENT)
Age: 64
End: 2019-10-21

## 2019-10-21 VITALS
SYSTOLIC BLOOD PRESSURE: 139 MMHG | TEMPERATURE: 97.4 F | RESPIRATION RATE: 16 BRPM | WEIGHT: 185 LBS | OXYGEN SATURATION: 85 % | DIASTOLIC BLOOD PRESSURE: 81 MMHG | HEART RATE: 73 BPM | BODY MASS INDEX: 31.58 KG/M2 | HEIGHT: 64 IN

## 2019-10-21 PROCEDURE — 99215 OFFICE O/P EST HI 40 MIN: CPT | Mod: 25

## 2019-10-21 PROCEDURE — G0008: CPT

## 2019-10-21 PROCEDURE — 90662 IIV NO PRSV INCREASED AG IM: CPT

## 2019-10-21 NOTE — DISCUSSION/SUMMARY
[FreeTextEntry1] : ---Assessment plan----------The patient has been referred here for further opinion regarding pulmonary problem,chronic diastolic heart failure secondary  pulm HTN, on sildenafil. PMH colon cancer, afib- on eliquis and  LINH on bipap and nocturnal oxygen, OSMAR, CHF, DM, thyroid nodule, history of colon cancer status post colon surgery history of lymphoma status post chemotherapy [1997], ??right diaphragm elevation\par \par 1- chronic RIGHT HEART   heart failure secondary  pulm HTN, [ WHO GROUP I +  GROUP II  COMPONENT OF DIASTOLIC DYSFUNCTION ]  on sildenafil., keep her euvolemic-- USES CANE TO WALK ---  6MIN WALK TEST--------ADD MACITENTAN---- ON HOME OXYGEN \par \par 2 CKD-- CAUSE NOT CLEAR, follow up with nephrology -DIURETICS BEING ADJUSTED  BY DR ECHEVARRIA\par \par 3- LINH-  on BiPAP\par \par 4 abnormal gait----neurology opinion \par \par 4-Thyroid Nodule--------needs to repeat thyroid biospy-------followup endocrinology [Dr. CASE]\par \par 5-anemia--  iron studies follow up with hematology\par \par 6  ??right diaphragm elevation - ultrasound right diaphragm\par \par ---ADD MACITENTAN-----RECEIVED FLU SHOT TODAY------Thanks for allowing  me to participate  in the care of this patient.  Patient at this time  will follow  the above mentioned recommendations and return back for follow up visit. If you have any questions  I can be reached  at #[phone] (beeper)  or  [phone] (office).\par \par Anirudh Yang MD, FCCP \par Director, Pulmonary Hypertension Program \par [facility] \par Division of Pulmonary, Critical Care and Sleep Medicine \par  Professor of Medicine \par [employer]\par

## 2019-10-21 NOTE — REVIEW OF SYSTEMS
[Cough] : cough [Dyspnea] : dyspnea [Hypertension] : ~T hypertension [Dysrhythmia] : dysrhythmia [Edema] : ~T edema was present [Heartburn] : heartburn [Diabetes] : diabetes mellitus [Thyroid Problem] : thyroid problem [As Noted in HPI] : as noted in HPI [Negative] : Psychiatric

## 2019-10-21 NOTE — PHYSICAL EXAM
[General Appearance - Well Developed] : well developed [Normal Appearance] : normal appearance [Well Groomed] : well groomed [General Appearance - Well Nourished] : well nourished [No Deformities] : no deformities [General Appearance - In No Acute Distress] : no acute distress [Normal Conjunctiva] : the conjunctiva exhibited no abnormalities [Eyelids - No Xanthelasma] : the eyelids demonstrated no xanthelasmas [III] : III [Neck Appearance] : the appearance of the neck was normal [Neck Cervical Mass (___cm)] : no neck mass was observed [Jugular Venous Distention Increased] : there was no jugular-venous distention [Thyroid Diffuse Enlargement] : the thyroid was not enlarged [Thyroid Nodule] : there were no palpable thyroid nodules [Heart Sounds] : normal S1 and S2 [Respiration, Rhythm And Depth] : normal respiratory rhythm and effort [Exaggerated Use Of Accessory Muscles For Inspiration] : no accessory muscle use [Auscultation Breath Sounds / Voice Sounds] : lungs were clear to auscultation bilaterally [Abdomen Soft] : soft [Tenderness: ____] : tenderness [unfilled] [Abdomen Tenderness] : non-tender [Abdomen Mass (___ Cm)] : no abdominal mass palpated [Abnormal Walk] : normal gait [Gait - Sufficient For Exercise Testing] : the gait was sufficient for exercise testing [2+ Pitting] : 2+  pitting [Deep Tendon Reflexes (DTR)] : deep tendon reflexes were 2+ and symmetric [Sensation] : the sensory exam was normal to light touch and pinprick [No Focal Deficits] : no focal deficits [Oriented To Time, Place, And Person] : oriented to person, place, and time [Impaired Insight] : insight and judgment were intact [Affect] : the affect was normal [Skin Color & Pigmentation] : normal skin color and pigmentation [Skin Turgor] : normal skin turgor [] : no rash

## 2019-10-21 NOTE — HISTORY OF PRESENT ILLNESS
[FreeTextEntry1] : This letter  is regarding your patient  who  attended pulmonary out patient office today.  I have reviewed  patient's  past history, social history, family history and medication list. I also  reviewed nurse practitioners/ and fellows  notes and assessment and agree with it.  \par The patient was referred by  for pulm HTN, on sildenafil. PMH colon cancer, afib- on eliquis and  LINH on bipap and nocturnal oxygen, OSMAR, CHF, DM, thyroid nodule -on home oxygen-  status post resection of stage I colon cancer    ----------\par \par ------No history of , fever, chills , rigors, chest pain, or hemoptysis. Questionable history of Raynaud's phenomenon. No h/o significant weight loss in last few months. No history of liver dysfunction , collagen vascular disorder or chronic thromboembolic disease. I would classify the patient's dyspnea as WHO  FUNCTIONAL CLASS II--------\par \par ----Pft date--2109- -------RESTRICTIVE VENT DEFECT \par ---6MWT 2019-    132 METERS  USED A CANE TO WALK \par ----Ct scan date--4/2019 IMPRESSION: \par Unchanged prominent mediastinal nodes, nonspecific. Otherwise, no \par evidence of metastatic disease in the chest, abdomen, or pelvis.\par High-density material layering within the gastric antrum may be secondary \par to ingested material.\par Questionable skin defect of the left gluteal soft tissues not seen on \par prior CT, likely skinfold however clinical correlation is recommended.\par \par Findings discussed by Dr. Avila with Dr. Arriaza on 4/14/2019 at 2:00 PM \par with read back.  \par -----\par vq scan 4/2019 low prob of PE\par \par echo 4/2019 ------------------------------------------------------------------------\par CONCLUSIONS:\par 1. Mitral annular calcification and calcified mitral\par leaflets with normal diastolic opening. Mild mitral\par regurgitation.\par 2. Calcified trileaflet aortic valve with normal opening.\par 3. Normal left ventricular internal dimensions and wall\par thicknesses.\par 4. Hyperdynamic left ventricle. Flattening of the\par interventricular septum in both systole and diastole is\par consistent with right ventricular pressure overload.\par 5. Severe right atrial enlargement.\par 6. Right ventricular enlargement with normal right\par ventricular systolic function.\par 7. Normal tricuspid valve. Moderate-severe tricuspid\par regurgitation.\par 8. Estimated pulmonary artery systolic pressure equals 79\par mm Hg, assuming right atrial pressure equals 5  mm Hg,\par consistent with severe pulmonary hypertension.\par *** Compared with echocardiogram of 10/31/2017, no\par significant changes noted.\par \par cardiac cath 4/2019 \par  Pulmonary Artery (S/D/M): 78/29/45\par Pressures:  -- Pulmonary Capillary Wedge: 20/21/18\par Pulmonary vascular resistance (Wood Units): 6.84,  CO by Juan Carlos: 4.39\par \par US thyroid 7/2019 IMPRESSION: \par Bilateral indeterminant thyroid nodules slightly decreased on the right. \par No parathyroid adenoma identified. \par \par AUGUT 2109 WILL  START  REVATIO FOR PAH\par \par ------OCT 2019---------stable from pulmonary standpoint--------dyspnea on exertion-----on 2L oxygen------tolerating revatio 20 mg TID--------under care of [facility] [Dr. Jerez]-----

## 2019-10-23 ENCOUNTER — MEDICATION RENEWAL (OUTPATIENT)
Age: 64
End: 2019-10-23

## 2019-10-23 ENCOUNTER — CHART COPY (OUTPATIENT)
Age: 64
End: 2019-10-23

## 2019-10-30 ENCOUNTER — MEDICATION RENEWAL (OUTPATIENT)
Age: 64
End: 2019-10-30

## 2019-10-31 ENCOUNTER — FORM ENCOUNTER (OUTPATIENT)
Age: 64
End: 2019-10-31

## 2019-11-01 ENCOUNTER — APPOINTMENT (OUTPATIENT)
Dept: CT IMAGING | Facility: IMAGING CENTER | Age: 64
End: 2019-11-01

## 2019-11-01 ENCOUNTER — OUTPATIENT (OUTPATIENT)
Dept: OUTPATIENT SERVICES | Facility: HOSPITAL | Age: 64
LOS: 1 days | End: 2019-11-01
Payer: MEDICARE

## 2019-11-01 DIAGNOSIS — Z00.00 ENCOUNTER FOR GENERAL ADULT MEDICAL EXAMINATION WITHOUT ABNORMAL FINDINGS: ICD-10-CM

## 2019-11-01 PROCEDURE — 71250 CT THORAX DX C-: CPT

## 2019-11-01 PROCEDURE — 71250 CT THORAX DX C-: CPT | Mod: 26

## 2019-11-18 ENCOUNTER — NON-APPOINTMENT (OUTPATIENT)
Age: 64
End: 2019-11-18

## 2019-11-18 ENCOUNTER — LABORATORY RESULT (OUTPATIENT)
Age: 64
End: 2019-11-18

## 2019-11-18 ENCOUNTER — APPOINTMENT (OUTPATIENT)
Dept: CARDIOLOGY | Facility: CLINIC | Age: 64
End: 2019-11-18
Payer: MEDICARE

## 2019-11-18 VITALS
SYSTOLIC BLOOD PRESSURE: 122 MMHG | OXYGEN SATURATION: 94 % | HEART RATE: 73 BPM | DIASTOLIC BLOOD PRESSURE: 68 MMHG | HEIGHT: 64 IN | BODY MASS INDEX: 31.92 KG/M2 | WEIGHT: 187 LBS

## 2019-11-18 DIAGNOSIS — R76.8 OTHER SPECIFIED ABNORMAL IMMUNOLOGICAL FINDINGS IN SERUM: ICD-10-CM

## 2019-11-18 PROCEDURE — 93000 ELECTROCARDIOGRAM COMPLETE: CPT

## 2019-11-18 PROCEDURE — 36415 COLL VENOUS BLD VENIPUNCTURE: CPT

## 2019-11-18 PROCEDURE — 99215 OFFICE O/P EST HI 40 MIN: CPT

## 2019-11-18 RX ORDER — PHENOL 1.4 %
600 AEROSOL, SPRAY (ML) MUCOUS MEMBRANE
Refills: 0 | Status: DISCONTINUED | COMMUNITY
End: 2019-11-18

## 2019-11-18 RX ORDER — METOPROLOL SUCCINATE 25 MG/1
25 TABLET, EXTENDED RELEASE ORAL
Qty: 180 | Refills: 3 | Status: DISCONTINUED | COMMUNITY
Start: 2019-07-18 | End: 2019-11-18

## 2019-11-18 RX ORDER — CALCIFEDIOL 30 UG/1
30 CAPSULE, EXTENDED RELEASE ORAL
Qty: 90 | Refills: 0 | Status: DISCONTINUED | COMMUNITY
Start: 2019-01-09 | End: 2019-11-18

## 2019-11-18 NOTE — REVIEW OF SYSTEMS
[see HPI] : see HPI [Dyspnea on exertion] : dyspnea during exertion [Lower Ext Edema] : lower extremity edema [Negative] : Heme/Lymph [Joint Pain] : no joint pain [FreeTextEntry1] : healed mid abdominal incision

## 2019-11-18 NOTE — ASSESSMENT
[FreeTextEntry1] : 63 y/o moderately obese female with controlled diabetes, HTN, COPD, lymphoma/CLL (1997), A.fib on Eliquis, HFpEF, LVEF 63% to 70% on TTE 4/1/19 with hyperdynamic LV, severe TYRONE, normal RV systolic function and severe pHTN\par ACC/AHA Stage C, NYHA Class III\par Appears mildly volume overloaded and normotensive

## 2019-11-18 NOTE — DISCUSSION/SUMMARY
[Patient] : the patient [___ Week(s)] : [unfilled] week(s) [FreeTextEntry1] : 1. Chronic diastolic heart failure, with low voltage on EKG, joint pains and B/L carpal tunnel, will R/O cardiac amyloid with Nuclear medicine- technetium pyrophosphate imaging for transthyretin cardiac amyloid. Pt should call [facility] at [phone] to schedule\par -  mildly hypervolemic, will checl labs today including CMP and pro BNP\par - continue torsemide 40 mg daily\par - Continue Toprol to 75 mg po BID ( a.fib) from 100 mg bid prior to hospitalization\par \par  2. Severe Pulmonary HTN/PAH\par -  Continue with sildenafil 20 mg po TID.\par - Continue new medication Opsumit 10 mg daily\par - Continue torsemide 20 mg daily\par - Follow up with Dr. Yang\par - Continue BiPAP with oxygen for LINH\par \par  3. CKD- will recheck labs today and will send to Dr. Heart\par - Follow up with renal Dr. Una mena \par - Follow up with primary Dr. Palafox\par \par 4. Atrial fibrillation- rate controlled \par -Continue Toprol to 75 mg po BID.\par - Continue digoxin 0.125 mg daily.\par - Continue Eliquis for oral a/c and stroke prevention.\par \par Follow up in office in 8 weeks, will call with lab results

## 2019-11-18 NOTE — PHYSICAL EXAM
[General Appearance - Well Developed] : well developed [Normal Appearance] : normal appearance [Well Groomed] : well groomed [General Appearance - Well Nourished] : well nourished [No Deformities] : no deformities [General Appearance - In No Acute Distress] : no acute distress [Normal Conjunctiva] : the conjunctiva exhibited no abnormalities [Eyelids - No Xanthelasma] : the eyelids demonstrated no xanthelasmas [Normal Oral Mucosa] : normal oral mucosa [Respiration, Rhythm And Depth] : normal respiratory rhythm and effort [Exaggerated Use Of Accessory Muscles For Inspiration] : no accessory muscle use [Auscultation Breath Sounds / Voice Sounds] : lungs were clear to auscultation bilaterally [Heart Sounds] : normal S1 and S2 [Edema] : no peripheral edema present [Bowel Sounds] : normal bowel sounds [Abdomen Tenderness] : non-tender [Nail Clubbing] : no clubbing of the fingernails [Cyanosis, Localized] : no localized cyanosis [Skin Color & Pigmentation] : normal skin color and pigmentation [] : no rash [No Venous Stasis] : no venous stasis [Skin Lesions] : no skin lesions [No Skin Ulcers] : no skin ulcer [No Xanthoma] : no  xanthoma was observed [Oriented To Time, Place, And Person] : oriented to person, place, and time [Affect] : the affect was normal [FreeTextEntry1] : 1+ lower extremity and ankle edema

## 2019-11-18 NOTE — HISTORY OF PRESENT ILLNESS
[FreeTextEntry1] : 63 y/o AA female w/ PMHX of Afib on Eliquis, DM II, HTN, COPD on home O2 2L, LINH on CPAP, lymphoma/CLL s/p radiation and chemo (1997), HFpEF LVEF 66%, severe pulmonary HTN (now on sildenafil), RV systolic dysfunction, mod-severe TR. Admitted 5/10-5/18/19. S/P partial left colectomy ( 4/23/19) secondary to Stage 1 colon cancer,  as per patient with 27 neg nodes and does not need chemo. Nephrologist Dr. Heart for CKD and pulm Dr. Yang for Group 2 pulmonary HTN and  Dr. Hunt is her colorectal surgeon.\par \par Pt comes for a follow up today and she was seen by Dr. Degroot. Her prior d/c weight was 185 lbs and she is 187 lbs in the office today. Since last visit, she was seen by renal Dr. Heart and was started on lucas 25 mg daily. She was seen by pulm Dr. Yang and started on a med for PAH called Opsumit and takes 10 mg daily since last week. She is also on continuous oxygen with room air saturation 80's and she purchased the Inogen unit which she said cost her > $2000.  She was seen by rheumatology and was taken off Calcium with Vit D due to high levels of Vit D. She complains of joint pains and has bilateral carpal tunnel. She was seen by her primary Dr. Palafox last week and her metoprolol was decreased to 50 mg bid from 75 mg bid and her torsemide was decreased to 20 mg daily from 40 mg daily due to low B/P.  Her home B/P ranged from 96/60 to 116/67 and she is 122/68 today.\par She states she has increased swelling in her legs and ankles that increases during the day and she is wearing mild compression stockings. She states she has shortness of breath with walking 1/2 block and has to stop to rest if she is climbing one flight of stairs. She sleeps with 2 pillows with no orthopnea.  She states she is using the BiPAP at night with oxygen and uses continuous oxygen during the day up to 18 hours. 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 She denies chest pain, palpitations, dizziness/LH and syncope. \par \par \par TTE 4/1/19:\par OBSERVATIONS:\par Mitral Valve: Mitral annular calcification and calcified\par mitral leaflets with normal diastolic opening. Mild mitral\par regurgitation.\par Aortic Root: Aortic Root: 2.9 cm.\par Aortic Valve: Calcified trileaflet aortic valve with normal\par opening. Peak transaortic valve gradient equals 7 mm Hg.\par Peak left ventricular outflow tract gradient equals 4.8 mm\par Hg.\par Left Atrium: Mildly dilated left atrium. LA volume index =\par 37 cc/m2.\par Left Ventricle: Hyperdynamic left ventricle. Flattening of\par the interventricular septum in both systole and diastole is\par  consistent with right ventricular pressure overload.\par Normal left ventricular internal dimensions and wall\par thicknesses. (DT:150 ms).\par Right Heart: Severe right atrial enlargement. Right\par ventricular enlargement with normal right ventricular\par systolic function. Normal tricuspid valve. Moderate-severe\par tricuspid regurgitation. Pulmonic valve not well\par visualized. Mild pulmonic regurgitation.\par Pericardium/Pleura Normal pericardium with no pericardial\par effusion.\par Hemodynamic: Estimated right ventricular systolic pressure\par equals 84 mm Hg, assuming right atrial pressure equals 10\par mm Hg, consistent with severe pulmonary hypertension.\par \par COMPARISON: CT chest 4/14/2019. CT chest 4/8/2019. CT chest 11/3/2017. \par Ultrasound thyroid gland 8/8/2019.\par \par FINDINGS: \par \par Tubes/Lines: None.\par \par Lungs And Airways: The subtle mosaic attenuation of the lungs is \par unchanged. The remainder of the lungs are clear. The airways are normal.\par \par Pleura: No pleural effusion or pneumothorax.\par \par Mediastinum: There are no enlarged chest lymph nodes. The hypodense \par nodule arising from the right lobe of the thyroid gland is partially \par imaged and has decreased in size. The esophagus is unremarkable.\par \par Heart and Vasculature: The heart is enlarged. No pericardial effusion. \par Coronary artery calcifications. The pulmonary artery is enlarged. Aorta \par is normal in caliber. Atheromatous disease of the aorta.\par \par Upper Abdomen: The upper abdomen is unremarkable.\par \par Bones And Soft Tissues: The bones are unremarkable.  The soft tissues are \par unremarkable.\par \par \par IMPRESSION: \par \par 1.  No change in the mosaic attenuation of the lungs.\par 2.  Cardiomegaly.\par 3.  Enlarged pulmonary artery can occur in the setting of pulmonary \par arterial hypertension.\par \par \par \par \par \par  \par \par \par SUSI HAUSER M.D., ATTENDING RADIOLOGIST \par This document has been electronically signed. Nov 4 2019 10:08AM\par   \par \par   \par \par \par

## 2019-11-18 NOTE — REASON FOR VISIT
[Follow-Up - Clinic] : a clinic follow-up of [Heart Failure] : congestive heart failure [FreeTextEntry2] : s/p hospitalization 5/10-5/18/19 secondary to BRBPR, S/P partial left colectomy secondary to Stage 1 colon cancer, ADHF

## 2019-11-20 ENCOUNTER — MED ADMIN CHARGE (OUTPATIENT)
Age: 64
End: 2019-11-20

## 2019-11-20 LAB
ALBUMIN SERPL ELPH-MCNC: 4.2 G/DL
ALP BLD-CCNC: 115 U/L
ALT SERPL-CCNC: 17 U/L
ANION GAP SERPL CALC-SCNC: 20 MMOL/L
AST SERPL-CCNC: 19 U/L
BASOPHILS # BLD AUTO: 0.03 K/UL
BASOPHILS NFR BLD AUTO: 0.6 %
BILIRUB SERPL-MCNC: 0.8 MG/DL
BUN SERPL-MCNC: 89 MG/DL
CALCIUM SERPL-MCNC: 10.5 MG/DL
CHLORIDE SERPL-SCNC: 100 MMOL/L
CO2 SERPL-SCNC: 18 MMOL/L
CREAT SERPL-MCNC: 2.18 MG/DL
EOSINOPHIL # BLD AUTO: 0.27 K/UL
EOSINOPHIL NFR BLD AUTO: 5.6 %
ESTIMATED AVERAGE GLUCOSE: 120 MG/DL
GLUCOSE SERPL-MCNC: 35 MG/DL
HBA1C MFR BLD HPLC: 5.8 %
HCT VFR BLD CALC: 32.7 %
HGB BLD-MCNC: 9.8 G/DL
IMM GRANULOCYTES NFR BLD AUTO: 0.4 %
LYMPHOCYTES # BLD AUTO: 0.97 K/UL
LYMPHOCYTES NFR BLD AUTO: 20.1 %
MAN DIFF?: NORMAL
MCHC RBC-ENTMCNC: 19.7 PG
MCHC RBC-ENTMCNC: 30 GM/DL
MCV RBC AUTO: 65.7 FL
MONOCYTES # BLD AUTO: 0.57 K/UL
MONOCYTES NFR BLD AUTO: 11.8 %
NEUTROPHILS # BLD AUTO: 2.96 K/UL
NEUTROPHILS NFR BLD AUTO: 61.5 %
NT-PROBNP SERPL-MCNC: 2646 PG/ML
PLATELET # BLD AUTO: 172 K/UL
POTASSIUM SERPL-SCNC: 5.5 MMOL/L
PROT SERPL-MCNC: 7.7 G/DL
RBC # BLD: 4.98 M/UL
RBC # FLD: 16.8 %
SODIUM SERPL-SCNC: 138 MMOL/L
WBC # FLD AUTO: 4.82 K/UL

## 2019-12-10 ENCOUNTER — APPOINTMENT (OUTPATIENT)
Dept: PULMONOLOGY | Facility: CLINIC | Age: 64
End: 2019-12-10
Payer: MEDICARE

## 2019-12-10 ENCOUNTER — LABORATORY RESULT (OUTPATIENT)
Age: 64
End: 2019-12-10

## 2019-12-10 VITALS
WEIGHT: 186 LBS | RESPIRATION RATE: 15 BRPM | OXYGEN SATURATION: 93 % | BODY MASS INDEX: 31.76 KG/M2 | SYSTOLIC BLOOD PRESSURE: 115 MMHG | HEART RATE: 108 BPM | DIASTOLIC BLOOD PRESSURE: 67 MMHG | TEMPERATURE: 98.3 F | HEIGHT: 64 IN

## 2019-12-10 DIAGNOSIS — R18.8 OTHER ASCITES: ICD-10-CM

## 2019-12-10 DIAGNOSIS — E34.9 ENDOCRINE DISORDER, UNSPECIFIED: ICD-10-CM

## 2019-12-10 PROCEDURE — 36415 COLL VENOUS BLD VENIPUNCTURE: CPT

## 2019-12-10 PROCEDURE — 99215 OFFICE O/P EST HI 40 MIN: CPT | Mod: 25

## 2019-12-10 NOTE — HISTORY OF PRESENT ILLNESS
[FreeTextEntry1] : This letter  is regarding your patient  who  attended pulmonary out patient office today.  I have reviewed  patient's  past history, social history, family history and medication list. I also  reviewed nurse practitioners/ and fellows  notes and assessment and agree with it.  \par The patient was referred by  for pulm HTN, on sildenafil. PMH colon cancer, afib- on eliquis and  LINH on bipap and nocturnal oxygen, OSMAR, CHF, DM, thyroid nodule -on home oxygen-  status post resection of stage I colon cancer    ----------\par \par ------No history of , fever, chills , rigors, chest pain, or hemoptysis. Questionable history of Raynaud's phenomenon. No h/o significant weight loss in last few months. No history of liver dysfunction , collagen vascular disorder or chronic thromboembolic disease. I would classify the patient's dyspnea as WHO  FUNCTIONAL CLASS II--------\par \par ----Pft date--2109- -------RESTRICTIVE VENT DEFECT \par ---6MWT 2019-    132 METERS  USED A CANE TO WALK \par ----Ct scan date--4/2019 IMPRESSION: \par Unchanged prominent mediastinal nodes, nonspecific. Otherwise, no \par evidence of metastatic disease in the chest, abdomen, or pelvis.\par High-density material layering within the gastric antrum may be secondary \par to ingested material.\par Questionable skin defect of the left gluteal soft tissues not seen on \par prior CT, likely skinfold however clinical correlation is recommended.\par \par Findings discussed by Dr. Avila with Dr. Arriaza on 4/14/2019 at 2:00 PM \par with read back.  \par -----\par vq scan 4/2019 low prob of PE\par \par echo 4/2019 ------------------------------------------------------------------------\par CONCLUSIONS:\par 1. Mitral annular calcification and calcified mitral\par leaflets with normal diastolic opening. Mild mitral\par regurgitation.\par 2. Calcified trileaflet aortic valve with normal opening.\par 3. Normal left ventricular internal dimensions and wall\par thicknesses.\par 4. Hyperdynamic left ventricle. Flattening of the\par interventricular septum in both systole and diastole is\par consistent with right ventricular pressure overload.\par 5. Severe right atrial enlargement.\par 6. Right ventricular enlargement with normal right\par ventricular systolic function.\par 7. Normal tricuspid valve. Moderate-severe tricuspid\par regurgitation.\par 8. Estimated pulmonary artery systolic pressure equals 79\par mm Hg, assuming right atrial pressure equals 5  mm Hg,\par consistent with severe pulmonary hypertension.\par *** Compared with echocardiogram of 10/31/2017, no\par significant changes noted.\par \par cardiac cath 4/2019 \par  Pulmonary Artery (S/D/M): 78/29/45\par Pressures:  -- Pulmonary Capillary Wedge: 20/21/18\par Pulmonary vascular resistance (Wood Units): 6.84,  CO by Juan Carlos: 4.39\par \par US thyroid 7/2019 IMPRESSION: \par Bilateral indeterminant thyroid nodules slightly decreased on the right. \par No parathyroid adenoma identified. \par \par AUGUT 2109 WILL  START  REVATIO FOR PAH\par \par ------OCT 2019---------stable from pulmonary standpoint--------dyspnea on exertion-----on 2L oxygen------tolerating revatio 20 mg TID--------under care of Rhe[facility] [Dr. Jerez]-----\par Dec 2109-----  on macetantan and revatio, renal dysfunction [ dr fischer], slight elevation of lft,

## 2019-12-10 NOTE — DISCUSSION/SUMMARY
[FreeTextEntry1] : ---Assessment plan----------The patient has been referred here for further opinion regarding pulmonary problem,chronic diastolic heart failure secondary  pulm HTN, on sildenafil. PMH colon cancer, afib- on eliquis and  LINH on bipap and nocturnal oxygen, OSMAR, CHF, DM, thyroid nodule, history of colon cancer status post colon surgery history of lymphoma status post chemotherapy [1997], ??right diaphragm elevation\par \par 1- chronic RIGHT HEART   heart failure secondary  pulm HTN, [ WHO GROUP I +  GROUP II  COMPONENT OF DIASTOLIC DYSFUNCTION ]  on sildenafil., keep her euvolemic-- USES CANE TO WALK ---  6MIN WALK TEST-- ON  REVATIO  AND   MACITENTAN---- ON HOME OXYGEN  [ INCREASE TO 3 L/MIN] , need tp r/o amyloidosis\par \par 2 CKD-- CAUSE NOT CLEAR, follow up with nephrology -DIURETICS BEING ADJUSTED  BY DR ECHEVARRIA\par \par 3- LINH-  on BiPAP\par \par 4 abnormal gait----neurology opinion \par \par 4-Thyroid Nodule--------needs to repeat thyroid biospy-------followup endocrinology [Dr. CASE]\par \par 5-anemia--  iron studies follow up with hematology [ dr tavera ] a lso has  ELEVATED IMMUNEGLOBULIN  kAPPA  LEVELS. R/O MULTIPLE , MYELOMA VS LIGHT CHAIN DISEASE VS AMYLOIDOSIS\par \par 6  ??right diaphragm elevation - ultrasound right diaphragm\par \par ---RECEIVED FLU SHOT  2019 ------Thanks for allowing  me to participate  in the care of this patient.  Patient at this time  will follow  the above mentioned recommendations and return back for follow up visit. If you have any questions  I can be reached  at #[phone] (beeper)  or  [phone] (office).\par \par Anirudh Yang MD, FCCP \par Director, Pulmonary Hypertension Program \par [facility] \par Division of Pulmonary, Critical Care and Sleep Medicine \par  Professor of Medicine \par [employer]\par

## 2019-12-10 NOTE — PHYSICAL EXAM
[General Appearance - Well Developed] : well developed [Normal Appearance] : normal appearance [Well Groomed] : well groomed [General Appearance - Well Nourished] : well nourished [No Deformities] : no deformities [General Appearance - In No Acute Distress] : no acute distress [Normal Conjunctiva] : the conjunctiva exhibited no abnormalities [Eyelids - No Xanthelasma] : the eyelids demonstrated no xanthelasmas [III] : III [Neck Appearance] : the appearance of the neck was normal [Neck Cervical Mass (___cm)] : no neck mass was observed [Jugular Venous Distention Increased] : there was no jugular-venous distention [Thyroid Diffuse Enlargement] : the thyroid was not enlarged [Thyroid Nodule] : there were no palpable thyroid nodules [Heart Sounds] : normal S1 and S2 [Respiration, Rhythm And Depth] : normal respiratory rhythm and effort [Exaggerated Use Of Accessory Muscles For Inspiration] : no accessory muscle use [Auscultation Breath Sounds / Voice Sounds] : lungs were clear to auscultation bilaterally [Tenderness: ____] : tenderness [unfilled] [Abdomen Soft] : soft [Abdomen Tenderness] : non-tender [Abdomen Mass (___ Cm)] : no abdominal mass palpated [Abnormal Walk] : normal gait [Gait - Sufficient For Exercise Testing] : the gait was sufficient for exercise testing [2+ Pitting] : 2+  pitting [Deep Tendon Reflexes (DTR)] : deep tendon reflexes were 2+ and symmetric [Sensation] : the sensory exam was normal to light touch and pinprick [No Focal Deficits] : no focal deficits [Oriented To Time, Place, And Person] : oriented to person, place, and time [Impaired Insight] : insight and judgment were intact [Affect] : the affect was normal [Skin Turgor] : normal skin turgor [Skin Color & Pigmentation] : normal skin color and pigmentation [] : no rash

## 2019-12-11 ENCOUNTER — FORM ENCOUNTER (OUTPATIENT)
Age: 64
End: 2019-12-11

## 2019-12-11 LAB
ALBUMIN SERPL ELPH-MCNC: 4.4 G/DL
ALP BLD-CCNC: 153 U/L
ALT SERPL-CCNC: 15 U/L
ANION GAP SERPL CALC-SCNC: 15 MMOL/L
AST SERPL-CCNC: 18 U/L
BASOPHILS # BLD AUTO: 0.09 K/UL
BASOPHILS NFR BLD AUTO: 1.7 %
BILIRUB SERPL-MCNC: 0.6 MG/DL
BUN SERPL-MCNC: 54 MG/DL
CALCIUM SERPL-MCNC: 9.8 MG/DL
CALCIUM SERPL-MCNC: 9.8 MG/DL
CHLORIDE SERPL-SCNC: 102 MMOL/L
CO2 SERPL-SCNC: 21 MMOL/L
CREAT SERPL-MCNC: 1.97 MG/DL
EOSINOPHIL # BLD AUTO: 0.22 K/UL
EOSINOPHIL NFR BLD AUTO: 4.4 %
GGT SERPL-CCNC: 63 U/L
GLUCOSE SERPL-MCNC: 82 MG/DL
HAV IGM SER QL: NONREACTIVE
HBV CORE IGM SER QL: NONREACTIVE
HBV SURFACE AG SER QL: NONREACTIVE
HCT VFR BLD CALC: 30.4 %
HCV AB SER QL: NONREACTIVE
HCV S/CO RATIO: 0.16 S/CO
HGB BLD-MCNC: 9.1 G/DL
LYMPHOCYTES # BLD AUTO: 1.54 K/UL
LYMPHOCYTES NFR BLD AUTO: 30.4 %
MAN DIFF?: NORMAL
MCHC RBC-ENTMCNC: 19.1 PG
MCHC RBC-ENTMCNC: 29.9 GM/DL
MCV RBC AUTO: 63.9 FL
MITOCHONDRIA AB SER IF-ACNC: NORMAL
MONOCYTES # BLD AUTO: 0.22 K/UL
MONOCYTES NFR BLD AUTO: 4.3 %
NEUTROPHILS # BLD AUTO: 2.95 K/UL
NEUTROPHILS NFR BLD AUTO: 58.3 %
PARATHYROID HORMONE INTACT: 140 PG/ML
PLATELET # BLD AUTO: 152 K/UL
POTASSIUM SERPL-SCNC: 4.6 MMOL/L
PROT SERPL-MCNC: 7.5 G/DL
RBC # BLD: 4.76 M/UL
RBC # FLD: 16.8 %
SODIUM SERPL-SCNC: 138 MMOL/L
WBC # FLD AUTO: 5.06 K/UL

## 2019-12-12 ENCOUNTER — OUTPATIENT (OUTPATIENT)
Dept: OUTPATIENT SERVICES | Facility: HOSPITAL | Age: 64
LOS: 1 days | End: 2019-12-12
Payer: MEDICARE

## 2019-12-12 ENCOUNTER — APPOINTMENT (OUTPATIENT)
Dept: NUCLEAR MEDICINE | Facility: HOSPITAL | Age: 64
End: 2019-12-12
Payer: MEDICARE

## 2019-12-12 DIAGNOSIS — I50.32 CHRONIC DIASTOLIC (CONGESTIVE) HEART FAILURE: ICD-10-CM

## 2019-12-12 PROCEDURE — 78803 RP LOCLZJ TUM SPECT 1 AREA: CPT | Mod: 26

## 2019-12-12 PROCEDURE — 78803 RP LOCLZJ TUM SPECT 1 AREA: CPT

## 2019-12-12 PROCEDURE — 78800 RP LOCLZJ TUM 1 AREA 1 D IMG: CPT

## 2019-12-12 PROCEDURE — A9538: CPT

## 2019-12-12 PROCEDURE — 78499 UNLISTED CV PX DX NUC MED: CPT

## 2019-12-12 PROCEDURE — 78999 UNLISTED MISC PX DX NUC MED: CPT | Mod: 26

## 2019-12-12 PROCEDURE — 78800 RP LOCLZJ TUM 1 AREA 1 D IMG: CPT | Mod: 26

## 2019-12-23 ENCOUNTER — OUTPATIENT (OUTPATIENT)
Dept: OUTPATIENT SERVICES | Facility: HOSPITAL | Age: 64
LOS: 1 days | End: 2019-12-23
Payer: MEDICARE

## 2019-12-23 ENCOUNTER — APPOINTMENT (OUTPATIENT)
Dept: ULTRASOUND IMAGING | Facility: IMAGING CENTER | Age: 64
End: 2019-12-23
Payer: MEDICARE

## 2019-12-23 DIAGNOSIS — R18.8 OTHER ASCITES: ICD-10-CM

## 2019-12-23 PROCEDURE — 76705 ECHO EXAM OF ABDOMEN: CPT | Mod: 26

## 2019-12-23 PROCEDURE — 76705 ECHO EXAM OF ABDOMEN: CPT

## 2019-12-23 NOTE — PHYSICAL EXAM
[General Appearance - Well Developed] : well developed [Normal Appearance] : normal appearance [Well Groomed] : well groomed [General Appearance - Well Nourished] : well nourished [Normal Conjunctiva] : the conjunctiva exhibited no abnormalities [General Appearance - In No Acute Distress] : no acute distress [No Deformities] : no deformities [Eyelids - No Xanthelasma] : the eyelids demonstrated no xanthelasmas [III] : III [Neck Appearance] : the appearance of the neck was normal [Thyroid Diffuse Enlargement] : the thyroid was not enlarged [Neck Cervical Mass (___cm)] : no neck mass was observed [Jugular Venous Distention Increased] : there was no jugular-venous distention [Heart Sounds] : normal S1 and S2 [Thyroid Nodule] : there were no palpable thyroid nodules [Respiration, Rhythm And Depth] : normal respiratory rhythm and effort [Exaggerated Use Of Accessory Muscles For Inspiration] : no accessory muscle use [Abdomen Soft] : soft [Tenderness: ____] : tenderness [unfilled] [Auscultation Breath Sounds / Voice Sounds] : lungs were clear to auscultation bilaterally [Abdomen Tenderness] : non-tender [Abdomen Mass (___ Cm)] : no abdominal mass palpated [Abnormal Walk] : normal gait [Gait - Sufficient For Exercise Testing] : the gait was sufficient for exercise testing [2+ Pitting] : 2+  pitting [No Focal Deficits] : no focal deficits [Sensation] : the sensory exam was normal to light touch and pinprick [Deep Tendon Reflexes (DTR)] : deep tendon reflexes were 2+ and symmetric [Oriented To Time, Place, And Person] : oriented to person, place, and time [Affect] : the affect was normal [Impaired Insight] : insight and judgment were intact [Skin Color & Pigmentation] : normal skin color and pigmentation [] : no rash [Skin Turgor] : normal skin turgor

## 2019-12-23 NOTE — REVIEW OF SYSTEMS
[Cough] : cough [Dyspnea] : dyspnea [Hypertension] : ~T hypertension [Dysrhythmia] : dysrhythmia [Edema] : ~T edema was present [Heartburn] : heartburn [Diabetes] : diabetes mellitus [Thyroid Problem] : thyroid problem [As Noted in HPI] : as noted in HPI [Negative] : Neurologic

## 2019-12-30 ENCOUNTER — APPOINTMENT (OUTPATIENT)
Dept: PULMONOLOGY | Facility: CLINIC | Age: 64
End: 2019-12-30
Payer: MEDICARE

## 2019-12-30 VITALS
TEMPERATURE: 98 F | BODY MASS INDEX: 32.1 KG/M2 | WEIGHT: 187 LBS | OXYGEN SATURATION: 91 % | HEART RATE: 78 BPM | RESPIRATION RATE: 17 BRPM | DIASTOLIC BLOOD PRESSURE: 65 MMHG | SYSTOLIC BLOOD PRESSURE: 121 MMHG

## 2019-12-30 DIAGNOSIS — Z00.00 ENCOUNTER FOR GENERAL ADULT MEDICAL EXAMINATION W/OUT ABNORMAL FINDINGS: ICD-10-CM

## 2019-12-30 PROCEDURE — 36415 COLL VENOUS BLD VENIPUNCTURE: CPT

## 2019-12-30 PROCEDURE — 94618 PULMONARY STRESS TESTING: CPT

## 2019-12-30 PROCEDURE — 99215 OFFICE O/P EST HI 40 MIN: CPT | Mod: 25

## 2019-12-30 NOTE — DISCUSSION/SUMMARY
[FreeTextEntry1] : ---Assessment plan----------The patient has been referred here for further opinion regarding pulmonary problem,chronic diastolic heart failure secondary  pulm HTN, on sildenafil. PMH colon cancer, afib- on eliquis and  LINH on bipap and nocturnal oxygen, OSMAR, CHF, DM, thyroid nodule, history of colon cancer status post colon surgery history of lymphoma status post chemotherapy [1997], ??right diaphragm elevation\par \par 1- chronic RIGHT HEART   heart failure secondary  pulm HTN, [ WHO GROUP I +  GROUP II  COMPONENT OF DIASTOLIC DYSFUNCTION ]  on sildenafil., keep her euvolemic-- USES CANE TO WALK ---  6MIN WALK TEST-- ON  REVATIO  AND   MACITENTAN---- ON HOME OXYGEN  [ INCREASE TO 3 L/MIN] , --------NO evidence of need  cardiac Amyloidosis\par \par 2 CKD-- CAUSE NOT CLEAR, follow up with nephrology ------DIURETICS BEING ADJUSTED  BY DR ECHEVARRIA\par \par 3- LINH------ on BiPAP\par \par 4 abnormal gait----neurology opinion \par \par 5-Thyroid Nodule--------needs to repeat thyroid biospy---------followup endocrinology [Dr. CABRALES]--\par \par \par 6-anemia--  iron studies follow up with hematology [ dr yang ] a lso has  ELEVATED IMMUNEGLOBULIN  kAPPA  LEVELS. R/O MULTIPLE , MYELOMA VS LIGHT CHAIN DISEASE VS AMYLOIDOSIS-------FOLLOW UP WITH DR. YANG------\par \par 7 ??right diaphragm elevation - ultrasound right diaphragm\par \par 8------PTH levels-------elevated PTH levels-----she needs close follow up with Dr. Cabrales -----\par \par --------FOLLOW UP IN MAR 2020------Thanks for allowing  me to participate  in the care of this patient.  Patient at this time  will follow  the above mentioned recommendations and return back for follow up visit. If you have any questions  I can be reached  at #[phone] (beeper)  or  [phone] (office).\par \par Anirudh Yang MD, FCCP \par Director, Pulmonary Hypertension Program \par [facility] \par Division of Pulmonary, Critical Care and Sleep Medicine \par  Professor of Medicine \par [facility]\par

## 2019-12-30 NOTE — HISTORY OF PRESENT ILLNESS
[FreeTextEntry1] : This letter  is regarding your patient  who  attended pulmonary out patient office today.  I have reviewed  patient's  past history, social history, family history and medication list. I also  reviewed nurse practitioners/ and fellows  notes and assessment and agree with it.  \par The patient was referred by  for pulm HTN, on sildenafil. PMH colon cancer, afib- on eliquis and  LINH on bipap and nocturnal oxygen, OSMAR, CHF, DM, thyroid nodule -on home oxygen-  status post resection of stage I colon cancer    ----------\par \par ------No history of , fever, chills , rigors, chest pain, or hemoptysis. Questionable history of Raynaud's phenomenon. No h/o significant weight loss in last few months. No history of liver dysfunction , collagen vascular disorder or chronic thromboembolic disease. I would classify the patient's dyspnea as WHO  FUNCTIONAL CLASS II--------\par \par ----Pft date--2109- -------RESTRICTIVE VENT DEFECT \par ---6MWT 2019-    132 METERS  USED A CANE TO WALK \par ----Ct scan date--4/2019 IMPRESSION: \par Unchanged prominent mediastinal nodes, nonspecific. Otherwise, no \par evidence of metastatic disease in the chest, abdomen, or pelvis.\par High-density material layering within the gastric antrum may be secondary \par to ingested material.\par Questionable skin defect of the left gluteal soft tissues not seen on \par prior CT, likely skinfold however clinical correlation is recommended.\par \par Findings discussed by Dr. Avila with Dr. Arriaza on 4/14/2019 at 2:00 PM \par with read back.  \par -----\par vq scan 4/2019 low prob of PE\par \par echo 4/2019 ------------------------------------------------------------------------\par CONCLUSIONS:\par 1. Mitral annular calcification and calcified mitral\par leaflets with normal diastolic opening. Mild mitral\par regurgitation.\par 2. Calcified trileaflet aortic valve with normal opening.\par 3. Normal left ventricular internal dimensions and wall\par thicknesses.\par 4. Hyperdynamic left ventricle. Flattening of the\par interventricular septum in both systole and diastole is\par consistent with right ventricular pressure overload.\par 5. Severe right atrial enlargement.\par 6. Right ventricular enlargement with normal right\par ventricular systolic function.\par 7. Normal tricuspid valve. Moderate-severe tricuspid\par regurgitation.\par 8. Estimated pulmonary artery systolic pressure equals 79\par mm Hg, assuming right atrial pressure equals 5  mm Hg,\par consistent with severe pulmonary hypertension.\par *** Compared with echocardiogram of 10/31/2017, no\par significant changes noted.\par \par cardiac cath 4/2019 \par  Pulmonary Artery (S/D/M): 78/29/45\par Pressures:  -- Pulmonary Capillary Wedge: 20/21/18\par Pulmonary vascular resistance (Wood Units): 6.84,  CO by Juan Carlos: 4.39\par \par US thyroid 7/2019 IMPRESSION: \par Bilateral indeterminant thyroid nodules slightly decreased on the right. \par No parathyroid adenoma identified. \par \par AUGUT 2109 WILL  START  REVATIO FOR PAH\par \par ------OCT 2019---------stable from pulmonary standpoint--------dyspnea on exertion-----on 2L oxygen------tolerating revatio 20 mg TID--------under care of Rheuma [Dr. Jerez]-----\par Dec 2109-----  on macetantan and revatio, renal dysfunction [ dr fischer], slight elevation of lft, \par \par -----DEC  2019-------stable from pulmonary point of view---------doing well on macitentan and revatio------unable to sleep------is using her BiPAP machine------underdare of Hem/Onc [Dr. YANG]------HAS RIGHT THYROID NODULE-\par

## 2020-01-03 LAB
24R-OH-CALCIDIOL SERPL-MCNC: 38.7 PG/ML
25(OH)D3 SERPL-MCNC: 73.3 NG/ML
ALBUMIN SERPL ELPH-MCNC: 4.4 G/DL
ALP BLD-CCNC: 162 U/L
ALT SERPL-CCNC: 12 U/L
ANION GAP SERPL CALC-SCNC: 12 MMOL/L
AST SERPL-CCNC: 18 U/L
BASOPHILS # BLD AUTO: 0.05 K/UL
BASOPHILS NFR BLD AUTO: 0.7 %
BILIRUB SERPL-MCNC: 0.6 MG/DL
BUN SERPL-MCNC: 67 MG/DL
CALCIUM SERPL-MCNC: 10 MG/DL
CALCIUM SERPL-MCNC: 10 MG/DL
CHLORIDE SERPL-SCNC: 105 MMOL/L
CO2 SERPL-SCNC: 22 MMOL/L
CREAT SERPL-MCNC: 2.26 MG/DL
EOSINOPHIL # BLD AUTO: 0.74 K/UL
EOSINOPHIL NFR BLD AUTO: 10.2 %
GLUCOSE SERPL-MCNC: 103 MG/DL
HCT VFR BLD CALC: 27.9 %
HGB BLD-MCNC: 8.5 G/DL
IMM GRANULOCYTES NFR BLD AUTO: 0.3 %
LYMPHOCYTES # BLD AUTO: 1.13 K/UL
LYMPHOCYTES NFR BLD AUTO: 15.6 %
MAN DIFF?: NORMAL
MCHC RBC-ENTMCNC: 19.7 PG
MCHC RBC-ENTMCNC: 30.5 GM/DL
MCV RBC AUTO: 64.6 FL
MONOCYTES # BLD AUTO: 0.81 K/UL
MONOCYTES NFR BLD AUTO: 11.2 %
NEUTROPHILS # BLD AUTO: 4.51 K/UL
NEUTROPHILS NFR BLD AUTO: 62 %
NT-PROBNP SERPL-MCNC: 3342 PG/ML
PARATHYROID HORMONE INTACT: 157 PG/ML
PLATELET # BLD AUTO: 185 K/UL
POTASSIUM SERPL-SCNC: 5.4 MMOL/L
PROT SERPL-MCNC: 7.4 G/DL
RBC # BLD: 4.32 M/UL
RBC # FLD: 18.3 %
SODIUM SERPL-SCNC: 139 MMOL/L
TSH SERPL-ACNC: 5.54 UIU/ML
WBC # FLD AUTO: 7.26 K/UL

## 2020-01-27 ENCOUNTER — NON-APPOINTMENT (OUTPATIENT)
Age: 65
End: 2020-01-27

## 2020-01-27 ENCOUNTER — APPOINTMENT (OUTPATIENT)
Dept: CARDIOLOGY | Facility: CLINIC | Age: 65
End: 2020-01-27
Payer: MEDICARE

## 2020-01-27 VITALS
HEART RATE: 71 BPM | HEIGHT: 64 IN | BODY MASS INDEX: 32.51 KG/M2 | SYSTOLIC BLOOD PRESSURE: 117 MMHG | OXYGEN SATURATION: 95 % | DIASTOLIC BLOOD PRESSURE: 66 MMHG

## 2020-01-27 VITALS — WEIGHT: 189.38 LBS | BODY MASS INDEX: 32.51 KG/M2

## 2020-01-27 PROCEDURE — 93000 ELECTROCARDIOGRAM COMPLETE: CPT

## 2020-01-27 PROCEDURE — 36415 COLL VENOUS BLD VENIPUNCTURE: CPT

## 2020-01-27 PROCEDURE — 99214 OFFICE O/P EST MOD 30 MIN: CPT

## 2020-01-27 NOTE — HISTORY OF PRESENT ILLNESS
[FreeTextEntry1] : 65 y/o AA female w/ PMHX of Afib on Eliquis, DM II, HTN, COPD on home O2 2L, LINH on CPAP, lymphoma/CLL s/p radiation and chemo (1997), HFpEF LVEF 66%, severe pulmonary HTN (now on sildenafil), RV systolic dysfunction, mod-severe TR. Admitted 5/10-5/18/19. S/P partial left colectomy ( 4/23/19) secondary to Stage 1 colon cancer,  as per patient with 27 neg nodes and does not need chemo. Nephrologist Dr. Heart for CKD and pulm Dr. Yang for Group 2 pulmonary HTN and  Dr. Hunt is her colorectal surgeon.\par \par Pt comes for a follow up today. Since last visit 11/16/19, she was seen by pulm Dr. Yang and her lucas was reduced to 12.5 mg five days a week from daily due to K 5.4. Her TSH was elevated 5.54 and she has an appt to follow with endo for possible thyroid biopsy. She was started on Epogen 20,000 units by Dr. Michael for anemia.   She feels she has had some improvement on med for PAH called Opsumit and takes 10 mg daily. She is also on oxygen prn with Inogen unit and she uses BIPAP qhs.   Her home B/P ranges from 96/60 to 120/68 and she is 117/66 in office today.\par Currently, she can walk 2-3 blocks slowly with the walker and can climb one flight of stairs. She sleeps with 2 pillows and denies orthopnea. She gets a left neck at times from the BIPAP strap.. 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 She denies chest pain, palpitations, dizziness/LH and syncope. \par \par \par TTE 4/1/19:\par OBSERVATIONS:\par Mitral Valve: Mitral annular calcification and calcified\par mitral leaflets with normal diastolic opening. Mild mitral\par regurgitation.\par Aortic Root: Aortic Root: 2.9 cm.\par Aortic Valve: Calcified trileaflet aortic valve with normal\par opening. Peak transaortic valve gradient equals 7 mm Hg.\par Peak left ventricular outflow tract gradient equals 4.8 mm\par Hg.\par Left Atrium: Mildly dilated left atrium. LA volume index =\par 37 cc/m2.\par Left Ventricle: Hyperdynamic left ventricle. Flattening of\par the interventricular septum in both systole and diastole is\par  consistent with right ventricular pressure overload.\par Normal left ventricular internal dimensions and wall\par thicknesses. (DT:150 ms).\par Right Heart: Severe right atrial enlargement. Right\par ventricular enlargement with normal right ventricular\par systolic function. Normal tricuspid valve. Moderate-severe\par tricuspid regurgitation. Pulmonic valve not well\par visualized. Mild pulmonic regurgitation.\par Pericardium/Pleura Normal pericardium with no pericardial\par effusion.\par Hemodynamic: Estimated right ventricular systolic pressure\par equals 84 mm Hg, assuming right atrial pressure equals 10\par mm Hg, consistent with severe pulmonary hypertension.\par \par COMPARISON: CT chest 4/14/2019. CT chest 4/8/2019. CT chest 11/3/2017. \par Ultrasound thyroid gland 8/8/2019.\par \par FINDINGS: \par \par Tubes/Lines: None.\par \par Lungs And Airways: The subtle mosaic attenuation of the lungs is \par unchanged. The remainder of the lungs are clear. The airways are normal.\par \par Pleura: No pleural effusion or pneumothorax.\par \par Mediastinum: There are no enlarged chest lymph nodes. The hypodense \par nodule arising from the right lobe of the thyroid gland is partially \par imaged and has decreased in size. The esophagus is unremarkable.\par \par Heart and Vasculature: The heart is enlarged. No pericardial effusion. \par Coronary artery calcifications. The pulmonary artery is enlarged. Aorta \par is normal in caliber. Atheromatous disease of the aorta.\par \par Upper Abdomen: The upper abdomen is unremarkable.\par \par Bones And Soft Tissues: The bones are unremarkable.  The soft tissues are \par unremarkable.\par \par \par IMPRESSION: \par \par 1.  No change in the mosaic attenuation of the lungs.\par 2.  Cardiomegaly.\par 3.  Enlarged pulmonary artery can occur in the setting of pulmonary \par arterial hypertension.\par \par \par \par \par \par  \par \par \par SUSI HAUSER M.D., ATTENDING RADIOLOGIST \par This document has been electronically signed. Nov 4 2019 10:08AM\par   \par \par   \par \par \par

## 2020-01-27 NOTE — REVIEW OF SYSTEMS
[see HPI] : see HPI [Lower Ext Edema] : lower extremity edema [Negative] : Heme/Lymph [Recent Weight Gain (___ Lbs)] : recent [unfilled] ~Ulb weight gain [Joint Pain] : no joint pain

## 2020-01-27 NOTE — PHYSICAL EXAM
[General Appearance - Well Developed] : well developed [Normal Appearance] : normal appearance [Well Groomed] : well groomed [General Appearance - Well Nourished] : well nourished [No Deformities] : no deformities [General Appearance - In No Acute Distress] : no acute distress [Normal Conjunctiva] : the conjunctiva exhibited no abnormalities [Eyelids - No Xanthelasma] : the eyelids demonstrated no xanthelasmas [Normal Oral Mucosa] : normal oral mucosa [Respiration, Rhythm And Depth] : normal respiratory rhythm and effort [Exaggerated Use Of Accessory Muscles For Inspiration] : no accessory muscle use [Auscultation Breath Sounds / Voice Sounds] : lungs were clear to auscultation bilaterally [Heart Sounds] : normal S1 and S2 [Edema] : no peripheral edema present [Bowel Sounds] : normal bowel sounds [Abdomen Tenderness] : non-tender [Nail Clubbing] : no clubbing of the fingernails [Cyanosis, Localized] : no localized cyanosis [Skin Color & Pigmentation] : normal skin color and pigmentation [] : no rash [No Venous Stasis] : no venous stasis [Skin Lesions] : no skin lesions [No Skin Ulcers] : no skin ulcer [No Xanthoma] : no  xanthoma was observed [Oriented To Time, Place, And Person] : oriented to person, place, and time [Affect] : the affect was normal [FreeTextEntry1] : trace lower extremity and ankle edema

## 2020-01-27 NOTE — DISCUSSION/SUMMARY
[Patient] : the patient [___ Week(s)] : [unfilled] week(s) [FreeTextEntry1] : 1. Chronic diastolic heart failure, with low voltage on EKG, joint pains and B/L carpal tunnel, will R/O cardiac amyloid with Nuclear medicine- technetium pyrophosphate imaging for transthyretin cardiac amyloid. Pt should call [facility] at [phone] to schedule\par -  labs drawn today including CMP and pro BNP, digoxin level, TSH\par - continue torsemide 20 mg daily with additional as needed for weight gain of 2-3 lbs in 2-3 days, home weight range 187-190 lbs.\par - Continue Toprol to 50 mg po BID ( a.fib) from 100 mg bid prior to hospitalization\par \par  2. Severe Pulmonary HTN/PAH\par -  Continue with sildenafil 20 mg po TID.\par - Continue  Opsumit 10 mg daily\par - Continue torsemide 20 mg daily\par - Follow up with Dr. Yang\par - Continue BiPAP with oxygen for LINH\par \par  3. CKD- labs rechecked today due to prior K 5.4 and will send to Dr. Heart\par - Follow up with renal Dr. Heart next \par - Follow up with primary Dr. Palafox\par \par 4. Atrial fibrillation- rate controlled \par -Continue Toprol 50 mg po BID.\par - Continue digoxin 0.125 mg daily, will check level today\par - Continue Eliquis for oral a/c and stroke prevention.\par \par Follow up in office in 12 weeks, will call with lab results

## 2020-01-27 NOTE — ASSESSMENT
[FreeTextEntry1] : 65 y/o moderately obese female with controlled diabetes, HTN, COPD, lymphoma/CLL (1997), A.fib on Eliquis, HFpEF, LVEF 63% to 70% on TTE 4/1/19 with hyperdynamic LV, severe TYRONE, normal RV systolic function and severe pHTN\par ACC/AHA Stage C, NYHA Class III\par Appears compensated and normotensive with mild JVD

## 2020-01-28 LAB
ALBUMIN SERPL ELPH-MCNC: 4.5 G/DL
ALP BLD-CCNC: 140 U/L
ALT SERPL-CCNC: 13 U/L
ANION GAP SERPL CALC-SCNC: 16 MMOL/L
AST SERPL-CCNC: 19 U/L
BILIRUB SERPL-MCNC: 0.9 MG/DL
BUN SERPL-MCNC: 78 MG/DL
CALCIUM SERPL-MCNC: 10.4 MG/DL
CHLORIDE SERPL-SCNC: 104 MMOL/L
CO2 SERPL-SCNC: 18 MMOL/L
CREAT SERPL-MCNC: 2.23 MG/DL
DIGOXIN SERPL-MCNC: 1.6 NG/ML
GLUCOSE SERPL-MCNC: 97 MG/DL
NT-PROBNP SERPL-MCNC: 1768 PG/ML
POTASSIUM SERPL-SCNC: 5.5 MMOL/L
PROT SERPL-MCNC: 8.1 G/DL
SODIUM SERPL-SCNC: 139 MMOL/L
TSH SERPL-ACNC: 4.8 UIU/ML

## 2020-03-09 NOTE — PHYSICAL EXAM
[General Appearance - Well Developed] : well developed [Normal Appearance] : normal appearance [Well Groomed] : well groomed [General Appearance - Well Nourished] : well nourished [No Deformities] : no deformities [General Appearance - In No Acute Distress] : no acute distress [Normal Conjunctiva] : the conjunctiva exhibited no abnormalities [Eyelids - No Xanthelasma] : the eyelids demonstrated no xanthelasmas [III] : III [Neck Appearance] : the appearance of the neck was normal [Neck Cervical Mass (___cm)] : no neck mass was observed [Jugular Venous Distention Increased] : there was no jugular-venous distention [Thyroid Diffuse Enlargement] : the thyroid was not enlarged [Thyroid Nodule] : there were no palpable thyroid nodules [Heart Sounds] : normal S1 and S2 [Respiration, Rhythm And Depth] : normal respiratory rhythm and effort [Exaggerated Use Of Accessory Muscles For Inspiration] : no accessory muscle use [Auscultation Breath Sounds / Voice Sounds] : lungs were clear to auscultation bilaterally [Tenderness: ____] : tenderness [unfilled] [Abdomen Soft] : soft [Abdomen Tenderness] : non-tender [Abdomen Mass (___ Cm)] : no abdominal mass palpated [Abnormal Walk] : normal gait [Gait - Sufficient For Exercise Testing] : the gait was sufficient for exercise testing [2+ Pitting] : 2+  pitting [Deep Tendon Reflexes (DTR)] : deep tendon reflexes were 2+ and symmetric [Sensation] : the sensory exam was normal to light touch and pinprick [No Focal Deficits] : no focal deficits [Oriented To Time, Place, And Person] : oriented to person, place, and time [Impaired Insight] : insight and judgment were intact [Affect] : the affect was normal [Skin Color & Pigmentation] : normal skin color and pigmentation [Skin Turgor] : normal skin turgor [] : no rash

## 2020-03-10 ENCOUNTER — APPOINTMENT (OUTPATIENT)
Dept: PULMONOLOGY | Facility: CLINIC | Age: 65
End: 2020-03-10
Payer: MEDICARE

## 2020-03-10 VITALS
OXYGEN SATURATION: 96 % | HEART RATE: 89 BPM | TEMPERATURE: 97.9 F | BODY MASS INDEX: 33.13 KG/M2 | WEIGHT: 193 LBS | RESPIRATION RATE: 17 BRPM | DIASTOLIC BLOOD PRESSURE: 81 MMHG | SYSTOLIC BLOOD PRESSURE: 142 MMHG

## 2020-03-10 PROCEDURE — 36415 COLL VENOUS BLD VENIPUNCTURE: CPT

## 2020-03-10 PROCEDURE — 99215 OFFICE O/P EST HI 40 MIN: CPT | Mod: 25

## 2020-03-11 LAB
ALBUMIN SERPL ELPH-MCNC: 4.4 G/DL
ALP BLD-CCNC: 152 U/L
ALT SERPL-CCNC: 21 U/L
ANION GAP SERPL CALC-SCNC: 12 MMOL/L
AST SERPL-CCNC: 30 U/L
BASOPHILS # BLD AUTO: 0.03 K/UL
BASOPHILS NFR BLD AUTO: 0.6 %
BILIRUB SERPL-MCNC: 0.7 MG/DL
BUN SERPL-MCNC: 53 MG/DL
CALCIUM SERPL-MCNC: 9.8 MG/DL
CHLORIDE SERPL-SCNC: 103 MMOL/L
CO2 SERPL-SCNC: 24 MMOL/L
CREAT SERPL-MCNC: 2.07 MG/DL
EOSINOPHIL # BLD AUTO: 0.35 K/UL
EOSINOPHIL NFR BLD AUTO: 7.3 %
FERRITIN SERPL-MCNC: 311 NG/ML
GLUCOSE SERPL-MCNC: 109 MG/DL
HCT VFR BLD CALC: 31.4 %
HGB BLD-MCNC: 9 G/DL
IMM GRANULOCYTES NFR BLD AUTO: 0.2 %
IRON SATN MFR SERPL: 28 %
IRON SERPL-MCNC: 85 UG/DL
LYMPHOCYTES # BLD AUTO: 1.08 K/UL
LYMPHOCYTES NFR BLD AUTO: 22.5 %
MAN DIFF?: NORMAL
MCHC RBC-ENTMCNC: 19.4 PG
MCHC RBC-ENTMCNC: 28.7 GM/DL
MCV RBC AUTO: 67.8 FL
MONOCYTES # BLD AUTO: 0.5 K/UL
MONOCYTES NFR BLD AUTO: 10.4 %
NEUTROPHILS # BLD AUTO: 2.82 K/UL
NEUTROPHILS NFR BLD AUTO: 59 %
PLATELET # BLD AUTO: 135 K/UL
POTASSIUM SERPL-SCNC: 5.7 MMOL/L
PROT SERPL-MCNC: 7.1 G/DL
RBC # BLD: 4.63 M/UL
RBC # FLD: 18.1 %
SODIUM SERPL-SCNC: 138 MMOL/L
TIBC SERPL-MCNC: 302 UG/DL
UIBC SERPL-MCNC: 217 UG/DL
WBC # FLD AUTO: 4.79 K/UL

## 2020-03-11 NOTE — HISTORY OF PRESENT ILLNESS
[FreeTextEntry1] : This letter  is regarding your patient  who  attended pulmonary out patient office today.  I have reviewed  patient's  past history, social history, family history and medication list. I also  reviewed nurse practitioners/ and fellows  notes and assessment and agree with it.  \par The patient was referred by  for pulm HTN, on sildenafil. PMH colon cancer, afib- on eliquis and  LINH on bipap and nocturnal oxygen, OSMAR, CHF, DM, thyroid nodule -on home oxygen-  status post resection of stage I colon cancer    ----------\par \par ------No history of , fever, chills , rigors, chest pain, or hemoptysis. Questionable history of Raynaud's phenomenon. No h/o significant weight loss in last few months. No history of liver dysfunction , collagen vascular disorder or chronic thromboembolic disease. I would classify the patient's dyspnea as WHO  FUNCTIONAL CLASS II--------\par \par ----Pft date--2109- -------RESTRICTIVE VENT DEFECT \par ---6MWT 2019-    132 METERS  USED A CANE TO WALK \par ----Ct scan date--4/2019 IMPRESSION: \par Unchanged prominent mediastinal nodes, nonspecific. Otherwise, no \par evidence of metastatic disease in the chest, abdomen, or pelvis.\par High-density material layering within the gastric antrum may be secondary \par to ingested material.\par Questionable skin defect of the left gluteal soft tissues not seen on \par prior CT, likely skinfold however clinical correlation is recommended.\par \par Findings discussed by Dr. Avila with Dr. Arriaza on 4/14/2019 at 2:00 PM \par with read back.  \par -----\par vq scan 4/2019 low prob of PE\par \par echo 4/2019 ------------------------------------------------------------------------\par CONCLUSIONS:\par 1. Mitral annular calcification and calcified mitral\par leaflets with normal diastolic opening. Mild mitral\par regurgitation.\par 2. Calcified trileaflet aortic valve with normal opening.\par 3. Normal left ventricular internal dimensions and wall\par thicknesses.\par 4. Hyperdynamic left ventricle. Flattening of the\par interventricular septum in both systole and diastole is\par consistent with right ventricular pressure overload.\par 5. Severe right atrial enlargement.\par 6. Right ventricular enlargement with normal right\par ventricular systolic function.\par 7. Normal tricuspid valve. Moderate-severe tricuspid\par regurgitation.\par 8. Estimated pulmonary artery systolic pressure equals 79\par mm Hg, assuming right atrial pressure equals 5  mm Hg,\par consistent with severe pulmonary hypertension.\par *** Compared with echocardiogram of 10/31/2017, no\par significant changes noted.\par \par cardiac cath 4/2019 \par  Pulmonary Artery (S/D/M): 78/29/45\par Pressures:  -- Pulmonary Capillary Wedge: 20/21/18\par Pulmonary vascular resistance (Wood Units): 6.84,  CO by Juan Carlos: 4.39\par \par US thyroid 7/2019 IMPRESSION: \par Bilateral indeterminant thyroid nodules slightly decreased on the right. \par No parathyroid adenoma identified. \par \par AUGUT 2109 WILL  START  REVATIO FOR PAH\par \par ------OCT 2019---------stable from pulmonary standpoint--------dyspnea on exertion-----on 2L oxygen------tolerating revatio 20 mg TID--------under care of [facility] [Dr. Jerez]-----\par Dec 2109-----  on macetantan and revatio, renal dysfunction [ dr heart], slight elevation of lft, \par \par -----DEC  2019-------stable from pulmonary point of view---------doing well on macitentan and revatio------unable to sleep------is using her BiPAP machine------underdare of Hem/Onc [Dr. YANG]------HAS RIGHT THYROID NODULE-\par \par march 2020 - pulm htn- on opsumit and sildenafil\par LINH- on nightly bipap and benefits from its use\par renal insufficiency- follows Dr Heart- recent creat =2.0- on torsemide 20mg and aldcatone reduced to 12.5mg tid. Also, having anemia- hgb=8- follows Dr Yang, heme- getting procrit\par

## 2020-03-11 NOTE — DISCUSSION/SUMMARY
[FreeTextEntry1] : ---Assessment plan----------The patient has been referred here for further opinion regarding pulmonary problem,chronic diastolic heart failure secondary  pulm HTN, on sildenafil. PMH colon cancer, afib- on eliquis and  LINH on bipap and nocturnal oxygen, OSMAR, CHF, DM, thyroid nodule, history of colon cancer status post colon surgery history of lymphoma status post chemotherapy [1997], ??right diaphragm elevation\par \par 1- chronic RIGHT HEART   heart failure secondary  pulm HTN, [ WHO GROUP I +  GROUP II  COMPONENT OF DIASTOLIC DYSFUNCTION ]  on sildenafil., keep her euvolemic-- USES CANE TO WALK ---  6MIN WALK TEST-- ON  REVATIO  AND   MACITENTAN---- It is medically necessary for pt to use OXYGEN  [ INCREASE TO 3 L/MIN]x 24 hrs\par \par 2 CKD-- DIURETICS BEING ADJUSTED  BY DR ECHEVARRIA\par \par 3- LINH------ on BiPAP and benefits from its n\par \par 4-anemia------IS OF CENCERN IS  CAUSING TIREDNESS AND FATIGUE----FOLLOW UP WITH DR. YANG------getting procrit- labs drawn in our office today\par \par 5) f/u in 2-3 months w/6mwt\par \par ---Thanks for allowing  me to participate  in the care of this patient.  Patient at this time  will follow  the above mentioned recommendations and return back for follow up visit. If you have any questions  I can be reached  at #[phone] (beeper)  or  [phone] (office).\par \par Anirudh Yang MD, FCCP \par Director, Pulmonary Hypertension Program \par [facility] \par Division of Pulmonary, Critical Care and Sleep Medicine \par  Professor of Medicine \par [employer]\par

## 2020-04-02 PROBLEM — R18.8 ASCITES: Status: ACTIVE | Noted: 2019-12-10

## 2020-04-14 ENCOUNTER — APPOINTMENT (OUTPATIENT)
Dept: COLORECTAL SURGERY | Facility: CLINIC | Age: 65
End: 2020-04-14

## 2020-05-05 ENCOUNTER — APPOINTMENT (OUTPATIENT)
Dept: RHEUMATOLOGY | Facility: CLINIC | Age: 65
End: 2020-05-05
Payer: MEDICARE

## 2020-05-05 VITALS
DIASTOLIC BLOOD PRESSURE: 83 MMHG | TEMPERATURE: 98.2 F | SYSTOLIC BLOOD PRESSURE: 146 MMHG | HEART RATE: 74 BPM | BODY MASS INDEX: 33.29 KG/M2 | OXYGEN SATURATION: 95 % | HEIGHT: 64 IN | WEIGHT: 195 LBS

## 2020-05-05 PROCEDURE — 99213 OFFICE O/P EST LOW 20 MIN: CPT

## 2020-05-06 LAB
CENTROMERE IGG SER-ACNC: <0.2 CD:130001892
ENA SCL70 IGG SER IA-ACNC: <0.2 AL

## 2020-05-10 LAB — RNA POLYMERASE III IGG: 3 UNITS

## 2020-05-22 LAB
EJ AB SER QL: NEGATIVE
ENA JO1 AB SER IA-ACNC: <20 UNITS
ENA PM/SCL AB SER-ACNC: <20 UNITS
ENA SM+RNP AB SER IA-ACNC: <20 UNITS
ENA SS-A IGG SER QL: 24 UNITS
FIBRILLARIN AB SER QL: NEGATIVE
KU AB SER QL: NEGATIVE
MDA-5 (P140)(CADM-140): <20 UNITS
MI2 AB SER QL: NEGATIVE
NXP-2 (P140): <20 UNITS
OJ AB SER QL: NEGATIVE
PL12 AB SER QL: NEGATIVE
PL7 AB SER QL: NEGATIVE
SRP AB SERPL QL: NEGATIVE
TIF GAMMA (P155/140): <20 UNITS
U2 SNRNP AB SER QL: NEGATIVE

## 2020-06-12 ENCOUNTER — APPOINTMENT (OUTPATIENT)
Dept: PULMONOLOGY | Facility: CLINIC | Age: 65
End: 2020-06-12
Payer: MEDICARE

## 2020-06-12 PROCEDURE — 99443: CPT | Mod: 95

## 2020-06-12 NOTE — HISTORY OF PRESENT ILLNESS
[Home] : at home, [unfilled] , at the time of the visit. [Medical Office: ([facility])___] : at the medical office located in  [Verbal consent obtained from patient] : the patient, [unfilled] [TextBox_4] : The patient was referred by  for pulm HTN, on sildenafil. PMH colon cancer, afib- on eliquis and LINH on bipap and nocturnal oxygen, OSMAR, CHF, DM, thyroid nodule -on home oxygen- status post resection of stage I colon cancer ----------\par \par denis 15 2020---     saw dr fischer nephrology  [ s creat was 2.2].  , saw dr tavera oncology  Hg  ---   still Anemia on EPOGEN---\par  ----------------------n sildenafil  AND OPSUMIT  --ON HOME OXYGEN --ON LASIX AND ALDACTONE \par

## 2020-06-12 NOTE — DISCUSSION/SUMMARY
[FreeTextEntry1] : -Assessment plan----------The patient has been referred here for further opinion regarding pulmonary problem,chronic diastolic heart failure secondary pulm HTN, on sildenafil. PMH colon cancer, afib- on eliquis and LINH on bipap and nocturnal oxygen, OSMAR, CHF, DM, thyroid nodule, history of colon cancer status post colon surgery history of lymphoma status post chemotherapy [1997], ??right diaphragm elevation\par \par 1- chronic RIGHT HEART heart failure secondary pulm HTN, [ WHO GROUP I + GROUP II COMPONENT OF DIASTOLIC DYSFUNCTION ] on sildenafil  AND OPSUMIT ., keep her euvolemic-- USES CANE TO WALK --- 6MIN WALK TEST-- ON REVATIO AND MACITENTAN---- It is medically necessary for pt to use OXYGEN [ INCREASE TO 3 L/MIN]x 24 hrs\par \par 2 CKD-- DIURETICS BEING ADJUSTED BY DR ECHEVARRIA ----ALDACTOEN AND LASIX \par \par 3- LINH------ on BiPAP and benefits from its n\par \par 4-anemia------IS OF CENCERN IS CAUSING TIREDNESS AND FATIGUE----FOLLOW UP WITH DR. YANG------getting procrit- labs drawn in our office today\par \par 5) CARDIOLOGY --DR MATHEWS\par \par 6 ] f/u in 2-3 months w/6mwt\par \par ---Thanks for allowing me to participate in the care of this patient. Patient at this time will follow the above mentioned recommendations and return back for follow up visit. If you have any questions I can be reached at #[phone] (beeper) or [phone] (office).\par \par Anirudh Yang MD, FCCP \par Director, Pulmonary Hypertension Program \par Health system \par Division of Pulmonary, Critical Care and Sleep Medicine \par  Professor of Medicine \par [employer]\par

## 2020-06-29 ENCOUNTER — NON-APPOINTMENT (OUTPATIENT)
Age: 65
End: 2020-06-29

## 2020-06-29 ENCOUNTER — APPOINTMENT (OUTPATIENT)
Dept: CARDIOLOGY | Facility: CLINIC | Age: 65
End: 2020-06-29
Payer: MEDICARE

## 2020-06-29 VITALS
BODY MASS INDEX: 34.37 KG/M2 | SYSTOLIC BLOOD PRESSURE: 108 MMHG | DIASTOLIC BLOOD PRESSURE: 67 MMHG | HEART RATE: 74 BPM | OXYGEN SATURATION: 95 % | TEMPERATURE: 97 F | HEIGHT: 64 IN

## 2020-06-29 VITALS — BODY MASS INDEX: 34.37 KG/M2 | WEIGHT: 200.25 LBS

## 2020-06-29 DIAGNOSIS — E87.6 HYPOKALEMIA: ICD-10-CM

## 2020-06-29 PROCEDURE — 99214 OFFICE O/P EST MOD 30 MIN: CPT

## 2020-06-29 PROCEDURE — 93000 ELECTROCARDIOGRAM COMPLETE: CPT

## 2020-06-29 RX ORDER — DIGOXIN 125 UG/1
125 TABLET ORAL
Qty: 90 | Refills: 3 | Status: ACTIVE | COMMUNITY
Start: 2018-04-17

## 2020-06-29 NOTE — DISCUSSION/SUMMARY
[Patient] : the patient [___ Month(s)] : [unfilled] month(s) [FreeTextEntry1] : 1. Chronic diastolic heart failure, with Normal technetium pyrophosphate  scan on 12/12/19 with findings not suggestive of transthyretin cardiac amyloidosis ( pt had low voltage on EKG, joint pains and B/L carpal tunnel), \par -  labs to be drawn prior to next pulm appt with Dr. Yang, will add  pro BNP\par - increase torsemide to  20 mg alternating with 40 mg daily from 20 mg daily with additional as needed for weight gain of 2-3 lbs in 2-3 days, (home weight range 187-190 lbs to 196 lbs recently and 200 in office).\par - Continue Toprol to 50 mg po BID ( a.fib) from 100 mg bid prior to hospitalization\par - Pt will wear compression stockings to improve venous return\par - increase daily exercise as tolerated\par \par  2. Severe Pulmonary HTN/PAH\par -  Continue with sildenafil 20 mg po TID.\par - Continue  Opsumit 10 mg daily\par - increase torsemide as above\par - Follow up with Dr. Yang\par - Continue BiPAP with oxygen for LINH\par \par  3. CKD- will obtain labs from nephrologist Dr. Heart, reports recent creat 2.2 and K 4.9\par - Follow up with renal Dr. Heart next \par - Follow up with primary Dr. Palafox\par \par 4. Atrial fibrillation- rate controlled \par -Continue Toprol 50 mg po BID.\par - Continue digoxin 0.125 mg every other day\par - Continue Eliquis for oral a/c and stroke prevention.\par \par Follow up in office in 3 months with TTE the same day,  will call with lab results

## 2020-06-29 NOTE — REVIEW OF SYSTEMS
[see HPI] : see HPI [Lower Ext Edema] : lower extremity edema [Negative] : Heme/Lymph [Joint Pain] : no joint pain [Recent Weight Gain (___ Lbs)] : recent [unfilled] ~Ulb weight gain

## 2020-06-29 NOTE — HISTORY OF PRESENT ILLNESS
[FreeTextEntry1] : 63 y/o AA female w/ PMHX of Afib on Eliquis, DM II, HTN, COPD on home O2 2-3L, LINH on CPAP, lymphoma/CLL s/p radiation and chemo (1997), HFpEF LVEF 66%, severe pulmonary HTN (now on sildenafil and opsumit), RV systolic dysfunction, mod-severe TR.  Last admission 5/10-5/18/19. S/P partial left colectomy ( 4/23/19) secondary to Stage 1 colon cancer,  as per patient with 27 neg nodes and does not need chemo. Nephrologist Dr. Heart for CKD and pulm Dr. Yang for Group 2 pulmonary HTN and  Dr. Hunt is her colorectal surgeon. Normal technetium pyrophosphate scan on 12/12/19 with findings not suggestive of transthyretin cardiac amyloidosis. Pt is here for a follow up today. \par \par Pt comes for a follow up today. Since last visit she had a normal PyP scan on 12/12/19 with findings not suggestive of transthyretin cardiac amyloidosis. She had a Telehealth with pulm Dr. Yang and is scheduled to have lab work done in 2 weeks. She feels she has had some improvement on med for PAH called Opsumit and takes 10 mg daily. She is also on oxygen 2-3 liters with Inogen unit and she uses BIPAP qhs. She had an appt with Dr. Heart and she had labs done and reports k was 4.9 and creat 2.2 after reducing lucas to 12.5 mg tiw after last appt and dig was decreased to 0,125 mg QOD after last visit due to level 1.6. \par Currently, she states she can walk 1-2 blocks with her walker. She can climb one flight of stairs but states if she does it without oxygen, her oxygen saturation goes from 95^ to 89%. She sleeps with 2 pillows with no orthopnea. She feels like she has been retaining some fluid since her last appt and has weight gain of 5 lbs on office scale to 200 lbs from 195 lbs and on her home 192 to 196 lbs with B/P range 94/61 to 110/70. \par Pt was to follow with endo for possible thyroid biopsy but it was rescheduled due to Covid 19 pandemic. She is on Epogen 20,000 units by Dr. Armenta for anemia and she will follow up next week, last hemoglobin 9.3.   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 She denies chest pain, palpitations, dizziness/LH and syncope. \par \par \par \par \par EXAM:  NM AMYLOIDOSIS SPECT CT#                      \par \par EXAM:  NM AMYLOIDOSIS LOC LTD AREA                      \par \par \par \par \par PROCEDURE DATE:  12/12/2019  \par \par \par \par INTERPRETATION:  CLINICAL INFORMATION: 64 year old female, with chronic \par diastolic congestive heart failure; referred to evaluate for cardiac \par transthyretin amyloidosis.\par \par RADIOPHARMACEUTICAL: 15.4 mCi Tc-99m-pyrophosphate; i.v.\par \par TECHNIQUE: Approximately 1and 3 hours after intravenous administration of \par the above radiopharmaceutical static images of the chest in the anterior, \par posterior, RPO, JHONNY, and both lateral projections were obtained. SPECT/CT \par of the chest also was obtained. A computer workstation was used to obtain \par composite images for anatomic correlation by precisely overlying the \par SPECT and CT images to generate a fused SPECT/CT image. The CT protocol \par was optimized for SPECT attenuation correction and to provide anatomic \par detail for localization of SPECT abnormalities. The CT protocol was not \par designed to produce and cannot replace state-of- the-art diagnostic CT \par images with specific imaging protocols for different body parts and \par indications.  \par \par COMPARISON: No prior pyrophosphate cardiac amyloid study available.\par \par FINDINGS: The technical quality of the images was satisfactory.  There is \par no abnormal increased myocardial uptake.\par \par Heart to Contralateral Chest Ratio (1 hr.): = 1.1; (normal: less than or \par equal to 1.0)\par \par Myocardium to Bone (visual at 3 hrs): Grade: 1  (normal: < 2)\par \par IMPRESSION: Normal cardiac amyloid Imaging study; findings are not \par suggestive of transthyretin cardiac amyloidosis. \par \par \par \par \par \par \par \par \par \par \par \par JAELYN RODRIGUES M.D., NUCLEAR MEDICINE ATTENDING\par This document has been electronically signed. Dec 12 2019  3:46PM\par   \par \par   \par \par \par TTE 4/1/19:\par OBSERVATIONS:\par Mitral Valve: Mitral annular calcification and calcified\par mitral leaflets with normal diastolic opening. Mild mitral\par regurgitation.\par Aortic Root: Aortic Root: 2.9 cm.\par Aortic Valve: Calcified trileaflet aortic valve with normal\par opening. Peak transaortic valve gradient equals 7 mm Hg.\par Peak left ventricular outflow tract gradient equals 4.8 mm\par Hg.\par Left Atrium: Mildly dilated left atrium. LA volume index =\par 37 cc/m2.\par Left Ventricle: Hyperdynamic left ventricle. Flattening of\par the interventricular septum in both systole and diastole is\par  consistent with right ventricular pressure overload.\par Normal left ventricular internal dimensions and wall\par thicknesses. (DT:150 ms).\par Right Heart: Severe right atrial enlargement. Right\par ventricular enlargement with normal right ventricular\par systolic function. Normal tricuspid valve. Moderate-severe\par tricuspid regurgitation. Pulmonic valve not well\par visualized. Mild pulmonic regurgitation.\par Pericardium/Pleura Normal pericardium with no pericardial\par effusion.\par Hemodynamic: Estimated right ventricular systolic pressure\par equals 84 mm Hg, assuming right atrial pressure equals 10\par mm Hg, consistent with severe pulmonary hypertension.\par \par COMPARISON: CT chest 4/14/2019. CT chest 4/8/2019. CT chest 11/3/2017. \par Ultrasound thyroid gland 8/8/2019.\par \par FINDINGS: \par \par Tubes/Lines: None.\par \par Lungs And Airways: The subtle mosaic attenuation of the lungs is \par unchanged. The remainder of the lungs are clear. The airways are normal.\par \par Pleura: No pleural effusion or pneumothorax.\par \par Mediastinum: There are no enlarged chest lymph nodes. The hypodense \par nodule arising from the right lobe of the thyroid gland is partially \par imaged and has decreased in size. The esophagus is unremarkable.\par \par Heart and Vasculature: The heart is enlarged. No pericardial effusion. \par Coronary artery calcifications. The pulmonary artery is enlarged. Aorta \par is normal in caliber. Atheromatous disease of the aorta.\par \par Upper Abdomen: The upper abdomen is unremarkable.\par \par Bones And Soft Tissues: The bones are unremarkable.  The soft tissues are \par unremarkable.\par \par \par IMPRESSION: \par \par 1.  No change in the mosaic attenuation of the lungs.\par 2.  Cardiomegaly.\par 3.  Enlarged pulmonary artery can occur in the setting of pulmonary \par arterial hypertension.\par \par \par \par \par \par  \par \par \par SUSI HAUSER M.D., ATTENDING RADIOLOGIST \par This document has been electronically signed. Nov 4 2019 10:08AM\par   \par \par   \par \par \par

## 2020-06-29 NOTE — REASON FOR VISIT
[Follow-Up - Clinic] : a clinic follow-up of [Heart Failure] : congestive heart failure [FreeTextEntry2] : Last hospitalization 5/10-5/18/19 secondary to BRBPR, S/P partial left colectomy secondary to Stage 1 colon cancer, ADHF

## 2020-06-29 NOTE — ASSESSMENT
[FreeTextEntry1] : 63 y/o moderately obese female with controlled diabetes, HTN, COPD, lymphoma/CLL (1997), A.fib on Eliquis, HFpEF, LVEF 63% to 70% on TTE 4/1/19 with hyperdynamic LV, severe TYRONE, normal RV systolic function and severe pHTN Pt had a normal technetium pyrophosphate scan not suggestive of TTR amyloid. \par ACC/AHA Stage C, NYHA Class III\par Appears mildly volume overloaded and normotensive with JVD, weight gain and ankle edema

## 2020-06-29 NOTE — PHYSICAL EXAM
[General Appearance - Well Developed] : well developed [Normal Appearance] : normal appearance [Well Groomed] : well groomed [General Appearance - Well Nourished] : well nourished [No Deformities] : no deformities [General Appearance - In No Acute Distress] : no acute distress [Normal Conjunctiva] : the conjunctiva exhibited no abnormalities [Eyelids - No Xanthelasma] : the eyelids demonstrated no xanthelasmas [Normal Oral Mucosa] : normal oral mucosa [Respiration, Rhythm And Depth] : normal respiratory rhythm and effort [Auscultation Breath Sounds / Voice Sounds] : lungs were clear to auscultation bilaterally [Exaggerated Use Of Accessory Muscles For Inspiration] : no accessory muscle use [Heart Sounds] : normal S1 and S2 [Edema] : no peripheral edema present [Bowel Sounds] : normal bowel sounds [Abdomen Tenderness] : non-tender [Nail Clubbing] : no clubbing of the fingernails [Cyanosis, Localized] : no localized cyanosis [Skin Color & Pigmentation] : normal skin color and pigmentation [] : no rash [No Venous Stasis] : no venous stasis [Skin Lesions] : no skin lesions [No Skin Ulcers] : no skin ulcer [No Xanthoma] : no  xanthoma was observed [Oriented To Time, Place, And Person] : oriented to person, place, and time [Affect] : the affect was normal [FreeTextEntry1] : trace lower extremity and ankle edema

## 2020-07-09 ENCOUNTER — NON-APPOINTMENT (OUTPATIENT)
Age: 65
End: 2020-07-09

## 2020-07-09 ENCOUNTER — APPOINTMENT (OUTPATIENT)
Dept: OPHTHALMOLOGY | Facility: CLINIC | Age: 65
End: 2020-07-09
Payer: MEDICARE

## 2020-07-09 PROCEDURE — 92014 COMPRE OPH EXAM EST PT 1/>: CPT

## 2020-07-14 ENCOUNTER — APPOINTMENT (OUTPATIENT)
Dept: COLORECTAL SURGERY | Facility: CLINIC | Age: 65
End: 2020-07-14
Payer: MEDICARE

## 2020-07-14 PROCEDURE — 99213 OFFICE O/P EST LOW 20 MIN: CPT

## 2020-07-14 NOTE — ASSESSMENT
[FreeTextEntry1] : Stage I colon cancer\par -Patient progressing well\par -We will schedule her one year surveillance colonoscopy\par -Bowel prep given\par -Patient followup with cardiology for clearance given chronic atrial fibrillation\par -Patient will need To come off anticoagulation prior to the procedure

## 2020-07-14 NOTE — HISTORY OF PRESENT ILLNESS
[FreeTextEntry1] : 64-year-old female 14 months status post laparoscopic assisted right hemicolectomy for stage I colon cancer. Patient progressing well. Tolerating diet. Normal bowel movements. No blood per rectum. Gaining weight. No fevers or chills. Patient is due for a one-year colonoscopy, but has not undergone the procedure due to the corona virus pandemic. Denies aggravating factors

## 2020-07-17 ENCOUNTER — LABORATORY RESULT (OUTPATIENT)
Age: 65
End: 2020-07-17

## 2020-07-19 LAB
ALBUMIN SERPL ELPH-MCNC: 4.5 G/DL
ALP BLD-CCNC: 150 U/L
ALT SERPL-CCNC: 20 U/L
ANION GAP SERPL CALC-SCNC: 14 MMOL/L
AST SERPL-CCNC: 26 U/L
BASOPHILS # BLD AUTO: 0.02 K/UL
BASOPHILS NFR BLD AUTO: 0.5 %
BILIRUB SERPL-MCNC: 1.2 MG/DL
BUN SERPL-MCNC: 56 MG/DL
CALCIUM SERPL-MCNC: 9.9 MG/DL
CHLORIDE SERPL-SCNC: 102 MMOL/L
CO2 SERPL-SCNC: 23 MMOL/L
CREAT SERPL-MCNC: 2.11 MG/DL
EOSINOPHIL # BLD AUTO: 0.26 K/UL
EOSINOPHIL NFR BLD AUTO: 6.1 %
GLUCOSE SERPL-MCNC: 95 MG/DL
HCT VFR BLD CALC: 29.8 %
HGB BLD-MCNC: 9.1 G/DL
IMM GRANULOCYTES NFR BLD AUTO: 0.2 %
LYMPHOCYTES # BLD AUTO: 1.26 K/UL
LYMPHOCYTES NFR BLD AUTO: 29.8 %
MAN DIFF?: NORMAL
MCHC RBC-ENTMCNC: 20.1 PG
MCHC RBC-ENTMCNC: 30.5 GM/DL
MCV RBC AUTO: 65.8 FL
MONOCYTES # BLD AUTO: 0.56 K/UL
MONOCYTES NFR BLD AUTO: 13.2 %
NEUTROPHILS # BLD AUTO: 2.12 K/UL
NEUTROPHILS NFR BLD AUTO: 50.2 %
PLATELET # BLD AUTO: 134 K/UL
POTASSIUM SERPL-SCNC: 4.6 MMOL/L
PROT SERPL-MCNC: 7.3 G/DL
RBC # BLD: 4.53 M/UL
RBC # FLD: 19.8 %
SARS-COV-2 IGG SERPL IA-ACNC: 0.15 INDEX
SARS-COV-2 IGG SERPL QL IA: NEGATIVE
SODIUM SERPL-SCNC: 140 MMOL/L
TSH SERPL-ACNC: 4.28 UIU/ML
WBC # FLD AUTO: 4.23 K/UL

## 2020-07-23 ENCOUNTER — APPOINTMENT (OUTPATIENT)
Dept: CARDIOLOGY | Facility: CLINIC | Age: 65
End: 2020-07-23
Payer: MEDICARE

## 2020-07-23 ENCOUNTER — NON-APPOINTMENT (OUTPATIENT)
Age: 65
End: 2020-07-23

## 2020-07-23 VITALS
HEART RATE: 80 BPM | BODY MASS INDEX: 34.33 KG/M2 | TEMPERATURE: 98.2 F | RESPIRATION RATE: 16 BRPM | SYSTOLIC BLOOD PRESSURE: 119 MMHG | DIASTOLIC BLOOD PRESSURE: 75 MMHG | OXYGEN SATURATION: 96 % | WEIGHT: 200 LBS

## 2020-07-23 LAB — NT-PROBNP SERPL-MCNC: 2142 PG/ML

## 2020-07-23 PROCEDURE — 93000 ELECTROCARDIOGRAM COMPLETE: CPT

## 2020-07-23 PROCEDURE — 99214 OFFICE O/P EST MOD 30 MIN: CPT

## 2020-07-23 NOTE — ASSESSMENT
[FreeTextEntry1] : 65 y/o moderately obese female with controlled diabetes, HTN, COPD, lymphoma/CLL (1997), A.fib on Eliquis, HFpEF, LVEF 63% to 70% on TTE 4/1/19 with hyperdynamic LV, severe TYRONE, normal RV systolic function and severe pHTN Pt had a normal technetium pyrophosphate scan not suggestive of TTR amyloid. \par ACC/AHA Stage C, NYHA Class III\par Appears compensated, possibly mildly volume overloaded, and normotensive

## 2020-07-23 NOTE — PHYSICAL EXAM
[Normal Appearance] : normal appearance [General Appearance - Well Developed] : well developed [General Appearance - Well Nourished] : well nourished [Well Groomed] : well groomed [General Appearance - In No Acute Distress] : no acute distress [Normal Conjunctiva] : the conjunctiva exhibited no abnormalities [No Deformities] : no deformities [Normal Oral Mucosa] : normal oral mucosa [Eyelids - No Xanthelasma] : the eyelids demonstrated no xanthelasmas [Respiration, Rhythm And Depth] : normal respiratory rhythm and effort [Exaggerated Use Of Accessory Muscles For Inspiration] : no accessory muscle use [Auscultation Breath Sounds / Voice Sounds] : lungs were clear to auscultation bilaterally [Heart Sounds] : normal S1 and S2 [Edema] : no peripheral edema present [Bowel Sounds] : normal bowel sounds [Abdomen Tenderness] : non-tender [Nail Clubbing] : no clubbing of the fingernails [Cyanosis, Localized] : no localized cyanosis [Skin Color & Pigmentation] : normal skin color and pigmentation [No Venous Stasis] : no venous stasis [] : no rash [No Xanthoma] : no  xanthoma was observed [Skin Lesions] : no skin lesions [No Skin Ulcers] : no skin ulcer [Affect] : the affect was normal [Oriented To Time, Place, And Person] : oriented to person, place, and time [Abdomen Soft] : soft [FreeTextEntry1] : trace lower extremity and ankle edema

## 2020-07-23 NOTE — HISTORY OF PRESENT ILLNESS
[FreeTextEntry1] : 63 y/o AA female w/ PMHX of Afib on Eliquis, DM II, HTN, COPD on home O2 2-3L, LINH on CPAP, lymphoma/CLL s/p radiation and chemo (1997), HFpEF LVEF 66%, severe pulmonary HTN (now on sildenafil and opsumit), RV systolic dysfunction, mod-severe TR.  Last admission 5/10-5/18/19. S/P partial right colectomy ( 4/23/19) secondary to Stage 1 colon cancer,  as per patient with 27 neg nodes and does not need chemo. Nephrologist Dr. Heart for CKD and pulm Dr. Yang for Group 2 pulmonary HTN and  Dr. Hunt is her colorectal surgeon. Normal technetium pyrophosphate scan on 12/12/19 with findings not suggestive of transthyretin cardiac amyloidosis. 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 Pt is here for a follow up today for pre op clearance prior to colonoscopy 14 months after laparoscopic assisted right hemicolectomy. \par \par Pt comes for a follow up today and was seen by Dr. Degroot. She feels she has had some improvement on med for PAH called Opsumit and takes 10 mg daily and sildenafil 20 mg tid. She is also on oxygen 2-3 liters with Inogen unit prn, not using today,  and she uses BIPAP qhs. Her weight is stable at 200 lbs, no change from last visit. She is on Epogen 40,000 units by Dr. Armenta for anemia.  Last labs 7/17/20 H&H after Epogen went from 8.8 to 9.1/29.8, TSH 4.28, K 4.6, BUN/creat 56/2.11 and pro BNP 2142 from 1768.\par Currently, she states she can walk 1-2 blocks with her walker. She can climb one flight of stairs. She sleeps with 2 pillows with no orthopnea. Her weight on office scale is 200 lbs and is 196-197 lbs on her  from 195 lbs and on her home 192 to 196 lbs with B/P range 96/69 to 108/64. \par She denies chest pain, palpitations, dizziness/LH and syncope.

## 2020-07-23 NOTE — REASON FOR VISIT
[Heart Failure] : congestive heart failure [Follow-Up - Clinic] : a clinic follow-up of [FreeTextEntry2] : pre op clearance for colonoscopy one year post right hemicolectomy for Stage 1 colon cancer 4/23/19

## 2020-07-23 NOTE — CARDIOLOGY SUMMARY
[No Ischemia] : no Ischemia [___] : [unfilled] [Severe] : severe pulmonary hypertension  [LVEF ___%] : LVEF [unfilled]% [None] : normal LV function [Enlarged] : enlarged LA size [Mild] : mild mitral regurgitation

## 2020-07-23 NOTE — DISCUSSION/SUMMARY
[Patient] : the patient [___ Month(s)] : [unfilled] month(s) [FreeTextEntry1] : 1. Chronic diastolic heart failure, with Normal technetium pyrophosphate  scan on 12/12/19 with findings not suggestive of transthyretin cardiac amyloidosis ( pt had low voltage on EKG, joint pains and B/L carpal tunnel), \par -  labs drawn 7/17/20 with pro BNP 2142 from 1768 \par -  continue  torsemide to  20 mg alternating with 40 mg daily  with additional as needed for weight gain of 2-3 lbs in 2-3 days, (home weight stable at 196-197  lbs and is  200 in office).\par - Continue Toprol to 50 mg po BID ( a.fib) \par - Continue compression stockings to improve venous return\par - increase daily exercise as tolerated\par - scheduled TTE for 9/2020 with appt in office after\par \par  2. Severe Pulmonary HTN/PAH\par -  Continue with sildenafil 20 mg po TID.\par - Continue  Opsumit 10 mg daily\par - Continue torsemide as above\par - Follow up with Dr. Yang\par - Continue BiPAP with oxygen for LINH\par \par  3. CKD- \par - Follow up with renal Dr. Una mena \par - Follow up with primary Dr. Palafox, pt given copies of labs and EKG for his review\par \par 4. Atrial fibrillation- rate controlled \par -Continue Toprol 50 mg po BID.\par - Continue digoxin 0.125 mg every other day\par - Continue Eliquis for oral a/c and stroke prevention.\par \par Follow up in office in 3 months with TTE the same day,  will call with lab results

## 2020-08-06 ENCOUNTER — APPOINTMENT (OUTPATIENT)
Dept: PULMONOLOGY | Facility: CLINIC | Age: 65
End: 2020-08-06
Payer: MEDICARE

## 2020-08-06 VITALS
OXYGEN SATURATION: 97 % | HEART RATE: 79 BPM | BODY MASS INDEX: 33.85 KG/M2 | SYSTOLIC BLOOD PRESSURE: 131 MMHG | RESPIRATION RATE: 17 BRPM | TEMPERATURE: 97.2 F | DIASTOLIC BLOOD PRESSURE: 79 MMHG | WEIGHT: 197.19 LBS

## 2020-08-06 PROCEDURE — 99215 OFFICE O/P EST HI 40 MIN: CPT | Mod: 25

## 2020-08-06 PROCEDURE — 94618 PULMONARY STRESS TESTING: CPT

## 2020-08-07 ENCOUNTER — NON-APPOINTMENT (OUTPATIENT)
Age: 65
End: 2020-08-07

## 2020-08-07 ENCOUNTER — APPOINTMENT (OUTPATIENT)
Dept: OPHTHALMOLOGY | Facility: CLINIC | Age: 65
End: 2020-08-07
Payer: MEDICARE

## 2020-08-07 PROCEDURE — 92014 COMPRE OPH EXAM EST PT 1/>: CPT

## 2020-08-07 PROCEDURE — 92025 CPTRIZED CORNEAL TOPOGRAPHY: CPT

## 2020-08-07 PROCEDURE — 92136 OPHTHALMIC BIOMETRY: CPT

## 2020-08-07 PROCEDURE — 92134 CPTRZ OPH DX IMG PST SGM RTA: CPT

## 2020-08-07 NOTE — DISCUSSION/SUMMARY
[FreeTextEntry1] : -Assessment plan----------The patient has been referred here for further opinion regarding pulmonary problem,chronic diastolic heart failure secondary pulm HTN, on sildenafil. PMH colon cancer, afib- on eliquis and LINH on bipap and nocturnal oxygen, OSMAR, CHF, DM, thyroid nodule, history of colon cancer status post colon surgery history of lymphoma status post chemotherapy [1997], ??right diaphragm elevation\par \par 1- chronic RIGHT HEART heart failure secondary pulm HTN, [ WHO GROUP I + GROUP II COMPONENT OF DIASTOLIC DYSFUNCTION ] on sildenafil  AND OPSUMIT ., keep her euvolemic-- USES CANE TO WALK --- 6MIN WALK TEST-- ON REVATIO AND MACITENTAN---- It is medically necessary for pt to use OXYGEN [ INCREASE TO 3 L/MIN]x 24 hrs\par \par 2 CKD-- DIURETICS BEING ADJUSTED BY DR ECHEVARRIA ----ALDACTOEN AND LASIX \par \par 3- LINH------ on BiPAP and benefits from its n\par \par 4-anemia------IS OF CENCERN IS CAUSING TIREDNESS AND FATIGUE----FOLLOW UP WITH DR. YANG------getting procrit-   labs drawn in our office today\par \par 5) CARDIOLOGY --DR MATHEWS\par \par 6 ] pt SI i being considered or coloscopy----she  may need  BIPAP  in the post procedure  period.\par \par   f/u in 2-3 months \par \par ---Thanks for allowing me to participate in the care of this patient. Patient at this time will follow the above mentioned recommendations and return back for follow up visit. If you have any questions I can be reached at #[phone] (beeper) or [phone] (office).\par \par Anirudh Yang MD, FCCP \par Director, Pulmonary Hypertension Program \par [facility] \par Division of Pulmonary, Critical Care and Sleep Medicine \par  Professor of Medicine \par [employer]\par

## 2020-08-07 NOTE — HISTORY OF PRESENT ILLNESS
[TextBox_4] : ---\par This letter  is regarding your patient  who  attended pulmonary out patient office today.  I have reviewed  patient's  past history, social history, family history and medication list. I also  reviewed nurse practitioners/ and fellows  notes and assessment and agree with it.  \par The patient was referred by  for pulm HTN, on sildenafil. PMH colon cancer, afib- on eliquis and  LINH on bipap and nocturnal oxygen, OSMAR, CHF, DM, thyroid nodule -on home oxygen-  status post resection of stage I colon cancer    ----------\par \par ------No history of , fever, chills , rigors, chest pain, or hemoptysis. Questionable history of Raynaud's phenomenon. No h/o significant weight loss in last few months. No history of liver dysfunction , collagen vascular disorder or chronic thromboembolic disease. I would classify the patient's dyspnea as WHO  FUNCTIONAL CLASS II--------\par \par ----Pft date--2109- -------RESTRICTIVE VENT DEFECT \par ---6MWT 2019-    132 METERS  USED A CANE TO WALK \par ----Ct scan date--4/2019 IMPRESSION: \par Unchanged prominent mediastinal nodes, nonspecific. Otherwise, no \par evidence of metastatic disease in the chest, abdomen, or pelvis.\par High-density material layering within the gastric antrum may be secondary \par to ingested material.\par Questionable skin defect of the left gluteal soft tissues not seen on \par prior CT, likely skinfold however clinical correlation is recommended.\par \par Findings discussed by Dr. Avila with Dr. Arriaza on 4/14/2019 at 2:00 PM \par with read back.  \par -----\par vq scan 4/2019 low prob of PE\par \par echo 4/2019 ------------------------------------------------------------------------\par CONCLUSIONS:\par 1. Mitral annular calcification and calcified mitral\par leaflets with normal diastolic opening. Mild mitral\par regurgitation.\par 2. Calcified trileaflet aortic valve with normal opening.\par 3. Normal left ventricular internal dimensions and wall\par thicknesses.\par 4. Hyperdynamic left ventricle. Flattening of the\par interventricular septum in both systole and diastole is\par consistent with right ventricular pressure overload.\par 5. Severe right atrial enlargement.\par 6. Right ventricular enlargement with normal right\par ventricular systolic function.\par 7. Normal tricuspid valve. Moderate-severe tricuspid\par regurgitation.\par 8. Estimated pulmonary artery systolic pressure equals 79\par mm Hg, assuming right atrial pressure equals 5  mm Hg,\par consistent with severe pulmonary hypertension.\par *** Compared with echocardiogram of 10/31/2017, no\par significant changes noted.\par \par cardiac cath 4/2019 \par  Pulmonary Artery (S/D/M): 78/29/45\par Pressures:  -- Pulmonary Capillary Wedge: 20/21/18\par Pulmonary vascular resistance (Wood Units): 6.84,  CO by Juan Carlos: 4.39\par \par US thyroid 7/2019 IMPRESSION: \par Bilateral indeterminant thyroid nodules slightly decreased on the right. \par No parathyroid adenoma identified. \par \par AUGUT 2109 WILL  START  REVATIO FOR PAH\par \par ------OCT 2019---------stable from pulmonary standpoint--------dyspnea on exertion-----on 2L oxygen------tolerating revatio 20 mg TID--------under care of Rheuma [Dr. Jerez]-----\par Dec 2109-----  on macetantan and revatio, renal dysfunction [ dr fischer], slight elevation of lft, \par \par -----DEC  2019-------stable from pulmonary point of view---------doing well on macitentan and revatio------unable to sleep------is using her BiPAP machine------underdare of Hem/Onc [Dr. YANG]------HAS RIGHT THYROID NODULE-\par \par \par denis 15 2020---     saw dr fischer nephrology  [ s creat was 2.2].  , saw dr yang oncology    ---   still Anemia on EPOGEN---\par  ----------------------n sildenafil  AND OPSUMIT  --ON HOME OXYGEN --ON LASIX AND ALDACTONE \par \par august 2020---doing well--- renl managed by  dr fischer nephrology .  , saw dr yang oncology    ---   still Anemia on EPOGEN---\par  ----------------------n sildenafil  AND OPSUMIT  --ON HOME OXYGEN --ON LASIX AND ALDACTONE \par

## 2020-08-09 NOTE — CONSULT NOTE ADULT - GASTROINTESTINAL
"Pt c/o now constant pain. States "these cannot be pain meds that I have been getting, they must be sugar pills." Pt c/o incision pain and discomfort, will not allow RN to remove dressing. MD Bernard called to assess pt. Will continue to monitor.  " details… detailed exam

## 2020-08-10 ENCOUNTER — NON-APPOINTMENT (OUTPATIENT)
Age: 65
End: 2020-08-10

## 2020-08-20 ENCOUNTER — APPOINTMENT (OUTPATIENT)
Dept: COLORECTAL SURGERY | Facility: CLINIC | Age: 65
End: 2020-08-20

## 2020-08-26 ENCOUNTER — OUTPATIENT (OUTPATIENT)
Dept: OUTPATIENT SERVICES | Facility: HOSPITAL | Age: 65
LOS: 1 days | End: 2020-08-26

## 2020-08-26 VITALS
SYSTOLIC BLOOD PRESSURE: 132 MMHG | RESPIRATION RATE: 16 BRPM | WEIGHT: 192.02 LBS | OXYGEN SATURATION: 94 % | HEART RATE: 82 BPM | HEIGHT: 63 IN | DIASTOLIC BLOOD PRESSURE: 77 MMHG | TEMPERATURE: 97 F

## 2020-08-26 DIAGNOSIS — Z98.890 OTHER SPECIFIED POSTPROCEDURAL STATES: Chronic | ICD-10-CM

## 2020-08-26 DIAGNOSIS — I27.20 PULMONARY HYPERTENSION, UNSPECIFIED: ICD-10-CM

## 2020-08-26 DIAGNOSIS — E03.9 HYPOTHYROIDISM, UNSPECIFIED: ICD-10-CM

## 2020-08-26 DIAGNOSIS — I48.91 UNSPECIFIED ATRIAL FIBRILLATION: ICD-10-CM

## 2020-08-26 DIAGNOSIS — G47.33 OBSTRUCTIVE SLEEP APNEA (ADULT) (PEDIATRIC): ICD-10-CM

## 2020-08-26 DIAGNOSIS — E11.9 TYPE 2 DIABETES MELLITUS WITHOUT COMPLICATIONS: ICD-10-CM

## 2020-08-26 DIAGNOSIS — Z87.09 PERSONAL HISTORY OF OTHER DISEASES OF THE RESPIRATORY SYSTEM: ICD-10-CM

## 2020-08-26 DIAGNOSIS — C18.2 MALIGNANT NEOPLASM OF ASCENDING COLON: ICD-10-CM

## 2020-08-26 DIAGNOSIS — Z90.49 ACQUIRED ABSENCE OF OTHER SPECIFIED PARTS OF DIGESTIVE TRACT: Chronic | ICD-10-CM

## 2020-08-26 LAB
ANION GAP SERPL CALC-SCNC: 14 MMO/L — SIGNIFICANT CHANGE UP (ref 7–14)
BUN SERPL-MCNC: 54 MG/DL — HIGH (ref 7–23)
CALCIUM SERPL-MCNC: 9.8 MG/DL — SIGNIFICANT CHANGE UP (ref 8.4–10.5)
CHLORIDE SERPL-SCNC: 102 MMOL/L — SIGNIFICANT CHANGE UP (ref 98–107)
CO2 SERPL-SCNC: 23 MMOL/L — SIGNIFICANT CHANGE UP (ref 22–31)
CREAT SERPL-MCNC: 2.15 MG/DL — HIGH (ref 0.5–1.3)
GLUCOSE SERPL-MCNC: 86 MG/DL — SIGNIFICANT CHANGE UP (ref 70–99)
HBA1C BLD-MCNC: 5.1 % — SIGNIFICANT CHANGE UP (ref 4–5.6)
HCT VFR BLD CALC: 30.5 % — LOW (ref 34.5–45)
HGB BLD-MCNC: 9 G/DL — LOW (ref 11.5–15.5)
MCHC RBC-ENTMCNC: 19.4 PG — LOW (ref 27–34)
MCHC RBC-ENTMCNC: 29.5 % — LOW (ref 32–36)
MCV RBC AUTO: 65.9 FL — LOW (ref 80–100)
NRBC # FLD: 0.08 K/UL — SIGNIFICANT CHANGE UP (ref 0–0)
NRBC FLD-RTO: 1.3 — SIGNIFICANT CHANGE UP
PLATELET # BLD AUTO: 144 K/UL — LOW (ref 150–400)
PMV BLD: SIGNIFICANT CHANGE UP FL (ref 7–13)
POTASSIUM SERPL-MCNC: 4.1 MMOL/L — SIGNIFICANT CHANGE UP (ref 3.5–5.3)
POTASSIUM SERPL-SCNC: 4.1 MMOL/L — SIGNIFICANT CHANGE UP (ref 3.5–5.3)
RBC # BLD: 4.63 M/UL — SIGNIFICANT CHANGE UP (ref 3.8–5.2)
RBC # FLD: 19.1 % — HIGH (ref 10.3–14.5)
SODIUM SERPL-SCNC: 139 MMOL/L — SIGNIFICANT CHANGE UP (ref 135–145)
WBC # BLD: 5.99 K/UL — SIGNIFICANT CHANGE UP (ref 3.8–10.5)
WBC # FLD AUTO: 5.99 K/UL — SIGNIFICANT CHANGE UP (ref 3.8–10.5)

## 2020-08-26 RX ORDER — APIXABAN 2.5 MG/1
1 TABLET, FILM COATED ORAL
Qty: 0 | Refills: 0 | DISCHARGE

## 2020-08-26 RX ORDER — CALCIFEDIOL 30 UG/1
1 CAPSULE, EXTENDED RELEASE ORAL
Qty: 0 | Refills: 0 | DISCHARGE

## 2020-08-26 RX ORDER — SITAGLIPTIN 50 MG/1
1 TABLET, FILM COATED ORAL
Qty: 0 | Refills: 0 | DISCHARGE

## 2020-08-26 RX ORDER — FLUTICASONE PROPIONATE AND SALMETEROL 50; 250 UG/1; UG/1
1 POWDER ORAL; RESPIRATORY (INHALATION)
Qty: 0 | Refills: 0 | DISCHARGE

## 2020-08-26 RX ORDER — SODIUM CHLORIDE 9 MG/ML
3 INJECTION INTRAMUSCULAR; INTRAVENOUS; SUBCUTANEOUS EVERY 8 HOURS
Refills: 0 | Status: DISCONTINUED | OUTPATIENT
Start: 2020-09-02 | End: 2020-09-17

## 2020-08-26 RX ORDER — CALCIUM CARBONATE 500(1250)
1 TABLET ORAL
Qty: 0 | Refills: 0 | DISCHARGE

## 2020-08-26 RX ORDER — SODIUM CHLORIDE 9 MG/ML
1000 INJECTION INTRAMUSCULAR; INTRAVENOUS; SUBCUTANEOUS
Refills: 0 | Status: DISCONTINUED | OUTPATIENT
Start: 2020-09-02 | End: 2020-09-17

## 2020-08-26 RX ORDER — GLUCOSAMINE/CHONDROITIN/C/MANG 500-400 MG
1 CAPSULE ORAL
Qty: 0 | Refills: 0 | DISCHARGE

## 2020-08-26 RX ORDER — ALLOPURINOL 300 MG
1 TABLET ORAL
Qty: 0 | Refills: 0 | DISCHARGE

## 2020-08-26 RX ORDER — ALBUTEROL 90 UG/1
2 AEROSOL, METERED ORAL
Qty: 0 | Refills: 0 | DISCHARGE

## 2020-08-26 NOTE — H&P PST ADULT - LAST ECHOCARDIOGRAM
4/1/2019. pt scheduled for ECHO on 9/20/20 with cardiologist 4/1/2019. report on chart. pt scheduled for ECHO on 9/20/20 with cardiologist

## 2020-08-26 NOTE — H&P PST ADULT - VENOUS THROMBOEMBOLISM CURRENT STATUS
(1) abnormal pulmonary function (COPD)/(1) other risk factor (includes escalating BMI, pack-years of smoking, diabetes requiring insulin, chemotherapy, female gender and length of surgery)/(2) malignancy (present or previous) (1) abnormal pulmonary function (COPD)/(1) varicose veins/(1) other risk factor (includes escalating BMI, pack-years of smoking, diabetes requiring insulin, chemotherapy, female gender and length of surgery)/(2) malignancy (present or previous)

## 2020-08-26 NOTE — H&P PST ADULT - NSICDXPROBLEM_GEN_ALL_CORE_FT
PROBLEM DIAGNOSES  Problem: Malignant neoplasm of ascending colon  Assessment and Plan: preop for colonoscopy with sedation on 9/2/20  preop instructions given, pt verbalized understanding   pt scheduled for COVID testing on 8/30/20    Problem: History of COPD  Assessment and Plan: pt will use advair inhaler day of procedure as prescribed   she will use ventolin inhaler as needed   last pulmonary note on chart     Problem: LINH treated with BiPAP  Assessment and Plan: Confirmed h/o LINH on BiPAP. OR booking notified via fax. pt instructed to bring BiPAP device day of colonoscopy     Problem: Atrial fibrillation  Assessment and Plan: pt instructed to hold eliquis for 3 days preop as per PCP  cardiac visit note on chart   pt will take metoprolol morning of colonoscopy with sips of water     Problem: Pulmonary HTN  Assessment and Plan: pt will take Opsumit and sildenafil morning of procedure as prescribed     Problem: Hypothyroidism  Assessment and Plan: pt will take levothyroxine morning of procedure as prescribed     Problem: Type 2 diabetes mellitus  Assessment and Plan: pt will hold januvia day of colonoscopy   accu check to be assessed on admission

## 2020-08-26 NOTE — H&P PST ADULT - NEGATIVE GENERAL SYMPTOMS
no weight loss/no fatigue/no fever/no sweating/no polyphagia/no polyuria/no polydipsia/no chills/no weight gain

## 2020-08-26 NOTE — H&P PST ADULT - HISTORY OF PRESENT ILLNESS
66 y/o female with Atrial Fibrillation on Eliquis, HTN, Hypothyroidism, CHF, COPD on home O2, LINH on BiPAP, pulmonary HTN, Type 2 DM CKD presents to [facility] preop for colonoscopy with sedation on 9/2/20. preop dx of malignant neoplasm of ascending colon. pt diagnosed with colon cancer last year and s/p right hemicolectomy april 2019. she presents today preop for a follow up colonoscopy. 66 y/o female with Atrial Fibrillation on Eliquis, HTN, Hypothyroidism, CHF, COPD on home O2, LINH on BiPAP, pulmonary HTN, Type 2 DM and CKD presents to [facility] preop for colonoscopy with sedation on 9/2/20. preop dx of malignant neoplasm of ascending colon. pt diagnosed with colon cancer last year and s/p right hemicolectomy april 2019. she presents today preop for follow up colonoscopy.

## 2020-08-26 NOTE — H&P PST ADULT - NSICDXPASTMEDICALHX_GEN_ALL_CORE_FT
PAST MEDICAL HISTORY:  Anemia     Arthritis     Atrial fibrillation on eliquis    Chronic kidney disease (CKD)     Chronic systolic right heart failure     CLL (chronic lymphocytic leukemia)     COPD (chronic obstructive pulmonary disease)     Diabetes type 2    Gout     History of lymphoma 1997 s/p chemo and radiation    HTN (hypertension)     Hypothyroidism     Malignant neoplasm of ascending colon     LINH (obstructive sleep apnea) on BiPAP    Oxygen dependent at night 2-3L    Pulmonary HTN severe

## 2020-08-26 NOTE — H&P PST ADULT - RESPIRATORY AND THORAX COMMENTS
h/o COPD on home o2 2-3 liters. followed by pulmonary. uses inhalers as prescribed. denies recent exacerbation h/o COPD on home O2 2-3 liters. followed by pulmonary. pt uses inhalers as prescribed. denies recent exacerbation

## 2020-08-26 NOTE — H&P PST ADULT - OPH GEN HX ROS MEA POS PC
left eye cataract. awaiting extraction procedure/blurred vision L/loss of vision L blurred vision L/left eye cataract. pt awaiting extraction procedure/loss of vision L

## 2020-08-26 NOTE — H&P PST ADULT - RX
nighty use of BiPAP. Pt also uses home O2 3L with BiPAP machine nighty use of BiPAP. Pt also uses home O2 2-3L at night with BiPAP machine

## 2020-08-28 DIAGNOSIS — Z01.818 ENCOUNTER FOR OTHER PREPROCEDURAL EXAMINATION: ICD-10-CM

## 2020-08-30 ENCOUNTER — APPOINTMENT (OUTPATIENT)
Dept: DISASTER EMERGENCY | Facility: CLINIC | Age: 65
End: 2020-08-30

## 2020-08-30 LAB — SARS-COV-2 N GENE NPH QL NAA+PROBE: NOT DETECTED

## 2020-09-02 ENCOUNTER — APPOINTMENT (OUTPATIENT)
Dept: COLORECTAL SURGERY | Facility: HOSPITAL | Age: 65
End: 2020-09-02
Payer: MEDICARE

## 2020-09-02 ENCOUNTER — OUTPATIENT (OUTPATIENT)
Dept: OUTPATIENT SERVICES | Facility: HOSPITAL | Age: 65
LOS: 1 days | Discharge: HOME | End: 2020-09-02

## 2020-09-02 VITALS
OXYGEN SATURATION: 100 % | SYSTOLIC BLOOD PRESSURE: 108 MMHG | DIASTOLIC BLOOD PRESSURE: 72 MMHG | RESPIRATION RATE: 14 BRPM | HEART RATE: 64 BPM

## 2020-09-02 VITALS
WEIGHT: 192.9 LBS | DIASTOLIC BLOOD PRESSURE: 63 MMHG | HEART RATE: 71 BPM | HEIGHT: 64 IN | SYSTOLIC BLOOD PRESSURE: 113 MMHG | TEMPERATURE: 98 F | RESPIRATION RATE: 19 BRPM

## 2020-09-02 DIAGNOSIS — Z90.49 ACQUIRED ABSENCE OF OTHER SPECIFIED PARTS OF DIGESTIVE TRACT: Chronic | ICD-10-CM

## 2020-09-02 DIAGNOSIS — C18.2 MALIGNANT NEOPLASM OF ASCENDING COLON: ICD-10-CM

## 2020-09-02 DIAGNOSIS — Z98.890 OTHER SPECIFIED POSTPROCEDURAL STATES: Chronic | ICD-10-CM

## 2020-09-02 LAB — GLUCOSE BLDC GLUCOMTR-MCNC: 94 MG/DL — SIGNIFICANT CHANGE UP (ref 70–99)

## 2020-09-02 PROCEDURE — 45378 DIAGNOSTIC COLONOSCOPY: CPT

## 2020-09-02 RX ADMIN — SODIUM CHLORIDE 30 MILLILITER(S): 9 INJECTION INTRAMUSCULAR; INTRAVENOUS; SUBCUTANEOUS at 12:59

## 2020-09-02 NOTE — ASU PATIENT PROFILE, ADULT - PMH
Anemia    Arthritis    Atrial fibrillation  on eliquis  Chronic kidney disease (CKD)    Chronic systolic right heart failure    CLL (chronic lymphocytic leukemia)    COPD (chronic obstructive pulmonary disease)    Diabetes  type 2  Gout    History of lymphoma  1997 s/p chemo and radiation  HTN (hypertension)    Hypothyroidism    Malignant neoplasm of ascending colon    LINH (obstructive sleep apnea)  on BiPAP  Oxygen dependent  at night 2-3L  Pulmonary HTN  severe

## 2020-09-03 ENCOUNTER — RX RENEWAL (OUTPATIENT)
Age: 65
End: 2020-09-03

## 2020-09-08 ENCOUNTER — APPOINTMENT (OUTPATIENT)
Dept: RHEUMATOLOGY | Facility: CLINIC | Age: 65
End: 2020-09-08
Payer: MEDICARE

## 2020-09-08 DIAGNOSIS — Z13.820 ENCOUNTER FOR SCREENING FOR OSTEOPOROSIS: ICD-10-CM

## 2020-09-08 PROCEDURE — 99213 OFFICE O/P EST LOW 20 MIN: CPT | Mod: 95

## 2020-09-08 NOTE — HISTORY OF PRESENT ILLNESS
[Home] : at home, [unfilled] , at the time of the visit. [Verbal consent obtained from patient] : the patient, [unfilled] [Other Location: e.g. Home (Enter Location, City,State)___] : at [unfilled] [___ Month(s) Ago] : [unfilled] month(s) ago [Currently Experiencing] : currently [Shortness of Breath] : shortness of breath [Arthralgias] : arthralgias [Dyspnea] : dyspnea [Weight Loss] : no weight loss [Anorexia] : no anorexia [Malaise] : no malaise [Fever] : no fever [Depression] : no depression [Fatigue] : no fatigue [Chills] : no chills [Skin Nodules] : no skin nodules [Skin Lesions] : no lesions [Malar Facial Rash] : no malar facial rash [Oral Ulcers] : no oral ulcers [Cough] : no cough [Dry Mouth] : no dry mouth [Dysphonia] : no dysphonia [Dysphagia] : no dysphagia [Chest Pain] : no chest pain [Joint Warmth] : no joint warmth [Joint Swelling] : no joint swelling [Joint Deformity] : no joint deformity [Morning Stiffness] : no morning stiffness [Decreased ROM] : no decreased range of motion [Falls] : no falls [Difficulty Standing] : no difficulty standing [Difficulty Walking] : no difficulty walking [Myalgias] : no myalgias [Muscle Weakness] : no muscle weakness [Muscle Spasms] : no muscle spasms [Muscle Cramping] : no muscle cramping [Visual Changes] : no visual changes [Eye Pain] : no eye pain [Eye Redness] : no eye redness [Dry Eyes] : no dry eyes [FreeTextEntry1] : Has been doing well - improved 6MWT at last visit with Dr. Yang. Feels like her breathing is getting better also. On 3L O2. No new joint complaints. No skin tightening. No sicca symptoms. \par \par Was diagnosed with colon cancer last year - had repeat colonoscopy last week - was BRIDGER

## 2020-09-08 NOTE — PHYSICAL EXAM
[General Appearance - Alert] : alert [General Appearance - In No Acute Distress] : in no acute distress [Sclera] : the sclera and conjunctiva were normal [Outer Ear] : the ears and nose were normal in appearance [Neck Appearance] : the appearance of the neck was normal [] : no respiratory distress [Abnormal Walk] : normal gait [Skin Color & Pigmentation] : normal skin color and pigmentation [No Focal Deficits] : no focal deficits [Oriented To Time, Place, And Person] : oriented to person, place, and time [Impaired Insight] : insight and judgment were intact [Affect] : the affect was normal

## 2020-09-08 NOTE — DATA REVIEWED
[FreeTextEntry1] : Cardiac Amyloidosis Spect scan (12/2019): The technical quality of the images was satisfactory. There is no abnormal increased myocardial uptake. Heart to Contralateral Chest Ratio (1 hr.): = 1.1; (normal: less than or equal to 1.0) Myocardium to Bone (visual at 3 hrs): Grade: 1 (normal: < 2) IMPRESSION: Normal cardiac amyloid Imaging study; findings are not suggestive of transthyretin cardiac amyloidosis. \par \par CT Chest (11/2019): Tubes/Lines: None. Lungs And Airways: The subtle mosaic attenuation of the lungs is unchanged. The remainder of the lungs are clear. The airways are normal. Pleura: No pleural effusion or pneumothorax. Mediastinum: There are no enlarged chest lymph nodes. The hypodense nodule arising from the right lobe of the thyroid gland is partially imaged and has decreased in size. The esophagus is unremarkable. Heart and Vasculature: The heart is enlarged. No pericardial effusion. Coronary artery calcifications. The pulmonary artery is enlarged. Aorta is normal in caliber. Atheromatous disease of the aorta. Upper Abdomen: The upper abdomen is unremarkable. Bones And Soft Tissues: The bones are unremarkable. The soft tissues are unremarkable. IMPRESSION: 1. No change in the mosaic attenuation of the lungs. 2. Cardiomegaly. 3. Enlarged pulmonary artery can occur in the setting of pulmonary arterial \par hypertension. \par \par Hand x-ray (10/2019): Chronic posttraumatic deformity of right ulnar styloid process with adjacent chronic focal small osseous fragmentation. 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 Preserved remaining joint spaces and \par no joint margin erosions. Carpal bones normally aligned. Neutral ulnar variance. No lytic or blastic lesions. \par \par Foot x-ray (10/2019): Frontal, oblique, and lateral views of both feet from 10/7/2019 at 1054. No \par similar prior studies available for comparison. IMPRESSION: No fractures or dislocations. Tarsometatarsal alignment maintained without evidence for a Lisfranc injury. Hypertrophic bony spurring projecting from dorsal right TMT joint margins. Preserved remaining visualized joint spaces and no joint margin erosions. \par Bilateral plantar posterior calcaneal enthesophytes. No lytic or blastic lesions.\par \par CT Neck (05/2019): AERODIGESTIVE TRACT: See below. Otherwise normal in appearance. LYMPH NODES: No adenopathy. PAROTID GLANDS: Normal. SUBMANDIBULAR GLANDS: Normal. THYROID GLAND: There is a large well marginated homogeneous appearing mass within the right thyroid lobe measuring 4.1 x 3.4 x 4 cm in size. The mass appears larger when compared with the prior ultrasound with measurements in the prior report of 3.3 x 3.3 x 2.6 cm. There is mild associated leftward tracheal displacement and mild tracheal narrowing. No substernal extension is seen. VISUALIZED PARANASAL SINUSES: Mild left maxillary polyp or retention cyst. VISUALIZED TYMPANOMASTOID CAVITIES: Clear. BONES: Cervical spondylosis MISCELLANEOUS: Right-sided pleural effusion, moderate. IMPRESSION: \par Right-sided thyroid nodule which appears to have enlarged when compared with a recent sonogram. Repeat sonogram is advised to confirm whether this is a rapidly enlarging mass which may require histologic characterization. There is mild leftward tracheal displacement and tracheal narrowing. \par Moderate right pleural effusion. \par \par TTE (04/2019): 1. Mitral annular calcification and calcified mitral leaflets with normal diastolic opening. Mild mitral regurgitation.  2. Calcified trileaflet aortic valve with normal opening. 3. Normal left ventricular internal dimensions and wall thicknesses. 4. Hyperdynamic left ventricle. Flattening of the interventricular septum in both systole and diastole is  consistent with right ventricular pressure overload. 5. Severe right atrial enlargement. 6. Right ventricular enlargement with normal right ventricular systolic function. 7. Normal tricuspid valve. Moderate-severe tricuspid regurgitation. 8. Estimated pulmonary artery systolic pressure equals 79 mm Hg, assuming right atrial pressure equals 5  mm Hg, consistent with severe pulmonary hypertension. *** Compared with echocardiogram of 10/31/2017, no significant changes noted.\par \par Right Heart Cath (04/2019):  COMPLICATIONS: There were no complications. No complications occurred\par during the cath lab visit. DIAGNOSTIC IMPRESSIONS: Severe PHT PCWP 18mmhg  INTERVENTIONAL IMPRESSIONS: Severe PHT PCWP 18mmhg\par \par CT chest, abdomen and pelvis (04/2019): CHEST: LUNGS AND LARGE AIRWAYS: Patent central airways. No pulmonary nodules. PLEURA: No pleural effusion. No pneumothorax VESSELS: Atherosclerotic disease. Coronary calcifications. HEART: Heart size is enlarged. No pericardial effusion. MEDIASTINUM AND CLAUDIA: Prominent mediastinal and hilar lymph nodes, without change. CHEST WALL AND LOWER NECK: Heterogeneously enhancing right thyroid mass measuring 3.5 x 2.9 cm, partially imaged (series 2 image 1). ABDOMEN AND PELVIS: LIVER: Within normal limits. BILE DUCTS: Normal caliber. GALLBLADDER: Within normal limits. SPLEEN: Within normal limits. PANCREAS: Within normal limits. ADRENALS: Within normal limits. KIDNEYS/URETERS: Atrophic. Subcentimeter hypoattenuating right renal lesions are too small to characterize. No hydronephrosis. BLADDER: Within normal limits. REPRODUCTIVE ORGANS: Calcified uterine fibroid. No adnexal masses. BOWEL: No bowel obstruction. Narrowing of the sigmoid colon may represent new sigmoid mass. Normal appendix. There is high-density material along the posterior aspect of the gastric fundus/antrum. PERITONEUM: No ascites. VESSELS: Atherosclerotic disease. \par RETROPERITONEUM: No lymphadenopathy. ABDOMINAL WALL: Questionable skin defect of the left gluteal soft tissues not seen on prior CT. BONES: Degenerative changes of the spine. IMPRESSION: Unchanged prominent mediastinal nodes, nonspecific. Otherwise, no evidence of metastatic disease in the chest, abdomen, or pelvis. High-density material layering within the gastric antrum may be secondary to ingested material. Questionable skin defect of the left gluteal soft tissues not seen on prior CT, likely skinfold however clinical correlation is recommended. \par \par CTA Chest (11/2017): CHEST: LUNGS AND LARGE AIRWAYS: Patent central airways. Minimal compressive \par atelectasis is noted involving portion of the right lower lobe secondary to right pleural effusion. PLEURA: Small right pleural effusion. No left-sided pleural effusion. No pneumothorax. VESSELS: No filling defect visualized within the bilateral pulmonary arteries. HEART: Cardiomegaly. No pericardial effusion. MEDIASTINUM AND CLAUDIA: 1.5 x 1.2 cm lymph node within the AP window (series 2, image 78), which is nonspecific in nature. CHEST WALL AND LOWER NECK: Within normal limits. VISUALIZED UPPER ABDOMEN: Small hiatal hernia. BONES: Mild degenerative changes of the spine. IMPRESSION: No evidence of a pulmonary embolism. Small right pleural effusion. \par \par CT Cervical spine (01/2017): CT HEAD: The study is limited by patient motion. There are patchy hypodensities in the bilateral periventricular and subcortical white matter without mass effect which are nonspecific but may represent white matter microvascular ischemic changes. There is no CT evidence for acute territorial infarction. There is no acute intracranial hemorrhage, extra axial fluid collection, mass effect, midline shift or hydrocephalus. There is bilateral proptosis. The visualized paranasal sinuses and mastoid air cells are clear. There is a right parietooccipital scalp hematoma with overlying skin staples. There is no calvarial fracture. CT CERVICAL SPINE: Vertebral body heights and alignment are maintained. The cervical lordosis is straightened, which may be due to muscle spasm or positioning. The atlantoaxial and atlantooccipital joints are maintained. There is no acute cervical spine fracture or traumatic malalignment. There are multilevel anterior osteophytes. The intervertebral disc space heights are maintained. There is a C6 superior endplate Schmorl's node. There is no osseous spinal canal stenosis. Uncovertebral hypertrophy results in mild multilevel bilateral neuroforaminal stenosis. The visualized lung apices are clear. The thyroid gland is unremarkable. IMPRESSION: CT head: Right parietooccipital scalp hematoma. No acute intracranial hemorrhage or extra-axial fluid collection. CT cervical spine: No acute cervical spine fracture or traumatic malalignment. \par \par CT Chest (2010): No prior CT scan is available for comparison. There is diffuse mosaic attenuation of both lungs with the size of vessels smaller in the lucent area. Repeat CT scan in expiration shows persistent \par mosaic attenuation due to air trapping. There is no significant mediastinal, hilar or axillary adenopathy. \par Trachea and main bronchi are patent. There is no evidence of pleural effusion. Heart size is within normal limits.. There is a trace pericardial effusion which is likely physiologic. Visualized bones show no significant abnormality. CT scan through upper abdomen shows normal adrenals. A 2 mm nonobstructing \par calculus is seen in the left kidney. IMPRESSION: Diffuse mosaic attenuation, inspiratory and expiratory CT scans confirm the presence of air trapping likely from small airway disease. \par

## 2020-09-08 NOTE — REVIEW OF SYSTEMS
[Lower Ext Edema] : lower extremity edema [Shortness Of Breath] : shortness of breath [SOB on Exertion] : shortness of breath during exertion [As Noted in HPI] : as noted in HPI [Arthralgias] : arthralgias [Negative] : Psychiatric [Heart Rate Is Slow] : the heart rate was not slow [Heart Rate Is Fast] : the heart rate was not fast [Chest Pain] : no chest pain [Palpitations] : no palpitations [Leg Claudication] : no intermittent leg claudication [Wheezing] : no wheezing [Cough] : no cough [Orthopnea] : no orthopnea [PND] : no PND [Joint Pain] : no joint pain [Joint Swelling] : no joint swelling [Joint Stiffness] : no joint stiffness [Limb Pain] : no limb pain [Limb Swelling] : no limb swelling

## 2020-09-08 NOTE — ASSESSMENT
[FreeTextEntry1] : 65 year old female here for pulm HTN as well as positive AMMON and CCP\par \par 1. 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 Discussed with the patient that I suspect that her positive AMMON is likely related to underlying thyroid disease versus malignancy rather than lupus, MCTD or other connective tissue diseases. Patient does not have any symptoms or exam findings to suggest an underlying CTD at this time. \par \par 2. Positive CCP: patient has high titer CCP antibodies. Although CCP is quite specific to RA, patient denies any RA like symptoms in the recent past. Has had one episode of bilateral hand pain and swelling of unclear etiology. Was attributed to gout in the past but not treated as such per patient. Has also had intermittent knee pain but appears to be mechanical and related to OA.  RA symptoms discussed with patient in detail but patient denies ever having such symptoms or morning stiffness.  Hand and foot x-rays unremarkable. \par \par 3. Gout: on Allopurinol for hyperuricemia. Recent UA at goal < 5 mg/dL. Unclear whether her attack last year was truly gout since she describes bilateral hand pain and swelling. However, it did resolve on its own within a couple days which would be atypical for gout as well as inflammatory arthritis. Will monitor symptoms for now. Continue Allopurinol. \par \par 4. Bone health: Ca+D as needed. Needs DEXA - will send prescription today. \par \par Follow up in 3 months

## 2020-09-14 PROBLEM — Z99.81 DEPENDENCE ON SUPPLEMENTAL OXYGEN: Chronic | Status: ACTIVE | Noted: 2020-08-26

## 2020-09-14 PROBLEM — E03.9 HYPOTHYROIDISM, UNSPECIFIED: Chronic | Status: ACTIVE | Noted: 2020-08-26

## 2020-09-14 PROBLEM — G47.33 OBSTRUCTIVE SLEEP APNEA (ADULT) (PEDIATRIC): Chronic | Status: ACTIVE | Noted: 2019-04-01

## 2020-09-14 PROBLEM — C18.2 MALIGNANT NEOPLASM OF ASCENDING COLON: Chronic | Status: ACTIVE | Noted: 2020-08-26

## 2020-09-14 PROBLEM — E11.9 TYPE 2 DIABETES MELLITUS WITHOUT COMPLICATIONS: Chronic | Status: ACTIVE | Noted: 2017-02-04

## 2020-09-14 PROBLEM — M19.90 UNSPECIFIED OSTEOARTHRITIS, UNSPECIFIED SITE: Chronic | Status: ACTIVE | Noted: 2020-08-26

## 2020-09-14 PROBLEM — D64.9 ANEMIA, UNSPECIFIED: Chronic | Status: ACTIVE | Noted: 2020-08-26

## 2020-09-14 PROBLEM — I48.91 UNSPECIFIED ATRIAL FIBRILLATION: Chronic | Status: ACTIVE | Noted: 2017-02-04

## 2020-09-14 PROBLEM — I27.20 PULMONARY HYPERTENSION, UNSPECIFIED: Chronic | Status: ACTIVE | Noted: 2020-08-26

## 2020-09-14 PROBLEM — N18.9 CHRONIC KIDNEY DISEASE, UNSPECIFIED: Chronic | Status: ACTIVE | Noted: 2020-08-26

## 2020-09-14 PROBLEM — Z85.79 PERSONAL HISTORY OF OTHER MALIGNANT NEOPLASMS OF LYMPHOID, HEMATOPOIETIC AND RELATED TISSUES: Chronic | Status: ACTIVE | Noted: 2020-08-26

## 2020-09-16 ENCOUNTER — APPOINTMENT (OUTPATIENT)
Dept: PULMONOLOGY | Facility: CLINIC | Age: 65
End: 2020-09-16

## 2020-09-18 ENCOUNTER — RX RENEWAL (OUTPATIENT)
Age: 65
End: 2020-09-18

## 2020-09-23 ENCOUNTER — TRANSCRIPTION ENCOUNTER (OUTPATIENT)
Age: 65
End: 2020-09-23

## 2020-09-28 ENCOUNTER — APPOINTMENT (OUTPATIENT)
Dept: CARDIOLOGY | Facility: CLINIC | Age: 65
End: 2020-09-28
Payer: MEDICARE

## 2020-09-28 ENCOUNTER — OUTPATIENT (OUTPATIENT)
Dept: OUTPATIENT SERVICES | Facility: HOSPITAL | Age: 65
LOS: 1 days | End: 2020-09-28

## 2020-09-28 ENCOUNTER — NON-APPOINTMENT (OUTPATIENT)
Age: 65
End: 2020-09-28

## 2020-09-28 ENCOUNTER — APPOINTMENT (OUTPATIENT)
Dept: CV DIAGNOSITCS | Facility: HOSPITAL | Age: 65
End: 2020-09-28
Payer: MEDICARE

## 2020-09-28 VITALS
BODY MASS INDEX: 34.16 KG/M2 | SYSTOLIC BLOOD PRESSURE: 127 MMHG | TEMPERATURE: 97.7 F | OXYGEN SATURATION: 97 % | HEART RATE: 58 BPM | DIASTOLIC BLOOD PRESSURE: 82 MMHG | HEIGHT: 64 IN

## 2020-09-28 VITALS — WEIGHT: 199 LBS | BODY MASS INDEX: 34.16 KG/M2

## 2020-09-28 DIAGNOSIS — I50.32 CHRONIC DIASTOLIC (CONGESTIVE) HEART FAILURE: ICD-10-CM

## 2020-09-28 DIAGNOSIS — Z90.49 ACQUIRED ABSENCE OF OTHER SPECIFIED PARTS OF DIGESTIVE TRACT: Chronic | ICD-10-CM

## 2020-09-28 DIAGNOSIS — Z98.890 OTHER SPECIFIED POSTPROCEDURAL STATES: Chronic | ICD-10-CM

## 2020-09-28 PROCEDURE — 99214 OFFICE O/P EST MOD 30 MIN: CPT

## 2020-09-28 PROCEDURE — 93000 ELECTROCARDIOGRAM COMPLETE: CPT

## 2020-09-28 PROCEDURE — 93306 TTE W/DOPPLER COMPLETE: CPT | Mod: 26

## 2020-09-28 RX ORDER — SITAGLIPTIN 50 MG/1
50 TABLET, FILM COATED ORAL DAILY
Qty: 30 | Refills: 2 | Status: DISCONTINUED | COMMUNITY
Start: 2018-09-17 | End: 2020-09-28

## 2020-09-29 NOTE — DISCUSSION/SUMMARY
[Patient] : the patient [___ Month(s)] : [unfilled] month(s) [FreeTextEntry1] : 1. Chronic diastolic heart failure, with Normal technetium pyrophosphate  scan on 12/12/19 with findings not suggestive of transthyretin cardiac amyloidosis ( pt had low voltage on EKG, joint pains and B/L carpal tunnel), \par -  labs drawn 7/17/20 with pro BNP 2142 from 1768, labs with PCP on 9/22/20 with K 5.5 and BUN/creat 66/2.03\par - will d/c lucas 12.5 mg three times a week and will repeat BMP with nephrologist Dr. Heart to recheck K off lucas before cataract surgery \par -  increase torsemide to 40 mg daily from 20 mg alternating with additional as needed for weight gain of 2-3 lbs in 2-3 days, (home weight stable at 196-198  lbs and is  199 in office).\par - Continue Toprol to 50 mg po BID ( a.fib) \par - Continue compression stockings to improve venous return\par - increase daily exercise as tolerated\par - TTE today with no significant change with LVEF 64% LVIDD 4.6 cm, normal LV systolic function, decreased RV systolic function, mild MR, mild mod TR, severe pHTN.\par \par  2. Severe Pulmonary HTN/PAH\par -  Continue with sildenafil 20 mg po TID.\par - Continue  Opsumit 10 mg daily\par - Continue torsemide as above\par - Follow up with Dr. Yang\par - Continue BiPAP with oxygen for LINH\par \par  3. CKD- \par - Follow up with renal Dr. Heart next week to repeat BMP\par - Follow up with primary Dr. Palafox\par \par 4. Atrial fibrillation- rate controlled \par -Continue Toprol 50 mg po BID.\par - Continue digoxin 0.125 mg every other day\par - Continue Eliquis for oral a/c and stroke prevention.\par \par Pt is at low risk for proceeding with cataract surgery on 10/7 and 10/21 but would repeat BMP before surgery to recheck K 5.5. Will recheck off lucas. \par \par Follow up in office in 3 months

## 2020-09-29 NOTE — REASON FOR VISIT
[Follow-Up - Clinic] : a clinic follow-up of [Heart Failure] : congestive heart failure [FreeTextEntry2] : pre op clearance for cataract surgery on left and right eye colonoscopy 10/2020

## 2020-09-29 NOTE — ASSESSMENT
[FreeTextEntry1] : 65 y/o moderately obese female with controlled diabetes, HTN, COPD, lymphoma/CLL (1997), A.fib on Eliquis, HFpEF, LVEF 63% to 70% on TTE 4/1/19 with hyperdynamic LV, severe TYRONE, normal RV systolic function and severe pHTN Pt had a normal technetium pyrophosphate scan not suggestive of TTR amyloid. TTE today 9/28/20 with LVEF 64%, LVIDD 4.6 cm, normal LV systolic function, decreased RV systolic function, mild MR, mild mod TR, severe pHTN.\par ACC/AHA Stage C, NYHA Class III\par Appears compensated, possibly mildly volume overloaded, and normotensive

## 2020-09-29 NOTE — PHYSICAL EXAM
[General Appearance - Well Developed] : well developed [Normal Appearance] : normal appearance [Well Groomed] : well groomed [General Appearance - Well Nourished] : well nourished [No Deformities] : no deformities [General Appearance - In No Acute Distress] : no acute distress [Normal Conjunctiva] : the conjunctiva exhibited no abnormalities [Eyelids - No Xanthelasma] : the eyelids demonstrated no xanthelasmas [Normal Oral Mucosa] : normal oral mucosa [Respiration, Rhythm And Depth] : normal respiratory rhythm and effort [Exaggerated Use Of Accessory Muscles For Inspiration] : no accessory muscle use [Auscultation Breath Sounds / Voice Sounds] : lungs were clear to auscultation bilaterally [Heart Sounds] : normal S1 and S2 [Edema] : no peripheral edema present [Bowel Sounds] : normal bowel sounds [Abdomen Soft] : soft [Abdomen Tenderness] : non-tender [Nail Clubbing] : no clubbing of the fingernails [Cyanosis, Localized] : no localized cyanosis [Skin Color & Pigmentation] : normal skin color and pigmentation [] : no rash [No Venous Stasis] : no venous stasis [Skin Lesions] : no skin lesions [No Skin Ulcers] : no skin ulcer [No Xanthoma] : no  xanthoma was observed [Oriented To Time, Place, And Person] : oriented to person, place, and time [Affect] : the affect was normal [FreeTextEntry1] : trace lower extremity and ankle edema

## 2020-09-29 NOTE — HISTORY OF PRESENT ILLNESS
[FreeTextEntry1] : 64 y/o AA female w/ PMHX of Afib on Eliquis, DM II, HTN, COPD on home O2 2-3L, LINH on CPAP, lymphoma/CLL s/p radiation and chemo (1997), HFpEF LVEF 66%, severe pulmonary HTN (now on sildenafil and opsumit), RV systolic dysfunction, mod-severe TR.  Last admission 5/10-5/18/19. S/P partial right colectomy ( 4/23/19) secondary to Stage 1 colon cancer,  as per patient with 27 neg nodes and does not need chemo. Nephrologist Dr. Heart for CKD and pulm Dr. Yang for Group 2 pulmonary HTN and  Dr. Hunt is her colorectal surgeon. Normal technetium pyrophosphate scan on 12/12/19 with findings not suggestive of transthyretin cardiac amyloidosis. 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 Pt is here for a follow up today for pre op clearance prior to bilateral cataract surgery on 10/7 and 10/21/20 after TTE today with LVEF 64%, LVIDD 4.6 cm, normal LV systolic function, decreased RV systolic function, mild MR, mild mod TR, severe pHTN.\par \par Pt comes for a follow up today and was seen by Dr. Degroot. She feels she has had some improvement on med for PAH called Opsumit and takes 10 mg daily and sildenafil 20 mg tid. She is also on oxygen 2-3 liters with Inogen unit prn, not using today,  and she uses BIPAP qhs.  She is on Epogen 40,000 units by Dr. Armenta for anemia.  Reports Last labs 9/22/20 with Na 142, K 5.5 BUN 66/creat 2.03, TSH 6.42 H&H 9.2/32.1.\par Currently, she states she can walk 1-2 blocks with her walker. She can climb one flight of stairs. She sleeps with 2 pillows with no orthopnea. Her weight on office scale is 199 lbs and is 196-198 lbs on her  scale with B/P range 96/69 to 118/60. \par She denies chest pain, palpitations, dizziness/LH and syncope. \par \par Patient name: KARINA SCHNEIDER\par YOB: 1955   Age: 65 (F)   MR#: [id number]\par Study Date: 9/28/2020\par Location: O/PSonographer: SARITA Otero\par Study quality: Technically good\par Referring Physician: MELINDA CHANEL\par Blood Pressure: 124/75 mmHg\par Height: 163 cm\par Weight: 90 kg\par BSA: 2 m2\par ------------------------------------------------------------------------\par PROCEDURE: Transthoracic echocardiogram with 2-D, M-Mode\par and complete spectral and color flow Doppler.\par INDICATION: Unspecified combined systolic (congestive) and\par diastolic (congestive) heart failure (I50.40)\par ------------------------------------------------------------------------\par DIMENSIONS:\par Dimensions:     Normal Values:\par LA:     5.1 cm    2.0 - 4.0 cm\par Ao:     2.9 cm    2.0 - 3.8 cm\par SEPTUM: 0.9 cm    0.6 - 1.2 cm\par PWT:    1.1 cm    0.6 - 1.1 cm\par LVIDd:  4.6 cm    3.0 - 5.6 cm\par LVIDs:  3.0 cm    1.8 - 4.0 cm\par Derived Variables:\par LVMI: 81 g/m2\par RWT: 0.47\par Fractional short: 35 %\par Ejection Fraction (Teicholtz): 64 %\par ------------------------------------------------------------------------\par OBSERVATIONS:\par Mitral Valve: Mitral annular calcification, otherwise\par normal mitral valve. Mild mitral regurgitation. \par Aortic Root: Normal aortic root.\par Aortic Valve: Normal trileaflet aortic valve.\par Left Atrium: Severely dilated left atrium.  LA volume index\par = 55 cc/m2.\par Left Ventricle: Normal left ventricular systolic function.\par No segmental wall motion abnormalities. Increased relative\par wall thickness with normal left ventricular mass index,\par consistent with concentric left ventricular remodeling.\par Right Heart: Severe right atrial enlargement. Right\par ventricular enlargement with decreased right ventricular\par systolic function. Normal tricuspid valve.  Mild-moderate\par tricuspid regurgitation. Normal pulmonic valve.\par Pericardium/PleuraNormal pericardium with no pericardial\par effusion.\par Hemodynamic: Estimated right ventricular systolic pressure\par equals 65 mm Hg, assuming right atrial pressure equals 10\par mm Hg, consistent with severe pulmonary hypertension.\par ------------------------------------------------------------------------\par CONCLUSIONS:\par 1. Mitral annular calcification, otherwise normal mitral\par valve. Mild mitral regurgitation. \par 2. Normal trileaflet aortic valve.\par 3. Severely dilated left atrium.  LA volume index = 55\par cc/m2.\par 4. Increased relative wall thickness with normal left\par ventricular mass index, consistent with concentric left\par ventricular remodeling.\par 5. Normal left ventricular systolic function. No segmental\par wall motion abnormalities.\par 6. Severe right atrial enlargement.\par 7. Right ventricular enlargement with decreased right\par ventricular systolic function.\par 8. Normal tricuspid valve.  Mild-moderate tricuspid\par regurgitation.\par 9. Estimated pulmonary artery systolic pressure equals 65\par mm Hg, assuming right atrial pressure equals 10  mm Hg,\par consistent with severe pulmonary hypertension.\par \par ------------------------------------------------------------------------

## 2020-10-03 DIAGNOSIS — Z01.818 ENCOUNTER FOR OTHER PREPROCEDURAL EXAMINATION: ICD-10-CM

## 2020-10-04 ENCOUNTER — APPOINTMENT (OUTPATIENT)
Dept: DISASTER EMERGENCY | Facility: CLINIC | Age: 65
End: 2020-10-04

## 2020-10-04 LAB — SARS-COV-2 N GENE NPH QL NAA+PROBE: NOT DETECTED

## 2020-10-06 ENCOUNTER — TRANSCRIPTION ENCOUNTER (OUTPATIENT)
Age: 65
End: 2020-10-06

## 2020-10-07 ENCOUNTER — APPOINTMENT (OUTPATIENT)
Dept: OPHTHALMOLOGY | Facility: AMBULATORY SURGERY CENTER | Age: 65
End: 2020-10-07

## 2020-10-07 ENCOUNTER — OUTPATIENT (OUTPATIENT)
Dept: OUTPATIENT SERVICES | Facility: HOSPITAL | Age: 65
LOS: 1 days | Discharge: HOME | End: 2020-10-07
Payer: MEDICARE

## 2020-10-07 ENCOUNTER — NON-APPOINTMENT (OUTPATIENT)
Age: 65
End: 2020-10-07

## 2020-10-07 DIAGNOSIS — Z90.49 ACQUIRED ABSENCE OF OTHER SPECIFIED PARTS OF DIGESTIVE TRACT: Chronic | ICD-10-CM

## 2020-10-07 DIAGNOSIS — Z98.890 OTHER SPECIFIED POSTPROCEDURAL STATES: Chronic | ICD-10-CM

## 2020-10-07 PROCEDURE — 66984 XCAPSL CTRC RMVL W/O ECP: CPT | Mod: LT

## 2020-10-08 ENCOUNTER — APPOINTMENT (OUTPATIENT)
Dept: OPHTHALMOLOGY | Facility: CLINIC | Age: 65
End: 2020-10-08
Payer: MEDICARE

## 2020-10-08 ENCOUNTER — NON-APPOINTMENT (OUTPATIENT)
Age: 65
End: 2020-10-08

## 2020-10-08 PROCEDURE — 99024 POSTOP FOLLOW-UP VISIT: CPT

## 2020-10-13 ENCOUNTER — APPOINTMENT (OUTPATIENT)
Dept: OPHTHALMOLOGY | Facility: CLINIC | Age: 65
End: 2020-10-13
Payer: MEDICARE

## 2020-10-13 ENCOUNTER — NON-APPOINTMENT (OUTPATIENT)
Age: 65
End: 2020-10-13

## 2020-10-13 PROCEDURE — 99024 POSTOP FOLLOW-UP VISIT: CPT

## 2020-10-17 DIAGNOSIS — Z01.818 ENCOUNTER FOR OTHER PREPROCEDURAL EXAMINATION: ICD-10-CM

## 2020-10-18 ENCOUNTER — APPOINTMENT (OUTPATIENT)
Dept: DISASTER EMERGENCY | Facility: CLINIC | Age: 65
End: 2020-10-18

## 2020-10-18 LAB — SARS-COV-2 N GENE NPH QL NAA+PROBE: NOT DETECTED

## 2020-10-20 ENCOUNTER — TRANSCRIPTION ENCOUNTER (OUTPATIENT)
Age: 65
End: 2020-10-20

## 2020-10-21 ENCOUNTER — OUTPATIENT (OUTPATIENT)
Dept: OUTPATIENT SERVICES | Facility: HOSPITAL | Age: 65
LOS: 1 days | Discharge: HOME | End: 2020-10-21
Payer: MEDICARE

## 2020-10-21 ENCOUNTER — APPOINTMENT (OUTPATIENT)
Dept: OPHTHALMOLOGY | Facility: AMBULATORY SURGERY CENTER | Age: 65
End: 2020-10-21

## 2020-10-21 DIAGNOSIS — Z90.49 ACQUIRED ABSENCE OF OTHER SPECIFIED PARTS OF DIGESTIVE TRACT: Chronic | ICD-10-CM

## 2020-10-21 DIAGNOSIS — Z98.890 OTHER SPECIFIED POSTPROCEDURAL STATES: Chronic | ICD-10-CM

## 2020-10-21 PROCEDURE — 66984 XCAPSL CTRC RMVL W/O ECP: CPT | Mod: 79,RT

## 2020-10-22 ENCOUNTER — APPOINTMENT (OUTPATIENT)
Dept: OPHTHALMOLOGY | Facility: CLINIC | Age: 65
End: 2020-10-22
Payer: MEDICARE

## 2020-10-22 ENCOUNTER — NON-APPOINTMENT (OUTPATIENT)
Age: 65
End: 2020-10-22

## 2020-10-22 PROCEDURE — 99024 POSTOP FOLLOW-UP VISIT: CPT

## 2020-10-27 ENCOUNTER — NON-APPOINTMENT (OUTPATIENT)
Age: 65
End: 2020-10-27

## 2020-10-27 ENCOUNTER — APPOINTMENT (OUTPATIENT)
Dept: OPHTHALMOLOGY | Facility: CLINIC | Age: 65
End: 2020-10-27
Payer: MEDICARE

## 2020-10-27 PROCEDURE — 99024 POSTOP FOLLOW-UP VISIT: CPT

## 2020-11-12 ENCOUNTER — APPOINTMENT (OUTPATIENT)
Dept: PULMONOLOGY | Facility: CLINIC | Age: 65
End: 2020-11-12
Payer: MEDICARE

## 2020-11-12 VITALS
HEART RATE: 77 BPM | WEIGHT: 200.25 LBS | SYSTOLIC BLOOD PRESSURE: 136 MMHG | DIASTOLIC BLOOD PRESSURE: 88 MMHG | OXYGEN SATURATION: 92 % | BODY MASS INDEX: 34.37 KG/M2 | RESPIRATION RATE: 17 BRPM | TEMPERATURE: 97.2 F

## 2020-11-12 LAB
ALBUMIN SERPL ELPH-MCNC: 4.4 G/DL
ALP BLD-CCNC: 168 U/L
ALT SERPL-CCNC: 17 U/L
ANION GAP SERPL CALC-SCNC: 12 MMOL/L
AST SERPL-CCNC: 21 U/L
BILIRUB SERPL-MCNC: 0.5 MG/DL
BUN SERPL-MCNC: 62 MG/DL
CALCIUM SERPL-MCNC: 10.1 MG/DL
CHLORIDE SERPL-SCNC: 102 MMOL/L
CO2 SERPL-SCNC: 27 MMOL/L
CREAT SERPL-MCNC: 2.06 MG/DL
GLUCOSE SERPL-MCNC: 141 MG/DL
NT-PROBNP SERPL-MCNC: 2067 PG/ML
POTASSIUM SERPL-SCNC: 4.8 MMOL/L
PROT SERPL-MCNC: 7.2 G/DL
SODIUM SERPL-SCNC: 141 MMOL/L

## 2020-11-12 PROCEDURE — 99215 OFFICE O/P EST HI 40 MIN: CPT | Mod: 25

## 2020-11-12 PROCEDURE — 36415 COLL VENOUS BLD VENIPUNCTURE: CPT

## 2020-11-12 NOTE — DISCUSSION/SUMMARY
[FreeTextEntry1] : -Assessment plan----------The patient has been referred here for further opinion regarding pulmonary problem,chronic diastolic heart failure secondary pulm HTN, on sildenafil. PMH colon cancer, afib- on eliquis and LINH on bipap and nocturnal oxygen, OSMAR, CHF, DM, thyroid nodule, history of colon cancer status post colon surgery history of lymphoma status post chemotherapy [1997], ??right diaphragm elevation\par \par 1- chronic RIGHT HEART heart failure secondary pulm HTN, [ WHO GROUP I + GROUP II COMPONENT OF DIASTOLIC DYSFUNCTION ] on sildenafil  AND OPSUMIT ., keep her euvolemic-- USES CANE TO WALK --- 6MIN WALK TEST-- ON REVATIO AND MACITENTAN---- It is medically necessary for pt to use OXYGEN [ INCREASE TO 3 L/MIN]x 24 hrs\par \par 2 CKD-- DIURETICS BEING ADJUSTED BY DR ECHEVARRIA --torsemide and aldactone\par \par 3- LINH------ on BiPAP and benefits from its nightly use\par \par 4-anemia------IS OF CENCERN IS CAUSING TIREDNESS AND FATIGUE----FOLLOW UP WITH DR. YANG------getting procrit-   \par 5) labs drawn in our office today\par \par 6) CARDIOLOGY --DR MATHEWS\par 7) she is up to date w/influenza and pneumovax\par 8) needs ct chest noncontrast\par 9) f/u in 3 m\par \par ---Thanks for allowing me to participate in the care of this patient. Patient at this time will follow the above mentioned recommendations and return back for follow up visit. If you have any questions I can be reached at #[phone] (beeper) or [phone] (office).\par \par Anirudh Yang MD, FCCP \par Director, Pulmonary Hypertension Program \par [facility] \par Division of Pulmonary, Critical Care and Sleep Medicine \par  Professor of Medicine \par [employer]\par

## 2020-11-12 NOTE — HISTORY OF PRESENT ILLNESS
[TextBox_4] : ---\par This letter  is regarding your patient  who  attended pulmonary out patient office today.  I have reviewed  patient's  past history, social history, family history and medication list. I also  reviewed nurse practitioners/ and fellows  notes and assessment and agree with it.  \par The patient was referred by  for pulm HTN, on sildenafil. PMH colon cancer, afib- on eliquis and  LINH on bipap and nocturnal oxygen, OSMAR, CHF, DM, thyroid nodule -on home oxygen-  status post resection of stage I colon cancer    ----------\par \par ------No history of , fever, chills , rigors, chest pain, or hemoptysis. Questionable history of Raynaud's phenomenon. No h/o significant weight loss in last few months. No history of liver dysfunction , collagen vascular disorder or chronic thromboembolic disease. I would classify the patient's dyspnea as WHO  FUNCTIONAL CLASS II--------\par \par ----Pft date--2109- -------RESTRICTIVE VENT DEFECT \par ---6MWT 2019-    132 METERS  USED A CANE TO WALK \par ----Ct scan date--4/2019 IMPRESSION: \par Unchanged prominent mediastinal nodes, nonspecific. Otherwise, no \par evidence of metastatic disease in the chest, abdomen, or pelvis.\par High-density material layering within the gastric antrum may be secondary \par to ingested material.\par Questionable skin defect of the left gluteal soft tissues not seen on \par prior CT, likely skinfold however clinical correlation is recommended.\par \par Findings discussed by Dr. Avila with Dr. Arriaza on 4/14/2019 at 2:00 PM \par with read back.  \par -----\par vq scan 4/2019 low prob of PE\par \par echo 4/2019 ------------------------------------------------------------------------\par CONCLUSIONS:\par 1. Mitral annular calcification and calcified mitral\par leaflets with normal diastolic opening. Mild mitral\par regurgitation.\par 2. Calcified trileaflet aortic valve with normal opening.\par 3. Normal left ventricular internal dimensions and wall\par thicknesses.\par 4. Hyperdynamic left ventricle. Flattening of the\par interventricular septum in both systole and diastole is\par consistent with right ventricular pressure overload.\par 5. Severe right atrial enlargement.\par 6. Right ventricular enlargement with normal right\par ventricular systolic function.\par 7. Normal tricuspid valve. Moderate-severe tricuspid\par regurgitation.\par 8. Estimated pulmonary artery systolic pressure equals 79\par mm Hg, assuming right atrial pressure equals 5  mm Hg,\par consistent with severe pulmonary hypertension.\par *** Compared with echocardiogram of 10/31/2017, no\par significant changes noted.\par \par \par echo 9/2020 est pasp=65mmHg\par cardiac cath 4/2019 \par  Pulmonary Artery (S/D/M): 78/29/45\par Pressures:  -- Pulmonary Capillary Wedge: 20/21/18\par Pulmonary vascular resistance (Wood Units): 6.84,  CO by Juan Carlos: 4.39\par \par US thyroid 7/2019 IMPRESSION: \par Bilateral indeterminant thyroid nodules slightly decreased on the right. \par No parathyroid adenoma identified. \par \par AUGUT 2109 WILL  START  REVATIO FOR PAH\par \par ------OCT 2019---------stable from pulmonary standpoint--------dyspnea on exertion-----on 2L oxygen------tolerating revatio 20 mg TID--------under care of [facility] [Dr. Jerez]-----\par Dec 2109-----  on macetantan and revatio, renal dysfunction [ dr fischer], slight elevation of lft, \par \par -----DEC  2019-------stable from pulmonary point of view---------doing well on macitentan and revatio------unable to sleep------is using her BiPAP machine------underdare of Hem/Onc [Dr. YANG]------HAS RIGHT THYROID NODULE-\par \par \par denis 15 2020---     saw dr fischer nephrology  [ s creat was 2.2].  , saw dr yang oncology  Hg  ---   still Anemia on EPOGEN---\par  ----------------------n sildenafil  AND OPSUMIT  --ON HOME OXYGEN --ON LASIX AND ALDACTONE \par nov 2020---doing well--- renal managed by  dr fischer nephrology .  , saw dr yang oncology  Hg  ---   still Anemia on EPOGEN---\par pulm htn-on sildenafil  AND OPSUMIT  --ON HOME OXYGEN --ON torsemide AND ALDACTONE \par

## 2020-11-13 LAB
BASOPHILS # BLD AUTO: 0.04 K/UL
BASOPHILS NFR BLD AUTO: 0.8 %
EOSINOPHIL # BLD AUTO: 0.31 K/UL
EOSINOPHIL NFR BLD AUTO: 6.3 %
FERRITIN SERPL-MCNC: 239 NG/ML
HCT VFR BLD CALC: 31.3 %
HGB BLD-MCNC: 9.4 G/DL
IMM GRANULOCYTES NFR BLD AUTO: 0.4 %
LYMPHOCYTES # BLD AUTO: 1.28 K/UL
LYMPHOCYTES NFR BLD AUTO: 26.2 %
MAN DIFF?: NORMAL
MCHC RBC-ENTMCNC: 20 PG
MCHC RBC-ENTMCNC: 30 GM/DL
MCV RBC AUTO: 66.7 FL
MONOCYTES # BLD AUTO: 0.56 K/UL
MONOCYTES NFR BLD AUTO: 11.5 %
NEUTROPHILS # BLD AUTO: 2.68 K/UL
NEUTROPHILS NFR BLD AUTO: 54.8 %
PLATELET # BLD AUTO: 129 K/UL
RBC # BLD: 4.69 M/UL
RBC # FLD: 17.9 %
TSH SERPL-ACNC: 5.41 UIU/ML
WBC # FLD AUTO: 4.89 K/UL

## 2020-11-24 ENCOUNTER — APPOINTMENT (OUTPATIENT)
Dept: OPHTHALMOLOGY | Facility: CLINIC | Age: 65
End: 2020-11-24
Payer: MEDICARE

## 2020-11-24 ENCOUNTER — NON-APPOINTMENT (OUTPATIENT)
Age: 65
End: 2020-11-24

## 2020-11-24 PROCEDURE — 99024 POSTOP FOLLOW-UP VISIT: CPT

## 2020-12-07 ENCOUNTER — APPOINTMENT (OUTPATIENT)
Dept: RHEUMATOLOGY | Facility: CLINIC | Age: 65
End: 2020-12-07
Payer: MEDICARE

## 2020-12-07 ENCOUNTER — LABORATORY RESULT (OUTPATIENT)
Age: 65
End: 2020-12-07

## 2020-12-07 VITALS
HEART RATE: 78 BPM | SYSTOLIC BLOOD PRESSURE: 135 MMHG | WEIGHT: 200 LBS | HEIGHT: 64 IN | BODY MASS INDEX: 34.15 KG/M2 | DIASTOLIC BLOOD PRESSURE: 79 MMHG | TEMPERATURE: 97.1 F | RESPIRATION RATE: 17 BRPM

## 2020-12-07 PROCEDURE — 99213 OFFICE O/P EST LOW 20 MIN: CPT

## 2020-12-07 NOTE — PHYSICAL EXAM
[General Appearance - Alert] : alert [General Appearance - In No Acute Distress] : in no acute distress [Sclera] : the sclera and conjunctiva were normal [Outer Ear] : the ears and nose were normal in appearance [Neck Appearance] : the appearance of the neck was normal [] : no respiratory distress [Abnormal Walk] : normal gait [Skin Color & Pigmentation] : normal skin color and pigmentation [No Focal Deficits] : no focal deficits [Oriented To Time, Place, And Person] : oriented to person, place, and time [Impaired Insight] : insight and judgment were intact [Affect] : the affect was normal [Auscultation Breath Sounds / Voice Sounds] : lungs were clear to auscultation bilaterally [Heart Rate And Rhythm] : heart rate was normal and rhythm regular [Heart Sounds] : normal S1 and S2 [Heart Sounds Gallop] : no gallops [Murmurs] : no murmurs [Heart Sounds Pericardial Friction Rub] : no pericardial rub [Full Pulse] : the pedal pulses are present [Edema] : there was no peripheral edema [Cervical Lymph Nodes Enlarged Posterior Bilaterally] : posterior cervical [Cervical Lymph Nodes Enlarged Anterior Bilaterally] : anterior cervical [Supraclavicular Lymph Nodes Enlarged Bilaterally] : supraclavicular [Axillary Lymph Nodes Enlarged Bilaterally] : axillary [No CVA Tenderness] : no ~M costovertebral angle tenderness [No Spinal Tenderness] : no spinal tenderness [Nail Clubbing] : no clubbing  or cyanosis of the fingernails [Musculoskeletal - Swelling] : no joint swelling seen [Motor Tone] : muscle strength and tone were normal

## 2020-12-07 NOTE — REVIEW OF SYSTEMS
[Lower Ext Edema] : lower extremity edema [Shortness Of Breath] : shortness of breath [SOB on Exertion] : shortness of breath during exertion [As Noted in HPI] : as noted in HPI [Arthralgias] : arthralgias [Negative] : Heme/Lymph [Heart Rate Is Slow] : the heart rate was not slow [Heart Rate Is Fast] : the heart rate was not fast [Chest Pain] : no chest pain [Palpitations] : no palpitations [Leg Claudication] : no intermittent leg claudication [Wheezing] : no wheezing [Cough] : no cough [Orthopnea] : no orthopnea [PND] : no PND [Joint Pain] : no joint pain [Joint Swelling] : no joint swelling [Joint Stiffness] : no joint stiffness [Limb Pain] : no limb pain [Limb Swelling] : no limb swelling

## 2020-12-07 NOTE — HISTORY OF PRESENT ILLNESS
[___ Month(s) Ago] : [unfilled] month(s) ago [Currently Experiencing] : currently [Shortness of Breath] : shortness of breath [Arthralgias] : arthralgias [Dyspnea] : dyspnea [Anorexia] : no anorexia [Weight Loss] : no weight loss [Malaise] : no malaise [Fever] : no fever [Chills] : no chills [Fatigue] : no fatigue [Depression] : no depression [Malar Facial Rash] : no malar facial rash [Skin Lesions] : no lesions [Skin Nodules] : no skin nodules [Oral Ulcers] : no oral ulcers [Cough] : no cough [Dry Mouth] : no dry mouth [Dysphonia] : no dysphonia [Dysphagia] : no dysphagia [Chest Pain] : no chest pain [Joint Swelling] : no joint swelling [Joint Warmth] : no joint warmth [Joint Deformity] : no joint deformity [Decreased ROM] : no decreased range of motion [Morning Stiffness] : no morning stiffness [Falls] : no falls [Difficulty Standing] : no difficulty standing [Difficulty Walking] : no difficulty walking [Myalgias] : no myalgias [Muscle Weakness] : no muscle weakness [Muscle Spasms] : no muscle spasms [Muscle Cramping] : no muscle cramping [Visual Changes] : no visual changes [Eye Pain] : no eye pain [Eye Redness] : no eye redness [Dry Eyes] : no dry eyes [FreeTextEntry1] : Doing quite well. Feels that her breathing has imrpoved since her last visit. Denies any joint pain, swelling or stiffness at this time.  Has occasional arthralgias in the knees and wrists but appears that they are self limited and respond to warm compress. No discomfort or interference with her daily activities. No rashes, oral ulcers or constitutional symptoms\par \par CT chest is pending. DEXA was performed Oct 2020 and was normal.

## 2020-12-07 NOTE — ASSESSMENT
[FreeTextEntry1] : 65 year old female here for pulm HTN as well as positive AMMON and CCP\par \par 1. 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 Discussed with the patient that I suspect that her positive AMMON is likely related to underlying thyroid disease versus malignancy rather than lupus, MCTD or other connective tissue diseases. Patient does not have any symptoms or exam findings to suggest an underlying CTD at this time. \par \par 2. Positive CCP: patient has high titer CCP antibodies. Although CCP is quite specific to RA, patient denies any RA like symptoms in the recent past. Has had one episode of bilateral hand pain and swelling of unclear etiology. Was attributed to gout in the past but not treated as such per patient. Has also had intermittent knee pain but appears to be mechanical and related to OA.  RA symptoms discussed with patient in detail but patient denies ever having such symptoms or morning stiffness.  Hand and foot x-rays unremarkable. \par \par 3. Gout: on Allopurinol for hyperuricemia. Recent UA at goal < 5 mg/dL. Unclear whether her attack in 2018 was truly gout since she describes bilateral hand pain and swelling. However, it did resolve on its own within a couple days which would be atypical for gout. Will monitor symptoms for now. Continue Allopurinol. \par \par 4. Bone health: Ca+D as needed. Normal DEXA in  Oct 2020. \par \par Follow up in 3 months

## 2020-12-16 ENCOUNTER — APPOINTMENT (OUTPATIENT)
Dept: CT IMAGING | Facility: IMAGING CENTER | Age: 65
End: 2020-12-16
Payer: MEDICARE

## 2020-12-16 ENCOUNTER — OUTPATIENT (OUTPATIENT)
Dept: OUTPATIENT SERVICES | Facility: HOSPITAL | Age: 65
LOS: 1 days | End: 2020-12-16
Payer: MEDICARE

## 2020-12-16 DIAGNOSIS — Z98.890 OTHER SPECIFIED POSTPROCEDURAL STATES: Chronic | ICD-10-CM

## 2020-12-16 DIAGNOSIS — I27.20 PULMONARY HYPERTENSION, UNSPECIFIED: ICD-10-CM

## 2020-12-16 DIAGNOSIS — Z90.49 ACQUIRED ABSENCE OF OTHER SPECIFIED PARTS OF DIGESTIVE TRACT: Chronic | ICD-10-CM

## 2020-12-16 LAB
25(OH)D3 SERPL-MCNC: 62.8 NG/ML
ALBUMIN MFR SERPL ELPH: 56.1 %
ALBUMIN SERPL ELPH-MCNC: 4.3 G/DL
ALBUMIN SERPL-MCNC: 4 G/DL
ALBUMIN/GLOB SERPL: 1.2 RATIO
ALP BLD-CCNC: 168 U/L
ALPHA1 GLOB MFR SERPL ELPH: 3.4 %
ALPHA1 GLOB SERPL ELPH-MCNC: 0.2 G/DL
ALPHA2 GLOB MFR SERPL ELPH: 9.8 %
ALPHA2 GLOB SERPL ELPH-MCNC: 0.7 G/DL
ALT SERPL-CCNC: 10 U/L
ANA PAT FLD IF-IMP: ABNORMAL
ANA PATTERN: ABNORMAL
ANA SER IF-ACNC: ABNORMAL
ANA TITER: ABNORMAL
ANION GAP SERPL CALC-SCNC: 12 MMOL/L
AST SERPL-CCNC: 24 U/L
B-GLOBULIN MFR SERPL ELPH: 10.2 %
B-GLOBULIN SERPL ELPH-MCNC: 0.7 G/DL
BASOPHILS # BLD AUTO: 0.05 K/UL
BASOPHILS NFR BLD AUTO: 0.9 %
BILIRUB SERPL-MCNC: 0.6 MG/DL
BUN SERPL-MCNC: 69 MG/DL
C3 SERPL-MCNC: 153 MG/DL
C4 SERPL-MCNC: 55 MG/DL
CALCIUM SERPL-MCNC: 10.2 MG/DL
CCP AB SER IA-ACNC: 242 UNITS
CHLORIDE SERPL-SCNC: 102 MMOL/L
CO2 SERPL-SCNC: 24 MMOL/L
CREAT SERPL-MCNC: 2.08 MG/DL
CRP SERPL-MCNC: 0.19 MG/DL
DEPRECATED KAPPA LC FREE/LAMBDA SER: 2.23 RATIO
DSDNA AB SER-ACNC: <12 IU/ML
EOSINOPHIL # BLD AUTO: 0.38 K/UL
EOSINOPHIL NFR BLD AUTO: 7.1 %
FERRITIN SERPL-MCNC: 170 NG/ML
GAMMA GLOB FLD ELPH-MCNC: 1.5 G/DL
GAMMA GLOB MFR SERPL ELPH: 20.5 %
GLUCOSE SERPL-MCNC: 90 MG/DL
HCT VFR BLD CALC: 32.8 %
HGB BLD-MCNC: 9.8 G/DL
IGA SER QL IEP: 82 MG/DL
IGG SER QL IEP: 1728 MG/DL
IGM SER QL IEP: 64 MG/DL
INTERPRETATION SERPL IEP-IMP: NORMAL
KAPPA LC CSF-MCNC: 4.29 MG/DL
KAPPA LC SERPL-MCNC: 9.57 MG/DL
LYMPHOCYTES # BLD AUTO: 1.38 K/UL
LYMPHOCYTES NFR BLD AUTO: 25.6 %
MAN DIFF?: NORMAL
MCHC RBC-ENTMCNC: 20 PG
MCHC RBC-ENTMCNC: 29.9 GM/DL
MCV RBC AUTO: 66.9 FL
MONOCYTES # BLD AUTO: 0.62 K/UL
MONOCYTES NFR BLD AUTO: 11.5 %
NEUTROPHILS # BLD AUTO: 2.97 K/UL
NEUTROPHILS NFR BLD AUTO: 54.9 %
PLATELET # BLD AUTO: 153 K/UL
POTASSIUM SERPL-SCNC: 4.9 MMOL/L
PROT SERPL-MCNC: 7.2 G/DL
RBC # BLD: 4.9 M/UL
RBC # FLD: 18.8 %
RF+CCP IGG SER-IMP: ABNORMAL
RHEUMATOID FACT SER QL: 15 IU/ML
SODIUM SERPL-SCNC: 138 MMOL/L
THYROGLOB AB SERPL-ACNC: <20 IU/ML
THYROPEROXIDASE AB SERPL IA-ACNC: <10 IU/ML
WBC # FLD AUTO: 5.41 K/UL

## 2020-12-16 PROCEDURE — 71250 CT THORAX DX C-: CPT | Mod: 26

## 2020-12-16 PROCEDURE — 71250 CT THORAX DX C-: CPT

## 2020-12-23 ENCOUNTER — NON-APPOINTMENT (OUTPATIENT)
Age: 65
End: 2020-12-23

## 2020-12-23 ENCOUNTER — APPOINTMENT (OUTPATIENT)
Dept: OPHTHALMOLOGY | Facility: CLINIC | Age: 65
End: 2020-12-23
Payer: MEDICARE

## 2020-12-23 PROCEDURE — 99442: CPT | Mod: 95

## 2020-12-24 ENCOUNTER — NON-APPOINTMENT (OUTPATIENT)
Age: 65
End: 2020-12-24

## 2020-12-24 ENCOUNTER — APPOINTMENT (OUTPATIENT)
Dept: OPHTHALMOLOGY | Facility: CLINIC | Age: 65
End: 2020-12-24
Payer: MEDICARE

## 2020-12-24 PROCEDURE — 99441: CPT | Mod: 95

## 2020-12-29 ENCOUNTER — NON-APPOINTMENT (OUTPATIENT)
Age: 65
End: 2020-12-29

## 2020-12-29 ENCOUNTER — APPOINTMENT (OUTPATIENT)
Dept: OPHTHALMOLOGY | Facility: CLINIC | Age: 65
End: 2020-12-29
Payer: MEDICARE

## 2020-12-29 PROCEDURE — 92012 INTRM OPH EXAM EST PATIENT: CPT | Mod: 24

## 2021-01-04 ENCOUNTER — NON-APPOINTMENT (OUTPATIENT)
Age: 66
End: 2021-01-04

## 2021-01-04 ENCOUNTER — APPOINTMENT (OUTPATIENT)
Dept: CARDIOLOGY | Facility: CLINIC | Age: 66
End: 2021-01-04
Payer: MEDICARE

## 2021-01-04 VITALS
HEART RATE: 76 BPM | BODY MASS INDEX: 34.85 KG/M2 | OXYGEN SATURATION: 100 % | TEMPERATURE: 98.3 F | SYSTOLIC BLOOD PRESSURE: 130 MMHG | DIASTOLIC BLOOD PRESSURE: 80 MMHG | HEIGHT: 64 IN

## 2021-01-04 VITALS — BODY MASS INDEX: 34.85 KG/M2 | WEIGHT: 203 LBS

## 2021-01-04 PROCEDURE — 99214 OFFICE O/P EST MOD 30 MIN: CPT

## 2021-01-04 PROCEDURE — 93000 ELECTROCARDIOGRAM COMPLETE: CPT

## 2021-01-04 NOTE — REVIEW OF SYSTEMS
[see HPI] : see HPI [Negative] : Heme/Lymph [Recent Weight Gain (___ Lbs)] : recent [unfilled] ~Ulb weight gain [Joint Pain] : no joint pain [FreeTextEntry1] : home oxygen prn

## 2021-01-04 NOTE — HISTORY OF PRESENT ILLNESS
[FreeTextEntry1] : 66 y/o AA female w/ PMHX of Afib on Eliquis, DM II, HTN, COPD on home O2 2-3L, LINH on CPAP, lymphoma/CLL s/p radiation and chemo (1997), HFpEF LVEF 66%, severe pulmonary HTN (now on sildenafil and opsumit), RV systolic dysfunction, mod-severe TR.  Last admission 5/10-5/18/19. S/P partial right colectomy ( 4/23/19) secondary to Stage 1 colon cancer,  as per patient with 27 neg nodes and does not need chemo. Nephrologist Dr. Heart for CKD and pulm Dr. Yang for Group 2 pulmonary HTN and  Dr. Hunt is her colorectal surgeon. Normal technetium pyrophosphate scan on 12/12/19 with findings not suggestive of transthyretin cardiac amyloidosis. 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 S/P bilateral cataract surgery on 10/7 and 10/21/20. Last TTE today with LVEF 64%, LVIDD 4.6 cm, normal LV systolic function, decreased RV systolic function, mild MR, mild mod TR, severe pHTN. Pt is here for a follow up today.\par \par Pt comes for a follow up today. Since last visit, she had successful bilateral cataract surgery She feels she has had some improvement on med for PAH called Opsumit and takes 10 mg daily and sildenafil 20 mg tid. She is also on oxygen 2-3 liters with Inogen unit prn, not using today,  and she uses BIPAP qhs.  She is on Epogen 40,000 units by Dr. Armenta for anemia.  Reports she had a CT of chest 12/16/20 with no significant changes and a bone density with no changes. \par Currently, she states she can walk 1-2 blocks, uses her walker as needed. She can climb one flight of stairs. She sleeps with 2 pillows with no orthopnea. Her weight on office scale is 203 lbs and is 199.4 lbs at home with range since October of 195.4 to 199.4 lbs that she attributes to eating more over the holidays and B/P 107/64 to 124/77. \par She denies chest pain, palpitations, dizziness/LH and syncope. \par \par EXAM:  CT CHEST  \par \par \par PROCEDURE DATE:  12/16/2020  \par  \par \par \par INTERPRETATION:  CT CHEST WITHOUT CONTRAST\par \par INDICATION: Pulmonary hypertension.\par \par TECHNIQUE: Unenhanced helical images were obtained of the chest. Coronal and sagittal images were reconstructed. Maximum intensity projection images were generated.\par \par COMPARISON: CT chest 11/1/2019, 4/14/2019, and 4/8/2019.\par \par FINDINGS:\par \par Tubes/Lines: None.\par \par Lungs And Airways: The mosaic attenuation of the lungs is unchanged. Linear atelectasis/scarring of the left lower lobe. The remainder of the lungs are clear. The airways are normal.\par \par Pleura: No pleural effusion or pneumothorax.\par \par Mediastinum: The chest lymph nodes are less than 10 mm in the short axis. The visualized esophagus is unremarkable. The right lobe thyroid gland nodule is not well visualized.\par \par Heart and Vasculature: The heart is enlarged. In particular, the atria are enlarged.  There is no pericardial effusion. Coronary artery calcifications.\par \par Left-sided aortic arch and left-sided descending thoracic aorta. The aorta is tortuous. Aorta is normal in caliber. Aortic calcifications. The pulmonary artery is enlarged which can occur in the clinical setting of pulmonary arterial hypertension.\par \par Upper Abdomen: The upper abdomen is unremarkable.\par \par Bones And Soft Tissues: The bones are unremarkable.  The soft tissues are unremarkable.\par \par \par IMPRESSION:\par \par 1.  No change in the mosaic attenuation of the lungs.\par 2.  Cardiomegaly.\par 3.  Enlarged pulmonary artery can occur in the clinical setting of pulmonary arterial hypertension\par \par \par \par \par Patient name: KARINA SCHNEIDER\par YOB: 1955   Age: 65 (F)   MR#: [id number]\par Study Date: 9/28/2020\par Location: O/PSonographer: SARITA Otero\par Study quality: Technically good\par Referring Physician: MELINDA CHANEL\par Blood Pressure: 124/75 mmHg\par Height: 163 cm\par Weight: 90 kg\par BSA: 2 m2\par ------------------------------------------------------------------------\par PROCEDURE: Transthoracic echocardiogram with 2-D, M-Mode\par and complete spectral and color flow Doppler.\par INDICATION: Unspecified combined systolic (congestive) and\par diastolic (congestive) heart failure (I50.40)\par ------------------------------------------------------------------------\par DIMENSIONS:\par Dimensions:     Normal Values:\par LA:     5.1 cm    2.0 - 4.0 cm\par Ao:     2.9 cm    2.0 - 3.8 cm\par SEPTUM: 0.9 cm    0.6 - 1.2 cm\par PWT:    1.1 cm    0.6 - 1.1 cm\par LVIDd:  4.6 cm    3.0 - 5.6 cm\par LVIDs:  3.0 cm    1.8 - 4.0 cm\par Derived Variables:\par LVMI: 81 g/m2\par RWT: 0.47\par Fractional short: 35 %\par Ejection Fraction (Teicholtz): 64 %\par ------------------------------------------------------------------------\par OBSERVATIONS:\par Mitral Valve: Mitral annular calcification, otherwise\par normal mitral valve. Mild mitral regurgitation. \par Aortic Root: Normal aortic root.\par Aortic Valve: Normal trileaflet aortic valve.\par Left Atrium: Severely dilated left atrium.  LA volume index\par = 55 cc/m2.\par Left Ventricle: Normal left ventricular systolic function.\par No segmental wall motion abnormalities. Increased relative\par wall thickness with normal left ventricular mass index,\par consistent with concentric left ventricular remodeling.\par Right Heart: Severe right atrial enlargement. Right\par ventricular enlargement with decreased right ventricular\par systolic function. Normal tricuspid valve.  Mild-moderate\par tricuspid regurgitation. Normal pulmonic valve.\par Pericardium/PleuraNormal pericardium with no pericardial\par effusion.\par Hemodynamic: Estimated right ventricular systolic pressure\par equals 65 mm Hg, assuming right atrial pressure equals 10\par mm Hg, consistent with severe pulmonary hypertension.\par ------------------------------------------------------------------------\par CONCLUSIONS:\par 1. Mitral annular calcification, otherwise normal mitral\par valve. Mild mitral regurgitation. \par 2. Normal trileaflet aortic valve.\par 3. Severely dilated left atrium.  LA volume index = 55\par cc/m2.\par 4. Increased relative wall thickness with normal left\par ventricular mass index, consistent with concentric left\par ventricular remodeling.\par 5. Normal left ventricular systolic function. No segmental\par wall motion abnormalities.\par 6. Severe right atrial enlargement.\par 7. Right ventricular enlargement with decreased right\par ventricular systolic function.\par 8. Normal tricuspid valve.  Mild-moderate tricuspid\par regurgitation.\par 9. Estimated pulmonary artery systolic pressure equals 65\par mm Hg, assuming right atrial pressure equals 10  mm Hg,\par consistent with severe pulmonary hypertension.\par \par ------------------------------------------------------------------------

## 2021-01-04 NOTE — DISCUSSION/SUMMARY
[Patient] : the patient [___ Month(s)] : [unfilled] month(s) [FreeTextEntry1] : 1. Chronic diastolic heart failure, with Normal technetium pyrophosphate  scan on 12/12/19 with findings not suggestive of transthyretin cardiac amyloidosis ( pt had low voltage on EKG, joint pains and B/L carpal tunnel), 9/28/20 TTE with LVEF 64%\par - will continue lucas 12.5 mg three times a week, repeat K on 12/7/20 was 4.9 and BUN/creat 69/2.08, will follow up with nephrologist Dr. Heart\par - take additional torsemide 20 mg x 2 days and thencontinue torsemide 40 mg daily from 20 mg with additional as needed for weight gain of 2-3 lbs in 2-3 days\par - Continue Toprol to 50 mg po BID ( a.fib) \par - Continue compression stockings to improve venous return\par - increase daily exercise as tolerated\par - TTE today with no significant change with LVEF 64% LVIDD 4.6 cm, normal LV systolic function, decreased RV systolic function, mild MR, mild mod TR, severe pHTN.\par - continue digoxin 0.125 mg QOD for AF with controlled VR 70's\par \par  2. Severe Pulmonary HTN/PAH\par -  Continue with sildenafil 20 mg po TID.\par - Continue  Opsumit 10 mg daily, pt feels she has had improvement since starting\par - Continue torsemide as above\par - Follow up with Dr. Yang\par - Continue BiPAP with oxygen for LINH\par \par  3. CKD- \par - Follow up with renal Dr. Heart \par - Follow up with primary Dr. Palafox, pt was given a copy of EKG, echo and labs for PCP\par \par 4. Atrial fibrillation- rate controlled \par -Continue Toprol 50 mg po BID.\par - Continue digoxin 0.125 mg every other day\par - Continue Eliquis for oral a/c and stroke prevention.\par \par Follow up in office in 3 months

## 2021-01-06 ENCOUNTER — RX RENEWAL (OUTPATIENT)
Age: 66
End: 2021-01-06

## 2021-01-26 ENCOUNTER — NON-APPOINTMENT (OUTPATIENT)
Age: 66
End: 2021-01-26

## 2021-01-29 ENCOUNTER — NON-APPOINTMENT (OUTPATIENT)
Age: 66
End: 2021-01-29

## 2021-02-08 ENCOUNTER — LABORATORY RESULT (OUTPATIENT)
Age: 66
End: 2021-02-08

## 2021-02-08 ENCOUNTER — APPOINTMENT (OUTPATIENT)
Dept: PULMONOLOGY | Facility: CLINIC | Age: 66
End: 2021-02-08
Payer: MEDICARE

## 2021-02-08 VITALS
RESPIRATION RATE: 15 BRPM | WEIGHT: 202 LBS | TEMPERATURE: 97.2 F | OXYGEN SATURATION: 94 % | BODY MASS INDEX: 34.49 KG/M2 | DIASTOLIC BLOOD PRESSURE: 72 MMHG | SYSTOLIC BLOOD PRESSURE: 126 MMHG | HEIGHT: 64 IN | HEART RATE: 80 BPM

## 2021-02-08 LAB
ALBUMIN SERPL ELPH-MCNC: 4.4 G/DL
ALP BLD-CCNC: 154 U/L
ALT SERPL-CCNC: 12 U/L
ANION GAP SERPL CALC-SCNC: 12 MMOL/L
AST SERPL-CCNC: 16 U/L
BILIRUB SERPL-MCNC: 0.5 MG/DL
BUN SERPL-MCNC: 56 MG/DL
CALCIUM SERPL-MCNC: 10.2 MG/DL
CHLORIDE SERPL-SCNC: 103 MMOL/L
CO2 SERPL-SCNC: 24 MMOL/L
CREAT SERPL-MCNC: 2.1 MG/DL
GLUCOSE SERPL-MCNC: 117 MG/DL
NT-PROBNP SERPL-MCNC: 2255 PG/ML
POTASSIUM SERPL-SCNC: 4.4 MMOL/L
PROT SERPL-MCNC: 7.3 G/DL
SODIUM SERPL-SCNC: 140 MMOL/L

## 2021-02-08 PROCEDURE — 99215 OFFICE O/P EST HI 40 MIN: CPT | Mod: 25

## 2021-02-08 PROCEDURE — 36415 COLL VENOUS BLD VENIPUNCTURE: CPT

## 2021-02-08 NOTE — PHYSICAL EXAM
[No Acute Distress] : no acute distress [Normal Oropharynx] : normal oropharynx [IV] : Mallampati Class: IV [No Neck Mass] : no neck mass [No Resp Distress] : no resp distress [No Abnormalities] : no abnormalities [Benign] : benign [Normal Gait] : normal gait [No Clubbing] : no clubbing [Normal Color/ Pigmentation] : normal color/ pigmentation [No Focal Deficits] : no focal deficits [Oriented x3] : oriented x3 [TextBox_54] : LOUD P2

## 2021-02-08 NOTE — DISCUSSION/SUMMARY
[FreeTextEntry1] : -Assessment plan----------The patient has been referred here for further opinion regarding pulmonary problem,chronic diastolic heart failure secondary pulm HTN, on sildenafil. OPSUMIT-------PMH colon cancer, afib- on eliquis and LINH on bipap and nocturnal oxygen, OSMAR, CHF, DM, thyroid nodule, history of colon cancer status post colon surgery history of lymphoma status post chemotherapy [1997], ??right diaphragm elevation\par \par 1- chronic RIGHT HEART heart failure secondary pulm HTN, [ WHO GROUP I + GROUP II COMPONENT OF DIASTOLIC DYSFUNCTION ] on sildenafil  AND OPSUMIT ., keep her euvolemic-- USES CANE TO WALK --- 6MIN WALK TEST-- ON REVATIO AND MACITENTAN---- It is medically necessary for pt to use OXYGEN [ INCREASE TO 3 L/MIN]x 24 hrs\par \par 2 CKD-- DIURETICS BEING ADJUSTED BY DR ECHEVARRIA --torsemide and aldactone\par \par 3- LINH------ on BiPAP  19/13  WITH O2  2LMIN  and benefits from its nightly use\par \par 4-anemia------IS OF CONCERN IS CAUSING TIREDNESS AND FATIGUE----FOLLOW UP WITH DR. YANG------getting procrit-   \par 5) labs drawn in our office today\par \par 6) CARDIOLOGY --DR MATHEWS ---HAS  A FIB OB ELIQUIS \par 7) she is up to date w/influenza and pneumovax\par 8) needs ct chest noncontrast\par 9) F/U THRYOPID BIOSPY ----JUAN CASE\par 10]  f/u in 3 m\par \par ---Thanks for allowing me to participate in the care of this patient. Patient at this time will follow the above mentioned recommendations and return back for follow up visit. If you have any questions I can be reached at #[phone] (beeper) or [phone] (office).\par \par Anirudh Yang MD, FCCP \par Director, Pulmonary Hypertension Program \par [facility] \par Division of Pulmonary, Critical Care and Sleep Medicine \par  Professor of Medicine \par [facility]\par

## 2021-02-08 NOTE — HISTORY OF PRESENT ILLNESS
[TextBox_4] : ---\par This letter  is regarding your patient  who  attended pulmonary out patient office today.  I have reviewed  patient's  past history, social history, family history and medication list. I also  reviewed nurse practitioners/ and fellows  notes and assessment and agree with it.  \par The patient was referred by  for pulm HTN, on sildenafil. PMH colon cancer, afib- on eliquis and  LINH on bipap and nocturnal oxygen, OSMAR, CHF, DM, thyroid nodule -on home oxygen-  status post resection of stage I colon cancer    ----------\par \par ------No history of , fever, chills , rigors, chest pain, or hemoptysis. Questionable history of Raynaud's phenomenon. No h/o significant weight loss in last few months. No history of liver dysfunction , collagen vascular disorder or chronic thromboembolic disease. I would classify the patient's dyspnea as WHO  FUNCTIONAL CLASS II--------\par \par ----Pft date--2109- -------RESTRICTIVE VENT DEFECT \par ---6MWT 2019-    132 METERS  USED A CANE TO WALK \par ----Ct scan date--4/2019 IMPRESSION: \par Unchanged prominent mediastinal nodes, nonspecific. Otherwise, no \par evidence of metastatic disease in the chest, abdomen, or pelvis.\par High-density material layering within the gastric antrum may be secondary \par to ingested material.\par Questionable skin defect of the left gluteal soft tissues not seen on \par prior CT, likely skinfold however clinical correlation is recommended.\par \par Findings discussed by Dr. Avila with Dr. Arriaza on 4/14/2019 at 2:00 PM \par with read back.  \par -----\par vq scan 4/2019 low prob of PE\par \par echo 4/2019 ------------------------------------------------------------------------\par CONCLUSIONS:\par 1. Mitral annular calcification and calcified mitral\par leaflets with normal diastolic opening. Mild mitral\par regurgitation.\par 2. Calcified trileaflet aortic valve with normal opening.\par 3. Normal left ventricular internal dimensions and wall\par thicknesses.\par 4. Hyperdynamic left ventricle. Flattening of the\par interventricular septum in both systole and diastole is\par consistent with right ventricular pressure overload.\par 5. Severe right atrial enlargement.\par 6. Right ventricular enlargement with normal right\par ventricular systolic function.\par 7. Normal tricuspid valve. Moderate-severe tricuspid\par regurgitation.\par 8. Estimated pulmonary artery systolic pressure equals 79\par mm Hg, assuming right atrial pressure equals 5  mm Hg,\par consistent with severe pulmonary hypertension.\par *** Compared with echocardiogram of 10/31/2017, no\par significant changes noted.\par \par \par echo 9/2020 est pasp=65mmHg\par cardiac cath 4/2019 \par  Pulmonary Artery (S/D/M): 78/29/45\par Pressures:  -- Pulmonary Capillary Wedge: 20/21/18\par Pulmonary vascular resistance (Wood Units): 6.84,  CO by Juan Carlos: 4.39\par \par US thyroid 7/2019 IMPRESSION: \par Bilateral indeterminant thyroid nodules slightly decreased on the right. \par No parathyroid adenoma identified. \par \par AUGUT 2109 WILL  START  REVATIO FOR PAH\par \par ------OCT 2019---------stable from pulmonary standpoint--------dyspnea on exertion-----on 2L oxygen------tolerating revatio 20 mg TID--------under care of [facility] [Dr. Jerez]-----\par Dec 2109-----  on macetantan and revatio, renal dysfunction [ dr fischer], slight elevation of lft, \par \par -----DEC  2019-------stable from pulmonary point of view---------doing well on macitentan and revatio------unable to sleep------is using her BiPAP machine------underdare of Hem/Onc [Dr. YANG]------HAS RIGHT THYROID NODULE-\par \par \par denis 15 2020---     saw dr fischer nephrology  [ s creat was 2.2].  , saw dr yang oncology  Hg  ---   still Anemia on EPOGEN---\par  ----------------------n sildenafil  AND OPSUMIT  --ON HOME OXYGEN --ON LASIX AND ALDACTONE \par nov 2020---doing well--- renal managed by  dr fischer nephrology .  , saw dr yang oncology  Hg  ---   still Anemia on EPOGEN---\par pulm htn-on sildenafil  AND OPSUMIT  --ON HOME OXYGEN --ON torsemide AND ALDACTONE \par \par \par \par feb 2012---feels bEtter-- HepPeF- sec pulm htn , renal dysfunction  LINH  on bipap 19/13 with o2 2 l/min ,,, during the daytime on 3l/min o2---on torsemide and spironolactone [dr fischer] ,  anemia on prcrit [dr yang]  ,  for pulm htn on Opsumit and sildenafil--------- needs pft and 6 min walk etst

## 2021-02-09 LAB
BASOPHILS # BLD AUTO: 0.1 K/UL
BASOPHILS NFR BLD AUTO: 1.9 %
EOSINOPHIL # BLD AUTO: 0.42 K/UL
EOSINOPHIL NFR BLD AUTO: 7.7 %
HCT VFR BLD CALC: 31.6 %
HGB BLD-MCNC: 9.5 G/DL
LYMPHOCYTES # BLD AUTO: 1.2 K/UL
LYMPHOCYTES NFR BLD AUTO: 22.1 %
MAN DIFF?: NORMAL
MCHC RBC-ENTMCNC: 20.1 PG
MCHC RBC-ENTMCNC: 30.1 GM/DL
MCV RBC AUTO: 66.8 FL
MONOCYTES # BLD AUTO: 0.63 K/UL
MONOCYTES NFR BLD AUTO: 11.6 %
NEUTROPHILS # BLD AUTO: 3.09 K/UL
NEUTROPHILS NFR BLD AUTO: 56.7 %
PLATELET # BLD AUTO: 159 K/UL
RBC # BLD: 4.73 M/UL
RBC # FLD: 18.4 %
WBC # FLD AUTO: 5.45 K/UL

## 2021-02-24 ENCOUNTER — NON-APPOINTMENT (OUTPATIENT)
Age: 66
End: 2021-02-24

## 2021-02-24 ENCOUNTER — APPOINTMENT (OUTPATIENT)
Dept: OPHTHALMOLOGY | Facility: CLINIC | Age: 66
End: 2021-02-24
Payer: MEDICARE

## 2021-02-24 PROCEDURE — 92014 COMPRE OPH EXAM EST PT 1/>: CPT

## 2021-03-10 ENCOUNTER — APPOINTMENT (OUTPATIENT)
Dept: RHEUMATOLOGY | Facility: CLINIC | Age: 66
End: 2021-03-10
Payer: MEDICARE

## 2021-03-10 VITALS
BODY MASS INDEX: 38.76 KG/M2 | SYSTOLIC BLOOD PRESSURE: 118 MMHG | WEIGHT: 227 LBS | RESPIRATION RATE: 16 BRPM | DIASTOLIC BLOOD PRESSURE: 71 MMHG | HEIGHT: 64 IN | TEMPERATURE: 96.7 F | OXYGEN SATURATION: 98 % | HEART RATE: 84 BPM

## 2021-03-10 PROCEDURE — 99213 OFFICE O/P EST LOW 20 MIN: CPT

## 2021-03-10 NOTE — ASSESSMENT
[FreeTextEntry1] : 65 year old female here for pulm HTN as well as positive AMMON and CCP\par \par 1. 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 Discussed with the patient that I suspect that her positive AMMON is likely related to underlying thyroid disease versus malignancy rather than lupus, MCTD or other connective tissue diseases. Patient does not have any symptoms or exam findings to suggest an underlying CTD at this time. \par \par 2. Positive CCP: patient has high titer CCP antibodies. Although CCP is quite specific to RA, patient denies any RA like symptoms in the recent past. Has had one episode of bilateral hand pain and swelling of unclear etiology. Was attributed to gout in the past but not treated as such per patient. Has also had intermittent knee pain but appears to be mechanical and related to OA.  RA symptoms discussed with patient in detail but patient denies ever having such symptoms or morning stiffness.  Hand and foot x-rays unremarkable. \par \par 3. Gout: on Allopurinol for hyperuricemia. Recent UA at goal < 5 mg/dL. Unclear whether her attack in 2018 was truly gout since she describes bilateral hand pain and swelling. However, it did resolve on its own within a couple days which would be atypical for gout. Will monitor symptoms for now. Continue Allopurinol. \par \par 4. Bone health: Ca+D as needed. Normal DEXA in  Oct 2020. \par \par Follow up in 6 months

## 2021-03-10 NOTE — DATA REVIEWED
[FreeTextEntry1] : CT Chest (12/2020): COMPARISON: CT chest 11/1/2019, 4/14/2019, and 4/8/2019. FINDINGS: Tubes/Lines: None. Lungs And Airways: The mosaic attenuation of the lungs is unchanged. Linear atelectasis/scarring of the left lower lobe. The remainder of the lungs are clear. The airways are normal. Pleura: No pleural effusion or pneumothorax. Mediastinum: The chest lymph nodes are less than 10 mm in the short axis. The visualized esophagus is unremarkable. The right lobe thyroid gland nodule is not well visualized. Heart and Vasculature: The heart is enlarged. In particular, the atria are enlarged. There is no pericardial effusion. Coronary artery calcifications. Left-sided aortic arch and left-sided descending thoracic aorta. The aorta is tortuous. Aorta is normal in caliber. Aortic calcifications. The pulmonary artery is enlarged which can occur in the clinical setting of pulmonary arterial hypertension. Upper Abdomen: The upper abdomen is unremarkable. Bones And Soft Tissues: The bones are unremarkable. The soft tissues are unremarkable. IMPRESSION: 1. No change in the mosaic attenuation of the lungs. 2. Cardiomegaly.\par 3. Enlarged pulmonary artery can occur in the clinical setting of pulmonary arterial hypertension\par \par Cardiac Amyloidosis Spect scan (12/2019): The technical quality of the images was satisfactory. There is no abnormal increased myocardial uptake. Heart to Contralateral Chest Ratio (1 hr.): = 1.1; (normal: less than or equal to 1.0) Myocardium to Bone (visual at 3 hrs): Grade: 1 (normal: < 2) IMPRESSION: Normal cardiac amyloid Imaging study; findings are not suggestive of transthyretin cardiac amyloidosis. \par \par CT Chest (11/2019): Tubes/Lines: None. Lungs And Airways: The subtle mosaic attenuation of the lungs is unchanged. The remainder of the lungs are clear. The airways are normal. Pleura: No pleural effusion or pneumothorax. Mediastinum: There are no enlarged chest lymph nodes. The hypodense nodule arising from the right lobe of the thyroid gland is partially imaged and has decreased in size. The esophagus is unremarkable. Heart and Vasculature: The heart is enlarged. No pericardial effusion. Coronary artery calcifications. The pulmonary artery is enlarged. Aorta is normal in caliber. Atheromatous disease of the aorta. Upper Abdomen: The upper abdomen is unremarkable. Bones And Soft Tissues: The bones are unremarkable. The soft tissues are unremarkable. IMPRESSION: 1. No change in the mosaic attenuation of the lungs. 2. Cardiomegaly. 3. Enlarged pulmonary artery can occur in the setting of pulmonary arterial \par hypertension. \par \par Hand x-ray (10/2019): Chronic posttraumatic deformity of right ulnar styloid process with adjacent chronic focal small osseous fragmentation. 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 Preserved remaining joint spaces and \par no joint margin erosions. Carpal bones normally aligned. Neutral ulnar variance. No lytic or blastic lesions. \par \par Foot x-ray (10/2019): Frontal, oblique, and lateral views of both feet from 10/7/2019 at 1054. No \par similar prior studies available for comparison. IMPRESSION: No fractures or dislocations. Tarsometatarsal alignment maintained without evidence for a Lisfranc injury. Hypertrophic bony spurring projecting from dorsal right TMT joint margins. Preserved remaining visualized joint spaces and no joint margin erosions. \par Bilateral plantar posterior calcaneal enthesophytes. No lytic or blastic lesions.\par \par CT Neck (05/2019): AERODIGESTIVE TRACT: See below. Otherwise normal in appearance. LYMPH NODES: No adenopathy. PAROTID GLANDS: Normal. SUBMANDIBULAR GLANDS: Normal. THYROID GLAND: There is a large well marginated homogeneous appearing mass within the right thyroid lobe measuring 4.1 x 3.4 x 4 cm in size. The mass appears larger when compared with the prior ultrasound with measurements in the prior report of 3.3 x 3.3 x 2.6 cm. There is mild associated leftward tracheal displacement and mild tracheal narrowing. No substernal extension is seen. VISUALIZED PARANASAL SINUSES: Mild left maxillary polyp or retention cyst. VISUALIZED TYMPANOMASTOID CAVITIES: Clear. BONES: Cervical spondylosis MISCELLANEOUS: Right-sided pleural effusion, moderate. IMPRESSION: \par Right-sided thyroid nodule which appears to have enlarged when compared with a recent sonogram. Repeat sonogram is advised to confirm whether this is a rapidly enlarging mass which may require histologic characterization. There is mild leftward tracheal displacement and tracheal narrowing. \par Moderate right pleural effusion. \par \par TTE (04/2019): 1. Mitral annular calcification and calcified mitral leaflets with normal diastolic opening. Mild mitral regurgitation.  2. Calcified trileaflet aortic valve with normal opening. 3. Normal left ventricular internal dimensions and wall thicknesses. 4. Hyperdynamic left ventricle. Flattening of the interventricular septum in both systole and diastole is  consistent with right ventricular pressure overload. 5. Severe right atrial enlargement. 6. Right ventricular enlargement with normal right ventricular systolic function. 7. Normal tricuspid valve. Moderate-severe tricuspid regurgitation. 8. Estimated pulmonary artery systolic pressure equals 79 mm Hg, assuming right atrial pressure equals 5  mm Hg, consistent with severe pulmonary hypertension. *** Compared with echocardiogram of 10/31/2017, no significant changes noted.\par \par Right Heart Cath (04/2019):  COMPLICATIONS: There were no complications. No complications occurred\par during the cath lab visit. DIAGNOSTIC IMPRESSIONS: Severe PHT PCWP 18mmhg  INTERVENTIONAL IMPRESSIONS: Severe PHT PCWP 18mmhg\par \par CT chest, abdomen and pelvis (04/2019): CHEST: LUNGS AND LARGE AIRWAYS: Patent central airways. No pulmonary nodules. PLEURA: No pleural effusion. No pneumothorax VESSELS: Atherosclerotic disease. Coronary calcifications. HEART: Heart size is enlarged. No pericardial effusion. MEDIASTINUM AND CLAUDIA: Prominent mediastinal and hilar lymph nodes, without change. CHEST WALL AND LOWER NECK: Heterogeneously enhancing right thyroid mass measuring 3.5 x 2.9 cm, partially imaged (series 2 image 1). ABDOMEN AND PELVIS: LIVER: Within normal limits. BILE DUCTS: Normal caliber. GALLBLADDER: Within normal limits. SPLEEN: Within normal limits. PANCREAS: Within normal limits. ADRENALS: Within normal limits. KIDNEYS/URETERS: Atrophic. Subcentimeter hypoattenuating right renal lesions are too small to characterize. No hydronephrosis. BLADDER: Within normal limits. REPRODUCTIVE ORGANS: Calcified uterine fibroid. No adnexal masses. BOWEL: No bowel obstruction. Narrowing of the sigmoid colon may represent new sigmoid mass. Normal appendix. There is high-density material along the posterior aspect of the gastric fundus/antrum. PERITONEUM: No ascites. VESSELS: Atherosclerotic disease. \par RETROPERITONEUM: No lymphadenopathy. ABDOMINAL WALL: Questionable skin defect of the left gluteal soft tissues not seen on prior CT. BONES: Degenerative changes of the spine. IMPRESSION: Unchanged prominent mediastinal nodes, nonspecific. Otherwise, no evidence of metastatic disease in the chest, abdomen, or pelvis. High-density material layering within the gastric antrum may be secondary to ingested material. Questionable skin defect of the left gluteal soft tissues not seen on prior CT, likely skinfold however clinical correlation is recommended. \par \par CTA Chest (11/2017): CHEST: LUNGS AND LARGE AIRWAYS: Patent central airways. Minimal compressive \par atelectasis is noted involving portion of the right lower lobe secondary to right pleural effusion. PLEURA: Small right pleural effusion. No left-sided pleural effusion. No pneumothorax. VESSELS: No filling defect visualized within the bilateral pulmonary arteries. HEART: Cardiomegaly. No pericardial effusion. MEDIASTINUM AND CLAUDIA: 1.5 x 1.2 cm lymph node within the AP window (series 2, image 78), which is nonspecific in nature. CHEST WALL AND LOWER NECK: Within normal limits. VISUALIZED UPPER ABDOMEN: Small hiatal hernia. BONES: Mild degenerative changes of the spine. IMPRESSION: No evidence of a pulmonary embolism. Small right pleural effusion. \par \par CT Cervical spine (01/2017): CT HEAD: The study is limited by patient motion. There are patchy hypodensities in the bilateral periventricular and subcortical white matter without mass effect which are nonspecific but may represent white matter microvascular ischemic changes. There is no CT evidence for acute territorial infarction. There is no acute intracranial hemorrhage, extra axial fluid collection, mass effect, midline shift or hydrocephalus. There is bilateral proptosis. The visualized paranasal sinuses and mastoid air cells are clear. There is a right parietooccipital scalp hematoma with overlying skin staples. There is no calvarial fracture. CT CERVICAL SPINE: Vertebral body heights and alignment are maintained. The cervical lordosis is straightened, which may be due to muscle spasm or positioning. The atlantoaxial and atlantooccipital joints are maintained. There is no acute cervical spine fracture or traumatic malalignment. There are multilevel anterior osteophytes. The intervertebral disc space heights are maintained. There is a C6 superior endplate Schmorl's node. There is no osseous spinal canal stenosis. Uncovertebral hypertrophy results in mild multilevel bilateral neuroforaminal stenosis. The visualized lung apices are clear. The thyroid gland is unremarkable. IMPRESSION: CT head: Right parietooccipital scalp hematoma. No acute intracranial hemorrhage or extra-axial fluid collection. CT cervical spine: No acute cervical spine fracture or traumatic malalignment. \par \par CT Chest (2010): No prior CT scan is available for comparison. There is diffuse mosaic attenuation of both lungs with the size of vessels smaller in the lucent area. Repeat CT scan in expiration shows persistent \par mosaic attenuation due to air trapping. There is no significant mediastinal, hilar or axillary adenopathy. \par Trachea and main bronchi are patent. There is no evidence of pleural effusion. Heart size is within normal limits.. There is a trace pericardial effusion which is likely physiologic. Visualized bones show no significant abnormality. CT scan through upper abdomen shows normal adrenals. A 2 mm nonobstructing \par calculus is seen in the left kidney. IMPRESSION: Diffuse mosaic attenuation, inspiratory and expiratory CT scans confirm the presence of air trapping likely from small airway disease. \par

## 2021-03-10 NOTE — HISTORY OF PRESENT ILLNESS
[___ Month(s) Ago] : [unfilled] month(s) ago [Currently Experiencing] : currently [Shortness of Breath] : shortness of breath [Arthralgias] : arthralgias [Dyspnea] : dyspnea [FreeTextEntry1] : Thyroid biopsy performed one week ago - awaiting results. OVID vaccine (Pfizer) given first shot last week. Tolerated it well. \par \par CT chest in Dec 2020 - stable. Needs to make appt. for 6 MWT and PFTs. O2 2L with exertion. Had some fluid retention for which she took Metolazone for 3 days - had a 3lbs weight loss. \par \par On Procrit injections with Dr. Armenta for her anemia. \par \par Denies any joint swelling ,stiffness or pain. Has some arthralgias in the arms, knees from time to time but has not needed medications for it and it has not limited her functioning in any way. Symptoms appear to resolve on their own in a couple days. Denies any skin tightening, rashes, mucosal ulcers, chest pain. cough, sicca symptoms. \par  [Anorexia] : no anorexia [Weight Loss] : no weight loss [Malaise] : no malaise [Fever] : no fever [Chills] : no chills [Fatigue] : no fatigue [Depression] : no depression [Malar Facial Rash] : no malar facial rash [Skin Lesions] : no lesions [Skin Nodules] : no skin nodules [Oral Ulcers] : no oral ulcers [Cough] : no cough [Dry Mouth] : no dry mouth [Dysphonia] : no dysphonia [Dysphagia] : no dysphagia [Chest Pain] : no chest pain [Joint Swelling] : no joint swelling [Joint Warmth] : no joint warmth [Joint Deformity] : no joint deformity [Decreased ROM] : no decreased range of motion [Morning Stiffness] : no morning stiffness [Falls] : no falls [Difficulty Standing] : no difficulty standing [Difficulty Walking] : no difficulty walking [Myalgias] : no myalgias [Muscle Weakness] : no muscle weakness [Muscle Spasms] : no muscle spasms [Muscle Cramping] : no muscle cramping [Visual Changes] : no visual changes [Eye Pain] : no eye pain [Eye Redness] : no eye redness [Dry Eyes] : no dry eyes

## 2021-03-10 NOTE — PHYSICAL EXAM
[General Appearance - Alert] : alert [General Appearance - In No Acute Distress] : in no acute distress [Sclera] : the sclera and conjunctiva were normal [Outer Ear] : the ears and nose were normal in appearance [Neck Appearance] : the appearance of the neck was normal [] : no respiratory distress [Auscultation Breath Sounds / Voice Sounds] : lungs were clear to auscultation bilaterally [Heart Rate And Rhythm] : heart rate was normal and rhythm regular [Heart Sounds] : normal S1 and S2 [Heart Sounds Gallop] : no gallops [Murmurs] : no murmurs [Heart Sounds Pericardial Friction Rub] : no pericardial rub [Full Pulse] : the pedal pulses are present [Edema] : there was no peripheral edema [Cervical Lymph Nodes Enlarged Posterior Bilaterally] : posterior cervical [Cervical Lymph Nodes Enlarged Anterior Bilaterally] : anterior cervical [Supraclavicular Lymph Nodes Enlarged Bilaterally] : supraclavicular [Axillary Lymph Nodes Enlarged Bilaterally] : axillary [No CVA Tenderness] : no ~M costovertebral angle tenderness [No Spinal Tenderness] : no spinal tenderness [Abnormal Walk] : normal gait [Nail Clubbing] : no clubbing  or cyanosis of the fingernails [Musculoskeletal - Swelling] : no joint swelling seen [Motor Tone] : muscle strength and tone were normal [Skin Color & Pigmentation] : normal skin color and pigmentation [No Focal Deficits] : no focal deficits [Oriented To Time, Place, And Person] : oriented to person, place, and time [Impaired Insight] : insight and judgment were intact [Affect] : the affect was normal

## 2021-03-16 ENCOUNTER — APPOINTMENT (OUTPATIENT)
Dept: OPHTHALMOLOGY | Facility: CLINIC | Age: 66
End: 2021-03-16
Payer: MEDICARE

## 2021-03-16 ENCOUNTER — NON-APPOINTMENT (OUTPATIENT)
Age: 66
End: 2021-03-16

## 2021-03-16 PROCEDURE — 99441: CPT | Mod: 95

## 2021-03-17 ENCOUNTER — APPOINTMENT (OUTPATIENT)
Dept: OPHTHALMOLOGY | Facility: CLINIC | Age: 66
End: 2021-03-17
Payer: MEDICARE

## 2021-03-17 ENCOUNTER — NON-APPOINTMENT (OUTPATIENT)
Age: 66
End: 2021-03-17

## 2021-03-17 PROCEDURE — 92012 INTRM OPH EXAM EST PATIENT: CPT

## 2021-03-23 ENCOUNTER — APPOINTMENT (OUTPATIENT)
Dept: COLORECTAL SURGERY | Facility: CLINIC | Age: 66
End: 2021-03-23

## 2021-03-23 ENCOUNTER — APPOINTMENT (OUTPATIENT)
Dept: COLORECTAL SURGERY | Facility: CLINIC | Age: 66
End: 2021-03-23
Payer: MEDICARE

## 2021-03-23 PROCEDURE — 99213 OFFICE O/P EST LOW 20 MIN: CPT

## 2021-03-23 NOTE — HISTORY OF PRESENT ILLNESS
[FreeTextEntry1] : 65-year-old female 2 years status post right colon resection for stage I colon cancer. Patient progressing well. Tolerating diet. Gaining weight. No blood per rectum. No domal pain. No fevers or chills. No nausea or vomiting no aggravating factors

## 2021-03-23 NOTE — ASSESSMENT
[FreeTextEntry1] : Stage I colon cancer\par -Patient progressing well\par -Repeat colonoscopy 3 years from September\par -High fiber diet\par -Continue to followup with pulmonary and cardiology\par -Follow up in one year for reevaluation

## 2021-03-25 ENCOUNTER — APPOINTMENT (OUTPATIENT)
Dept: OPHTHALMOLOGY | Facility: CLINIC | Age: 66
End: 2021-03-25
Payer: MEDICARE

## 2021-03-25 ENCOUNTER — NON-APPOINTMENT (OUTPATIENT)
Age: 66
End: 2021-03-25

## 2021-03-25 PROCEDURE — 92012 INTRM OPH EXAM EST PATIENT: CPT

## 2021-04-05 ENCOUNTER — LABORATORY RESULT (OUTPATIENT)
Age: 66
End: 2021-04-05

## 2021-04-05 ENCOUNTER — APPOINTMENT (OUTPATIENT)
Dept: CARDIOLOGY | Facility: CLINIC | Age: 66
End: 2021-04-05
Payer: MEDICARE

## 2021-04-05 ENCOUNTER — NON-APPOINTMENT (OUTPATIENT)
Age: 66
End: 2021-04-05

## 2021-04-05 VITALS
DIASTOLIC BLOOD PRESSURE: 71 MMHG | BODY MASS INDEX: 33.99 KG/M2 | OXYGEN SATURATION: 98 % | HEIGHT: 64 IN | HEART RATE: 69 BPM | TEMPERATURE: 96.8 F | SYSTOLIC BLOOD PRESSURE: 109 MMHG

## 2021-04-05 VITALS — BODY MASS INDEX: 33.99 KG/M2 | WEIGHT: 198 LBS

## 2021-04-05 PROCEDURE — 99214 OFFICE O/P EST MOD 30 MIN: CPT

## 2021-04-05 PROCEDURE — 36415 COLL VENOUS BLD VENIPUNCTURE: CPT

## 2021-04-05 PROCEDURE — 93000 ELECTROCARDIOGRAM COMPLETE: CPT

## 2021-04-05 RX ORDER — SPIRONOLACTONE 25 MG/1
25 TABLET ORAL
Refills: 0 | Status: DISCONTINUED | COMMUNITY
Start: 2020-11-12 | End: 2021-04-05

## 2021-04-05 RX ORDER — SPIRONOLACTONE 25 MG/1
25 TABLET ORAL
Qty: 30 | Refills: 5 | Status: ACTIVE | COMMUNITY
Start: 2019-11-18

## 2021-04-05 NOTE — HISTORY OF PRESENT ILLNESS
[FreeTextEntry1] : Karina Marion is a 66 y/o AA female w/ PMHX of Afib on Eliquis, DM II, HTN, COPD on home O2 2-3L, LINH on CPAP, lymphoma/CLL s/p radiation and chemo (1997), HFpEF LVEF 66%, severe pulmonary HTN (now on sildenafil and opsumit), RV systolic dysfunction, mod-severe TR.  Last admission 5/10-5/18/19. S/P partial right colectomy ( 4/23/19) secondary to Stage 1 colon cancer,  as per patient with 27 neg nodes and does not need chemo. Nephrologist Dr. Heart for CKD and pulm Dr. Yang for Group 2 pulmonary HTN and  Dr. Hunt is her colorectal surgeon. Normal technetium pyrophosphate scan on 12/12/19 with findings not suggestive of transthyretin cardiac amyloidosis. 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 S/P bilateral cataract surgery on 10/7 and 10/21/20. 9/28/20 TTE  with LVEF 64%, LVIDD 4.6 cm, normal LV systolic function, decreased RV systolic function, mild MR, mild mod TR, severe pHTN. Pt is here for a follow up today.\par \par Pt comes for a follow up today. Since last visit, she followed up for dexa scan and thyroid biopsy ( see scanned data) and will go for a repeat thyroid biopsy in 6 months, She was seen by renal Dr. Heart 3/8/21 and had been experiencing some dyspnea with climbing a flight of stairs and was given metolazone 5 mg x 3 days and lucas 12.5 mg was increased to 5 times a week from 3. She is also on oxygen 2 liters with Inogen unit prn, not using today,  and she uses BIPAP qhs.  She is on Epogen 40,000 units by Dr. Armenta for anemia every 3 weeks. \par Currently, she states she can walk 2 blocks, uses her walker as needed. She can climb one flight of stairs. She sleeps with 2 pillows with no orthopnea. Her weight on office scale is 198 lbs and is 193.8 lbs at home from 199.8 lbs before metolazone and B/P range 106-109/67.  \par She denies chest pain, palpitations, dizziness/LH and syncope. \par \par EXAM:  CT CHEST  \par \par \par PROCEDURE DATE:  12/16/2020  \par  \par \par \par INTERPRETATION:  CT CHEST WITHOUT CONTRAST\par \par INDICATION: Pulmonary hypertension.\par \par TECHNIQUE: Unenhanced helical images were obtained of the chest. Coronal and sagittal images were reconstructed. Maximum intensity projection images were generated.\par \par COMPARISON: CT chest 11/1/2019, 4/14/2019, and 4/8/2019.\par \par FINDINGS:\par \par Tubes/Lines: None.\par \par Lungs And Airways: The mosaic attenuation of the lungs is unchanged. Linear atelectasis/scarring of the left lower lobe. The remainder of the lungs are clear. The airways are normal.\par \par Pleura: No pleural effusion or pneumothorax.\par \par Mediastinum: The chest lymph nodes are less than 10 mm in the short axis. The visualized esophagus is unremarkable. The right lobe thyroid gland nodule is not well visualized.\par \par Heart and Vasculature: The heart is enlarged. In particular, the atria are enlarged.  There is no pericardial effusion. Coronary artery calcifications.\par \par Left-sided aortic arch and left-sided descending thoracic aorta. The aorta is tortuous. Aorta is normal in caliber. Aortic calcifications. The pulmonary artery is enlarged which can occur in the clinical setting of pulmonary arterial hypertension.\par \par Upper Abdomen: The upper abdomen is unremarkable.\par \par Bones And Soft Tissues: The bones are unremarkable.  The soft tissues are unremarkable.\par \par \par IMPRESSION:\par \par 1.  No change in the mosaic attenuation of the lungs.\par 2.  Cardiomegaly.\par 3.  Enlarged pulmonary artery can occur in the clinical setting of pulmonary arterial hypertension\par \par \par \par \par Patient name: KARINA MARION\par YOB: 1955   Age: 65 (F)   MR#: [id number]\par Study Date: 9/28/2020\par Location: O/PSonographer: [facility]arillo [facility], [place]\par Study quality: Technically good\par Referring Physician: MELINDA CHANEL\par Blood Pressure: 124/75 mmHg\par Height: 163 cm\par Weight: 90 kg\par BSA: 2 m2\par ------------------------------------------------------------------------\par PROCEDURE: Transthoracic echocardiogram with 2-D, M-Mode\par and complete spectral and color flow Doppler.\par INDICATION: Unspecified combined systolic (congestive) and\par diastolic (congestive) heart failure (I50.40)\par ------------------------------------------------------------------------\par DIMENSIONS:\par Dimensions:     Normal Values:\par LA:     5.1 cm    2.0 - 4.0 cm\par Ao:     2.9 cm    2.0 - 3.8 cm\par SEPTUM: 0.9 cm    0.6 - 1.2 cm\par PWT:    1.1 cm    0.6 - 1.1 cm\par LVIDd:  4.6 cm    3.0 - 5.6 cm\par LVIDs:  3.0 cm    1.8 - 4.0 cm\par Derived Variables:\par LVMI: 81 g/m2\par RWT: 0.47\par Fractional short: 35 %\par Ejection Fraction (Teicholtz): 64 %\par ------------------------------------------------------------------------\par OBSERVATIONS:\par Mitral Valve: Mitral annular calcification, otherwise\par normal mitral valve. Mild mitral regurgitation. \par Aortic Root: Normal aortic root.\par Aortic Valve: Normal trileaflet aortic valve.\par Left Atrium: Severely dilated left atrium.  LA volume index\par = 55 cc/m2.\par Left Ventricle: Normal left ventricular systolic function.\par No segmental wall motion abnormalities. Increased relative\par wall thickness with normal left ventricular mass index,\par consistent with concentric left ventricular remodeling.\par Right Heart: Severe right atrial enlargement. Right\par ventricular enlargement with decreased right ventricular\par systolic function. Normal tricuspid valve.  Mild-moderate\par tricuspid regurgitation. Normal pulmonic valve.\par Pericardium/PleuraNormal pericardium with no pericardial\par effusion.\par Hemodynamic: Estimated right ventricular systolic pressure\par equals 65 mm Hg, assuming right atrial pressure equals 10\par mm Hg, consistent with severe pulmonary hypertension.\par ------------------------------------------------------------------------\par CONCLUSIONS:\par 1. Mitral annular calcification, otherwise normal mitral\par valve. Mild mitral regurgitation. \par 2. Normal trileaflet aortic valve.\par 3. Severely dilated left atrium.  LA volume index = 55\par cc/m2.\par 4. Increased relative wall thickness with normal left\par ventricular mass index, consistent with concentric left\par ventricular remodeling.\par 5. Normal left ventricular systolic function. No segmental\par wall motion abnormalities.\par 6. Severe right atrial enlargement.\par 7. Right ventricular enlargement with decreased right\par ventricular systolic function.\par 8. Normal tricuspid valve.  Mild-moderate tricuspid\par regurgitation.\par 9. Estimated pulmonary artery systolic pressure equals 65\par mm Hg, assuming right atrial pressure equals 10  mm Hg,\par consistent with severe pulmonary hypertension.\par \par ------------------------------------------------------------------------

## 2021-04-05 NOTE — ASSESSMENT
[FreeTextEntry1] : 64 y/o moderately obese female with controlled diabetes, HTN, COPD, lymphoma/CLL (1997), A.fib on Eliquis, HFpEF, LVEF 63% to 70% on TTE 4/1/19 with hyperdynamic LV, severe TYRONE, normal RV systolic function and severe pHTN Pt had a normal technetium pyrophosphate scan not suggestive of TTR amyloid. TTE  9/28/20 with LVEF 64%, LVIDD 4.6 cm, normal LV systolic function, decreased RV systolic function, mild MR, mild mod TR, severe pHTN.\par ACC/AHA Stage C, NYHA Class III\par Appears compensated, possibly mildly volume overloaded, and normotensive

## 2021-04-05 NOTE — DISCUSSION/SUMMARY
[Patient] : the patient [___ Month(s)] : [unfilled] month(s) [FreeTextEntry1] : 1. Chronic diastolic heart failure, with Normal technetium pyrophosphate  scan on 12/12/19 with findings not suggestive of transthyretin cardiac amyloidosis ( pt had low voltage on EKG, joint pains and B/L carpal tunnel), 9/28/20 TTE with LVEF 64%\par - will continue lucas 12.5 mg five times a week, will check labs today since patient had 3 doses of metolazone 5 mg and will check HgA1C to send to PCP Dr. Palafox, will follow up with nephrologist Dr. Heart\par - take additional torsemide 20 mg x 1 day and then continue torsemide 40 mg daily from 20 mg with additional as needed for weight gain of 2-3 lbs in 2-3 days\par - Continue Toprol to 50 mg po BID ( a.fib) \par - Continue compression stockings to improve venous return\par - increase daily exercise as tolerated\par - TTE 9/28/20 with no significant change with LVEF 64% LVIDD 4.6 cm, normal LV systolic function, decreased RV systolic function, mild MR, mild mod TR, severe pHTN.\par - continue digoxin 0.125 mg QOD for AF with controlled VR 60's, will check level today\par \par  2. Severe Pulmonary HTN/PAH\par -  Continue with sildenafil 20 mg po TID.\par - Continue  Opsumit 10 mg daily, pt feels she has had improvement since starting\par - Continue torsemide as above\par - Follow up with Dr. Yang\par - Continue BiPAP with oxygen for LINH\par \par  3. CKD- \par - Follow up with renal Dr. Heart \par - Follow up with primary Dr. Palafox, will fax labs\par \par 4. Atrial fibrillation- rate controlled \par -Continue Toprol 50 mg po BID.\par - Continue digoxin 0.125 mg every other day\par - Continue Eliquis for oral a/c and stroke prevention.\par \par Follow up in office in 3 months

## 2021-04-05 NOTE — REVIEW OF SYSTEMS
[see HPI] : see HPI [Negative] : Heme/Lymph [Recent Weight Loss (___ Lbs)] : recent [unfilled] ~Ulb weight loss [Joint Pain] : no joint pain [FreeTextEntry1] : home oxygen prn

## 2021-04-06 ENCOUNTER — NON-APPOINTMENT (OUTPATIENT)
Age: 66
End: 2021-04-06

## 2021-04-06 LAB
ALBUMIN SERPL ELPH-MCNC: 4.4 G/DL
ALP BLD-CCNC: 121 U/L
ALT SERPL-CCNC: 11 U/L
ANION GAP SERPL CALC-SCNC: 15 MMOL/L
AST SERPL-CCNC: 17 U/L
BASOPHILS # BLD AUTO: 0.04 K/UL
BASOPHILS NFR BLD AUTO: 0.9 %
BILIRUB SERPL-MCNC: 0.7 MG/DL
BUN SERPL-MCNC: 93 MG/DL
CALCIUM SERPL-MCNC: 10.3 MG/DL
CHLORIDE SERPL-SCNC: 100 MMOL/L
CO2 SERPL-SCNC: 24 MMOL/L
CREAT SERPL-MCNC: 2.42 MG/DL
DIGOXIN SERPL-MCNC: 1 NG/ML
EOSINOPHIL # BLD AUTO: 0.42 K/UL
EOSINOPHIL NFR BLD AUTO: 8.6 %
HCT VFR BLD CALC: 32.3 %
HGB BLD-MCNC: 9.6 G/DL
LYMPHOCYTES # BLD AUTO: 1.26 K/UL
LYMPHOCYTES NFR BLD AUTO: 25.9 %
MAN DIFF?: NORMAL
MCHC RBC-ENTMCNC: 20 PG
MCHC RBC-ENTMCNC: 29.7 GM/DL
MCV RBC AUTO: 67.2 FL
MONOCYTES # BLD AUTO: 0.21 K/UL
MONOCYTES NFR BLD AUTO: 4.3 %
NEUTROPHILS # BLD AUTO: 2.94 K/UL
NEUTROPHILS NFR BLD AUTO: 60.3 %
NT-PROBNP SERPL-MCNC: 1241 PG/ML
PLATELET # BLD AUTO: 141 K/UL
POTASSIUM SERPL-SCNC: 4.8 MMOL/L
PROT SERPL-MCNC: 7.7 G/DL
RBC # BLD: 4.81 M/UL
RBC # FLD: 19.3 %
SODIUM SERPL-SCNC: 139 MMOL/L
WBC # FLD AUTO: 4.87 K/UL

## 2021-04-10 ENCOUNTER — NON-APPOINTMENT (OUTPATIENT)
Age: 66
End: 2021-04-10

## 2021-04-15 ENCOUNTER — APPOINTMENT (OUTPATIENT)
Dept: OPHTHALMOLOGY | Facility: CLINIC | Age: 66
End: 2021-04-15
Payer: MEDICARE

## 2021-04-15 ENCOUNTER — NON-APPOINTMENT (OUTPATIENT)
Age: 66
End: 2021-04-15

## 2021-04-15 PROCEDURE — 92012 INTRM OPH EXAM EST PATIENT: CPT

## 2021-04-20 NOTE — DISCHARGE NOTE PROVIDER - PROVIDER RX CONTACT NUMBER
Physical Therapy     Referred by: Candice Venegas MD; Medical Diagnosis (from order):    Diagnosis Information      Diagnosis    719.46 (ICD-9-CM) - M25.561 (ICD-10-CM) - Right knee pain, unspecified chronicity                Daily Treatment Note    Visit:  4     SUBJECTIVE                                                                                                             \"It's getting better, this is really helping me\"      OBJECTIVE                                                                                                                        TREATMENT                                                                                                                  Therapeutic Exercise:  Rec. Bike x 8 min  Side stepping with red TB 20ft x 4 reps  Standing hip abd 2 x 10 reps, bilat with red TB  Standing hip ext bilat 2 x 10 reps with red TB     Manual Therapy:  IASTM to gastroc with STM x 15 min    Skilled input: verbal instruction/cues, tactile instruction/cues, posture correction and as detailed above    Writer verbally educated and received verbal consent for hand placement, positioning of patient, and techniques to be performed today from patient for therapist position for techniques, hand placement and palpation for techniques and clothing adjustments for techniques as described above and how they are pertinent to the patient's plan of care.    Home Exercise Program: *above indicates provided as part of home exercise program     ASSESSMENT                                                                                                             Pt requires mod verbal, tactile and visual cues for proper performance.  Provided pt with red TB for home use.   Pt requires tactile cues to facilitate quad contraction with standing activities.     Pain/symptoms after session: 0    Patient Education:   Results of above outlined education: Verbalizes understanding      PLAN                                                                                                                            Suggestions for next session as indicated: Progress per plan of care         Therapy procedure time and total treatment time can be found documented on the Time Entry flowsheet   [phone]

## 2021-04-21 NOTE — ED PROVIDER NOTE - SKIN [+], MLM
89 YO Man with PMH of CAD s/p stent, PPM, dementia, hypothyroidism who was found down in the bathroom by his daughter.     Fall vs Syncope  CTH Moderate chronic microvascular changes without evidence of an acute transcortical infarction or hemorrhage  CT neck no fracture  xr no fractures  - admit to tele  - TTE  - carotid duplex  - check orthostatics  - check etoh level  - PT/OT  - Cardiology consulted for PPM interrogation    Hypothermia with mild Leukocytosis of 11  - ua no sign of infection  - cxr no infiltrate  - LA 1  - will follow blood cx  - no diarrhea, abdominal pain, cough or dysuria  - will hold off on additional abx for now already received azitho and ctx in ed    Abnormal TSH, hx if hypothyroidism  tsh 9  - check free t4  - verify if on levothyroxine    CAD s/p stents  - will need to verify home meds and restart    Chronic HFrEF  EF 35-40%  - patient unable to tell me home meds    Dementia  - supportive care    DVT ppx: Lovenox  Attempted to call Clarissa no answer  **PLEASE VERIFY HOME MEDS*** Once family called back          
+laceration

## 2021-04-23 ENCOUNTER — RX RENEWAL (OUTPATIENT)
Age: 66
End: 2021-04-23

## 2021-06-05 ENCOUNTER — LABORATORY RESULT (OUTPATIENT)
Age: 66
End: 2021-06-05

## 2021-06-05 ENCOUNTER — APPOINTMENT (OUTPATIENT)
Dept: DISASTER EMERGENCY | Facility: CLINIC | Age: 66
End: 2021-06-05

## 2021-06-08 ENCOUNTER — APPOINTMENT (OUTPATIENT)
Dept: PULMONOLOGY | Facility: CLINIC | Age: 66
End: 2021-06-08
Payer: MEDICARE

## 2021-06-08 VITALS
DIASTOLIC BLOOD PRESSURE: 74 MMHG | SYSTOLIC BLOOD PRESSURE: 111 MMHG | RESPIRATION RATE: 16 BRPM | BODY MASS INDEX: 34.55 KG/M2 | WEIGHT: 195 LBS | TEMPERATURE: 97.6 F | OXYGEN SATURATION: 94 % | HEIGHT: 63 IN | HEART RATE: 88 BPM

## 2021-06-08 VITALS
HEART RATE: 79 BPM | BODY MASS INDEX: 34.55 KG/M2 | TEMPERATURE: 97.6 F | OXYGEN SATURATION: 95 % | HEIGHT: 63 IN | WEIGHT: 195 LBS

## 2021-06-08 PROCEDURE — 99215 OFFICE O/P EST HI 40 MIN: CPT | Mod: 25

## 2021-06-08 PROCEDURE — ZZZZZ: CPT

## 2021-06-08 PROCEDURE — 94726 PLETHYSMOGRAPHY LUNG VOLUMES: CPT

## 2021-06-08 PROCEDURE — 94618 PULMONARY STRESS TESTING: CPT

## 2021-06-08 PROCEDURE — 36415 COLL VENOUS BLD VENIPUNCTURE: CPT

## 2021-06-08 PROCEDURE — 94010 BREATHING CAPACITY TEST: CPT

## 2021-06-08 PROCEDURE — 94729 DIFFUSING CAPACITY: CPT

## 2021-06-08 NOTE — DISCUSSION/SUMMARY
[FreeTextEntry1] : -Assessment plan----------The patient has been referred here for further opinion regarding pulmonary problem,chronic diastolic heart failure secondary pulm HTN, on sildenafil. OPSUMIT-------PMH colon cancer, afib- on eliquis and LINH on bipap and nocturnal oxygen, OSMAR, CHF, DM, thyroid nodule, history of colon cancer status post colon surgery history of lymphoma status post chemotherapy [1997], ??right diaphragm elevation\par \par 1- chronic RIGHT HEART heart failure secondary pulm HTN, [ WHO GROUP I + GROUP II COMPONENT OF DIASTOLIC DYSFUNCTION ] on sildenafil  AND OPSUMIT - It is medically necessary for pt to use OXYGEN [ INCREASE TO 3 L/MIN]x 24 hrs\par \par 2 CKD-- DIURETICS BEING ADJUSTED BY DR ECHEVARRIA --torsemide and aldactone\par \par 3- LINH------ on BiPAP  19/13  WITH O2  2LMIN  and benefits from its nightly use\par \par 4-anemia------IS OF CONCERN IS CAUSING TIREDNESS AND FATIGUE----FOLLOW UP WITH DR. YANG------getting procrit-   \par 5) labs drawn in our office today\par \par 6) CARDIOLOGY --DR MATHEWS ---HAS  A FIB OB ELIQUIS \par 7) she is up to date w/covid vac\par 8) s/p Thyroid Biopsy ----Dr JUAN CASE\par 9) advised pulm rehab and exercises at home\par 10) f/u in 3 months\par \par ---Thanks for allowing me to participate in the care of this patient. Patient at this time will follow the above mentioned recommendations and return back for follow up visit. If you have any questions I can be reached at #[phone] (beeper) or [phone] (office).\par \par Anirudh Yang MD, FCCP \par Director, Pulmonary Hypertension Program \par [facility] \par Division of Pulmonary, Critical Care and Sleep Medicine \par  Professor of Medicine \par [facility]\par \par GAYLE JaramilloC\par \par

## 2021-06-08 NOTE — HISTORY OF PRESENT ILLNESS
[TextBox_4] : ---\par This letter  is regarding your patient  who  attended pulmonary out patient office today.  I have reviewed  patient's  past history, social history, family history and medication list. I also  reviewed nurse practitioners/ and fellows  notes and assessment and agree with it.  \par The patient was referred by  for pulm HTN, on sildenafil. PMH colon cancer, afib- on eliquis and  LINH on bipap and nocturnal oxygen, OSMAR, CHF, DM, thyroid nodule -on home oxygen-  status post resection of stage I colon cancer    ----------\par \par ------No history of , fever, chills , rigors, chest pain, or hemoptysis. Questionable history of Raynaud's phenomenon. No h/o significant weight loss in last few months. No history of liver dysfunction , collagen vascular disorder or chronic thromboembolic disease. I would classify the patient's dyspnea as WHO  FUNCTIONAL CLASS II--------\par \par ----Pft date--2109- -------RESTRICTIVE VENT DEFECT \par ---6MWT 2019-    132 METERS  USED A CANE TO WALK \par ----Ct scan date--4/2019 IMPRESSION: \par Unchanged prominent mediastinal nodes, nonspecific. Otherwise, no \par evidence of metastatic disease in the chest, abdomen, or pelvis.\par High-density material layering within the gastric antrum may be secondary \par to ingested material.\par Questionable skin defect of the left gluteal soft tissues not seen on \par prior CT, likely skinfold however clinical correlation is recommended.\par \par CT CHEST 12/2020\par IMPRESSION:\par 1. No change in the mosaic attenuation of the lungs.\par 2. Cardiomegaly.\par 3. Enlarged pulmonary artery can occur in the clinical setting of pulmonary arterial hypertension\par \par -----\par vq scan 4/2019 low prob of PE\par \par echo 4/2019 ------------------------------------------------------------------------\par CONCLUSIONS:\par 1. Mitral annular calcification and calcified mitral\par leaflets with normal diastolic opening. Mild mitral\par regurgitation.\par 2. Calcified trileaflet aortic valve with normal opening.\par 3. Normal left ventricular internal dimensions and wall\par thicknesses.\par 4. Hyperdynamic left ventricle. Flattening of the\par interventricular septum in both systole and diastole is\par consistent with right ventricular pressure overload.\par 5. Severe right atrial enlargement.\par 6. Right ventricular enlargement with normal right\par ventricular systolic function.\par 7. Normal tricuspid valve. Moderate-severe tricuspid\par regurgitation.\par 8. Estimated pulmonary artery systolic pressure equals 79\par mm Hg, assuming right atrial pressure equals 5  mm Hg,\par consistent with severe pulmonary hypertension.\par *** Compared with echocardiogram of 10/31/2017, no\par significant changes noted.\par \par \par echo 9/2020 est pasp=65mmHg\par cardiac cath 4/2019 \par  Pulmonary Artery (S/D/M): 78/29/45\par Pressures:  -- Pulmonary Capillary Wedge: 20/21/18\par Pulmonary vascular resistance (Wood Units): 6.84,  CO by Juan Carlos: 4.39\par \par US thyroid 7/2019 IMPRESSION: \par Bilateral indeterminant thyroid nodules slightly decreased on the right. \par No parathyroid adenoma identified. \par \par AUGUT 2109 WILL  START  REVATIO FOR PAH\par \par ------OCT 2019---------stable from pulmonary standpoint--------dyspnea on exertion-----on 2L oxygen------tolerating revatio 20 mg TID--------under care of Rheuma [Dr. Jerez]-----\par Dec 2109-----  on macetantan and revatio, renal dysfunction [ dr heart], slight elevation of lft, \par \par -----DEC  2019-------stable from pulmonary point of view---------doing well on macitentan and revatio------unable to sleep------is using her BiPAP machine------underdare of Hem/Onc [Dr. YANG]------HAS RIGHT THYROID NODULE-\par \par \par denis 15 2020---     saw dr heart nephrology  [ s creat was 2.2].  , saw dr yang oncology  Hg  ---   still Anemia on EPOGEN---\par  ----------------------n sildenafil  AND OPSUMIT  --ON HOME OXYGEN --ON LASIX AND ALDACTONE \par nov 2020---doing well--- renal managed by  dr heart nephrology .  , saw dr yang oncology  Hg  ---   still Anemia on EPOGEN---\par pulm htn-on sildenafil  AND OPSUMIT  --ON HOME OXYGEN --ON torsemide AND ALDACTONE \par \par \par \par feb 2012---feels bEtter-- HepPeF- sec pulm htn , renal dysfunction  LINH  on bipap 19/13 with o2 2 l/min ,,, during the daytime on 3l/min o2---on torsemide and spironolactone [dr heart] ,  anemia on prcrit [dr yang]  ,  for pulm htn on Opsumit and sildenafil--------- needs pft and 6 min walk \par \par 6/2021 6mwt 240 m\par 6/2021 PFT restrictive defect\par \par 6/2021 pulm htn on sildenafil & opsumit, using oxygen. On diuretics per Dr Heart. Follows cardiology Dr Degroot.  LINH------ on BiPAP \par She is up to date w/covid vac

## 2021-06-08 NOTE — PHYSICAL EXAM
[No Acute Distress] : no acute distress [Normal Oropharynx] : normal oropharynx [IV] : Mallampati Class: IV [No Neck Mass] : no neck mass [No Resp Distress] : no resp distress [No Abnormalities] : no abnormalities [Benign] : benign [Normal Gait] : normal gait [Normal Color/ Pigmentation] : normal color/ pigmentation [No Clubbing] : no clubbing [No Focal Deficits] : no focal deficits [Oriented x3] : oriented x3 [TextBox_54] : LOUD P2 [TextBox_105] : trace edema

## 2021-06-09 LAB
ALBUMIN SERPL ELPH-MCNC: 4.9 G/DL
ALP BLD-CCNC: 139 U/L
ALT SERPL-CCNC: 7 U/L
ANION GAP SERPL CALC-SCNC: 22 MMOL/L
AST SERPL-CCNC: 15 U/L
BASOPHILS # BLD AUTO: 0.04 K/UL
BASOPHILS NFR BLD AUTO: 0.7 %
BILIRUB SERPL-MCNC: 0.7 MG/DL
BUN SERPL-MCNC: 112 MG/DL
CALCIUM SERPL-MCNC: 10.3 MG/DL
CHLORIDE SERPL-SCNC: 97 MMOL/L
CO2 SERPL-SCNC: 19 MMOL/L
COVID-19 SPIKE DOMAIN ANTIBODY INTERPRETATION: POSITIVE
CREAT SERPL-MCNC: 3.08 MG/DL
EOSINOPHIL # BLD AUTO: 0.27 K/UL
EOSINOPHIL NFR BLD AUTO: 4.7 %
ESTIMATED AVERAGE GLUCOSE: 120 MG/DL
GLUCOSE SERPL-MCNC: 119 MG/DL
HBA1C MFR BLD HPLC: 5.8 %
HCT VFR BLD CALC: 34.6 %
HGB BLD-MCNC: 10.5 G/DL
IMM GRANULOCYTES NFR BLD AUTO: 0.4 %
LYMPHOCYTES # BLD AUTO: 1.64 K/UL
LYMPHOCYTES NFR BLD AUTO: 28.8 %
MAN DIFF?: NORMAL
MCHC RBC-ENTMCNC: 20.3 PG
MCHC RBC-ENTMCNC: 30.3 GM/DL
MCV RBC AUTO: 66.8 FL
MONOCYTES # BLD AUTO: 0.68 K/UL
MONOCYTES NFR BLD AUTO: 12 %
NEUTROPHILS # BLD AUTO: 3.04 K/UL
NEUTROPHILS NFR BLD AUTO: 53.4 %
NT-PROBNP SERPL-MCNC: 1066 PG/ML
PLATELET # BLD AUTO: 147 K/UL
POTASSIUM SERPL-SCNC: 4.4 MMOL/L
PROT SERPL-MCNC: 7.8 G/DL
RBC # BLD: 5.18 M/UL
RBC # FLD: 20.5 %
SARS-COV-2 AB SERPL IA-ACNC: >250 U/ML
SODIUM SERPL-SCNC: 138 MMOL/L
T4 SERPL-MCNC: 7.6 UG/DL
TSH SERPL-ACNC: 5.6 UIU/ML
WBC # FLD AUTO: 5.69 K/UL

## 2021-06-15 NOTE — ED PROVIDER NOTE - RESPIRATORY, MLM
[FreeTextEntry1] : Dysuria:\par Prostatitis:\par PVR: 91 ml. \par Will get Urinalysis, Urine culture and Urine cytology. \par Start Bactrim. \par Abstain from sex for 2 weeks. \par \par Return to office in 4 weeks or sooner if any issues- will do Uroflo/PVR. 
Breath sounds clear and equal bilaterally. no crackles

## 2021-07-08 ENCOUNTER — NON-APPOINTMENT (OUTPATIENT)
Age: 66
End: 2021-07-08

## 2021-07-13 ENCOUNTER — NON-APPOINTMENT (OUTPATIENT)
Age: 66
End: 2021-07-13

## 2021-07-13 ENCOUNTER — APPOINTMENT (OUTPATIENT)
Dept: OPHTHALMOLOGY | Facility: CLINIC | Age: 66
End: 2021-07-13
Payer: MEDICARE

## 2021-07-13 PROCEDURE — 92012 INTRM OPH EXAM EST PATIENT: CPT

## 2021-07-26 ENCOUNTER — APPOINTMENT (OUTPATIENT)
Dept: CARDIOLOGY | Facility: CLINIC | Age: 66
End: 2021-07-26
Payer: MEDICARE

## 2021-07-26 ENCOUNTER — NON-APPOINTMENT (OUTPATIENT)
Age: 66
End: 2021-07-26

## 2021-07-26 VITALS
HEART RATE: 68 BPM | BODY MASS INDEX: 36.05 KG/M2 | HEIGHT: 63 IN | OXYGEN SATURATION: 95 % | DIASTOLIC BLOOD PRESSURE: 85 MMHG | SYSTOLIC BLOOD PRESSURE: 143 MMHG

## 2021-07-26 VITALS — SYSTOLIC BLOOD PRESSURE: 120 MMHG | DIASTOLIC BLOOD PRESSURE: 77 MMHG

## 2021-07-26 VITALS — BODY MASS INDEX: 36.05 KG/M2 | WEIGHT: 203.5 LBS

## 2021-07-26 PROCEDURE — 93000 ELECTROCARDIOGRAM COMPLETE: CPT

## 2021-07-26 PROCEDURE — 99214 OFFICE O/P EST MOD 30 MIN: CPT

## 2021-07-26 NOTE — DISCUSSION/SUMMARY
[Patient] : the patient [___ Month(s)] : in [unfilled] month(s) [FreeTextEntry1] : 1. Chronic diastolic heart failure, with Normal technetium pyrophosphate  scan on 12/12/19 with findings not suggestive of transthyretin cardiac amyloidosis ( pt had low voltage on EKG, joint pains and B/L carpal tunnel), 9/28/20 TTE with LVEF 64%\par - will continue lucas 12.5 mg five times a week\par - pt is no longer on metolazone 5 mg an torsemide was decreased to 20 mg daily from 40 mg daily.\par - Instructed to take an additional torsemide once a week and as needed for weight gain of 2-3 lbs in 2-3 days. \par , follow up with PCP Dr. Palafox, will follow up with nephrologist Dr. Heart\par - take additional torsemide 20 mg x 1 day and then continue torsemide 40 mg daily from 20 mg with additional as needed for weight gain of 2-3 lbs in 2-3 days\par - Continue Toprol to 50 mg po BID ( a.fib) \par - Continue compression stockings to improve venous return\par - increase daily exercise as tolerated\par - TTE 9/28/20 with no significant change with LVEF 64% LVIDD 4.6 cm, normal LV systolic function, decreased RV systolic function, mild MR, mild mod TR, severe pHTN.\par - continue digoxin 0.125 mg QOD for AF with controlled VR 60's, will check level today\par \par  2. Severe Pulmonary HTN/PAH\par -  Continue with sildenafil 20 mg po TID.\par - Continue  Opsumit 10 mg daily, pt feels she has had improvement since starting\par - Continue torsemide as above\par - Follow up with Dr. Yang\par - Continue BiPAP with oxygen for LINH\par \par  3. CKD- \par - Follow up with renal Dr. Heart \par - Follow up with primary Dr. Palafox, will fax labs\par \par 4. Atrial fibrillation- rate controlled \par -Continue Toprol 50 mg po BID.\par - Continue digoxin 0.125 mg every other day\par - Continue Eliquis for oral a/c and stroke prevention.\par \par Follow up in office in 3 months

## 2021-07-26 NOTE — REASON FOR VISIT
[Follow-Up - Clinic] : a clinic follow-up of [Heart Failure] : congestive heart failure [Cardiac Failure] : cardiac failure [FreeTextEntry2] : pre op clearance for cataract surgery on left and right eye colonoscopy 10/2020

## 2021-07-26 NOTE — PHYSICAL EXAM
[General Appearance - Well Developed] : well developed [Normal Appearance] : normal appearance [Well Groomed] : well groomed [No Deformities] : no deformities [General Appearance - Well Nourished] : well nourished [General Appearance - In No Acute Distress] : no acute distress [Normal Conjunctiva] : the conjunctiva exhibited no abnormalities [Eyelids - No Xanthelasma] : the eyelids demonstrated no xanthelasmas [Normal Oral Mucosa] : normal oral mucosa [Respiration, Rhythm And Depth] : normal respiratory rhythm and effort [Exaggerated Use Of Accessory Muscles For Inspiration] : no accessory muscle use [Auscultation Breath Sounds / Voice Sounds] : lungs were clear to auscultation bilaterally [Heart Sounds] : normal S1 and S2 [Edema] : no peripheral edema present [Bowel Sounds] : normal bowel sounds [Abdomen Soft] : soft [Abdomen Tenderness] : non-tender [Nail Clubbing] : no clubbing of the fingernails [Cyanosis, Localized] : no localized cyanosis [Skin Color & Pigmentation] : normal skin color and pigmentation [] : no rash [No Venous Stasis] : no venous stasis [Skin Lesions] : no skin lesions [No Skin Ulcers] : no skin ulcer [No Xanthoma] : no  xanthoma was observed [Oriented To Time, Place, And Person] : oriented to person, place, and time [Affect] : the affect was normal [FreeTextEntry1] : trace lower extremity and ankle edema

## 2021-07-26 NOTE — HISTORY OF PRESENT ILLNESS
[FreeTextEntry1] : Karina Marion is a 66 y/o AA female w/ PMHX of Afib on Eliquis, DM II, HTN, COPD on home O2 2-3L, LINH on CPAP, lymphoma/CLL s/p radiation and chemo (1997), HFpEF LVEF 66%, severe pulmonary HTN (now on sildenafil and opsumit), RV systolic dysfunction, mod-severe TR.  Last admission 5/10-5/18/19. S/P partial right colectomy ( 4/23/19) secondary to Stage 1 colon cancer,  as per patient with 27 neg nodes and does not need chemo. Nephrologist Dr. Heart for CKD and pulm Dr. Yang for Group 2 pulmonary HTN and  Dr. Hunt is her colorectal surgeon. Normal technetium pyrophosphate scan on 12/12/19 with findings not suggestive of transthyretin cardiac amyloidosis. 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 S/P bilateral cataract surgery on 10/7 and 10/21/20. 9/28/20 TTE  with LVEF 64%, LVIDD 4.6 cm, normal LV systolic function, decreased RV systolic function, mild MR, mild mod TR, severe pHTN. Pt is here for a follow up today.\par \par Pt comes for a follow up today. Since last visit, she followed up with renal Dr. Heart 3/8/21 and creat went went up to 112/3.08 and torsemide was decreased to 20 mg daily from 40 mg daily. It was repeated and creat came down to 2.19. She is not taking metolazone any more. She is also on oxygen 2 liters with Inogen unit prn, not using today,  and she uses BIPAP qhs.  She is on Epogen 40,000 units by Dr. Armenta for anemia every 3 weeks. \par Currently, she states she can walk 6 blocks, uses her walker as needed. She can climb one flight of stairs. She sleeps with 2 pillows with no orthopnea. Her weight on office scale is 203 lbs from 198 lbs.\par She denies chest pain, palpitations, dizziness/LH and syncope. \par \par EXAM:  CT CHEST  \par \par \par PROCEDURE DATE:  12/16/2020  \par  \par \par \par INTERPRETATION:  CT CHEST WITHOUT CONTRAST\par \par INDICATION: Pulmonary hypertension.\par \par TECHNIQUE: Unenhanced helical images were obtained of the chest. Coronal and sagittal images were reconstructed. Maximum intensity projection images were generated.\par \par COMPARISON: CT chest 11/1/2019, 4/14/2019, and 4/8/2019.\par \par FINDINGS:\par \par Tubes/Lines: None.\par \par Lungs And Airways: The mosaic attenuation of the lungs is unchanged. Linear atelectasis/scarring of the left lower lobe. The remainder of the lungs are clear. The airways are normal.\par \par Pleura: No pleural effusion or pneumothorax.\par \par Mediastinum: The chest lymph nodes are less than 10 mm in the short axis. The visualized esophagus is unremarkable. The right lobe thyroid gland nodule is not well visualized.\par \par Heart and Vasculature: The heart is enlarged. In particular, the atria are enlarged.  There is no pericardial effusion. Coronary artery calcifications.\par \par Left-sided aortic arch and left-sided descending thoracic aorta. The aorta is tortuous. Aorta is normal in caliber. Aortic calcifications. The pulmonary artery is enlarged which can occur in the clinical setting of pulmonary arterial hypertension.\par \par Upper Abdomen: The upper abdomen is unremarkable.\par \par Bones And Soft Tissues: The bones are unremarkable.  The soft tissues are unremarkable.\par \par \par IMPRESSION:\par \par 1.  No change in the mosaic attenuation of the lungs.\par 2.  Cardiomegaly.\par 3.  Enlarged pulmonary artery can occur in the clinical setting of pulmonary arterial hypertension\par \par \par \par \par Patient name: KARINA MARION\par YOB: 1955   Age: 65 (F)   MR#: [id number]\par Study Date: 9/28/2020\par Location: O/PSonographer: SARITA Otero\par Study quality: Technically good\par Referring Physician: MELINDA CHANEL\par Blood Pressure: 124/75 mmHg\par Height: 163 cm\par Weight: 90 kg\par BSA: 2 m2\par ------------------------------------------------------------------------\par PROCEDURE: Transthoracic echocardiogram with 2-D, M-Mode\par and complete spectral and color flow Doppler.\par INDICATION: Unspecified combined systolic (congestive) and\par diastolic (congestive) heart failure (I50.40)\par ------------------------------------------------------------------------\par DIMENSIONS:\par Dimensions:     Normal Values:\par LA:     5.1 cm    2.0 - 4.0 cm\par Ao:     2.9 cm    2.0 - 3.8 cm\par SEPTUM: 0.9 cm    0.6 - 1.2 cm\par PWT:    1.1 cm    0.6 - 1.1 cm\par LVIDd:  4.6 cm    3.0 - 5.6 cm\par LVIDs:  3.0 cm    1.8 - 4.0 cm\par Derived Variables:\par LVMI: 81 g/m2\par RWT: 0.47\par Fractional short: 35 %\par Ejection Fraction (Teicholtz): 64 %\par ------------------------------------------------------------------------\par OBSERVATIONS:\par Mitral Valve: Mitral annular calcification, otherwise\par normal mitral valve. Mild mitral regurgitation. \par Aortic Root: Normal aortic root.\par Aortic Valve: Normal trileaflet aortic valve.\par Left Atrium: Severely dilated left atrium.  LA volume index\par = 55 cc/m2.\par Left Ventricle: Normal left ventricular systolic function.\par No segmental wall motion abnormalities. Increased relative\par wall thickness with normal left ventricular mass index,\par consistent with concentric left ventricular remodeling.\par Right Heart: Severe right atrial enlargement. Right\par ventricular enlargement with decreased right ventricular\par systolic function. Normal tricuspid valve.  Mild-moderate\par tricuspid regurgitation. Normal pulmonic valve.\par Pericardium/PleuraNormal pericardium with no pericardial\par effusion.\par Hemodynamic: Estimated right ventricular systolic pressure\par equals 65 mm Hg, assuming right atrial pressure equals 10\par mm Hg, consistent with severe pulmonary hypertension.\par ------------------------------------------------------------------------\par CONCLUSIONS:\par 1. Mitral annular calcification, otherwise normal mitral\par valve. Mild mitral regurgitation. \par 2. Normal trileaflet aortic valve.\par 3. Severely dilated left atrium.  LA volume index = 55\par cc/m2.\par 4. Increased relative wall thickness with normal left\par ventricular mass index, consistent with concentric left\par ventricular remodeling.\par 5. Normal left ventricular systolic function. No segmental\par wall motion abnormalities.\par 6. Severe right atrial enlargement.\par 7. Right ventricular enlargement with decreased right\par ventricular systolic function.\par 8. Normal tricuspid valve.  Mild-moderate tricuspid\par regurgitation.\par 9. Estimated pulmonary artery systolic pressure equals 65\par mm Hg, assuming right atrial pressure equals 10  mm Hg,\par consistent with severe pulmonary hypertension.\par \par ------------------------------------------------------------------------

## 2021-08-25 ENCOUNTER — APPOINTMENT (OUTPATIENT)
Dept: OPHTHALMOLOGY | Facility: CLINIC | Age: 66
End: 2021-08-25

## 2021-09-08 ENCOUNTER — LABORATORY RESULT (OUTPATIENT)
Age: 66
End: 2021-09-08

## 2021-09-08 ENCOUNTER — APPOINTMENT (OUTPATIENT)
Dept: RHEUMATOLOGY | Facility: CLINIC | Age: 66
End: 2021-09-08
Payer: MEDICARE

## 2021-09-08 VITALS
BODY MASS INDEX: 35.26 KG/M2 | RESPIRATION RATE: 16 BRPM | TEMPERATURE: 97.4 F | SYSTOLIC BLOOD PRESSURE: 127 MMHG | HEART RATE: 82 BPM | HEIGHT: 63 IN | OXYGEN SATURATION: 98 % | WEIGHT: 199 LBS | DIASTOLIC BLOOD PRESSURE: 79 MMHG

## 2021-09-08 DIAGNOSIS — E79.0 HYPERURICEMIA W/OUT SIGNS OF INFLAMMATORY ARTHRITIS AND TOPHACEOUS DISEASE: ICD-10-CM

## 2021-09-08 PROCEDURE — 99214 OFFICE O/P EST MOD 30 MIN: CPT

## 2021-09-09 LAB
ALBUMIN SERPL ELPH-MCNC: 4.2 G/DL
ALP BLD-CCNC: 124 U/L
ALT SERPL-CCNC: 12 U/L
ANION GAP SERPL CALC-SCNC: 12 MMOL/L
AST SERPL-CCNC: 17 U/L
BASOPHILS # BLD AUTO: 0.05 K/UL
BASOPHILS NFR BLD AUTO: 0.9 %
BILIRUB SERPL-MCNC: 0.5 MG/DL
BUN SERPL-MCNC: 68 MG/DL
CALCIUM SERPL-MCNC: 10.3 MG/DL
CHLORIDE SERPL-SCNC: 108 MMOL/L
CO2 SERPL-SCNC: 22 MMOL/L
CREAT SERPL-MCNC: 1.93 MG/DL
CRP SERPL-MCNC: <3 MG/L
DEPRECATED KAPPA LC FREE/LAMBDA SER: 1.31 RATIO
EOSINOPHIL # BLD AUTO: 0.32 K/UL
EOSINOPHIL NFR BLD AUTO: 6.1 %
ERYTHROCYTE [SEDIMENTATION RATE] IN BLOOD BY WESTERGREN METHOD: 49 MM/HR
FERRITIN SERPL-MCNC: 250 NG/ML
GLUCOSE SERPL-MCNC: 82 MG/DL
HCT VFR BLD CALC: 33.2 %
HGB BLD-MCNC: 9.8 G/DL
IGA SER QL IEP: 69 MG/DL
IGG SER QL IEP: 1577 MG/DL
IGM SER QL IEP: 51 MG/DL
KAPPA LC CSF-MCNC: 4.08 MG/DL
KAPPA LC SERPL-MCNC: 5.34 MG/DL
LYMPHOCYTES # BLD AUTO: 1.91 K/UL
LYMPHOCYTES NFR BLD AUTO: 36.8 %
MAN DIFF?: NORMAL
MCHC RBC-ENTMCNC: 20.2 PG
MCHC RBC-ENTMCNC: 29.5 GM/DL
MCV RBC AUTO: 68.5 FL
MONOCYTES # BLD AUTO: 0.46 K/UL
MONOCYTES NFR BLD AUTO: 8.8 %
NEUTROPHILS # BLD AUTO: 2.46 K/UL
NEUTROPHILS NFR BLD AUTO: 47.4 %
PLATELET # BLD AUTO: 139 K/UL
POTASSIUM SERPL-SCNC: 5.1 MMOL/L
PROT SERPL-MCNC: 7.1 G/DL
RBC # BLD: 4.85 M/UL
RBC # FLD: 19.6 %
SODIUM SERPL-SCNC: 142 MMOL/L
URATE SERPL-MCNC: 3.2 MG/DL
WBC # FLD AUTO: 5.18 K/UL

## 2021-09-13 ENCOUNTER — APPOINTMENT (OUTPATIENT)
Dept: PULMONOLOGY | Facility: CLINIC | Age: 66
End: 2021-09-13
Payer: MEDICARE

## 2021-09-13 ENCOUNTER — MED ADMIN CHARGE (OUTPATIENT)
Age: 66
End: 2021-09-13

## 2021-09-13 VITALS
WEIGHT: 204 LBS | DIASTOLIC BLOOD PRESSURE: 73 MMHG | OXYGEN SATURATION: 92 % | SYSTOLIC BLOOD PRESSURE: 118 MMHG | TEMPERATURE: 95.6 F | HEART RATE: 87 BPM | HEIGHT: 63 IN | BODY MASS INDEX: 36.14 KG/M2

## 2021-09-13 DIAGNOSIS — Z23 ENCOUNTER FOR IMMUNIZATION: ICD-10-CM

## 2021-09-13 LAB
G6PD SER-CCNC: 31.4 U/G HGB
NT-PROBNP SERPL-MCNC: 2329 PG/ML

## 2021-09-13 PROCEDURE — 99215 OFFICE O/P EST HI 40 MIN: CPT | Mod: 25

## 2021-09-13 PROCEDURE — 90662 IIV NO PRSV INCREASED AG IM: CPT

## 2021-09-13 PROCEDURE — G0008: CPT

## 2021-09-13 PROCEDURE — 36415 COLL VENOUS BLD VENIPUNCTURE: CPT

## 2021-09-13 NOTE — DISCUSSION/SUMMARY
[FreeTextEntry1] : -Assessment plan----------The patient has been referred here for further opinion regarding pulmonary problem,chronic diastolic heart failure secondary pulm HTN, on sildenafil. OPSUMIT-------PMH colon cancer, afib- on eliquis and LINH on bipap and nocturnal oxygen, OSMAR, CHF, DM, thyroid nodule, history of colon cancer status post colon surgery history of lymphoma status post chemotherapy [1997], ??right diaphragm elevation\par \par 1- chronic RIGHT HEART heart failure secondary pulm HTN, [ WHO GROUP I + GROUP II COMPONENT OF DIASTOLIC DYSFUNCTION ] on sildenafil  AND OPSUMIT - It is medically necessary for pt to use OXYGEN [ INCREASE TO 3 L/MIN]x 24 hrs\par \par 2 CKD-- DIURETICS BEING ADJUSTED BY DR ECHEVARRIA --torsemide and aldactone\par \par 3- LINH------ on BiPAP  19/13  WITH O2  2LMIN  and benefits from its nightly use\par Discussed the recent [employer] voluntary recall for Continuous and Non-Continuous Ventilators (certain CPAP, BiLevel PAP and Ventilator Devices) due to two issues related to the polyester-based polyurethane (PE-PUR) sound abatement foam used in these devices.  The potential harm of inhalation or ingestion of the foam particles was discussed in detail with the patient.  Symptoms such as headache, upper airway irritation, cough, chest pressure and sinus infection were discussed at length with the patient.  \par \par : Given that the patient has moderate to severe sleep disordered breathing, the decision was made to continue therapy after a very thorough discussion of the risks and benefits of continued use until their repaired or fully replaced\par Patient was counseled to not use ozone-related cleaning products and adhere to their device Instructions for Use for approved cleaning methods.\par The dangers of drowsy driving were discussed with the patient.  The patient was warned to avoid drowsy driving. The patient will follow-up after he/she has heard from the DME company.\par \par -NEED COMPLIANCE REPORT\par \par \par \par 4-anemia------IS OF CONCERN IS CAUSING TIREDNESS AND FATIGUE----FOLLOW UP WITH DR. YANG------getting procrit-   \par 5) labs drawn in our office today\par \par 6) CARDIOLOGY --DR MATHEWS ---HAS  A FIB OB ELIQUIS - needs yearly echo\par 7) she is up to date w/covid vac\par 8) h/o Thyroid Biopsy --with --Dr JUAN CASE\par 9) advised pulm rehab and exercises at home\par 10) received influenza vac today \par 11) f/u w/rheum for positive AMMON\par 12) f/u in January February 2022\par \par ---Thanks for allowing me to participate in the care of this patient. Patient at this time will follow the above mentioned recommendations and return back for follow up visit. If you have any questions I can be reached at #[phone] (beeper) or [phone] (office).\par \par Anirudh Yang MD, FCCP \par Director, Pulmonary Hypertension Program \par [facility] \par Division of Pulmonary, Critical Care and Sleep Medicine \par  Professor of Medicine \par [employer]\par \par GAYLE JaramilloC\par \par

## 2021-09-13 NOTE — PHYSICAL EXAM
[No Acute Distress] : no acute distress [Normal Oropharynx] : normal oropharynx [IV] : Mallampati Class: IV [No Neck Mass] : no neck mass [No Resp Distress] : no resp distress [No Abnormalities] : no abnormalities [Benign] : benign [Normal Gait] : normal gait [No Clubbing] : no clubbing [Normal Color/ Pigmentation] : normal color/ pigmentation [No Focal Deficits] : no focal deficits [Oriented x3] : oriented x3 [TextBox_54] : LOUD P2 [TextBox_105] : trace edema

## 2021-09-13 NOTE — HISTORY OF PRESENT ILLNESS
[TextBox_4] : ---\par This letter  is regarding your patient  who  attended pulmonary out patient office today.  I have reviewed  patient's  past history, social history, family history and medication list. I also  reviewed nurse practitioners/ and fellows  notes and assessment and agree with it.  \par The patient was referred by  for pulm HTN, on sildenafil. PMH colon cancer, afib- on eliquis and  LINH on bipap and nocturnal oxygen, OSMAR, CHF, DM, thyroid nodule -on home oxygen-  status post resection of stage I colon cancer    ----------\par \par ------No history of , fever, chills , rigors, chest pain, or hemoptysis. Questionable history of Raynaud's phenomenon. No h/o significant weight loss in last few months. No history of liver dysfunction , collagen vascular disorder or chronic thromboembolic disease. I would classify the patient's dyspnea as WHO  FUNCTIONAL CLASS II--------\par \par ----Pft date--2109- -------RESTRICTIVE VENT DEFECT \par ---6MWT 2019-    132 METERS  USED A CANE TO WALK \par ----Ct scan date--4/2019 IMPRESSION: \par Unchanged prominent mediastinal nodes, nonspecific. Otherwise, no \par evidence of metastatic disease in the chest, abdomen, or pelvis.\par High-density material layering within the gastric antrum may be secondary \par to ingested material.\par Questionable skin defect of the left gluteal soft tissues not seen on \par prior CT, likely skinfold however clinical correlation is recommended.\par \par CT CHEST 12/2020\par IMPRESSION:\par 1. No change in the mosaic attenuation of the lungs.\par 2. Cardiomegaly.\par 3. Enlarged pulmonary artery can occur in the clinical setting of pulmonary arterial hypertension\par \par -----\par vq scan 4/2019 low prob of PE\par \par echo 4/2019 ------------------------------------------------------------------------\par CONCLUSIONS:\par 1. Mitral annular calcification and calcified mitral\par leaflets with normal diastolic opening. Mild mitral\par regurgitation.\par 2. Calcified trileaflet aortic valve with normal opening.\par 3. Normal left ventricular internal dimensions and wall\par thicknesses.\par 4. Hyperdynamic left ventricle. Flattening of the\par interventricular septum in both systole and diastole is\par consistent with right ventricular pressure overload.\par 5. Severe right atrial enlargement.\par 6. Right ventricular enlargement with normal right\par ventricular systolic function.\par 7. Normal tricuspid valve. Moderate-severe tricuspid\par regurgitation.\par 8. Estimated pulmonary artery systolic pressure equals 79\par mm Hg, assuming right atrial pressure equals 5  mm Hg,\par consistent with severe pulmonary hypertension.\par *** Compared with echocardiogram of 10/31/2017, no\par significant changes noted.\par \par \par echo 9/2020 est pasp=65mmHg\par cardiac cath 4/2019 \par  Pulmonary Artery (S/D/M): 78/29/45\par Pressures:  -- Pulmonary Capillary Wedge: 20/21/18\par Pulmonary vascular resistance (Wood Units): 6.84,  CO by Juan Carlos: 4.39\par \par US thyroid 7/2019 IMPRESSION: \par Bilateral indeterminant thyroid nodules slightly decreased on the right. \par No parathyroid adenoma identified. \par \par AUGUT 2109 WILL  START  REVATIO FOR PAH\par \par ------OCT 2019---------stable from pulmonary standpoint--------dyspnea on exertion-----on 2L oxygen------tolerating revatio 20 mg TID--------under care of [facility] [Dr. Jerez]-----\par Dec 2109-----  on macetantan and revatio, renal dysfunction [ dr heart], slight elevation of lft, \par \par -----DEC  2019-------stable from pulmonary point of view---------doing well on macitentan and revatio------unable to sleep------is using her BiPAP machine------underdare of Hem/Onc [Dr. YANG]------HAS RIGHT THYROID NODULE-\par \par \par denis 15 2020---     saw dr heart nephrology  [ s creat was 2.2].  , saw dr yang oncology  Hg  ---   still Anemia on EPOGEN---\par  ----------------------n sildenafil  AND OPSUMIT  --ON HOME OXYGEN --ON LASIX AND ALDACTONE \par nov 2020---doing well--- renal managed by  dr heart nephrology .  , saw dr yang oncology  Hg  ---   still Anemia on EPOGEN---\par pulm htn-on sildenafil  AND OPSUMIT  --ON HOME OXYGEN --ON torsemide AND ALDACTONE \par \par \par \par feb 2012---feels bEtter-- HepPeF- sec pulm htn , renal dysfunction  LINH  on bipap 19/13 with o2 2 l/min ,,, during the daytime on 3l/min o2---on torsemide and spironolactone [dr heart] ,  anemia on prcrit [dr yang]  ,  for pulm htn on Opsumit and sildenafil--------- needs pft and 6 min walk \par \par 6/2021 6mwt 240 m\par 6/2021 PFT restrictive defect\par \par 6/2021 pulm htn on sildenafil & opsumit, using oxygen. On diuretics per Dr Heart. Follows cardiology Dr Degroot.  LINH------ on BiPAP \par She is up to date w/covid vac\par \par \par 9/2021 pulm htn on sildenafil & opsumit, using oxygen. On diuretics per Dr Heart. Follows cardiology Dr Degroot.  LINH------ on BiPAP nightly snd  benefits from its use -need compliance report  --- She is up to date w/covid vac

## 2021-09-17 ENCOUNTER — OUTPATIENT (OUTPATIENT)
Dept: OUTPATIENT SERVICES | Facility: HOSPITAL | Age: 66
LOS: 1 days | End: 2021-09-17
Payer: MEDICARE

## 2021-09-17 ENCOUNTER — APPOINTMENT (OUTPATIENT)
Dept: RADIOLOGY | Facility: IMAGING CENTER | Age: 66
End: 2021-09-17
Payer: MEDICARE

## 2021-09-17 DIAGNOSIS — Z98.890 OTHER SPECIFIED POSTPROCEDURAL STATES: Chronic | ICD-10-CM

## 2021-09-17 DIAGNOSIS — Z90.49 ACQUIRED ABSENCE OF OTHER SPECIFIED PARTS OF DIGESTIVE TRACT: Chronic | ICD-10-CM

## 2021-09-17 DIAGNOSIS — R76.8 OTHER SPECIFIED ABNORMAL IMMUNOLOGICAL FINDINGS IN SERUM: ICD-10-CM

## 2021-09-17 PROCEDURE — 73130 X-RAY EXAM OF HAND: CPT

## 2021-09-17 PROCEDURE — 73130 X-RAY EXAM OF HAND: CPT | Mod: 26,50

## 2021-09-21 LAB
ANION GAP SERPL CALC-SCNC: 18 MMOL/L
BUN SERPL-MCNC: 62 MG/DL
CALCIUM SERPL-MCNC: 10.1 MG/DL
CHLORIDE SERPL-SCNC: 100 MMOL/L
CO2 SERPL-SCNC: 22 MMOL/L
CREAT SERPL-MCNC: 2.1 MG/DL
GLUCOSE SERPL-MCNC: 100 MG/DL
POTASSIUM SERPL-SCNC: 4.1 MMOL/L
SODIUM SERPL-SCNC: 140 MMOL/L

## 2021-10-25 ENCOUNTER — APPOINTMENT (OUTPATIENT)
Dept: CARDIOLOGY | Facility: CLINIC | Age: 66
End: 2021-10-25
Payer: MEDICARE

## 2021-10-25 ENCOUNTER — NON-APPOINTMENT (OUTPATIENT)
Age: 66
End: 2021-10-25

## 2021-10-25 VITALS
HEART RATE: 75 BPM | TEMPERATURE: 96.9 F | OXYGEN SATURATION: 96 % | SYSTOLIC BLOOD PRESSURE: 147 MMHG | HEIGHT: 63 IN | DIASTOLIC BLOOD PRESSURE: 84 MMHG | RESPIRATION RATE: 16 BRPM | WEIGHT: 205 LBS | BODY MASS INDEX: 36.32 KG/M2

## 2021-10-25 PROCEDURE — 99214 OFFICE O/P EST MOD 30 MIN: CPT

## 2021-10-25 PROCEDURE — 93000 ELECTROCARDIOGRAM COMPLETE: CPT

## 2021-10-25 RX ORDER — MULTIVITAMIN
TABLET ORAL
Refills: 0 | Status: DISCONTINUED | COMMUNITY
End: 2021-10-25

## 2021-10-25 RX ORDER — METOLAZONE 2.5 MG/1
2.5 TABLET ORAL
Refills: 0 | Status: DISCONTINUED | COMMUNITY
End: 2021-10-25

## 2021-10-25 NOTE — HISTORY OF PRESENT ILLNESS
[FreeTextEntry1] : Karina Marion is a 67 y/o AA female w/ PMHX of Afib on Eliquis, DM II, HTN, COPD on home O2 2-3L, LINH on CPAP, lymphoma/CLL s/p radiation and chemo (1997), HFpEF LVEF 66%, severe pulmonary HTN (now on sildenafil and opsumit), RV systolic dysfunction, mod-severe TR.  Last admission 5/10-5/18/19. S/P partial right colectomy ( 4/23/19) secondary to Stage 1 colon cancer,  as per patient with 27 neg nodes and does not need chemo. Nephrologist Dr. Heart for CKD and pulm Dr. Yang for Group 2 pulmonary HTN and  Dr. Hunt is her colorectal surgeon. Normal technetium pyrophosphate scan on 12/12/19 with findings not suggestive of transthyretin cardiac amyloidosis. 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 S/P bilateral cataract surgery on 10/7 and 10/21/20. 9/28/20 TTE  with LVEF 64%, LVIDD 4.6 cm, normal LV systolic function, decreased RV systolic function, mild MR, mild mod TR, severe pHTN. Pt is here for a follow up today.\par \par Pt comes for a follow up today. Since last visit, she followed up with renal Dr. Heart and has continued the current dose of torsemide 20 mg daily with an extra on weekends as needed with last BUN/creat 62/2.10 ( 9/2021). She is also on oxygen 2 liters with Inogen unit prn, not using today,  and she uses BIPAP qhs.  She is on Epogen 60,000 units by Dr. Armenta for anemia every 3 weeks. \par Currently, she states she can walk 6-7 blocks, uses her walker as needed but stops at 3 blocks to rest briefly. She can climb one flight of stairs several times a day. She sleeps with 2 pillows with no orthopnea. She denies chest pain, palpitations, dizziness/LH and no syncope. States joints have not been hurting recently. She wears compression stockings that helps the swelling in her legs. Her weight on office scale is 205 from  203 lbs from 198 lbs. Home weight is 202 lbs and B/P range 1110. Office B/P 147/84 today.\par She denies chest pain, palpitations, dizziness/LH and syncope. \par \par EXAM:  CT CHEST  \par \par \par PROCEDURE DATE:  12/16/2020  \par  \par \par \par INTERPRETATION:  CT CHEST WITHOUT CONTRAST\par \par INDICATION: Pulmonary hypertension.\par \par TECHNIQUE: Unenhanced helical images were obtained of the chest. Coronal and sagittal images were reconstructed. Maximum intensity projection images were generated.\par \par COMPARISON: CT chest 11/1/2019, 4/14/2019, and 4/8/2019.\par \par FINDINGS:\par \par Tubes/Lines: None.\par \par Lungs And Airways: The mosaic attenuation of the lungs is unchanged. Linear atelectasis/scarring of the left lower lobe. The remainder of the lungs are clear. The airways are normal.\par \par Pleura: No pleural effusion or pneumothorax.\par \par Mediastinum: The chest lymph nodes are less than 10 mm in the short axis. The visualized esophagus is unremarkable. The right lobe thyroid gland nodule is not well visualized.\par \par Heart and Vasculature: The heart is enlarged. In particular, the atria are enlarged.  There is no pericardial effusion. Coronary artery calcifications.\par \par Left-sided aortic arch and left-sided descending thoracic aorta. The aorta is tortuous. Aorta is normal in caliber. Aortic calcifications. The pulmonary artery is enlarged which can occur in the clinical setting of pulmonary arterial hypertension.\par \par Upper Abdomen: The upper abdomen is unremarkable.\par \par Bones And Soft Tissues: The bones are unremarkable.  The soft tissues are unremarkable.\par \par \par IMPRESSION:\par \par 1.  No change in the mosaic attenuation of the lungs.\par 2.  Cardiomegaly.\par 3.  Enlarged pulmonary artery can occur in the clinical setting of pulmonary arterial hypertension\par \par \par \par \par Patient name: KARINA MARION\par YOB: 1955   Age: 65 (F)   MR#: [id number]\par Study Date: 9/28/2020\par Location: O/PSonographer: [facility]\par Study quality: Technically good\par Referring Physician: MELINDA CHANEL\par Blood Pressure: 124/75 mmHg\par Height: 163 cm\par Weight: 90 kg\par BSA: 2 m2\par ------------------------------------------------------------------------\par PROCEDURE: Transthoracic echocardiogram with 2-D, M-Mode\par and complete spectral and color flow Doppler.\par INDICATION: Unspecified combined systolic (congestive) and\par diastolic (congestive) heart failure (I50.40)\par ------------------------------------------------------------------------\par DIMENSIONS:\par Dimensions:     Normal Values:\par LA:     5.1 cm    2.0 - 4.0 cm\par Ao:     2.9 cm    2.0 - 3.8 cm\par SEPTUM: 0.9 cm    0.6 - 1.2 cm\par PWT:    1.1 cm    0.6 - 1.1 cm\par LVIDd:  4.6 cm    3.0 - 5.6 cm\par LVIDs:  3.0 cm    1.8 - 4.0 cm\par Derived Variables:\par LVMI: 81 g/m2\par RWT: 0.47\par Fractional short: 35 %\par Ejection Fraction (Teicholtz): 64 %\par ------------------------------------------------------------------------\par OBSERVATIONS:\par Mitral Valve: Mitral annular calcification, otherwise\par normal mitral valve. Mild mitral regurgitation. \par Aortic Root: Normal aortic root.\par Aortic Valve: Normal trileaflet aortic valve.\par Left Atrium: Severely dilated left atrium.  LA volume index\par = 55 cc/m2.\par Left Ventricle: Normal left ventricular systolic function.\par No segmental wall motion abnormalities. Increased relative\par wall thickness with normal left ventricular mass index,\par consistent with concentric left ventricular remodeling.\par Right Heart: Severe right atrial enlargement. Right\par ventricular enlargement with decreased right ventricular\par systolic function. Normal tricuspid valve.  Mild-moderate\par tricuspid regurgitation. Normal pulmonic valve.\par Pericardium/PleuraNormal pericardium with no pericardial\par effusion.\par Hemodynamic: Estimated right ventricular systolic pressure\par equals 65 mm Hg, assuming right atrial pressure equals 10\par mm Hg, consistent with severe pulmonary hypertension.\par ------------------------------------------------------------------------\par CONCLUSIONS:\par 1. Mitral annular calcification, otherwise normal mitral\par valve. Mild mitral regurgitation. \par 2. Normal trileaflet aortic valve.\par 3. Severely dilated left atrium.  LA volume index = 55\par cc/m2.\par 4. Increased relative wall thickness with normal left\par ventricular mass index, consistent with concentric left\par ventricular remodeling.\par 5. Normal left ventricular systolic function. No segmental\par wall motion abnormalities.\par 6. Severe right atrial enlargement.\par 7. Right ventricular enlargement with decreased right\par ventricular systolic function.\par 8. Normal tricuspid valve.  Mild-moderate tricuspid\par regurgitation.\par 9. Estimated pulmonary artery systolic pressure equals 65\par mm Hg, assuming right atrial pressure equals 10  mm Hg,\par consistent with severe pulmonary hypertension.\par \par ------------------------------------------------------------------------

## 2021-10-25 NOTE — PHYSICAL EXAM
[Well Developed] : well developed [No Acute Distress] : no acute distress [Normal Conjunctiva] : normal conjunctiva [Clear Lung Fields] : clear lung fields [Good Air Entry] : good air entry [Soft] : abdomen soft [Non Tender] : non-tender [No Rash] : no rash [Normal] : alert and oriented, normal memory [de-identified] : JVP  10 cm with V waves secondary to TR [de-identified] : irregularly irregular [de-identified] : slow gait,uses a cane [de-identified] : trace lower extremity edema bnilaterally

## 2021-10-25 NOTE — ASSESSMENT
[FreeTextEntry1] : 67 y/o moderately obese female with controlled diabetes, HTN, COPD, lymphoma/CLL (1997), A.fib on Eliquis, HFpEF, LVEF 63% to 70% on TTE 4/1/19 with hyperdynamic LV, severe TYRONE, normal RV systolic function and severe pHTN Pt had a normal technetium pyrophosphate scan not suggestive of TTR amyloid. TTE  9/28/20 with LVEF 64%, LVIDD 4.6 cm, normal LV systolic function, decreased RV systolic function, mild MR, mild mod TR, severe pHTN.\par ACC/AHA Stage C, NYHA Class III\par Appears compensated, possibly mildly volume overloaded, and with elevated B/P 147/84

## 2021-10-25 NOTE — CARDIOLOGY SUMMARY
[de-identified] : 10/25/21 AF 78, low voltage, NSST [de-identified] :  9/28/20 TTE  with LVEF 64%, LVIDD 4.6 cm, normal LV systolic function, decreased RV systolic function, mild MR, mild mod TR, severe pHTN\par  [de-identified] : Normal technetium pyrophosphate scan on 12/12/19 with findings not suggestive of transthyretin cardiac amyloidosis.\par

## 2021-10-25 NOTE — DISCUSSION/SUMMARY
[Patient] : the patient [___ Month(s)] : in [unfilled] month(s) [FreeTextEntry1] : 1. Chronic diastolic heart failure, with Normal technetium pyrophosphate  scan on 12/12/19 with findings not suggestive of transthyretin cardiac amyloidosis ( pt had low voltage on EKG, joint pains and B/L carpal tunnel), 9/28/20 TTE with LVEF 64%\par - will continue lucsa 12.5 mg five times a week\par -Give one additional torsemide 20 mg today and continue torsemide 20 mg daily with one additional on weekends and as needed for weight gain of 2-3 lbs in 2-3 days.  pt is no longer on metolazone 5 mg prn\par , follow up with PCP Dr. Palafox, will follow up with nephrologist Dr. Heart, pulm Dr. Yang and rheum \par - Continue Toprol to 50 mg po BID ( a.fib) \par - Continue compression stockings to improve venous return\par - increase daily exercise as tolerated\par - TTE 9/28/20 with no significant change with LVEF 64% LVIDD 4.6 cm, normal LV systolic function, decreased RV systolic function, mild MR, mild mod TR, severe pHTN.\par - continue digoxin 0.125 mg QOD for AF with controlled VR 70's, will check level today\par - schedule TTE to follow up severe pHTN\par \par  2. Severe Pulmonary HTN/PAH\par -  Continue with sildenafil 20 mg po TID.\par - Continue  Opsumit 10 mg daily, pt feels she has had improvement since starting\par - Continue torsemide as above\par - Follow up with Dr. Yang\par - Continue BiPAP with oxygen for LINH\par \par  3. CKD- \par - Follow up with renal Dr. Heart \par - Follow up with primary Dr. Palafox\par \par 4. Atrial fibrillation- rate controlled \par -Continue Toprol 50 mg po BID.\par - Continue digoxin 0.125 mg every other day\par - Continue Eliquis for oral a/c and stroke prevention.\par \par Follow up in office in 3 months , will call with echo results

## 2021-10-28 ENCOUNTER — RX RENEWAL (OUTPATIENT)
Age: 66
End: 2021-10-28

## 2021-11-23 ENCOUNTER — OUTPATIENT (OUTPATIENT)
Dept: OUTPATIENT SERVICES | Facility: HOSPITAL | Age: 66
LOS: 1 days | End: 2021-11-23

## 2021-11-23 ENCOUNTER — APPOINTMENT (OUTPATIENT)
Dept: CV DIAGNOSITCS | Facility: HOSPITAL | Age: 66
End: 2021-11-23
Payer: MEDICARE

## 2021-11-23 DIAGNOSIS — I50.32 CHRONIC DIASTOLIC (CONGESTIVE) HEART FAILURE: ICD-10-CM

## 2021-11-23 DIAGNOSIS — Z90.49 ACQUIRED ABSENCE OF OTHER SPECIFIED PARTS OF DIGESTIVE TRACT: Chronic | ICD-10-CM

## 2021-11-23 DIAGNOSIS — Z98.890 OTHER SPECIFIED POSTPROCEDURAL STATES: Chronic | ICD-10-CM

## 2021-11-23 PROCEDURE — 93306 TTE W/DOPPLER COMPLETE: CPT | Mod: 26

## 2022-01-05 NOTE — REASON FOR VISIT
[Home] : at home, [unfilled] , at the time of the visit. [Medical Office: ([facility])___] : at the medical office located in  [Verbal consent obtained from patient] : the patient, [unfilled] [Follow-Up] : a follow-up visit

## 2022-01-06 ENCOUNTER — APPOINTMENT (OUTPATIENT)
Dept: PULMONOLOGY | Facility: CLINIC | Age: 67
End: 2022-01-06
Payer: MEDICARE

## 2022-01-06 PROCEDURE — 99443: CPT | Mod: 95

## 2022-01-06 NOTE — DISCUSSION/SUMMARY
[FreeTextEntry1] : -Assessment plan----------The patient has been referred here for further opinion regarding pulmonary problem,chronic diastolic heart failure secondary pulm HTN, on sildenafil. OPSUMIT-------PMH colon cancer, afib- on eliquis and LINH on bipap and nocturnal oxygen, OSMAR, CHF, DM, thyroid nodule, history of colon cancer status post colon surgery history of lymphoma status post chemotherapy [1997], ??right diaphragm elevation\par \par 1- chronic RIGHT HEART heart failure secondary pulm HTN, [ WHO GROUP I + GROUP II COMPONENT OF DIASTOLIC DYSFUNCTION ] on sildenafil  AND OPSUMIT - It is medically necessary for pt to use OXYGEN [ INCREASE TO 3 L/MIN]x 24 hrs\par \par 2 CKD-- DIURETICS BEING ADJUSTED BY DR ECHEVARRIA --torsemide and aldactone  --need blood   work  to evaluate the serum   creatinine------- ----\par \par 3- LINH------ on BiPAP  19/13  WITH O2  2LMIN  and benefits from its nightly use\par Discussed the recent [employer] voluntary recall for Continuous and Non-Continuous Ventilators (certain CPAP, BiLevel PAP and Ventilator Devices) due to two issues related to the polyester-based polyurethane (PE-PUR) sound abatement foam used in these devices.  The potential harm of inhalation or ingestion of the foam particles was discussed in detail with the patient.  Symptoms such as headache, upper airway irritation, cough, chest pressure and sinus infection were discussed at length with the patient.  \par \par : Given that the patient has moderate to severe sleep disordered breathing, the decision was made to continue therapy after a very thorough discussion of the risks and benefits of continued use until their repaired or fully replaced\par Patient was counseled to not use ozone-related cleaning products and adhere to their device Instructions for Use for approved cleaning methods.\par The dangers of drowsy driving were discussed with the patient.  The patient was warned to avoid drowsy driving. The patient will follow-up after he/she has heard from the [employer] company.\par \par -NEED COMPLIANCE REPORT\par \par \par \par 4-anemia------IS OF CONCERN IS CAUSING TIREDNESS AND FATIGUE----FOLLOW UP WITH DR. YANG------getting Aranesp® (darbepoetin cris) -\par 5) labs drawn in our office today\par \par 6) CARDIOLOGY --DR MATHEWS ---HAS  A FIB OB ELIQUIS - needs yearly echo\par 7) she is up to date w/covid vac\par 8) h/o Thyroid Biopsy --with --Dr JUAN CASE\par 9) advised pulm rehab and exercises at home\par 10) received influenza vac today \par 11) f/u w/rheum for positive AMMON\par 12) f/u in march 2022\par \par ---Thanks for allowing me to participate in the care of this patient. Patient at this time will follow the above mentioned recommendations and return back for follow up visit. If you have any questions I can be reached at #[phone] (beeper) or [phone] (office).\par \par Anirudh Yang MD, FCCP \par Director, Pulmonary Hypertension Program \par [facility] \par Division of Pulmonary, Critical Care and Sleep Medicine \par  Professor of Medicine \par [employer]\par \par GAYLE JaramilloC\par \par

## 2022-01-06 NOTE — HISTORY OF PRESENT ILLNESS
[TextBox_4] : ---\par This letter  is regarding your patient  who  attended pulmonary out patient office today.  I have reviewed  patient's  past history, social history, family history and medication list. I also  reviewed nurse practitioners/ and fellows  notes and assessment and agree with it.  \par The patient was referred by  for pulm HTN, on sildenafil. PMH colon cancer, afib- on eliquis and  LINH on bipap and nocturnal oxygen, OSMAR, CHF, DM, thyroid nodule -on home oxygen-  status post resection of stage I colon cancer    ----------\par \par ------No history of , fever, chills , rigors, chest pain, or hemoptysis. Questionable history of Raynaud's phenomenon. No h/o significant weight loss in last few months. No history of liver dysfunction , collagen vascular disorder or chronic thromboembolic disease. I would classify the patient's dyspnea as WHO  FUNCTIONAL CLASS II--------\par \par ----Pft date--2109- -------RESTRICTIVE VENT DEFECT \par ---6MWT 2019-    132 METERS  USED A CANE TO WALK \par ----Ct scan date--4/2019 IMPRESSION: \par Unchanged prominent mediastinal nodes, nonspecific. Otherwise, no \par evidence of metastatic disease in the chest, abdomen, or pelvis.\par High-density material layering within the gastric antrum may be secondary \par to ingested material.\par Questionable skin defect of the left gluteal soft tissues not seen on \par prior CT, likely skinfold however clinical correlation is recommended.\par \par CT CHEST 12/2020\par IMPRESSION:\par 1. No change in the mosaic attenuation of the lungs.\par 2. Cardiomegaly.\par 3. Enlarged pulmonary artery can occur in the clinical setting of pulmonary arterial hypertension\par \par -----\par vq scan 4/2019 low prob of PE\par \par echo 4/2019 ------------------------------------------------------------------------\par CONCLUSIONS:\par 1. Mitral annular calcification and calcified mitral\par leaflets with normal diastolic opening. Mild mitral\par regurgitation.\par 2. Calcified trileaflet aortic valve with normal opening.\par 3. Normal left ventricular internal dimensions and wall\par thicknesses.\par 4. Hyperdynamic left ventricle. Flattening of the\par interventricular septum in both systole and diastole is\par consistent with right ventricular pressure overload.\par 5. Severe right atrial enlargement.\par 6. Right ventricular enlargement with normal right\par ventricular systolic function.\par 7. Normal tricuspid valve. Moderate-severe tricuspid\par regurgitation.\par 8. Estimated pulmonary artery systolic pressure equals 79\par mm Hg, assuming right atrial pressure equals 5  mm Hg,\par consistent with severe pulmonary hypertension.\par *** Compared with echocardiogram of 10/31/2017, no\par significant changes noted.\par \par \par echo 9/2020 est pasp=65mmHg\par cardiac cath 4/2019 \par  Pulmonary Artery (S/D/M): 78/29/45\par Pressures:  -- Pulmonary Capillary Wedge: 20/21/18\par Pulmonary vascular resistance (Wood Units): 6.84,  CO by Juan Carlos: 4.39\par \par US thyroid 7/2019 IMPRESSION: \par Bilateral indeterminant thyroid nodules slightly decreased on the right. \par No parathyroid adenoma identified. \par \par AUGUT 2109 WILL  START  REVATIO FOR PAH\par \par ------OCT 2019---------stable from pulmonary standpoint--------dyspnea on exertion-----on 2L oxygen------tolerating revatio 20 mg TID--------under care of Rhe[facility] [Dr. Jerez]-----\par Dec 2109-----  on macetantan and revatio, renal dysfunction [ dr heart], slight elevation of lft, \par \par -----DEC  2019-------stable from pulmonary point of view---------doing well on macitentan and revatio------unable to sleep------is using her BiPAP machine------underdare of Hem/Onc [Dr. YANG]------HAS RIGHT THYROID NODULE-\par \par \par denis 15 2020---     saw dr heart nephrology  [ s creat was 2.2].  , saw dr yang oncology  Hg  ---   still Anemia on EPOGEN---\par  ----------------------n sildenafil  AND OPSUMIT  --ON HOME OXYGEN --ON LASIX AND ALDACTONE \par nov 2020---doing well--- renal managed by  dr heart nephrology .  , saw dr yang oncology  Hg  ---   still Anemia on EPOGEN---\par pulm htn-on sildenafil  AND OPSUMIT  --ON HOME OXYGEN --ON torsemide AND ALDACTONE \par \par \par \par feb 2012---feels bEtter-- HepPeF- sec pulm htn , renal dysfunction  LINH  on bipap 19/13 with o2 2 l/min ,,, during the daytime on 3l/min o2---on torsemide and spironolactone [dr heart] ,  anemia on prcrit [dr yang]  ,  for pulm htn on Opsumit and sildenafil--------- needs pft and 6 min walk \par \par 6/2021 6mwt 240 m\par 6/2021 PFT restrictive defect\par \par 6/2021 pulm htn on sildenafil & opsumit, using oxygen. On diuretics per Dr Heart. Follows cardiology Dr Degroot.  LINH------ on BiPAP \par She is up to date w/covid vac\par \par \par 9/2021 pulm htn on sildenafil & opsumit, using oxygen. On diuretics per Dr Heart. Follows cardiology Dr Degroot.  LINH------ on BiPAP nightly snd  benefits from its use -need compliance report  --- She is up to date w/covid vac\par \par jan 20222     ----pulm htn on sildenafil & opsumit, using oxygen. --compliant to  meds-------On diuretics per Dr Heart. Follows cardiology Dr Degroot.  LINH------ on BiPAP nightly snd  benefits from its use -need compliance report  --- She is up to date w/covid vac

## 2022-01-10 NOTE — PROGRESS NOTE ADULT - ATTENDING COMMENTS
s/p partial colectomy  -Diet as tolerated  -CT neck to evaluate swelling per ENT  -OOB  -Dispo planning 24

## 2022-01-11 ENCOUNTER — NON-APPOINTMENT (OUTPATIENT)
Age: 67
End: 2022-01-11

## 2022-01-11 ENCOUNTER — APPOINTMENT (OUTPATIENT)
Dept: OPHTHALMOLOGY | Facility: CLINIC | Age: 67
End: 2022-01-11
Payer: MEDICARE

## 2022-01-11 PROCEDURE — 92014 COMPRE OPH EXAM EST PT 1/>: CPT

## 2022-02-07 ENCOUNTER — APPOINTMENT (OUTPATIENT)
Dept: CARDIOLOGY | Facility: CLINIC | Age: 67
End: 2022-02-07
Payer: MEDICARE

## 2022-02-07 ENCOUNTER — NON-APPOINTMENT (OUTPATIENT)
Age: 67
End: 2022-02-07

## 2022-02-07 VITALS — SYSTOLIC BLOOD PRESSURE: 132 MMHG | DIASTOLIC BLOOD PRESSURE: 84 MMHG

## 2022-02-07 VITALS
WEIGHT: 206 LBS | HEART RATE: 69 BPM | HEIGHT: 63 IN | SYSTOLIC BLOOD PRESSURE: 148 MMHG | BODY MASS INDEX: 36.5 KG/M2 | DIASTOLIC BLOOD PRESSURE: 88 MMHG | OXYGEN SATURATION: 94 %

## 2022-02-07 PROCEDURE — 99214 OFFICE O/P EST MOD 30 MIN: CPT

## 2022-02-07 PROCEDURE — 93000 ELECTROCARDIOGRAM COMPLETE: CPT

## 2022-02-07 RX ORDER — TORSEMIDE 20 MG/1
20 TABLET ORAL
Qty: 180 | Refills: 1 | Status: ACTIVE | COMMUNITY

## 2022-02-07 NOTE — ASSESSMENT
[FreeTextEntry1] : 65 y/o moderately obese female with controlled diabetes, HTN, COPD, lymphoma/CLL (1997), A.fib on Eliquis, HFpEF, LVEF 63% to 70% on TTE 4/1/19 with hyperdynamic LV, severe TYRONE, normal RV systolic function and severe pHTN Pt had a normal technetium pyrophosphate scan not suggestive of TTR amyloid. TTE  11/23/21 with LVEF 66%, normal LV systolic function, mild mod MR, mild mod TR, severe pHTN.\par ACC/AHA Stage C, NYHA Class III\par Appears compensated, possibly mildly volume overloaded, and with B/P 148/88 to 132/84 and home B/P range below 120.

## 2022-02-07 NOTE — PHYSICAL EXAM
[Well Developed] : well developed [No Acute Distress] : no acute distress [Normal Conjunctiva] : normal conjunctiva [Clear Lung Fields] : clear lung fields [Good Air Entry] : good air entry [Soft] : abdomen soft [Non Tender] : non-tender [No Rash] : no rash [Normal] : alert and oriented, normal memory [de-identified] : JVP  10 cm with V waves secondary to TR [de-identified] : irregularly irregular [de-identified] : slow gait,uses a cane [de-identified] : trace lower extremity edema bnilaterally

## 2022-02-07 NOTE — DISCUSSION/SUMMARY
[Patient] : the patient [___ Month(s)] : in [unfilled] month(s) [FreeTextEntry1] : 1. Chronic diastolic heart failure, with Normal technetium pyrophosphate  scan on 12/12/19 with findings not suggestive of transthyretin cardiac amyloidosis ( pt had low voltage on EKG, joint pains and B/L carpal tunnel), 11/23/21 TTE with LVEF 66%\par - will continue lucas 12.5 mg five times a week\par -Give one additional torsemide 20 mg today and continue torsemide 20 mg daily with one additional on weekend and as needed for weight gain of 2-3 lbs in 2-3 days.  pt is no longer on metolazone 5 mg prn\par , follow up with PCP Dr. Palafox, will follow up with nephrologist Dr. Heart, pulm Dr. Yang and rheum \par - Continue Toprol to 50 mg po BID ( a.fib) \par - Continue compression stockings to improve venous return\par - increase daily exercise as tolerated\par - continue digoxin 0.125 mg QOD for AF with controlled VR 69 bpm\par \par  2. Severe Pulmonary HTN/PAH\par -  Continue with sildenafil 20 mg po TID.\par - Continue  Opsumit 10 mg daily, pt feels she has had improvement since starting. Will apply for patient assistance program \par - Continue torsemide as above\par - Follow up with Dr. Yang\par - Continue CPAP with oxygen for LINH\par \par  3. CKD- creat 1.83 on 2/2/22\par - Follow up with renal Dr. Heart \par - Follow up with primary Dr. Palafox\par \par 4. Atrial fibrillation- rate controlled \par -Continue Toprol 50 mg po BID.\par - Continue digoxin 0.125 mg every other day\par - Continue Eliquis for oral a/c and stroke prevention.\par \par Follow up in office in 3 months

## 2022-02-07 NOTE — PROGRESS NOTE ADULT - PROBLEM SELECTOR PLAN 2
AFib, rates 80s-120 on tele. Improved to 80s-90s this am  - c/w metoprolol 12.5 mg PO TID  - Continuous tele monitoring  - Monitor BMP Q12 and replete Mg to 2.0, K to 4.0 (q12 labs while on gtt) AFib, rates 80s-110 on tele. Improved to 80s-90s this am  - c/w metoprolol 25 mg PO TID  - Continuous tele monitoring  - Monitor BMP Q12 and replete Mg to 2.0, K to 4.0 (q12 labs while on gtt) Mirvaso Counseling: Mirvaso is a topical medication which can decrease superficial blood flow where applied. Side effects are uncommon and include stinging, redness and allergic reactions.

## 2022-02-07 NOTE — CARDIOLOGY SUMMARY
[de-identified] : 2/7/22 AF 69, low voltage, NSST\par 10/25/21 AF 78, low voltage, NSST\par  [de-identified] : 11/23/21 TTE; LVEF 66%, LVIDD 4.9 cm, normal LV systolic function, mild mod MR, mild mod TR, severe pHTN\par \par  9/28/20 TTE  with LVEF 64%, LVIDD 4.6 cm, normal LV systolic function, decreased RV systolic function, mild MR, mild mod TR, severe pHTN\par \par  [de-identified] : Normal technetium pyrophosphate scan on 12/12/19 with findings not suggestive of transthyretin cardiac amyloidosis.\par

## 2022-02-07 NOTE — HISTORY OF PRESENT ILLNESS
[FreeTextEntry1] : Karina Marion is a 65 y/o AA female w/ PMHX of Afib on Eliquis, DM II, HTN, COPD on home O2 2-3L, LINH on CPAP, lymphoma/CLL s/p radiation and chemo (1997), HFpEF LVEF 66%, severe pulmonary HTN (now on sildenafil and opsumit), RV systolic dysfunction, mod-severe TR.  Last admission 5/10-5/18/19. S/P partial right colectomy ( 4/23/19) secondary to Stage 1 colon cancer,  as per patient with 27 neg nodes and does not need chemo. Nephrologist Dr. Heart for CKD and pulm Dr. Yang for Group 2 pulmonary HTN and  Dr. Hunt is her colorectal surgeon. Normal technetium pyrophosphate scan on 12/12/19 with findings not suggestive of transthyretin cardiac amyloidosis. 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 S/P bilateral cataract surgery on 10/7 and 10/21/20. 9/28/20 TTE  with LVEF 64%, LVIDD 4.6 cm, normal LV systolic function, decreased RV systolic function, mild MR, mild mod TR, severe pHTN. Pt is here for a follow up today.\par \par Pt comes for a follow up today. Since last visit, she had an echo on 11/23/21 with LVEF 66%, LVIDD 4.9 cm, mild mod MR, mild mod TR, severe pHTN. She had labs on 2/2/22 with hematologist  notable for improvement in H&H 11/36.4 and BUN/creat 58/1.83, K 4.8 and Na 142. She is currently taking torsemide 20 mg daily with an extra on weekend and lucas 12.5 mg daily. She is  on oxygen 2 liters with [employer] unit prn,  and she uses CPAP qhs but is waiting for a new unit as hers is under [employer] recall. . \par Currently, she states she can walk 6-7 blocks, uses her walker as needed but stops at 3 blocks to rest briefly. She hasn't been walking as much recently due to cold weather. She can climb one flight of stairs several times a day. She sleeps with 2 pillows with no orthopnea. She denies chest pain, palpitations, dizziness/LH and no syncope. States joints have not been hurting recently. She wears compression stockings that helps the swelling in her legs. Her weight on office scale is 206 lbs and at home is 202 to 204 lbs. Her home B/P range is 110/62 to 124/82 and in office was 148/88 to 132/84 ( didn't take meds yet). from  203 lbs from 198 lbs. \par \par Patient name: KARINA MARION\par YOB: 1955   Age: 66 (F)   MR#: [id number]\par Study Date: 11/23/2021\par Location: O/PSonographer: Cary Parra RDCS\par Study quality: Technically good\par Referring Physician: MELINDA CHANEL NP\par Blood Pressure: 122/81 mmHg\par Height: 163 cm\par Weight: 92 kg\par BSA: 2 m2\par ------------------------------------------------------------------------\par PROCEDURE: Transthoracic echocardiogram with 2-D, M-Mode\par and complete spectral and color flow Doppler.\par INDICATION: Chronic diastolic (congestive) heart failure\par (I50.32)\par ------------------------------------------------------------------------\par DIMENSIONS:\par Dimensions:     Normal Values:\par LA:     4.8 cm    2.0 - 4.0 cm\par Ao:     2.9 cm    2.0 - 3.8 cm\par SEPTUM: 1.1 cm    0.6 - 1.2 cm\par PWT:    1.1 cm    0.6 - 1.1 cm\par LVIDd:  4.9 cm    3.0 - 5.6 cm\par LVIDs:  3.1 cm    1.8 - 4.0 cm\par Derived Variables:\par LVMI: 102 g/m2\par RWT: 0.44\par Fractional short: 37 %\par Ejection Fraction (Teicholtz): 66 %\par ------------------------------------------------------------------------\par OBSERVATIONS:\par Mitral Valve: Mitral annular calcification, otherwise\par normal mitral valve. Mild-moderate mitral regurgitation.\par Aortic Root: Normal aortic root.\par Aortic Valve: Calcified trileaflet aortic valve with normal\par opening.\par Left Atrium: Moderately dilated left atrium.  LA volume\par index = 46 cc/m2.\par Left Ventricle: Normal left ventricular systolic function.\par No segmental wall motion abnormalities. Normal left\par ventricular internal dimensions and wall thicknesses.\par Right Heart: Moderate right atrial enlargement. Normal\par right ventricular size and function. Normal tricuspid\par valve. Mild-moderate tricuspid regurgitation. Normal\par pulmonic valve. Minimal pulmonic regurgitation.\par Pericardium/PleuraNormal pericardium with no pericardial\par effusion.\par Hemodynamic: Estimated right ventricular systolic pressure\par equals 62 mm Hg, assuming right atrial pressure equals 10\par mm Hg, consistent with severe pulmonary hypertension.\par ------------------------------------------------------------------------\par CONCLUSIONS:\par 1. Mitral annular calcification, otherwise normal mitral\par valve. Mild-moderate mitral regurgitation.\par 2. Calcified trileaflet aortic valve with normal opening.\par 3. Moderately dilated left atrium.  LA volume index = 46\par cc/m2.\par 4. Normal left ventricular internal dimensions and wall\par thicknesses.\par 5. Normal left ventricular systolic function. No segmental\par wall motion abnormalities.\par 6. Moderate right atrial enlargement.\par 7. Normal right ventricular size and function.\par 8. Normal tricuspid valve. Mild-moderate tricuspid\par regurgitation.\par ------------------------------------------------------------------------\par Confirmed on  11/23/2021 - 17:01:34 by mel Avila M.D.\par ------------------------------------------------------------------------\par \par \par EXAM:  CT CHEST  \par \par \par PROCEDURE DATE:  12/16/2020  \par  \par \par \par INTERPRETATION:  CT CHEST WITHOUT CONTRAST\par \par INDICATION: Pulmonary hypertension.\par \par TECHNIQUE: Unenhanced helical images were obtained of the chest. Coronal and sagittal images were reconstructed. Maximum intensity projection images were generated.\par \par COMPARISON: CT chest 11/1/2019, 4/14/2019, and 4/8/2019.\par \par FINDINGS:\par \par Tubes/Lines: None.\par \par Lungs And Airways: The mosaic attenuation of the lungs is unchanged. Linear atelectasis/scarring of the left lower lobe. The remainder of the lungs are clear. The airways are normal.\par \par Pleura: No pleural effusion or pneumothorax.\par \par Mediastinum: The chest lymph nodes are less than 10 mm in the short axis. The visualized esophagus is unremarkable. The right lobe thyroid gland nodule is not well visualized.\par \par Heart and Vasculature: The heart is enlarged. In particular, the atria are enlarged.  There is no pericardial effusion. Coronary artery calcifications.\par \par Left-sided aortic arch and left-sided descending thoracic aorta. The aorta is tortuous. Aorta is normal in caliber. Aortic calcifications. The pulmonary artery is enlarged which can occur in the clinical setting of pulmonary arterial hypertension.\par \par Upper Abdomen: The upper abdomen is unremarkable.\par \par Bones And Soft Tissues: The bones are unremarkable.  The soft tissues are unremarkable.\par \par \par IMPRESSION:\par \par 1.  No change in the mosaic attenuation of the lungs.\par 2.  Cardiomegaly.\par 3.  Enlarged pulmonary artery can occur in the clinical setting of pulmonary arterial hypertension\par \par \par \par \par

## 2022-02-22 NOTE — ED PROVIDER NOTE - CPE EDP RESP NORM
Art Ring and I were pleased to meet with you today. Today we reviewed the following plan:    - We reviewed your images and your symptoms now compared to previously when you were hospitalized.  - We discussed options including surgical intervention which includes small incisions to drain the fluid in the pleural space and remove the peel that is surrounding your lung from the infection. Hospital stay is at least 2 days. The other option includes to continue to monitor.  - Given how great you are feeling and showing no additional symptoms choosing to continue to monitor and not pursue surgical intervention is appropriate.  - We will review your CT chest scheduled 3/29/22. Our office will call you once we have reviewed.     If you have a question or concerns at any time please call [phone] to speak to our staff.     MARÍA ELENA Perez         normal...

## 2022-03-16 ENCOUNTER — APPOINTMENT (OUTPATIENT)
Dept: RHEUMATOLOGY | Facility: CLINIC | Age: 67
End: 2022-03-16
Payer: MEDICARE

## 2022-03-16 PROCEDURE — 99447 NTRPROF PH1/NTRNET/EHR 11-20: CPT

## 2022-03-23 NOTE — REASON FOR VISIT
[Follow-Up] : a follow-up visit [Home] : at home, [unfilled] , at the time of the visit. [Medical Office: ([facility])___] : at the medical office located in  [Verbal consent obtained from patient] : the patient, [unfilled]

## 2022-03-24 ENCOUNTER — APPOINTMENT (OUTPATIENT)
Dept: PULMONOLOGY | Facility: CLINIC | Age: 67
End: 2022-03-24
Payer: MEDICARE

## 2022-03-24 VITALS
OXYGEN SATURATION: 93 % | WEIGHT: 205 LBS | HEART RATE: 75 BPM | TEMPERATURE: 97.6 F | SYSTOLIC BLOOD PRESSURE: 112 MMHG | HEIGHT: 63 IN | DIASTOLIC BLOOD PRESSURE: 73 MMHG | BODY MASS INDEX: 36.32 KG/M2

## 2022-03-24 DIAGNOSIS — R06.02 SHORTNESS OF BREATH: ICD-10-CM

## 2022-03-24 PROCEDURE — 36415 COLL VENOUS BLD VENIPUNCTURE: CPT

## 2022-03-24 PROCEDURE — 99215 OFFICE O/P EST HI 40 MIN: CPT | Mod: 25

## 2022-03-24 NOTE — PHYSICAL EXAM
[No Acute Distress] : no acute distress [Normal Oropharynx] : normal oropharynx [III] : Mallampati Class: III [No Neck Mass] : no neck mass [Clear to Auscultation Bilaterally] : clear to auscultation bilaterally [No Abnormalities] : no abnormalities [Benign] : benign [Normal Gait] : normal gait [No Clubbing] : no clubbing [Non-Pitting] : non-pitting [Normal Color/ Pigmentation] : normal color/ pigmentation [No Focal Deficits] : no focal deficits [Oriented x3] : oriented x3 [TextBox_54] : LOUD S2 , III/VI [facility]

## 2022-03-24 NOTE — DISCUSSION/SUMMARY
[FreeTextEntry1] : -Assessment plan----------The patient has been referred here for further opinion regarding pulmonary problem,chronic diastolic heart failure secondary pulm HTN, on sildenafil. OPSUMIT-------PMH colon cancer, afib- on eliquis and LINH on bipap and nocturnal oxygen, OSMAR, CHF, DM, thyroid nodule, history of colon cancer status post colon surgery history of lymphoma status post chemotherapy [1997], ??right diaphragm elevation\par \par 1- chronic RIGHT HEART heart failure secondary pulm HTN, [ WHO GROUP I + GROUP II COMPONENT OF DIASTOLIC DYSFUNCTION ] on sildenafil  AND OPSUMIT - It is medically necessary for pt to use OXYGEN [ INCREASE TO 3 L/MIN]x 24 hrs---NEEDS PFT AND 6MWT \par \par 2- CKD-- DIURETICS BEING ADJUSTED BY DR ECHEVARRIA --torsemide and aldactone \par \par 3- LINH ----- on BiPAP  19/13  WITH O2  2LMIN and benefits from its nightly use\par \par \par WILL FU CLINICALLY \par \par Discussed the recent [employer] voluntary recall for Continuous and Non-Continuous Ventilators (certain CPAP, BiLevel PAP and Ventilator Devices) due to two issues related to the polyester-based polyurethane (PE-PUR) sound abatement foam used in these devices.  The potential harm of inhalation or ingestion of the foam particles was discussed in detail with the patient.  Symptoms such as headache, upper airway irritation, cough, chest pressure and sinus infection were discussed at length with the patient.  \par \par : Given that the patient has moderate to severe sleep disordered breathing, the decision was made to continue therapy after a very thorough discussion of the risks and benefits of continued use until their repaired or fully replaced\par Patient was counseled to not use ozone-related cleaning products and adhere to their device Instructions for Use for approved cleaning methods.\par The dangers of drowsy driving were discussed with the patient.  The patient was warned to avoid drowsy driving. The patient will follow-up after he/she has heard from the [employer] company.\par \par -NEED COMPLIANCE REPORT\par \par 4- anemia----FOLLOW UP WITH DR. YANG------getting Aranesp® (darbepoetin cris)\par \par 5- labs drawn in our office today\par \par 6- CARDIOLOGY --DR MATHEWS ---HAS  A FIB ON ELIQUIS - needs yearly echo\par 7- she is up to date w/covid vac\par 8- h/o Thyroid Biopsy --with --Dr JUAN CASE, recent increase in Synthroid \par 9- advised exercises at home\par 10- f/u w/rheum for positive AMMON\par 11- f/u in July 2022\par \par ---Thanks for allowing me to participate in the care of this patient. Patient at this time will follow the above mentioned recommendations and return back for follow up visit. If you have any questions I can be reached at #[phone] (beeper) or [phone] (office).\par \par Anirudh Yang MD, FCCP \par Director, Pulmonary Hypertension Program \par [facility] \par Division of Pulmonary, Critical Care and Sleep Medicine \par  Professor of Medicine \par [employer]\par \par CORNELIUS Jaramillo-C\par \par

## 2022-03-24 NOTE — HISTORY OF PRESENT ILLNESS
[TextBox_4] : This letter  is regarding your patient  who  attended pulmonary out patient office today.  I have reviewed  patient's  past history, social history, family history and medication list. I also  reviewed nurse practitioners/ and fellows  notes and assessment and agree with it.  \par The patient was referred by  for pulm HTN, on sildenafil. PMH colon cancer, afib- on eliquis and  LINH on bipap and nocturnal oxygen, OSMAR, CHF, DM, thyroid nodule -on home oxygen-  status post resection of stage I colon cancer    ----------\par \par ------No history of , fever, chills , rigors, chest pain, or hemoptysis. Questionable history of Raynaud's phenomenon. No h/o significant weight loss in last few months. No history of liver dysfunction , collagen vascular disorder or chronic thromboembolic disease. I would classify the patient's dyspnea as WHO  FUNCTIONAL CLASS II--------\par \par ----Pft date--2109- -------RESTRICTIVE VENT DEFECT \par ---6MWT 2019-    132 METERS  USED A CANE TO WALK \par ----Ct scan date--4/2019 IMPRESSION: \par Unchanged prominent mediastinal nodes, nonspecific. Otherwise, no \par evidence of metastatic disease in the chest, abdomen, or pelvis.\par High-density material layering within the gastric antrum may be secondary \par to ingested material.\par Questionable skin defect of the left gluteal soft tissues not seen on \par prior CT, likely skinfold however clinical correlation is recommended.\par \par CT CHEST 12/2020\par IMPRESSION:\par 1. No change in the mosaic attenuation of the lungs.\par 2. Cardiomegaly.\par 3. Enlarged pulmonary artery can occur in the clinical setting of pulmonary arterial hypertension\par \par -----\par vq scan 4/2019 low prob of PE\par \par echo 4/2019 ------------------------------------------------------------------------\par CONCLUSIONS:\par 1. Mitral annular calcification and calcified mitral\par leaflets with normal diastolic opening. Mild mitral\par regurgitation.\par 2. Calcified trileaflet aortic valve with normal opening.\par 3. Normal left ventricular internal dimensions and wall\par thicknesses.\par 4. Hyperdynamic left ventricle. Flattening of the\par interventricular septum in both systole and diastole is\par consistent with right ventricular pressure overload.\par 5. Severe right atrial enlargement.\par 6. Right ventricular enlargement with normal right\par ventricular systolic function.\par 7. Normal tricuspid valve. Moderate-severe tricuspid\par regurgitation.\par 8. Estimated pulmonary artery systolic pressure equals 79\par mm Hg, assuming right atrial pressure equals 5  mm Hg,\par consistent with severe pulmonary hypertension.\par *** Compared with echocardiogram of 10/31/2017, no\par significant changes noted.\par \par \par echo 9/2020 est pasp=65mmHg\par cardiac cath 4/2019 \par  Pulmonary Artery (S/D/M): 78/29/45\par Pressures:  -- Pulmonary Capillary Wedge: 20/21/18\par Pulmonary vascular resistance (Wood Units): 6.84,  CO by Juan Carlos: 4.39\par \par US thyroid 7/2019 IMPRESSION: \par Bilateral indeterminant thyroid nodules slightly decreased on the right. \par No parathyroid adenoma identified. \par \par AUGUT 2109 WILL  START  REVATIO FOR PAH\par \par ------OCT 2019---------stable from pulmonary standpoint--------dyspnea on exertion-----on 2L oxygen------tolerating revatio 20 mg TID--------under care of [facility] [Dr. Jerez]-----\par Dec 2109-----  on macetantan and revatio, renal dysfunction [ dr heart], slight elevation of lft, \par \par -----DEC  2019-------stable from pulmonary point of view---------doing well on macitentan and revatio------unable to sleep------is using her BiPAP machine------underdare of Hem/Onc [Dr. YANG]------HAS RIGHT THYROID NODULE-\par \par \par denis 15 2020---     saw dr heart nephrology  [ s creat was 2.2].  , saw dr yang oncology  Hg  ---   still Anemia on EPOGEN---\par  ----------------------n sildenafil  AND OPSUMIT  --ON HOME OXYGEN --ON LASIX AND ALDACTONE \par nov 2020---doing well--- renal managed by  dr heart nephrology .  , saw dr yang oncology  Hg  ---   still Anemia on EPOGEN---\par pulm htn-on sildenafil  AND OPSUMIT  --ON HOME OXYGEN --ON torsemide AND ALDACTONE \par \par \par \par feb 2012---feels bEtter-- HepPeF- sec pulm htn , renal dysfunction  LINH  on bipap 19/13 with o2 2 l/min ,,, during the daytime on 3l/min o2---on torsemide and spironolactone [dr heart] ,  anemia on prcrit [dr yang]  ,  for pulm htn on Opsumit and sildenafil--------- needs pft and 6 min walk \par \par 6/2021 6mwt 240 m\par 6/2021 PFT restrictive defect\par \par 6/2021 pulm htn on sildenafil & opsumit, using oxygen. On diuretics per Dr Heart. Follows cardiology Dr Degroot.  LINH------ on BiPAP \par She is up to date w/covid vac\par \par 9/2021 pulm htn on sildenafil & opsumit, using oxygen. On diuretics per Dr Heart. Follows cardiology Dr Degroot.  LINH------ on BiPAP nightly snd  benefits from its use -need compliance report  --- She is up to date w/covid vac\par \par jan 2022 ---  pulm htn on sildenafil & opsumit, using oxygen. --compliant to  meds-------On diuretics per Dr Heart. Follows cardiology Dr Degroot.  LINH------ on BiPAP nightly snd  benefits from its use -need compliance report  --- She is up to date w/covid vac\par \par March 2022   --- pulm htn remains on sildenafil and opsumit, using oxygen ---  On diuretics per cardiology--- LINH on BiPAP- need compliance report--- Hypothyroidism being managed by Dr. Nasra Cabrales, recent increase in Synthroid--- Anemia being follow by Dr. Yang remains on Aranesp -- needs PFT 6MWT

## 2022-03-25 LAB
ALBUMIN SERPL ELPH-MCNC: 4.4 G/DL
ALP BLD-CCNC: 133 U/L
ALT SERPL-CCNC: 14 U/L
ANION GAP SERPL CALC-SCNC: 15 MMOL/L
AST SERPL-CCNC: 22 U/L
BASOPHILS # BLD AUTO: 0.04 K/UL
BASOPHILS NFR BLD AUTO: 0.9 %
BILIRUB SERPL-MCNC: 0.5 MG/DL
BUN SERPL-MCNC: 63 MG/DL
CALCIUM SERPL-MCNC: 10 MG/DL
CHLORIDE SERPL-SCNC: 106 MMOL/L
CO2 SERPL-SCNC: 19 MMOL/L
CREAT SERPL-MCNC: 1.86 MG/DL
EGFR: 29 ML/MIN/1.73M2
EOSINOPHIL # BLD AUTO: 0.21 K/UL
EOSINOPHIL NFR BLD AUTO: 4.8 %
GLUCOSE SERPL-MCNC: 98 MG/DL
HCT VFR BLD CALC: 34.2 %
HGB BLD-MCNC: 10.1 G/DL
IMM GRANULOCYTES NFR BLD AUTO: 0.5 %
LYMPHOCYTES # BLD AUTO: 1.37 K/UL
LYMPHOCYTES NFR BLD AUTO: 31 %
MAN DIFF?: NORMAL
MCHC RBC-ENTMCNC: 20 PG
MCHC RBC-ENTMCNC: 29.5 GM/DL
MCV RBC AUTO: 67.9 FL
MONOCYTES # BLD AUTO: 0.5 K/UL
MONOCYTES NFR BLD AUTO: 11.3 %
NEUTROPHILS # BLD AUTO: 2.28 K/UL
NEUTROPHILS NFR BLD AUTO: 51.5 %
NT-PROBNP SERPL-MCNC: 2140 PG/ML
PLATELET # BLD AUTO: 139 K/UL
POTASSIUM SERPL-SCNC: 4.5 MMOL/L
PROT SERPL-MCNC: 7 G/DL
RBC # BLD: 5.04 M/UL
RBC # FLD: 18.8 %
SODIUM SERPL-SCNC: 141 MMOL/L
WBC # FLD AUTO: 4.42 K/UL

## 2022-04-19 ENCOUNTER — APPOINTMENT (OUTPATIENT)
Dept: COLORECTAL SURGERY | Facility: CLINIC | Age: 67
End: 2022-04-19

## 2022-04-26 ENCOUNTER — APPOINTMENT (OUTPATIENT)
Dept: COLORECTAL SURGERY | Facility: CLINIC | Age: 67
End: 2022-04-26
Payer: MEDICARE

## 2022-04-26 DIAGNOSIS — K61.0 ANAL ABSCESS: ICD-10-CM

## 2022-04-26 PROCEDURE — 46600 DIAGNOSTIC ANOSCOPY SPX: CPT

## 2022-04-26 PROCEDURE — 99213 OFFICE O/P EST LOW 20 MIN: CPT | Mod: 25

## 2022-04-26 NOTE — HISTORY OF PRESENT ILLNESS
[FreeTextEntry1] : 65-year-old female 2 years status post right colon resection for stage I colon cancer. Patient progressing well. Tolerating diet. Gaining weight. No blood per rectum. No domal pain. No fevers or chills. No nausea or vomiting no aggravating factors\par \par April 26, 2022-patient is now 3 years status post right colon resection for stage I colon cancer. Patient progressing well. Tolerating diet. No fevers or chills no nausea or vomiting. Stable weight. No blood per rectum note, we'll discomfort. No aggravating factors. Of note, patient reports intermittent swelling in the perianal area. The swelling resolved by itself and the patient denies discharge at this time. Patient with previous I&D of this site. No specific aggravating factor

## 2022-04-26 NOTE — ASSESSMENT
[FreeTextEntry1] : Stage I colon cancer, history of perianal abscess\par -Patient progressing well from a colorectal cancer standpoint. No change in bowel habits\par -Repeat colonoscopy in 2 years\par -High fiber diet\par -Patient possibly with perianal fistula. However no external opening is currently present. I recommended observation at this time. If the area swells and needs to be drained she will followup in the office\par -We discussed the Significant possibility of recurrence given multiple times noted in the same site\par -Patient will followup in the office in one year for reevaluation for cancer followup. She will follow up earlier perianal abscess Becomes active

## 2022-05-09 ENCOUNTER — RX RENEWAL (OUTPATIENT)
Age: 67
End: 2022-05-09

## 2022-05-25 ENCOUNTER — TRANSCRIPTION ENCOUNTER (OUTPATIENT)
Age: 67
End: 2022-05-25

## 2022-05-28 ENCOUNTER — APPOINTMENT (OUTPATIENT)
Dept: DISASTER EMERGENCY | Facility: HOSPITAL | Age: 67
End: 2022-05-28

## 2022-05-28 ENCOUNTER — OUTPATIENT (OUTPATIENT)
Dept: OUTPATIENT SERVICES | Facility: HOSPITAL | Age: 67
LOS: 1 days | End: 2022-05-28

## 2022-05-28 VITALS
DIASTOLIC BLOOD PRESSURE: 83 MMHG | HEIGHT: 64 IN | RESPIRATION RATE: 17 BRPM | HEART RATE: 71 BPM | OXYGEN SATURATION: 96 % | TEMPERATURE: 98 F | WEIGHT: 201.94 LBS | SYSTOLIC BLOOD PRESSURE: 128 MMHG

## 2022-05-28 VITALS
HEART RATE: 74 BPM | SYSTOLIC BLOOD PRESSURE: 112 MMHG | TEMPERATURE: 98 F | DIASTOLIC BLOOD PRESSURE: 69 MMHG | OXYGEN SATURATION: 95 % | RESPIRATION RATE: 17 BRPM

## 2022-05-28 DIAGNOSIS — U07.1 COVID-19: ICD-10-CM

## 2022-05-28 DIAGNOSIS — Z98.890 OTHER SPECIFIED POSTPROCEDURAL STATES: Chronic | ICD-10-CM

## 2022-05-28 DIAGNOSIS — Z90.49 ACQUIRED ABSENCE OF OTHER SPECIFIED PARTS OF DIGESTIVE TRACT: Chronic | ICD-10-CM

## 2022-05-28 RX ORDER — BEBTELOVIMAB 87.5 MG/ML
175 INJECTION, SOLUTION INTRAVENOUS ONCE
Refills: 0 | Status: COMPLETED | OUTPATIENT
Start: 2022-05-28 | End: 2022-05-28

## 2022-05-28 RX ADMIN — BEBTELOVIMAB 175 MILLIGRAM(S): 87.5 INJECTION, SOLUTION INTRAVENOUS at 11:40

## 2022-05-28 NOTE — MONOCLONAL ANTIBODY INFUSION - EXAM
Exam/findings:  T(C): 36.7 (05-28-22 @ 11:35), Max: 36.7 (05-28-22 @ 11:35)  HR: 71 (05-28-22 @ 11:35) (71 - 71)  BP: 128/83 (05-28-22 @ 11:35) (128/83 - 128/83)  RR: 17 (05-28-22 @ 11:35) (17 - 17)  SpO2: 96% (05-28-22 @ 11:35) (96% - 96%)    PE:   Appearance: NAD	  HEENT:  NC/AT  Cardiovascular:  No edema  Respiratory: no use of accessory muscles  Gastrointestinal:  non-distended   Skin: warm and dry  Neurologic: Non-focal  Extremities: Normal range of motion

## 2022-05-28 NOTE — MONOCLONAL ANTIBODY INFUSION - ASSESSMENT AND PLAN
CC: Monoclonal Antibody Infusion/COVID 19 Positive  66y Female with pulmonary artery hypertension, atrial fibrillation, type 2 DM, HTN, hypothyroidism, COPD, heart failure , CKD, and recent dx of COVID 19+ who presents today for elective Bebtelovimab. Patient has been screened and was deemed to be a candidate.    Symptoms/ Criteria  Date of Symptom Onset: 5/23/22  Symptoms: headache, cough, itchy throat   Date of Positive COVID PCR: 5/24/22  Risk Profile includes: age    Vaccination Status: Pfizer booster x 1    PMHx:  Infection due to severe acute respiratory syndrome coronavirus 2 (SARS-CoV-2)      ASSESSMENT:  Pt is COVID positive and symptomatic who was referred for Bebtelovimab monoclonal antibody treatment.    PLAN:  - MAB treatment explained to patient. I have reviewed the Bebtelovimab Emergency Use Authorization (EUA).   - Consent for MAB obtained.   - Risk & benefits discussed. Patient verbalized understanding of plan and agrees to treatment. All questions answered.  - 175mg of Bebtelovimab administered as a single intravenous injection over at least 30 seconds.   - Observe patient for one hour post medication administration and then if stable, discharge home with outpatient follow up as planned by PCP.    POST ASSESSMENT:   Patient completed MAB, and monitored x 1 hour post-infusion with no adverse reactions noted, remained hemodynamically stable.  - Patient tolerated injection well; denies complaints of chest pain/SOB/dizziness/palpitations.   - VSS for discharge home.  - D/C instructions given/ fact sheet included.  - Patient was instructed to self-isolate and use infection control measures (e.g wear mask, isolate, social distance, avoid sharing personal items, clean and disinfect "high touch" surfaces, and frequent handwashing according to the CDC guidelines.   - The patient was informed on what symptoms to be aware of for the next couple of days, and if there are any issues to call the 24/7 clinical call center. Patient was instructed to follow up with primary care provider as needed.    DISCHARGE at 12:45PM.  CC: Monoclonal Antibody Infusion/COVID 19 Positive  66y Female with pulmonary artery hypertension, atrial fibrillation, type 2 DM, HTN, hypothyroidism, COPD, heart failure , CKD stage 3, colon cancer s/p partial colectomy, lymphoma/CLL s/p radiation and chemo (1997), and recent dx of COVID 19+ who presents today for elective Bebtelovimab. Patient has been screened and was deemed to be a candidate.    Symptoms/ Criteria  Date of Symptom Onset: 5/23/22  Symptoms: headache, cough, itchy throat   Date of Positive COVID PCR: 5/24/22  Risk Profile includes: age    Vaccination Status: Pfizer booster x 1    PMHx:  Infection due to severe acute respiratory syndrome coronavirus 2 (SARS-CoV-2)      ASSESSMENT:  Pt is COVID positive and symptomatic who was referred for Bebtelovimab monoclonal antibody treatment.    PLAN:  - MAB treatment explained to patient. I have reviewed the Bebtelovimab Emergency Use Authorization (EUA).   - Consent for MAB obtained.   - Risk & benefits discussed. Patient verbalized understanding of plan and agrees to treatment. All questions answered.  - 175mg of Bebtelovimab administered as a single intravenous injection over at least 30 seconds.   - Observe patient for one hour post medication administration and then if stable, discharge home with outpatient follow up as planned by PCP.    POST ASSESSMENT:   Patient completed MAB, and monitored x 1 hour post-infusion with no adverse reactions noted, remained hemodynamically stable.  - Patient tolerated injection well; denies complaints of chest pain/SOB/dizziness/palpitations.   - VSS for discharge home.  - D/C instructions given/ fact sheet included.  - Patient was instructed to self-isolate and use infection control measures (e.g wear mask, isolate, social distance, avoid sharing personal items, clean and disinfect "high touch" surfaces, and frequent handwashing according to the CDC guidelines.   - The patient was informed on what symptoms to be aware of for the next couple of days, and if there are any issues to call the 24/7 clinical call center. Patient was instructed to follow up with primary care provider as needed.    DISCHARGE at 12:45PM.

## 2022-05-31 NOTE — CARDIOLOGY SUMMARY
[facility] Hospitalist Team  History and Physical   Admit Date: 2022     PCP: Nay      HISTORY:   CC: unresponsive episode    PCP: nay    History of Present Illness: Patient is a 78 year old male with stable HTN and HL presented to ER yesterday after unresponsive / syncopal episode at home    Was swimming with family and grandkids for memorial day.  Had a few beers to drink and some barbecue.  He laid down on lounge chair in sun, family noticed he was not responsive.  They did chest compressions and called EMS and patient quickly regained consciousness, was a little confused, but otherwise alert and oriented.  No post ictal state.  No convulsions.  No head injury    No recent fevers or chills.  No change in urine or stool.  Good appetite.  No recent chest pains or dyspnea.  No dyspnea with exertion.  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  Was able to swim without dyspnea on exertion       Review of Systems  A comprehensive 10 point review of systems was completed.  Pertinent positives and negatives noted in the the HPI.    PMH  Past Medical History:   Diagnosis Date   • Essential (primary) hypertension    • Fracture     right arm no surgery        PSH  Past Surgical History:   Procedure Laterality Date   • Hernia repair          ALL:  ALLERGIES:  No Known Allergies     Home Medications:  Outpatient Medications Marked as Taking for the 22 encounter (Hospital Encounter)   Medication Sig Dispense Refill   • gemfibrozil (LOPID) 600 MG tablet Take 600 mg by mouth daily.     • aspirin 81 MG chewable tablet Chew 81 mg by mouth daily.     • Omega-3 Fatty Acids (Fish Oil) 1000 MG capsule Take 2 g by mouth daily.     • Ibuprofen (ADVIL PO) Take 1 tablet by mouth daily.         Soc Hx  Social History     Tobacco Use   • Smoking status: Former Smoker     Quit date: 2018     Years since quittin.0   • Smokeless tobacco: Never Used   Substance Use  Topics   • Alcohol use: Yes     Alcohol/week: 1.0 standard drink     Types: 1 Glasses of wine per week        Fam Hx  Family History   Problem Relation Age of Onset   • Heart disease Mother            OBJECTIVE:  Visit Vitals  BP (!) 171/80 (BP Location: RUE - Right upper extremity, Patient Position: Supine)   Pulse 69   Temp 97.5 °F (36.4 °C) (Oral)   Resp 18   Ht 5' 6\" (1.676 m)   Wt 79.7 kg (175 lb 11.3 oz)   SpO2 96%   BMI 28.36 kg/m²       GENERAL: no apparent distress, well nourished  SKIN: warm, dry, no diffuse rashes   HEENT: normocephalic, normal nose, oropharynx and mucous membranes moist, sclera anicteric, conjunctiva normal  NECK: supple, no masses  RESPIRATORY: normal expansion; non labored, lungs clear,  no wheezing  CARDIOVASCULAR: RRR; no murmur, no gallop,  no rub, , no LE edema,  dorsalis pedis pulses intact  ABDOMEN:  Soft, BS+; non distended, non tender,   PSYCH: appropriate mood and affect  NEUROLOGIC: A&Ox3: CN II-XII intact, strength normal; sensation intact  Extremities:  moving all 4 extremities spontaneously      DIAGNOSTIC DATA:     LABS  Recent Labs   Lab 05/30/22  1631   WBC 6.8   HGB 14.7   MCV 90.9        Recent Labs   Lab 05/30/22  1631   SODIUM 141   POTASSIUM 4.0   CHLORIDE 112*   CO2 19*   BUN 15   CREATININE 1.18*   CALCIUM 8.8   ALBUMIN 3.6   BILIRUBIN 0.4   ALKPT 82   GPT 30   AST 25   GLUCOSE 93        No results found  No results found    Invalid input(s): T3FRE   No results found     Additional Diagnostics:   ECG:   Encounter Date: 05/30/22   Electrocardiogram 12-Lead   Result Value    Ventricular Rate EKG/Min (BPM) 65    Atrial Rate (BPM) 65    MO-Interval (MSEC) 192    QRS-Interval (MSEC) 74    QT-Interval (MSEC) 430    QTc 447    P Axis (Degrees) 46    R Axis (Degrees) 6    T Axis (Degrees) 29    REPORT TEXT      Normal sinus rhythm  Poor R wave progression  Borderline ECG  No previous ECGs available  Confirmed by EVE PERALTA, YIN BANKS ([id number]) on 5/30/2022 9:21:42  [No Ischemia] : no Ischemia PM         Radiology:   No orders to display             ASSESSMENT / PLAN:    78 year old male with stable HTN and HL presented to ER yesterday after unresponsive / syncopal episode at home    Found to have:    #cv- syncope, suspect related to mild dehydration from being outside in the heat and effects of EtOH, HTN, HL    -ekg personally reviewed, NSR, no sig ST changes, no signs of active ischemia  -troponins negative.  No arrhythmias on tele  -check echo  -cardiology consulted, may need event monitor at dc  -obtain CT head for completeness    -resume home lipid meds at dc    #renal  -mild dehydration  -repeat BMP today       DVT PPX - ambulation    Dispo/ADOD currently stable on floor, OBS, hopeful DC later today if echo and CT head unrevealing and cardiology in agreement    Case d/w patient, Serena MEADOWS, daughter of patient on phone      Pineda Jaquez MD   [___] : [unfilled] [LVEF ___%] : LVEF [unfilled]% [None] : normal LV function [Severe] : severe pulmonary hypertension  [Enlarged] : enlarged LA size [Mild] : mild mitral regurgitation

## 2022-06-06 ENCOUNTER — NON-APPOINTMENT (OUTPATIENT)
Age: 67
End: 2022-06-06

## 2022-06-06 ENCOUNTER — APPOINTMENT (OUTPATIENT)
Dept: CARDIOLOGY | Facility: CLINIC | Age: 67
End: 2022-06-06
Payer: MEDICARE

## 2022-06-06 VITALS
TEMPERATURE: 97 F | SYSTOLIC BLOOD PRESSURE: 122 MMHG | BODY MASS INDEX: 35.08 KG/M2 | WEIGHT: 198 LBS | DIASTOLIC BLOOD PRESSURE: 74 MMHG | HEART RATE: 61 BPM | RESPIRATION RATE: 16 BRPM | HEIGHT: 63 IN | OXYGEN SATURATION: 96 %

## 2022-06-06 PROCEDURE — 99214 OFFICE O/P EST MOD 30 MIN: CPT

## 2022-06-06 PROCEDURE — 93000 ELECTROCARDIOGRAM COMPLETE: CPT

## 2022-06-06 RX ORDER — DARBEPOETIN ALFA 500 UG/ML
500 INJECTION, SOLUTION INTRAVENOUS; SUBCUTANEOUS
Refills: 0 | Status: ACTIVE | COMMUNITY
Start: 2022-06-06

## 2022-06-06 RX ORDER — EPOETIN ALFA 20000 [IU]/ML
20000 SOLUTION INTRAVENOUS; SUBCUTANEOUS
Refills: 0 | Status: DISCONTINUED | COMMUNITY
Start: 2020-01-27 | End: 2022-06-06

## 2022-06-06 RX ORDER — ERYTHROPOIETIN 40000 [IU]/ML
40000 INJECTION, SOLUTION INTRAVENOUS; SUBCUTANEOUS
Refills: 0 | Status: DISCONTINUED | COMMUNITY
Start: 2020-11-30 | End: 2022-06-06

## 2022-06-06 RX ORDER — LEVOTHYROXINE SODIUM 0.05 MG/1
50 TABLET ORAL
Qty: 30 | Refills: 0 | Status: ACTIVE | COMMUNITY
Start: 2019-06-18

## 2022-06-06 NOTE — PHYSICAL EXAM
[Well Developed] : well developed [No Acute Distress] : no acute distress [Normal Conjunctiva] : normal conjunctiva [Clear Lung Fields] : clear lung fields [Good Air Entry] : good air entry [Soft] : abdomen soft [Non Tender] : non-tender [No Rash] : no rash [Normal] : alert and oriented, normal memory [de-identified] : JVP  10 cm with V waves secondary to TR [de-identified] : irregularly irregular [de-identified] : slow gait [de-identified] : trace lower extremity edema bnilaterally

## 2022-06-06 NOTE — CARDIOLOGY SUMMARY
[de-identified] : 6/6/22 AF 61,  low voltage, NSST\par 2/7/22 AF 69, low voltage, NSST\par 10/25/21 AF 78, low voltage, NSST\par \par  [de-identified] : 11/23/21 TTE; LVEF 66%, LVIDD 4.9 cm, normal LV systolic function, mild mod MR, mild mod TR, severe pHTN\par \par  9/28/20 TTE  with LVEF 64%, LVIDD 4.6 cm, normal LV systolic function, decreased RV systolic function, mild MR, mild mod TR, severe pHTN\par \par  [de-identified] : Normal technetium pyrophosphate scan on 12/12/19 with findings not suggestive of transthyretin cardiac amyloidosis.\par

## 2022-06-06 NOTE — DISCUSSION/SUMMARY
[Patient] : the patient [___ Month(s)] : in [unfilled] month(s) [FreeTextEntry1] : 1. Chronic diastolic heart failure, with Normal technetium pyrophosphate  scan on 12/12/19 with findings not suggestive of transthyretin cardiac amyloidosis ( pt had low voltage on EKG, joint pains and B/L carpal tunnel), 11/23/21 TTE with LVEF 66%\par - will continue lucas 12.5 mg five times a week. Reports last creat 1.9\par -Continue torsemide 20 mg  with additional  as needed for weight gain of 2-3 lbs in 2-3 days.  pt is no longer on metolazone 5 mg prn\par , follow up with PCP Dr. Palafox, will follow up with nephrologist Dr. Heart, pulm Dr. Yang and rheum \par - Continue Toprol to 50 mg po BID ( a.fib) \par - Continue compression stockings to improve venous return\par - increase daily exercise as tolerated\par - continue digoxin 0.125 mg QOD for AF with controlled VR 61 bpm\par \par  2. Severe Pulmonary HTN/PAH\par -  Continue with sildenafil 20 mg po TID.\par - Continue  Opsumit 10 mg daily, pt feels she has had improvement since starting. She is happy she was approved for assistance program \par - Continue torsemide as above\par - Follow up with Dr. Yang\par - Continue CPAP with oxygen for LINH\par \par  3. CKD- creat 1.83 on 2/2/22 and states recent labs with Dr. Heart creat 1.9, will obtain results\par - Follow up with renal Dr. Heart \par - Follow up with primary Dr. Palafox\par \par 4. Atrial fibrillation- rate controlled \par -Continue Toprol 50 mg po BID.\par - Continue digoxin 0.125 mg every other day\par - Continue Eliquis for oral a/c and stroke prevention.\par \par 5. S/P recent Covid 19 infection 5/24/22\par - S/P monoclonal antibodies and doing well\par \par Follow up in office in 4 months

## 2022-06-06 NOTE — ASSESSMENT
[FreeTextEntry1] : 65 y/o moderately obese female with controlled diabetes, HTN, COPD, lymphoma/CLL (1997), A.fib on Eliquis, HFpEF, LVEF 63% to 70% on TTE 4/1/19 with hyperdynamic LV, severe TYRONE, normal RV systolic function and severe pHTN Pt had a normal technetium pyrophosphate scan not suggestive of TTR amyloid. TTE  11/23/21 with LVEF 66%, normal LV systolic function, mild mod MR, mild mod TR, severe pHTN.\par ACC/AHA Stage C, NYHA Class III\par Appears compensated  and normotensive

## 2022-06-06 NOTE — HISTORY OF PRESENT ILLNESS
[FreeTextEntry1] : Karina Marion is a 65 y/o AA female w/ PMHX of Afib on Eliquis, DM II, HTN, COPD on home O2 2-3L, LINH on CPAP, lymphoma/CLL s/p radiation and chemo (1997), HFpEF LVEF 66%, severe pulmonary HTN (now on sildenafil and opsumit), RV systolic dysfunction, mod-severe TR.  Last admission 5/10-5/18/19. S/P partial right colectomy ( 4/23/19) secondary to Stage 1 colon cancer,  as per patient with 27 neg nodes and does not need chemo. Nephrologist Dr. Heart for CKD and pulm Dr. Yang for Group 2 pulmonary HTN and  Dr. Hunt is her colorectal surgeon. Normal technetium pyrophosphate scan on 12/12/19 with findings not suggestive of transthyretin cardiac amyloidosis. 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 S/P bilateral cataract surgery on 10/7 and 10/21/20. 9/28/20 TTE  with LVEF 64%, LVIDD 4.6 cm, normal LV systolic function, decreased RV systolic function, mild MR, mild mod TR, severe pHTN. Pt is here for a follow up today.\par \par Pt comes for a follow up today. Since last visit, she  had gone on a trip to [facility] and tested positive for Covid 19 several days later on 5/24/22. She had a headache and sore throat and her PCP prescribed Monoclonal antibodies which she had on 5/28/22 and is currently feeling well. She had labs with renal Dr. Heart and reports creat 1.9. \par Last  echo on 11/23/21 with LVEF 66%, LVIDD 4.9 cm, mild mod MR, mild mod TR, severe pHTN. She followed up with hematologist and was put on Aranesp and procrit and Epogen d/c. Her levothyroxine was increased to 50 mcg from 25 mcg. She is currently taking torsemide 20 mg daily with an extra as needed and lucas 12.5 mg daily. She is  on oxygen 2 liters as needed with Inogen unit prn,  and she uses CPAP qhs but is waiting for a new unit as hers is under [employer] recall.. Not using oxygen today. States she has an appt with pulm next month and will have CT, 6MWT and PFTs. \par Currently, she states she feels better since last visit and can walk 3 blocks without cane as needed but sometimes stops at to rest briefly. She can climb one flight of stairs several times a day. She sleeps with 2 pillows with no orthopnea. She denies chest pain, palpitations, dizziness/LH and no syncope. States joints have not been hurting recently. She wears compression stockings that helps the swelling in her legs. Her weight on office scale is  198 bs from 206 lbs . Her home B/P range is 99/65 to 120/68 and in office 122/74. \par \par \par She had the COvid vaccine x 2 and booster without adverse reaction. S/P recent Covid 19 infection 5/24/22\par - S/P monoclonal antibodies \par \par Patient name: KARINA MARION\par YOB: 1955   Age: 66 (F)   MR#: [id number]\par Study Date: 11/23/2021\par Location: O/PSonographer: Cary Parra RDCS\par Study quality: Technically good\par Referring Physician: MELINDA CHANEL NP\par Blood Pressure: 122/81 mmHg\par Height: 163 cm\par Weight: 92 kg\par BSA: 2 m2\par ------------------------------------------------------------------------\par PROCEDURE: Transthoracic echocardiogram with 2-D, M-Mode\par and complete spectral and color flow Doppler.\par INDICATION: Chronic diastolic (congestive) heart failure\par (I50.32)\par ------------------------------------------------------------------------\par DIMENSIONS:\par Dimensions:     Normal Values:\par LA:     4.8 cm    2.0 - 4.0 cm\par Ao:     2.9 cm    2.0 - 3.8 cm\par SEPTUM: 1.1 cm    0.6 - 1.2 cm\par PWT:    1.1 cm    0.6 - 1.1 cm\par LVIDd:  4.9 cm    3.0 - 5.6 cm\par LVIDs:  3.1 cm    1.8 - 4.0 cm\par Derived Variables:\par LVMI: 102 g/m2\par RWT: 0.44\par Fractional short: 37 %\par Ejection Fraction (Teicholtz): 66 %\par ------------------------------------------------------------------------\par OBSERVATIONS:\par Mitral Valve: Mitral annular calcification, otherwise\par normal mitral valve. Mild-moderate mitral regurgitation.\par Aortic Root: Normal aortic root.\par Aortic Valve: Calcified trileaflet aortic valve with normal\par opening.\par Left Atrium: Moderately dilated left atrium.  LA volume\par index = 46 cc/m2.\par Left Ventricle: Normal left ventricular systolic function.\par No segmental wall motion abnormalities. Normal left\par ventricular internal dimensions and wall thicknesses.\par Right Heart: Moderate right atrial enlargement. Normal\par right ventricular size and function. Normal tricuspid\par valve. Mild-moderate tricuspid regurgitation. Normal\par pulmonic valve. Minimal pulmonic regurgitation.\par Pericardium/PleuraNormal pericardium with no pericardial\par effusion.\par Hemodynamic: Estimated right ventricular systolic pressure\par equals 62 mm Hg, assuming right atrial pressure equals 10\par mm Hg, consistent with severe pulmonary hypertension.\par ------------------------------------------------------------------------\par CONCLUSIONS:\par 1. Mitral annular calcification, otherwise normal mitral\par valve. Mild-moderate mitral regurgitation.\par 2. Calcified trileaflet aortic valve with normal opening.\par 3. Moderately dilated left atrium.  LA volume index = 46\par cc/m2.\par 4. Normal left ventricular internal dimensions and wall\par thicknesses.\par 5. Normal left ventricular systolic function. No segmental\par wall motion abnormalities.\par 6. Moderate right atrial enlargement.\par 7. Normal right ventricular size and function.\par 8. Normal tricuspid valve. Mild-moderate tricuspid\par regurgitation.\par ------------------------------------------------------------------------\par Confirmed on  11/23/2021 - 17:01:34 by mel Avila M.D.\par ------------------------------------------------------------------------\par \par \par EXAM:  CT CHEST  \par \par \par PROCEDURE DATE:  12/16/2020  \par  \par \par \par INTERPRETATION:  CT CHEST WITHOUT CONTRAST\par \par INDICATION: Pulmonary hypertension.\par \par TECHNIQUE: Unenhanced helical images were obtained of the chest. Coronal and sagittal images were reconstructed. Maximum intensity projection images were generated.\par \par COMPARISON: CT chest 11/1/2019, 4/14/2019, and 4/8/2019.\par \par FINDINGS:\par \par Tubes/Lines: None.\par \par Lungs And Airways: The mosaic attenuation of the lungs is unchanged. Linear atelectasis/scarring of the left lower lobe. The remainder of the lungs are clear. The airways are normal.\par \par Pleura: No pleural effusion or pneumothorax.\par \par Mediastinum: The chest lymph nodes are less than 10 mm in the short axis. The visualized esophagus is unremarkable. The right lobe thyroid gland nodule is not well visualized.\par \par Heart and Vasculature: The heart is enlarged. In particular, the atria are enlarged.  There is no pericardial effusion. Coronary artery calcifications.\par \par Left-sided aortic arch and left-sided descending thoracic aorta. The aorta is tortuous. Aorta is normal in caliber. Aortic calcifications. The pulmonary artery is enlarged which can occur in the clinical setting of pulmonary arterial hypertension.\par \par Upper Abdomen: The upper abdomen is unremarkable.\par \par Bones And Soft Tissues: The bones are unremarkable.  The soft tissues are unremarkable.\par \par \par IMPRESSION:\par \par 1.  No change in the mosaic attenuation of the lungs.\par 2.  Cardiomegaly.\par 3.  Enlarged pulmonary artery can occur in the clinical setting of pulmonary arterial hypertension\par \par \par \par \par

## 2022-06-21 ENCOUNTER — APPOINTMENT (OUTPATIENT)
Dept: CT IMAGING | Facility: IMAGING CENTER | Age: 67
End: 2022-06-21
Payer: MEDICARE

## 2022-06-21 ENCOUNTER — OUTPATIENT (OUTPATIENT)
Dept: OUTPATIENT SERVICES | Facility: HOSPITAL | Age: 67
LOS: 1 days | End: 2022-06-21
Payer: MEDICARE

## 2022-06-21 DIAGNOSIS — Z90.49 ACQUIRED ABSENCE OF OTHER SPECIFIED PARTS OF DIGESTIVE TRACT: Chronic | ICD-10-CM

## 2022-06-21 DIAGNOSIS — Z98.890 OTHER SPECIFIED POSTPROCEDURAL STATES: Chronic | ICD-10-CM

## 2022-06-21 DIAGNOSIS — I27.20 PULMONARY HYPERTENSION, UNSPECIFIED: ICD-10-CM

## 2022-06-21 PROCEDURE — 71250 CT THORAX DX C-: CPT | Mod: 26,MG

## 2022-06-21 PROCEDURE — G1004: CPT

## 2022-06-21 PROCEDURE — 71250 CT THORAX DX C-: CPT | Mod: MG

## 2022-07-04 ENCOUNTER — NON-APPOINTMENT (OUTPATIENT)
Age: 67
End: 2022-07-04

## 2022-07-06 ENCOUNTER — NON-APPOINTMENT (OUTPATIENT)
Age: 67
End: 2022-07-06

## 2022-07-07 ENCOUNTER — APPOINTMENT (OUTPATIENT)
Dept: PULMONOLOGY | Facility: CLINIC | Age: 67
End: 2022-07-07

## 2022-07-07 VITALS
HEART RATE: 83 BPM | OXYGEN SATURATION: 97 % | SYSTOLIC BLOOD PRESSURE: 110 MMHG | DIASTOLIC BLOOD PRESSURE: 72 MMHG | WEIGHT: 202 LBS | BODY MASS INDEX: 35.79 KG/M2 | TEMPERATURE: 97.8 F | HEIGHT: 63 IN

## 2022-07-07 VITALS — OXYGEN SATURATION: 96 % | RESPIRATION RATE: 16 BRPM | HEART RATE: 95 BPM

## 2022-07-07 PROCEDURE — 94618 PULMONARY STRESS TESTING: CPT

## 2022-07-07 PROCEDURE — ZZZZZ: CPT

## 2022-07-07 PROCEDURE — 94010 BREATHING CAPACITY TEST: CPT

## 2022-07-07 PROCEDURE — 94729 DIFFUSING CAPACITY: CPT

## 2022-07-07 PROCEDURE — 94726 PLETHYSMOGRAPHY LUNG VOLUMES: CPT

## 2022-07-07 PROCEDURE — 99215 OFFICE O/P EST HI 40 MIN: CPT | Mod: 25

## 2022-07-07 NOTE — PHYSICAL EXAM
[No Acute Distress] : no acute distress [Normal Oropharynx] : normal oropharynx [No Neck Mass] : no neck mass [III] : Mallampati Class: III [Clear to Auscultation Bilaterally] : clear to auscultation bilaterally [No Abnormalities] : no abnormalities [Benign] : benign [Normal Gait] : normal gait [No Clubbing] : no clubbing [Non-Pitting] : non-pitting [Normal Color/ Pigmentation] : normal color/ pigmentation [No Focal Deficits] : no focal deficits [Oriented x3] : oriented x3 [TextBox_54] : LOUD S2 , III[facility]

## 2022-07-07 NOTE — DISCUSSION/SUMMARY
[FreeTextEntry1] : -Assessment plan----------The patient has been referred here for further opinion regarding pulmonary problem,chronic diastolic heart failure secondary pulm HTN, on sildenafil. OPSUMIT-------PMH colon cancer, afib- on eliquis and LINH on bipap and nocturnal oxygen, OSMAR, CHF, DM, thyroid nodule, history of colon cancer status post colon surgery history of lymphoma status post chemotherapy [1997], ??right diaphragm elevation\par \par 1- chronic RIGHT HEART heart failure secondary pulm HTN, [ WHO GROUP I + GROUP II COMPONENT OF DIASTOLIC DYSFUNCTION ] on sildenafil  AND OPSUMIT - It is medically necessary for pt to use OXYGEN [ remains on 3 L/MIN]x 24 hrs- PFT restrictive pattern, 6mw distance improved from 193 -> 299 [ JULY 2022]  \par \par 2- CKD-- remains on torsemide 20 OD and aldactone 12.5 5/week - managed by Dr. Heart and Dr. Levy \par \par 3- LINH ----- on BiPAP  19/13  WITH O2  2LMIN and benefits from its nightly use, awaiting replacement, hoping by end of 2022\par \par 4  AFIB -----ELIQUIS\par \par WILL FU CLINICALLY \par \par Discussed the recent [employer] voluntary recall for Continuous and Non-Continuous Ventilators (certain CPAP, BiLevel PAP and Ventilator Devices) due to two issues related to the polyester-based polyurethane (PE-PUR) sound abatement foam used in these devices.  The potential harm of inhalation or ingestion of the foam particles was discussed in detail with the patient.  Symptoms such as headache, upper airway irritation, cough, chest pressure and sinus infection were discussed at length with the patient.  \par \par : Given that the patient has moderate to severe sleep disordered breathing, the decision was made to continue therapy after a very thorough discussion of the risks and benefits of continued use until their repaired or fully replaced\par Patient was counseled to not use ozone-related cleaning products and adhere to their device Instructions for Use for approved cleaning methods.\par The dangers of drowsy driving were discussed with the patient.  The patient was warned to avoid drowsy driving. The patient will follow-up after he/she has heard from the DME company.\par \par -NEED COMPLIANCE REPORT\par \par 4- anemia----FOLLOW UP WITH DR. YANG-- Hb of 9.8 on 7/7 ----getting Aranesp® (darbepoetin cris)\par \par 5- labs - proBNP in our office today\par \par 6- CARDIOLOGY --DR MATEHWS ---HAS  A FIB ON ELIQUIS - needs yearly echo\par 7- COVID - had in may, 22 had mild infection had 1st booster, will get 2nd booster in sept \par 8- h/o Thyroid Biopsy --with --Dr JUAN CASE, TSH stable on current synthroid dose \par 9- advised exercises at home\par 10- f/u w/rheum for positive AMMON\par 11- f/u in Nov 2022\par \par ---Thanks for allowing me to participate in the care of this patient. Patient at this time will follow the above mentioned recommendations and return back for follow up visit. If you have any questions I can be reached at #[phone] (beeper) or [phone] (office).\par \par Anirudh Yang MD, FCCP \par Director, Pulmonary Hypertension Program \par [facility] \par Division of Pulmonary, Critical Care and Sleep Medicine \par  Professor of Medicine \par [employer]\par \par CORNELIUS Jaramillo-C\par \par

## 2022-07-07 NOTE — HISTORY OF PRESENT ILLNESS
[TextBox_4] : This letter  is regarding your patient  who  attended pulmonary out patient office today.  I have reviewed  patient's  past history, social history, family history and medication list. I also  reviewed nurse practitioners/ and fellows  notes and assessment and agree with it.  \par The patient was referred by  for SECONDARY  pulm HTN [,GROUP 2 ---DIASTOLIC DYSFUNCTION ], on sildenafil. PMH colon cancer, afib- on eliquis,   CKD,  ATRIAL FIBRILLATION,  PAST H/O LYMPHOMA/CLL  S/P RT AND CHEMO [ 1997]--H/O  LINH on bipap and nocturnal oxygen, OSMAR, CHF, DM, thyroid nodule -on home oxygen-  status post resection of stage I colon cancer    ----------\par \par ------No history of , fever, chills , rigors, chest pain, or hemoptysis. Questionable history of Raynaud's phenomenon. No h/o significant weight loss in last few months. No history of liver dysfunction ,  or chronic thromboembolic disease. I would classify the patient's dyspnea as WHO  FUNCTIONAL CLASS II--------\par \par ----Pft date--2109- -------RESTRICTIVE VENT DEFECT \par ---6MWT 2019-    132 METERS  USED A CANE TO WALK \par \par CARDIAC C ATH 2019----------  PA 78/29 MEAN PA 45, RA 10, RV 78/14,  PA SAT 47, CO 4.79. CI 2.33, PVR  6.84 WOODS\par ----Ct scan date--4/2019 IMPRESSION: \par Unchanged prominent mediastinal nodes, nonspecific. Otherwise, no \par evidence of metastatic disease in the chest, abdomen, or pelvis.\par High-density material layering within the gastric antrum may be secondary \par to ingested material.\par Questionable skin defect of the left gluteal soft tissues not seen on \par prior CT, likely skinfold however clinical correlation is recommended.\par \par CT CHEST 12/2020\par IMPRESSION:\par 1. No change in the mosaic attenuation of the lungs.\par 2. Cardiomegaly.\par 3. Enlarged pulmonary artery can occur in the clinical setting of pulmonary arterial hypertension\par \par -----\par vq scan 4/2019 low prob of PE\par \par echo 4/2019 ------------------------------------------------------------------------\par CONCLUSIONS:\par 1. Mitral annular calcification and calcified mitral\par leaflets with normal diastolic opening. Mild mitral\par regurgitation.\par 2. Calcified trileaflet aortic valve with normal opening.\par 3. Normal left ventricular internal dimensions and wall\par thicknesses.\par 4. Hyperdynamic left ventricle. Flattening of the\par interventricular septum in both systole and diastole is\par consistent with right ventricular pressure overload.\par 5. Severe right atrial enlargement.\par 6. Right ventricular enlargement with normal right\par ventricular systolic function.\par 7. Normal tricuspid valve. Moderate-severe tricuspid\par regurgitation.\par 8. Estimated pulmonary artery systolic pressure equals 79\par mm Hg, assuming right atrial pressure equals 5  mm Hg,\par consistent with severe pulmonary hypertension.\par *** Compared with echocardiogram of 10/31/2017, no\par significant changes noted.\par \par \par echo 9/2020 est pasp=65mmHg\par cardiac cath 4/2019 \par  Pulmonary Artery (S/D/M): 78/29/45\par Pressures:  -- Pulmonary Capillary Wedge: 20/21/18\par Pulmonary vascular resistance (Wood Units): 6.84,  CO by Juan Carlos: 4.39\par \par US thyroid 7/2019 IMPRESSION: \par Bilateral indeterminant thyroid nodules slightly decreased on the right. \par No parathyroid adenoma identified. \par \par \par CT CHEST - ORDERED BY: NIRALI MOORE\par \par \par PROCEDURE DATE: 06/21/2022\par \par \par \par INTERPRETATION: CLINICAL INFORMATION: Pulmonary hypertension\par \par TECHNIQUE: A volumetric CT acquisition of the chest was obtained from the thoracic inlet to the upper abdomen, without the administration of intravenous contrast. Coronal and sagittal multiplanar reformations were also submitted.\par \par Comparison: 12/16/2020\par \par FINDINGS:\par \par Lungs/Airways/Pleura: Persistent mosaic perfusion in the lungs. No endobronchial nodule. No pleural effusion\par \par Mediastinum/Lymph nodes: No thoracic adenopathy.\par \par Heart and Vessels: There is biatrial enlargement. No pericardial effusion there are coronary artery calcifications. The mid ascending aorta measures 3.2 cm. The pulmonary artery is larger than the adjacent ascending aorta.\par \par Upper Abdomen: Partially imaged anastomotic staple line at the splenic flexure.\par \par Osseous structures and Soft Tissues: No aggressive bone lesions.\par \par IMPRESSION:\par \par Stable appearance the chest with mosaic perfusion of the lungs and enlarged pulmonary artery compared to the adjacent ascending aorta\par \par \par \par AUGUT 2109 WILL  START  REVATIO FOR PAH\par \par ------OCT 2019---------stable from pulmonary standpoint--------dyspnea on exertion-----on 2L oxygen------tolerating revatio 20 mg TID--------under care of Rheuma [Dr. Jerez]-----\par Dec 2109-----  on macetantan and revatio, renal dysfunction [ dr heart], slight elevation of lft, \par \par -----DEC  2019-------stable from pulmonary point of view---------doing well on macitentan and revatio------unable to sleep------is using her BiPAP machine------underdare of Hem/Onc [Dr. YANG]------HAS RIGHT THYROID NODULE-\par \par \par denis 15 2020---     saw dr heart nephrology  [ s creat was 2.2].  , saw dr yang oncology  Hg  ---   still Anemia on EPOGEN---\par  ----------------------n sildenafil  AND OPSUMIT  --ON HOME OXYGEN --ON LASIX AND ALDACTONE \par nov 2020---doing well--- renal managed by  dr heart nephrology .  , saw dr yang oncology  Hg  ---   still Anemia on EPOGEN---\par pulm htn-on sildenafil  AND OPSUMIT  --ON HOME OXYGEN --ON torsemide AND ALDACTONE \par \par \par \par feb 2012---feels bEtter-- HepPeF- sec pulm htn , renal dysfunction  LINH  on bipap 19/13 with o2 2 l/min ,,, during the daytime on 3l/min o2---on torsemide and spironolactone [dr heart] ,  anemia on prcrit [dr yang]  ,  for pulm htn on Opsumit and sildenafil--------- needs pft and 6 min walk \par \par 6/2021 6mwt 240 m\par 6/2021 PFT restrictive defect\par \par 6/2021 pulm htn on sildenafil & opsumit, using oxygen. On diuretics per Dr Heart. Follows cardiology Dr Degroot.  LINH------ on BiPAP \par She is up to date w/covid vac\par \par 9/2021 pulm htn on sildenafil & opsumit, using oxygen. On diuretics per Dr Heart. Follows cardiology Dr Degroot.  LINH------ on BiPAP nightly snd  benefits from its use -need compliance report  --- She is up to date w/covid vac\par \par jan 2022 ---  pulm htn on sildenafil & opsumit, using oxygen. --compliant to  meds-------On diuretics per Dr Heart. Follows cardiology Dr Degroot.  LINH------ on BiPAP nightly snd  benefits from its use -need compliance report  --- She is up to date w/covid vac\par \par March 2022   --- pulm htn remains on sildenafil and opsumit, using oxygen ---  On diuretics per cardiology--- LINH on BiPAP- need compliance report--- Hypothyroidism being managed by Dr. Nasra Cabrales, recent increase in Synthroid--- Anemia being follow by Dr. Yang remains on Aranesp -- needs PFT 6MWT\par \par July 2022 - remains on sildenafil and opsumit, uses 3 L O2 when out of house. RA at rest and with ADLs, no difficulties. Continues to be on torsemide 20 OD and spironolactone 12.5 5/week. LINH on BIPAP - awaiting  CPAP UNIT replacement. Repeat TSH of 2.95 on increased Synthroid dose. Outside labs reviewed pertinent for TSH 2.95, BUN/Cr of 54/1.90, a1c of 5.8, Hb of 9.8. Patient had appointment with renal and cardio in june, 22 - stable\par PFT - restrictive pattern \par 6mw - able to walk 299 m compare to previous 193 in june, 21\par CT scan - stable mosaic pattern

## 2022-07-08 LAB — NT-PROBNP SERPL-MCNC: 2202 PG/ML

## 2022-07-11 LAB — SARS-COV-2 N GENE NPH QL NAA+PROBE: NOT DETECTED

## 2022-08-14 NOTE — PROGRESS NOTE ADULT - PROBLEM SELECTOR PLAN 5
Patient with h/o COPD, currently on home oxygen 2L  - on 2L NC, maintain goal SpO2 > 92%.   - c/w Advair PRN   - continuous O2 sat monitoring There are no Wet Read(s) to document.

## 2022-09-07 ENCOUNTER — NON-APPOINTMENT (OUTPATIENT)
Age: 67
End: 2022-09-07

## 2022-09-26 ENCOUNTER — NON-APPOINTMENT (OUTPATIENT)
Age: 67
End: 2022-09-26

## 2022-10-03 ENCOUNTER — NON-APPOINTMENT (OUTPATIENT)
Age: 67
End: 2022-10-03

## 2022-10-03 ENCOUNTER — APPOINTMENT (OUTPATIENT)
Dept: CARDIOLOGY | Facility: CLINIC | Age: 67
End: 2022-10-03

## 2022-10-03 ENCOUNTER — APPOINTMENT (OUTPATIENT)
Dept: HEART FAILURE | Facility: CLINIC | Age: 67
End: 2022-10-03

## 2022-10-03 VITALS
BODY MASS INDEX: 36.85 KG/M2 | TEMPERATURE: 97.7 F | HEART RATE: 74 BPM | OXYGEN SATURATION: 100 % | HEIGHT: 63 IN | DIASTOLIC BLOOD PRESSURE: 80 MMHG | SYSTOLIC BLOOD PRESSURE: 119 MMHG

## 2022-10-03 VITALS — BODY MASS INDEX: 36.85 KG/M2 | WEIGHT: 208 LBS

## 2022-10-03 PROCEDURE — 93000 ELECTROCARDIOGRAM COMPLETE: CPT

## 2022-10-03 PROCEDURE — 99214 OFFICE O/P EST MOD 30 MIN: CPT

## 2022-10-03 NOTE — PHYSICAL EXAM
[Well Developed] : well developed [No Acute Distress] : no acute distress [Normal Conjunctiva] : normal conjunctiva [Clear Lung Fields] : clear lung fields [Good Air Entry] : good air entry [Soft] : abdomen soft [Non Tender] : non-tender [No Rash] : no rash [Normal] : alert and oriented, normal memory [de-identified] : JVP  10 cm with V waves secondary to TR [de-identified] : irregularly irregular [de-identified] : not using oxygen with appt [de-identified] : slow gait [de-identified] : trace lower extremity edema bilaterally

## 2022-10-03 NOTE — ASSESSMENT
[FreeTextEntry1] :  66 y/o AA female w/ PMHX of Afib on Eliquis, DM II, HTN, COPD on home O2 2-3L, LINH on CPAP, lymphoma/CLL s/p radiation and chemo (1997), HFpEF LVEF 66%, severe pulmonary HTN (now on sildenafil and opsumit), RV systolic dysfunction, mod-severe TR.  Last admission 5/10-5/18/19. S/P partial right colectomy  (4/23/19) secondary to Stage 1 colon cancer,  as per patient with 27 neg nodes and did not need chemo. Nephrologist Dr. Heart for CKD and pulm Dr. Yang for Group 2 pulmonary HTN and  Dr. Hunt is her colorectal surgeon. Normal technetium pyrophosphate scan on 12/12/19 with findings not suggestive of transthyretin cardiac amyloidosis. 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 S/P bilateral cataract surgery on 10/7 and 10/21/20. Last TTE 11/23/21 TTE; LVEF 66%, LVIDD 4.9 cm, normal LV systolic function, mild mod MR, mild mod TR, severe pHTN\par \par ACC/AHA Stage C, NYHA Class III\par Appears mildly volume overloaded  and normotensive with approx weight gain of 6 lbs since last visit

## 2022-10-03 NOTE — CARDIOLOGY SUMMARY
[de-identified] : 10/3/22 AF 73, ow voltage, NSST\par 6/6/22 AF 61,  low voltage, NSST\par 2/7/22 AF 69, low voltage, NSST\par 10/25/21 AF 78, low voltage, NSST\par \par  [de-identified] : 11/23/21 TTE; LVEF 66%, LVIDD 4.9 cm, normal LV systolic function, mild mod MR, mild mod TR, severe pHTN\par \par  9/28/20 TTE  with LVEF 64%, LVIDD 4.6 cm, normal LV systolic function, decreased RV systolic function, mild MR, mild mod TR, severe pHTN\par \par  [de-identified] : Normal technetium pyrophosphate scan on 12/12/19 with findings not suggestive of transthyretin cardiac amyloidosis.\par

## 2022-10-03 NOTE — DISCUSSION/SUMMARY
[Patient] : the patient [___ Month(s)] : in [unfilled] month(s) [FreeTextEntry1] : 1. Chronic diastolic heart failure, with Normal technetium pyrophosphate  scan on 12/12/19 with findings not suggestive of transthyretin cardiac amyloidosis ( pt had low voltage on EKG, joint pains and B/L carpal tunnel), 11/23/21 TTE with LVEF 66%\par - will continue lucas 12.5 mg five times a week. 9/7/22 creata 2.22, has a follow up with renal Dr. Heart next week \par -Take an extra torsemide 20 mg today and then continue torsemide 20 mg daily with one additional on weekend and with additional  as needed for weight gain of 2-3 lbs in 2-3 days.  pt is no longer on metolazone 5 mg prn. Goal weight is less than 202 lbs and she is 204 lbs at home today\par , follow up with PCP Dr. Palafox, will follow up with nephrologist Dr. Heart, pulm Dr. Yang and rheum \par - Continue Toprol to 50 mg po BID ( a.fib) \par - Continue compression stockings to improve venous return\par - increase daily exercise as tolerated\par - continue digoxin 0.125 mg QOD for AF with controlled VR 73 bpm\par \par  2. Severe Pulmonary HTN/PAH\par -  Continue with sildenafil 20 mg po TID.\par - Continue  Opsumit 10 mg daily, pt feels she has had improvement since starting \par - Continue torsemide as above\par - Follow up with Dr. Yang\par - Continue CPAP with oxygen for LINH\par - schedule TTE the same day as next appt \par \par  3. CKD- creat 2.22 on labd 9/7 from from creat 1.83 on 2/2/22 . Will repeat with  Dr. Domitila ambriz 1.9, will obtain results\par - Follow up with renal Dr. Heart \par - Follow up with primary Dr. Palafox\par \par 4. Atrial fibrillation- rate controlled \par -Continue Toprol 50 mg po BID.\par - Continue digoxin 0.125 mg every other day, add dig level to next blood work\par - Continue Eliquis for oral a/c and stroke prevention.\par \par 5. S/P  Covid 19 infection 5/24/22\par - S/P monoclonal antibodies and doing well\par - pt will get 2 nd booster since it is over 3 months since she had Covid\par - she will get flu shot with PCP Dr. Palafox\par \par Follow up in office in 3-4 months on the same day as TTE [EKG obtained to assist in diagnosis and management of assessed problem(s)] : EKG obtained to assist in diagnosis and management of assessed problem(s)

## 2022-10-03 NOTE — HISTORY OF PRESENT ILLNESS
[FreeTextEntry1] : Karina Marion is a 66 y/o AA female w/ PMHX of Afib on Eliquis, DM II, HTN, COPD on home O2 2-3L, LINH on CPAP, lymphoma/CLL s/p radiation and chemo (1997), HFpEF LVEF 66%, severe pulmonary HTN (now on sildenafil and opsumit), RV systolic dysfunction, mod-severe TR.  Last admission 5/10-5/18/19. S/P partial right colectomy  (4/23/19) secondary to Stage 1 colon cancer,  as per patient with 27 neg nodes and did not need chemo. Nephrologist Dr. Heart for CKD and pulm Dr. Yang for Group 2 pulmonary HTN and  Dr. Hunt is her colorectal surgeon. Normal technetium pyrophosphate scan on 12/12/19 with findings not suggestive of transthyretin cardiac amyloidosis. 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 S/P bilateral cataract surgery on 10/7 and 10/21/20. Last TTE 11/23/21 TTE; LVEF 66%, LVIDD 4.9 cm, normal LV systolic function, mild mod MR, mild mod TR, severe pHTN  Pt is here for a follow up today.\par \par Pt comes for a follow up today. Since last visit, she  followed up with pulm Dr. Yang 7/7/22 and had labs with serum pro  BNP 2202 from 2140. She had a 6 MWT that she reports as some improvement and PFT's. She uses oxygen 3 liters, usually in the evening at [facility] and CPAP for LINH. She had Covid 19 infection in 5/2022 and had vaccine x 2 with one booster and is scheduled to get a second booster this week.She plans on getting flu shot with PCP Dr. Palafox.She has a follow up next week with Dr. Heart.  \par \par She followed up with hematologist Dr. Armenta and had labs 9/7/22 notable for incirease in BUN/creat to 71/2.22 with K 4.5 from 63/1.8 with K 4.5, H&H 10.8/36, normal uric acid 3.2, .and  she is continuing Aranesp and procrit. \par \par She is currently taking torsemide 20 mg daily with an extra 20 mg on the weekends. She is taking lucas 12.5 mg 5 days a week with K 4.5. She is taking dig 0.125 mg QOD. Her home weight has increased from 198.4 lbs 6/6/22 to 204 lbs at home today ( 208 lbs with clothes in office. B/P range at home 101/58 to 120/66 and is 119/80 in office today. She is adhering to low salt diet and is drinking less than 2 liters of fluid per day.\par \par Currently, she states she feels better since last visit and can walk 4 blocks without cane to the store but sometimes stops at to rest briefly. She can climb one flight of stairs several times a day. She sleeps with 2 pillows with no orthopnea. She denies chest pain, palpitations, dizziness/LH and no syncope. States joints have not been hurting recently. She wears compression stockings that helps the swelling in her legs. \par \par \par She had the COvid vaccine x 2 and booster without adverse reaction. S/P recent Covid 19 infection 5/24/22\par - S/P monoclonal antibodies \par \par Patient name: KARINA MARION\par YOB: 1955   Age: 66 (F)   MR#: [id number]\par Study Date: 11/23/2021\par Location: O/PSonographer: Cary Parra RDCS\par Study quality: Technically good\par Referring Physician: MELINDA CHANEL NP\par Blood Pressure: 122/81 mmHg\par Height: 163 cm\par Weight: 92 kg\par BSA: 2 m2\par ------------------------------------------------------------------------\par PROCEDURE: Transthoracic echocardiogram with 2-D, M-Mode\par and complete spectral and color flow Doppler.\par INDICATION: Chronic diastolic (congestive) heart failure\par (I50.32)\par ------------------------------------------------------------------------\par DIMENSIONS:\par Dimensions:     Normal Values:\par LA:     4.8 cm    2.0 - 4.0 cm\par Ao:     2.9 cm    2.0 - 3.8 cm\par SEPTUM: 1.1 cm    0.6 - 1.2 cm\par PWT:    1.1 cm    0.6 - 1.1 cm\par LVIDd:  4.9 cm    3.0 - 5.6 cm\par LVIDs:  3.1 cm    1.8 - 4.0 cm\par Derived Variables:\par LVMI: 102 g/m2\par RWT: 0.44\par Fractional short: 37 %\par Ejection Fraction (Teicholtz): 66 %\par ------------------------------------------------------------------------\par OBSERVATIONS:\par Mitral Valve: Mitral annular calcification, otherwise\par normal mitral valve. Mild-moderate mitral regurgitation.\par Aortic Root: Normal aortic root.\par Aortic Valve: Calcified trileaflet aortic valve with normal\par opening.\par Left Atrium: Moderately dilated left atrium.  LA volume\par index = 46 cc/m2.\par Left Ventricle: Normal left ventricular systolic function.\par No segmental wall motion abnormalities. Normal left\par ventricular internal dimensions and wall thicknesses.\par Right Heart: Moderate right atrial enlargement. Normal\par right ventricular size and function. Normal tricuspid\par valve. Mild-moderate tricuspid regurgitation. Normal\par pulmonic valve. Minimal pulmonic regurgitation.\par Pericardium/PleuraNormal pericardium with no pericardial\par effusion.\par Hemodynamic: Estimated right ventricular systolic pressure\par equals 62 mm Hg, assuming right atrial pressure equals 10\par mm Hg, consistent with severe pulmonary hypertension.\par ------------------------------------------------------------------------\par CONCLUSIONS:\par 1. Mitral annular calcification, otherwise normal mitral\par valve. Mild-moderate mitral regurgitation.\par 2. Calcified trileaflet aortic valve with normal opening.\par 3. Moderately dilated left atrium.  LA volume index = 46\par cc/m2.\par 4. Normal left ventricular internal dimensions and wall\par thicknesses.\par 5. Normal left ventricular systolic function. No segmental\par wall motion abnormalities.\par 6. Moderate right atrial enlargement.\par 7. Normal right ventricular size and function.\par 8. Normal tricuspid valve. Mild-moderate tricuspid\par regurgitation.\par ------------------------------------------------------------------------\par Confirmed on  11/23/2021 - 17:01:34 by mel Avila M.D.par ------------------------------------------------------------------------\par \par \par EXAM:  CT CHEST  \par \par \par PROCEDURE DATE:  12/16/2020  \par  \par \par \par INTERPRETATION:  CT CHEST WITHOUT CONTRAST\par \par INDICATION: Pulmonary hypertension.\par \par TECHNIQUE: Unenhanced helical images were obtained of the chest. Coronal and sagittal images were reconstructed. Maximum intensity projection images were generated.\par \par COMPARISON: CT chest 11/1/2019, 4/14/2019, and 4/8/2019.\par \par FINDINGS:\par \par Tubes/Lines: None.\par \par Lungs And Airways: The mosaic attenuation of the lungs is unchanged. Linear atelectasis/scarring of the left lower lobe. The remainder of the lungs are clear. The airways are normal.\par \par Pleura: No pleural effusion or pneumothorax.\par \par Mediastinum: The chest lymph nodes are less than 10 mm in the short axis. The visualized esophagus is unremarkable. The right lobe thyroid gland nodule is not well visualized.\par \par Heart and Vasculature: The heart is enlarged. In particular, the atria are enlarged.  There is no pericardial effusion. Coronary artery calcifications.\par \par Left-sided aortic arch and left-sided descending thoracic aorta. The aorta is tortuous. Aorta is normal in caliber. Aortic calcifications. The pulmonary artery is enlarged which can occur in the clinical setting of pulmonary arterial hypertension.\par \par Upper Abdomen: The upper abdomen is unremarkable.\par \par Bones And Soft Tissues: The bones are unremarkable.  The soft tissues are unremarkable.\par \par \par IMPRESSION:\par \par 1.  No change in the mosaic attenuation of the lungs.\par 2.  Cardiomegaly.\par 3.  Enlarged pulmonary artery can occur in the clinical setting of pulmonary arterial hypertension\par \par \par \par \par

## 2022-10-10 ENCOUNTER — LABORATORY RESULT (OUTPATIENT)
Age: 67
End: 2022-10-10

## 2022-10-10 ENCOUNTER — APPOINTMENT (OUTPATIENT)
Dept: RHEUMATOLOGY | Facility: CLINIC | Age: 67
End: 2022-10-10

## 2022-10-10 VITALS
WEIGHT: 202 LBS | HEART RATE: 77 BPM | DIASTOLIC BLOOD PRESSURE: 78 MMHG | SYSTOLIC BLOOD PRESSURE: 117 MMHG | HEIGHT: 63 IN | TEMPERATURE: 97.3 F | OXYGEN SATURATION: 96 % | RESPIRATION RATE: 18 BRPM | BODY MASS INDEX: 35.79 KG/M2

## 2022-10-10 PROCEDURE — 99214 OFFICE O/P EST MOD 30 MIN: CPT

## 2022-10-13 LAB
APPEARANCE: CLEAR
B2 GLYCOPROT1 IGA SERPL IA-ACNC: <5 SAU
B2 GLYCOPROT1 IGG SER-ACNC: <5 SGU
B2 GLYCOPROT1 IGM SER-ACNC: <5 SMU
BILIRUBIN URINE: NEGATIVE
BLOOD URINE: NEGATIVE
CARDIOLIPIN IGM SER-MCNC: 6 MPL
CARDIOLIPIN IGM SER-MCNC: <5 GPL
CCP AB SER IA-ACNC: >250 UNITS
CK SERPL-CCNC: 57 U/L
COLOR: NORMAL
CONFIRM: 73.2 SEC
CREAT SPEC-SCNC: 80 MG/DL
CREAT/PROT UR: 0.1 RATIO
DEPRECATED CARDIOLIPIN IGA SER: <5 APL
DEPRECATED KAPPA LC FREE/LAMBDA SER: 2.09 RATIO
DRVVT 1H NP PPP: 51.5 SEC
DRVVT IMM 1:2 NP PPP: NORMAL
DRVVT SCREEN TO CONFIRM RATIO: 0.79 RATIO
DSDNA AB SER-ACNC: <12 IU/ML
ENA RNP AB SER IA-ACNC: <0.2 AL
ENA SM AB SER IA-ACNC: <0.2 AL
ENA SS-A AB SER IA-ACNC: <0.2 AL
ENA SS-B AB SER IA-ACNC: <0.2 AL
FERRITIN SERPL-MCNC: 398 NG/ML
GLUCOSE QUALITATIVE U: NEGATIVE
IGA SER QL IEP: 61 MG/DL
IGG SER QL IEP: 1425 MG/DL
IGM SER QL IEP: 58 MG/DL
KAPPA LC CSF-MCNC: 5.02 MG/DL
KAPPA LC SERPL-MCNC: 10.47 MG/DL
KETONES URINE: NEGATIVE
LEUKOCYTE ESTERASE URINE: NEGATIVE
NITRITE URINE: NEGATIVE
PH URINE: 6
PROT UR-MCNC: 4 MG/DL
PROTEIN URINE: NEGATIVE
PS IGA SER QL: <1
PS IGG SER QL: <9 UNITS
PS IGM SER QL: <10 UNITS
RF+CCP IGG SER-IMP: ABNORMAL
RHEUMATOID FACT SER QL: 20 IU/ML
SCREEN DRVVT: 68.1 SEC
SPECIFIC GRAVITY URINE: 1.01
UROBILINOGEN URINE: NORMAL

## 2022-10-17 LAB
ALBUMIN MFR SERPL ELPH: 56.3 %
ALBUMIN SERPL-MCNC: 4.4 G/DL
ALBUMIN/GLOB SERPL: 1.3 RATIO
ALPHA1 GLOB MFR SERPL ELPH: 3.6 %
ALPHA1 GLOB SERPL ELPH-MCNC: 0.3 G/DL
ALPHA2 GLOB MFR SERPL ELPH: 9.6 %
ALPHA2 GLOB SERPL ELPH-MCNC: 0.7 G/DL
B-GLOBULIN MFR SERPL ELPH: 10.7 %
B-GLOBULIN SERPL ELPH-MCNC: 0.8 G/DL
GAMMA GLOB FLD ELPH-MCNC: 1.5 G/DL
GAMMA GLOB MFR SERPL ELPH: 19.8 %
INTERPRETATION SERPL IEP-IMP: NORMAL
PROT SERPL-MCNC: 7.8 G/DL
PROT SERPL-MCNC: 7.8 G/DL

## 2022-10-26 LAB
EJ AB SER QL: NEGATIVE
ENA JO1 AB SER IA-ACNC: <20 UNITS
ENA PM/SCL AB SER-ACNC: <20 UNITS
ENA SM+RNP AB SER IA-ACNC: <20 UNITS
ENA SS-A IGG SER QL: 22 UNITS
FIBRILLARIN AB SER QL: NEGATIVE
KU AB SER QL: NEGATIVE
MDA-5 (P140)(CADM-140): <20 UNITS
MI2 AB SER QL: NEGATIVE
NXP-2 (P140): <20 UNITS
OJ AB SER QL: NEGATIVE
PL12 AB SER QL: NEGATIVE
PL7 AB SER QL: NEGATIVE
PS-PROTHROM CMPLX IGG SERPL IA-ACNC: <9.4 U
PS-PROTHROM CMPLX IGM SERPL IA-ACNC: <9.4 U
SRP AB SERPL QL: NEGATIVE
TIF GAMMA (P155/140): <20 UNITS
U2 SNRNP AB SER QL: NEGATIVE

## 2022-11-02 ENCOUNTER — NON-APPOINTMENT (OUTPATIENT)
Age: 67
End: 2022-11-02

## 2022-11-03 ENCOUNTER — APPOINTMENT (OUTPATIENT)
Dept: PULMONOLOGY | Facility: CLINIC | Age: 67
End: 2022-11-03

## 2022-11-03 VITALS
HEART RATE: 59 BPM | WEIGHT: 205 LBS | TEMPERATURE: 97.3 F | RESPIRATION RATE: 16 BRPM | HEIGHT: 63 IN | BODY MASS INDEX: 36.32 KG/M2 | SYSTOLIC BLOOD PRESSURE: 119 MMHG | DIASTOLIC BLOOD PRESSURE: 80 MMHG

## 2022-11-03 DIAGNOSIS — E03.9 HYPOTHYROIDISM, UNSPECIFIED: ICD-10-CM

## 2022-11-03 PROCEDURE — 99215 OFFICE O/P EST HI 40 MIN: CPT | Mod: 25

## 2022-11-03 PROCEDURE — 36415 COLL VENOUS BLD VENIPUNCTURE: CPT

## 2022-11-03 NOTE — DISCUSSION/SUMMARY
[FreeTextEntry1] : -Assessment plan----------The patient has been referred here for further opinion regarding pulmonary problem,chronic diastolic heart failure secondary pulm HTN, on sildenafil. OPSUMIT-------PMH colon cancer, afib- on eliquis and LINH on bipap and nocturnal oxygen, OSMAR, CHF, DM, thyroid nodule, history of colon cancer status post colon surgery history of lymphoma status post chemotherapy [1997], ??right diaphragm elevation\par \par 1- chronic RIGHT HEART heart failure secondary pulm HTN, [ WHO GROUP I + GROUP II COMPONENT OF DIASTOLIC DYSFUNCTION ] on sildenafil  AND OPSUMIT - It is medically necessary for pt to use OXYGEN [ remains on 3 L/MIN]x 24 hrs- PFT restrictive pattern, 6mw distance improved from 193 -> 299 [ JULY 2022]  \par \par 2- CKD-- remains on torsemide 20 OD and aldactone 12.5 5/week - managed by Dr. Heart and Dr. Levy \par \par 3- LINH ----- on BiPAP  19/13  WITH O2  2LMIN and benefits from its nightly use, awaiting replacement, hoping by end of 2022\par \par 4  AFIB -----ELIQUIS\par \par WILL FU CLINICALLY\par \par \par 4- anemia----FOLLOW UP WITH DR. YANG-- Hb of 9.8 on 7/7 ----getting Aranesp® (darbepoetin cris)\par \par 5- labs - proBNP in our office today\par \par 6- CARDIOLOGY --DR MATHEWS ---HAS  A FIB ON ELIQUIS - needs yearly echo\par 7- COVID - had in may, 22 had mild infection had 1st booster, will get 2nd booster in sept \par 8- h/o Thyroid Biopsy --with --Dr JUAN CASE, TSH stable on current synthroid dose \par 9- advised exercises at home\par 10- f/u w/rheum for positive AMMON\par 11- f/u in  3m\par \par ---Thanks for allowing me to participate in the care of this patient. Patient at this time will follow the above mentioned recommendations and return back for follow up visit. If you have any questions I can be reached at #[phone] (beeper) or [phone] (office).\par \par Anirudh Yang MD, FCCP \par Director, Pulmonary Hypertension Program \par [facility] \par Division of Pulmonary, Critical Care and Sleep Medicine \par  Professor of Medicine \par [employer]\par \par MARIMAR Jaramillo\par \par

## 2022-11-03 NOTE — PHYSICAL EXAM
[No Acute Distress] : no acute distress [Normal Oropharynx] : normal oropharynx [III] : Mallampati Class: III [No Neck Mass] : no neck mass [Clear to Auscultation Bilaterally] : clear to auscultation bilaterally [No Abnormalities] : no abnormalities [Benign] : benign [Normal Gait] : normal gait [No Clubbing] : no clubbing [Non-Pitting] : non-pitting [Normal Color/ Pigmentation] : normal color/ pigmentation [No Focal Deficits] : no focal deficits [Oriented x3] : oriented x3 [TextBox_54] : LOUD S2 , [facility]

## 2022-11-03 NOTE — HISTORY OF PRESENT ILLNESS
[TextBox_4] : This letter  is regarding your patient  who  attended pulmonary out patient office today.  I have reviewed  patient's  past history, social history, family history and medication list. I also  reviewed nurse practitioners/ and fellows  notes and assessment and agree with it.  \par The patient was referred by  for SECONDARY  pulm HTN [,GROUP 2 ---DIASTOLIC DYSFUNCTION ], on sildenafil. PMH colon cancer, afib- on eliquis,   CKD,  ATRIAL FIBRILLATION,  PAST H/O LYMPHOMA/CLL  S/P RT AND CHEMO [ 1997]--H/O  LINH on bipap and nocturnal oxygen, OSMAR, CHF, DM, thyroid nodule -on home oxygen-  status post resection of stage I colon cancer    ----------\par \par ------No history of , fever, chills , rigors, chest pain, or hemoptysis. Questionable history of Raynaud's phenomenon. No h/o significant weight loss in last few months. No history of liver dysfunction ,  or chronic thromboembolic disease. I would classify the patient's dyspnea as WHO  FUNCTIONAL CLASS II--------\par \par ----Pft date--2109- -------RESTRICTIVE VENT DEFECT \par ---6MWT 2019-    132 METERS  USED A CANE TO WALK \par \par CARDIAC C ATH 2019----------  PA 78/29 MEAN PA 45, RA 10, RV 78/14,  PA SAT 47, CO 4.79. CI 2.33, PVR  6.84 WOODS\par ----Ct scan date--4/2019 IMPRESSION: \par Unchanged prominent mediastinal nodes, nonspecific. Otherwise, no \par evidence of metastatic disease in the chest, abdomen, or pelvis.\par High-density material layering within the gastric antrum may be secondary \par to ingested material.\par Questionable skin defect of the left gluteal soft tissues not seen on \par prior CT, likely skinfold however clinical correlation is recommended.\par \par CT CHEST 12/2020\par IMPRESSION:\par 1. No change in the mosaic attenuation of the lungs.\par 2. Cardiomegaly.\par 3. Enlarged pulmonary artery can occur in the clinical setting of pulmonary arterial hypertension\par \par -----\par vq scan 4/2019 low prob of PE\par \par echo 4/2019 ------------------------------------------------------------------------\par CONCLUSIONS:\par 1. Mitral annular calcification and calcified mitral\par leaflets with normal diastolic opening. Mild mitral\par regurgitation.\par 2. Calcified trileaflet aortic valve with normal opening.\par 3. Normal left ventricular internal dimensions and wall\par thicknesses.\par 4. Hyperdynamic left ventricle. Flattening of the\par interventricular septum in both systole and diastole is\par consistent with right ventricular pressure overload.\par 5. Severe right atrial enlargement.\par 6. Right ventricular enlargement with normal right\par ventricular systolic function.\par 7. Normal tricuspid valve. Moderate-severe tricuspid\par regurgitation.\par 8. Estimated pulmonary artery systolic pressure equals 79\par mm Hg, assuming right atrial pressure equals 5  mm Hg,\par consistent with severe pulmonary hypertension.\par *** Compared with echocardiogram of 10/31/2017, no\par significant changes noted.\par \par \par echo 9/2020 est pasp=65mmHg\par cardiac cath 4/2019 \par  Pulmonary Artery (S/D/M): 78/29/45\par Pressures:  -- Pulmonary Capillary Wedge: 20/21/18\par Pulmonary vascular resistance (Wood Units): 6.84,  CO by Juan Carlos: 4.39\par \par US thyroid 7/2019 IMPRESSION: \par Bilateral indeterminant thyroid nodules slightly decreased on the right. \par No parathyroid adenoma identified. \par \par \par CT CHEST - ORDERED BY: NIRALI MOORE\par \par \par PROCEDURE DATE: 06/21/2022\par \par \par \par INTERPRETATION: CLINICAL INFORMATION: Pulmonary hypertension\par \par TECHNIQUE: A volumetric CT acquisition of the chest was obtained from the thoracic inlet to the upper abdomen, without the administration of intravenous contrast. Coronal and sagittal multiplanar reformations were also submitted.\par \par Comparison: 12/16/2020\par \par FINDINGS:\par \par Lungs/Airways/Pleura: Persistent mosaic perfusion in the lungs. No endobronchial nodule. No pleural effusion\par \par Mediastinum/Lymph nodes: No thoracic adenopathy.\par \par Heart and Vessels: There is biatrial enlargement. No pericardial effusion there are coronary artery calcifications. The mid ascending aorta measures 3.2 cm. The pulmonary artery is larger than the adjacent ascending aorta.\par \par Upper Abdomen: Partially imaged anastomotic staple line at the splenic flexure.\par \par Osseous structures and Soft Tissues: No aggressive bone lesions.\par \par IMPRESSION:\par \par Stable appearance the chest with mosaic perfusion of the lungs and enlarged pulmonary artery compared to the adjacent ascending aorta\par \par \par \par AUGUT 2109 WILL  START  REVATIO FOR PAH\par \par ------OCT 2019---------stable from pulmonary standpoint--------dyspnea on exertion-----on 2L oxygen------tolerating revatio 20 mg TID--------under care of Rheuma [Dr. Jerez]-----\par Dec 2109-----  on macetantan and revatio, renal dysfunction [ dr heart], slight elevation of lft, \par \par -----DEC  2019-------stable from pulmonary point of view---------doing well on macitentan and revatio------unable to sleep------is using her BiPAP machine------underdare of Hem/Onc [Dr. YANG]------HAS RIGHT THYROID NODULE-\par \par \par denis 15 2020---     saw dr heart nephrology  [ s creat was 2.2].  , saw dr yang oncology  Hg  ---   still Anemia on EPOGEN---\par  ----------------------n sildenafil  AND OPSUMIT  --ON HOME OXYGEN --ON LASIX AND ALDACTONE \par nov 2020---doing well--- renal managed by  dr heart nephrology .  , saw dr yang oncology  Hg  ---   still Anemia on EPOGEN---\par pulm htn-on sildenafil  AND OPSUMIT  --ON HOME OXYGEN --ON torsemide AND ALDACTONE \par \par \par \par feb 2012---feels bEtter-- HepPeF- sec pulm htn , renal dysfunction  LINH  on bipap 19/13 with o2 2 l/min ,,, during the daytime on 3l/min o2---on torsemide and spironolactone [dr heart] ,  anemia on prcrit [dr yang]  ,  for pulm htn on Opsumit and sildenafil--------- needs pft and 6 min walk \par \par 6/2021 6mwt 240 m\par 6/2021 PFT restrictive defect\par \par 6/2021 pulm htn on sildenafil & opsumit, using oxygen. On diuretics per Dr Heart. Follows cardiology Dr Degroot.  LINH------ on BiPAP \par She is up to date w/covid vac\par \par 9/2021 pulm htn on sildenafil & opsumit, using oxygen. On diuretics per Dr Heart. Follows cardiology Dr Degroot.  LINH------ on BiPAP nightly snd  benefits from its use -need compliance report  --- She is up to date w/covid vac\par \par jan 2022 ---  pulm htn on sildenafil & opsumit, using oxygen. --compliant to  meds-------On diuretics per Dr Heart. Follows cardiology Dr Degroot.  LINH------ on BiPAP nightly snd  benefits from its use -need compliance report  --- She is up to date w/covid vac\par \par March 2022   --- pulm htn remains on sildenafil and opsumit, using oxygen ---  On diuretics per cardiology--- LINH on BiPAP- need compliance report--- Hypothyroidism being managed by Dr. Nasra Cabrales, recent increase in Synthroid--- Anemia being follow by Dr. Yang remains on Aranesp -- needs PFT 6MWT\par \par July 2022 - remains on sildenafil and opsumit, uses 3 L O2 when out of house. RA at rest and with ADLs, no difficulties. Continues to be on torsemide 20 OD and spironolactone 12.5 5/week. LINH on BIPAP - awaiting  CPAP UNIT replacement. Repeat TSH of 2.95 on increased Synthroid dose. Outside labs reviewed pertinent for TSH 2.95, BUN/Cr of 54/1.90, a1c of 5.8, Hb of 9.8. Patient had appointment with renal and cardio in june, 22 - stable\par PFT - restrictive pattern \par 6mw - able to walk 299 m compare to previous 193 in june, 21\par CT scan - stable mosaic pattern \par \par \par 11/2022 pulm htn on opsumit and sildenafil and tolerating it well- No pulm complaints-uses oxygen as needed\par reactive airway- uses advair, has not needed rescue inhaler\par linh on bipap w/O2 and benefits from nightly use- awaiting replacement of recalled device\par diuretics managed by Dr Heart- on torsemide and aldactone\par cardiology Dr Degroot- o/s TTE jan 2023\par utd w/all vaccines

## 2022-11-04 LAB
ALBUMIN SERPL ELPH-MCNC: 4.2 G/DL
ALP BLD-CCNC: 108 U/L
ALT SERPL-CCNC: 12 U/L
ANION GAP SERPL CALC-SCNC: 15 MMOL/L
AST SERPL-CCNC: 17 U/L
BASOPHILS # BLD AUTO: 0.04 K/UL
BASOPHILS NFR BLD AUTO: 0.9 %
BILIRUB SERPL-MCNC: 0.6 MG/DL
BUN SERPL-MCNC: 78 MG/DL
CALCIUM SERPL-MCNC: 10.1 MG/DL
CHLORIDE SERPL-SCNC: 107 MMOL/L
CHOLEST SERPL-MCNC: 172 MG/DL
CO2 SERPL-SCNC: 19 MMOL/L
CREAT SERPL-MCNC: 1.97 MG/DL
DIGOXIN SERPL-MCNC: 0.9 NG/ML
EGFR: 27 ML/MIN/1.73M2
EOSINOPHIL # BLD AUTO: 0.18 K/UL
EOSINOPHIL NFR BLD AUTO: 4 %
ESTIMATED AVERAGE GLUCOSE: 123 MG/DL
GLUCOSE SERPL-MCNC: 90 MG/DL
HBA1C MFR BLD HPLC: 5.9 %
HCT VFR BLD CALC: 36.1 %
HDLC SERPL-MCNC: 61 MG/DL
HGB BLD-MCNC: 10.5 G/DL
IMM GRANULOCYTES NFR BLD AUTO: 0.2 %
LDLC SERPL CALC-MCNC: 93 MG/DL
LYMPHOCYTES # BLD AUTO: 1.87 K/UL
LYMPHOCYTES NFR BLD AUTO: 41.7 %
MAN DIFF?: NORMAL
MCHC RBC-ENTMCNC: 19.9 PG
MCHC RBC-ENTMCNC: 29.1 GM/DL
MCV RBC AUTO: 68.5 FL
MONOCYTES # BLD AUTO: 0.41 K/UL
MONOCYTES NFR BLD AUTO: 9.2 %
NEUTROPHILS # BLD AUTO: 1.97 K/UL
NEUTROPHILS NFR BLD AUTO: 44 %
NONHDLC SERPL-MCNC: 111 MG/DL
NT-PROBNP SERPL-MCNC: 1429 PG/ML
PLATELET # BLD AUTO: 146 K/UL
POTASSIUM SERPL-SCNC: 5 MMOL/L
PROT SERPL-MCNC: 7.3 G/DL
RBC # BLD: 5.27 M/UL
RBC # FLD: 19.7 %
SODIUM SERPL-SCNC: 140 MMOL/L
TRIGL SERPL-MCNC: 90 MG/DL
TSH SERPL-ACNC: 3.45 UIU/ML
WBC # FLD AUTO: 4.48 K/UL

## 2022-11-18 NOTE — DISCHARGE NOTE PROVIDER - NSDCCPCAREPLAN_GEN_ALL_CORE_FT
[facility] ED  Emergency Department Encounter  Emergency Medicine Resident     Pt Name: Nadir Fagan  MRN: [id number]  [place] 1995  Date of evaluation: 22  PCP:  Gail Yoon PA-C    CHIEF COMPLAINT       Chief Complaint   Patient presents with    Headache    Dizziness           Hyperglycemia     500 at home       Criselda John  (Location/Symptom, Timing/Onset, Context/Setting, Quality, Duration, Modifying Factors,Severity.)      Nadir Fagan is a 32 y. o.yo female who presents with concerns for DKA. Patient states that she has history of diabetes and has had DKA admissions in the past.  She states she is not compliant with her medications solely due to the fact she does not like taking medications. She states that she has had some headache some dizziness urinary frequency and chest discomfort. Patient states that she does not follow diabetic diet. She denies any recent illness but does state that she has a cough secondary to getting over RSV last week. She states she has not had a fever. PAST MEDICAL / SURGICAL / SOCIAL / FAMILY HISTORY      has a past medical history of Asthma, Bipolar 1 disorder ([address] Diabetes mellitus type 2 in obese ([address] GERD (gastroesophageal reflux disease), Hepatic steatosis, Hypertension, Morbid obesity with body mass index (BMI) of 40.0 to 49.9 ([address] Neuropathy, and Postpartum depression. has a past surgical history that includes Tonsillectomy (Bilateral);  section; eye surgery;  section (N/A, 3/23/2017); Upper gastrointestinal endoscopy (N/A, 2018); Breast surgery (Left, 2019); Breast surgery (Right, 2019); Rectal surgery (N/A, 2/3/2020); Upper gastrointestinal endoscopy (N/A, 2020); Colonoscopy (N/A, 2020); Axillary Surgery (Right, 2021); Ankle arthroscopy (Right, 01/10/2022); and Ankle arthroscopy (Right, 1/10/2022).      Social History     Socioeconomic History    Marital status: Single     Spouse name: Not on file    Number of children: 2    Years of education: graduated high school, some college    Highest education level: Not on file   Occupational History    Occupation: housewife    Occupation:      Comment: last worked    Tobacco Use    Smoking status: Former     Packs/day: 1.00     Years: 10.00     Pack years: 10.00     Types: Cigarettes     Start date: 2007     Quit date: 2020     Years since quittin.1    Smokeless tobacco: Never   Vaping Use    Vaping Use: Every day    Substances: Always   Substance and Sexual Activity    Alcohol use: No     Alcohol/week: 0.0 standard drinks    Drug use: No    Sexual activity: Yes     Partners: Male     Comment: chlamydia in 2016,   Other Topics Concern    Not on file   Social History Narrative    Lives with fiance and 2 daughters     Social Determinants of Health     Financial Resource Strain: Not on file   Food Insecurity: Not on file   Transportation Needs: Not on file   Physical Activity: Not on file   Stress: Not on file   Social Connections: Not on file   Intimate Partner Violence: Not on file   Housing Stability: Not on file       Family History   Problem Relation Age of Onset    Breast Cancer Mother 28    Diabetes Father     Cancer Maternal Uncle 60        acute leukemia    Heart Disease Maternal Uncle     Diabetes Maternal Uncle     Colon Cancer Maternal Uncle     Diabetes Maternal Uncle     Heart Attack Maternal Uncle     Uterine Cancer Neg Hx     Ovarian Cancer Neg Hx         Allergies:  Benadryl [diphenhydramine]    Home Medications:  Prior to Admission medications    Medication Sig Start Date End Date Taking?  Authorizing Provider   acetaminophen (TYLENOL) 500 MG tablet Take 2 tablets by mouth 3 times daily 22  Yes Re Freeze, DO   lisinopril (PRINIVIL;ZESTRIL) 5 MG tablet take 1 tablet by mouth once daily 10/13/22   Savanah Murillo MD   ibuprofen (IBU) 400 MG tablet Take 1 tablet by mouth every 6 hours as needed for Pain 9/23/22 9/26/22  Filiberto Flechristianne,    LEVEMIR FLEXTOUCH 100 UNIT/ML injection pen inject 55 units subcutaneously NIGHTLY 9/9/22   MARCELO Betancourt CNP   metFORMIN (GLUCOPHAGE) 1000 MG tablet take 1 tablet by mouth twice a day with meals 9/8/22   MARCELO Betancourt CNP   predniSONE 10 MG (21) TBPK Take 1 Dose by mouth daily Take 60mg po on day 1, 50mg po on day 2, 40mg po on day 3, 30mg po on day 4, 20 mg po on day 5, 10mg po on day 6 7/20/22   Jose M Alexandre DPM   gabapentin (NEURONTIN) 300 MG capsule Take 1 capsule by mouth 3 times daily for 120 days.  Intended supply: 90 days 5/17/22 9/14/22  Jose M Alexandre DPM   albuterol sulfate  (90 Base) MCG/ACT inhaler Inhale 2 puffs into the lungs every 6 hours as needed for Wheezing 5/3/22   Donis Laurent MD   cephALEXin (KEFLEX) 500 MG capsule Take 1 capsule by mouth 4 times daily 4/20/22   Kalyani Shetty MD   ibuprofen (ADVIL;MOTRIN) 800 MG tablet Take 1 tablet by mouth every 6 hours as needed for Pain 4/20/22   Kalyani Shetty MD   ibuprofen (ADVIL;MOTRIN) 800 MG tablet Take 1 tablet by mouth every 8 hours as needed for Pain 4/12/22   Kalyani Shetty MD   Insulin Aspart FlexPen 100 UNIT/ML SOPN inject 4 units subcutaneously before breakfast then inject 4 units subcutaneously before LUNCH then inject 6 units subcutaneously before dinner and 6 units subcutaneously at bedtime 3/10/22   MARCELO Wiley CNP   UNIFINE PENTIPS 29G X 12MM MISC USE 4 times a DAILY WITH basal and short acting insulin 3/10/22   MARCELO Wiley CNP   blood glucose monitor kit and supplies 1 kit by Other route three times daily Dispense product that insurance will cover 3/10/22   MARCELO Wiley CNP   Lancets MISC 1 each by Does not apply route 3 times daily 3/10/22   MARCELO Wiley CNP   blood glucose monitor strips Test 3 times a day & as needed for symptoms of irregular blood glucose. Dispense sufficient amount for indicated testing frequency plus additional to accommodate PRN testing needs. 3/10/22   MARCELO Raphael - CNP   Elastic Bandages & Supports (JOBST KNEE HIGH COMPRESSION SM) MISC Dispense Bilateral Jobst Below Knee 20-30mmHg compression stockings 3/2/22   Fredrick Araujo DPM   ondansetron (ZOFRAN ODT) 4 MG disintegrating tablet Take 1 tablet by mouth every 8 hours as needed for Nausea 2/21/22   Zonia North MD   ibuprofen (IBU) 600 MG tablet Take 1 tablet by mouth every 8 hours as needed for Pain 9/27/21   Ly Levi PA-C   budesonide-formoterol (SYMBICORT) 160-4.5 MCG/ACT AERO Inhale 2 puffs into the lungs 2 times daily 9/16/21   Rohan Dhaliwal MD   ipratropium-albuterol (DUONEB) 0.5-2.5 (3) MG/3ML SOLN nebulizer solution Inhale 3 mLs into the lungs every 4 hours (while awake) 9/16/21   Rohan Dhaliwal MD   Blood Pressure KIT 1 kit by Does not apply route 2 times daily 9/16/21   Rohan Dhaliwal MD   omeprazole (PRILOSEC) 20 MG delayed release capsule Take 1 capsule by mouth every morning (before breakfast) 7/12/21 12/28/21  MARCELO Dowling - NP   Insulin Pen Needle (PEN NEEDLES) 31G X 6 MM MISC 1 each by Does not apply route daily 5/21/21   Gómez Du PA-C   TRUEplus Lancets 30G MISC TEST 2 TIMES A DAY AND AS NEEED FOR SYMPTOMS OF IRREGULAR BLOOD GLUCOSE 4/21/21   Gómez Du PA-C   albuterol sulfate HFA (PROVENTIL HFA) 108 (90 Base) MCG/ACT inhaler Inhale 2 puffs into the lungs every 6 hours as needed for Wheezing 7/23/19   Matt Couch PA-C       REVIEW OFSYSTEMS    (2-9 systems for level 4, 10 or more for level 5)      Review of Systems   Constitutional:  Positive for fatigue. Negative for chills, diaphoresis and fever. HENT:  Negative for congestion, sinus pressure, sinus pain, sore throat and trouble swallowing. Eyes:  Negative for photophobia, redness and visual disturbance.    Respiratory:  Positive for cough and chest tightness. Negative for shortness of breath. Cardiovascular:  Negative for chest pain, palpitations and leg swelling. Gastrointestinal:  Negative for abdominal pain, constipation, diarrhea, nausea and vomiting. Genitourinary:  Positive for frequency. Negative for difficulty urinating, dysuria, flank pain and urgency. Musculoskeletal:  Positive for myalgias. Negative for back pain, neck pain and neck stiffness. Skin:  Negative for color change, pallor and rash. Neurological:  Positive for dizziness and headaches. Negative for tremors, speech difficulty, weakness and light-headedness. PHYSICAL EXAM   (up to 7 for level 4, 8 or more forlevel 5)      INITIAL VITALS:   ED Triage Vitals [11/18/22 1834]   BP Temp Temp Source Heart Rate Resp SpO2 Height Weight   (!) 142/98 98.3 °F (36.8 °C) Oral 99 20 98 % -- --       Physical Exam  Constitutional:       General: She is not in acute distress. Appearance: She is obese. HENT:      Head: Normocephalic and atraumatic. Right Ear: External ear normal.      Left Ear: External ear normal.      Nose: Nose normal. No congestion or rhinorrhea. Mouth/Throat:      Mouth: Mucous membranes are moist.      Pharynx: Oropharynx is clear. Eyes:      Extraocular Movements: Extraocular movements intact. Conjunctiva/sclera: Conjunctivae normal.      Pupils: Pupils are equal, round, and reactive to light. Cardiovascular:      Rate and Rhythm: Normal rate and regular rhythm. Pulses: Normal pulses. Heart sounds: No murmur heard. Pulmonary:      Effort: Pulmonary effort is normal. No respiratory distress. Breath sounds: Normal breath sounds. No wheezing. Abdominal:      Palpations: Abdomen is soft. Tenderness: There is no abdominal tenderness. Musculoskeletal:         General: No swelling. Normal range of motion. Cervical back: Normal range of motion and neck supple. No rigidity. Right lower leg: No edema.       Left lower leg: No edema. Skin:     General: Skin is warm and dry. Neurological:      General: No focal deficit present. Mental Status: She is alert and oriented to person, place, and time. DIFFERENTIAL  DIAGNOSIS     PLAN (LABS / IMAGING / EKG):  Orders Placed This Encounter   Procedures    CBC with Auto Differential    BMP    Urinalysis with Reflex to Culture    BLOOD GAS, VENOUS    Beta-Hydroxybutyrate    Microscopic Urinalysis    POC Glucose Fingerstick       MEDICATIONS ORDERED:  Orders Placed This Encounter   Medications    acetaminophen (TYLENOL) 500 MG tablet     Sig: Take 2 tablets by mouth 3 times daily     Dispense:  90 tablet     Refill:  0       DDX: DKA, dehydration, urinary tract infection, medical noncompliance    Initial MDM/Plan: 32 y.o. female who presents with concerns for DKA. Patient is a noncompliant diabetic who states she only takes her medications which feels like it. She has very vague complaints including dizziness headache chest discomfort and urinary frequency. Will get DKA labs and urinalysis to make sure patient does not have a urinary tract infection. Patient clinically looks well, vital signs are stable.     DIAGNOSTIC RESULTS / EMERGENCYDEPARTMENT COURSE / MDM     LABS:  Labs Reviewed   CBC WITH AUTO DIFFERENTIAL - Abnormal; Notable for the following components:       Result Value    MCHC 35.4 (*)     All other components within normal limits   BASIC METABOLIC PANEL - Abnormal; Notable for the following components:    Glucose 382 (*)     Creatinine 0.48 (*)     Sodium 134 (*)     All other components within normal limits   URINALYSIS WITH REFLEX TO CULTURE - Abnormal; Notable for the following components:    Glucose, Ur 3+ (*)     Specific Gravity, UA 1.044 (*)     Urine Hgb TRACE (*)     All other components within normal limits   BLOOD GAS, VENOUS - Abnormal; Notable for the following components:    pCO2, Nico 38.0 (*)     pO2, Nico 57.0 (*)     HCO3, Venous 20.8 (*) Negative Base Excess, Nico 3.7 (*)     O2 Sat, Nico 87.6 (*)     All other components within normal limits   MICROSCOPIC URINALYSIS - Abnormal; Notable for the following components:    Bacteria, UA FEW (*)     All other components within normal limits   POC GLUCOSE FINGERSTICK - Abnormal; Notable for the following components:    POC Glucose 377 (*)     All other components within normal limits   BETA-HYDROXYBUTYRATE         RADIOLOGY:  No results found. EKG      All EKG's are interpreted by the Emergency Department Physicianwho either signs or Co-signs this chart in the absence of a cardiologist.    EMERGENCY DEPARTMENT COURSE:  ED Course as of 11/18/22 2248   [facility] Nov 18, 2022   1905 Patient seen and evaluated. [CD]   1948 Beta-Hydroxybutyrate: 0.14 [CD]   1948 CBC wnl   [CD]   2023 No signs of DKA at this time. Awaiting urinalysis. [CD]   2100 UA in process  [CD]   2117 Ketones, Urine: NEGATIVE [CD]   2122 Lab work-up essentially unremarkable. Will discharge patient home, instructed that she needs to take her diabetes medication. [CD]      ED Course User Index  [CD] Russell Hoffman DO          PROCEDURES:  None    CONSULTS:  None    CRITICAL CARE:  20 minutes    FINAL IMPRESSION      1. Hyperglycemia    2.  Non compliance w medication regimen          DISPOSITION / PLAN     DISPOSITION Decision To Discharge 11/18/2022 09:17:46 PM      PATIENT REFERRED TO:  Cammy Kennedy PA-C  [facility]  [address] [facility],[address]  [address] [phone] [id number] [phone]    Call in 3 days  For ER follow-up    [facility]  [address] [place] [address]  [phone]  Go to   If symptoms worsen    DISCHARGE MEDICATIONS:  Discharge Medication List as of 11/18/2022  9:21 PM          Russell Hoffman DO  Emergency Medicine Resident    (Please note that portions of this note were completed with a voice recognition program.Efforts were made to edit the dictations but occasionally words are mis-transcribed.)        Kahlil Lozano Mele Curry, [place]  Resident  11/18/22 1299 PRINCIPAL DISCHARGE DIAGNOSIS  Diagnosis: BRBPR (bright red blood per rectum)  Assessment and Plan of Treatment: Patient is s/p recent hemicolectomy. Discussed with surgery and no acute surgical intervention.  Patient received one unit of PRBC during admission with appropriate response to HgB rise.  Anticoagulation was intially held secondary to bleed but was later restarted on 5/16 without bleeding. To continue upon discharge.   Follow up with PCP within 1-2 weeks of discharge.   Follow up with colorectal surgery  within 1-2 weeks of discharge.      SECONDARY DISCHARGE DIAGNOSES  Diagnosis: Colon cancer  Assessment and Plan of Treatment: Seen by hematology/oncology team - no adjuvant chemo. Will monitor and observe from colon cancer standpoint. Will need head and neck surgery evaluation for the thyroid as an outpatient. Patient should also get genetic counseling as outpatient, as well as familial counseling for the possibility of Winters syndrome and appropriate screening.    Diagnosis: Thyroid mass  Assessment and Plan of Treatment: Found to have large well marginated homogenous appearing mass within R thyroid lobe measuring 4.1 x 3.4 x 4.0 cm in size. Deemed to be stable enough to follow up as an outpatient.    Diagnosis: Pulmonary HTN  Assessment and Plan of Treatment: On Sildenafil 20mg TID, which is not covered by insurance for this indication. Patient has supply which she bought out of pocket at home. Wants to continue taking this and will follow up with pulmonology as an outpatient.    Diagnosis: Chronic systolic right heart failure  Assessment and Plan of Treatment: Seen by heart failure team who recommended IV Lasix course. Transitioned to Torsemide 20mg daily.    Diagnosis: Atrial fibrillation  Assessment and Plan of Treatment: Started on Eliquis and monitored for bleeding. Deemed to be poor candidate for NICOLAS/ablation or Watchman due to pulmonary issues. May follow up as outpatient with EP team.    Diagnosis: OSMAR (acute kidney injury)  Assessment and Plan of Treatment: Seen by renal team during admission for superimposed OSMAR from ATN from contrast nephropathy s/p CT last week. Creatinine improved.   Follow up with nephrology within 1-2 weeks of discharge.

## 2022-12-12 NOTE — DIETITIAN INITIAL EVALUATION ADULT. - ORAL INTAKE PTA
Stroke RF  SBP < 220, currently at goal   Consider slowly bringing down SBP goal from <220 to < 180 over the next 48h    poor/1-2 months PTA pt was eating 1/4 bagel for breakfast, some fruits, little protein intake. Pt with nausea and vomiting, and unintentional wt loss.

## 2023-01-05 ENCOUNTER — APPOINTMENT (OUTPATIENT)
Dept: HEART FAILURE | Facility: CLINIC | Age: 68
End: 2023-01-05
Payer: MEDICARE

## 2023-01-05 ENCOUNTER — OUTPATIENT (OUTPATIENT)
Dept: OUTPATIENT SERVICES | Facility: HOSPITAL | Age: 68
LOS: 1 days | End: 2023-01-05

## 2023-01-05 ENCOUNTER — NON-APPOINTMENT (OUTPATIENT)
Age: 68
End: 2023-01-05

## 2023-01-05 ENCOUNTER — APPOINTMENT (OUTPATIENT)
Dept: CV DIAGNOSITCS | Facility: HOSPITAL | Age: 68
End: 2023-01-05
Payer: MEDICARE

## 2023-01-05 VITALS
OXYGEN SATURATION: 96 % | DIASTOLIC BLOOD PRESSURE: 67 MMHG | BODY MASS INDEX: 36.5 KG/M2 | SYSTOLIC BLOOD PRESSURE: 103 MMHG | HEIGHT: 63 IN | WEIGHT: 206 LBS | HEART RATE: 74 BPM

## 2023-01-05 DIAGNOSIS — Z90.49 ACQUIRED ABSENCE OF OTHER SPECIFIED PARTS OF DIGESTIVE TRACT: Chronic | ICD-10-CM

## 2023-01-05 DIAGNOSIS — Z98.890 OTHER SPECIFIED POSTPROCEDURAL STATES: Chronic | ICD-10-CM

## 2023-01-05 DIAGNOSIS — I50.32 CHRONIC DIASTOLIC (CONGESTIVE) HEART FAILURE: ICD-10-CM

## 2023-01-05 DIAGNOSIS — I11.9 HYPERTENSIVE HEART DISEASE W/OUT HEART FAILURE: ICD-10-CM

## 2023-01-05 PROCEDURE — 93000 ELECTROCARDIOGRAM COMPLETE: CPT

## 2023-01-05 PROCEDURE — 93306 TTE W/DOPPLER COMPLETE: CPT | Mod: 26

## 2023-01-05 PROCEDURE — 99214 OFFICE O/P EST MOD 30 MIN: CPT

## 2023-01-05 RX ORDER — METOPROLOL TARTRATE 50 MG/1
50 TABLET, FILM COATED ORAL
Qty: 180 | Refills: 0 | Status: DISCONTINUED | COMMUNITY
Start: 2022-08-15 | End: 2023-01-05

## 2023-01-05 RX ORDER — CALCITRIOL 0.25 UG/1
0.25 CAPSULE, GELATIN COATED ORAL
Refills: 0 | Status: ACTIVE | COMMUNITY
Start: 2023-01-05

## 2023-01-05 RX ORDER — CALCIUM CARBONATE 500 MG/1
500 TABLET, CHEWABLE ORAL
Refills: 0 | Status: DISCONTINUED | COMMUNITY
Start: 2019-11-18 | End: 2023-01-05

## 2023-01-05 NOTE — ASSESSMENT
[FreeTextEntry1] :  66 y/o AA female w/ PMHX of Afib on Eliquis, DM II, HTN, COPD on home O2 2-3L, LINH on CPAP, lymphoma/CLL s/p radiation and chemo (1997), HFpEF LVEF 66%, severe pulmonary HTN (now on sildenafil and opsumit), RV systolic dysfunction, mod-severe TR.  Last admission 5/10-5/18/19. S/P partial right colectomy  (4/23/19) secondary to Stage 1 colon cancer,  as per patient with 27 neg nodes and did not need chemo. Nephrologist Dr. Heart for CKD and pulm Dr. Yang for Group 2 pulmonary HTN and  Dr. Hnut is her colorectal surgeon. Normal technetium pyrophosphate scan on 12/12/19 with findings not suggestive of transthyretin cardiac amyloidosis. 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 S/P bilateral cataract surgery on 10/7 and 10/21/20. Last TTE 11/23/21 TTE; LVEF 66%, LVIDD 4.9 cm, normal LV systolic function, mild mod MR, mild mod TR, severe pHTN\par \par ACC/AHA Stage C, NYHA Class III\par Appears compensated and normotensive

## 2023-01-05 NOTE — PHYSICAL EXAM
[Well Developed] : well developed [No Acute Distress] : no acute distress [Normal Conjunctiva] : normal conjunctiva [Clear Lung Fields] : clear lung fields [Good Air Entry] : good air entry [Soft] : abdomen soft [Non Tender] : non-tender [No Rash] : no rash [Normal] : alert and oriented, normal memory [de-identified] : JVP  8 cm with V waves secondary to TR [de-identified] : irregularly irregular [de-identified] : not using oxygen with appt [de-identified] : slow gait [de-identified] : trace lower extremity edema bilaterally

## 2023-01-05 NOTE — HISTORY OF PRESENT ILLNESS
[FreeTextEntry1] : Karina Marion is a 68 y/o AA female w/ PMHX of Afib on Eliquis, DM II, HTN, COPD on home O2 2-3L, LINH on CPAP, lymphoma/CLL s/p radiation and chemo (1997), HFpEF LVEF 66%, severe pulmonary HTN (now on sildenafil and opsumit), RV systolic dysfunction, mod-severe TR.  Last admission 5/10-5/18/19. S/P partial right colectomy  (4/23/19) secondary to Stage 1 colon cancer,  as per patient with 27 neg nodes and did not need chemo. Nephrologist Dr. Heart for CKD and pulm Dr. Yang for Group 2 pulmonary HTN and  Dr. Hunt is her colorectal surgeon. Normal technetium pyrophosphate scan on 12/12/19 with findings not suggestive of transthyretin cardiac amyloidosis. 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 S/P bilateral cataract surgery on 10/7 and 10/21/20. Last TTE 11/23/21 TTE; LVEF 66%, LVIDD 4.9 cm, normal LV systolic function, mild mod MR, mild mod TR, severe pHTN  Pt is here for a follow up today after TTE with normal LVEF 65%, mod pHTN. \par \par Pt comes for a follow up today after TTE with normal LVEF 65%, mod pHTN. Since last visit, she  followed up with pulm Dr. Yang 11/3/22 and had labs with K 5 and BUN/creat 78/1.97 and serum pro  BNP 1429 from 2202 . She had a 6 MWT that she reports as some improvement and PFT's. She uses oxygen 3 liters, usually in the evening at [facility] and CPAP for LINH but reports she has had heating up of the mask recently and will call [employer] to report. She will hold off on using for now. .  \par She followed up with hematologist Dr. Jerez and aranesp was changed to every 5 weeks. She was started on Rocaltrol by nephrology due to elevated PTH.  \par \par She is currently taking torsemide 20 mg daily with an extra 20 mg on the weekends. She is taking lucas 12.5 mg 5 days a week . She is taking dig 0.125 mg QOD. Her home weight has been stable at 202 lbs and is 206 lbs in office today with clothes.  B/P range at home 106/60 and is 103/67 in office today. She is adhering to low salt diet and is drinking less than 2 liters of fluid per day.\par \par Currently, she states she has been feeling well  better since  and can walk 4 blocks without cane to the store but sometimes stops at to rest briefly. She can climb one flight of stairs several times a day. She sleeps with 2 pillows with no orthopnea. She denies chest pain, palpitations, dizziness/LH and no syncope. States joints have not been hurting recently. She wears compression stockings that helps the swelling in her legs. \par \par \par She had the COvid vaccine x 2 and booster without adverse reaction. S/P Covid 19 infection 5/24/22\par - S/P monoclonal antibodies \par \par \par \par Patient name: KARINA MARION\par YOB: 1955   Age: 67 (F)   MR#: [id number]\par Study Date: 1/5/2023\par Location: O/PSonographer: SARITA Otero\par 2nd Sonographer: Evangelina Quiros M.D.\par Study quality: Technically good\par Referring Physician: MELINDA CHANEL NP\par Blood Pressure: 104/65 mmHg\par Height: 163 cm\par Weight: 92 kg\par BSA: 2 m2\par ------------------------------------------------------------------------\par PROCEDURE: Transthoracic echocardiogram with 2-D, M-Mode\par and complete spectral and color flow Doppler.\par INDICATION: Unspecified combined systolic (congestive) and\par diastolic (congestive) heart failure (I50.40)\par ------------------------------------------------------------------------\par DIMENSIONS:\par Dimensions:     Normal Values:\par LA:     4.7 cm    2.0 - 4.0 cm\par Ao:     2.8 cm    2.0 - 3.8 cm\par SEPTUM: 0.9 cm    0.6 - 1.2 cm\par PWT:    0.9 cm    0.6 - 1.1 cm\par LVIDd:  4.5 cm    3.0 - 5.6 cm\par LVIDs:  2.9 cm    1.8 - 4.0 cm\par Derived Variables:\par LVMI: 67 g/m2\par RWT: 0.40\par Fractional short: 36 %\par Ejection Fraction (Modified Roberts Rule): 65 %\par ------------------------------------------------------------------------\par OBSERVATIONS:\par Mitral Valve: Mitral annular calcification, otherwise\par normal mitral valve. Mild-moderate mitral regurgitation.\par Aortic Root: Normal aortic root.\par Aortic Valve: Calcified trileaflet aortic valve with normal\par opening.\par Left Atrium: Moderately dilated left atrium.  LA volume\par index = 46 cc/m2.\par Left Ventricle: Normal left ventricular systolic function.\par No segmental wall motion abnormalities. Normal left\par ventricular internal dimensions and wall thicknesses.\par Right Heart: Normal right atrium. Normal right ventricular\par size and function. Normal tricuspid valve.  Mild-moderate\par tricuspid regurgitation. Normal pulmonic valve.\par Pericardium/PleuraNormal pericardium with no pericardial\par effusion.\par Hemodynamic: Estimated right ventricular systolic pressure\par equals 51 mm Hg, assuming right atrial pressure equals 10\par mm Hg, consistent with moderate pulmonary hypertension.\par ------------------------------------------------------------------------\par CONCLUSIONS:\par 1. Mitral annular calcification, otherwise normal mitral\par valve. Mild-moderate mitral regurgitation.\par 2. Moderately dilated left atrium.  LA volume index = 46\par cc/m2.\par 3. Normal left ventricular internal dimensions and wall\par thicknesses.\par 4. Normal left ventricular systolic function. No segmental\par wall motion abnormalities.\par 5. Normal right ventricular size and function.\par 6. Estimated right ventricular systolic pressure equals 51\par mm Hg, assuming right atrial pressure equals 10 mm Hg,\par consistent with moderate pulmonary hypertension.\par *** Compared with echocardiogram of 11/23/2021, no\par significant changes noted.\par ------------------------------------------------------------------------\par Confirmed on  1/5/2023 - 10:24:15 by Tc Camargo M.D.,\par [facility]\par \par \par Patient name: KARINA MARION\par YOB: 1955   Age: 66 (F)   MR#: [id number]\par Study Date: 11/23/2021\par Location: O/PSonographer: Cary Parra RDCS\par Study quality: Technically good\par Referring Physician: MELINDA CHANEL NP\par Blood Pressure: 122/81 mmHg\par Height: 163 cm\par Weight: 92 kg\par BSA: 2 m2\par ------------------------------------------------------------------------\par PROCEDURE: Transthoracic echocardiogram with 2-D, M-Mode\par and complete spectral and color flow Doppler.\par INDICATION: Chronic diastolic (congestive) heart failure\par (I50.32)\par ------------------------------------------------------------------------\par DIMENSIONS:\par Dimensions:     Normal Values:\par LA:     4.8 cm    2.0 - 4.0 cm\par Ao:     2.9 cm    2.0 - 3.8 cm\par SEPTUM: 1.1 cm    0.6 - 1.2 cm\par PWT:    1.1 cm    0.6 - 1.1 cm\par LVIDd:  4.9 cm    3.0 - 5.6 cm\par LVIDs:  3.1 cm    1.8 - 4.0 cm\par Derived Variables:\par LVMI: 102 g/m2\par RWT: 0.44\par Fractional short: 37 %\par Ejection Fraction (Teicholtz): 66 %\par ------------------------------------------------------------------------\par OBSERVATIONS:\par Mitral Valve: Mitral annular calcification, otherwise\par normal mitral valve. Mild-moderate mitral regurgitation.\par Aortic Root: Normal aortic root.\par Aortic Valve: Calcified trileaflet aortic valve with normal\par opening.\par Left Atrium: Moderately dilated left atrium.  LA volume\par index = 46 cc/m2.\par Left Ventricle: Normal left ventricular systolic function.\par No segmental wall motion abnormalities. Normal left\par ventricular internal dimensions and wall thicknesses.\par Right Heart: Moderate right atrial enlargement. Normal\par right ventricular size and function. Normal tricuspid\par valve. Mild-moderate tricuspid regurgitation. Normal\par pulmonic valve. Minimal pulmonic regurgitation.\par Pericardium/PleuraNormal pericardium with no pericardial\par effusion.\par Hemodynamic: Estimated right ventricular systolic pressure\par equals 62 mm Hg, assuming right atrial pressure equals 10\par mm Hg, consistent with severe pulmonary hypertension.\par ------------------------------------------------------------------------\par CONCLUSIONS:\par 1. Mitral annular calcification, otherwise normal mitral\par valve. Mild-moderate mitral regurgitation.\par 2. Calcified trileaflet aortic valve with normal opening.\par 3. Moderately dilated left atrium.  LA volume index = 46\par cc/m2.\par 4. Normal left ventricular internal dimensions and wall\par thicknesses.\par 5. Normal left ventricular systolic function. No segmental\par wall motion abnormalities.\par 6. Moderate right atrial enlargement.\par 7. Normal right ventricular size and function.\par 8. Normal tricuspid valve. Mild-moderate tricuspid\par regurgitation.\par ------------------------------------------------------------------------\par Confirmed on  11/23/2021 - 17:01:34 by mel Avila M.D.\par ------------------------------------------------------------------------\par \par \par EXAM:  CT CHEST  \par \par \par PROCEDURE DATE:  12/16/2020  \par  \par \par \par INTERPRETATION:  CT CHEST WITHOUT CONTRAST\par \par INDICATION: Pulmonary hypertension.\par \par TECHNIQUE: Unenhanced helical images were obtained of the chest. Coronal and sagittal images were reconstructed. Maximum intensity projection images were generated.\par \par COMPARISON: CT chest 11/1/2019, 4/14/2019, and 4/8/2019.\par \par FINDINGS:\par \par Tubes/Lines: None.\par \par Lungs And Airways: The mosaic attenuation of the lungs is unchanged. Linear atelectasis/scarring of the left lower lobe. The remainder of the lungs are clear. The airways are normal.\par \par Pleura: No pleural effusion or pneumothorax.\par \par Mediastinum: The chest lymph nodes are less than 10 mm in the short axis. The visualized esophagus is unremarkable. The right lobe thyroid gland nodule is not well visualized.\par \par Heart and Vasculature: The heart is enlarged. In particular, the atria are enlarged.  There is no pericardial effusion. Coronary artery calcifications.\par \par Left-sided aortic arch and left-sided descending thoracic aorta. The aorta is tortuous. Aorta is normal in caliber. Aortic calcifications. The pulmonary artery is enlarged which can occur in the clinical setting of pulmonary arterial hypertension.\par \par Upper Abdomen: The upper abdomen is unremarkable.\par \par Bones And Soft Tissues: The bones are unremarkable.  The soft tissues are unremarkable.\par \par \par IMPRESSION:\par \par 1.  No change in the mosaic attenuation of the lungs.\par 2.  Cardiomegaly.\par 3.  Enlarged pulmonary artery can occur in the clinical setting of pulmonary arterial hypertension\par \par \par \par \par

## 2023-01-05 NOTE — DISCUSSION/SUMMARY
[Patient] : the patient [___ Month(s)] : in [unfilled] month(s) [FreeTextEntry1] : 1. Chronic diastolic heart failure, with Normal technetium pyrophosphate  scan on 12/12/19 with findings not suggestive of transthyretin cardiac amyloidosis ( pt had low voltage on EKG, joint pains and B/L carpal tunnel), 11/23/21 TTE with LVEF 66%\par - will continue lucas 12.5 mg five times a week.\par -continue torsemide 20 mg daily with one additional on weekend and with additional  as needed for weight gain of 2-3 lbs in 2-3 days.  pt is no longer on metolazone 5 mg prn. Goal weight is less than 202 lbs and she is 202 lbs at home today\par , follow up with PCP Dr. Palafox, will follow up with nephrologist Dr. Heart, pulm Dr. Yang and rheum \par - Continue Toprol to 50 mg po BID ( a.fib) \par - Continue compression stockings to improve venous return\par - increase daily exercise as tolerated\par - continue digoxin 0.125 mg QOD for AF with controlled VR 74 bpm\par \par  2. Severe Pulmonary HTN/PAH- TTE today with mod pHTN\par -  Continue with sildenafil 20 mg po TID.\par - Continue  Opsumit 10 mg daily, pt feels she has had improvement since starting \par - Continue torsemide as above\par - Follow up with Dr. Yang\par - Continue CPAP with oxygen for LINH\par \par  3. CKD- \par -11/3/22  creat 1.97 from prior  creat 2.22 on lab 9/7 \par - Follow up with renal Dr. Heart \par - Follow up with primary Dr. Palafox\par \par 4. Atrial fibrillation- rate controlled \par -Continue Toprol 50 mg po BID.\par - Continue digoxin 0.125 mg every other day, add dig level to next blood work\par - Continue Eliquis for oral a/c and stroke prevention.\par \par 5. S/P  Covid 19 infection 5/24/22\par - S/P monoclonal antibodies and doing well\par \par Follow up in office in 4 months [EKG obtained to assist in diagnosis and management of assessed problem(s)] : EKG obtained to assist in diagnosis and management of assessed problem(s)

## 2023-01-05 NOTE — CARDIOLOGY SUMMARY
[de-identified] : 1/5/23  AF 74, ow voltage, NSST\par 10/3/22 AF 73, ow voltage, NSST\par 6/6/22 AF 61,  low voltage, NSST\par 2/7/22 AF 69, low voltage, NSST\par 10/25/21 AF 78, low voltage, NSST\par \par  [de-identified] : 1/5/23 normal LVEF 65%, mod pHTN.\par \par 11/23/21 TTE; LVEF 66%, LVIDD 4.9 cm, normal LV systolic function, mild mod MR, mild mod TR, severe pHTN\par \par  9/28/20 TTE  with LVEF 64%, LVIDD 4.6 cm, normal LV systolic function, decreased RV systolic function, mild MR, mild mod TR, severe pHTN\par \par  [de-identified] : Normal technetium pyrophosphate scan on 12/12/19 with findings not suggestive of transthyretin cardiac amyloidosis.\par

## 2023-01-10 ENCOUNTER — NON-APPOINTMENT (OUTPATIENT)
Age: 68
End: 2023-01-10

## 2023-01-10 ENCOUNTER — APPOINTMENT (OUTPATIENT)
Dept: OPHTHALMOLOGY | Facility: CLINIC | Age: 68
End: 2023-01-10
Payer: MEDICARE

## 2023-01-10 PROCEDURE — 92250 FUNDUS PHOTOGRAPHY W/I&R: CPT

## 2023-01-10 PROCEDURE — 92014 COMPRE OPH EXAM EST PT 1/>: CPT

## 2023-01-20 ENCOUNTER — NON-APPOINTMENT (OUTPATIENT)
Age: 68
End: 2023-01-20

## 2023-01-25 ENCOUNTER — APPOINTMENT (OUTPATIENT)
Dept: RHEUMATOLOGY | Facility: CLINIC | Age: 68
End: 2023-01-25
Payer: MEDICARE

## 2023-01-25 VITALS
WEIGHT: 218 LBS | HEIGHT: 63 IN | SYSTOLIC BLOOD PRESSURE: 119 MMHG | OXYGEN SATURATION: 91 % | TEMPERATURE: 97.2 F | HEART RATE: 73 BPM | DIASTOLIC BLOOD PRESSURE: 69 MMHG | BODY MASS INDEX: 38.62 KG/M2

## 2023-01-25 PROCEDURE — 99213 OFFICE O/P EST LOW 20 MIN: CPT

## 2023-01-25 NOTE — HISTORY OF PRESENT ILLNESS
[___ Month(s) Ago] : [unfilled] month(s) ago [Currently Experiencing] : currently [Shortness of Breath] : shortness of breath [Arthralgias] : arthralgias [Decreased ROM] : decreased range of motion [Morning Stiffness] : morning stiffness [Dyspnea] : dyspnea [FreeTextEntry1] : C/o L index and R wrist discomfort in the morning, better with exercise. No swelling in the joints.  [Anorexia] : no anorexia [Weight Loss] : no weight loss [Malaise] : no malaise [Fever] : no fever [Chills] : no chills [Fatigue] : no fatigue [Depression] : no depression [Malar Facial Rash] : no malar facial rash [Skin Lesions] : no lesions [Skin Nodules] : no skin nodules [Oral Ulcers] : no oral ulcers [Cough] : no cough [Dry Mouth] : no dry mouth [Dysphonia] : no dysphonia [Dysphagia] : no dysphagia [Chest Pain] : no chest pain [Joint Swelling] : no joint swelling [Joint Warmth] : no joint warmth [Joint Deformity] : no joint deformity [Falls] : no falls [Difficulty Standing] : no difficulty standing [Difficulty Walking] : no difficulty walking [Myalgias] : no myalgias [Muscle Weakness] : no muscle weakness [Muscle Spasms] : no muscle spasms [Visual Changes] : no visual changes [Muscle Cramping] : no muscle cramping [Eye Pain] : no eye pain [Eye Redness] : no eye redness [Dry Eyes] : no dry eyes

## 2023-01-25 NOTE — DATA REVIEWED
[FreeTextEntry1] : CT Chest (06/2022): Lungs/Airways/Pleura: Persistent mosaic perfusion in the lungs. No endobronchial nodule. No pleural effusion. Mediastinum/Lymph nodes: No thoracic adenopathy. Heart and Vessels: There is biatrial enlargement. No pericardial effusion there are coronary artery calcifications. The mid ascending aorta measures 3.2 cm. The pulmonary artery is larger than the adjacent ascending aorta. Upper Abdomen: Partially imaged anastomotic staple line at the splenic flexure. Osseous structures and Soft Tissues: No aggressive bone lesions. IMPRESSION: Stable appearance the chest with mosaic perfusion of the lungs and enlarged pulmonary artery compared to the adjacent ascending aorta\par \par Hand x-ray (09/2021): Chronic posttraumatic deformity of right ulnar styloid process with adjacent chronic focal small osseous fragmentation. Otherwise no dislocations or acute appearing fractures. 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 Preserved remaining joint spaces and no developed joint margin erosions. Carpal bones normally aligned. Neutral ulnar variance. No lytic or blastic lesions. \par \par CT Chest (12/2020): COMPARISON: CT chest 11/1/2019, 4/14/2019, and 4/8/2019. FINDINGS: Tubes/Lines: None. Lungs And Airways: The mosaic attenuation of the lungs is unchanged. Linear atelectasis/scarring of the left lower lobe. The remainder of the lungs are clear. The airways are normal. Pleura: No pleural effusion or pneumothorax. Mediastinum: The chest lymph nodes are less than 10 mm in the short axis. The visualized esophagus is unremarkable. The right lobe thyroid gland nodule is not well visualized. Heart and Vasculature: The heart is enlarged. In particular, the atria are enlarged. There is no pericardial effusion. Coronary artery calcifications. Left-sided aortic arch and left-sided descending thoracic aorta. The aorta is tortuous. Aorta is normal in caliber. Aortic calcifications. The pulmonary artery is enlarged which can occur in the clinical setting of pulmonary arterial hypertension. Upper Abdomen: The upper abdomen is unremarkable. Bones And Soft Tissues: The bones are unremarkable. The soft tissues are unremarkable. IMPRESSION: 1. No change in the mosaic attenuation of the lungs. 2. Cardiomegaly.\par 3. Enlarged pulmonary artery can occur in the clinical setting of pulmonary arterial hypertension\par \par Cardiac Amyloidosis Spect scan (12/2019): The technical quality of the images was satisfactory. There is no abnormal increased myocardial uptake. Heart to Contralateral Chest Ratio (1 hr.): = 1.1; (normal: less than or equal to 1.0) Myocardium to Bone (visual at 3 hrs): Grade: 1 (normal: < 2) IMPRESSION: Normal cardiac amyloid Imaging study; findings are not suggestive of transthyretin cardiac amyloidosis. \par \par CT Chest (11/2019): Tubes/Lines: None. Lungs And Airways: The subtle mosaic attenuation of the lungs is unchanged. The remainder of the lungs are clear. The airways are normal. Pleura: No pleural effusion or pneumothorax. Mediastinum: There are no enlarged chest lymph nodes. The hypodense nodule arising from the right lobe of the thyroid gland is partially imaged and has decreased in size. The esophagus is unremarkable. Heart and Vasculature: The heart is enlarged. No pericardial effusion. Coronary artery calcifications. The pulmonary artery is enlarged. Aorta is normal in caliber. Atheromatous disease of the aorta. Upper Abdomen: The upper abdomen is unremarkable. Bones And Soft Tissues: The bones are unremarkable. The soft tissues are unremarkable. IMPRESSION: 1. No change in the mosaic attenuation of the lungs. 2. Cardiomegaly. 3. Enlarged pulmonary artery can occur in the setting of pulmonary arterial \par hypertension. \par \par Hand x-ray (10/2019): Chronic posttraumatic deformity of right ulnar styloid process with adjacent chronic focal small osseous fragmentation. 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 Preserved remaining joint spaces and \par no joint margin erosions. Carpal bones normally aligned. Neutral ulnar variance. No lytic or blastic lesions. \par \par Foot x-ray (10/2019): Frontal, oblique, and lateral views of both feet from 10/7/2019 at 1054. No \par similar prior studies available for comparison. IMPRESSION: No fractures or dislocations. Tarsometatarsal alignment maintained without evidence for a Lisfranc injury. Hypertrophic bony spurring projecting from dorsal right TMT joint margins. Preserved remaining visualized joint spaces and no joint margin erosions. \par Bilateral plantar posterior calcaneal enthesophytes. No lytic or blastic lesions.\par \par CT Neck (05/2019): AERODIGESTIVE TRACT: See below. Otherwise normal in appearance. LYMPH NODES: No adenopathy. PAROTID GLANDS: Normal. SUBMANDIBULAR GLANDS: Normal. THYROID GLAND: There is a large well marginated homogeneous appearing mass within the right thyroid lobe measuring 4.1 x 3.4 x 4 cm in size. The mass appears larger when compared with the prior ultrasound with measurements in the prior report of 3.3 x 3.3 x 2.6 cm. There is mild associated leftward tracheal displacement and mild tracheal narrowing. No substernal extension is seen. VISUALIZED PARANASAL SINUSES: Mild left maxillary polyp or retention cyst. VISUALIZED TYMPANOMASTOID CAVITIES: Clear. BONES: Cervical spondylosis MISCELLANEOUS: Right-sided pleural effusion, moderate. IMPRESSION: \par Right-sided thyroid nodule which appears to have enlarged when compared with a recent sonogram. Repeat sonogram is advised to confirm whether this is a rapidly enlarging mass which may require histologic characterization. There is mild leftward tracheal displacement and tracheal narrowing. \par Moderate right pleural effusion. \par \par TTE (04/2019): 1. Mitral annular calcification and calcified mitral leaflets with normal diastolic opening. Mild mitral regurgitation.  2. Calcified trileaflet aortic valve with normal opening. 3. Normal left ventricular internal dimensions and wall thicknesses. 4. Hyperdynamic left ventricle. Flattening of the interventricular septum in both systole and diastole is  consistent with right ventricular pressure overload. 5. Severe right atrial enlargement. 6. Right ventricular enlargement with normal right ventricular systolic function. 7. Normal tricuspid valve. Moderate-severe tricuspid regurgitation. 8. Estimated pulmonary artery systolic pressure equals 79 mm Hg, assuming right atrial pressure equals 5  mm Hg, consistent with severe pulmonary hypertension. *** Compared with echocardiogram of 10/31/2017, no significant changes noted.\par \par Right Heart Cath (04/2019):  COMPLICATIONS: There were no complications. No complications occurred\par during the cath lab visit. DIAGNOSTIC IMPRESSIONS: Severe PHT PCWP 18mmhg  INTERVENTIONAL IMPRESSIONS: Severe PHT PCWP 18mmhg\par \par CT chest, abdomen and pelvis (04/2019): CHEST: LUNGS AND LARGE AIRWAYS: Patent central airways. No pulmonary nodules. PLEURA: No pleural effusion. No pneumothorax VESSELS: Atherosclerotic disease. Coronary calcifications. HEART: Heart size is enlarged. No pericardial effusion. MEDIASTINUM AND CLAUDIA: Prominent mediastinal and hilar lymph nodes, without change. CHEST WALL AND LOWER NECK: Heterogeneously enhancing right thyroid mass measuring 3.5 x 2.9 cm, partially imaged (series 2 image 1). ABDOMEN AND PELVIS: LIVER: Within normal limits. BILE DUCTS: Normal caliber. GALLBLADDER: Within normal limits. SPLEEN: Within normal limits. PANCREAS: Within normal limits. ADRENALS: Within normal limits. KIDNEYS/URETERS: Atrophic. Subcentimeter hypoattenuating right renal lesions are too small to characterize. No hydronephrosis. BLADDER: Within normal limits. REPRODUCTIVE ORGANS: Calcified uterine fibroid. No adnexal masses. BOWEL: No bowel obstruction. Narrowing of the sigmoid colon may represent new sigmoid mass. Normal appendix. There is high-density material along the posterior aspect of the gastric fundus/antrum. PERITONEUM: No ascites. VESSELS: Atherosclerotic disease. \par RETROPERITONEUM: No lymphadenopathy. ABDOMINAL WALL: Questionable skin defect of the left gluteal soft tissues not seen on prior CT. BONES: Degenerative changes of the spine. IMPRESSION: Unchanged prominent mediastinal nodes, nonspecific. Otherwise, no evidence of metastatic disease in the chest, abdomen, or pelvis. High-density material layering within the gastric antrum may be secondary to ingested material. Questionable skin defect of the left gluteal soft tissues not seen on prior CT, likely skinfold however clinical correlation is recommended. \par \par CTA Chest (11/2017): CHEST: LUNGS AND LARGE AIRWAYS: Patent central airways. Minimal compressive \par atelectasis is noted involving portion of the right lower lobe secondary to right pleural effusion. PLEURA: Small right pleural effusion. No left-sided pleural effusion. No pneumothorax. VESSELS: No filling defect visualized within the bilateral pulmonary arteries. HEART: Cardiomegaly. No pericardial effusion. MEDIASTINUM AND CLAUDIA: 1.5 x 1.2 cm lymph node within the AP window (series 2, image 78), which is nonspecific in nature. CHEST WALL AND LOWER NECK: Within normal limits. VISUALIZED UPPER ABDOMEN: Small hiatal hernia. BONES: Mild degenerative changes of the spine. IMPRESSION: No evidence of a pulmonary embolism. Small right pleural effusion. \par \par CT Cervical spine (01/2017): CT HEAD: The study is limited by patient motion. There are patchy hypodensities in the bilateral periventricular and subcortical white matter without mass effect which are nonspecific but may represent white matter microvascular ischemic changes. There is no CT evidence for acute territorial infarction. There is no acute intracranial hemorrhage, extra axial fluid collection, mass effect, midline shift or hydrocephalus. There is bilateral proptosis. The visualized paranasal sinuses and mastoid air cells are clear. There is a right parietooccipital scalp hematoma with overlying skin staples. There is no calvarial fracture. CT CERVICAL SPINE: Vertebral body heights and alignment are maintained. The cervical lordosis is straightened, which may be due to muscle spasm or positioning. The atlantoaxial and atlantooccipital joints are maintained. There is no acute cervical spine fracture or traumatic malalignment. There are multilevel anterior osteophytes. The intervertebral disc space heights are maintained. There is a C6 superior endplate Schmorl's node. There is no osseous spinal canal stenosis. Uncovertebral hypertrophy results in mild multilevel bilateral neuroforaminal stenosis. The visualized lung apices are clear. The thyroid gland is unremarkable. IMPRESSION: CT head: Right parietooccipital scalp hematoma. No acute intracranial hemorrhage or extra-axial fluid collection. CT cervical spine: No acute cervical spine fracture or traumatic malalignment. \par \par CT Chest (2010): No prior CT scan is available for comparison. There is diffuse mosaic attenuation of both lungs with the size of vessels smaller in the lucent area. Repeat CT scan in expiration shows persistent \par mosaic attenuation due to air trapping. There is no significant mediastinal, hilar or axillary adenopathy. \par Trachea and main bronchi are patent. There is no evidence of pleural effusion. Heart size is within normal limits.. There is a trace pericardial effusion which is likely physiologic. Visualized bones show no significant abnormality. CT scan through upper abdomen shows normal adrenals. A 2 mm nonobstructing \par calculus is seen in the left kidney. IMPRESSION: Diffuse mosaic attenuation, inspiratory and expiratory CT scans confirm the presence of air trapping likely from small airway disease. \par

## 2023-01-25 NOTE — PHYSICAL EXAM
[General Appearance - In No Acute Distress] : in no acute distress [General Appearance - Alert] : alert [Sclera] : the sclera and conjunctiva were normal [Outer Ear] : the ears and nose were normal in appearance [Neck Appearance] : the appearance of the neck was normal [] : no respiratory distress [Auscultation Breath Sounds / Voice Sounds] : lungs were clear to auscultation bilaterally [Heart Rate And Rhythm] : heart rate was normal and rhythm regular [Heart Sounds] : normal S1 and S2 [Heart Sounds Gallop] : no gallops [Murmurs] : no murmurs [Heart Sounds Pericardial Friction Rub] : no pericardial rub [Full Pulse] : the pedal pulses are present [Edema] : there was no peripheral edema [Cervical Lymph Nodes Enlarged Posterior Bilaterally] : posterior cervical [Cervical Lymph Nodes Enlarged Anterior Bilaterally] : anterior cervical [Supraclavicular Lymph Nodes Enlarged Bilaterally] : supraclavicular [Axillary Lymph Nodes Enlarged Bilaterally] : axillary [No CVA Tenderness] : no ~M costovertebral angle tenderness [No Spinal Tenderness] : no spinal tenderness [Abnormal Walk] : normal gait [Nail Clubbing] : no clubbing  or cyanosis of the fingernails [Musculoskeletal - Swelling] : no joint swelling seen [Motor Tone] : muscle strength and tone were normal [Skin Color & Pigmentation] : normal skin color and pigmentation [No Focal Deficits] : no focal deficits [Oriented To Time, Place, And Person] : oriented to person, place, and time [Impaired Insight] : insight and judgment were intact [Affect] : the affect was normal [FreeTextEntry1] : TTP of the 2nd MCP without any swelling

## 2023-01-25 NOTE — ASSESSMENT
[FreeTextEntry1] : 67 year old female here for pulm HTN as well as positive AMMON and CCP\par \par 1. 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 Discussed with the patient that I suspect that her positive AMMON is likely related to underlying thyroid disease versus malignancy rather than lupus, MCTD or other connective tissue diseases. Patient does not have any symptoms or exam findings to suggest an underlying CTD at this time. \par \par 2. Positive CCP: patient has high titer CCP antibodies. C/o mild oligoarticular pain with minimal stiffness. Hand exercises printed for patient to do at home. Consider Plaquenil or alternate DMARD therapy if joint changes noted on x-ray.  \par \par 3. Gout: on Allopurinol for hyperuricemia. Recent UA at goal < 5 mg/dL. Unclear whether her attack in 2018 was truly gout since she describes bilateral hand pain and swelling. However, it did resolve on its own within a couple days which would be atypical for gout. Will monitor symptoms for now. Continue Allopurinol.\par \par 4. Bone health: Ca+D as needed. Normal DEXA in  Nov 2022. \par \par Follow up in 3 months

## 2023-01-25 NOTE — REVIEW OF SYSTEMS
[Lower Ext Edema] : lower extremity edema [Shortness Of Breath] : shortness of breath [SOB on Exertion] : shortness of breath during exertion [As Noted in HPI] : as noted in HPI [Arthralgias] : arthralgias [Negative] : Heme/Lymph [Heart Rate Is Fast] : the heart rate was not fast [Heart Rate Is Slow] : the heart rate was not slow [Chest Pain] : no chest pain [Palpitations] : no palpitations [Leg Claudication] : no intermittent leg claudication [Wheezing] : no wheezing [Cough] : no cough [Orthopnea] : no orthopnea [PND] : no PND [Joint Pain] : no joint pain [Joint Swelling] : no joint swelling [Joint Stiffness] : no joint stiffness [Limb Pain] : no limb pain [Limb Swelling] : no limb swelling

## 2023-01-31 ENCOUNTER — NON-APPOINTMENT (OUTPATIENT)
Age: 68
End: 2023-01-31

## 2023-04-03 ENCOUNTER — APPOINTMENT (OUTPATIENT)
Dept: PULMONOLOGY | Facility: CLINIC | Age: 68
End: 2023-04-03
Payer: MEDICARE

## 2023-04-03 VITALS
DIASTOLIC BLOOD PRESSURE: 73 MMHG | TEMPERATURE: 97.6 F | HEART RATE: 64 BPM | BODY MASS INDEX: 35.61 KG/M2 | OXYGEN SATURATION: 96 % | SYSTOLIC BLOOD PRESSURE: 117 MMHG | RESPIRATION RATE: 15 BRPM | HEIGHT: 63 IN | WEIGHT: 201 LBS

## 2023-04-03 PROCEDURE — ZZZZZ: CPT

## 2023-04-03 PROCEDURE — 94618 PULMONARY STRESS TESTING: CPT

## 2023-04-03 PROCEDURE — 99215 OFFICE O/P EST HI 40 MIN: CPT | Mod: 25

## 2023-04-03 NOTE — HISTORY OF PRESENT ILLNESS
[Never] : never [TextBox_4] : This letter  is regarding your patient  who  attended pulmonary out patient office today.  I have reviewed  patient's  past history, social history, family history and medication list. I also  reviewed nurse practitioners/ and fellows  notes and assessment and agree with it.  \par The patient was referred by  for SECONDARY  pulm HTN [,GROUP 2 ---DIASTOLIC DYSFUNCTION ], on sildenafil. PMH colon cancer, afib- on eliquis,   CKD,  ATRIAL FIBRILLATION,  PAST H/O LYMPHOMA/CLL  S/P RT AND CHEMO [ 1997]--H/O  LINH on bipap and nocturnal oxygen, OSMAR, CHF, DM, thyroid nodule -on home oxygen-  status post resection of stage I colon cancer    ----------\par \par ------No history of , fever, chills , rigors, chest pain, or hemoptysis. Questionable history of Raynaud's phenomenon. No h/o significant weight loss in last few months. No history of liver dysfunction ,  or chronic thromboembolic disease. I would classify the patient's dyspnea as WHO  FUNCTIONAL CLASS II--------\par \par ----Pft date--2109- -------RESTRICTIVE VENT DEFECT \par ---6MWT 2019-    132 METERS  USED A CANE TO WALK \par \par CARDIAC C ATH 2019----------  PA 78/29 MEAN PA 45, RA 10, RV 78/14,  PA SAT 47, CO 4.79. CI 2.33, PVR  6.84 WOODS\par ----Ct scan date--4/2019 IMPRESSION: \par Unchanged prominent mediastinal nodes, nonspecific. Otherwise, no \par evidence of metastatic disease in the chest, abdomen, or pelvis.\par High-density material layering within the gastric antrum may be secondary \par to ingested material.\par Questionable skin defect of the left gluteal soft tissues not seen on \par prior CT, likely skinfold however clinical correlation is recommended.\par \par CT CHEST 12/2020\par IMPRESSION:\par 1. No change in the mosaic attenuation of the lungs.\par 2. Cardiomegaly.\par 3. Enlarged pulmonary artery can occur in the clinical setting of pulmonary arterial hypertension\par \par -----\par vq scan 4/2019 low prob of PE\par \par echo 4/2019 ------------------------------------------------------------------------\par CONCLUSIONS:\par 1. Mitral annular calcification and calcified mitral\par leaflets with normal diastolic opening. Mild mitral\par regurgitation.\par 2. Calcified trileaflet aortic valve with normal opening.\par 3. Normal left ventricular internal dimensions and wall\par thicknesses.\par 4. Hyperdynamic left ventricle. Flattening of the\par interventricular septum in both systole and diastole is\par consistent with right ventricular pressure overload.\par 5. Severe right atrial enlargement.\par 6. Right ventricular enlargement with normal right\par ventricular systolic function.\par 7. Normal tricuspid valve. Moderate-severe tricuspid\par regurgitation.\par 8. Estimated pulmonary artery systolic pressure equals 79\par mm Hg, assuming right atrial pressure equals 5  mm Hg,\par consistent with severe pulmonary hypertension.\par *** Compared with echocardiogram of 10/31/2017, no\par significant changes noted.\par \par \par echo 9/2020 est pasp=65mmHg\par cardiac cath 4/2019 \par  Pulmonary Artery (S/D/M): 78/29/45\par Pressures:  -- Pulmonary Capillary Wedge: 20/21/18\par Pulmonary vascular resistance (Wood Units): 6.84,  CO by Juan Carlos: 4.39\par \par ECHO jan 2023\par CONCLUSIONS:\par 1. Mitral annular calcification, otherwise normal mitral\par valve. Mild-moderate mitral regurgitation.\par 2. Moderately dilated left atrium.  LA volume index = 46\par cc/m2.\par 3. Normal left ventricular internal dimensions and wall\par thicknesses.\par 4. Normal left ventricular systolic function. No segmental\par wall motion abnormalities.\par 5. Normal right ventricular size and function.\par 6. Estimated right ventricular systolic pressure equals 51\par mm Hg, assuming right atrial pressure equals 10 mm Hg,\par consistent with moderate pulmonary hypertension.\par *** Compared with echocardiogram of 11/23/2021, no\par significant changes noted.\par \par \par US thyroid 7/2019 IMPRESSION: \par Bilateral indeterminant thyroid nodules slightly decreased on the right. \par No parathyroid adenoma identified. \par \par \par CT CHEST - ORDERED BY: NIRALI MOORE\par PROCEDURE DATE: 06/21/2022-----------Comparison: 12/16/2020\par FINDINGS:\par Lungs/Airways/Pleura: Persistent mosaic perfusion in the lungs. No endobronchial nodule. No pleural effusion\par Mediastinum/Lymph nodes: No thoracic adenopathy.\par Heart and Vessels: There is biatrial enlargement. No pericardial effusion there are coronary artery calcifications. The mid ascending aorta measures 3.2 cm. The pulmonary artery is larger than the adjacent ascending aorta\par Upper Abdomen: Partially imaged anastomotic staple line at the splenic flexure.\par Osseous structures and Soft Tissues: No aggressive bone lesions.\par IMPRESSION:\par Stable appearance the chest with mosaic perfusion of the lungs and enlarged pulmonary artery compared to the adjacent ascending aorta\par \par \par \par AUGUST 2109 WILL  START  REVATIO FOR PAH\par \par ------OCT 2019---------stable from pulmonary standpoint--------dyspnea on exertion-----on 2L oxygen------tolerating revatio 20 mg TID--------under care of [facility] [Dr. Jerez]-----\par Dec 2109-----  on macetantan and revatio, renal dysfunction [ dr heart], slight elevation of lft, \par \par -----DEC  2019-------stable from pulmonary point of view---------doing well on macitentan and revatio------unable to sleep------is using her BiPAP machine------underdare of Hem/Onc [Dr. YANG]------HAS RIGHT THYROID NODULE-\par \par \par denis 15 2020---     saw dr heart nephrology  [ s creat was 2.2].  , saw dr yang oncology  Hg  ---   still Anemia on EPOGEN---\par  ----------------------n sildenafil  AND OPSUMIT  --ON HOME OXYGEN --ON LASIX AND ALDACTONE \par nov 2020---doing well--- renal managed by  dr heart nephrology .  , saw dr yang oncology  Hg  ---   still Anemia on EPOGEN---\par pulm htn-on sildenafil  AND OPSUMIT  --ON HOME OXYGEN --ON torsemide AND ALDACTONE \par \par \par \par feb 2012---feels bEtter-- HepPeF- sec pulm htn , renal dysfunction  LINH  on bipap 19/13 with o2 2 l/min ,,, during the daytime on 3l/min o2---on torsemide and spironolactone [dr heart] ,  anemia on prcrit [dr yang]  ,  for pulm htn on Opsumit and sildenafil--------- needs pft and 6 min walk \par \par 6/2021 6mwt 240 m\par 6/2021 PFT restrictive defect\par \par 6/2021 pulm htn on sildenafil & opsumit, using oxygen. On diuretics per Dr Heart. Follows cardiology Dr Degroot.  LINH------ on BiPAP \par She is up to date w/covid vac\par \par 9/2021 pulm htn on sildenafil & opsumit, using oxygen. On diuretics per Dr Heart. Follows cardiology Dr Degroot.  LINH------ on BiPAP nightly snd  benefits from its use -need compliance report  --- She is up to date w/covid vac\par \par jan 2022 ---  pulm htn on sildenafil & opsumit, using oxygen. --compliant to  meds-------On diuretics per Dr Heart. Follows cardiology Dr Degroot.  LINH------ on BiPAP nightly snd  benefits from its use -need compliance report  --- She is up to date w/covid vac\par \par March 2022   --- pulm htn remains on sildenafil and opsumit, using oxygen ---  On diuretics per cardiology--- LINH on BiPAP- need compliance report--- Hypothyroidism being managed by Dr. Nasra Cabrales, recent increase in Synthroid--- Anemia being follow by Dr. Yang remains on Aranesp -- needs PFT 6MWT\par \par July 2022 - remains on sildenafil and opsumit, uses 3 L O2 when out of house. RA at rest and with ADLs, no difficulties. Continues to be on torsemide 20 OD and spironolactone 12.5 5/week. LINH on BIPAP - awaiting  CPAP UNIT replacement. Repeat TSH of 2.95 on increased Synthroid dose. Outside labs reviewed pertinent for TSH 2.95, BUN/Cr of 54/1.90, a1c of 5.8, Hb of 9.8. Patient had appointment with renal and cardio in june, 22 - stable\par PFT - restrictive pattern \par 6mw - able to walk 299 m compare to previous 193 in june, 21\par CT scan - stable mosaic pattern \par \par \par 11/2022 pulm htn on opsumit and sildenafil and tolerating it well- No pulm complaints-uses oxygen as needed\par reactive airway- uses advair, has not needed rescue inhaler\par linh on bipap w/O2 and benefits from nightly use- awaiting replacement of recalled device\par diuretics managed by Dr Heart- on torsemide and aldactone\par cardiology Dr Degroot- o/s TTE jan 2023\par utd w/all vaccines\par \par \par 4/2023\par  pulm htn -FUNCTIONAL  -CLASS  IIIon opsumit and sildenafil and tolerating it well- No pulm complaints-uses oxygen as needed\par HAd covid 3/7- recovered w/o treatment- minimal symptoms\par reactive airway- uses advair, has not needed rescue inhaler\par linh on bipap w/O2 and benefits from nightly use-\par  RENAL DYSFUNCTION - - -diuretics managed by Dr Heart- on torsemide and aldactone--F/U DR ALI NEPHROLOGY - - - -\par  anemia-- follows Dr Birch -on aranesp\par --POSITIVE  CCP ANTIBODIES---- -MILD ARTHRITIS----F/U RHEUMATOLOGY

## 2023-04-03 NOTE — PHYSICAL EXAM
[No Acute Distress] : no acute distress [Normal Oropharynx] : normal oropharynx [III] : Mallampati Class: III [No Neck Mass] : no neck mass [Clear to Auscultation Bilaterally] : clear to auscultation bilaterally [No Abnormalities] : no abnormalities [Benign] : benign [Normal Gait] : normal gait [No Clubbing] : no clubbing [Non-Pitting] : non-pitting [Normal Color/ Pigmentation] : normal color/ pigmentation [No Focal Deficits] : no focal deficits [Oriented x3] : oriented x3 [TextBox_54] : LOUD S2 , III[facility]

## 2023-04-03 NOTE — DISCUSSION/SUMMARY
[FreeTextEntry1] : -Assessment plan----------The patient has been referred here for further opinion regarding pulmonary problem,chronic diastolic heart failure secondary pulm HTN, on sildenafil. OPSUMIT-------PMH colon cancer, afib- on eliquis and LINH on bipap and nocturnal oxygen, OSMAR, CHF, DM, thyroid nodule, history of colon cancer status post colon surgery history of lymphoma status post chemotherapy [1997], ??right diaphragm elevation\par \par 1- chronic RIGHT HEART heart failure secondary pulm HTN, [ WHO GROUP I + GROUP II COMPONENT OF DIASTOLIC DYSFUNCTION ] on sildenafil  AND OPSUMIT - It is medically necessary for pt to use OXYGEN [ remains on 3 L/MIN]x 24 hrs- PFT restrictive pattern, 6mw distance improved from 193 -> 299 [ JULY 2022]  --NEEDS CT , PFT \par 2- CKD-- remains on torsemide 20 OD and aldactone 12.5 5/week - managed by Dr. Heart and Dr. Levy \par \par 3- LINH ----- on BiPAP  19/13  WITH O2  2LMIN and benefits from its nightly use, -COMPLIANCE REPORT - - -\par 4  AFIB -----ELIQUIS\par \par 5   DEXA -NORMAL IN 2022\par 6  GOUT---ALLOPURINOL\par 7- POSITIVE CCP ANTIBODIES---ASLO HAS POSITIVE AMMON--F/U RHEUMATOLOGY --\par WILL FU CLINICALLY\par \par \par 8- anemia----FOLLOW UP WITH DR. YANG-- Hb of 9.8 on 7/7 ----getting Aranesp® (darbepoetin cris)\par \par 9- labs - proBNP in our office today\par \par 10- CARDIOLOGY --DR MATHEWS ---HAS  A FIB ON ELIQUIS - needs yearly echo\par 11- COVID - had in may, 22 had mild infection had 1st booster, will get 2nd booster in sept \par 12  h/o Thyroid Biopsy --with --Dr JUAN CASE, TSH stable on current synthroid dose \par 13- advised exercises at home\par \par 11- f/u in  3-4 m\par \par \par \par 44 MINUTES SPENT ON MANAGEMENT ISSUES OF [facility] PT--this was over and above the time spent for 6mwt\par ---Thanks for allowing me to participate in the care of this patient. Patient at this time will follow the above mentioned recommendations and return back for follow up visit. If you have any questions I can be reached at #[phone] (beeper) or [phone] (office).\par \par Anirudh Yang MD, [facility] \par

## 2023-04-13 ENCOUNTER — APPOINTMENT (OUTPATIENT)
Dept: CT IMAGING | Facility: IMAGING CENTER | Age: 68
End: 2023-04-13
Payer: MEDICARE

## 2023-04-13 ENCOUNTER — OUTPATIENT (OUTPATIENT)
Dept: OUTPATIENT SERVICES | Facility: HOSPITAL | Age: 68
LOS: 1 days | End: 2023-04-13
Payer: MEDICARE

## 2023-04-13 DIAGNOSIS — Z98.890 OTHER SPECIFIED POSTPROCEDURAL STATES: Chronic | ICD-10-CM

## 2023-04-13 DIAGNOSIS — Z90.49 ACQUIRED ABSENCE OF OTHER SPECIFIED PARTS OF DIGESTIVE TRACT: Chronic | ICD-10-CM

## 2023-04-13 DIAGNOSIS — I27.20 PULMONARY HYPERTENSION, UNSPECIFIED: ICD-10-CM

## 2023-04-13 PROCEDURE — 71250 CT THORAX DX C-: CPT | Mod: 26,MH

## 2023-04-13 PROCEDURE — 71250 CT THORAX DX C-: CPT

## 2023-04-25 ENCOUNTER — RX RENEWAL (OUTPATIENT)
Age: 68
End: 2023-04-25

## 2023-05-04 ENCOUNTER — NON-APPOINTMENT (OUTPATIENT)
Age: 68
End: 2023-05-04

## 2023-05-04 ENCOUNTER — APPOINTMENT (OUTPATIENT)
Dept: HEART FAILURE | Facility: CLINIC | Age: 68
End: 2023-05-04
Payer: MEDICARE

## 2023-05-04 VITALS
HEIGHT: 63 IN | TEMPERATURE: 98 F | OXYGEN SATURATION: 96 % | HEART RATE: 78 BPM | BODY MASS INDEX: 36.07 KG/M2 | DIASTOLIC BLOOD PRESSURE: 75 MMHG | SYSTOLIC BLOOD PRESSURE: 120 MMHG | WEIGHT: 203.56 LBS

## 2023-05-04 DIAGNOSIS — R60.0 LOCALIZED EDEMA: ICD-10-CM

## 2023-05-04 PROCEDURE — 93000 ELECTROCARDIOGRAM COMPLETE: CPT

## 2023-05-04 PROCEDURE — 99214 OFFICE O/P EST MOD 30 MIN: CPT

## 2023-06-06 ENCOUNTER — APPOINTMENT (OUTPATIENT)
Dept: COLORECTAL SURGERY | Facility: CLINIC | Age: 68
End: 2023-06-06
Payer: MEDICARE

## 2023-06-06 DIAGNOSIS — C18.9 MALIGNANT NEOPLASM OF COLON, UNSPECIFIED: ICD-10-CM

## 2023-06-06 PROCEDURE — 99213 OFFICE O/P EST LOW 20 MIN: CPT

## 2023-06-06 NOTE — HISTORY OF PRESENT ILLNESS
[FreeTextEntry1] : 65-year-old female 2 years status post right colon resection for stage I colon cancer. Patient progressing well. Tolerating diet. Gaining weight. No blood per rectum. No domal pain. No fevers or chills. No nausea or vomiting no aggravating factors\par \par April 26, 2022-patient is now 3 years status post right colon resection for stage I colon cancer. Patient progressing well. Tolerating diet. No fevers or chills no nausea or vomiting. Stable weight. No blood per rectum note, we'll discomfort. No aggravating factors. Of note, patient reports intermittent swelling in the perianal area. The swelling resolved by itself and the patient denies discharge at this time. Patient with previous I&D of this site. No specific aggravating factor\par \par June 6, 2023 patient is 4 years status post right hemicolectomy for stage I colon cancer. Patient progressing well. Tolerating diet. No fevers or chills no nausea or vomiting normal bowel movements no abdominal pain no weight loss some weight gain noted. No aggravating factors no perianal discomfort as previously was noted

## 2023-06-06 NOTE — ASSESSMENT
[FreeTextEntry1] : Stage I colon cancer\par -Patient progressing well\par -High-fiber diet\par -We'll schedule colonoscopy is 3 years from previous study\par -Bowel prep given\par -All questions answered\par -Patient for final one year evaluation after colonoscopy. This will be 5 years from surgery

## 2023-06-12 ENCOUNTER — NON-APPOINTMENT (OUTPATIENT)
Age: 68
End: 2023-06-12

## 2023-06-12 NOTE — ASSESSMENT
[FreeTextEntry1] :  66 y/o AA female w/ PMHX of Afib on Eliquis, DM II, HTN, COPD on home O2 2-3L, LINH on CPAP, lymphoma/CLL s/p radiation and chemo (1997), HFpEF LVEF 66%, severe pulmonary HTN (now on sildenafil and opsumit), RV systolic dysfunction, mod-severe TR.  Last admission 5/10-5/18/19. S/P partial right colectomy  (4/23/19) secondary to Stage 1 colon cancer,  as per patient with 27 neg nodes and did not need chemo. Nephrologist Dr. Heart for CKD and pulm Dr. Yang for Group 2 pulmonary HTN and  Dr. Hunt is her colorectal surgeon. Normal technetium pyrophosphate scan on 12/12/19 with findings not suggestive of transthyretin cardiac amyloidosis. 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 S/P bilateral cataract surgery on 10/7 and 10/21/20. Last TTE 11/23/21 TTE; LVEF 66%, LVIDD 4.9 cm, normal LV systolic function, mild mod MR, mild mod TR, severe pHTN\par \par ACC/AHA Stage C, NYHA Class III\par Appears compensated and normotensive

## 2023-06-12 NOTE — PHYSICAL EXAM
[Well Developed] : well developed [No Acute Distress] : no acute distress [Normal Conjunctiva] : normal conjunctiva [Clear Lung Fields] : clear lung fields [Good Air Entry] : good air entry [Soft] : abdomen soft [Non Tender] : non-tender [No Rash] : no rash [Normal] : alert and oriented, normal memory [de-identified] : JVP  8 cm with V waves secondary to TR [de-identified] : irregularly irregular [de-identified] : not using oxygen with appt [de-identified] : slow gait [de-identified] : trace lower extremity edema bilaterally

## 2023-06-12 NOTE — HISTORY OF PRESENT ILLNESS
[FreeTextEntry1] : Karina Marion is a 68 y/o AA female w/ PMHX of Afib on Eliquis, DM II, HTN, COPD on home O2 2-3L, LINH on CPAP, lymphoma/CLL s/p radiation and chemo (1997), HFpEF LVEF 66%, severe pulmonary HTN (now on sildenafil and opsumit), RV systolic dysfunction, mod-severe TR.  Last admission 5/10-5/18/19. S/P partial right colectomy  (4/23/19) secondary to Stage 1 colon cancer,  as per patient with 27 neg nodes and did not need chemo. Nephrologist Dr. Heart for CKD and pulm Dr. Yang for Group 2 pulmonary HTN and  Dr. Hunt is her colorectal surgeon. Normal technetium pyrophosphate scan on 12/12/19 with findings not suggestive of transthyretin cardiac amyloidosis. 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 S/P bilateral cataract surgery on 10/7 and 10/21/20. Last TTE 11/23/21 TTE; LVEF 66%, LVIDD 4.9 cm, normal LV systolic function, mild mod MR, mild mod TR, severe pHTN  Pt is here for a follow up today \par \par Pt comes for a follow up today. Last visit 1/5/23 she had a TTE with normal LVEF 65%, mod pHTN. Since last visit, she  followed up with pulm Dr. Yang 4/3/23 and reports she had a 6 MWT and was instructed to use oxygen more during the day with activity. She had a CT 4/13/23 after she had tested positive again for Covid 3/2023 notable for mild mosaic patttern to the lung parenchyma and mild enlargement of PA, unchanged.\par \par She had labs and iron infusions q 5 weeks with Kathi Norris 4/19/23 notable for H&H 9.4/30.8,  K 4. 5 and BUN/creat 63/1.79 from prior  78/1.97. Last serum pro  BNP 1429 from 2202 . She was started on Rocaltrol by nephrology due to elevated PTH.  She uses oxygen 3 liters, usually in the evening at [facility] and received a new CPAP mask and machine for LINH . No daytime sleepiness \par    \par She is currently taking torsemide 20 mg daily and has not needed an extra 20 mg on the weekends since last visit. She is taking lucas 12.5 mg 5 days a week . She is taking dig 0.125 mg QOD. Her home weight has been stable at 199-200 lbs from 202 lbs and is 203 lbs in office today with clothes.  B/P range at home 96//73 and is 120/75 in office today. She is adhering to low salt diet and is drinking less than 2 liters of fluid per day.\par \par Currently, she states she has been feeling well  better since  and can walk  6 blocks ( with oxygen 2-3 liters)  without cane to the store but sometimes stops at to rest briefly. She can climb one flight of stairs several times a day. She sleeps with 2 pillows with no orthopnea. She denies chest pain, palpitations, dizziness/LH and no syncope. She wears compression stockings that helps the swelling in her legs. \par \par \par She had the COvid vaccine x 2 and booster without adverse reaction. S/P Covid 19 infection 5/24/22\par - S/P monoclonal antibodies \par \par \par \par Patient name: KARINA MARION\par YOB: 1955   Age: 67 (F)   MR#: [id number]\par Study Date: 1/5/2023\par Location: O/PSonographer: SARITA Otero\par 2nd Sonographer: Evangelina Quiros M.D.\par Study quality: Technically good\par Referring Physician: MELINDA CHANEL NP\par Blood Pressure: 104/65 mmHg\par Height: 163 cm\par Weight: 92 kg\par BSA: 2 m2\par ------------------------------------------------------------------------\par PROCEDURE: Transthoracic echocardiogram with 2-D, M-Mode\par and complete spectral and color flow Doppler.\par INDICATION: Unspecified combined systolic (congestive) and\par diastolic (congestive) heart failure (I50.40)\par ------------------------------------------------------------------------\par DIMENSIONS:\par Dimensions:     Normal Values:\par LA:     4.7 cm    2.0 - 4.0 cm\par Ao:     2.8 cm    2.0 - 3.8 cm\par SEPTUM: 0.9 cm    0.6 - 1.2 cm\par PWT:    0.9 cm    0.6 - 1.1 cm\par LVIDd:  4.5 cm    3.0 - 5.6 cm\par LVIDs:  2.9 cm    1.8 - 4.0 cm\par Derived Variables:\par LVMI: 67 g/m2\par RWT: 0.40\par Fractional short: 36 %\par Ejection Fraction (Modified Roberts Rule): 65 %\par ------------------------------------------------------------------------\par OBSERVATIONS:\par Mitral Valve: Mitral annular calcification, otherwise\par normal mitral valve. Mild-moderate mitral regurgitation.\par Aortic Root: Normal aortic root.\par Aortic Valve: Calcified trileaflet aortic valve with normal\par opening.\par Left Atrium: Moderately dilated left atrium.  LA volume\par index = 46 cc/m2.\par Left Ventricle: Normal left ventricular systolic function.\par No segmental wall motion abnormalities. Normal left\par ventricular internal dimensions and wall thicknesses.\par Right Heart: Normal right atrium. Normal right ventricular\par size and function. Normal tricuspid valve.  Mild-moderate\par tricuspid regurgitation. Normal pulmonic valve.\par Pericardium/PleuraNormal pericardium with no pericardial\par effusion.\par Hemodynamic: Estimated right ventricular systolic pressure\par equals 51 mm Hg, assuming right atrial pressure equals 10\par mm Hg, consistent with moderate pulmonary hypertension.\par ------------------------------------------------------------------------\par CONCLUSIONS:\par 1. Mitral annular calcification, otherwise normal mitral\par valve. Mild-moderate mitral regurgitation.\par 2. Moderately dilated left atrium.  LA volume index = 46\par cc/m2.\par 3. Normal left ventricular internal dimensions and wall\par thicknesses.\par 4. Normal left ventricular systolic function. No segmental\par wall motion abnormalities.\par 5. Normal right ventricular size and function.\par 6. Estimated right ventricular systolic pressure equals 51\par mm Hg, assuming right atrial pressure equals 10 mm Hg,\par consistent with moderate pulmonary hypertension.\par *** Compared with echocardiogram of 11/23/2021, no\par significant changes noted.\par ------------------------------------------------------------------------\par Confirmed on  1/5/2023 - 10:24:15 by Tc Camargo M.D.,\par MIRYAMEDUARDO\par \par \par Patient name: KARINA MARION\par YOB: 1955   Age: 66 (F)   MR#: [id number]\par Study Date: 11/23/2021\par Location: O/PSonographer: Cary Parra RDCS\par Study quality: Technically good\par Referring Physician: MELINDA CHANEL NP\par Blood Pressure: 122/81 mmHg\par Height: 163 cm\par Weight: 92 kg\par BSA: 2 m2\par ------------------------------------------------------------------------\par PROCEDURE: Transthoracic echocardiogram with 2-D, M-Mode\par and complete spectral and color flow Doppler.\par INDICATION: Chronic diastolic (congestive) heart failure\par (I50.32)\par ------------------------------------------------------------------------\par DIMENSIONS:\par Dimensions:     Normal Values:\par LA:     4.8 cm    2.0 - 4.0 cm\par Ao:     2.9 cm    2.0 - 3.8 cm\par SEPTUM: 1.1 cm    0.6 - 1.2 cm\par PWT:    1.1 cm    0.6 - 1.1 cm\par LVIDd:  4.9 cm    3.0 - 5.6 cm\par LVIDs:  3.1 cm    1.8 - 4.0 cm\par Derived Variables:\par LVMI: 102 g/m2\par RWT: 0.44\par Fractional short: 37 %\par Ejection Fraction (Teicholtz): 66 %\par ------------------------------------------------------------------------\par OBSERVATIONS:\par Mitral Valve: Mitral annular calcification, otherwise\par normal mitral valve. Mild-moderate mitral regurgitation.\par Aortic Root: Normal aortic root.\par Aortic Valve: Calcified trileaflet aortic valve with normal\par opening.\par Left Atrium: Moderately dilated left atrium.  LA volume\par index = 46 cc/m2.\par Left Ventricle: Normal left ventricular systolic function.\par No segmental wall motion abnormalities. Normal left\par ventricular internal dimensions and wall thicknesses.\par Right Heart: Moderate right atrial enlargement. Normal\par right ventricular size and function. Normal tricuspid\par valve. Mild-moderate tricuspid regurgitation. Normal\par pulmonic valve. Minimal pulmonic regurgitation.\par Pericardium/PleuraNormal pericardium with no pericardial\par effusion.\par Hemodynamic: Estimated right ventricular systolic pressure\par equals 62 mm Hg, assuming right atrial pressure equals 10\par mm Hg, consistent with severe pulmonary hypertension.\par ------------------------------------------------------------------------\par CONCLUSIONS:\par 1. Mitral annular calcification, otherwise normal mitral\par valve. Mild-moderate mitral regurgitation.\par 2. Calcified trileaflet aortic valve with normal opening.\par 3. Moderately dilated left atrium.  LA volume index = 46\par cc/m2.\par 4. Normal left ventricular internal dimensions and wall\par thicknesses.\par 5. Normal left ventricular systolic function. No segmental\par wall motion abnormalities.\par 6. Moderate right atrial enlargement.\par 7. Normal right ventricular size and function.\par 8. Normal tricuspid valve. Mild-moderate tricuspid\par regurgitation.\par ------------------------------------------------------------------------\par Confirmed on  11/23/2021 - 17:01:34 by mel Avila M.D.\par ------------------------------------------------------------------------\par \par \par EXAM:  CT CHEST  \par \par \par PROCEDURE DATE:  12/16/2020  \par  \par \par \par INTERPRETATION:  CT CHEST WITHOUT CONTRAST\par \par INDICATION: Pulmonary hypertension.\par \par TECHNIQUE: Unenhanced helical images were obtained of the chest. Coronal and sagittal images were reconstructed. Maximum intensity projection images were generated.\par \par COMPARISON: CT chest 11/1/2019, 4/14/2019, and 4/8/2019.\par \par FINDINGS:\par \par Tubes/Lines: None.\par \par Lungs And Airways: The mosaic attenuation of the lungs is unchanged. Linear atelectasis/scarring of the left lower lobe. The remainder of the lungs are clear. The airways are normal.\par \par Pleura: No pleural effusion or pneumothorax.\par \par Mediastinum: The chest lymph nodes are less than 10 mm in the short axis. The visualized esophagus is unremarkable. The right lobe thyroid gland nodule is not well visualized.\par \par Heart and Vasculature: The heart is enlarged. In particular, the atria are enlarged.  There is no pericardial effusion. Coronary artery calcifications.\par \par Left-sided aortic arch and left-sided descending thoracic aorta. The aorta is tortuous. Aorta is normal in caliber. Aortic calcifications. The pulmonary artery is enlarged which can occur in the clinical setting of pulmonary arterial hypertension.\par \par Upper Abdomen: The upper abdomen is unremarkable.\par \par Bones And Soft Tissues: The bones are unremarkable.  The soft tissues are unremarkable.\par \par \par IMPRESSION:\par \par 1.  No change in the mosaic attenuation of the lungs.\par 2.  Cardiomegaly.\par 3.  Enlarged pulmonary artery can occur in the clinical setting of pulmonary arterial hypertension\par \par \par \par \par

## 2023-06-12 NOTE — CARDIOLOGY SUMMARY
[de-identified] : 5/4/23 AF 78, ow voltage, NSST\par 1/5/23  AF 74, ow voltage, NSST\par 10/3/22 AF 73, ow voltage, NSST\par 6/6/22 AF 61,  low voltage, NSST\par 2/7/22 AF 69, low voltage, NSST\par 10/25/21 AF 78, low voltage, NSST\par \par  [de-identified] : 1/5/23 normal LVEF 65%, mod pHTN.\par \par 11/23/21 TTE; LVEF 66%, LVIDD 4.9 cm, normal LV systolic function, mild mod MR, mild mod TR, severe pHTN\par \par  9/28/20 TTE  with LVEF 64%, LVIDD 4.6 cm, normal LV systolic function, decreased RV systolic function, mild MR, mild mod TR, severe pHTN\par \par  [de-identified] : Normal technetium pyrophosphate scan on 12/12/19 with findings not suggestive of transthyretin cardiac amyloidosis.\par

## 2023-06-12 NOTE — DISCUSSION/SUMMARY
[Patient] : the patient [___ Month(s)] : in [unfilled] month(s) [EKG obtained to assist in diagnosis and management of assessed problem(s)] : EKG obtained to assist in diagnosis and management of assessed problem(s) [FreeTextEntry1] : 1. Chronic diastolic heart failure, with Normal technetium pyrophosphate  scan on 12/12/19 with findings not suggestive of transthyretin cardiac amyloidosis ( pt had low voltage on EKG, joint pains and B/L carpal tunnel), 11/23/21 TTE with LVEF 66%\par - will continue lucas 12.5 mg five times a week, 4/9/23 K 4.5 and BUN/creat 63/1.79\par -continue torsemide 20 mg daily with one additional  as needed for weight gain of 2-3 lbs in 2-3 days.  pt is no longer on metolazone 5 mg prn. Goal weight is less than 202 lbs and she is 199-200 lbs at home and is 203 lbs in office today with clothes\par , follow up with PCP Dr. Palafox, will follow up with nephrologist Dr. Heart, pulm Dr. Yang and rheum \par - Continue Toprol to 50 mg po BID ( a.fib) \par - Continue compression stockings to improve venous return\par - increase daily exercise as tolerated\par - continue digoxin 0.125 mg QOD for AF with controlled VR 78 bpm\par \par  2. Severe Pulmonary HTN/PAH- TTE today with mod pHTN\par -  Continue with sildenafil 20 mg po TID.\par - Continue  Opsumit 10 mg daily, pt feels she has had improvement since starting \par - Continue torsemide as above\par - Follow up with Dr. Yang\par - Continue CPAP with oxygen for LINH\par - For PFTs with next pulm visit\par - using oxgen qhs and with activity\par \par  3. CKD- \par - 4/9/23 with improvement with BUN/creat 63/1.79 from 11/3/22  creat 1.97 from prior  creat 2.22 on lab 9/7 \par - Follow up with renal Dr. Heart \par - Follow up with primary Dr. Palafox\par \par 4. Atrial fibrillation- rate controlled \par -Continue Toprol 50 mg po BID.\par - Continue digoxin 0.125 mg every other day, add dig level to next blood work\par - Continue Eliquis for oral a/c and stroke prevention.\par \par 5. S/P  Covid 19 infection 5/24/22 and again 3/2023 ( didn't require treatment- had low grade temp and dry cough)\par - S/P monoclonal antibodies and doing well with first Covid infection\par \par Follow up in office in 4 months

## 2023-06-12 NOTE — ADDENDUM
[FreeTextEntry1] : Pt was seen in the office 5/4/23 and was found to be euvolemic and normotensive, Discussed with Dr. Degroot, may proceed with colonoscopy without any further cardiac  testing.

## 2023-06-30 NOTE — H&P PST ADULT - NSICDXFAMILYHX_GEN_ALL_CORE_FT
None FAMILY HISTORY:  Family history of hyperlipidemia  Family history of type 2 diabetes mellitus    Sibling  Still living? Unknown  Family history of hypertension, Age at diagnosis: Age Unknown    Aunt  Still living? Unknown  Family history of cancer, Age at diagnosis: Age Unknown

## 2023-07-10 ENCOUNTER — NON-APPOINTMENT (OUTPATIENT)
Age: 68
End: 2023-07-10

## 2023-07-21 ENCOUNTER — OUTPATIENT (OUTPATIENT)
Dept: OUTPATIENT SERVICES | Facility: HOSPITAL | Age: 68
LOS: 1 days | End: 2023-07-21

## 2023-07-21 VITALS
HEART RATE: 74 BPM | SYSTOLIC BLOOD PRESSURE: 137 MMHG | TEMPERATURE: 98 F | DIASTOLIC BLOOD PRESSURE: 78 MMHG | RESPIRATION RATE: 20 BRPM | WEIGHT: 201.94 LBS | HEIGHT: 62 IN | OXYGEN SATURATION: 98 %

## 2023-07-21 DIAGNOSIS — I50.9 HEART FAILURE, UNSPECIFIED: ICD-10-CM

## 2023-07-21 DIAGNOSIS — I10 ESSENTIAL (PRIMARY) HYPERTENSION: ICD-10-CM

## 2023-07-21 DIAGNOSIS — I27.20 PULMONARY HYPERTENSION, UNSPECIFIED: ICD-10-CM

## 2023-07-21 DIAGNOSIS — K62.5 HEMORRHAGE OF ANUS AND RECTUM: ICD-10-CM

## 2023-07-21 DIAGNOSIS — G47.33 OBSTRUCTIVE SLEEP APNEA (ADULT) (PEDIATRIC): ICD-10-CM

## 2023-07-21 DIAGNOSIS — E03.9 HYPOTHYROIDISM, UNSPECIFIED: ICD-10-CM

## 2023-07-21 DIAGNOSIS — I48.91 UNSPECIFIED ATRIAL FIBRILLATION: ICD-10-CM

## 2023-07-21 DIAGNOSIS — Z98.890 OTHER SPECIFIED POSTPROCEDURAL STATES: Chronic | ICD-10-CM

## 2023-07-21 DIAGNOSIS — Z98.49 CATARACT EXTRACTION STATUS, UNSPECIFIED EYE: Chronic | ICD-10-CM

## 2023-07-21 DIAGNOSIS — Z87.09 PERSONAL HISTORY OF OTHER DISEASES OF THE RESPIRATORY SYSTEM: ICD-10-CM

## 2023-07-21 DIAGNOSIS — Z90.49 ACQUIRED ABSENCE OF OTHER SPECIFIED PARTS OF DIGESTIVE TRACT: Chronic | ICD-10-CM

## 2023-07-21 LAB
A1C WITH ESTIMATED AVERAGE GLUCOSE RESULT: 5.6 % — SIGNIFICANT CHANGE UP (ref 4–5.6)
ALBUMIN SERPL ELPH-MCNC: 4.2 G/DL — SIGNIFICANT CHANGE UP (ref 3.3–5)
ALP SERPL-CCNC: 81 U/L — SIGNIFICANT CHANGE UP (ref 40–120)
ALT FLD-CCNC: 9 U/L — SIGNIFICANT CHANGE UP (ref 4–33)
ANION GAP SERPL CALC-SCNC: 11 MMOL/L — SIGNIFICANT CHANGE UP (ref 7–14)
AST SERPL-CCNC: 23 U/L — SIGNIFICANT CHANGE UP (ref 4–32)
BILIRUB SERPL-MCNC: 0.6 MG/DL — SIGNIFICANT CHANGE UP (ref 0.2–1.2)
BUN SERPL-MCNC: 55 MG/DL — HIGH (ref 7–23)
CALCIUM SERPL-MCNC: 10.4 MG/DL — SIGNIFICANT CHANGE UP (ref 8.4–10.5)
CHLORIDE SERPL-SCNC: 101 MMOL/L — SIGNIFICANT CHANGE UP (ref 98–107)
CO2 SERPL-SCNC: 24 MMOL/L — SIGNIFICANT CHANGE UP (ref 22–31)
CREAT SERPL-MCNC: 2.08 MG/DL — HIGH (ref 0.5–1.3)
EGFR: 25 ML/MIN/1.73M2 — LOW
ESTIMATED AVERAGE GLUCOSE: 114 — SIGNIFICANT CHANGE UP
GLUCOSE SERPL-MCNC: 90 MG/DL — SIGNIFICANT CHANGE UP (ref 70–99)
HCT VFR BLD CALC: 31.7 % — LOW (ref 34.5–45)
HGB BLD-MCNC: 9.4 G/DL — LOW (ref 11.5–15.5)
MCHC RBC-ENTMCNC: 20.4 PG — LOW (ref 27–34)
MCHC RBC-ENTMCNC: 29.7 GM/DL — LOW (ref 32–36)
MCV RBC AUTO: 68.8 FL — LOW (ref 80–100)
NRBC # BLD: 5 /100 WBCS — HIGH (ref 0–0)
NRBC # FLD: 0.26 K/UL — HIGH (ref 0–0)
PLATELET # BLD AUTO: 196 K/UL — SIGNIFICANT CHANGE UP (ref 150–400)
POTASSIUM SERPL-MCNC: 4.2 MMOL/L — SIGNIFICANT CHANGE UP (ref 3.5–5.3)
POTASSIUM SERPL-SCNC: 4.2 MMOL/L — SIGNIFICANT CHANGE UP (ref 3.5–5.3)
PROT SERPL-MCNC: 6.9 G/DL — SIGNIFICANT CHANGE UP (ref 6–8.3)
RBC # BLD: 4.61 M/UL — SIGNIFICANT CHANGE UP (ref 3.8–5.2)
RBC # FLD: 19.2 % — HIGH (ref 10.3–14.5)
SODIUM SERPL-SCNC: 136 MMOL/L — SIGNIFICANT CHANGE UP (ref 135–145)
WBC # BLD: 5.59 K/UL — SIGNIFICANT CHANGE UP (ref 3.8–10.5)
WBC # FLD AUTO: 5.59 K/UL — SIGNIFICANT CHANGE UP (ref 3.8–10.5)

## 2023-07-21 RX ORDER — ALLOPURINOL 300 MG
1 TABLET ORAL
Refills: 0 | DISCHARGE

## 2023-07-21 RX ORDER — DIGOXIN 250 MCG
1 TABLET ORAL
Refills: 0 | DISCHARGE

## 2023-07-21 RX ORDER — MACITENTAN 10 MG/1
1 TABLET, FILM COATED ORAL
Refills: 0 | DISCHARGE

## 2023-07-21 RX ORDER — FLUTICASONE PROPIONATE AND SALMETEROL 50; 250 UG/1; UG/1
1 POWDER ORAL; RESPIRATORY (INHALATION)
Refills: 0 | DISCHARGE

## 2023-07-21 RX ORDER — SPIRONOLACTONE 25 MG/1
1 TABLET, FILM COATED ORAL
Refills: 0 | DISCHARGE

## 2023-07-21 RX ORDER — METOPROLOL TARTRATE 50 MG
1 TABLET ORAL
Refills: 0 | DISCHARGE

## 2023-07-21 RX ORDER — FERROUS SULFATE 325(65) MG
1 TABLET ORAL
Refills: 0 | DISCHARGE

## 2023-07-21 RX ORDER — CALCITRIOL 0.5 UG/1
1 CAPSULE ORAL
Refills: 0 | DISCHARGE

## 2023-07-21 RX ORDER — LEVOTHYROXINE SODIUM 125 MCG
1 TABLET ORAL
Refills: 0 | DISCHARGE

## 2023-07-21 RX ORDER — PROPRANOLOL HCL 160 MG
1 CAPSULE, EXTENDED RELEASE 24HR ORAL
Refills: 0 | DISCHARGE

## 2023-07-21 RX ORDER — APIXABAN 2.5 MG/1
1 TABLET, FILM COATED ORAL
Refills: 0 | DISCHARGE

## 2023-07-21 NOTE — H&P PST ADULT - PROBLEM SELECTOR PLAN 4
Patient instructed to hold Eliquis 48 hours before procedure. Last dose 7/30.  Patient instructed to take Digoxin with a sip of water on the morning of procedure.

## 2023-07-21 NOTE — H&P PST ADULT - NSICDXPASTMEDICALHX_GEN_ALL_CORE_FT
PAST MEDICAL HISTORY:  Abnormal vaginal bleeding in postmenopausal patient     Anemia     Arthritis     Atrial fibrillation on eliquis    Cataract, bilateral     Chronic kidney disease (CKD)     Chronic systolic right heart failure     COPD (chronic obstructive pulmonary disease)     Diabetes type 2    Gout     Hemorrhage of anus and rectum     History of lymphoma 1997 s/p chemo and radiation    HTN (hypertension)     Hypothyroidism     Malignant neoplasm of ascending colon     Non-Hodgkin's lymphoma     LINH (obstructive sleep apnea) on BiPAP    Oxygen dependent at night 2-3L    Pulmonary HTN severe     PAST MEDICAL HISTORY:  Abnormal vaginal bleeding in postmenopausal patient     Anemia     Arthritis     Atrial fibrillation on eliquis    Cataract, bilateral     Chronic kidney disease (CKD)     Chronic systolic right heart failure     COPD (chronic obstructive pulmonary disease)     Diabetes type 2    Gout     Hemorrhage of anus and rectum     History of lymphoma 1997 s/p chemo and radiation    History of morbid obesity     HTN (hypertension)     Hypothyroidism     Malignant neoplasm of ascending colon     Non-Hodgkin's lymphoma     LINH (obstructive sleep apnea) on BiPAP    Oxygen dependent at night 2-3L    Pulmonary HTN severe

## 2023-07-21 NOTE — H&P PST ADULT - MUSCULOSKELETAL
Unsteady gait due to arthritis- uses cane/ROM intact/no joint swelling/no joint erythema/no joint warmth/no calf tenderness/strength 5/5 bilateral upper extremities/strength 5/5 bilateral lower extremities/abnormal gait details…

## 2023-07-21 NOTE — H&P PST ADULT - NEGATIVE ENMT SYMPTOMS
no hearing difficulty/no ear pain/no tinnitus/no vertigo/no sinus symptoms/no nasal congestion/no abnormal taste sensation/no gum bleeding/no dry mouth/no throat pain/no dysphagia

## 2023-07-21 NOTE — H&P PST ADULT - PROBLEM SELECTOR PLAN 1
Patient tentatively scheduled for Colonoscopy on 8/2/23.  Pre-op instructions provided. Pt given verbal and written instructions with teach back on  pepcid. Pt verbalized understanding with return demonstration.     CMP, HgbA1c were done at [facility].   Copy of CBC and EKG in chart.  Copy of Echocardiogram and Sleep study requested.  Copy of Cardiologist evaluation in chart. Patient tentatively scheduled for Colonoscopy on 8/2/23.  Pre-op instructions provided. Pt given verbal and written instructions with teach back on  pepcid. Pt verbalized understanding with return demonstration.     CMP, HgbA1c were done at [facility].   Copy of CBC and EKG in chart.  Copy of Echocardiogram and Sleep study requested.  Copy of Cardiologist evaluation in chart.  Pulmonary evaluations requested. Patient tentatively scheduled for Colonoscopy on 8/2/23.  Pre-op instructions provided. Pt given verbal and written instructions with teach back on  pepcid. Pt verbalized understanding with return demonstration.     CBC, CMP, HgbA1c were done at [facility].   Copy of EKG in chart.  Copy of Echocardiogram and Sleep study requested.  Copy of Cardiologist evaluation in chart.  Pulmonary evaluation requested (hx of COPD on Home O2). Patient will make an appointment with Dr. Francesco Francisco Patient tentatively scheduled for Colonoscopy on 8/2/23.  Pre-op instructions provided. Pt given verbal and written instructions with teach back on  pepcid. Pt verbalized understanding with return demonstration.     CBC, CMP, HgbA1c were done at [facility].   Copy of EKG in chart.  Copy of Echocardiogram and Sleep study requested.  Copy of Cardiology evaluation in chart.  Pulmonary evaluation requested (hx of COPD on Home O2). Patient will make an appointment with Dr. Francesco Francisco Patient tentatively scheduled for Colonoscopy on 8/2/23.  Pre-op instructions provided. Pt given verbal and written instructions with teach back on  pepcid. Pt verbalized understanding with return demonstration.     CBC, CMP, HgbA1c were done at [facility].   Copy of EKG in chart.  Copy of Echocardiogram and Sleep study requested.  Copy of Cardiology evaluation in chart.  Pulmonary evaluation requested (hx of COPD on Home O2). Patient will make an appointment with Dr. Francesco Francisco. Surgeon emailed and informed.

## 2023-07-21 NOTE — H&P PST ADULT - HISTORY OF PRESENT ILLNESS
67 year old female with pmhx of HTN, DMII (off medication- diet controlled), Afib (on Eliquis), CKD, CHF, Colon ca s/p partial colectomy (2019), LINH on Bipap, Pulmonary HTN, COPD on O2 NC 3 Liters, Non-hodgkin's Lymphoma s/p chemo and radiation presents for pre-op evaluation for diagnosis of Hemorrhage of anus and rectum. Pt is scheduled for Colonoscopy with sedation. Per patient procedure is for 3 year re-evaluation for history of Colon ca. Denies melena, hematochezia.

## 2023-07-21 NOTE — H&P PST ADULT - FUNCTIONAL STATUS
METS 3- Pt able to walk up 1 flight of stairs and ADLS without oxygen without symptoms. CHAVEZ while doing house chores without oxygen./4-10 METS METS 3-CHAVEZ while climbing up 2 flights of stairs and doing house chores on room air. Able to do ADLs on room air without symptoms./4-10 METS

## 2023-07-21 NOTE — H&P PST ADULT - OTHER CARE PROVIDERS
Cardiologist Dr. Lolly Thompson and Sherley Levy NP [phone]                    Pulmonologist- Dr. [phone] Cardiologist Dr. Lolly Thompson and Sherley Levy NP [phone]                    Pulmonologist- Dr. Francesco Francisco- [phone] and Dr. [phone] Cardiologist Dr. Lolly Thompson and Sherley Levy NP [phone]                                  Pulmonologist- Dr. Francesco Francisco- [phone] and Dr. [phone]

## 2023-07-21 NOTE — H&P PST ADULT - PROBLEM SELECTOR PLAN 3
Patient instructed to take Torsemide and Spironolactone with a sip of water on the morning of procedure.

## 2023-07-21 NOTE — H&P PST ADULT - NSICDXPASTSURGICALHX_GEN_ALL_CORE_FT
PAST SURGICAL HISTORY:  S/P cataract surgery     S/P D&C (status post dilation and curettage)     S/P partial colectomy 2019

## 2023-07-24 ENCOUNTER — LABORATORY RESULT (OUTPATIENT)
Age: 68
End: 2023-07-24

## 2023-07-24 ENCOUNTER — APPOINTMENT (OUTPATIENT)
Dept: RHEUMATOLOGY | Facility: CLINIC | Age: 68
End: 2023-07-24
Payer: MEDICARE

## 2023-07-24 VITALS
TEMPERATURE: 97.1 F | BODY MASS INDEX: 35.44 KG/M2 | OXYGEN SATURATION: 98 % | RESPIRATION RATE: 16 BRPM | HEART RATE: 73 BPM | DIASTOLIC BLOOD PRESSURE: 63 MMHG | HEIGHT: 63 IN | WEIGHT: 200.03 LBS | SYSTOLIC BLOOD PRESSURE: 100 MMHG

## 2023-07-24 DIAGNOSIS — M10.9 GOUT, UNSPECIFIED: ICD-10-CM

## 2023-07-24 PROBLEM — Z86.39 PERSONAL HISTORY OF OTHER ENDOCRINE, NUTRITIONAL AND METABOLIC DISEASE: Chronic | Status: ACTIVE | Noted: 2023-07-21

## 2023-07-24 PROBLEM — H26.9 UNSPECIFIED CATARACT: Chronic | Status: ACTIVE | Noted: 2023-07-21

## 2023-07-24 PROBLEM — K62.5 HEMORRHAGE OF ANUS AND RECTUM: Chronic | Status: ACTIVE | Noted: 2023-07-21

## 2023-07-24 PROBLEM — C85.90 NON-HODGKIN LYMPHOMA, UNSPECIFIED, UNSPECIFIED SITE: Chronic | Status: ACTIVE | Noted: 2023-07-21

## 2023-07-24 PROBLEM — N95.0 POSTMENOPAUSAL BLEEDING: Chronic | Status: ACTIVE | Noted: 2023-07-21

## 2023-07-24 PROCEDURE — 99214 OFFICE O/P EST MOD 30 MIN: CPT

## 2023-07-24 NOTE — ASSESSMENT
[FreeTextEntry1] : 67 year old female here for pulm HTN as well as positive AMMON and CCP\par \par 1. 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 Discussed with the patient that I suspect that her positive AMMON is likely related to underlying thyroid disease versus malignancy rather than lupus, MCTD or other connective tissue diseases. Patient does not have any symptoms or exam findings to suggest an underlying CTD at this time. \par \par 2. Positive CCP: patient has high titer CCP antibodies. C/o mild oligoarticular pain with minimal stiffness. Consider Plaquenil or alternate DMARD if symptoms worsen.   \par \par 3. Gout: on Allopurinol for hyperuricemia. Recent UA at goal < 5 mg/dL. Has nodular swelling over bilateral elbows and R 2nd PIP which could represent tophaceous deposits in this setting. Will monitor symptoms for now. Continue Allopurinol.\par \par 4. Bone health: Ca+D as needed. Normal DEXA in  Nov 2022. \par \par Follow up in 3 months

## 2023-07-24 NOTE — HISTORY OF PRESENT ILLNESS
[___ Month(s) Ago] : [unfilled] month(s) ago [Currently Experiencing] : currently [Shortness of Breath] : shortness of breath [Arthralgias] : arthralgias [Decreased ROM] : decreased range of motion [Morning Stiffness] : morning stiffness [Dyspnea] : dyspnea [FreeTextEntry1] : Stable arthralgias. R wrist occasionally bothers her but uses Diclofenac gel which alleviates her pain.  [Anorexia] : no anorexia [Weight Loss] : no weight loss [Malaise] : no malaise [Fever] : no fever [Chills] : no chills [Fatigue] : no fatigue [Depression] : no depression [Malar Facial Rash] : no malar facial rash [Skin Lesions] : no lesions [Skin Nodules] : no skin nodules [Oral Ulcers] : no oral ulcers [Cough] : no cough [Dry Mouth] : no dry mouth [Dysphonia] : no dysphonia [Dysphagia] : no dysphagia [Chest Pain] : no chest pain [Joint Swelling] : no joint swelling [Joint Warmth] : no joint warmth [Joint Deformity] : no joint deformity [Falls] : no falls [Difficulty Standing] : no difficulty standing [Difficulty Walking] : no difficulty walking [Myalgias] : no myalgias [Muscle Weakness] : no muscle weakness [Muscle Spasms] : no muscle spasms [Muscle Cramping] : no muscle cramping [Visual Changes] : no visual changes [Eye Pain] : no eye pain [Eye Redness] : no eye redness [Dry Eyes] : no dry eyes

## 2023-07-24 NOTE — DATA REVIEWED
[FreeTextEntry1] : CT Chest (04/2023): LUNGS AND AIRWAYS: Patent central airways. Mild mosaic attenuation pattern to the lung parenchyma unchanged. PLEURA: No pleural effusion. MEDIASTINUM AND CLAUDIA: No  lymphadenopathy. VESSELS: Atherosclerotic changes. Mild enlargement of the main pulmonary artery at 3.7 cm, unchanged. The ascending aorta at the same level measures 3.2 cm. HEART: Biatrial enlargement. No pericardial effusion. CHEST WALL AND LOWER NECK: Within normal limits. VISUALIZED UPPER ABDOMEN: Within normal limits. BONES: Degenerative changes. IMPRESSION: Mild mosaic attenuation pattern to the lung parenchyma and mild enlargement of the main pulmonary artery, unchanged.\par \par CT Chest (06/2022): Lungs/Airways/Pleura: Persistent mosaic perfusion in the lungs. No endobronchial nodule. No pleural effusion. Mediastinum/Lymph nodes: No thoracic adenopathy. Heart and Vessels: There is biatrial enlargement. No pericardial effusion there are coronary artery calcifications. The mid ascending aorta measures 3.2 cm. The pulmonary artery is larger than the adjacent ascending aorta. Upper Abdomen: Partially imaged anastomotic staple line at the splenic flexure. Osseous structures and Soft Tissues: No aggressive bone lesions. IMPRESSION: Stable appearance the chest with mosaic perfusion of the lungs and enlarged pulmonary artery compared to the adjacent ascending aorta\par \par Hand x-ray (09/2021): Chronic posttraumatic deformity of right ulnar styloid process with adjacent chronic focal small osseous fragmentation. Otherwise no dislocations or acute appearing fractures. 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 Preserved remaining joint spaces and no developed joint margin erosions. Carpal bones normally aligned. Neutral ulnar variance. No lytic or blastic lesions. \par \par CT Chest (12/2020): COMPARISON: CT chest 11/1/2019, 4/14/2019, and 4/8/2019. FINDINGS: Tubes/Lines: None. Lungs And Airways: The mosaic attenuation of the lungs is unchanged. Linear atelectasis/scarring of the left lower lobe. The remainder of the lungs are clear. The airways are normal. Pleura: No pleural effusion or pneumothorax. Mediastinum: The chest lymph nodes are less than 10 mm in the short axis. The visualized esophagus is unremarkable. The right lobe thyroid gland nodule is not well visualized. Heart and Vasculature: The heart is enlarged. In particular, the atria are enlarged. There is no pericardial effusion. Coronary artery calcifications. Left-sided aortic arch and left-sided descending thoracic aorta. The aorta is tortuous. Aorta is normal in caliber. Aortic calcifications. The pulmonary artery is enlarged which can occur in the clinical setting of pulmonary arterial hypertension. Upper Abdomen: The upper abdomen is unremarkable. Bones And Soft Tissues: The bones are unremarkable. The soft tissues are unremarkable. IMPRESSION: 1. No change in the mosaic attenuation of the lungs. 2. Cardiomegaly.\par 3. Enlarged pulmonary artery can occur in the clinical setting of pulmonary arterial hypertension\par \par Cardiac Amyloidosis Spect scan (12/2019): The technical quality of the images was satisfactory. There is no abnormal increased myocardial uptake. Heart to Contralateral Chest Ratio (1 hr.): = 1.1; (normal: less than or equal to 1.0) Myocardium to Bone (visual at 3 hrs): Grade: 1 (normal: < 2) IMPRESSION: Normal cardiac amyloid Imaging study; findings are not suggestive of transthyretin cardiac amyloidosis. \par \par CT Chest (11/2019): Tubes/Lines: None. Lungs And Airways: The subtle mosaic attenuation of the lungs is unchanged. The remainder of the lungs are clear. The airways are normal. Pleura: No pleural effusion or pneumothorax. Mediastinum: There are no enlarged chest lymph nodes. The hypodense nodule arising from the right lobe of the thyroid gland is partially imaged and has decreased in size. The esophagus is unremarkable. Heart and Vasculature: The heart is enlarged. No pericardial effusion. Coronary artery calcifications. The pulmonary artery is enlarged. Aorta is normal in caliber. Atheromatous disease of the aorta. Upper Abdomen: The upper abdomen is unremarkable. Bones And Soft Tissues: The bones are unremarkable. The soft tissues are unremarkable. IMPRESSION: 1. No change in the mosaic attenuation of the lungs. 2. Cardiomegaly. 3. Enlarged pulmonary artery can occur in the setting of pulmonary arterial \par hypertension. \par \par Hand x-ray (10/2019): Chronic posttraumatic deformity of right ulnar styloid process with adjacent chronic focal small osseous fragmentation. 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 Preserved remaining joint spaces and \par no joint margin erosions. Carpal bones normally aligned. Neutral ulnar variance. No lytic or blastic lesions. \par \par Foot x-ray (10/2019): Frontal, oblique, and lateral views of both feet from 10/7/2019 at 1054. No \par similar prior studies available for comparison. IMPRESSION: No fractures or dislocations. Tarsometatarsal alignment maintained without evidence for a Lisfranc injury. Hypertrophic bony spurring projecting from dorsal right TMT joint margins. Preserved remaining visualized joint spaces and no joint margin erosions. \par Bilateral plantar posterior calcaneal enthesophytes. No lytic or blastic lesions.\par \par CT Neck (05/2019): AERODIGESTIVE TRACT: See below. Otherwise normal in appearance. LYMPH NODES: No adenopathy. PAROTID GLANDS: Normal. SUBMANDIBULAR GLANDS: Normal. THYROID GLAND: There is a large well marginated homogeneous appearing mass within the right thyroid lobe measuring 4.1 x 3.4 x 4 cm in size. The mass appears larger when compared with the prior ultrasound with measurements in the prior report of 3.3 x 3.3 x 2.6 cm. There is mild associated leftward tracheal displacement and mild tracheal narrowing. No substernal extension is seen. VISUALIZED PARANASAL SINUSES: Mild left maxillary polyp or retention cyst. VISUALIZED TYMPANOMASTOID CAVITIES: Clear. BONES: Cervical spondylosis MISCELLANEOUS: Right-sided pleural effusion, moderate. IMPRESSION: \par Right-sided thyroid nodule which appears to have enlarged when compared with a recent sonogram. Repeat sonogram is advised to confirm whether this is a rapidly enlarging mass which may require histologic characterization. There is mild leftward tracheal displacement and tracheal narrowing. \par Moderate right pleural effusion. \par \par TTE (04/2019): 1. Mitral annular calcification and calcified mitral leaflets with normal diastolic opening. Mild mitral regurgitation.  2. Calcified trileaflet aortic valve with normal opening. 3. Normal left ventricular internal dimensions and wall thicknesses. 4. Hyperdynamic left ventricle. Flattening of the interventricular septum in both systole and diastole is  consistent with right ventricular pressure overload. 5. Severe right atrial enlargement. 6. Right ventricular enlargement with normal right ventricular systolic function. 7. Normal tricuspid valve. Moderate-severe tricuspid regurgitation. 8. Estimated pulmonary artery systolic pressure equals 79 mm Hg, assuming right atrial pressure equals 5  mm Hg, consistent with severe pulmonary hypertension. *** Compared with echocardiogram of 10/31/2017, no significant changes noted.\par \par Right Heart Cath (04/2019):  COMPLICATIONS: There were no complications. No complications occurred\par during the cath lab visit. DIAGNOSTIC IMPRESSIONS: Severe PHT PCWP 18mmhg  INTERVENTIONAL IMPRESSIONS: Severe PHT PCWP 18mmhg\par \par CT chest, abdomen and pelvis (04/2019): CHEST: LUNGS AND LARGE AIRWAYS: Patent central airways. No pulmonary nodules. PLEURA: No pleural effusion. No pneumothorax VESSELS: Atherosclerotic disease. Coronary calcifications. HEART: Heart size is enlarged. No pericardial effusion. MEDIASTINUM AND CLAUDIA: Prominent mediastinal and hilar lymph nodes, without change. CHEST WALL AND LOWER NECK: Heterogeneously enhancing right thyroid mass measuring 3.5 x 2.9 cm, partially imaged (series 2 image 1). ABDOMEN AND PELVIS: LIVER: Within normal limits. BILE DUCTS: Normal caliber. GALLBLADDER: Within normal limits. SPLEEN: Within normal limits. PANCREAS: Within normal limits. ADRENALS: Within normal limits. KIDNEYS/URETERS: Atrophic. Subcentimeter hypoattenuating right renal lesions are too small to characterize. No hydronephrosis. BLADDER: Within normal limits. REPRODUCTIVE ORGANS: Calcified uterine fibroid. No adnexal masses. BOWEL: No bowel obstruction. Narrowing of the sigmoid colon may represent new sigmoid mass. Normal appendix. There is high-density material along the posterior aspect of the gastric fundus/antrum. PERITONEUM: No ascites. VESSELS: Atherosclerotic disease. \par RETROPERITONEUM: No lymphadenopathy. ABDOMINAL WALL: Questionable skin defect of the left gluteal soft tissues not seen on prior CT. BONES: Degenerative changes of the spine. IMPRESSION: Unchanged prominent mediastinal nodes, nonspecific. Otherwise, no evidence of metastatic disease in the chest, abdomen, or pelvis. High-density material layering within the gastric antrum may be secondary to ingested material. Questionable skin defect of the left gluteal soft tissues not seen on prior CT, likely skinfold however clinical correlation is recommended. \par \par CTA Chest (11/2017): CHEST: LUNGS AND LARGE AIRWAYS: Patent central airways. Minimal compressive \par atelectasis is noted involving portion of the right lower lobe secondary to right pleural effusion. PLEURA: Small right pleural effusion. No left-sided pleural effusion. No pneumothorax. VESSELS: No filling defect visualized within the bilateral pulmonary arteries. HEART: Cardiomegaly. No pericardial effusion. MEDIASTINUM AND CLAUDIA: 1.5 x 1.2 cm lymph node within the AP window (series 2, image 78), which is nonspecific in nature. CHEST WALL AND LOWER NECK: Within normal limits. VISUALIZED UPPER ABDOMEN: Small hiatal hernia. BONES: Mild degenerative changes of the spine. IMPRESSION: No evidence of a pulmonary embolism. Small right pleural effusion. \par \par CT Cervical spine (01/2017): CT HEAD: The study is limited by patient motion. There are patchy hypodensities in the bilateral periventricular and subcortical white matter without mass effect which are nonspecific but may represent white matter microvascular ischemic changes. There is no CT evidence for acute territorial infarction. There is no acute intracranial hemorrhage, extra axial fluid collection, mass effect, midline shift or hydrocephalus. There is bilateral proptosis. The visualized paranasal sinuses and mastoid air cells are clear. There is a right parietooccipital scalp hematoma with overlying skin staples. There is no calvarial fracture. CT CERVICAL SPINE: Vertebral body heights and alignment are maintained. The cervical lordosis is straightened, which may be due to muscle spasm or positioning. The atlantoaxial and atlantooccipital joints are maintained. There is no acute cervical spine fracture or traumatic malalignment. There are multilevel anterior osteophytes. The intervertebral disc space heights are maintained. There is a C6 superior endplate Schmorl's node. There is no osseous spinal canal stenosis. Uncovertebral hypertrophy results in mild multilevel bilateral neuroforaminal stenosis. The visualized lung apices are clear. The thyroid gland is unremarkable. IMPRESSION: CT head: Right parietooccipital scalp hematoma. No acute intracranial hemorrhage or extra-axial fluid collection. CT cervical spine: No acute cervical spine fracture or traumatic malalignment. \par \par CT Chest (2010): No prior CT scan is available for comparison. There is diffuse mosaic attenuation of both lungs with the size of vessels smaller in the lucent area. Repeat CT scan in expiration shows persistent \par mosaic attenuation due to air trapping. There is no significant mediastinal, hilar or axillary adenopathy. \par Trachea and main bronchi are patent. There is no evidence of pleural effusion. Heart size is within normal limits.. There is a trace pericardial effusion which is likely physiologic. Visualized bones show no significant abnormality. CT scan through upper abdomen shows normal adrenals. A 2 mm nonobstructing \par calculus is seen in the left kidney. IMPRESSION: Diffuse mosaic attenuation, inspiratory and expiratory CT scans confirm the presence of air trapping likely from small airway disease. \par

## 2023-07-24 NOTE — PHYSICAL EXAM
[General Appearance - Alert] : alert [General Appearance - In No Acute Distress] : in no acute distress [Sclera] : the sclera and conjunctiva were normal [Outer Ear] : the ears and nose were normal in appearance [Neck Appearance] : the appearance of the neck was normal [] : no respiratory distress [Auscultation Breath Sounds / Voice Sounds] : lungs were clear to auscultation bilaterally [Heart Rate And Rhythm] : heart rate was normal and rhythm regular [Heart Sounds] : normal S1 and S2 [Heart Sounds Gallop] : no gallops [Murmurs] : no murmurs [Heart Sounds Pericardial Friction Rub] : no pericardial rub [Full Pulse] : the pedal pulses are present [Edema] : there was no peripheral edema [Cervical Lymph Nodes Enlarged Posterior Bilaterally] : posterior cervical [Cervical Lymph Nodes Enlarged Anterior Bilaterally] : anterior cervical [Supraclavicular Lymph Nodes Enlarged Bilaterally] : supraclavicular [Axillary Lymph Nodes Enlarged Bilaterally] : axillary [No CVA Tenderness] : no ~M costovertebral angle tenderness [No Spinal Tenderness] : no spinal tenderness [Abnormal Walk] : normal gait [Nail Clubbing] : no clubbing  or cyanosis of the fingernails [Musculoskeletal - Swelling] : no joint swelling seen [Motor Tone] : muscle strength and tone were normal [Skin Color & Pigmentation] : normal skin color and pigmentation [No Focal Deficits] : no focal deficits [Oriented To Time, Place, And Person] : oriented to person, place, and time [Impaired Insight] : insight and judgment were intact [Affect] : the affect was normal [FreeTextEntry1] : TTP of the 2nd MCP without any swelling

## 2023-07-31 NOTE — ED ADULT NURSE NOTE - NS PRO AD BILL OF RIGHTS
Reached out to patient's insurance to discuss the message below. Per Brynn in claims, patient has already submitted a second level appeal through the plan [place] which is the next step. The employer will determine at this time if the procedure/medication should be covered or not. If the second level appeal is denied, there is no further steps that can be taken to approve the procedure/medication. Patient notified via e-advice of the above.    Yes

## 2023-08-02 ENCOUNTER — APPOINTMENT (OUTPATIENT)
Dept: COLORECTAL SURGERY | Facility: HOSPITAL | Age: 68
End: 2023-08-02
Payer: MEDICARE

## 2023-08-02 ENCOUNTER — OUTPATIENT (OUTPATIENT)
Dept: OUTPATIENT SERVICES | Facility: HOSPITAL | Age: 68
LOS: 1 days | Discharge: HOME | End: 2023-08-02

## 2023-08-02 VITALS
HEART RATE: 69 BPM | SYSTOLIC BLOOD PRESSURE: 104 MMHG | OXYGEN SATURATION: 94 % | DIASTOLIC BLOOD PRESSURE: 53 MMHG | RESPIRATION RATE: 18 BRPM

## 2023-08-02 VITALS
OXYGEN SATURATION: 100 % | DIASTOLIC BLOOD PRESSURE: 66 MMHG | SYSTOLIC BLOOD PRESSURE: 125 MMHG | WEIGHT: 199.96 LBS | HEART RATE: 66 BPM | HEIGHT: 63 IN | TEMPERATURE: 98 F | RESPIRATION RATE: 16 BRPM

## 2023-08-02 DIAGNOSIS — K62.5 HEMORRHAGE OF ANUS AND RECTUM: ICD-10-CM

## 2023-08-02 DIAGNOSIS — Z90.49 ACQUIRED ABSENCE OF OTHER SPECIFIED PARTS OF DIGESTIVE TRACT: Chronic | ICD-10-CM

## 2023-08-02 DIAGNOSIS — Z98.49 CATARACT EXTRACTION STATUS, UNSPECIFIED EYE: Chronic | ICD-10-CM

## 2023-08-02 DIAGNOSIS — Z98.890 OTHER SPECIFIED POSTPROCEDURAL STATES: Chronic | ICD-10-CM

## 2023-08-02 LAB
GLUCOSE BLDC GLUCOMTR-MCNC: 92 MG/DL — SIGNIFICANT CHANGE UP (ref 70–99)
GLUCOSE BLDC GLUCOMTR-MCNC: 96 MG/DL — SIGNIFICANT CHANGE UP (ref 70–99)

## 2023-08-02 PROCEDURE — 45378 DIAGNOSTIC COLONOSCOPY: CPT

## 2023-08-02 NOTE — ASU PATIENT PROFILE, ADULT - FALL HARM RISK - UNIVERSAL INTERVENTIONS
Bed in lowest position, wheels locked, appropriate side rails in place/Call bell, personal items and telephone in reach/Instruct patient to call for assistance before getting out of bed or chair/Non-slip footwear when patient is out of bed/[place] to call system/Physically safe environment - no spills, clutter or unnecessary equipment/Purposeful Proactive Rounding/Room/bathroom lighting operational, light cord in reach

## 2023-08-03 LAB
ALBUMIN SERPL ELPH-MCNC: 4.4 G/DL
ALP BLD-CCNC: 87 U/L
ALT SERPL-CCNC: 9 U/L
ANION GAP SERPL CALC-SCNC: 12 MMOL/L
APPEARANCE: CLEAR
AST SERPL-CCNC: 17 U/L
BILIRUB SERPL-MCNC: 0.6 MG/DL
BILIRUBIN URINE: NEGATIVE
BLOOD URINE: NEGATIVE
BUN SERPL-MCNC: 62 MG/DL
C3 SERPL-MCNC: 117 MG/DL
C4 SERPL-MCNC: 57 MG/DL
CALCIUM SERPL-MCNC: 10.8 MG/DL
CHLORIDE SERPL-SCNC: 101 MMOL/L
CK SERPL-CCNC: 59 U/L
CO2 SERPL-SCNC: 26 MMOL/L
COLOR: YELLOW
CREAT SERPL-MCNC: 2.29 MG/DL
CREAT SPEC-SCNC: 73 MG/DL
CREAT/PROT UR: 0.1 RATIO
CRP SERPL-MCNC: <3 MG/L
DEPRECATED KAPPA LC FREE/LAMBDA SER: 2.44 RATIO
DSDNA AB SER-ACNC: <12 IU/ML
EGFR: 23 ML/MIN/1.73M2
ERYTHROCYTE [SEDIMENTATION RATE] IN BLOOD BY WESTERGREN METHOD: 45 MM/HR
GLUCOSE QUALITATIVE U: NEGATIVE MG/DL
GLUCOSE SERPL-MCNC: 93 MG/DL
IGA SER QL IEP: 66 MG/DL
IGG SER QL IEP: 1463 MG/DL
IGM SER QL IEP: 58 MG/DL
KAPPA LC CSF-MCNC: 4.87 MG/DL
KAPPA LC SERPL-MCNC: 11.86 MG/DL
KETONES URINE: NEGATIVE MG/DL
LEUKOCYTE ESTERASE URINE: ABNORMAL
NITRITE URINE: NEGATIVE
PH URINE: 6
POTASSIUM SERPL-SCNC: 4.6 MMOL/L
PROT SERPL-MCNC: 7.1 G/DL
PROT UR-MCNC: 4 MG/DL
PROTEIN URINE: NEGATIVE MG/DL
SODIUM SERPL-SCNC: 138 MMOL/L
SPECIFIC GRAVITY URINE: 1.01
URATE SERPL-MCNC: 3.5 MG/DL
UROBILINOGEN URINE: 0.2 MG/DL

## 2023-08-04 NOTE — CONSULT NOTE ADULT - PROVIDER SPECIALTY LIST ADULT
Cardiology
Colorectal Surgery
ENT
Gastroenterology
Nephrology
Pulmonology
Pulmonology
SICU
Treatment Goal Explanation (Does Not Render In The Note): Stable for the purposes of categorizing medical decision making is defined by the specific treatment goals for an individual patient. A patient that is not at their treatment goal is not stable, even if the condition has not changed and there is no short- term threat to life or function.

## 2023-08-10 ENCOUNTER — APPOINTMENT (OUTPATIENT)
Dept: HEART FAILURE | Facility: CLINIC | Age: 68
End: 2023-08-10
Payer: MEDICARE

## 2023-08-10 ENCOUNTER — NON-APPOINTMENT (OUTPATIENT)
Age: 68
End: 2023-08-10

## 2023-08-10 VITALS
WEIGHT: 204 LBS | HEART RATE: 58 BPM | DIASTOLIC BLOOD PRESSURE: 70 MMHG | OXYGEN SATURATION: 99 % | BODY MASS INDEX: 36.14 KG/M2 | HEIGHT: 63 IN | SYSTOLIC BLOOD PRESSURE: 112 MMHG

## 2023-08-10 PROCEDURE — 93000 ELECTROCARDIOGRAM COMPLETE: CPT

## 2023-08-10 PROCEDURE — 99214 OFFICE O/P EST MOD 30 MIN: CPT

## 2023-08-10 NOTE — PHYSICAL EXAM
[Well Developed] : well developed [No Acute Distress] : no acute distress [Normal Conjunctiva] : normal conjunctiva [Clear Lung Fields] : clear lung fields [Good Air Entry] : good air entry [Soft] : abdomen soft [Non Tender] : non-tender [No Rash] : no rash [Normal] : alert and oriented, normal memory [de-identified] : JVP  8-10 cm with V waves secondary to TR [de-identified] : irregularly irregular [de-identified] : on 2-3 liter oxygen via nasal cannula [de-identified] : slow gait [de-identified] : trace lower extremity edema bilaterally

## 2023-08-10 NOTE — DISCUSSION/SUMMARY
[Patient] : the patient [___ Month(s)] : in [unfilled] month(s) [FreeTextEntry1] : 1. Chronic diastolic heart failure- normal LVEF -  with Normal technetium pyrophosphate scan on 12/12/19 with findings not suggestive of transthyretin cardiac amyloidosis ( pt had low voltage on EKG, joint pains and B/L carpal tunnel) - will continue lucas 12.5 mg five times a week - will take one additional torsemide 20 mg x 1 and will continue torsemide 20 mg daily with one additional  as needed for weight gain of 2-3 lbs in 2-3 days.  pt is no longer on metolazone 5 mg prn. Goal weight is less than 202 lbs and she is 201 lbs at home and is 204 lbs in office today with clothes , follow up with PCP Dr. Palafox, will follow up with nephrologist Dr. Heart, pulm Dr. Yang and rheum  - Continue Toprol to 50 mg po BID ( a.fib)  - Continue compression stockings to improve venous return - increase daily exercise as tolerated - continue digoxin 0.125 mg QOD for AF with controlled VR - will refer to Dr. Montero for vascular due to numbness in feet   2. Severe Pulmonary HTN/PAH-  - TTE  with mod pHTN -  Continue with sildenafil 20 mg po TID. - Continue  Opsumit 10 mg daily, pt feels she has had improvement since starting  - Continue torsemide as above - Follow up with Dr. Yang - Continue CPAP with oxygen for LINH - For PFTs with next pulm visit - using oxgen qhs and with activity   3. CKD-   - Follow up with renal Dr. Heart  - Follow up with primary Dr. Palafox  4. Atrial fibrillation- rate controlled  -Continue Toprol 50 mg po BID. - Continue digoxin 0.125 mg every other day, add dig level to next blood work - Continue Eliquis for oral a/c and stroke prevention.    Follow up in office in 4 months [EKG obtained to assist in diagnosis and management of assessed problem(s)] : EKG obtained to assist in diagnosis and management of assessed problem(s)

## 2023-08-10 NOTE — CARDIOLOGY SUMMARY
[de-identified] : 8/10/23 AF 57, ow voltage, NSST 5/4/23 AF 78, ow voltage, NSST 1/5/23  AF 74, ow voltage, NSST 10/3/22 AF 73, ow voltage, NSST 6/6/22 AF 61,  low voltage, NSST 2/7/22 AF 69, low voltage, NSST 10/25/21 AF 78, low voltage, NSST   [de-identified] : 1/5/23 normal LVEF 65%, mod pHTN.  11/23/21 TTE; LVEF 66%, LVIDD 4.9 cm, normal LV systolic function, mild mod MR, mild mod TR, severe pHTN   9/28/20 TTE  with LVEF 64%, LVIDD 4.6 cm, normal LV systolic function, decreased RV systolic function, mild MR, mild mod TR, severe pHTN   [de-identified] : CT 4/13/23 after she had tested positive again for Covid 3/2023 notable for mild mosaic patttern to the lung parenchyma and mild enlargement of PA, unchanged [de-identified] : Normal technetium pyrophosphate scan on 12/12/19 with findings not suggestive of transthyretin cardiac amyloidosis.\par   solids

## 2023-08-10 NOTE — ASSESSMENT
[FreeTextEntry1] :  67 y/o AA female w/ PMHX of Afib on Eliquis, DM II, HTN, COPD on home O2 2-3L, LINH on CPAP, lymphoma/CLL s/p radiation and chemo (1997), HFpEF LVEF 66%, severe pulmonary HTN (now on sildenafil and opsumit), RV systolic dysfunction, mod-severe TR.  Last admission 5/10-5/18/19. S/P partial right colectomy  (4/23/19) secondary to Stage 1 colon cancer,  as per patient with 27 neg nodes and did not need chemo. Nephrologist Dr. Heart for CKD and pulm Dr. Yang for Group 2 pulmonary HTN and  Dr. Hunt is her colorectal surgeon. Normal technetium pyrophosphate scan on 12/12/19 with findings not suggestive of transthyretin cardiac amyloidosis. 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 S/P bilateral cataract surgery on 10/7 and 10/21/20. Last TTE Last TTE 1/5/23 normal LVEF 65%, mod pHTN  ACC/AHA Stage C, NYHA Class III Appears mildly volume overloaded and normotensive

## 2023-08-10 NOTE — HISTORY OF PRESENT ILLNESS
[FreeTextEntry1] : Karina Marion is a 67 y/o AA female w/ PMHX of Afib on Eliquis, DM II, HTN, COPD on home O2 2-3L, LINH on CPAP, lymphoma/CLL s/p radiation and chemo (1997), HFpEF LVEF 66%, severe pulmonary HTN (now on sildenafil and opsumit), RV systolic dysfunction, mod-severe TR.  Last admission 5/10-5/18/19. S/P partial right colectomy  (4/23/19) secondary to Stage 1 colon cancer, as per patient with 27 neg nodes and did not need chemo. Nephrologist Dr. Heart for CKD and pulm Dr. Yang for Group 2 pulmonary HTN and  Dr. Hunt is her colorectal surgeon. Normal technetium pyrophosphate scan on 12/12/19 with findings not suggestive of transthyretin cardiac amyloidosis. 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 S/P bilateral cataract surgery on 10/7 and 10/21/20. Last TTE 1/5/23 normal LVEF 65%, mod pHTN. Pt is here for a follow up today   Pt comes for a follow up today. Last visit 5/4/23 she had a colonoscopy with no polys. Last TTE with normal LVEF 65%, mod pHTN. Since last visit, she had labs with K 4.6, Bun/creat 62/2.29 . She had labs with Dr. Armenta and aranesp was changed to q 3 weeks.      She uses oxygen 2-3 liters, usually in the evening at [facility] and received a new CPAP mask and machine for LINH . No daytime sleepiness Pt complains of numbness in her feet and states she was told by her PCP that she has peripheral neuropathy but she feels it has worsened.      She is currently taking torsemide 20 mg daily and has not needed an extra 20 mg on the weekends since last visit. She is taking lucas 12.5 mg 5 days a week . She is taking dig 0.125 mg QOD. Her home weight has been stable at 201.6 lbs and is 204 lbs in office today with clothes.  B/P range at home 95-98 and is 112/70 in office today. She is adhering to low salt diet and is drinking less than 2 liters of fluid per day.  Currently, she states she has been feeling well  better since  and can walk 5 blocks ( with oxygen 2-3 liters) without cane to the store but sometimes stops at to rest briefly. She can climb one flight of stairs several times a day. She sleeps with 2 pillows with no orthopnea. She denies chest pain, palpitations, dizziness/LH and no syncope. She wears compression stockings that helps the swelling in her legs.    She had the COvid vaccine x 2 and booster without adverse reaction. S/P Covid 19 infection 5/24/22 - S/P monoclonal antibodies     Patient name: KARINA MARION YOB: 1955   Age: 67 (F)   MR#: [id number] Study Date: 1/5/2023 Location: O/PSonographer: Alfred Camarillo [address] Sonographer: Evangelina Quiros M.D. Study quality: Technically good Referring Physician: MELINDA CHANEL NP Blood Pressure: 104/65 mmHg Height: 163 cm Weight: 92 kg BSA: 2 m2 ------------------------------------------------------------------------ PROCEDURE: Transthoracic echocardiogram with 2-D, M-Mode and complete spectral and color flow Doppler. INDICATION: Unspecified combined systolic (congestive) and diastolic (congestive) heart failure (I50.40) ------------------------------------------------------------------------ DIMENSIONS: Dimensions:     Normal Values: LA:     4.7 cm    2.0 - 4.0 cm Ao:     2.8 cm    2.0 - 3.8 cm SEPTUM: 0.9 cm    0.6 - 1.2 cm PWT:    0.9 cm    0.6 - 1.1 cm LVIDd:  4.5 cm    3.0 - 5.6 cm LVIDs:  2.9 cm    1.8 - 4.0 cm Derived Variables: LVMI: 67 g/m2 RWT: 0.40 Fractional short: 36 % Ejection Fraction (Modified Roberts Rule): 65 % ------------------------------------------------------------------------ OBSERVATIONS: Mitral Valve: Mitral annular calcification, otherwise normal mitral valve. Mild-moderate mitral regurgitation. Aortic Root: Normal aortic root. Aortic Valve: Calcified trileaflet aortic valve with normal opening. Left Atrium: Moderately dilated left atrium.  LA volume index = 46 cc/m2. Left Ventricle: Normal left ventricular systolic function. No segmental wall motion abnormalities. Normal left ventricular internal dimensions and wall thicknesses. Right Heart: Normal right atrium. Normal right ventricular size and function. Normal tricuspid valve.  Mild-moderate tricuspid regurgitation. Normal pulmonic valve. Pericardium/PleuraNormal pericardium with no pericardial effusion. Hemodynamic: Estimated right ventricular systolic pressure equals 51 mm Hg, assuming right atrial pressure equals 10 mm Hg, consistent with moderate pulmonary hypertension. ------------------------------------------------------------------------ CONCLUSIONS: 1. Mitral annular calcification, otherwise normal mitral valve. Mild-moderate mitral regurgitation. 2. Moderately dilated left atrium.  LA volume index = 46 cc/m2. 3. Normal left ventricular internal dimensions and wall thicknesses. 4. Normal left ventricular systolic function. No segmental wall motion abnormalities. 5. Normal right ventricular size and function. 6. Estimated right ventricular systolic pressure equals 51 mm Hg, assuming right atrial pressure equals 10 mm Hg, consistent with moderate pulmonary hypertension. *** Compared with echocardiogram of 11/23/2021, no significant changes noted. ------------------------------------------------------------------------ Confirmed on  1/5/2023 - 10:24:15 by Tc Camargo M.D., [facility], [facility]   Patient name: KARINA MARION YOB: 1955   Age: 66 (F)   MR#: [id number] Study Date: 11/23/2021 Location: O/PSonographer: Cary Parra [facility] Study quality: Technically good Referring Physician: MELINDA CHANEL NP Blood Pressure: 122/81 mmHg Height: 163 cm Weight: 92 kg BSA: 2 m2 ------------------------------------------------------------------------ PROCEDURE: Transthoracic echocardiogram with 2-D, M-Mode and complete spectral and color flow Doppler. INDICATION: Chronic diastolic (congestive) heart failure (I50.32) ------------------------------------------------------------------------ DIMENSIONS: Dimensions:     Normal Values: LA:     4.8 cm    2.0 - 4.0 cm Ao:     2.9 cm    2.0 - 3.8 cm SEPTUM: 1.1 cm    0.6 - 1.2 cm PWT:    1.1 cm    0.6 - 1.1 cm LVIDd:  4.9 cm    3.0 - 5.6 cm LVIDs:  3.1 cm    1.8 - 4.0 cm Derived Variables: LVMI: 102 g/m2 RWT: 0.44 Fractional short: 37 % Ejection Fraction (Leonichmaldonadotz): 66 % ------------------------------------------------------------------------ OBSERVATIONS: Mitral Valve: Mitral annular calcification, otherwise normal mitral valve. Mild-moderate mitral regurgitation. Aortic Root: Normal aortic root. Aortic Valve: Calcified trileaflet aortic valve with normal opening. Left Atrium: Moderately dilated left atrium.  LA volume index = 46 cc/m2. Left Ventricle: Normal left ventricular systolic function. No segmental wall motion abnormalities. Normal left ventricular internal dimensions and wall thicknesses. Right Heart: Moderate right atrial enlargement. Normal right ventricular size and function. Normal tricuspid valve. Mild-moderate tricuspid regurgitation. Normal pulmonic valve. Minimal pulmonic regurgitation. Pericardium/PleuraNormal pericardium with no pericardial effusion. Hemodynamic: Estimated right ventricular systolic pressure equals 62 mm Hg, assuming right atrial pressure equals 10 mm Hg, consistent with severe pulmonary hypertension. ------------------------------------------------------------------------ CONCLUSIONS: 1. Mitral annular calcification, otherwise normal mitral valve. Mild-moderate mitral regurgitation. 2. Calcified trileaflet aortic valve with normal opening. 3. Moderately dilated left atrium.  LA volume index = 46 cc/m2. 4. Normal left ventricular internal dimensions and wall thicknesses. 5. Normal left ventricular systolic function. No segmental wall motion abnormalities. 6. Moderate right atrial enlargement. 7. Normal right ventricular size and function. 8. Normal tricuspid valve. Mild-moderate tricuspid regurgitation. ------------------------------------------------------------------------ Confirmed on  11/23/2021 - 17:01:34 by DENISSE Avila ------------------------------------------------------------------------   EXAM:  CT CHEST     PROCEDURE DATE:  12/16/2020       INTERPRETATION:  CT CHEST WITHOUT CONTRAST  INDICATION: Pulmonary hypertension.  TECHNIQUE: Unenhanced helical images were obtained of the chest. Coronal and sagittal images were reconstructed. Maximum intensity projection images were generated.  COMPARISON: CT chest 11/1/2019, 4/14/2019, and 4/8/2019.  FINDINGS:  Tubes/Lines: None.  Lungs And Airways: The mosaic attenuation of the lungs is unchanged. Linear atelectasis/scarring of the left lower lobe. The remainder of the lungs are clear. The airways are normal.  Pleura: No pleural effusion or pneumothorax.  Mediastinum: The chest lymph nodes are less than 10 mm in the short axis. The visualized esophagus is unremarkable. The right lobe thyroid gland nodule is not well visualized.  Heart and Vasculature: The heart is enlarged. In particular, the atria are enlarged.  There is no pericardial effusion. Coronary artery calcifications.  Left-sided aortic arch and left-sided descending thoracic aorta. The aorta is tortuous. Aorta is normal in caliber. Aortic calcifications. The pulmonary artery is enlarged which can occur in the clinical setting of pulmonary arterial hypertension.  Upper Abdomen: The upper abdomen is unremarkable.  Bones And Soft Tissues: The bones are unremarkable.  The soft tissues are unremarkable.   IMPRESSION:  1.  No change in the mosaic attenuation of the lungs. 2.  Cardiomegaly. 3.  Enlarged pulmonary artery can occur in the clinical setting of pulmonary arterial hypertension

## 2023-09-14 ENCOUNTER — APPOINTMENT (OUTPATIENT)
Dept: PULMONOLOGY | Facility: CLINIC | Age: 68
End: 2023-09-14
Payer: MEDICARE

## 2023-09-14 ENCOUNTER — NON-APPOINTMENT (OUTPATIENT)
Age: 68
End: 2023-09-14

## 2023-09-14 VITALS
HEIGHT: 62 IN | WEIGHT: 201 LBS | SYSTOLIC BLOOD PRESSURE: 112 MMHG | HEART RATE: 69 BPM | DIASTOLIC BLOOD PRESSURE: 71 MMHG | BODY MASS INDEX: 36.99 KG/M2 | OXYGEN SATURATION: 98 %

## 2023-09-14 DIAGNOSIS — I27.20 PULMONARY HYPERTENSION, UNSPECIFIED: ICD-10-CM

## 2023-09-14 PROCEDURE — ZZZZZ: CPT

## 2023-09-14 PROCEDURE — 94726 PLETHYSMOGRAPHY LUNG VOLUMES: CPT

## 2023-09-14 PROCEDURE — 94060 EVALUATION OF WHEEZING: CPT

## 2023-09-14 PROCEDURE — 36415 COLL VENOUS BLD VENIPUNCTURE: CPT

## 2023-09-14 PROCEDURE — 94729 DIFFUSING CAPACITY: CPT

## 2023-09-14 PROCEDURE — 99215 OFFICE O/P EST HI 40 MIN: CPT | Mod: 25

## 2023-09-15 LAB
DIGOXIN SERPL-MCNC: 0.9 NG/ML
NT-PROBNP SERPL-MCNC: 2545 PG/ML

## 2023-09-20 ENCOUNTER — NON-APPOINTMENT (OUTPATIENT)
Age: 68
End: 2023-09-20

## 2023-09-20 ENCOUNTER — APPOINTMENT (OUTPATIENT)
Dept: CARDIOLOGY | Facility: CLINIC | Age: 68
End: 2023-09-20
Payer: MEDICARE

## 2023-09-20 VITALS
WEIGHT: 201 LBS | BODY MASS INDEX: 36.99 KG/M2 | SYSTOLIC BLOOD PRESSURE: 120 MMHG | HEART RATE: 67 BPM | OXYGEN SATURATION: 97 % | DIASTOLIC BLOOD PRESSURE: 75 MMHG | HEIGHT: 62 IN

## 2023-09-20 DIAGNOSIS — M79.605 PAIN IN RIGHT LEG: ICD-10-CM

## 2023-09-20 DIAGNOSIS — M79.604 PAIN IN RIGHT LEG: ICD-10-CM

## 2023-09-20 DIAGNOSIS — Z13.6 ENCOUNTER FOR SCREENING FOR CARDIOVASCULAR DISORDERS: ICD-10-CM

## 2023-09-20 PROCEDURE — 99214 OFFICE O/P EST MOD 30 MIN: CPT

## 2023-09-20 PROCEDURE — 93000 ELECTROCARDIOGRAM COMPLETE: CPT

## 2023-09-26 PROBLEM — Z13.6 ENCOUNTER FOR SCREENING FOR VASCULAR DISEASE: Status: ACTIVE | Noted: 2023-09-26

## 2023-09-28 ENCOUNTER — APPOINTMENT (OUTPATIENT)
Dept: CV DIAGNOSITCS | Facility: HOSPITAL | Age: 68
End: 2023-09-28

## 2023-09-28 ENCOUNTER — OUTPATIENT (OUTPATIENT)
Dept: OUTPATIENT SERVICES | Facility: HOSPITAL | Age: 68
LOS: 1 days | End: 2023-09-28
Payer: MEDICARE

## 2023-09-28 DIAGNOSIS — M79.604 PAIN IN RIGHT LEG: ICD-10-CM

## 2023-09-28 DIAGNOSIS — Z98.49 CATARACT EXTRACTION STATUS, UNSPECIFIED EYE: Chronic | ICD-10-CM

## 2023-09-28 PROCEDURE — 93923 UPR/LXTR ART STDY 3+ LVLS: CPT | Mod: 26

## 2023-10-02 ENCOUNTER — APPOINTMENT (OUTPATIENT)
Dept: CV DIAGNOSITCS | Facility: HOSPITAL | Age: 68
End: 2023-10-02

## 2023-10-02 ENCOUNTER — OUTPATIENT (OUTPATIENT)
Dept: OUTPATIENT SERVICES | Facility: HOSPITAL | Age: 68
LOS: 1 days | End: 2023-10-02
Payer: MEDICARE

## 2023-10-02 DIAGNOSIS — Z90.49 ACQUIRED ABSENCE OF OTHER SPECIFIED PARTS OF DIGESTIVE TRACT: Chronic | ICD-10-CM

## 2023-10-02 DIAGNOSIS — M79.604 PAIN IN RIGHT LEG: ICD-10-CM

## 2023-10-02 DIAGNOSIS — Z98.49 CATARACT EXTRACTION STATUS, UNSPECIFIED EYE: Chronic | ICD-10-CM

## 2023-10-02 DIAGNOSIS — Z98.890 OTHER SPECIFIED POSTPROCEDURAL STATES: Chronic | ICD-10-CM

## 2023-10-02 DIAGNOSIS — R60.0 LOCALIZED EDEMA: ICD-10-CM

## 2023-10-02 PROCEDURE — 93925 LOWER EXTREMITY STUDY: CPT | Mod: 26

## 2023-10-09 ENCOUNTER — NON-APPOINTMENT (OUTPATIENT)
Age: 68
End: 2023-10-09

## 2023-10-09 ENCOUNTER — OUTPATIENT (OUTPATIENT)
Dept: OUTPATIENT SERVICES | Facility: HOSPITAL | Age: 68
LOS: 1 days | End: 2023-10-09

## 2023-10-09 ENCOUNTER — APPOINTMENT (OUTPATIENT)
Dept: CV DIAGNOSITCS | Facility: HOSPITAL | Age: 68
End: 2023-10-09
Payer: COMMERCIAL

## 2023-10-09 DIAGNOSIS — R60.0 LOCALIZED EDEMA: ICD-10-CM

## 2023-10-09 DIAGNOSIS — M79.604 PAIN IN RIGHT LEG: ICD-10-CM

## 2023-10-09 DIAGNOSIS — Z98.49 CATARACT EXTRACTION STATUS, UNSPECIFIED EYE: Chronic | ICD-10-CM

## 2023-10-09 DIAGNOSIS — Z90.49 ACQUIRED ABSENCE OF OTHER SPECIFIED PARTS OF DIGESTIVE TRACT: Chronic | ICD-10-CM

## 2023-10-09 DIAGNOSIS — Z98.890 OTHER SPECIFIED POSTPROCEDURAL STATES: Chronic | ICD-10-CM

## 2023-10-09 PROCEDURE — 93970 EXTREMITY STUDY: CPT | Mod: 26

## 2023-10-25 ENCOUNTER — RX RENEWAL (OUTPATIENT)
Age: 68
End: 2023-10-25

## 2023-11-17 NOTE — PATIENT PROFILE ADULT - NSASFALLNEEDSASSIST_GEN_A_NUR
yes no hematuria/no renal colic/no flank pain L/no flank pain R/no urine discoloration/no gas in urine

## 2023-12-14 ENCOUNTER — APPOINTMENT (OUTPATIENT)
Dept: HEART FAILURE | Facility: CLINIC | Age: 68
End: 2023-12-14
Payer: MEDICARE

## 2023-12-14 ENCOUNTER — NON-APPOINTMENT (OUTPATIENT)
Age: 68
End: 2023-12-14

## 2023-12-14 VITALS
WEIGHT: 201.8 LBS | BODY MASS INDEX: 37.13 KG/M2 | DIASTOLIC BLOOD PRESSURE: 80 MMHG | HEART RATE: 62 BPM | OXYGEN SATURATION: 94 % | HEIGHT: 62 IN | SYSTOLIC BLOOD PRESSURE: 136 MMHG

## 2023-12-14 DIAGNOSIS — E11.9 TYPE 2 DIABETES MELLITUS W/OUT COMPLICATIONS: ICD-10-CM

## 2023-12-14 PROCEDURE — 93000 ELECTROCARDIOGRAM COMPLETE: CPT

## 2023-12-14 PROCEDURE — 99214 OFFICE O/P EST MOD 30 MIN: CPT

## 2023-12-14 NOTE — REASON FOR VISIT
[Cardiac Failure] : cardiac failure [Hypertension] : hypertension [FreeTextEntry1] : Diagnosed with diabetic peripheral neuropathy via Nerve conduction study in Sept 2021 Not on meds progressive symptoms of leg discomfort -- no leg pain can walk 4-5 blocks with home O2 Sees Trey for pulm HTN  No smoking history DM2 under control 5.5%, diet-controlled  DP2+ b/l no edema  Neuropathy likely diagnosed ?trial of gabapentin  Will arrange for: TATIANA/PVR Lower arterial US Lower venous U

## 2023-12-27 ENCOUNTER — NON-APPOINTMENT (OUTPATIENT)
Age: 68
End: 2023-12-27

## 2023-12-27 NOTE — PHYSICAL EXAM
[Well Developed] : well developed [No Acute Distress] : no acute distress [Normal Conjunctiva] : normal conjunctiva [Clear Lung Fields] : clear lung fields [Good Air Entry] : good air entry [Soft] : abdomen soft [Non Tender] : non-tender [No Rash] : no rash [Normal] : alert and oriented, normal memory [de-identified] : JVP  8 cm with V waves secondary to TR [de-identified] : irregularly irregular [de-identified] : on room air but uses 2-3 liter oxygen via nasal cannula [de-identified] : slow gait [de-identified] : trace lower extremity edema bilaterally

## 2023-12-27 NOTE — HISTORY OF PRESENT ILLNESS
[FreeTextEntry1] : Karina Marion is a 67 y/o AA female w/ PMHX of Afib on Eliquis, DM II, HTN, COPD on home O2 2-3L, LINH on CPAP, lymphoma/CLL s/p radiation and chemo (1997), HFpEF LVEF 66%, severe pulmonary HTN (now on sildenafil and opsumit), RV systolic dysfunction, mod-severe TR.  Last admission 5/10-5/18/19. S/P partial right colectomy  (4/23/19) secondary to Stage 1 colon cancer, as per patient with 27 neg nodes and did not need chemo. Nephrologist Dr. Heart for CKD and pulm Dr. Yang for Group 2 pulmonary HTN and  Dr. Hunt is her colorectal surgeon. Normal technetium pyrophosphate scan on 12/12/19 with findings not suggestive of transthyretin cardiac amyloidosis. 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 S/P bilateral cataract surgery on 10/7 and 10/21/20. Last TTE 1/5/23 normal LVEF 65%, mod pHTN. Pt is here for a follow up today   Pt comes for a follow up today. States she has been feeling well. She followed up with vascular Dr. Montero and had TATIANA/PVR normal. Lower arterial US Lower venous US with no DVT.  Last TTE with normal LVEF 65%, mod pHTN. Since last visit, she had labs  11/24/23 with  H&H 10.2/33.9 with Dr. Armenta and is on aranesp was q 3 weeks. Serum Pro BNP 2545 from 1429 11/3/22.   She uses oxygen 2-3 liters, usually in the evening at [facility] and received a new CPAP mask and machine for LINH . No daytime sleepiness Pt complains of numbness in her feet and states she was told by her PCP that she has peripheral neuropathy but she feels it has worsened.      She is currently taking torsemide 20 mg daily and has not needed an extra 20 mg on the weekends since last visit. She is taking lucas 12.5 mg 5 days a week . She is taking dig 0.125 mg QOD. Her home weight has been decreasded to 197-199 from  201.6 lbs and is 201 lbs in office today with clothes.  B/P 136/80 in office today. She is adhering to low salt diet and is drinking less than 2 liters of fluid per day.  Currently, she states she has been feeling well  better since  and can walk 5 blocks ( with oxygen 2-3 liters) without cane to the store but sometimes stops at to rest briefly. She can climb one flight of stairs several times a day. She sleeps with 2 pillows with no orthopnea. She denies chest pain, palpitations, dizziness/LH and no syncope. She wears compression stockings that helps the swelling in her legs.    She had the Covid vaccine x 2 and booster without adverse reaction. S/P Covid 19 infection 5/24/22 - S/P monoclonal antibodies     Patient name: KARINA MARION YOB: 1955   Age: 67 (F)   MR#: [id number] Study Date: 1/5/2023 Location: O/PSonographer: Alfred Camarillo [address] Sonographer: Evangelina Quiros M.D. Study quality: Technically good Referring Physician: MELINDA CHANEL NP Blood Pressure: 104/65 mmHg Height: 163 cm Weight: 92 kg BSA: 2 m2 ------------------------------------------------------------------------ PROCEDURE: Transthoracic echocardiogram with 2-D, M-Mode and complete spectral and color flow Doppler. INDICATION: Unspecified combined systolic (congestive) and diastolic (congestive) heart failure (I50.40) ------------------------------------------------------------------------ DIMENSIONS: Dimensions:     Normal Values: LA:     4.7 cm    2.0 - 4.0 cm Ao:     2.8 cm    2.0 - 3.8 cm SEPTUM: 0.9 cm    0.6 - 1.2 cm PWT:    0.9 cm    0.6 - 1.1 cm LVIDd:  4.5 cm    3.0 - 5.6 cm LVIDs:  2.9 cm    1.8 - 4.0 cm Derived Variables: LVMI: 67 g/m2 RWT: 0.40 Fractional short: 36 % Ejection Fraction (Modified Roberts Rule): 65 % ------------------------------------------------------------------------ OBSERVATIONS: Mitral Valve: Mitral annular calcification, otherwise normal mitral valve. Mild-moderate mitral regurgitation. Aortic Root: Normal aortic root. Aortic Valve: Calcified trileaflet aortic valve with normal opening. Left Atrium: Moderately dilated left atrium.  LA volume index = 46 cc/m2. Left Ventricle: Normal left ventricular systolic function. No segmental wall motion abnormalities. Normal left ventricular internal dimensions and wall thicknesses. Right Heart: Normal right atrium. Normal right ventricular size and function. Normal tricuspid valve.  Mild-moderate tricuspid regurgitation. Normal pulmonic valve. Pericardium/PleuraNormal pericardium with no pericardial effusion. Hemodynamic: Estimated right ventricular systolic pressure equals 51 mm Hg, assuming right atrial pressure equals 10 mm Hg, consistent with moderate pulmonary hypertension. ------------------------------------------------------------------------ CONCLUSIONS: 1. Mitral annular calcification, otherwise normal mitral valve. Mild-moderate mitral regurgitation. 2. Moderately dilated left atrium.  LA volume index = 46 cc/m2. 3. Normal left ventricular internal dimensions and wall thicknesses. 4. Normal left ventricular systolic function. No segmental wall motion abnormalities. 5. Normal right ventricular size and function. 6. Estimated right ventricular systolic pressure equals 51 mm Hg, assuming right atrial pressure equals 10 mm Hg, consistent with moderate pulmonary hypertension. *** Compared with echocardiogram of 11/23/2021, no significant changes noted. ------------------------------------------------------------------------ Confirmed on  1/5/2023 - 10:24:15 by Tc Camargo M.D., [facility]   Patient name: KARINA MARION YOB: 1955   Age: 66 (F)   MR#: [id number] Study Date: 11/23/2021 Location: O/PSonographer: Cary Parra [facility] Study quality: Technically good Referring Physician: MELINDA CHANEL NP Blood Pressure: 122/81 mmHg Height: 163 cm Weight: 92 kg BSA: 2 m2 ------------------------------------------------------------------------ PROCEDURE: Transthoracic echocardiogram with 2-D, M-Mode and complete spectral and color flow Doppler. INDICATION: Chronic diastolic (congestive) heart failure (I50.32) ------------------------------------------------------------------------ DIMENSIONS: Dimensions:     Normal Values: LA:     4.8 cm    2.0 - 4.0 cm Ao:     2.9 cm    2.0 - 3.8 cm SEPTUM: 1.1 cm    0.6 - 1.2 cm PWT:    1.1 cm    0.6 - 1.1 cm LVIDd:  4.9 cm    3.0 - 5.6 cm LVIDs:  3.1 cm    1.8 - 4.0 cm Derived Variables: LVMI: 102 g/m2 RWT: 0.44 Fractional short: 37 % Ejection Fraction (Leonicholtz): 66 % ------------------------------------------------------------------------ OBSERVATIONS: Mitral Valve: Mitral annular calcification, otherwise normal mitral valve. Mild-moderate mitral regurgitation. Aortic Root: Normal aortic root. Aortic Valve: Calcified trileaflet aortic valve with normal opening. Left Atrium: Moderately dilated left atrium.  LA volume index = 46 cc/m2. Left Ventricle: Normal left ventricular systolic function. No segmental wall motion abnormalities. Normal left ventricular internal dimensions and wall thicknesses. Right Heart: Moderate right atrial enlargement. Normal right ventricular size and function. Normal tricuspid valve. Mild-moderate tricuspid regurgitation. Normal pulmonic valve. Minimal pulmonic regurgitation. Pericardium/PleuraNormal pericardium with no pericardial effusion. Hemodynamic: Estimated right ventricular systolic pressure equals 62 mm Hg, assuming right atrial pressure equals 10 mm Hg, consistent with severe pulmonary hypertension. ------------------------------------------------------------------------ CONCLUSIONS: 1. Mitral annular calcification, otherwise normal mitral valve. Mild-moderate mitral regurgitation. 2. Calcified trileaflet aortic valve with normal opening. 3. Moderately dilated left atrium.  LA volume index = 46 cc/m2. 4. Normal left ventricular internal dimensions and wall thicknesses. 5. Normal left ventricular systolic function. No segmental wall motion abnormalities. 6. Moderate right atrial enlargement. 7. Normal right ventricular size and function. 8. Normal tricuspid valve. Mild-moderate tricuspid regurgitation. ------------------------------------------------------------------------ Confirmed on  11/23/2021 - 17:01:34 by DENISSE Avila ------------------------------------------------------------------------   EXAM:  CT CHEST     PROCEDURE DATE:  12/16/2020       INTERPRETATION:  CT CHEST WITHOUT CONTRAST  INDICATION: Pulmonary hypertension.  TECHNIQUE: Unenhanced helical images were obtained of the chest. Coronal and sagittal images were reconstructed. Maximum intensity projection images were generated.  COMPARISON: CT chest 11/1/2019, 4/14/2019, and 4/8/2019.  FINDINGS:  Tubes/Lines: None.  Lungs And Airways: The mosaic attenuation of the lungs is unchanged. Linear atelectasis/scarring of the left lower lobe. The remainder of the lungs are clear. The airways are normal.  Pleura: No pleural effusion or pneumothorax.  Mediastinum: The chest lymph nodes are less than 10 mm in the short axis. The visualized esophagus is unremarkable. The right lobe thyroid gland nodule is not well visualized.  Heart and Vasculature: The heart is enlarged. In particular, the atria are enlarged.  There is no pericardial effusion. Coronary artery calcifications.  Left-sided aortic arch and left-sided descending thoracic aorta. The aorta is tortuous. Aorta is normal in caliber. Aortic calcifications. The pulmonary artery is enlarged which can occur in the clinical setting of pulmonary arterial hypertension.  Upper Abdomen: The upper abdomen is unremarkable.  Bones And Soft Tissues: The bones are unremarkable.  The soft tissues are unremarkable.   IMPRESSION:  1.  No change in the mosaic attenuation of the lungs. 2.  Cardiomegaly. 3.  Enlarged pulmonary artery can occur in the clinical setting of pulmonary arterial hypertension

## 2023-12-27 NOTE — ASSESSMENT
[FreeTextEntry1] : 69 y/o AA female w/ PMHX of Afib on Eliquis, DM II, HTN, COPD on home O2 2-3L, LINH on CPAP, lymphoma/CLL s/p radiation and chemo (1997), HFpEF LVEF 66%, severe pulmonary HTN (now on sildenafil and opsumit), RV systolic dysfunction, mod-severe TR. Last admission 5/10-5/18/19. S/P partial right colectomy (4/23/19) secondary to Stage 1 colon cancer, as per patient with 27 neg nodes and did not need chemo. Nephrologist Dr. Heart for CKD and pulm Dr. Yang for Group 2 pulmonary HTN and Dr. Hunt is her colorectal surgeon. Normal technetium pyrophosphate scan on 12/12/19 with findings not suggestive of transthyretin cardiac amyloidosis. 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 S/P bilateral cataract surgery on 10/7 and 10/21/20. Last TTE Last TTE 1/5/23 normal LVEF 65%, mod pHTN  ACC/AHA Stage C, NYHA Class III Appears compensated and normotensive.

## 2023-12-27 NOTE — DISCUSSION/SUMMARY
[Patient] : the patient [___ Month(s)] : in [unfilled] month(s) [EKG obtained to assist in diagnosis and management of assessed problem(s)] : EKG obtained to assist in diagnosis and management of assessed problem(s) [FreeTextEntry1] : 1. Chronic diastolic heart failure- normal LVEF -  with Normal technetium pyrophosphate scan on 12/12/19 with findings not suggestive of transthyretin cardiac amyloidosis ( pt had low voltage on EKG, joint pains and B/L carpal tunnel) - will continue lucas 12.5 mg five times a week - continue torsemide 20 mg daily with one additional as needed for weight gain of 2-3 lbs in 2-3 days.  pt is no longer on metolazone 5 mg prn. Goal weight is less than 202 lbs and she is 197-199 lbs at home , follow up with PCP Dr. Palafox, will follow up with nephrologist Dr. Heart, pulm Dr. Yang and rheum  - Continue Toprol to 50 mg po BID ( a.fib)  - Continue compression stockings to improve venous return - increase daily exercise as tolerated - continue digoxin 0.125 mg QOD for AF with controlled VR - follow up with Dr. Montero for vascular annually   2. Severe Pulmonary HTN/PAH-  - TTE with mod pHTN -  Continue with sildenafil 20 mg po TID. - Continue  Opsumit 10 mg daily, pt feels she has had improvement since starting  - Continue torsemide as above - Follow up with Dr. Yang - Continue CPAP with oxygen for LINH - For PFTs with next pulm visit - using oxgen qhs and with activity   3. CKD-   - Follow up with renal Dr. Heart  - Follow up with primary Dr. Palafox  4. Atrial fibrillation- rate controlled  -Continue Toprol 50 mg po BID. - Continue digoxin 0.125 mg every other day, with level 9/2023 0,9 ng/ml - Continue Eliquis for oral a/c and stroke prevention.    Follow up in office in 4 months

## 2023-12-27 NOTE — CARDIOLOGY SUMMARY
[de-identified] : 12/15/23 AF 63, low voltage, NSST 8/10/23 AF 57, low voltage, NSST 5/4/23 AF 78, ow voltage, NSST 1/5/23  AF 74, ow voltage, NSST 10/3/22 AF 73, ow voltage, NSST 6/6/22 AF 61,  low voltage, NSST 2/7/22 AF 69, low voltage, NSST 10/25/21 AF 78, low voltage, NSST   [de-identified] : 1/5/23 normal LVEF 65%, mod pHTN.  11/23/21 TTE; LVEF 66%, LVIDD 4.9 cm, normal LV systolic function, mild mod MR, mild mod TR, severe pHTN   9/28/20 TTE  with LVEF 64%, LVIDD 4.6 cm, normal LV systolic function, decreased RV systolic function, mild MR, mild mod TR, severe pHTN   [de-identified] : CT 4/13/23 after she had tested positive again for Covid 3/2023 notable for mild mosaic patttern to the lung parenchyma and mild enlargement of PA, unchanged [de-identified] : Normal technetium pyrophosphate scan on 12/12/19 with findings not suggestive of transthyretin cardiac amyloidosis.\par   [de-identified] :  5/4/23 she had a colonoscopy with no polys.

## 2024-01-08 ENCOUNTER — APPOINTMENT (OUTPATIENT)
Dept: CV DIAGNOSITCS | Facility: HOSPITAL | Age: 69
End: 2024-01-08

## 2024-01-08 ENCOUNTER — OUTPATIENT (OUTPATIENT)
Dept: OUTPATIENT SERVICES | Facility: HOSPITAL | Age: 69
LOS: 1 days | End: 2024-01-08
Payer: MEDICARE

## 2024-01-08 DIAGNOSIS — Z90.49 ACQUIRED ABSENCE OF OTHER SPECIFIED PARTS OF DIGESTIVE TRACT: Chronic | ICD-10-CM

## 2024-01-08 DIAGNOSIS — Z98.890 OTHER SPECIFIED POSTPROCEDURAL STATES: Chronic | ICD-10-CM

## 2024-01-08 DIAGNOSIS — I50.32 CHRONIC DIASTOLIC (CONGESTIVE) HEART FAILURE: ICD-10-CM

## 2024-01-08 DIAGNOSIS — Z98.49 CATARACT EXTRACTION STATUS, UNSPECIFIED EYE: Chronic | ICD-10-CM

## 2024-01-08 PROCEDURE — 93306 TTE W/DOPPLER COMPLETE: CPT | Mod: 26

## 2024-01-09 ENCOUNTER — NON-APPOINTMENT (OUTPATIENT)
Age: 69
End: 2024-01-09

## 2024-01-12 ENCOUNTER — APPOINTMENT (OUTPATIENT)
Dept: OPHTHALMOLOGY | Facility: CLINIC | Age: 69
End: 2024-01-12

## 2024-01-19 ENCOUNTER — NON-APPOINTMENT (OUTPATIENT)
Age: 69
End: 2024-01-19

## 2024-01-19 ENCOUNTER — APPOINTMENT (OUTPATIENT)
Dept: OPHTHALMOLOGY | Facility: CLINIC | Age: 69
End: 2024-01-19
Payer: MEDICARE

## 2024-01-19 PROCEDURE — 92250 FUNDUS PHOTOGRAPHY W/I&R: CPT

## 2024-01-19 PROCEDURE — 92012 INTRM OPH EXAM EST PATIENT: CPT

## 2024-01-26 RX ORDER — MACITENTAN 10 MG/1
10 TABLET, FILM COATED ORAL
Qty: 30 | Refills: 11 | Status: ACTIVE | COMMUNITY
Start: 2021-07-09 | End: 1900-01-01

## 2024-02-16 ENCOUNTER — APPOINTMENT (OUTPATIENT)
Dept: PULMONOLOGY | Facility: CLINIC | Age: 69
End: 2024-02-16
Payer: MEDICARE

## 2024-02-16 VITALS
BODY MASS INDEX: 36.25 KG/M2 | WEIGHT: 197 LBS | TEMPERATURE: 97.8 F | RESPIRATION RATE: 15 BRPM | HEART RATE: 68 BPM | HEIGHT: 62 IN | OXYGEN SATURATION: 92 % | DIASTOLIC BLOOD PRESSURE: 70 MMHG | SYSTOLIC BLOOD PRESSURE: 117 MMHG

## 2024-02-16 PROCEDURE — 99214 OFFICE O/P EST MOD 30 MIN: CPT

## 2024-02-16 NOTE — PHYSICAL EXAM
[No Acute Distress] : no acute distress [Normal Oropharynx] : normal oropharynx [III] : Mallampati Class: III [No Neck Mass] : no neck mass [Clear to Auscultation Bilaterally] : clear to auscultation bilaterally [No Abnormalities] : no abnormalities [Benign] : benign [Normal Gait] : normal gait [No Clubbing] : no clubbing [Non-Pitting] : non-pitting [Normal Color/ Pigmentation] : normal color/ pigmentation [No Focal Deficits] : no focal deficits [Oriented x3] : oriented x3 [TextBox_54] : LOUD S2 , III[facility]

## 2024-02-16 NOTE — HISTORY OF PRESENT ILLNESS
[Never] : never [TextBox_4] : This letter  is regarding your patient  who  attended pulmonary out patient office today.  I have reviewed  patient's  past history, social history, family history and medication list. I also  reviewed nurse practitioners/ and fellows  notes and assessment and agree with it.   The patient was referred by  for SECONDARY  pulm HTN [,GROUP 2 ---DIASTOLIC DYSFUNCTION ], on sildenafil. PMH colon cancer, afib- on eliquis,   CKD,  ATRIAL FIBRILLATION,  PAST H/O LYMPHOMA/CLL  S/P RT AND CHEMO [ 1997]--H/O  LINH on bipap and nocturnal oxygen, OSMAR, CHF, DM, thyroid nodule -on home oxygen-  status post resection of stage I colon cancer    ----------  ------No history of , fever, chills , rigors, chest pain, or hemoptysis. Questionable history of Raynaud's phenomenon. No h/o significant weight loss in last few months. No history of liver dysfunction ,  or chronic thromboembolic disease. I would classify the patient's dyspnea as WHO  FUNCTIONAL CLASS II--------  ----Pft date--2109- -------RESTRICTIVE VENT DEFECT  ---6MWT 2019-    132 METERS  USED A CANE TO WALK   CARDIAC C ATH 2019----------  PA 78/29 MEAN PA 45, RA 10, RV 78/14,  PA SAT 47, CO 4.79. CI 2.33, PVR  6.84 WOODS ----Ct scan date--4/2019 IMPRESSION:  Unchanged prominent mediastinal nodes, nonspecific. Otherwise, no  evidence of metastatic disease in the chest, abdomen, or pelvis. High-density material layering within the gastric antrum may be secondary  to ingested material. Questionable skin defect of the left gluteal soft tissues not seen on  prior CT, likely skinfold however clinical correlation is recommended.  CT CHEST 12/2020 IMPRESSION: 1. No change in the mosaic attenuation of the lungs. 2. Cardiomegaly. 3. Enlarged pulmonary artery can occur in the clinical setting of pulmonary arterial hypertension  ----- vq scan 4/2019 low prob of PE  echo 4/2019 ------------------------------------------------------------------------ CONCLUSIONS: 1. Mitral annular calcification and calcified mitral leaflets with normal diastolic opening. Mild mitral regurgitation. 2. Calcified trileaflet aortic valve with normal opening. 3. Normal left ventricular internal dimensions and wall thicknesses. 4. Hyperdynamic left ventricle. Flattening of the interventricular septum in both systole and diastole is consistent with right ventricular pressure overload. 5. Severe right atrial enlargement. 6. Right ventricular enlargement with normal right ventricular systolic function. 7. Normal tricuspid valve. Moderate-severe tricuspid regurgitation. 8. Estimated pulmonary artery systolic pressure equals 79 mm Hg, assuming right atrial pressure equals 5  mm Hg, consistent with severe pulmonary hypertension. *** Compared with echocardiogram of 10/31/2017, no significant changes noted.   echo 9/2020 est pasp=65mmHg   ECHO 1/2024 CONCLUSIONS:    1. The left ventricular cavity is normal. Left ventricular wall thickness is normal. Left ventricular systolic function is normal with a calculated ejection fraction of 74 % by the Roberts's biplane method of disks. There are no regional wall motion abnormalities seen. The left ventricular diastolic function is indeterminate. Analysis of left ventricular diastolic function and filling pressure is made challenging by the presence of atrial fibrillation.  2. Normal right ventricular cavity size and systolic function.  3. Structurally normal mitral valve with normal leaflet excursion. There is calcification of the mitral valve annulus. There is mild mitral regurgitation.  4. The left atrium is severely dilated with an indexed volume of 54.87 ml/m?.  5. The right atrium is severely dilated in size with an indexed volume of 52.45 ml/m?.  6. Compared to the transthoracic echocardiogram performed on 1/5/2023 the estimated pulmonary artery systolic pressure has decreased.  cardiac cath 4/2019   Pulmonary Artery (S/D/M): 78/29/45 Pressures:  -- Pulmonary Capillary Wedge: 20/21/18 Pulmonary vascular resistance (Wood Units): 6.84,  CO by Juan Carlos: 4.39  US thyroid 7/2019 IMPRESSION:  Bilateral indeterminant thyroid nodules slightly decreased on the right.  No parathyroid adenoma identified.    CT CHEST - ORDERED BY: NIRALI MOORE   PROCEDURE DATE: 06/21/2022    INTERPRETATION: CLINICAL INFORMATION: Pulmonary hypertension  TECHNIQUE: A volumetric CT acquisition of the chest was obtained from the thoracic inlet to the upper abdomen, without the administration of intravenous contrast. Coronal and sagittal multiplanar reformations were also submitted.  Comparison: 12/16/2020  FINDINGS:  Lungs/Airways/Pleura: Persistent mosaic perfusion in the lungs. No endobronchial nodule. No pleural effusion  Mediastinum/Lymph nodes: No thoracic adenopathy.  Heart and Vessels: There is biatrial enlargement. No pericardial effusion there are coronary artery calcifications. The mid ascending aorta measures 3.2 cm. The pulmonary artery is larger than the adjacent ascending aorta.  Upper Abdomen: Partially imaged anastomotic staple line at the splenic flexure.  Osseous structures and Soft Tissues: No aggressive bone lesions.  IMPRESSION:  Stable appearance the chest with mosaic perfusion of the lungs and enlarged pulmonary artery compared to the adjacent ascending aorta    AUGUT 2109 WILL  START  REVATIO FOR PAH  ------OCT 2019---------stable from pulmonary standpoint--------dyspnea on exertion-----on 2L oxygen------tolerating revatio 20 mg TID--------under care of [facility] [Dr. Jerez]----- Dec 2109-----  on macetantan and revatio, renal dysfunction [ dr heart], slight elevation of lft,   -----DEC  2019-------stable from pulmonary point of view---------doing well on macitentan and revatio------unable to sleep------is using her BiPAP machine------underdare of Hem/Onc [Dr. YANG]------HAS RIGHT THYROID NODULE-   denis 15 2020---     saw dr heart nephrology  [ s creat was 2.2].  , saw dr yang oncology  Hg  ---   still Anemia on EPOGEN---  ----------------------n sildenafil  AND OPSUMIT  --ON HOME OXYGEN --ON LASIX AND ALDACTONE  nov 2020---doing well--- renal managed by  dr heart nephrology .  , saw dr yang oncology  Hg  ---   still Anemia on EPOGEN--- pulm htn-on sildenafil  AND OPSUMIT  --ON HOME OXYGEN --ON torsemide AND ALDACTONE     feb 2012---feels bEtter-- HepPeF- sec pulm htn , renal dysfunction  LINH  on bipap 19/13 with o2 2 l/min ,,, during the daytime on 3l/min o2---on torsemide and spironolactone [dr heart] ,  anemia on prcrit [dr yang]  ,  for pulm htn on Opsumit and sildenafil--------- needs pft and 6 min walk   6/2021 6mwt 240 m 6/2021 PFT restrictive defect  6/2021 pulm htn on sildenafil & opsumit, using oxygen. On diuretics per Dr Heart. Follows cardiology Dr Degroot.  LINH------ on BiPAP  She is up to date w/covid vac  9/2021 pulm htn on sildenafil & opsumit, using oxygen. On diuretics per Dr Heart. Follows cardiology Dr Degroot.  LINH------ on BiPAP nightly snd  benefits from its use -need compliance report  --- She is up to date w/covid vac  jan 2022 ---  pulm htn on sildenafil & opsumit, using oxygen. --compliant to  meds-------On diuretics per Dr Heart. Follows cardiology Dr Degroot.  LINH------ on BiPAP nightly snd  benefits from its use -need compliance report  --- She is up to date w/covid vac  March 2022   --- pulm htn remains on sildenafil and opsumit, using oxygen ---  On diuretics per cardiology--- LINH on BiPAP- need compliance report--- Hypothyroidism being managed by Dr. Nasra Cabrales, recent increase in Synthroid--- Anemia being follow by Dr. Yang remains on Aranesp -- needs PFT 6MWT  July 2022 - remains on sildenafil and opsumit, uses 3 L O2 when out of house. RA at rest and with ADLs, no difficulties. Continues to be on torsemide 20 OD and spironolactone 12.5 5/week. LINH on BIPAP - awaiting  CPAP UNIT replacement. Repeat TSH of 2.95 on increased Synthroid dose. Outside labs reviewed pertinent for TSH 2.95, BUN/Cr of 54/1.90, a1c of 5.8, Hb of 9.8. Patient had appointment with renal and cardio in june, 22 - stable PFT - restrictive pattern  6mw - able to walk 299 m compare to previous 193 in june, 21 CT scan - stable mosaic pattern    11/2022 pulm htn on opsumit and sildenafil and tolerating it well- No pulm complaints-uses oxygen as needed reactive airway- uses advair, has not needed rescue inhaler linh on bipap w/O2 and benefits from nightly use- awaiting replacement of recalled device diuretics managed by Dr Heart- on torsemide and aldactone cardiology Dr Degroot- o/s TTE jan 2023 utd w/all vaccines   9/2023 pft comined obstruction/restriction- uses adviar 9/2023 pulm htn- remains stable on opsumit and sildenafil- cards Dr degroot- next echo jan 2024 diuretics managed by Saw- pb 2.4- spironolactone 12.5mg 5x weekly and torsemide 20mg daily using oxygen x24hrs- resp atkins stable follows Reena for anemia- on aranesp q 3weeks, reports recent colonoscopy normal  LINH ----- on BiPAP  19/13  WITH O2  2LMIN and benefits from its nightly use  02/2024 hx of pulmonary hypertension stable on opsumit and sildenafil. LINH on CPAP, benefiting from nightly use. Will obtain compliance report patient reports 100% compliance. Diuretics managed by Dr. Nohelia ambriz elevated at 2.5- spironolactone 12.5mg 5x weekly and torsemide 20mg daily. Follows Dr. Yang for anemia- on aranesp q 3weeks. Compliant with medications, reports being euvolemic and improvement in shortness of breath. Uses oxygen 2lpm as needed with activity.

## 2024-02-16 NOTE — DISCUSSION/SUMMARY
[FreeTextEntry1] : -Assessment plan----------The patient has been referred here for further opinion regarding pulmonary problem,chronic diastolic heart failure secondary pulm HTN, on sildenafil. OPSUMIT-------PMH colon cancer, afib- on eliquis and LINH on bipap and nocturnal oxygen, OSMAR, CHF, DM, thyroid nodule, history of colon cancer status post colon surgery history of lymphoma status post chemotherapy [1997], ??right diaphragm elevation  1- chronic RIGHT HEART heart failure secondary pulm HTN, [ WHO GROUP I + GROUP II COMPONENT OF DIASTOLIC DYSFUNCTION ] on sildenafil  AND OPSUMIT - It is medically necessary for pt to use OXYGEN [ remains on 2 L/MIN]x 24 hrs- PFT restrictive pattern, 6mw distance improved from 193 -> 299 [ JULY 2022]  echo 1/2024  2- CKD-- remains on torsemide 20 OD and aldactone 12.5 5/week - managed by Dr. eHart and Dr. Levy   3- LINH ----- on BiPAP  19/13  WITH O2  2LMIN and benefits from its nightly use, will obtain compliance report  4  AFIB -----ELIQUIS  5- f/u in 4 months  6- anemia----FOLLOW UP WITH DR. YANG-- ----getting Aranesp (darbepoetin cris)  7-  CARDIOLOGY --DR MATHEWS ---HAS  A FIB ON ELIQUIS - needs yearly echo   ---Thanks for allowing me to participate in the care of this patient. Patient at this time will follow the above mentioned recommendations and return back for follow up visit. If you have any questions I can be reached at #[phone] (office).  Anirudh Yang MD, [facility]

## 2024-02-26 ENCOUNTER — APPOINTMENT (OUTPATIENT)
Dept: RHEUMATOLOGY | Facility: CLINIC | Age: 69
End: 2024-02-26
Payer: MEDICARE

## 2024-02-26 VITALS
WEIGHT: 197 LBS | OXYGEN SATURATION: 93 % | BODY MASS INDEX: 36.25 KG/M2 | DIASTOLIC BLOOD PRESSURE: 70 MMHG | RESPIRATION RATE: 15 BRPM | HEART RATE: 72 BPM | SYSTOLIC BLOOD PRESSURE: 117 MMHG | HEIGHT: 62 IN

## 2024-02-26 PROCEDURE — 99214 OFFICE O/P EST MOD 30 MIN: CPT

## 2024-02-26 NOTE — ASSESSMENT
[FreeTextEntry1] : 68 year old female here for pulm HTN as well as positive AMMON and CCP  1. Positive AMMON: patient with h/o CKD, pulmonary HTN, CHF, diabetes (well controlled) and h/o lymphoma and s/p hemicolectomy for poorly differentiated adenocarcinoma of the colon. Also noted to have a thyroid mass s/p biopsy with inadequate sampling of one side and possible chronic thyroiditis on the other side. Discussed with the patient that I suspect that her positive AMMON is likely related to underlying thyroid disease versus malignancy rather than lupus, MCTD or other connective tissue diseases. Patient does not have any symptoms or exam findings to suggest an underlying CTD at this time.   2. Positive CCP: patient has high titer CCP antibodies. C/o mild oligoarticular pain with minimal stiffness. Consider Plaquenil or alternate DMARD if symptoms worsen in the future.     3. Gout: on Allopurinol for hyperuricemia. UA (checked by PCP in June 2023) at goal < 5 mg/dL. Has nodular swelling over bilateral elbows and R 2nd PIP which could represent tophaceous deposits in this setting. Will monitor symptoms for now. Continue Allopurinol. Repeat level today.   4. Bone health: Ca+D as needed. Normal DEXA in Nov 2022.   Follow up in 6 months

## 2024-02-26 NOTE — REVIEW OF SYSTEMS
[Lower Ext Edema] : lower extremity edema [Shortness Of Breath] : shortness of breath [SOB on Exertion] : shortness of breath during exertion [As Noted in HPI] : as noted in HPI [Arthralgias] : arthralgias [Negative] : Heme/Lymph [Heart Rate Is Slow] : the heart rate was not slow [Heart Rate Is Fast] : the heart rate was not fast [Chest Pain] : no chest pain [Leg Claudication] : no intermittent leg claudication [Palpitations] : no palpitations [Wheezing] : no wheezing [Cough] : no cough [Orthopnea] : no orthopnea [PND] : no PND [Joint Pain] : no joint pain [Joint Swelling] : no joint swelling [Joint Stiffness] : no joint stiffness [Limb Pain] : no limb pain [Limb Swelling] : no limb swelling

## 2024-02-26 NOTE — HISTORY OF PRESENT ILLNESS
[___ Month(s) Ago] : [unfilled] month(s) ago [Currently Experiencing] : currently [Shortness of Breath] : shortness of breath [Arthralgias] : arthralgias [Decreased ROM] : decreased range of motion [Morning Stiffness] : morning stiffness [Dyspnea] : dyspnea [Anorexia] : no anorexia [FreeTextEntry1] : Doing quite well. Intermittent arthralgias without any evidence of inflammatory arthritis.  [Weight Loss] : no weight loss [Malaise] : no malaise [Fever] : no fever [Chills] : no chills [Fatigue] : no fatigue [Depression] : no depression [Malar Facial Rash] : no malar facial rash [Skin Lesions] : no lesions [Skin Nodules] : no skin nodules [Oral Ulcers] : no oral ulcers [Dry Mouth] : no dry mouth [Cough] : no cough [Dysphonia] : no dysphonia [Dysphagia] : no dysphagia [Chest Pain] : no chest pain [Joint Swelling] : no joint swelling [Joint Warmth] : no joint warmth [Joint Deformity] : no joint deformity [Falls] : no falls [Difficulty Standing] : no difficulty standing [Difficulty Walking] : no difficulty walking [Myalgias] : no myalgias [Muscle Weakness] : no muscle weakness [Muscle Cramping] : no muscle cramping [Muscle Spasms] : no muscle spasms [Visual Changes] : no visual changes [Eye Pain] : no eye pain [Eye Redness] : no eye redness [Dry Eyes] : no dry eyes

## 2024-04-01 ENCOUNTER — APPOINTMENT (OUTPATIENT)
Dept: HEART FAILURE | Facility: CLINIC | Age: 69
End: 2024-04-01

## 2024-04-01 ENCOUNTER — NON-APPOINTMENT (OUTPATIENT)
Age: 69
End: 2024-04-01

## 2024-04-01 VITALS
DIASTOLIC BLOOD PRESSURE: 63 MMHG | WEIGHT: 199 LBS | SYSTOLIC BLOOD PRESSURE: 99 MMHG | OXYGEN SATURATION: 94 % | BODY MASS INDEX: 36.62 KG/M2 | HEIGHT: 62 IN | HEART RATE: 66 BPM

## 2024-04-01 DIAGNOSIS — I10 ESSENTIAL (PRIMARY) HYPERTENSION: ICD-10-CM

## 2024-04-01 DIAGNOSIS — I50.32 CHRONIC DIASTOLIC (CONGESTIVE) HEART FAILURE: ICD-10-CM

## 2024-04-01 DIAGNOSIS — G47.33 OBSTRUCTIVE SLEEP APNEA (ADULT) (PEDIATRIC): ICD-10-CM

## 2024-04-01 PROCEDURE — 93000 ELECTROCARDIOGRAM COMPLETE: CPT

## 2024-04-01 PROCEDURE — 99214 OFFICE O/P EST MOD 30 MIN: CPT

## 2024-04-01 NOTE — CARDIOLOGY SUMMARY
[de-identified] : 4/1/24 AF 66, low voltage, NSST 12/15/23 AF 63, low voltage, NSST 8/10/23 AF 57, low voltage, NSST 5/4/23 AF 78, ow voltage, NSST 1/5/23  AF 74, ow voltage, NSST 10/3/22 AF 73, ow voltage, NSST 6/6/22 AF 61,  low voltage, NSST 2/7/22 AF 69, low voltage, NSST 10/25/21 AF 78, low voltage, NSST   [de-identified] : TTE 1/8/24 with LVEF 74% with normal LV and RV systolic function, severe LAE, TYRONE, mild MR PA pressure 37 mmHg, normal RA pressure  1/5/23 normal LVEF 65%, mod pHTN.  11/23/21 TTE; LVEF 66%, LVIDD 4.9 cm, normal LV systolic function, mild mod MR, mild mod TR, severe pHTN   9/28/20 TTE  with LVEF 64%, LVIDD 4.6 cm, normal LV systolic function, decreased RV systolic function, mild MR, mild mod TR, severe pHTN   [de-identified] : CT 4/13/23 after she had tested positive again for Covid 3/2023 notable for mild mosaic patttern to the lung parenchyma and mild enlargement of PA, unchanged [de-identified] : Normal technetium pyrophosphate scan on 12/12/19 with findings not suggestive of transthyretin cardiac amyloidosis.\par   [de-identified] : TATIANA/PVR normal. Lower arterial US Lower venous US with no DVT.  [de-identified] :  5/4/23 she had a colonoscopy with no polys.

## 2024-04-01 NOTE — ASSESSMENT
[FreeTextEntry1] : 69 y/o AA female w/ PMHX of Afib on Eliquis, DM II, HTN, COPD on home O2 2-3L, LINH on CPAP, lymphoma/CLL s/p radiation and chemo (1997), HFpEF LVEF 66%, severe pulmonary HTN (now on sildenafil and opsumit), RV systolic dysfunction, mod-severe TR. Admission 5/10-5/18/19. S/P partial right colectomy (4/23/19) secondary to Stage 1 colon cancer, as per patient with 27 neg nodes and did not need chemo. Nephrologist Dr. Heart for CKD and pulm Dr. Yang for Group 2 pulmonary HTN and Dr. Hunt is her colorectal surgeon. Normal technetium pyrophosphate scan on 12/12/19 with findings not suggestive of transthyretin cardiac amyloidosis. 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 S/P bilateral cataract surgery on 10/7 and 10/21/20. Last TTE 1/8/24 with LVEF 74% with normal LV and RV systolic function, severe LAE, TYRONE, mild MR PA pressure 37 mmHg, normal RA pressure from TTE 1/5/23 normal LVEF 65%, mod pHTN  ACC/AHA Stage C, NYHA Class III Appears compensated and normotensive.

## 2024-04-01 NOTE — HISTORY OF PRESENT ILLNESS
[FreeTextEntry1] : Dinah Marion is a 67 y/o AA female w/ PMHX of Afib on Eliquis, DM II, HTN, COPD on home O2 2-3L, LINH on CPAP, lymphoma/CLL s/p radiation and chemo (1997), HFpEF LVEF 66%, severe pulmonary HTN (now on sildenafil and opsumit), RV systolic dysfunction, mod-severe TR.  Last admission 5/10-5/18/19. S/P partial right colectomy  (4/23/19) secondary to Stage 1 colon cancer, as per patient with 27 neg nodes and did not need chemo. Nephrologist Dr. Heart for CKD and pulm Dr. Yang for Group 2 pulmonary HTN and  Dr. Hunt is her colorectal surgeon. Normal technetium pyrophosphate scan on 12/12/19 with findings not suggestive of transthyretin cardiac amyloidosis. 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 S/P bilateral cataract surgery on 10/7 and 10/21/20. Last TTE 1/8/24 LVEF 74% with normal LV and RV systolic function, severe LAE, TYRONE, mild MR PA pressure 37 mmHg, normal RA pressure. Pt is here for a follow up today   Pt comes for a follow up today. States she has been feeling well. She had a TTE 1/8/24 notable for LVEF 74% with normal LV and RV systolic function, severe LAE, TYRONE, mild MR PA pressure 37 mmHg, normal RA pressure with improvement in PA pressure from prior TTE.   She uses oxygen 2-3 liters, usually in the evening at [facility] and CPAP for LINH . No daytime sleepiness       Since last visit, she had labs with endocrinologist Dr. Cabrales 2/2024 notable for mild improvement in BUN/creat 71/2.03 with K 4.7 and pt reports it had been up to 2.5 previously. HgA1C was 5.5% and Hg 10.3 (see scanned data) She followed up with pulm Dr. Yang and was referred to pulmonary rehab and has assessment 5/24/24, states she needs card clearance.   She is currently taking torsemide 20 mg daily and has not needed an extra 20 mg on the weekends since last visit. She is taking lucas 12.5 mg 5 days a week. She is taking dig 0.125 mg QOD. Her home weight has been decreased to 197.5 lbs and is 199 lbs in office today with clothes.  Home B/P range 120-130. She is adhering to low salt diet and is drinking less than 2 liters of fluid per day.  Currently, she can walk 5 blocks (with oxygen 2-3 liters) without cane to the store but sometimes stops at to rest briefly. She can climb one flight of stairs several times a day. She sleeps with 2 pillows with no orthopnea. She denies chest pain, palpitations, dizziness/LH and no syncope. She wears compression stockings that helps the swelling in her legs.    She had the Covid vaccine x 2 and booster without adverse reaction. S/P Covid 19 infection 5/24/22 - S/P monoclonal antibodies

## 2024-04-01 NOTE — PHYSICAL EXAM
[Well Developed] : well developed [No Acute Distress] : no acute distress [Normal Conjunctiva] : normal conjunctiva [Good Air Entry] : good air entry [Clear Lung Fields] : clear lung fields [Soft] : abdomen soft [Non Tender] : non-tender [No Rash] : no rash [Normal] : alert and oriented, normal memory [de-identified] : JVP  8 cm with V waves secondary to TR [de-identified] : irregularly irregular [de-identified] : on room air but uses 2-3 liter oxygen via nasal cannula [de-identified] : slow gait [de-identified] : trace lower extremity edema bilaterally

## 2024-04-01 NOTE — DISCUSSION/SUMMARY
[FreeTextEntry1] : 1. Chronic diastolic heart failure- normal LVEF - 1/8/24 TTE with EF 74% and improved pulm pressures -  with Normal technetium pyrophosphate scan on 12/12/19 with findings not suggestive of transthyretin cardiac amyloidosis ( pt had low voltage on EKG, joint pains and B/L carpal tunnel) - will continue lucas 12.5 mg five times a week - continue torsemide 20 mg daily with one additional as needed for weight gain of 2-3 lbs in 2-3 days.  pt is no longer on metolazone 5 mg prn. Goal weight is less than 202 lbs and she is 197.5  lbs at home , follow up with PCP Dr. Palafox, will follow up with nephrologist Dr. Heart, pulm Dr. Yang and rheum  - Continue Toprol to 50 mg po BID ( a.fib)  - Continue compression stockings to improve venous return - increase daily exercise as tolerated - continue digoxin 0.125 mg QOD for AF with controlled VR - follow up with Dr. Montero for vascular annually   2. Severe Pulmonary HTN/PAH-  - TTE with mod pHTN -  Continue with sildenafil 20 mg po TID. - Continue  Opsumit 10 mg daily, pt feels she has had improvement since starting  - Continue torsemide as above - Followed up with Dr. Yang and recommended pulmonary rehab - Continue CPAP with oxygen for LINH - using oxygen qhs and with activity - cardiac status is stable to proceed with pulmonary rehab without any further cardiac testing   3. CKD-   - Follow up with renal Dr. Heart  - Follow up with primary Dr. Palafox, will send notes and EKG  4. Atrial fibrillation- rate controlled  -Continue Toprol 50 mg po BID. - Continue digoxin 0.125 mg every other day, with level 9/2023 0,9 ng/ml - Continue Eliquis for oral a/c and stroke prevention.    Follow up in office in 4 months [EKG obtained to assist in diagnosis and management of assessed problem(s)] : EKG obtained to assist in diagnosis and management of assessed problem(s)

## 2024-04-05 NOTE — CONSULT NOTE ADULT - PROVIDER SPECIALTY LIST ADULT
Heart Failure
Heme/Onc
Pulmonology
Surgery
Gastroenterology
Nephrology
hard copy, drawn during this pregnancy

## 2024-05-01 NOTE — DISCHARGE NOTE PROVIDER - PROVIDER TOKENS
[Visual inspection, sensory exam] : Foot exam, including visual inspection, sensory exam with mono filament, and pulse exam, was performed within the last 12 months [TextEntry] : Ophthalmology follow up: Need to re-establish care with eye doctor Podiatry follow up and foot exam: Foot exam 02/12/2024 within normal limits.  PROVIDER:[TOKEN:[[id number]]],PROVIDER:[TOKEN:[[id number]]],PROVIDER:[TOKEN:[[id number]]],PROVIDER:[TOKEN:[[id number]]],PROVIDER:[TOKEN:[[id number]]],PROVIDER:[TOKEN:[[id number]]],PROVIDER:[TOKEN:[[id number]]]

## 2024-05-07 NOTE — H&P ADULT - PROBLEM SELECTOR PLAN 7
[FreeTextEntry1] : ***Follow up 5/7/24*** - Presents with mom Patient presents for follow up to neuro clinic. Memory issues same since last visit. He misplaces things like his DM devices, medications that his mm helps him with. Never forgets people, places, ADLs (brush teeth, shower, feed himself), ambulates though cautiously. When ambulating, he looks down. This has been stable since accident in 2018, feel and injury of RLE. Lives at home with mom. In the past was working, good with math. He was working in the restaurant as a . Unsure why he cant work then worked in [employer] in December but was fired for working slow. Unsure if ability to do maths is impaired as he spends most of his time at home and doesn't want to leave home, as per mom.   Otherwise no fevers, chills, nausea, vomiting, speech changes, headaches, hearing changes, vision changes, extremity weakness/numbness/tingling, ambulatory changes.   Patient had LP 2/23/24: TNC <1, no color, clear RBC 0. PCR not detect. IgG 2.9, ratio 0.20, protein 23, glucose 140, OCB absent, AIE panel unrevealing.  Recent labs 4/2024: cmp wnl, Cr  zahida D, 25: 38.1, TSH 0.71, A1c 9.7, LDL 54, trig 41, chol 122,   12/19/23: B12 622, folate >20, syphilis neg MR head no con 1/4/24:  There is no evidence of acute infarct. There are no foci of susceptibility artifact to suggest hemorrhage. Areas of T2/FLAIR hyperintensity in the bilateral hemispheric white matter are nonspecific but likely related to chronic white matter microvascular ischemic disease. Significant atrophy of the cerebellum and brainstem. Flow voids of the major intracranial vessels at the skull base follow expected course and contour. The paranasal sinuses demonstrate no signal abnormality. The mastoids demonstrate no signal abnormality.  Bilateral orbits are within normal limits. IMPRESSION: No acute infarct, hemorrhage, or mass effect. Significant atrophy of the cerebellum and brainstem.  Initial encounter: 45y M with Hx DM1, hypothyroidism, depression, HLD, who presents as initial evaluation for memory loss. Issue has been ongoing for 1 year. He is not currently working. He is independent in ADLs. He states LTM is impaired, but LTM is intact. He is forgetful of other people, except for family members. Denies tremors, getting lost in familiar places. Mom notices that he walks slow and sometimes will fall in the AM. He is slow to find his words at times. No focal weakness, numbness, tingling, dizziness, headaches, or visual loss. He had normal development as child. No neurological disease in the family. He was referred here by PCP due to memory loss and abnormal MRI (cerebellar and brainstem atrophy). Normal folate, B12, RPR, CMP. He has been on Zoloft for past 2 months and has noticed some improvements, talking and more helpful around the house. Lives with his mother. No S/S of flare.  - C/w allopurinol

## 2024-05-23 ENCOUNTER — APPOINTMENT (OUTPATIENT)
Dept: PULMONOLOGY | Facility: CLINIC | Age: 69
End: 2024-05-23
Payer: MEDICARE

## 2024-05-23 PROCEDURE — 94618 PULMONARY STRESS TESTING: CPT

## 2024-05-23 PROCEDURE — 94060 EVALUATION OF WHEEZING: CPT

## 2024-05-23 PROCEDURE — 94726 PLETHYSMOGRAPHY LUNG VOLUMES: CPT

## 2024-05-23 PROCEDURE — 94729 DIFFUSING CAPACITY: CPT

## 2024-06-05 ENCOUNTER — APPOINTMENT (OUTPATIENT)
Dept: PULMONOLOGY | Facility: CLINIC | Age: 69
End: 2024-06-05
Payer: MEDICARE

## 2024-06-05 PROCEDURE — ZZZZZ: CPT

## 2024-06-05 PROCEDURE — G0239: CPT

## 2024-06-10 ENCOUNTER — APPOINTMENT (OUTPATIENT)
Dept: PULMONOLOGY | Facility: CLINIC | Age: 69
End: 2024-06-10
Payer: MEDICARE

## 2024-06-10 PROCEDURE — 94626 PHY/QHP OP PULM RHB W/MNTR: CPT

## 2024-06-12 ENCOUNTER — APPOINTMENT (OUTPATIENT)
Dept: PULMONOLOGY | Facility: CLINIC | Age: 69
End: 2024-06-12
Payer: MEDICARE

## 2024-06-12 PROCEDURE — G0239: CPT

## 2024-06-13 ENCOUNTER — APPOINTMENT (OUTPATIENT)
Dept: PULMONOLOGY | Facility: CLINIC | Age: 69
End: 2024-06-13
Payer: MEDICARE

## 2024-06-13 VITALS
TEMPERATURE: 97.2 F | RESPIRATION RATE: 16 BRPM | SYSTOLIC BLOOD PRESSURE: 143 MMHG | HEART RATE: 74 BPM | WEIGHT: 196 LBS | DIASTOLIC BLOOD PRESSURE: 71 MMHG | BODY MASS INDEX: 36.07 KG/M2 | OXYGEN SATURATION: 97 % | HEIGHT: 62 IN

## 2024-06-13 DIAGNOSIS — I48.91 UNSPECIFIED ATRIAL FIBRILLATION: ICD-10-CM

## 2024-06-13 PROCEDURE — 99214 OFFICE O/P EST MOD 30 MIN: CPT

## 2024-06-14 NOTE — HISTORY OF PRESENT ILLNESS
[Never] : never [TextBox_4] : This letter  is regarding your patient  who  attended pulmonary out patient office today.  I have reviewed  patient's  past history, social history, family history and medication list. I also  reviewed nurse practitioners/ and fellows  notes and assessment and agree with it.   The patient was referred by  for SECONDARY  pulm HTN [,GROUP 2 ---DIASTOLIC DYSFUNCTION ], on sildenafil. PMH colon cancer, afib- on eliquis,   CKD,  ATRIAL FIBRILLATION,  PAST H/O LYMPHOMA/CLL  S/P RT AND CHEMO [ 1997]--H/O  LINH on bipap and nocturnal oxygen, OSMAR, CHF, DM, thyroid nodule -on home oxygen-  status post resection of stage I colon cancer    ----------  ------No history of , fever, chills , rigors, chest pain, or hemoptysis. Questionable history of Raynaud's phenomenon. No h/o significant weight loss in last few months. No history of liver dysfunction ,  or chronic thromboembolic disease. I would classify the patient's dyspnea as WHO  FUNCTIONAL CLASS II--------  ----Pft date--2109- -------RESTRICTIVE VENT DEFECT  ---6MWT 2019-    132 METERS  USED A CANE TO WALK   CARDIAC C ATH 2019----------  PA 78/29 MEAN PA 45, RA 10, RV 78/14,  PA SAT 47, CO 4.79. CI 2.33, PVR  6.84 WOODS ----Ct scan date--4/2019 IMPRESSION:  Unchanged prominent mediastinal nodes, nonspecific. Otherwise, no  evidence of metastatic disease in the chest, abdomen, or pelvis. High-density material layering within the gastric antrum may be secondary  to ingested material. Questionable skin defect of the left gluteal soft tissues not seen on  prior CT, likely skinfold however clinical correlation is recommended.  CT CHEST 12/2020 IMPRESSION: 1. No change in the mosaic attenuation of the lungs. 2. Cardiomegaly. 3. Enlarged pulmonary artery can occur in the clinical setting of pulmonary arterial hypertension  ----- vq scan 4/2019 low prob of PE  echo 4/2019 ------------------------------------------------------------------------ CONCLUSIONS: 1. Mitral annular calcification and calcified mitral leaflets with normal diastolic opening. Mild mitral regurgitation. 2. Calcified trileaflet aortic valve with normal opening. 3. Normal left ventricular internal dimensions and wall thicknesses. 4. Hyperdynamic left ventricle. Flattening of the interventricular septum in both systole and diastole is consistent with right ventricular pressure overload. 5. Severe right atrial enlargement. 6. Right ventricular enlargement with normal right ventricular systolic function. 7. Normal tricuspid valve. Moderate-severe tricuspid regurgitation. 8. Estimated pulmonary artery systolic pressure equals 79 mm Hg, assuming right atrial pressure equals 5  mm Hg, consistent with severe pulmonary hypertension. *** Compared with echocardiogram of 10/31/2017, no significant changes noted.   echo 9/2020 est pasp=65mmHg   ECHO 1/2024 CONCLUSIONS:    1. The left ventricular cavity is normal. Left ventricular wall thickness is normal. Left ventricular systolic function is normal with a calculated ejection fraction of 74 % by the Roberts's biplane method of disks. There are no regional wall motion abnormalities seen. The left ventricular diastolic function is indeterminate. Analysis of left ventricular diastolic function and filling pressure is made challenging by the presence of atrial fibrillation.  2. Normal right ventricular cavity size and systolic function.  3. Structurally normal mitral valve with normal leaflet excursion. There is calcification of the mitral valve annulus. There is mild mitral regurgitation.  4. The left atrium is severely dilated with an indexed volume of 54.87 ml/m?.  5. The right atrium is severely dilated in size with an indexed volume of 52.45 ml/m?.  6. Compared to the transthoracic echocardiogram performed on 1/5/2023 the estimated pulmonary artery systolic pressure has decreased.  cardiac cath 4/2019   Pulmonary Artery (S/D/M): 78/29/45 Pressures:  -- Pulmonary Capillary Wedge: 20/21/18 Pulmonary vascular resistance (Wood Units): 6.84,  CO by Juan Carlos: 4.39  US thyroid 7/2019 IMPRESSION:  Bilateral indeterminant thyroid nodules slightly decreased on the right.  No parathyroid adenoma identified.    CT CHEST - ORDERED BY: NIRALI MOORE   PROCEDURE DATE: 06/21/2022    INTERPRETATION: CLINICAL INFORMATION: Pulmonary hypertension  TECHNIQUE: A volumetric CT acquisition of the chest was obtained from the thoracic inlet to the upper abdomen, without the administration of intravenous contrast. Coronal and sagittal multiplanar reformations were also submitted.  Comparison: 12/16/2020  FINDINGS:  Lungs/Airways/Pleura: Persistent mosaic perfusion in the lungs. No endobronchial nodule. No pleural effusion  Mediastinum/Lymph nodes: No thoracic adenopathy.  Heart and Vessels: There is biatrial enlargement. No pericardial effusion there are coronary artery calcifications. The mid ascending aorta measures 3.2 cm. The pulmonary artery is larger than the adjacent ascending aorta.  Upper Abdomen: Partially imaged anastomotic staple line at the splenic flexure.  Osseous structures and Soft Tissues: No aggressive bone lesions.  IMPRESSION:  Stable appearance the chest with mosaic perfusion of the lungs and enlarged pulmonary artery compared to the adjacent ascending aorta    AUGUT 2109 WILL  START  REVATIO FOR PAH  ------OCT 2019---------stable from pulmonary standpoint--------dyspnea on exertion-----on 2L oxygen------tolerating revatio 20 mg TID--------under care of [facility] [Dr. Jerez]----- Dec 2109-----  on macetantan and revatio, renal dysfunction [ dr heart], slight elevation of lft,   -----DEC  2019-------stable from pulmonary point of view---------doing well on macitentan and revatio------unable to sleep------is using her BiPAP machine------underdare of Hem/Onc [Dr. YANG]------HAS RIGHT THYROID NODULE-   denis 15 2020---     saw dr heart nephrology  [ s creat was 2.2].  , saw dr yang oncology  Hg  ---   still Anemia on EPOGEN---  ----------------------n sildenafil  AND OPSUMIT  --ON HOME OXYGEN --ON LASIX AND ALDACTONE  nov 2020---doing well--- renal managed by  dr heart nephrology .  , saw dr yang oncology  Hg  ---   still Anemia on EPOGEN--- pulm htn-on sildenafil  AND OPSUMIT  --ON HOME OXYGEN --ON torsemide AND ALDACTONE     feb 2012---feels bEtter-- HepPeF- sec pulm htn , renal dysfunction  LINH  on bipap 19/13 with o2 2 l/min ,,, during the daytime on 3l/min o2---on torsemide and spironolactone [dr heart] ,  anemia on prcrit [dr yang]  ,  for pulm htn on Opsumit and sildenafil--------- needs pft and 6 min walk   6/2021 6mwt 240 m 6/2021 PFT restrictive defect  6/2021 pulm htn on sildenafil & opsumit, using oxygen. On diuretics per Dr Heart. Follows cardiology Dr Degroot.  LINH------ on BiPAP  She is up to date w/covid vac  9/2021 pulm htn on sildenafil & opsumit, using oxygen. On diuretics per Dr Heart. Follows cardiology Dr Degroot.  LINH------ on BiPAP nightly snd  benefits from its use -need compliance report  --- She is up to date w/covid vac  jan 2022 ---  pulm htn on sildenafil & opsumit, using oxygen. --compliant to  meds-------On diuretics per Dr Heart. Follows cardiology Dr Degroot.  LINH------ on BiPAP nightly snd  benefits from its use -need compliance report  --- She is up to date w/covid vac  March 2022   --- pulm htn remains on sildenafil and opsumit, using oxygen ---  On diuretics per cardiology--- LINH on BiPAP- need compliance report--- Hypothyroidism being managed by Dr. Nasra Cabrales, recent increase in Synthroid--- Anemia being follow by Dr. Yang remains on Aranesp -- needs PFT 6MWT  July 2022 - remains on sildenafil and opsumit, uses 3 L O2 when out of house. RA at rest and with ADLs, no difficulties. Continues to be on torsemide 20 OD and spironolactone 12.5 5/week. LINH on BIPAP - awaiting  CPAP UNIT replacement. Repeat TSH of 2.95 on increased Synthroid dose. Outside labs reviewed pertinent for TSH 2.95, BUN/Cr of 54/1.90, a1c of 5.8, Hb of 9.8. Patient had appointment with renal and cardio in june, 22 - stable PFT - restrictive pattern  6mw - able to walk 299 m compare to previous 193 in june, 21 CT scan - stable mosaic pattern    11/2022 pulm htn on opsumit and sildenafil and tolerating it well- No pulm complaints-uses oxygen as needed reactive airway- uses advair, has not needed rescue inhaler linh on bipap w/O2 and benefits from nightly use- awaiting replacement of recalled device diuretics managed by Dr Heart- on torsemide and aldactone cardiology Dr Degroot- o/s TTE jan 2023 utd w/all vaccines   9/2023 pft comined obstruction/restriction- uses adviar 9/2023 pulm htn- remains stable on opsumit and sildenafil- cards Dr degroot- next echo jan 2024 diuretics managed by Saw- pb 2.4- spironolactone 12.5mg 5x weekly and torsemide 20mg daily using oxygen x24hrs- resp atkins stable follows Reena for anemia- on aranesp q 3weeks, reports recent colonoscopy normal  LINH ----- on BiPAP  19/13  WITH O2  2LMIN and benefits from its nightly use  02/2024 hx of pulmonary hypertension stable on opsumit and sildenafil. LINH on CPAP, benefiting from nightly use. Will obtain compliance report patient reports 100% compliance. Diuretics managed by Dr. Heart- pb elevated at 2.5- spironolactone 12.5mg 5x weekly and torsemide 20mg daily. Follows Dr. Yang for anemia- on aranesp q 3weeks. Compliant with medications, reports being euvolemic and improvement in shortness of breath. Uses oxygen 2lpm as needed with activity.    5/2024 pft combined restriction and obstruction 6/2024 6mwt 275m  6/2024 pulmonary hypertension stable on opsumit and sildenafil. Getting pulm rehab w/noted benefit, using oxygen as needed Diuretics- torsemide and aldactone managed by Dr Yaritza Heart Follows Dr. Yang for anemia- .LINH on CPAP, benefiting from nightly use-she is 100% compliant per download

## 2024-06-14 NOTE — DISCUSSION/SUMMARY
[FreeTextEntry1] : -Assessment plan----------The patient has been referred here for further opinion regarding pulmonary problem,chronic diastolic heart failure secondary pulm HTN, on sildenafil. OPSUMIT-------PMH colon cancer, afib- on eliquis and LINH on bipap and nocturnal oxygen, OSMAR, CHF, DM, thyroid nodule, history of colon cancer status post colon surgery history of lymphoma status post chemotherapy [1997], ??right diaphragm elevation  1- chronic RIGHT HEART heart failure secondary pulm HTN, [ WHO GROUP I + GROUP II COMPONENT OF DIASTOLIC DYSFUNCTION ] on sildenafil  AND OPSUMIT - It is medically necessary for pt to use OXYGEN [ remains on 2 L/MIN]x 24 hrs- PFT restrictive pattern, 6mw distance improved from 193 -> 299 [ JULY 2022]  echo 1/2024-follows card Dr Degroot  2- CKD-- remains on torsemide 20 OD and aldactone 12.5 5/week - managed by Dr. Heart and Dr. Levy   3- [facility] ----- on BiPAP  19/13  WITH O2  2LMIN and benefits from its nightly use, cFollows Dr. Yang for anemia-  4  AFIB -----remains on ELIQUIS  5- needs repeat CT CHEST   6- anemia----FOLLOW UP WITH DR. YANG-- ----getting Aranesp (darbepoetin cris)  7-  CARDIOLOGY --DR DEGROOT ---HAS  A FIB ON ELIQUIS - needs yearly echo 8- f/u in 4 months  ---Thanks for allowing me to participate in the care of this patient. Patient at this time will follow the above mentioned recommendations and return back for follow up visit. If you have any questions I can be reached at #[phone] (office).  Anirudh Yang MD, [facility]

## 2024-06-14 NOTE — END OF VISIT
[Time Spent: ___ minutes] : I have spent [unfilled] minutes of time on the encounter. [FreeTextEntry3] :   I, Dr. Anirudh Yang  personally performed the evaluation and management (E/M) services for this established patient who presents today with (a) new problem(s)/exacerbation of (an) existing condition(s). That E/M includes conducting the clinically appropriate interval history &/or exam, assessing all new/exacerbated conditions, and establishing a new plan of care. Today, my TIEN, Josselyn Grider NP, , was here to observe my evaluation and management service for this new problem/exacerbated condition and follow the plan of care established by me going forward.

## 2024-06-14 NOTE — PHYSICAL EXAM
[No Acute Distress] : no acute distress [Normal Oropharynx] : normal oropharynx [III] : Mallampati Class: III [No Neck Mass] : no neck mass [Clear to Auscultation Bilaterally] : clear to auscultation bilaterally [No Abnormalities] : no abnormalities [Benign] : benign [Normal Gait] : normal gait [No Clubbing] : no clubbing [Non-Pitting] : non-pitting [Normal Color/ Pigmentation] : normal color/ pigmentation [No Focal Deficits] : no focal deficits [Oriented x3] : oriented x3 [TextBox_54] : LOUD S2 , III[facility]

## 2024-06-17 ENCOUNTER — APPOINTMENT (OUTPATIENT)
Dept: PULMONOLOGY | Facility: CLINIC | Age: 69
End: 2024-06-17
Payer: MEDICARE

## 2024-06-17 PROCEDURE — G0239: CPT

## 2024-06-19 ENCOUNTER — APPOINTMENT (OUTPATIENT)
Dept: PULMONOLOGY | Facility: CLINIC | Age: 69
End: 2024-06-19
Payer: MEDICARE

## 2024-06-19 PROCEDURE — G0239: CPT

## 2024-06-19 NOTE — H&P ADULT - PROBLEM/PLAN-5
Left pt detailed voicemail- waiting for call back to see if GI referral needed.   DISPLAY PLAN FREE TEXT

## 2024-06-24 ENCOUNTER — APPOINTMENT (OUTPATIENT)
Dept: PULMONOLOGY | Facility: CLINIC | Age: 69
End: 2024-06-24
Payer: MEDICARE

## 2024-06-24 PROCEDURE — G0239: CPT

## 2024-06-26 ENCOUNTER — APPOINTMENT (OUTPATIENT)
Dept: PULMONOLOGY | Facility: CLINIC | Age: 69
End: 2024-06-26
Payer: MEDICARE

## 2024-06-26 PROCEDURE — G0239: CPT

## 2024-07-01 ENCOUNTER — APPOINTMENT (OUTPATIENT)
Dept: PULMONOLOGY | Facility: CLINIC | Age: 69
End: 2024-07-01
Payer: MEDICARE

## 2024-07-01 PROCEDURE — G0239: CPT

## 2024-07-03 ENCOUNTER — APPOINTMENT (OUTPATIENT)
Dept: PULMONOLOGY | Facility: CLINIC | Age: 69
End: 2024-07-03
Payer: MEDICARE

## 2024-07-03 PROCEDURE — G0239: CPT

## 2024-07-05 NOTE — PROGRESS NOTE ADULT - PROBLEM/PLAN-2
LVM confirming appt. for 07/08/2024 @ 03:00 PM   
DISPLAY PLAN FREE TEXT

## 2024-07-08 ENCOUNTER — APPOINTMENT (OUTPATIENT)
Dept: PULMONOLOGY | Facility: CLINIC | Age: 69
End: 2024-07-08
Payer: MEDICARE

## 2024-07-08 PROCEDURE — G0239: CPT

## 2024-07-10 ENCOUNTER — APPOINTMENT (OUTPATIENT)
Dept: PULMONOLOGY | Facility: CLINIC | Age: 69
End: 2024-07-10
Payer: MEDICARE

## 2024-07-10 PROCEDURE — G0239: CPT

## 2024-07-11 ENCOUNTER — APPOINTMENT (OUTPATIENT)
Dept: HEART FAILURE | Facility: CLINIC | Age: 69
End: 2024-07-11
Payer: MEDICARE

## 2024-07-11 ENCOUNTER — NON-APPOINTMENT (OUTPATIENT)
Age: 69
End: 2024-07-11

## 2024-07-11 VITALS
HEIGHT: 62 IN | WEIGHT: 197 LBS | DIASTOLIC BLOOD PRESSURE: 75 MMHG | SYSTOLIC BLOOD PRESSURE: 128 MMHG | HEART RATE: 76 BPM | OXYGEN SATURATION: 95 % | BODY MASS INDEX: 36.25 KG/M2

## 2024-07-11 DIAGNOSIS — I27.20 PULMONARY HYPERTENSION, UNSPECIFIED: ICD-10-CM

## 2024-07-11 DIAGNOSIS — I50.32 CHRONIC DIASTOLIC (CONGESTIVE) HEART FAILURE: ICD-10-CM

## 2024-07-11 PROCEDURE — 99214 OFFICE O/P EST MOD 30 MIN: CPT

## 2024-07-11 PROCEDURE — 93000 ELECTROCARDIOGRAM COMPLETE: CPT

## 2024-07-15 ENCOUNTER — APPOINTMENT (OUTPATIENT)
Dept: PULMONOLOGY | Facility: CLINIC | Age: 69
End: 2024-07-15
Payer: MEDICARE

## 2024-07-15 PROCEDURE — G0239: CPT

## 2024-07-17 ENCOUNTER — APPOINTMENT (OUTPATIENT)
Dept: PULMONOLOGY | Facility: CLINIC | Age: 69
End: 2024-07-17
Payer: MEDICARE

## 2024-07-17 PROCEDURE — G0239: CPT

## 2024-07-19 ENCOUNTER — APPOINTMENT (OUTPATIENT)
Dept: CT IMAGING | Facility: IMAGING CENTER | Age: 69
End: 2024-07-19

## 2024-07-19 ENCOUNTER — RESULT REVIEW (OUTPATIENT)
Age: 69
End: 2024-07-19

## 2024-07-19 ENCOUNTER — OUTPATIENT (OUTPATIENT)
Dept: OUTPATIENT SERVICES | Facility: HOSPITAL | Age: 69
LOS: 1 days | End: 2024-07-19
Payer: MEDICARE

## 2024-07-19 DIAGNOSIS — I27.20 PULMONARY HYPERTENSION, UNSPECIFIED: ICD-10-CM

## 2024-07-19 PROCEDURE — 71250 CT THORAX DX C-: CPT | Mod: 26,MH

## 2024-07-19 PROCEDURE — 71250 CT THORAX DX C-: CPT | Mod: MH

## 2024-07-22 ENCOUNTER — APPOINTMENT (OUTPATIENT)
Dept: PULMONOLOGY | Facility: CLINIC | Age: 69
End: 2024-07-22
Payer: MEDICARE

## 2024-07-22 PROCEDURE — G0239: CPT

## 2024-07-24 ENCOUNTER — APPOINTMENT (OUTPATIENT)
Dept: PULMONOLOGY | Facility: CLINIC | Age: 69
End: 2024-07-24
Payer: MEDICARE

## 2024-07-24 PROCEDURE — G0239: CPT

## 2024-07-29 ENCOUNTER — APPOINTMENT (OUTPATIENT)
Dept: PULMONOLOGY | Facility: CLINIC | Age: 69
End: 2024-07-29
Payer: MEDICARE

## 2024-07-29 PROCEDURE — G0239: CPT

## 2024-07-31 ENCOUNTER — APPOINTMENT (OUTPATIENT)
Dept: PULMONOLOGY | Facility: CLINIC | Age: 69
End: 2024-07-31
Payer: MEDICARE

## 2024-07-31 PROCEDURE — G0239: CPT

## 2024-08-05 ENCOUNTER — APPOINTMENT (OUTPATIENT)
Dept: PULMONOLOGY | Facility: CLINIC | Age: 69
End: 2024-08-05

## 2024-08-05 PROCEDURE — G0239: CPT

## 2024-08-07 ENCOUNTER — APPOINTMENT (OUTPATIENT)
Dept: PULMONOLOGY | Facility: CLINIC | Age: 69
End: 2024-08-07

## 2024-08-07 PROCEDURE — 94626 PHY/QHP OP PULM RHB W/MNTR: CPT

## 2024-08-12 ENCOUNTER — APPOINTMENT (OUTPATIENT)
Dept: PULMONOLOGY | Facility: CLINIC | Age: 69
End: 2024-08-12
Payer: MEDICARE

## 2024-08-12 PROCEDURE — G0239: CPT

## 2024-08-13 ENCOUNTER — APPOINTMENT (OUTPATIENT)
Dept: COLORECTAL SURGERY | Facility: CLINIC | Age: 69
End: 2024-08-13
Payer: MEDICARE

## 2024-08-13 DIAGNOSIS — C18.9 MALIGNANT NEOPLASM OF COLON, UNSPECIFIED: ICD-10-CM

## 2024-08-13 PROCEDURE — 99213 OFFICE O/P EST LOW 20 MIN: CPT

## 2024-08-13 NOTE — PHYSICAL EXAM
[FreeTextEntry1] : Alert and oriented x3 MMoves all extremities no edema Abdomen soft nontender Incisions intact No rashes

## 2024-08-13 NOTE — ASSESSMENT
[FreeTextEntry1] : Stage I colon cancer -Patient is 5 years status post resection was completed surveillance -Continue to followup with hematology regarding anemia -Gastroenterology for upper endoscopy -Followup in 2 years for colonoscopy

## 2024-08-13 NOTE — HISTORY OF PRESENT ILLNESS
[FreeTextEntry1] : Status post excision of stage I colon cancer. Patient has been progressing well. Tolerating diet no fevers or chills no nausea or vomiting. Mild constipation however patient has been on iron supplementation for mild anemia. Colonoscopy was one year ago. Hematology is recommending upper endoscopy to evaluate stomach otherwise patient is without complaints she is 5 years from resection

## 2024-08-14 ENCOUNTER — APPOINTMENT (OUTPATIENT)
Dept: PULMONOLOGY | Facility: CLINIC | Age: 69
End: 2024-08-14
Payer: MEDICARE

## 2024-08-14 PROCEDURE — G0239: CPT

## 2024-08-19 ENCOUNTER — APPOINTMENT (OUTPATIENT)
Dept: PULMONOLOGY | Facility: CLINIC | Age: 69
End: 2024-08-19
Payer: MEDICARE

## 2024-08-19 PROCEDURE — G0239: CPT

## 2024-08-21 ENCOUNTER — APPOINTMENT (OUTPATIENT)
Dept: PULMONOLOGY | Facility: CLINIC | Age: 69
End: 2024-08-21
Payer: MEDICARE

## 2024-08-21 PROCEDURE — G0239: CPT

## 2024-08-21 PROCEDURE — ZZZZZ: CPT

## 2024-08-25 ENCOUNTER — NON-APPOINTMENT (OUTPATIENT)
Age: 69
End: 2024-08-25

## 2024-08-26 ENCOUNTER — LABORATORY RESULT (OUTPATIENT)
Age: 69
End: 2024-08-26

## 2024-08-26 ENCOUNTER — APPOINTMENT (OUTPATIENT)
Dept: PULMONOLOGY | Facility: CLINIC | Age: 69
End: 2024-08-26

## 2024-08-26 ENCOUNTER — APPOINTMENT (OUTPATIENT)
Dept: RHEUMATOLOGY | Facility: CLINIC | Age: 69
End: 2024-08-26
Payer: MEDICARE

## 2024-08-26 VITALS
SYSTOLIC BLOOD PRESSURE: 106 MMHG | HEART RATE: 64 BPM | DIASTOLIC BLOOD PRESSURE: 68 MMHG | BODY MASS INDEX: 35.88 KG/M2 | HEIGHT: 62 IN | RESPIRATION RATE: 16 BRPM | OXYGEN SATURATION: 96 % | WEIGHT: 195 LBS

## 2024-08-26 DIAGNOSIS — R22.31 LOCALIZED SWELLING, MASS AND LUMP, RIGHT UPPER LIMB: ICD-10-CM

## 2024-08-26 DIAGNOSIS — M25.531 PAIN IN RIGHT WRIST: ICD-10-CM

## 2024-08-26 PROCEDURE — 99214 OFFICE O/P EST MOD 30 MIN: CPT

## 2024-08-26 PROCEDURE — G2211 COMPLEX E/M VISIT ADD ON: CPT

## 2024-08-26 NOTE — ASSESSMENT
[FreeTextEntry1] : 69 year old female here for pulm HTN as well as positive AMMON and CCP  1. Positive AMMON: patient with h/o CKD, pulmonary HTN, CHF, diabetes (well controlled) and h/o lymphoma and s/p hemicolectomy for poorly differentiated adenocarcinoma of the colon. Also noted to have a thyroid mass s/p biopsy with inadequate sampling of one side and possible chronic thyroiditis on the other side. Discussed with the patient that I suspect that her positive AMMON is likely related to underlying thyroid disease versus malignancy rather than lupus, MCTD or other connective tissue diseases. Patient does not have any symptoms or exam findings to suggest an underlying CTD at this time.   2. Positive CCP: patient has high titer CCP antibodies. C/o mild oligoarticular pain with minimal stiffness. Consider Plaquenil or alternate DMARD if symptoms worsen in the future.     3. Gout: on Allopurinol for hyperuricemia. UA (checked by PCP in June 2023) at goal < 5 mg/dL. Has nodular swelling over bilateral elbows and R 2nd PIP which could represent tophaceous deposits in this setting. Will monitor symptoms for now. Continue Allopurinol. Repeat level today. Dual energy CT ordered to evaluate the swelling.   4. R wrist pain: US joint ordered to evaluate for tendinitis versus carpal tunnel syndrome.   5, Bone health: Ca+D as needed. Normal DEXA in Nov 2022. Referred to repeat in Nov 2024.   Follow up in 4 months

## 2024-08-26 NOTE — HISTORY OF PRESENT ILLNESS
[___ Month(s) Ago] : [unfilled] month(s) ago [Currently Experiencing] : currently [Shortness of Breath] : shortness of breath [Arthralgias] : arthralgias [Decreased ROM] : decreased range of motion [Morning Stiffness] : morning stiffness [Dyspnea] : dyspnea [FreeTextEntry1] : c/o pain and burning sensation in the R wrist that she has noticed after starting pulmonary rehab. Developed some nodules in the R middle finger PIP over the last couple months. Uric acid is at goal on Allopurinol. No known gout attacks recently.  [Anorexia] : no anorexia [Weight Loss] : no weight loss [Malaise] : no malaise [Fever] : no fever [Chills] : no chills [Fatigue] : no fatigue [Depression] : no depression [Malar Facial Rash] : no malar facial rash [Skin Lesions] : no lesions [Skin Nodules] : no skin nodules [Oral Ulcers] : no oral ulcers [Cough] : no cough [Dry Mouth] : no dry mouth [Dysphonia] : no dysphonia [Dysphagia] : no dysphagia [Chest Pain] : no chest pain [Joint Swelling] : no joint swelling [Joint Warmth] : no joint warmth [Joint Deformity] : no joint deformity [Falls] : no falls [Difficulty Standing] : no difficulty standing [Difficulty Walking] : no difficulty walking [Myalgias] : no myalgias [Muscle Weakness] : no muscle weakness [Muscle Spasms] : no muscle spasms [Muscle Cramping] : no muscle cramping [Visual Changes] : no visual changes [Eye Pain] : no eye pain [Eye Redness] : no eye redness [Dry Eyes] : no dry eyes

## 2024-08-26 NOTE — PHYSICAL EXAM
[General Appearance - Alert] : alert [General Appearance - In No Acute Distress] : in no acute distress [Sclera] : the sclera and conjunctiva were normal [Outer Ear] : the ears and nose were normal in appearance [Neck Appearance] : the appearance of the neck was normal [] : no respiratory distress [Auscultation Breath Sounds / Voice Sounds] : lungs were clear to auscultation bilaterally [Heart Rate And Rhythm] : heart rate was normal and rhythm regular [Heart Sounds] : normal S1 and S2 [Heart Sounds Gallop] : no gallops [Murmurs] : no murmurs [Heart Sounds Pericardial Friction Rub] : no pericardial rub [Full Pulse] : the pedal pulses are present [Edema] : there was no peripheral edema [Cervical Lymph Nodes Enlarged Posterior Bilaterally] : posterior cervical [Cervical Lymph Nodes Enlarged Anterior Bilaterally] : anterior cervical [Supraclavicular Lymph Nodes Enlarged Bilaterally] : supraclavicular [Axillary Lymph Nodes Enlarged Bilaterally] : axillary [No CVA Tenderness] : no ~M costovertebral angle tenderness [No Spinal Tenderness] : no spinal tenderness [Skin Color & Pigmentation] : normal skin color and pigmentation [No Focal Deficits] : no focal deficits [Oriented To Time, Place, And Person] : oriented to person, place, and time [Impaired Insight] : insight and judgment were intact [Affect] : the affect was normal [Abnormal Walk] : normal gait [Nail Clubbing] : no clubbing  or cyanosis of the fingernails [Musculoskeletal - Swelling] : no joint swelling seen [Motor Tone] : muscle strength and tone were normal [FreeTextEntry1] : Nodular swelling in the R middle finger PIP joint

## 2024-08-27 DIAGNOSIS — D64.9 ANEMIA, UNSPECIFIED: ICD-10-CM

## 2024-08-28 ENCOUNTER — APPOINTMENT (OUTPATIENT)
Dept: PULMONOLOGY | Facility: CLINIC | Age: 69
End: 2024-08-28
Payer: MEDICARE

## 2024-08-28 PROCEDURE — G0239: CPT

## 2024-08-30 LAB
ALBUMIN SERPL ELPH-MCNC: 4.6 G/DL
ALP BLD-CCNC: 100 U/L
ALT SERPL-CCNC: 11 U/L
ANION GAP SERPL CALC-SCNC: 12 MMOL/L
AST SERPL-CCNC: 13 U/L
BASOPHILS # BLD AUTO: 0.04 K/UL
BASOPHILS NFR BLD AUTO: 0.7 %
BILIRUB SERPL-MCNC: 0.6 MG/DL
BUN SERPL-MCNC: 81 MG/DL
CALCIUM SERPL-MCNC: 10.7 MG/DL
CCP AB SER IA-ACNC: >500 U/ML
CHLORIDE SERPL-SCNC: 104 MMOL/L
CO2 SERPL-SCNC: 22 MMOL/L
CREAT SERPL-MCNC: 2.35 MG/DL
CRP SERPL-MCNC: 4 MG/L
EGFR: 22 ML/MIN/1.73M2
EOSINOPHIL # BLD AUTO: 0.2 K/UL
EOSINOPHIL NFR BLD AUTO: 3.6 %
ERYTHROCYTE [SEDIMENTATION RATE] IN BLOOD BY WESTERGREN METHOD: 28 MM/HR
FERRITIN SERPL-MCNC: 649 NG/ML
FOLATE SERPL-MCNC: >20 NG/ML
GLUCOSE SERPL-MCNC: 101 MG/DL
HAPTOGLOB SERPL-MCNC: 60 MG/DL
HCT VFR BLD CALC: 36.8 %
HGB A MFR BLD: 92.7 %
HGB A2 MFR BLD: 5.5 %
HGB BLD-MCNC: 10.8 G/DL
HGB F MFR BLD: 1.8 %
HGB FRACT BLD-IMP: NORMAL
IMM GRANULOCYTES NFR BLD AUTO: 0.2 %
IRON SATN MFR SERPL: 38 %
IRON SERPL-MCNC: 102 UG/DL
LDH SERPL-CCNC: 163 U/L
LYMPHOCYTES # BLD AUTO: 1.79 K/UL
LYMPHOCYTES NFR BLD AUTO: 32.6 %
MAN DIFF?: NORMAL
MCHC RBC-ENTMCNC: 20.5 PG
MCHC RBC-ENTMCNC: 29.3 GM/DL
MCV RBC AUTO: 70 FL
MONOCYTES # BLD AUTO: 0.54 K/UL
MONOCYTES NFR BLD AUTO: 9.8 %
NEUTROPHILS # BLD AUTO: 2.91 K/UL
NEUTROPHILS NFR BLD AUTO: 53.1 %
PLATELET # BLD AUTO: 154 K/UL
POTASSIUM SERPL-SCNC: 5.1 MMOL/L
PROT SERPL-MCNC: 7.4 G/DL
RBC # BLD: 5.26 M/UL
RBC # BLD: 5.32 M/UL
RBC # FLD: 21.1 %
RETICS # AUTO: 1.5 %
RETICS AGGREG/RBC NFR: 81.4 K/UL
RF+CCP IGG SER-IMP: POSITIVE
RHEUMATOID FACT SER QL: 20 IU/ML
SODIUM SERPL-SCNC: 138 MMOL/L
TIBC SERPL-MCNC: 265 UG/DL
UIBC SERPL-MCNC: 163 UG/DL
URATE SERPL-MCNC: 3 MG/DL
VIT B12 SERPL-MCNC: 1541 PG/ML
WBC # FLD AUTO: 5.49 K/UL

## 2024-09-04 ENCOUNTER — APPOINTMENT (OUTPATIENT)
Dept: PULMONOLOGY | Facility: CLINIC | Age: 69
End: 2024-09-04

## 2024-09-04 LAB
CENP-A: <11 SI
CENP-B: <11 SI
FIBRILLARIN: <11 SI
PM/SCL-100: <11 SI
PM/SCL-75: <11 SI
RP11: <11 SI
RP155: <11 SI
SCL-70: <11 SI
TH/TO: <11 SI
U1-SNRNP RNP A: <11 SI
U1-SNRNP RNP C: <11 SI
U1-SNRNP RNP-70KD: <11 SI

## 2024-09-06 ENCOUNTER — APPOINTMENT (OUTPATIENT)
Dept: PULMONOLOGY | Facility: CLINIC | Age: 69
End: 2024-09-06
Payer: SELF-PAY

## 2024-09-06 PROCEDURE — 94799 UNLISTED PULMONARY SVC/PX: CPT

## 2024-09-13 ENCOUNTER — APPOINTMENT (OUTPATIENT)
Dept: PULMONOLOGY | Facility: CLINIC | Age: 69
End: 2024-09-13
Payer: SELF-PAY

## 2024-09-13 PROCEDURE — ZZZZZ: CPT

## 2024-09-20 ENCOUNTER — APPOINTMENT (OUTPATIENT)
Dept: PULMONOLOGY | Facility: CLINIC | Age: 69
End: 2024-09-20
Payer: SELF-PAY

## 2024-09-20 PROCEDURE — ZZZZZ: CPT

## 2024-09-23 ENCOUNTER — APPOINTMENT (OUTPATIENT)
Dept: CT IMAGING | Facility: CLINIC | Age: 69
End: 2024-09-23
Payer: MEDICARE

## 2024-09-23 ENCOUNTER — APPOINTMENT (OUTPATIENT)
Dept: ULTRASOUND IMAGING | Facility: CLINIC | Age: 69
End: 2024-09-23

## 2024-09-23 ENCOUNTER — OUTPATIENT (OUTPATIENT)
Dept: OUTPATIENT SERVICES | Facility: HOSPITAL | Age: 69
LOS: 1 days | End: 2024-09-23
Payer: MEDICARE

## 2024-09-23 DIAGNOSIS — Z90.49 ACQUIRED ABSENCE OF OTHER SPECIFIED PARTS OF DIGESTIVE TRACT: Chronic | ICD-10-CM

## 2024-09-23 DIAGNOSIS — R22.31 LOCALIZED SWELLING, MASS AND LUMP, RIGHT UPPER LIMB: ICD-10-CM

## 2024-09-23 DIAGNOSIS — Z98.49 CATARACT EXTRACTION STATUS, UNSPECIFIED EYE: Chronic | ICD-10-CM

## 2024-09-23 PROCEDURE — 76377 3D RENDER W/INTRP POSTPROCES: CPT | Mod: 26

## 2024-09-23 PROCEDURE — 76377 3D RENDER W/INTRP POSTPROCES: CPT

## 2024-09-23 PROCEDURE — 73200 CT UPPER EXTREMITY W/O DYE: CPT | Mod: 26,RT,MH

## 2024-09-23 PROCEDURE — 73200 CT UPPER EXTREMITY W/O DYE: CPT

## 2024-09-23 PROCEDURE — 76881 US COMPL JOINT R-T W/IMG: CPT

## 2024-09-23 PROCEDURE — 76881 US COMPL JOINT R-T W/IMG: CPT | Mod: 26,RT

## 2024-09-24 ENCOUNTER — NON-APPOINTMENT (OUTPATIENT)
Age: 69
End: 2024-09-24

## 2024-09-25 ENCOUNTER — NON-APPOINTMENT (OUTPATIENT)
Age: 69
End: 2024-09-25

## 2024-09-25 ENCOUNTER — APPOINTMENT (OUTPATIENT)
Dept: CARDIOLOGY | Facility: CLINIC | Age: 69
End: 2024-09-25
Payer: MEDICARE

## 2024-09-25 VITALS
OXYGEN SATURATION: 91 % | BODY MASS INDEX: 36.62 KG/M2 | SYSTOLIC BLOOD PRESSURE: 118 MMHG | DIASTOLIC BLOOD PRESSURE: 73 MMHG | HEIGHT: 62 IN | HEART RATE: 56 BPM | WEIGHT: 199 LBS

## 2024-09-25 DIAGNOSIS — M79.604 PAIN IN RIGHT LEG: ICD-10-CM

## 2024-09-25 DIAGNOSIS — R60.0 LOCALIZED EDEMA: ICD-10-CM

## 2024-09-25 DIAGNOSIS — Z13.6 ENCOUNTER FOR SCREENING FOR CARDIOVASCULAR DISORDERS: ICD-10-CM

## 2024-09-25 DIAGNOSIS — M79.2 NEURALGIA AND NEURITIS, UNSPECIFIED: ICD-10-CM

## 2024-09-25 DIAGNOSIS — M79.605 PAIN IN RIGHT LEG: ICD-10-CM

## 2024-09-25 PROCEDURE — G2211 COMPLEX E/M VISIT ADD ON: CPT

## 2024-09-25 PROCEDURE — 99214 OFFICE O/P EST MOD 30 MIN: CPT

## 2024-09-25 PROCEDURE — 93000 ELECTROCARDIOGRAM COMPLETE: CPT

## 2024-09-27 ENCOUNTER — APPOINTMENT (OUTPATIENT)
Dept: PULMONOLOGY | Facility: CLINIC | Age: 69
End: 2024-09-27
Payer: SELF-PAY

## 2024-09-27 PROCEDURE — ZZZZZ: CPT

## 2024-10-04 ENCOUNTER — APPOINTMENT (OUTPATIENT)
Dept: PULMONOLOGY | Facility: CLINIC | Age: 69
End: 2024-10-04
Payer: SELF-PAY

## 2024-10-04 PROCEDURE — 94799 UNLISTED PULMONARY SVC/PX: CPT

## 2024-10-07 NOTE — PROGRESS NOTE ADULT - PROBLEM SELECTOR PLAN 4
10/7/2024      Syd Samuel  [address] [facility] [address]        Dear Syd Samuel:        COLONOSCOPY PREPARATION AND INSTRUCTIONS  (Trilyte/[id number]/[id number])      Please note  It is your responsibility to verify if the procedure is covered by your insurance.  If it is not covered, you will be responsible for payment.  If you need to CANCEL or RESCHEDULE, please call [phone] at least 3 DAYS prior to your scheduled procedure.    IF YOU HAVE A CHANGE IN HEALTH STATUS BETWEEN SCHEULING AND THE DATE OF YOUR PROCEDURE PLEASE LET US KNOW.      INFORM YOUR PHYSICIAN IF YOU TAKE COUMADIN, PLAVIX, OR ANY OTHER BLOOD THINNER  IF YOU TAKE ASPIRIN, IT IS OK TO CONTINUE TAKING  NO NUTS OR CORN THREE DAYS PRIOR TO YOUR PROCEDURE    If you take oral (pill) or injectable Rybelsus, Wygovy, Ozempic, Trulicity, Byetta, Bydureon BCise,Saxenda, Victoza, Mounjaro, Xultophy or Soliquabetic for   weight loss you will need to stop it for 7 days.  If you take one of these medications for diabetes, please speak with the prescribing physician for further instruction.    Items you will need prior to your procedure  You will need to pick-up your bowel cleansing prescription that was sent to your pharmacy.   Purchase one 1 oz bottle of Simethicone drops (infant gas relief drops), available over the counter.                                      DAY BEFORE EXAM: November 6th                   NO SOLID FOOD THE DAY BEFORE THE EXAM    If you take Insulin: Take half your normal dose of long-acting insulin.  Take your sliding scale insulin as you normally would.  If you take an oral (pill) diabetic medication other than those listed above, take your morning dose only, then discontinue until after the procedure.  Okay to take all other regular medications.    Prepare your solution in the morning. Add water (do not mix with anything but water) to the fill line as indicated on the bottle.  Add 2 teaspoons of simethicone  drops. Shake well and refrigerate. Mixture may be more palatable if kept refrigerated, but this is not required.    Start a clear liquid diet from the time you wake up until 3 hours before the start time of your procedure.  This means NO SOLID FOOD the day before the exam.      CLEAR LIQUIDS MAY INCLUDE strained broth, fruit flavored drinks (apple juice, white grape juice, etc.)  soda pop including lulú, coffee, tea, gelatin, fruit ices, popsicles, and hard candy.  NO MILK PRODUCTS OR SHERBETS, AVOID ANYTHING RED OR PURPLE, NO ALCOHOLTHE DAY BEFORE THE EXAM     3.   At 5:00 pm start to drink the solution. Drink one 8oz glass every 10-30 minutes. Drink half the solution.         Note:  If at any time while drinking your preparation you experience nausea, slow down the pace, then resume drinking                DAY OF EXAM: November 7th           If you take Insulin: Take your sliding scale insulin as you normally would.     Take blood pressure medications or any other essential medication at least 3 hours prior to your exam with a few sips of water.    5 hours/5:45pm before the start time of the procedure finish drinking the rest of your solution.       2.  Nothing by mouth, no more clear liquids 3 hours/7:45am before the start time of your procedure.   3.  [facility] in the PATIENT INTAKE registration area, located on the main floor of the hospital one hour/9:45am before the start time of your procedure.           4.  Your procedure is scheduled to start at 10:45am . Expect to be at the hospital for approximately 3 hours from the time you arrive.   5.  Due to sedation you will need to have a  take you home.  GI lab policy states you  may not take a cab/Uber/Lyft home.  6.  If you have HMO insurance, we will generate a referral form for you. You do not have to have it with you the day of your procedure.  7.  If you should have any questions or difficulties, please call the office and  ask to speak with a clinical associate during office hours (9-5, [phone].  If the office is closed the answering service will contact the physician on call.    what you need to know when scheduling your Procedure!  Routine screening, Well-visit, Preventative care,   Personal History, Family History      Please contact your insurance carrier BEFORE YOUR EXAM to learn about the possible differences in your benefits.    1.  Our charges are billed according to the American Medical Association's regulations, using the same code books as all insurance carriers.  Although we submit the appropriate codes, including indicating screening procedures, each insurance carrier is unique in how they may process your claim.    2.  The definition of a screening colonoscopy is a colonoscopy performed when the patient has no medical indication for the procedure.  'No medical indication' means you have NOT had rectal bleeding, blood in your stool, change in bowel habits, abdominal pain or any other symptom that would indicate you need a procedure.      3.  A diagnosis of a family or personal history of colon polyps or colon cancer may be considered 'screening' by some insurance carriers while others insurance carriers may consider this a 'diagnostic' procedure; in which case your deductible may apply.  We do not know this in advance.    4.  When you come to the office and tell your doctor you have no symptoms AND have a screening benefit, we schedule your procedure as a screening colonoscopy.  When your claim is submitted to your insurance company, we are required to bill with the finding after your colonoscopy is complete.  This means if you had no symptoms and we scheduled you for screening colonoscopy BUT a condition was found during your procedure (such as a polyp, diverticulosis, colitis, etc.), we will bill your insurance carrier with the diagnosis of the condition found as well as for screening colonoscopy.  This may mean  your claim is processed under your diagnostic or surgical benefits, not routine screening.  We cannot determine how your insurance carrier will process it.    5.  If you have a normal exam with no biopsies taken and no conditions found your insurance carrier may process this as a screening exam.  Every insurance carrier is different, and we cannot determine how your claim will be processed until it has been submitted to them.    6.  Be aware that some policies offer screening benefits with a set monetary limit which can be very low.  If your screening benefit is exhausted, you may be responsible for charges not covered by that benefit.    7.  Some insurance carriers may deny payment of your screening colonoscopy based on your age, although you may have medical indications for the procedure.  Each insurance carrier handles these claims differently and do not tell us in advance how they will process your claim.  They only tell us how we must bill your claim.    8.  Neither the hospital nor our office can change codes after they have been billed in order to obtain claim payment.  This is considered fraud.  Your medical record needs to support such a change.         Patient has chronic a fib. NOAC held due to GI bleeding. FU HR.

## 2024-10-11 ENCOUNTER — APPOINTMENT (OUTPATIENT)
Dept: RHEUMATOLOGY | Facility: CLINIC | Age: 69
End: 2024-10-11
Payer: MEDICARE

## 2024-10-11 ENCOUNTER — APPOINTMENT (OUTPATIENT)
Dept: PULMONOLOGY | Facility: CLINIC | Age: 69
End: 2024-10-11
Payer: SELF-PAY

## 2024-10-11 DIAGNOSIS — M05.742 RHEUMATOID ARTHRITIS WITH RHEUMATOID FACTOR OF RIGHT HAND W/OUT ORGAN OR SYSTEMS INVOLVEMENT: ICD-10-CM

## 2024-10-11 DIAGNOSIS — M05.741 RHEUMATOID ARTHRITIS WITH RHEUMATOID FACTOR OF RIGHT HAND W/OUT ORGAN OR SYSTEMS INVOLVEMENT: ICD-10-CM

## 2024-10-11 PROCEDURE — ZZZZZ: CPT

## 2024-10-11 PROCEDURE — 99442: CPT | Mod: 93

## 2024-10-14 RX ORDER — PREDNISONE 5 MG/1
5 TABLET ORAL
Qty: 50 | Refills: 1 | Status: ACTIVE | COMMUNITY
Start: 2024-10-11 | End: 1900-01-01

## 2024-10-18 ENCOUNTER — APPOINTMENT (OUTPATIENT)
Dept: PULMONOLOGY | Facility: CLINIC | Age: 69
End: 2024-10-18
Payer: SELF-PAY

## 2024-10-18 PROCEDURE — ZZZZZ: CPT

## 2024-10-25 ENCOUNTER — APPOINTMENT (OUTPATIENT)
Dept: PULMONOLOGY | Facility: CLINIC | Age: 69
End: 2024-10-25
Payer: SELF-PAY

## 2024-10-25 PROCEDURE — ZZZZZ: CPT

## 2024-11-01 ENCOUNTER — APPOINTMENT (OUTPATIENT)
Dept: PULMONOLOGY | Facility: CLINIC | Age: 69
End: 2024-11-01
Payer: SELF-PAY

## 2024-11-01 PROCEDURE — 94799 UNLISTED PULMONARY SVC/PX: CPT

## 2024-11-07 ENCOUNTER — APPOINTMENT (OUTPATIENT)
Dept: PULMONOLOGY | Facility: CLINIC | Age: 69
End: 2024-11-07
Payer: MEDICARE

## 2024-11-07 VITALS
OXYGEN SATURATION: 90 % | DIASTOLIC BLOOD PRESSURE: 75 MMHG | HEART RATE: 77 BPM | HEIGHT: 62 IN | SYSTOLIC BLOOD PRESSURE: 126 MMHG | WEIGHT: 195 LBS | RESPIRATION RATE: 15 BRPM | BODY MASS INDEX: 35.88 KG/M2 | TEMPERATURE: 97 F

## 2024-11-07 PROCEDURE — 99215 OFFICE O/P EST HI 40 MIN: CPT

## 2024-11-08 ENCOUNTER — APPOINTMENT (OUTPATIENT)
Dept: PULMONOLOGY | Facility: CLINIC | Age: 69
End: 2024-11-08
Payer: SELF-PAY

## 2024-11-08 PROCEDURE — ZZZZZ: CPT

## 2024-11-11 ENCOUNTER — APPOINTMENT (OUTPATIENT)
Dept: RHEUMATOLOGY | Facility: CLINIC | Age: 69
End: 2024-11-11
Payer: MEDICARE

## 2024-11-11 VITALS
RESPIRATION RATE: 16 BRPM | BODY MASS INDEX: 35.88 KG/M2 | SYSTOLIC BLOOD PRESSURE: 120 MMHG | DIASTOLIC BLOOD PRESSURE: 71 MMHG | HEIGHT: 62 IN | WEIGHT: 195 LBS | OXYGEN SATURATION: 97 % | HEART RATE: 79 BPM

## 2024-11-11 PROCEDURE — G2211 COMPLEX E/M VISIT ADD ON: CPT

## 2024-11-11 PROCEDURE — 99215 OFFICE O/P EST HI 40 MIN: CPT

## 2024-11-15 ENCOUNTER — APPOINTMENT (OUTPATIENT)
Dept: PULMONOLOGY | Facility: CLINIC | Age: 69
End: 2024-11-15
Payer: SELF-PAY

## 2024-11-15 PROCEDURE — ZZZZZ: CPT

## 2024-11-17 NOTE — ED PROCEDURE NOTE - CPROC ED POST PROC CARE GUIDE1
BG goal: 140-180  T2DM on Omnipod 5.    Blood sugar stable.     - -Continue Insulin pump    - POCT Glucose before meals, at bedtime and at 2 am  - Hypoglycemia protocol in place      ** Please notify Endocrine for any change and/or advance in diet**  ** Please call Endocrine for any BG related issues **     Discharge Planning:   TBD. Please notify endocrinology prior to discharge.       Verbal/written post procedure instructions were given to patient/caregiver.

## 2024-11-21 ENCOUNTER — OUTPATIENT (OUTPATIENT)
Dept: OUTPATIENT SERVICES | Facility: HOSPITAL | Age: 69
LOS: 1 days | End: 2024-11-21
Payer: MEDICARE

## 2024-11-21 ENCOUNTER — APPOINTMENT (OUTPATIENT)
Dept: MRI IMAGING | Facility: IMAGING CENTER | Age: 69
End: 2024-11-21

## 2024-11-21 DIAGNOSIS — Z98.890 OTHER SPECIFIED POSTPROCEDURAL STATES: Chronic | ICD-10-CM

## 2024-11-21 DIAGNOSIS — M05.741 RHEUMATOID ARTHRITIS WITH RHEUMATOID FACTOR OF RIGHT HAND WITHOUT ORGAN OR SYSTEMS INVOLVEMENT: ICD-10-CM

## 2024-11-21 DIAGNOSIS — Z90.49 ACQUIRED ABSENCE OF OTHER SPECIFIED PARTS OF DIGESTIVE TRACT: Chronic | ICD-10-CM

## 2024-11-21 DIAGNOSIS — Z98.49 CATARACT EXTRACTION STATUS, UNSPECIFIED EYE: Chronic | ICD-10-CM

## 2024-11-21 PROCEDURE — 73221 MRI JOINT UPR EXTREM W/O DYE: CPT

## 2024-11-21 PROCEDURE — 73221 MRI JOINT UPR EXTREM W/O DYE: CPT | Mod: 26,RT,MH

## 2024-11-22 ENCOUNTER — APPOINTMENT (OUTPATIENT)
Dept: PULMONOLOGY | Facility: CLINIC | Age: 69
End: 2024-11-22
Payer: SELF-PAY

## 2024-11-22 PROCEDURE — ZZZZZ: CPT

## 2024-11-23 NOTE — ED ADULT NURSE NOTE - SPO2 (%)
Take medication as prescribed.  Keep area clean and dry.  Apply ice to swelling/bruising 3 times a day for 15 minutes.  Follow up with your primary care provider or urgent care in 7 days for suture removal.  Return to ER for new or worsening symptoms.  
95

## 2024-11-29 ENCOUNTER — APPOINTMENT (OUTPATIENT)
Dept: PULMONOLOGY | Facility: CLINIC | Age: 69
End: 2024-11-29
Payer: SELF-PAY

## 2024-11-29 PROCEDURE — ZZZZZ: CPT

## 2024-12-05 ENCOUNTER — OUTPATIENT (OUTPATIENT)
Dept: OUTPATIENT SERVICES | Facility: HOSPITAL | Age: 69
LOS: 1 days | Discharge: HOME | End: 2024-12-05
Payer: MEDICARE

## 2024-12-05 VITALS
DIASTOLIC BLOOD PRESSURE: 79 MMHG | SYSTOLIC BLOOD PRESSURE: 119 MMHG | OXYGEN SATURATION: 98 % | HEART RATE: 63 BPM | RESPIRATION RATE: 19 BRPM

## 2024-12-05 VITALS
RESPIRATION RATE: 16 BRPM | TEMPERATURE: 97 F | OXYGEN SATURATION: 98 % | DIASTOLIC BLOOD PRESSURE: 85 MMHG | WEIGHT: 195.11 LBS | HEART RATE: 71 BPM | HEIGHT: 62 IN | SYSTOLIC BLOOD PRESSURE: 130 MMHG

## 2024-12-05 DIAGNOSIS — N18.9 CHRONIC KIDNEY DISEASE, UNSPECIFIED: ICD-10-CM

## 2024-12-05 DIAGNOSIS — Z98.49 CATARACT EXTRACTION STATUS, UNSPECIFIED EYE: Chronic | ICD-10-CM

## 2024-12-05 DIAGNOSIS — Z98.890 OTHER SPECIFIED POSTPROCEDURAL STATES: Chronic | ICD-10-CM

## 2024-12-05 DIAGNOSIS — Z90.49 ACQUIRED ABSENCE OF OTHER SPECIFIED PARTS OF DIGESTIVE TRACT: Chronic | ICD-10-CM

## 2024-12-05 LAB
GLUCOSE BLDC GLUCOMTR-MCNC: 103 MG/DL — HIGH (ref 70–99)
GLUCOSE BLDC GLUCOMTR-MCNC: 109 MG/DL — HIGH (ref 70–99)

## 2024-12-05 PROCEDURE — 88305 TISSUE EXAM BY PATHOLOGIST: CPT | Mod: 26

## 2024-12-05 NOTE — ASU PATIENT PROFILE, ADULT - NSICDXPASTMEDICALHX_GEN_ALL_CORE_FT
PAST MEDICAL HISTORY:  Abnormal vaginal bleeding in postmenopausal patient     Anemia     Arthritis     Atrial fibrillation on eliquis    Cataract, bilateral     Chronic kidney disease (CKD)     Chronic systolic right heart failure     COPD (chronic obstructive pulmonary disease)     Diabetes type 2    Gout     Hemorrhage of anus and rectum     History of lymphoma 1997 s/p chemo and radiation    History of morbid obesity     HTN (hypertension)     Hypothyroidism     Malignant neoplasm of ascending colon     Non-Hodgkin's lymphoma     LINH (obstructive sleep apnea) on BiPAP    Oxygen dependent at night 2-3L    Pulmonary HTN severe

## 2024-12-06 ENCOUNTER — APPOINTMENT (OUTPATIENT)
Dept: PULMONOLOGY | Facility: CLINIC | Age: 69
End: 2024-12-06
Payer: SELF-PAY

## 2024-12-06 ENCOUNTER — APPOINTMENT (OUTPATIENT)
Dept: RHEUMATOLOGY | Facility: CLINIC | Age: 69
End: 2024-12-06

## 2024-12-06 PROCEDURE — 94799 UNLISTED PULMONARY SVC/PX: CPT

## 2024-12-11 LAB — SURGICAL PATHOLOGY STUDY: SIGNIFICANT CHANGE UP

## 2024-12-13 ENCOUNTER — APPOINTMENT (OUTPATIENT)
Dept: PULMONOLOGY | Facility: CLINIC | Age: 69
End: 2024-12-13
Payer: SELF-PAY

## 2024-12-13 PROCEDURE — ZZZZZ: CPT

## 2024-12-20 ENCOUNTER — APPOINTMENT (OUTPATIENT)
Dept: PULMONOLOGY | Facility: CLINIC | Age: 69
End: 2024-12-20
Payer: SELF-PAY

## 2024-12-20 PROCEDURE — ZZZZZ: CPT

## 2024-12-23 ENCOUNTER — LABORATORY RESULT (OUTPATIENT)
Age: 69
End: 2024-12-23

## 2024-12-23 ENCOUNTER — APPOINTMENT (OUTPATIENT)
Dept: RHEUMATOLOGY | Facility: CLINIC | Age: 69
End: 2024-12-23
Payer: MEDICARE

## 2024-12-23 VITALS
SYSTOLIC BLOOD PRESSURE: 127 MMHG | HEIGHT: 62 IN | HEART RATE: 79 BPM | OXYGEN SATURATION: 98 % | BODY MASS INDEX: 34.96 KG/M2 | RESPIRATION RATE: 16 BRPM | WEIGHT: 190 LBS | DIASTOLIC BLOOD PRESSURE: 80 MMHG

## 2024-12-23 LAB
ALBUMIN SERPL ELPH-MCNC: 4.3 G/DL
ALP BLD-CCNC: 129 U/L
ALT SERPL-CCNC: 12 U/L
ANION GAP SERPL CALC-SCNC: 13 MMOL/L
AST SERPL-CCNC: 15 U/L
BILIRUB SERPL-MCNC: 0.7 MG/DL
BUN SERPL-MCNC: 78 MG/DL
CALCIUM SERPL-MCNC: 10.3 MG/DL
CHLORIDE SERPL-SCNC: 107 MMOL/L
CO2 SERPL-SCNC: 21 MMOL/L
CREAT SERPL-MCNC: 2.23 MG/DL
CRP SERPL-MCNC: 6 MG/L
EGFR: 23 ML/MIN/1.73M2
ERYTHROCYTE [SEDIMENTATION RATE] IN BLOOD BY WESTERGREN METHOD: 41 MM/HR
GLUCOSE SERPL-MCNC: 79 MG/DL
POTASSIUM SERPL-SCNC: 5 MMOL/L
PROT SERPL-MCNC: 7.2 G/DL
SODIUM SERPL-SCNC: 140 MMOL/L
URATE SERPL-MCNC: 3.3 MG/DL

## 2024-12-23 PROCEDURE — G2211 COMPLEX E/M VISIT ADD ON: CPT

## 2024-12-23 PROCEDURE — 99215 OFFICE O/P EST HI 40 MIN: CPT

## 2024-12-23 RX ORDER — FOLIC ACID 1 MG/1
1 TABLET ORAL DAILY
Qty: 1 | Refills: 2 | Status: DISCONTINUED | COMMUNITY
Start: 2024-12-23 | End: 2024-12-23

## 2024-12-23 RX ORDER — METHOTREXATE 2.5 MG/1
2.5 TABLET ORAL
Qty: 32 | Refills: 0 | Status: DISCONTINUED | COMMUNITY
Start: 2024-12-23 | End: 2024-12-23

## 2024-12-24 LAB
BASOPHILS # BLD AUTO: 0.04 K/UL
BASOPHILS NFR BLD AUTO: 0.7 %
EOSINOPHIL # BLD AUTO: 0.24 K/UL
EOSINOPHIL NFR BLD AUTO: 4.4 %
HAV IGM SER QL: NONREACTIVE
HBV CORE IGM SER QL: NONREACTIVE
HBV CORE IGM SER QL: NONREACTIVE
HBV SURFACE AG SER QL: NONREACTIVE
HCT VFR BLD CALC: 37.6 %
HCV AB SER QL: NONREACTIVE
HCV S/CO RATIO: 0.15 S/CO
HGB BLD-MCNC: 11.1 G/DL
IMM GRANULOCYTES NFR BLD AUTO: 0.2 %
LYMPHOCYTES # BLD AUTO: 1.65 K/UL
LYMPHOCYTES NFR BLD AUTO: 29.9 %
MAN DIFF?: NORMAL
MCHC RBC-ENTMCNC: 20.8 PG
MCHC RBC-ENTMCNC: 29.5 G/DL
MCV RBC AUTO: 70.4 FL
MONOCYTES # BLD AUTO: 0.5 K/UL
MONOCYTES NFR BLD AUTO: 9.1 %
NEUTROPHILS # BLD AUTO: 3.07 K/UL
NEUTROPHILS NFR BLD AUTO: 55.7 %
PLATELET # BLD AUTO: 210 K/UL
RBC # BLD: 5.34 M/UL
RBC # FLD: 22 %
WBC # FLD AUTO: 5.51 K/UL

## 2024-12-24 RX ORDER — CETIRIZINE HYDROCHLORIDE 10 MG/1
10 TABLET, FILM COATED ORAL
Refills: 0 | Status: ACTIVE | OUTPATIENT
Start: 2024-12-24

## 2024-12-24 RX ORDER — METHYLPREDNISOLONE SODIUM SUCCINATE 125 MG/2ML
125 INJECTION, POWDER, FOR SOLUTION INTRAMUSCULAR; INTRAVENOUS
Refills: 0 | Status: ACTIVE | OUTPATIENT
Start: 2024-12-24

## 2024-12-24 RX ORDER — RITUXIMAB-ABBS 10 MG/ML
500 INJECTION, SOLUTION INTRAVENOUS
Refills: 0 | Status: ACTIVE | OUTPATIENT
Start: 2024-12-24

## 2024-12-27 ENCOUNTER — APPOINTMENT (OUTPATIENT)
Dept: PULMONOLOGY | Facility: CLINIC | Age: 69
End: 2024-12-27
Payer: SELF-PAY

## 2024-12-27 PROCEDURE — ZZZZZ: CPT

## 2025-01-03 ENCOUNTER — APPOINTMENT (OUTPATIENT)
Dept: PULMONOLOGY | Facility: CLINIC | Age: 70
End: 2025-01-03
Payer: SELF-PAY

## 2025-01-03 PROCEDURE — 94799 UNLISTED PULMONARY SVC/PX: CPT

## 2025-01-05 LAB
M TB IFN-G BLD-IMP: NEGATIVE
QUANTIFERON TB PLUS MITOGEN MINUS NIL: >10 IU/ML
QUANTIFERON TB PLUS NIL: 0.06 IU/ML
QUANTIFERON TB PLUS TB1 MINUS NIL: 0 IU/ML
QUANTIFERON TB PLUS TB2 MINUS NIL: 0 IU/ML

## 2025-01-06 ENCOUNTER — NON-APPOINTMENT (OUTPATIENT)
Age: 70
End: 2025-01-06

## 2025-01-06 ENCOUNTER — APPOINTMENT (OUTPATIENT)
Dept: HEART FAILURE | Facility: CLINIC | Age: 70
End: 2025-01-06
Payer: MEDICARE

## 2025-01-06 VITALS
WEIGHT: 190 LBS | BODY MASS INDEX: 34.96 KG/M2 | HEART RATE: 78 BPM | HEIGHT: 62 IN | SYSTOLIC BLOOD PRESSURE: 108 MMHG | DIASTOLIC BLOOD PRESSURE: 71 MMHG | OXYGEN SATURATION: 89 %

## 2025-01-06 VITALS — WEIGHT: 190 LBS | BODY MASS INDEX: 34.96 KG/M2 | HEIGHT: 62 IN

## 2025-01-06 DIAGNOSIS — G47.33 OBSTRUCTIVE SLEEP APNEA (ADULT) (PEDIATRIC): ICD-10-CM

## 2025-01-06 DIAGNOSIS — I50.32 CHRONIC DIASTOLIC (CONGESTIVE) HEART FAILURE: ICD-10-CM

## 2025-01-06 PROCEDURE — G2211 COMPLEX E/M VISIT ADD ON: CPT

## 2025-01-06 PROCEDURE — 99214 OFFICE O/P EST MOD 30 MIN: CPT

## 2025-01-06 PROCEDURE — 93000 ELECTROCARDIOGRAM COMPLETE: CPT

## 2025-01-06 RX ORDER — OMEPRAZOLE 40 MG/1
40 CAPSULE, DELAYED RELEASE ORAL
Qty: 30 | Refills: 0 | Status: ACTIVE | COMMUNITY
Start: 2024-12-18

## 2025-01-06 RX ORDER — FAMOTIDINE 40 MG/1
40 TABLET, FILM COATED ORAL
Qty: 90 | Refills: 0 | Status: ACTIVE | COMMUNITY
Start: 2024-12-18

## 2025-01-06 RX ORDER — GABAPENTIN 300 MG/1
300 CAPSULE ORAL
Qty: 90 | Refills: 0 | Status: ACTIVE | COMMUNITY
Start: 2024-11-07

## 2025-01-10 ENCOUNTER — APPOINTMENT (OUTPATIENT)
Dept: PULMONOLOGY | Facility: CLINIC | Age: 70
End: 2025-01-10
Payer: SELF-PAY

## 2025-01-10 PROCEDURE — ZZZZZ: CPT

## 2025-01-13 ENCOUNTER — RX RENEWAL (OUTPATIENT)
Age: 70
End: 2025-01-13

## 2025-01-15 NOTE — DISCHARGE NOTE ADULT - CARE PROVIDERS DIRECT ADDRESSES
VBHM Score: 1     Colon Cancer Screening          L/m and sent portal for intro to review of SDOH and patient care coordinator   Overdue colorectal cancer screening   lipid         ,DirectAddress_Unknown,DirectAddress_Unknown

## 2025-01-16 ENCOUNTER — NON-APPOINTMENT (OUTPATIENT)
Age: 70
End: 2025-01-16

## 2025-01-17 ENCOUNTER — APPOINTMENT (OUTPATIENT)
Dept: PULMONOLOGY | Facility: CLINIC | Age: 70
End: 2025-01-17
Payer: SELF-PAY

## 2025-01-17 PROCEDURE — ZZZZZ: CPT

## 2025-01-24 ENCOUNTER — APPOINTMENT (OUTPATIENT)
Dept: PULMONOLOGY | Facility: CLINIC | Age: 70
End: 2025-01-24
Payer: SELF-PAY

## 2025-01-24 PROCEDURE — ZZZZZ: CPT

## 2025-01-27 ENCOUNTER — APPOINTMENT (OUTPATIENT)
Dept: RHEUMATOLOGY | Facility: CLINIC | Age: 70
End: 2025-01-27

## 2025-01-30 ENCOUNTER — APPOINTMENT (OUTPATIENT)
Dept: RHEUMATOLOGY | Facility: CLINIC | Age: 70
End: 2025-01-30
Payer: MEDICARE

## 2025-01-30 VITALS
SYSTOLIC BLOOD PRESSURE: 128 MMHG | BODY MASS INDEX: 35.48 KG/M2 | TEMPERATURE: 96.9 F | WEIGHT: 194 LBS | RESPIRATION RATE: 16 BRPM | DIASTOLIC BLOOD PRESSURE: 72 MMHG | HEART RATE: 71 BPM | OXYGEN SATURATION: 96 %

## 2025-01-30 VITALS — HEART RATE: 85 BPM | DIASTOLIC BLOOD PRESSURE: 77 MMHG | SYSTOLIC BLOOD PRESSURE: 111 MMHG

## 2025-01-30 VITALS
OXYGEN SATURATION: 95 % | DIASTOLIC BLOOD PRESSURE: 76 MMHG | HEART RATE: 77 BPM | RESPIRATION RATE: 16 BRPM | SYSTOLIC BLOOD PRESSURE: 122 MMHG | TEMPERATURE: 97.6 F

## 2025-01-30 PROCEDURE — 96374 THER/PROPH/DIAG INJ IV PUSH: CPT | Mod: 59

## 2025-01-30 PROCEDURE — 96415 CHEMO IV INFUSION ADDL HR: CPT

## 2025-01-30 PROCEDURE — 96413 CHEMO IV INFUSION 1 HR: CPT

## 2025-01-30 RX ORDER — CETIRIZINE HYDROCHLORIDE 10 MG/1
10 TABLET, FILM COATED ORAL
Qty: 0 | Refills: 0 | Status: COMPLETED
Start: 2024-12-24

## 2025-01-30 RX ORDER — RITUXIMAB-ABBS 10 MG/ML
500 INJECTION, SOLUTION INTRAVENOUS
Qty: 0 | Refills: 0 | Status: COMPLETED
Start: 2024-12-24

## 2025-01-30 RX ORDER — METHYLPREDNISOLONE SODIUM SUCCINATE 125 MG/2ML
125 INJECTION, POWDER, FOR SOLUTION INTRAMUSCULAR; INTRAVENOUS
Qty: 0 | Refills: 0 | Status: COMPLETED
Start: 2024-12-24

## 2025-01-31 ENCOUNTER — APPOINTMENT (OUTPATIENT)
Dept: PULMONOLOGY | Facility: CLINIC | Age: 70
End: 2025-01-31

## 2025-02-03 ENCOUNTER — NON-APPOINTMENT (OUTPATIENT)
Age: 70
End: 2025-02-03

## 2025-02-03 ENCOUNTER — APPOINTMENT (OUTPATIENT)
Dept: OPHTHALMOLOGY | Facility: CLINIC | Age: 70
End: 2025-02-03
Payer: MEDICARE

## 2025-02-03 PROCEDURE — 92014 COMPRE OPH EXAM EST PT 1/>: CPT

## 2025-02-07 ENCOUNTER — APPOINTMENT (OUTPATIENT)
Dept: PULMONOLOGY | Facility: CLINIC | Age: 70
End: 2025-02-07
Payer: MEDICARE

## 2025-02-07 PROCEDURE — 94799 UNLISTED PULMONARY SVC/PX: CPT

## 2025-02-13 ENCOUNTER — APPOINTMENT (OUTPATIENT)
Dept: RHEUMATOLOGY | Facility: CLINIC | Age: 70
End: 2025-02-13
Payer: MEDICARE

## 2025-02-13 VITALS
HEART RATE: 69 BPM | DIASTOLIC BLOOD PRESSURE: 71 MMHG | RESPIRATION RATE: 16 BRPM | SYSTOLIC BLOOD PRESSURE: 119 MMHG | OXYGEN SATURATION: 95 % | TEMPERATURE: 97.9 F

## 2025-02-13 VITALS — SYSTOLIC BLOOD PRESSURE: 119 MMHG | DIASTOLIC BLOOD PRESSURE: 72 MMHG | OXYGEN SATURATION: 95 % | HEART RATE: 76 BPM

## 2025-02-13 VITALS
SYSTOLIC BLOOD PRESSURE: 128 MMHG | HEART RATE: 76 BPM | TEMPERATURE: 97.7 F | OXYGEN SATURATION: 95 % | DIASTOLIC BLOOD PRESSURE: 73 MMHG | RESPIRATION RATE: 16 BRPM

## 2025-02-13 PROCEDURE — 96374 THER/PROPH/DIAG INJ IV PUSH: CPT | Mod: 59

## 2025-02-13 PROCEDURE — 96415 CHEMO IV INFUSION ADDL HR: CPT

## 2025-02-13 PROCEDURE — 96413 CHEMO IV INFUSION 1 HR: CPT

## 2025-02-13 RX ORDER — METHYLPREDNISOLONE SODIUM SUCCINATE 125 MG/2ML
125 INJECTION, POWDER, FOR SOLUTION INTRAMUSCULAR; INTRAVENOUS
Qty: 0 | Refills: 0 | Status: COMPLETED
Start: 2024-12-24

## 2025-02-13 RX ORDER — CETIRIZINE HYDROCHLORIDE 10 MG/1
10 TABLET, FILM COATED ORAL
Qty: 0 | Refills: 0 | Status: COMPLETED
Start: 2024-12-24

## 2025-02-13 RX ORDER — RITUXIMAB-ABBS 10 MG/ML
500 INJECTION, SOLUTION INTRAVENOUS
Qty: 0 | Refills: 0 | Status: COMPLETED
Start: 2024-12-24

## 2025-02-14 ENCOUNTER — APPOINTMENT (OUTPATIENT)
Dept: PULMONOLOGY | Facility: CLINIC | Age: 70
End: 2025-02-14

## 2025-02-17 ENCOUNTER — APPOINTMENT (OUTPATIENT)
Dept: ULTRASOUND IMAGING | Facility: IMAGING CENTER | Age: 70
End: 2025-02-17
Payer: MEDICARE

## 2025-02-17 ENCOUNTER — OUTPATIENT (OUTPATIENT)
Dept: OUTPATIENT SERVICES | Facility: HOSPITAL | Age: 70
LOS: 1 days | End: 2025-02-17
Payer: MEDICARE

## 2025-02-17 DIAGNOSIS — Z00.8 ENCOUNTER FOR OTHER GENERAL EXAMINATION: ICD-10-CM

## 2025-02-17 DIAGNOSIS — Z90.49 ACQUIRED ABSENCE OF OTHER SPECIFIED PARTS OF DIGESTIVE TRACT: Chronic | ICD-10-CM

## 2025-02-17 DIAGNOSIS — Z98.49 CATARACT EXTRACTION STATUS, UNSPECIFIED EYE: Chronic | ICD-10-CM

## 2025-02-17 PROCEDURE — 76700 US EXAM ABDOM COMPLETE: CPT | Mod: 26

## 2025-02-17 PROCEDURE — 76700 US EXAM ABDOM COMPLETE: CPT

## 2025-02-21 ENCOUNTER — APPOINTMENT (OUTPATIENT)
Dept: PULMONOLOGY | Facility: CLINIC | Age: 70
End: 2025-02-21

## 2025-02-25 ENCOUNTER — LABORATORY RESULT (OUTPATIENT)
Age: 70
End: 2025-02-25

## 2025-02-25 ENCOUNTER — APPOINTMENT (OUTPATIENT)
Dept: RHEUMATOLOGY | Facility: CLINIC | Age: 70
End: 2025-02-25
Payer: MEDICARE

## 2025-02-25 VITALS
OXYGEN SATURATION: 94 % | DIASTOLIC BLOOD PRESSURE: 66 MMHG | SYSTOLIC BLOOD PRESSURE: 104 MMHG | BODY MASS INDEX: 31.1 KG/M2 | HEART RATE: 80 BPM | HEIGHT: 62 IN | WEIGHT: 169.03 LBS

## 2025-02-25 PROCEDURE — G2211 COMPLEX E/M VISIT ADD ON: CPT

## 2025-02-25 PROCEDURE — 99215 OFFICE O/P EST HI 40 MIN: CPT

## 2025-02-27 ENCOUNTER — TRANSCRIPTION ENCOUNTER (OUTPATIENT)
Age: 70
End: 2025-02-27

## 2025-02-27 ENCOUNTER — OUTPATIENT (OUTPATIENT)
Dept: OUTPATIENT SERVICES | Facility: HOSPITAL | Age: 70
LOS: 1 days | End: 2025-02-27
Payer: MEDICARE

## 2025-02-27 ENCOUNTER — APPOINTMENT (OUTPATIENT)
Dept: CV DIAGNOSITCS | Facility: HOSPITAL | Age: 70
End: 2025-02-27

## 2025-02-27 ENCOUNTER — RESULT REVIEW (OUTPATIENT)
Age: 70
End: 2025-02-27

## 2025-02-27 DIAGNOSIS — Z98.890 OTHER SPECIFIED POSTPROCEDURAL STATES: Chronic | ICD-10-CM

## 2025-02-27 DIAGNOSIS — Z90.49 ACQUIRED ABSENCE OF OTHER SPECIFIED PARTS OF DIGESTIVE TRACT: Chronic | ICD-10-CM

## 2025-02-27 DIAGNOSIS — I27.20 PULMONARY HYPERTENSION, UNSPECIFIED: ICD-10-CM

## 2025-02-27 DIAGNOSIS — Z98.49 CATARACT EXTRACTION STATUS, UNSPECIFIED EYE: Chronic | ICD-10-CM

## 2025-02-27 LAB
ALBUMIN SERPL ELPH-MCNC: 4 G/DL
ALP BLD-CCNC: 123 U/L
ALT SERPL-CCNC: 6 U/L
ANION GAP SERPL CALC-SCNC: 15 MMOL/L
AST SERPL-CCNC: 17 U/L
BASOPHILS # BLD AUTO: 0.04 K/UL
BASOPHILS NFR BLD AUTO: 0.7 %
BILIRUB SERPL-MCNC: 0.6 MG/DL
BUN SERPL-MCNC: 63 MG/DL
CALCIUM SERPL-MCNC: 10.4 MG/DL
CD16+CD56+ CELLS # BLD: 365 CELLS/UL
CD16+CD56+ CELLS NFR BLD: 26 %
CD19 CELLS NFR BLD: 3 CELLS/UL
CD3 CELLS # BLD: 1002 CELLS/UL
CD3 CELLS NFR BLD: 72 %
CD3+CD4+ CELLS # BLD: 580 CELLS/UL
CD3+CD4+ CELLS NFR BLD: 42 %
CD3+CD4+ CELLS/CD3+CD8+ CLL SPEC: 1.4 RATIO
CD3+CD8+ CELLS # SPEC: 413 CELLS/UL
CD3+CD8+ CELLS NFR BLD: 30 %
CELLS.CD3-CD19+/CELLS IN BLOOD: <1 %
CHLORIDE SERPL-SCNC: 107 MMOL/L
CO2 SERPL-SCNC: 21 MMOL/L
CREAT SERPL-MCNC: 1.97 MG/DL
CRP SERPL-MCNC: 13 MG/L
DEPRECATED KAPPA LC FREE/LAMBDA SER: 1.72 RATIO
EGFR: 27 ML/MIN/1.73M2
EOSINOPHIL # BLD AUTO: 0.28 K/UL
EOSINOPHIL NFR BLD AUTO: 5.2 %
ERYTHROCYTE [SEDIMENTATION RATE] IN BLOOD BY WESTERGREN METHOD: 59 MM/HR
GLUCOSE SERPL-MCNC: 120 MG/DL
HCT VFR BLD CALC: 34.8 %
HGB BLD-MCNC: 9.8 G/DL
IGA SER QL IEP: 56 MG/DL
IGG SER QL IEP: 1193 MG/DL
IGM SER QL IEP: 46 MG/DL
IMM GRANULOCYTES NFR BLD AUTO: 0.6 %
KAPPA LC CSF-MCNC: 4.22 MG/DL
KAPPA LC SERPL-MCNC: 7.25 MG/DL
LYMPHOCYTES # BLD AUTO: 1.32 K/UL
LYMPHOCYTES NFR BLD AUTO: 24.7 %
MAN DIFF?: NORMAL
MCHC RBC-ENTMCNC: 20 PG
MCHC RBC-ENTMCNC: 28.2 G/DL
MCV RBC AUTO: 70.9 FL
MONOCYTES # BLD AUTO: 0.45 K/UL
MONOCYTES NFR BLD AUTO: 8.4 %
NEUTROPHILS # BLD AUTO: 3.22 K/UL
NEUTROPHILS NFR BLD AUTO: 60.4 %
PLATELET # BLD AUTO: 273 K/UL
POTASSIUM SERPL-SCNC: 4.7 MMOL/L
PROT SERPL-MCNC: 6.9 G/DL
RBC # BLD: 4.91 M/UL
RBC # FLD: 21.1 %
SODIUM SERPL-SCNC: 144 MMOL/L
URATE SERPL-MCNC: 3.4 MG/DL
WBC # FLD AUTO: 5.34 K/UL

## 2025-02-27 PROCEDURE — 93306 TTE W/DOPPLER COMPLETE: CPT | Mod: 26

## 2025-02-28 ENCOUNTER — APPOINTMENT (OUTPATIENT)
Dept: PULMONOLOGY | Facility: CLINIC | Age: 70
End: 2025-02-28
Payer: SELF-PAY

## 2025-02-28 PROCEDURE — ZZZZZ: CPT

## 2025-03-07 ENCOUNTER — APPOINTMENT (OUTPATIENT)
Dept: PULMONOLOGY | Facility: CLINIC | Age: 70
End: 2025-03-07
Payer: SELF-PAY

## 2025-03-07 PROCEDURE — 94799 UNLISTED PULMONARY SVC/PX: CPT

## 2025-03-13 ENCOUNTER — APPOINTMENT (OUTPATIENT)
Dept: PULMONOLOGY | Facility: CLINIC | Age: 70
End: 2025-03-13
Payer: MEDICARE

## 2025-03-13 ENCOUNTER — APPOINTMENT (OUTPATIENT)
Dept: PULMONOLOGY | Facility: CLINIC | Age: 70
End: 2025-03-13

## 2025-03-13 VITALS
TEMPERATURE: 97.7 F | SYSTOLIC BLOOD PRESSURE: 122 MMHG | BODY MASS INDEX: 36.8 KG/M2 | DIASTOLIC BLOOD PRESSURE: 75 MMHG | WEIGHT: 200 LBS | OXYGEN SATURATION: 96 % | HEIGHT: 62 IN | HEART RATE: 71 BPM

## 2025-03-13 PROCEDURE — 94010 BREATHING CAPACITY TEST: CPT

## 2025-03-13 PROCEDURE — 99214 OFFICE O/P EST MOD 30 MIN: CPT | Mod: 25

## 2025-03-13 PROCEDURE — ZZZZZ: CPT

## 2025-03-14 ENCOUNTER — APPOINTMENT (OUTPATIENT)
Dept: PULMONOLOGY | Facility: CLINIC | Age: 70
End: 2025-03-14
Payer: SELF-PAY

## 2025-03-14 PROCEDURE — ZZZZZ: CPT

## 2025-03-21 ENCOUNTER — APPOINTMENT (OUTPATIENT)
Dept: PULMONOLOGY | Facility: CLINIC | Age: 70
End: 2025-03-21
Payer: SELF-PAY

## 2025-03-21 PROCEDURE — ZZZZZ: CPT

## 2025-03-28 ENCOUNTER — APPOINTMENT (OUTPATIENT)
Dept: PULMONOLOGY | Facility: CLINIC | Age: 70
End: 2025-03-28
Payer: SELF-PAY

## 2025-03-28 PROCEDURE — ZZZZZ: CPT

## 2025-04-04 ENCOUNTER — APPOINTMENT (OUTPATIENT)
Dept: PULMONOLOGY | Facility: CLINIC | Age: 70
End: 2025-04-04
Payer: SELF-PAY

## 2025-04-04 PROCEDURE — 94799 UNLISTED PULMONARY SVC/PX: CPT

## 2025-04-11 ENCOUNTER — APPOINTMENT (OUTPATIENT)
Dept: PULMONOLOGY | Facility: CLINIC | Age: 70
End: 2025-04-11
Payer: SELF-PAY

## 2025-04-11 PROCEDURE — ZZZZZ: CPT

## 2025-04-18 ENCOUNTER — APPOINTMENT (OUTPATIENT)
Dept: PULMONOLOGY | Facility: CLINIC | Age: 70
End: 2025-04-18
Payer: SELF-PAY

## 2025-04-18 PROCEDURE — ZZZZZ: CPT

## 2025-04-25 ENCOUNTER — APPOINTMENT (OUTPATIENT)
Dept: PULMONOLOGY | Facility: CLINIC | Age: 70
End: 2025-04-25
Payer: SELF-PAY

## 2025-04-25 PROCEDURE — ZZZZZ: CPT

## 2025-05-02 ENCOUNTER — APPOINTMENT (OUTPATIENT)
Dept: PULMONOLOGY | Facility: CLINIC | Age: 70
End: 2025-05-02
Payer: SELF-PAY

## 2025-05-02 PROCEDURE — 94799 UNLISTED PULMONARY SVC/PX: CPT

## 2025-05-09 ENCOUNTER — APPOINTMENT (OUTPATIENT)
Dept: PULMONOLOGY | Facility: CLINIC | Age: 70
End: 2025-05-09
Payer: SELF-PAY

## 2025-05-09 PROCEDURE — ZZZZZ: CPT

## 2025-05-16 ENCOUNTER — APPOINTMENT (OUTPATIENT)
Dept: PULMONOLOGY | Facility: CLINIC | Age: 70
End: 2025-05-16
Payer: SELF-PAY

## 2025-05-16 PROCEDURE — ZZZZZ: CPT

## 2025-05-23 ENCOUNTER — APPOINTMENT (OUTPATIENT)
Dept: PULMONOLOGY | Facility: CLINIC | Age: 70
End: 2025-05-23
Payer: SELF-PAY

## 2025-05-23 PROCEDURE — ZZZZZ: CPT

## 2025-05-30 ENCOUNTER — APPOINTMENT (OUTPATIENT)
Dept: PULMONOLOGY | Facility: CLINIC | Age: 70
End: 2025-05-30
Payer: SELF-PAY

## 2025-05-30 PROCEDURE — ZZZZZ: CPT

## 2025-06-02 ENCOUNTER — LABORATORY RESULT (OUTPATIENT)
Age: 70
End: 2025-06-02

## 2025-06-02 ENCOUNTER — APPOINTMENT (OUTPATIENT)
Dept: RHEUMATOLOGY | Facility: CLINIC | Age: 70
End: 2025-06-02
Payer: MEDICARE

## 2025-06-02 VITALS
DIASTOLIC BLOOD PRESSURE: 71 MMHG | OXYGEN SATURATION: 96 % | BODY MASS INDEX: 35.89 KG/M2 | HEIGHT: 62 IN | HEART RATE: 76 BPM | SYSTOLIC BLOOD PRESSURE: 113 MMHG | WEIGHT: 195.03 LBS

## 2025-06-02 PROCEDURE — 99215 OFFICE O/P EST HI 40 MIN: CPT

## 2025-06-02 PROCEDURE — G2211 COMPLEX E/M VISIT ADD ON: CPT

## 2025-06-02 RX ORDER — PREDNISONE 5 MG/1
5 TABLET ORAL
Qty: 50 | Refills: 0 | Status: ACTIVE | COMMUNITY
Start: 2025-06-02 | End: 1900-01-01

## 2025-06-06 ENCOUNTER — APPOINTMENT (OUTPATIENT)
Dept: PULMONOLOGY | Facility: CLINIC | Age: 70
End: 2025-06-06
Payer: SELF-PAY

## 2025-06-06 ENCOUNTER — TRANSCRIPTION ENCOUNTER (OUTPATIENT)
Age: 70
End: 2025-06-06

## 2025-06-06 LAB
ALBUMIN SERPL ELPH-MCNC: 4.3 G/DL
ALP BLD-CCNC: 150 U/L
ALT SERPL-CCNC: 19 U/L
ANION GAP SERPL CALC-SCNC: 14 MMOL/L
AST SERPL-CCNC: 30 U/L
BASOPHILS # BLD AUTO: 0.05 K/UL
BASOPHILS NFR BLD AUTO: 0.9 %
BILIRUB SERPL-MCNC: 0.6 MG/DL
BUN SERPL-MCNC: 70 MG/DL
CALCIUM SERPL-MCNC: 10.4 MG/DL
CD16+CD56+ CELLS # BLD: 472 CELLS/UL
CD16+CD56+ CELLS NFR BLD: 25 %
CD19 CELLS NFR BLD: 2 CELLS/UL
CD3 CELLS # BLD: 1348 CELLS/UL
CD3 CELLS NFR BLD: 73 %
CD3+CD4+ CELLS # BLD: 791 CELLS/UL
CD3+CD4+ CELLS NFR BLD: 44 %
CD3+CD4+ CELLS/CD3+CD8+ CLL SPEC: 1.48 RATIO
CD3+CD8+ CELLS # SPEC: 535 CELLS/UL
CD3+CD8+ CELLS NFR BLD: 30 %
CELLS.CD3-CD19+/CELLS IN BLOOD: <1 %
CHLORIDE SERPL-SCNC: 107 MMOL/L
CO2 SERPL-SCNC: 19 MMOL/L
CREAT SERPL-MCNC: 2.42 MG/DL
CRP SERPL-MCNC: <3 MG/L
DEPRECATED KAPPA LC FREE/LAMBDA SER: 1.69 RATIO
EGFRCR SERPLBLD CKD-EPI 2021: 21 ML/MIN/1.73M2
EOSINOPHIL # BLD AUTO: 0.34 K/UL
EOSINOPHIL NFR BLD AUTO: 6.5 %
ERYTHROCYTE [SEDIMENTATION RATE] IN BLOOD BY WESTERGREN METHOD: 32 MM/HR
GLUCOSE SERPL-MCNC: 108 MG/DL
HCT VFR BLD CALC: 38.7 %
HGB BLD-MCNC: 10.9 G/DL
IGA SERPL-MCNC: 60 MG/DL
IGG SERPL-MCNC: 1142 MG/DL
IGM SERPL-MCNC: 56 MG/DL
KAPPA LC CSF-MCNC: 3.86 MG/DL
KAPPA LC SERPL-MCNC: 6.52 MG/DL
LYMPHOCYTES # BLD AUTO: 1.98 K/UL
LYMPHOCYTES NFR BLD AUTO: 37.4 %
MAN DIFF?: NORMAL
MCHC RBC-ENTMCNC: 20.2 PG
MCHC RBC-ENTMCNC: 28.2 G/DL
MCV RBC AUTO: 71.8 FL
MONOCYTES # BLD AUTO: 0.5 K/UL
MONOCYTES NFR BLD AUTO: 9.4 %
NEUTROPHILS # BLD AUTO: 1.98 K/UL
NEUTROPHILS NFR BLD AUTO: 37.4 %
PLATELET # BLD AUTO: 209 K/UL
POTASSIUM SERPL-SCNC: 4.7 MMOL/L
PROT SERPL-MCNC: 6.8 G/DL
RBC # BLD: 5.39 M/UL
RBC # FLD: 22.5 %
SODIUM SERPL-SCNC: 140 MMOL/L
VIABILITY: NORMAL
WBC # FLD AUTO: 5.3 K/UL

## 2025-06-06 PROCEDURE — 94799 UNLISTED PULMONARY SVC/PX: CPT

## 2025-06-13 ENCOUNTER — APPOINTMENT (OUTPATIENT)
Dept: PULMONOLOGY | Facility: CLINIC | Age: 70
End: 2025-06-13
Payer: SELF-PAY

## 2025-06-13 PROCEDURE — ZZZZZ: CPT

## 2025-06-20 ENCOUNTER — APPOINTMENT (OUTPATIENT)
Dept: PULMONOLOGY | Facility: CLINIC | Age: 70
End: 2025-06-20
Payer: SELF-PAY

## 2025-06-20 PROCEDURE — ZZZZZ: CPT

## 2025-06-27 ENCOUNTER — APPOINTMENT (OUTPATIENT)
Dept: PULMONOLOGY | Facility: CLINIC | Age: 70
End: 2025-06-27
Payer: SELF-PAY

## 2025-06-27 PROCEDURE — ZZZZZ: CPT

## 2025-07-03 ENCOUNTER — APPOINTMENT (OUTPATIENT)
Dept: ORTHOPEDIC SURGERY | Facility: CLINIC | Age: 70
End: 2025-07-03
Payer: MEDICARE

## 2025-07-03 PROBLEM — M66.241 NONTRAUMATIC RUPTURE OF EXTENSOR TENDONS OF RIGHT HAND AND WRIST: Status: ACTIVE | Noted: 2025-07-03

## 2025-07-03 PROCEDURE — 73130 X-RAY EXAM OF HAND: CPT | Mod: 50

## 2025-07-03 PROCEDURE — 99204 OFFICE O/P NEW MOD 45 MIN: CPT

## 2025-07-07 ENCOUNTER — NON-APPOINTMENT (OUTPATIENT)
Age: 70
End: 2025-07-07

## 2025-07-07 ENCOUNTER — APPOINTMENT (OUTPATIENT)
Dept: HEART FAILURE | Facility: CLINIC | Age: 70
End: 2025-07-07
Payer: MEDICARE

## 2025-07-07 VITALS
DIASTOLIC BLOOD PRESSURE: 77 MMHG | OXYGEN SATURATION: 96 % | BODY MASS INDEX: 36.07 KG/M2 | RESPIRATION RATE: 16 BRPM | HEIGHT: 62 IN | SYSTOLIC BLOOD PRESSURE: 121 MMHG | WEIGHT: 196 LBS | TEMPERATURE: 98 F | HEART RATE: 64 BPM

## 2025-07-07 PROCEDURE — 93000 ELECTROCARDIOGRAM COMPLETE: CPT

## 2025-07-07 PROCEDURE — 99215 OFFICE O/P EST HI 40 MIN: CPT

## 2025-07-11 ENCOUNTER — OUTPATIENT (OUTPATIENT)
Dept: OUTPATIENT SERVICES | Facility: HOSPITAL | Age: 70
LOS: 1 days | End: 2025-07-11
Payer: MEDICARE

## 2025-07-11 ENCOUNTER — APPOINTMENT (OUTPATIENT)
Dept: PULMONOLOGY | Facility: CLINIC | Age: 70
End: 2025-07-11

## 2025-07-11 VITALS
SYSTOLIC BLOOD PRESSURE: 120 MMHG | HEIGHT: 62 IN | RESPIRATION RATE: 17 BRPM | WEIGHT: 197.98 LBS | HEART RATE: 83 BPM | DIASTOLIC BLOOD PRESSURE: 76 MMHG | TEMPERATURE: 98 F | OXYGEN SATURATION: 96 %

## 2025-07-11 DIAGNOSIS — M66.241 SPONTANEOUS RUPTURE OF EXTENSOR TENDONS, RIGHT HAND: ICD-10-CM

## 2025-07-11 DIAGNOSIS — I50.9 HEART FAILURE, UNSPECIFIED: ICD-10-CM

## 2025-07-11 DIAGNOSIS — Z01.818 ENCOUNTER FOR OTHER PREPROCEDURAL EXAMINATION: ICD-10-CM

## 2025-07-11 DIAGNOSIS — Z98.49 CATARACT EXTRACTION STATUS, UNSPECIFIED EYE: Chronic | ICD-10-CM

## 2025-07-11 DIAGNOSIS — I48.91 UNSPECIFIED ATRIAL FIBRILLATION: ICD-10-CM

## 2025-07-11 DIAGNOSIS — Z90.49 ACQUIRED ABSENCE OF OTHER SPECIFIED PARTS OF DIGESTIVE TRACT: Chronic | ICD-10-CM

## 2025-07-11 DIAGNOSIS — Z98.890 OTHER SPECIFIED POSTPROCEDURAL STATES: Chronic | ICD-10-CM

## 2025-07-11 DIAGNOSIS — I27.20 PULMONARY HYPERTENSION, UNSPECIFIED: ICD-10-CM

## 2025-07-11 PROCEDURE — 85027 COMPLETE CBC AUTOMATED: CPT

## 2025-07-11 PROCEDURE — G0463: CPT

## 2025-07-11 PROCEDURE — 83036 HEMOGLOBIN GLYCOSYLATED A1C: CPT

## 2025-07-11 PROCEDURE — 80048 BASIC METABOLIC PNL TOTAL CA: CPT

## 2025-07-11 NOTE — H&P PST ADULT - OTHER CARE PROVIDERS
Jay Degroot (cardiac)[phone] appt 7/7  Jerome Rakeshil (heme) [phone] last 7/7 Anirudh Yang (Pul)m [phone] appt 7/24

## 2025-07-11 NOTE — H&P PST ADULT - PROBLEM SELECTOR PLAN 1
right distal ulnar resection  right flexor digitorum of ring finger to ring and small extensors right middle finger flexor digitorum to middle

## 2025-07-11 NOTE — H&P PST ADULT - ASSESSMENT
DASI: walks in house, Pulmonary Rehab 1-2 x week  needs oxygen for stairs and walking in hallway uses BiPAP at night Mets 5  Symptoms : Denies SOB, CHAVEZ, palpitations  Airway : no airway abnormalities , denies prior anesthesia complications   Mallampati :3  Denies loose teeth     Corneal abrasion risk : Denies

## 2025-07-11 NOTE — H&P PST ADULT - NSICDXPASTMEDICALHX_GEN_ALL_CORE_FT
PAST MEDICAL HISTORY:  Abnormal vaginal bleeding in postmenopausal patient     Anemia     Arthritis     Atrial fibrillation on eliquis    Cataract, bilateral     Chronic kidney disease (CKD)     Chronic systolic right heart failure     COPD (chronic obstructive pulmonary disease)     Diabetes type 2    Gout     Hemorrhage of anus and rectum     History of lymphoma 1997 s/p chemo and radiation    History of morbid obesity     HTN (hypertension)     Hypothyroidism     Malignant neoplasm of ascending colon     Non-Hodgkin's lymphoma     LINH (obstructive sleep apnea) on BiPAP    Oxygen dependent at night 2-3L    Pulmonary HTN severe     PAST MEDICAL HISTORY:  Abnormal vaginal bleeding in postmenopausal patient     Anemia     Arthritis     Atrial fibrillation on eliquis    Cataract, bilateral     Chronic CHF     Chronic kidney disease (CKD)     COPD (chronic obstructive pulmonary disease)     Diabetes type 2    Gout     Hemorrhage of anus and rectum     History of morbid obesity     HTN (hypertension)     Hypothyroidism     Malignant neoplasm of ascending colon     Non-Hodgkin's lymphoma     LINH (obstructive sleep apnea) on BiPAP    Oxygen dependent at night 2-3L    Pulmonary HTN severe    Spontaneous rupture of extensor tendons, right hand

## 2025-07-11 NOTE — H&P PST ADULT - HISTORY OF PRESENT ILLNESS
Diabetes Mellitus (was on oral meds now none ) COPD , Pulmonary Hypertension uses Oxygen  2 Liter (when walking, cleaning) BIPAP at night 2 liters   Rheumatoid arthritis right hand / wrist  was on  immunologic agents  (IV) noted 12/24 finger drop 3-5th digit right and  limited ROM  70 year old female with hx of Diabetes Mellitus (was on oral meds now none ) COPD , Pulmonary Hypertension uses Oxygen  2 Liter (when walking, cleaning) BIPAP at night 2 liters, Anemia of chronic disease, CKD, Chronic Diastolic Congestive Heart failure,    Rheumatoid arthritis right hand / wrist  was on  immunologic agents  (IV) noted 12/24 finger drop 3-5th digit right and  limited ROM.  work up referred for surgery.    A Fib  Eliquis   last 7/29  confirm plan with cardiology  70 year old female with hx of Diabetes Mellitus (was on oral meds now none ) COPD , Pulmonary Hypertension uses Oxygen  2 Liter (when walking, cleaning) BIPAP at night 2 liters, Anemia of chronic disease, CKD, Chronic Diastolic Congestive Heart failure,    Rheumatoid arthritis right hand / wrist  was on  immunologic agents  (IV) noted 12/24 finger drop 3-5th digit right and  limited ROM.  work up referred for surgery.    A Fib  Eliquis   last 7/29  confirm plan with cardiology     *case discussed with Dr. Beltran (anesthesia) will be reroute to Main OR email sent to team  70 year old female with hx of Diabetes Mellitus (was on oral meds now none ) COPD , Pulmonary Hypertension uses Oxygen  2 Liter (when walking, cleaning) BIPAP at night 2 liters, Anemia of chronic disease, CKD, Chronic Diastolic Congestive Heart failure,    Rheumatoid arthritis right hand / wrist  was on  immunologic agents  (IV) noted 12/24 finger drop 3-5th digit right and  limited ROM.  work up referred for surgery.    A Fib  Eliquis   last 7/29  confirm plan with cardiology     *case discussed with Dr. Beltran (anesthesia) will be reroute to Main OR email sent to team   7/18/2025 As per Dr. Lester case is ok to proceed in [facility] - email is in the chart . CHARLIE Lin NP

## 2025-07-11 NOTE — H&P PST ADULT - PRIMARY CARE PROVIDER
Vivienne (PCP) [phone] appt 7/21  Yaritza Heart (nephro) [phone] last 5/2025 Nasra Cabrales (endo) [phone]

## 2025-07-12 PROBLEM — I50.22 CHRONIC SYSTOLIC (CONGESTIVE) HEART FAILURE: Chronic | Status: INACTIVE | Noted: 2019-04-01 | Resolved: 2025-07-11

## 2025-07-12 PROBLEM — Z85.79 PERSONAL HISTORY OF OTHER MALIGNANT NEOPLASMS OF LYMPHOID, HEMATOPOIETIC AND RELATED TISSUES: Chronic | Status: INACTIVE | Noted: 2020-08-26 | Resolved: 2025-07-11

## 2025-07-14 ENCOUNTER — OUTPATIENT (OUTPATIENT)
Dept: OUTPATIENT SERVICES | Facility: HOSPITAL | Age: 70
LOS: 1 days | End: 2025-07-14
Payer: MEDICARE

## 2025-07-14 ENCOUNTER — APPOINTMENT (OUTPATIENT)
Dept: CT IMAGING | Facility: IMAGING CENTER | Age: 70
End: 2025-07-14
Payer: MEDICARE

## 2025-07-14 DIAGNOSIS — I27.20 PULMONARY HYPERTENSION, UNSPECIFIED: ICD-10-CM

## 2025-07-14 DIAGNOSIS — Z90.49 ACQUIRED ABSENCE OF OTHER SPECIFIED PARTS OF DIGESTIVE TRACT: Chronic | ICD-10-CM

## 2025-07-14 DIAGNOSIS — Z98.49 CATARACT EXTRACTION STATUS, UNSPECIFIED EYE: Chronic | ICD-10-CM

## 2025-07-14 DIAGNOSIS — Z98.890 OTHER SPECIFIED POSTPROCEDURAL STATES: Chronic | ICD-10-CM

## 2025-07-14 PROBLEM — M66.241: Chronic | Status: ACTIVE | Noted: 2025-07-11

## 2025-07-14 PROBLEM — I50.9 HEART FAILURE, UNSPECIFIED: Chronic | Status: ACTIVE | Noted: 2025-07-11

## 2025-07-14 PROCEDURE — 71250 CT THORAX DX C-: CPT

## 2025-07-14 PROCEDURE — 71250 CT THORAX DX C-: CPT | Mod: 26

## 2025-07-18 ENCOUNTER — NON-APPOINTMENT (OUTPATIENT)
Age: 70
End: 2025-07-18

## 2025-07-18 ENCOUNTER — APPOINTMENT (OUTPATIENT)
Dept: PULMONOLOGY | Facility: CLINIC | Age: 70
End: 2025-07-18
Payer: SELF-PAY

## 2025-07-18 PROCEDURE — 94799 UNLISTED PULMONARY SVC/PX: CPT

## 2025-07-24 ENCOUNTER — APPOINTMENT (OUTPATIENT)
Dept: PULMONOLOGY | Facility: CLINIC | Age: 70
End: 2025-07-24
Payer: MEDICARE

## 2025-07-24 VITALS
HEIGHT: 62 IN | DIASTOLIC BLOOD PRESSURE: 74 MMHG | SYSTOLIC BLOOD PRESSURE: 128 MMHG | WEIGHT: 200 LBS | TEMPERATURE: 97.2 F | BODY MASS INDEX: 36.8 KG/M2 | OXYGEN SATURATION: 95 % | HEART RATE: 78 BPM

## 2025-07-24 PROCEDURE — 99214 OFFICE O/P EST MOD 30 MIN: CPT

## 2025-07-25 ENCOUNTER — APPOINTMENT (OUTPATIENT)
Dept: PULMONOLOGY | Facility: CLINIC | Age: 70
End: 2025-07-25
Payer: SELF-PAY

## 2025-07-25 PROCEDURE — ZZZZZ: CPT

## 2025-07-28 ENCOUNTER — APPOINTMENT (OUTPATIENT)
Dept: RHEUMATOLOGY | Facility: CLINIC | Age: 70
End: 2025-07-28
Payer: MEDICARE

## 2025-07-28 VITALS
HEIGHT: 62 IN | RESPIRATION RATE: 16 BRPM | HEART RATE: 79 BPM | OXYGEN SATURATION: 98 % | DIASTOLIC BLOOD PRESSURE: 69 MMHG | BODY MASS INDEX: 36.07 KG/M2 | SYSTOLIC BLOOD PRESSURE: 111 MMHG | WEIGHT: 196 LBS

## 2025-07-28 PROCEDURE — G2211 COMPLEX E/M VISIT ADD ON: CPT

## 2025-07-28 PROCEDURE — 99215 OFFICE O/P EST HI 40 MIN: CPT

## 2025-07-31 RX ORDER — OXYCODONE 5 MG/1
5 TABLET ORAL
Qty: 8 | Refills: 0 | Status: ACTIVE | COMMUNITY
Start: 2025-07-31 | End: 1900-01-01

## 2025-08-01 ENCOUNTER — OUTPATIENT (OUTPATIENT)
Dept: OUTPATIENT SERVICES | Facility: HOSPITAL | Age: 70
LOS: 1 days | End: 2025-08-01
Payer: MEDICARE

## 2025-08-01 ENCOUNTER — TRANSCRIPTION ENCOUNTER (OUTPATIENT)
Age: 70
End: 2025-08-01

## 2025-08-01 ENCOUNTER — APPOINTMENT (OUTPATIENT)
Dept: ORTHOPEDIC SURGERY | Facility: HOSPITAL | Age: 70
End: 2025-08-01

## 2025-08-01 VITALS
HEART RATE: 66 BPM | DIASTOLIC BLOOD PRESSURE: 68 MMHG | SYSTOLIC BLOOD PRESSURE: 113 MMHG | WEIGHT: 197.98 LBS | HEIGHT: 62 IN | RESPIRATION RATE: 16 BRPM | TEMPERATURE: 98 F | OXYGEN SATURATION: 95 %

## 2025-08-01 VITALS
OXYGEN SATURATION: 98 % | RESPIRATION RATE: 17 BRPM | DIASTOLIC BLOOD PRESSURE: 61 MMHG | SYSTOLIC BLOOD PRESSURE: 99 MMHG | HEART RATE: 69 BPM | TEMPERATURE: 97 F

## 2025-08-01 DIAGNOSIS — Z98.890 OTHER SPECIFIED POSTPROCEDURAL STATES: Chronic | ICD-10-CM

## 2025-08-01 DIAGNOSIS — Z98.49 CATARACT EXTRACTION STATUS, UNSPECIFIED EYE: Chronic | ICD-10-CM

## 2025-08-01 DIAGNOSIS — M66.241 SPONTANEOUS RUPTURE OF EXTENSOR TENDONS, RIGHT HAND: ICD-10-CM

## 2025-08-01 LAB — GLUCOSE BLDC GLUCOMTR-MCNC: 114 MG/DL — HIGH (ref 70–99)

## 2025-08-01 PROCEDURE — 25240 PARTIAL REMOVAL OF ULNA: CPT | Mod: RT

## 2025-08-01 PROCEDURE — 76000 FLUOROSCOPY <1 HR PHYS/QHP: CPT

## 2025-08-01 PROCEDURE — 82962 GLUCOSE BLOOD TEST: CPT

## 2025-08-01 PROCEDURE — 26480 TRANSPLANT HAND TENDON: CPT | Mod: F8

## 2025-08-01 PROCEDURE — 25310 TRANSPLANT FOREARM TENDON: CPT | Mod: F9

## 2025-08-01 RX ORDER — TORSEMIDE 10 MG
1 TABLET ORAL
Refills: 0 | DISCHARGE

## 2025-08-01 RX ORDER — GLUCOSAMINE HCL/CHONDROITIN SU 500-400 MG
1 CAPSULE ORAL
Refills: 0 | DISCHARGE

## 2025-08-01 RX ORDER — SPIRONOLACTONE 25 MG
0.5 TABLET ORAL
Refills: 0 | DISCHARGE

## 2025-08-01 RX ORDER — DARBEPOETIN ALFA 40 UG/ML
500 SOLUTION INTRAVENOUS; SUBCUTANEOUS
Refills: 0 | DISCHARGE

## 2025-08-01 RX ORDER — SILDENAFIL 50 MG/1
1 TABLET, FILM COATED ORAL
Refills: 0 | DISCHARGE

## 2025-08-01 RX ORDER — GABAPENTIN 400 MG/1
1 CAPSULE ORAL
Refills: 0 | DISCHARGE

## 2025-08-01 RX ORDER — ALBUTEROL SULFATE 2.5 MG/3ML
2 VIAL, NEBULIZER (ML) INHALATION
Refills: 0 | DISCHARGE

## 2025-08-01 RX ORDER — B1/B2/B3/B5/B6/B12/VIT C/FOLIC 500-0.5 MG
1 TABLET ORAL
Refills: 0 | DISCHARGE

## 2025-08-01 RX ORDER — METOPROLOL SUCCINATE 50 MG/1
1 TABLET, EXTENDED RELEASE ORAL
Refills: 0 | DISCHARGE

## 2025-08-01 RX ORDER — DIGOXIN 250 UG/1
1 TABLET ORAL
Refills: 0 | DISCHARGE

## 2025-08-01 RX ORDER — CALCITRIOL 0.5 UG/1
1 CAPSULE, GELATIN COATED ORAL
Refills: 0 | DISCHARGE

## 2025-08-01 RX ORDER — LEVOTHYROXINE SODIUM 300 MCG
1 TABLET ORAL
Refills: 0 | DISCHARGE

## 2025-08-01 RX ORDER — MACITENTAN 10 MG/1
1 TABLET, FILM COATED ORAL
Refills: 0 | DISCHARGE

## 2025-08-01 RX ORDER — CALCIUM CARBONATE 750 MG/1
1 TABLET ORAL
Refills: 0 | DISCHARGE

## 2025-08-01 RX ADMIN — Medication 3 MILLILITER(S): at 08:08

## 2025-08-01 RX ADMIN — Medication 1 APPLICATION(S): at 08:08

## 2025-08-08 ENCOUNTER — NON-APPOINTMENT (OUTPATIENT)
Age: 70
End: 2025-08-08

## 2025-08-08 ENCOUNTER — APPOINTMENT (OUTPATIENT)
Dept: PULMONOLOGY | Facility: CLINIC | Age: 70
End: 2025-08-08

## 2025-08-11 ENCOUNTER — APPOINTMENT (OUTPATIENT)
Dept: ORTHOPEDIC SURGERY | Facility: CLINIC | Age: 70
End: 2025-08-11
Payer: MEDICARE

## 2025-08-11 DIAGNOSIS — M05.741 RHEUMATOID ARTHRITIS WITH RHEUMATOID FACTOR OF RIGHT HAND W/OUT ORGAN OR SYSTEMS INVOLVEMENT: ICD-10-CM

## 2025-08-11 DIAGNOSIS — M05.742 RHEUMATOID ARTHRITIS WITH RHEUMATOID FACTOR OF RIGHT HAND W/OUT ORGAN OR SYSTEMS INVOLVEMENT: ICD-10-CM

## 2025-08-11 PROCEDURE — 99024 POSTOP FOLLOW-UP VISIT: CPT

## 2025-08-15 ENCOUNTER — APPOINTMENT (OUTPATIENT)
Dept: PULMONOLOGY | Facility: CLINIC | Age: 70
End: 2025-08-15

## 2025-08-22 ENCOUNTER — APPOINTMENT (OUTPATIENT)
Dept: PULMONOLOGY | Facility: CLINIC | Age: 70
End: 2025-08-22

## 2025-08-29 ENCOUNTER — APPOINTMENT (OUTPATIENT)
Dept: PULMONOLOGY | Facility: CLINIC | Age: 70
End: 2025-08-29

## 2025-09-03 ENCOUNTER — LABORATORY RESULT (OUTPATIENT)
Age: 70
End: 2025-09-03

## 2025-09-05 ENCOUNTER — APPOINTMENT (OUTPATIENT)
Dept: PULMONOLOGY | Facility: CLINIC | Age: 70
End: 2025-09-05

## 2025-09-05 ENCOUNTER — NON-APPOINTMENT (OUTPATIENT)
Age: 70
End: 2025-09-05

## 2025-09-12 ENCOUNTER — APPOINTMENT (OUTPATIENT)
Dept: PULMONOLOGY | Facility: CLINIC | Age: 70
End: 2025-09-12

## 2025-09-18 ENCOUNTER — APPOINTMENT (OUTPATIENT)
Dept: ORTHOPEDIC SURGERY | Facility: CLINIC | Age: 70
End: 2025-09-18
Payer: MEDICARE

## 2025-09-18 DIAGNOSIS — M05.741 RHEUMATOID ARTHRITIS WITH RHEUMATOID FACTOR OF RIGHT HAND W/OUT ORGAN OR SYSTEMS INVOLVEMENT: ICD-10-CM

## 2025-09-18 DIAGNOSIS — M66.231 SPONTANEOUS RUPTURE OF EXTENSOR TENDONS, RIGHT HAND: ICD-10-CM

## 2025-09-18 DIAGNOSIS — M66.241 SPONTANEOUS RUPTURE OF EXTENSOR TENDONS, RIGHT HAND: ICD-10-CM

## 2025-09-18 DIAGNOSIS — M05.742 RHEUMATOID ARTHRITIS WITH RHEUMATOID FACTOR OF RIGHT HAND W/OUT ORGAN OR SYSTEMS INVOLVEMENT: ICD-10-CM

## 2025-09-18 PROCEDURE — 99024 POSTOP FOLLOW-UP VISIT: CPT

## 2025-09-18 PROCEDURE — 73110 X-RAY EXAM OF WRIST: CPT | Mod: RT

## 2025-09-19 ENCOUNTER — APPOINTMENT (OUTPATIENT)
Dept: PULMONOLOGY | Facility: CLINIC | Age: 70
End: 2025-09-19

## (undated) DEVICE — PACK HAND

## (undated) DEVICE — DRAPE TOWEL BLUE 17" X 24"

## (undated) DEVICE — DRSG 2X2

## (undated) DEVICE — CATH IV SAFE BC 22G X 1" (BLUE)

## (undated) DEVICE — CONTAINER FORMALIN 80ML YELLOW

## (undated) DEVICE — Device

## (undated) DEVICE — DRSG STERISTRIPS 0.5 X 4"

## (undated) DEVICE — LUBRICATING JELLY HR ONE SHOT 3G

## (undated) DEVICE — DRSG DERMABOND 0.7ML

## (undated) DEVICE — BITE BLOCK ADULT 20 X 27MM (GREEN)

## (undated) DEVICE — TOURNIQUET CUFF 12" DUAL PORT W PLC

## (undated) DEVICE — TUBING SUCTION NONCONDUCTIVE 6MM X 12FT

## (undated) DEVICE — TUBING IV SET GRAVITY 3Y 100" MACRO

## (undated) DEVICE — BIOPSY FORCEP RADIAL JAW 4 STANDARD WITH NEEDLE

## (undated) DEVICE — DRSG BANDAID 0.75X3"

## (undated) DEVICE — SUT FIBERWIRE 0 38" BLUE

## (undated) DEVICE — TUBING MEDI-VAC W MAXIGRIP CONNECTORS 1/4"X6'

## (undated) DEVICE — CLAMP BX HOT RAD JAW 3

## (undated) DEVICE — PACK IV START WITH CHG

## (undated) DEVICE — DRSG CURITY GAUZE SPONGE 4 X 4" 12-PLY NON-STERILE

## (undated) DEVICE — BIOPSY FORCEP COLD DISP

## (undated) DEVICE — DENTURE CUP PINK

## (undated) DEVICE — ELCTR GROUNDING PAD ADULT COVIDIEN

## (undated) DEVICE — SOL IRR POUR NS 0.9% 500ML

## (undated) DEVICE — SUT FIBERWIRE 3-0 18" BLUE TAP NDL 15MM 3/8 CIRCLE

## (undated) DEVICE — ELCTR ECG CONDUCTIVE ADHESIVE

## (undated) DEVICE — UNDERPAD LINEN SAVER 17 X 24"

## (undated) DEVICE — GOWN LG

## (undated) DEVICE — SALIVA EJECTOR (BLUE)

## (undated) DEVICE — MEDICATION LABELS W MARKER

## (undated) DEVICE — TOURNIQUET CUFF 18" DUAL PORT SINGLE BLADDER W PLC  (BLACK)

## (undated) DEVICE — BASIN EMESIS 10IN GRADUATED MAUVE

## (undated) DEVICE — PREP CHLORAPREP HI-LITE ORANGE 26ML

## (undated) DEVICE — SUT MONOCRYL 5-0 18" P-3 UNDYED

## (undated) DEVICE — DRSG COBAN 2" LF STERILE

## (undated) DEVICE — LINE BREATHE SAMPLNG

## (undated) DEVICE — SAW BLADE STRYKER PRECISION THIN TYPE 9 X 0.38 X 18.5MM